# Patient Record
Sex: MALE | Race: WHITE
[De-identification: names, ages, dates, MRNs, and addresses within clinical notes are randomized per-mention and may not be internally consistent; named-entity substitution may affect disease eponyms.]

---

## 2017-03-23 ENCOUNTER — APPOINTMENT (OUTPATIENT)
Dept: OPHTHALMOLOGY | Facility: CLINIC | Age: 82
End: 2017-03-23

## 2017-06-22 ENCOUNTER — APPOINTMENT (OUTPATIENT)
Dept: OPHTHALMOLOGY | Facility: CLINIC | Age: 82
End: 2017-06-22

## 2017-12-07 ENCOUNTER — APPOINTMENT (OUTPATIENT)
Dept: OPHTHALMOLOGY | Facility: CLINIC | Age: 82
End: 2017-12-07
Payer: MEDICARE

## 2017-12-07 PROCEDURE — 92014 COMPRE OPH EXAM EST PT 1/>: CPT

## 2018-06-28 ENCOUNTER — APPOINTMENT (OUTPATIENT)
Dept: OPHTHALMOLOGY | Facility: CLINIC | Age: 83
End: 2018-06-28
Payer: MEDICARE

## 2018-06-28 PROCEDURE — 92014 COMPRE OPH EXAM EST PT 1/>: CPT

## 2018-07-19 ENCOUNTER — APPOINTMENT (OUTPATIENT)
Dept: OPHTHALMOLOGY | Facility: CLINIC | Age: 83
End: 2018-07-19
Payer: MEDICARE

## 2018-07-19 PROCEDURE — 92012 INTRM OPH EXAM EST PATIENT: CPT | Mod: NC

## 2018-12-13 ENCOUNTER — APPOINTMENT (OUTPATIENT)
Dept: OPHTHALMOLOGY | Facility: CLINIC | Age: 83
End: 2018-12-13

## 2019-06-27 ENCOUNTER — APPOINTMENT (OUTPATIENT)
Dept: OPHTHALMOLOGY | Facility: CLINIC | Age: 84
End: 2019-06-27
Payer: MEDICARE

## 2019-06-27 ENCOUNTER — NON-APPOINTMENT (OUTPATIENT)
Age: 84
End: 2019-06-27

## 2019-06-27 PROCEDURE — 92014 COMPRE OPH EXAM EST PT 1/>: CPT

## 2019-10-31 ENCOUNTER — RECORD ABSTRACTING (OUTPATIENT)
Age: 84
End: 2019-10-31

## 2019-10-31 DIAGNOSIS — M48.00 SPINAL STENOSIS, SITE UNSPECIFIED: ICD-10-CM

## 2019-10-31 DIAGNOSIS — F41.9 ANXIETY DISORDER, UNSPECIFIED: ICD-10-CM

## 2019-10-31 RX ORDER — TERAZOSIN 5 MG/1
5 CAPSULE ORAL
Refills: 0 | Status: ACTIVE | COMMUNITY

## 2019-10-31 RX ORDER — FINASTERIDE 5 MG/1
5 TABLET, FILM COATED ORAL
Qty: 90 | Refills: 3 | Status: ACTIVE | COMMUNITY

## 2019-10-31 RX ORDER — NITROGLYCERIN 0.4 MG/1
0.4 TABLET SUBLINGUAL
Refills: 0 | Status: ACTIVE | COMMUNITY

## 2019-10-31 RX ORDER — ASPIRIN 81 MG
81 TABLET, DELAYED RELEASE (ENTERIC COATED) ORAL DAILY
Refills: 0 | Status: ACTIVE | COMMUNITY

## 2019-10-31 RX ORDER — DIAZEPAM 5 MG/1
5 TABLET ORAL
Refills: 0 | Status: ACTIVE | COMMUNITY

## 2019-12-26 ENCOUNTER — APPOINTMENT (OUTPATIENT)
Dept: CARDIOLOGY | Facility: CLINIC | Age: 84
End: 2019-12-26
Payer: MEDICARE

## 2019-12-26 ENCOUNTER — RX RENEWAL (OUTPATIENT)
Age: 84
End: 2019-12-26

## 2019-12-26 ENCOUNTER — NON-APPOINTMENT (OUTPATIENT)
Age: 84
End: 2019-12-26

## 2019-12-26 ENCOUNTER — MEDICATION RENEWAL (OUTPATIENT)
Age: 84
End: 2019-12-26

## 2019-12-26 VITALS
HEIGHT: 67 IN | SYSTOLIC BLOOD PRESSURE: 100 MMHG | BODY MASS INDEX: 25.58 KG/M2 | DIASTOLIC BLOOD PRESSURE: 60 MMHG | RESPIRATION RATE: 15 BRPM | HEART RATE: 56 BPM | WEIGHT: 163 LBS

## 2019-12-26 PROCEDURE — 99205 OFFICE O/P NEW HI 60 MIN: CPT

## 2019-12-26 PROCEDURE — 93306 TTE W/DOPPLER COMPLETE: CPT

## 2019-12-26 PROCEDURE — 93000 ELECTROCARDIOGRAM COMPLETE: CPT

## 2020-01-13 NOTE — ASSESSMENT
[FreeTextEntry1] : ECG- Sinus margarita at 56 bpm, 1'AVB, LBBB pattern (unchanged)\par \par Lab data 11/19/2019\par Chol. 123\par LDL    54\par HDL   59\par Tri.    48\par Creat. 1.31\par HGB 12.0\par \par Echo 12/26/19\par EF 45%\par Basal and mid inferior  wall severe Hypokinesis\par ischemic cardiomyopathy\par Normal LV size and function\par Mod. to severe MR\par Mild to mod. aortic aortic regurgitation \par Large mid to distal abdominal aortic aneurysm measuring 5.90 cm\par \par Impression\par   87 year old male with history of inferior infarction, mild LV dysfunction s/p  a quadruple bypass 1997 with 2   graft occlusions:\par   s/p  stenting of OM1 in  2008,  presenting to continue cardiac management as he recently moved to   [place]   from the [place].\par \par 1. HX of HTN, BP today 100/60 and has been stable. No dizzy spells\par \par 2. Lipids for review reasonably controlled with Simvastatin therapy. \par     Creat. of 1.3, seems to be baseline, no HX of renal  insufficiency.\par \par 3. No anginal discomfort at rest or with exercise. Last stress test from 4/21/10 showed Anterior ischemia.\par \par 4. There has been several years passed since any non invasive testing has been completed to reassess CAD     status.\par \par 5. Known large  aortic abnormal aneurysm which is being managed by [facility]/ DR. Pinto. No records are available for review. Brief scan today showed it was > 5 cm\par He is asymptomatic in regards to this.\par \par \par 6. Left bundle bloc  pattern chronic on ECG.\par \par Plan\par 1. Continue all medications as prescribed\par \par Although Mr. Collazo has a significant cardiac history, as outlined above,  he seems to be stable from a cardiac standpoint without any associated symptoms of HF or angina\par \par He knows to contact my office if there is any chest pain or SOB.\par \par 2. Ask that any testing/exams or consults from Dr. Mahsa Pinto re the AAA be forwarded for review.\par \par 3. Continue to F/U with PCP as scheduled and have all blood work faxed to our office. \par Clinical follow up in 6 months\par

## 2020-01-13 NOTE — PHYSICAL EXAM
[General Appearance - Well Developed] : well developed [Normal Appearance] : normal appearance [Normal Conjunctiva] : the conjunctiva exhibited no abnormalities [Eyelids - No Xanthelasma] : the eyelids demonstrated no xanthelasmas [Normal Oral Mucosa] : normal oral mucosa [No Oral Pallor] : no oral pallor [No Oral Cyanosis] : no oral cyanosis [Normal Jugular Venous V Waves Present] : normal jugular venous V waves present [Normal Jugular Venous A Waves Present] : normal jugular venous A waves present [Heart Sounds] : normal S1 and S2 [Heart Rate And Rhythm] : heart rate and rhythm were normal [Murmurs] : no murmurs present [Edema] : no peripheral edema present [Arterial Pulses Normal] : the arterial pulses were normal [] : no respiratory distress [Exaggerated Use Of Accessory Muscles For Inspiration] : no accessory muscle use [Respiration, Rhythm And Depth] : normal respiratory rhythm and effort [Abdomen Tenderness] : non-tender [Abdomen Soft] : soft [Bowel Sounds] : normal bowel sounds [Nail Clubbing] : no clubbing of the fingernails [Abnormal Walk] : normal gait [No Venous Stasis] : no venous stasis [Skin Color & Pigmentation] : normal skin color and pigmentation [No Skin Ulcers] : no skin ulcer [Oriented To Time, Place, And Person] : oriented to person, place, and time [Affect] : the affect was normal [Memory Recent] : recent memory was not impaired [FreeTextEntry1] : Uses cane

## 2020-01-13 NOTE — ADDENDUM
[FreeTextEntry1] : Telephone conversation today 1/13/20. Vascular evaluation at [facility] revealed that the aneurysm is now 5.2 cm. This represents a 1.5 cm growth and the last 3 months and a 4.5 cm growth in the last 9 months.\par A fenestrated graft is anticipated as a means of repair to allow for renal artery stenting. Cut off this is about 5.5 cm at the center. This will be reassessed in June.\par We have discussed the need for preoperative evaluation to include pharmacologic stress testing which will be facilitated shortly.

## 2020-01-13 NOTE — HISTORY OF PRESENT ILLNESS
[FreeTextEntry1] : Recent moved from the [place] to [place] and [facility].\par \par His Coronary history includes inferior infarction with  a quadruple bypass 10/6/97 \par LIMA to LAD\par Vein graft to PDA\par Vein graft to posterior LV branch and diagonal\par \par Cardiac catheterization 5/13/8 revealed occluded vein graft to diagonal and posterior LV branch\par LIMA to LAD and vein graft to PDA patent\par He had  OM1 stented \par These interventions all  done at [facility]. He doesn’t remember the exact symptoms that prompted intervention.\par \par Generally speaking he feel well, denies any SOB, chest pain, palpitations or edema. He ambulates slowly with a cane.\par \par Previously a patient of Dr. Isael Navarro. Last echo completed in 2014 revealed an EF of 55% with mild- moderate valvular regurgitation and mild left ventricular dilation\par \par Stress test completed in 4/21/10 revealing moderate to large sized region of ischemia involving the anterior wall and anterior septum.\par \par Currently being worked up for an aneurysm of the abdominal aorta through [facility]/ Dr.Karin Pinto with whom he will continue to follow up \par \par An echo completed on 12/26/19 will be discussed

## 2020-01-13 NOTE — REASON FOR VISIT
[FreeTextEntry1] : This is a 87 year old male presenting to continue management of cardiac care with a known history of:\par \par 1. Abdominal Aortic aneurysm\par 2.CAD with stenting of the  OM1  in 2008 and CABG x 4 1997\par 3. Chest pain syndrome\par 4. Claudication\par 5. inferior wall MI 1997\par 6. HTN\par 7. PAD

## 2020-01-13 NOTE — REVIEW OF SYSTEMS
[Feeling Fatigued] : feeling fatigued [Joint Pain] : joint pain [Joint Stiffness] : joint stiffness [Negative] : Heme/Lymph [Recent Weight Gain (___ Lbs)] : no recent weight gain [Recent Weight Loss (___ Lbs)] : no recent weight loss

## 2020-01-28 ENCOUNTER — INPATIENT (INPATIENT)
Facility: HOSPITAL | Age: 85
LOS: 1 days | Discharge: HOME | DRG: 641 | End: 2020-01-30
Attending: HOSPITALIST | Admitting: HOSPITALIST
Payer: MEDICARE

## 2020-01-28 ENCOUNTER — MOBILE ON CALL (OUTPATIENT)
Age: 85
End: 2020-01-28

## 2020-01-28 VITALS
RESPIRATION RATE: 18 BRPM | SYSTOLIC BLOOD PRESSURE: 178 MMHG | OXYGEN SATURATION: 98 % | HEART RATE: 85 BPM | TEMPERATURE: 99 F | DIASTOLIC BLOOD PRESSURE: 80 MMHG

## 2020-01-28 DIAGNOSIS — I10 ESSENTIAL (PRIMARY) HYPERTENSION: ICD-10-CM

## 2020-01-28 DIAGNOSIS — E78.5 HYPERLIPIDEMIA, UNSPECIFIED: ICD-10-CM

## 2020-01-28 DIAGNOSIS — Z98.890 OTHER SPECIFIED POSTPROCEDURAL STATES: Chronic | ICD-10-CM

## 2020-01-28 DIAGNOSIS — E86.0 DEHYDRATION: ICD-10-CM

## 2020-01-28 DIAGNOSIS — Z96.641 PRESENCE OF RIGHT ARTIFICIAL HIP JOINT: Chronic | ICD-10-CM

## 2020-01-28 DIAGNOSIS — Z95.1 PRESENCE OF AORTOCORONARY BYPASS GRAFT: Chronic | ICD-10-CM

## 2020-01-28 DIAGNOSIS — R55 SYNCOPE AND COLLAPSE: ICD-10-CM

## 2020-01-28 DIAGNOSIS — F41.9 ANXIETY DISORDER, UNSPECIFIED: ICD-10-CM

## 2020-01-28 DIAGNOSIS — I71.4 ABDOMINAL AORTIC ANEURYSM, WITHOUT RUPTURE: ICD-10-CM

## 2020-01-28 DIAGNOSIS — N40.0 BENIGN PROSTATIC HYPERPLASIA WITHOUT LOWER URINARY TRACT SYMPTOMS: ICD-10-CM

## 2020-01-28 DIAGNOSIS — I25.10 ATHEROSCLEROTIC HEART DISEASE OF NATIVE CORONARY ARTERY WITHOUT ANGINA PECTORIS: Chronic | ICD-10-CM

## 2020-01-28 LAB
ALBUMIN SERPL ELPH-MCNC: 3.7 G/DL — SIGNIFICANT CHANGE UP (ref 3.3–5.2)
ALP SERPL-CCNC: 85 U/L — SIGNIFICANT CHANGE UP (ref 40–120)
ALT FLD-CCNC: 9 U/L — SIGNIFICANT CHANGE UP
ANION GAP SERPL CALC-SCNC: 10 MMOL/L — SIGNIFICANT CHANGE UP (ref 5–17)
APPEARANCE UR: CLEAR — SIGNIFICANT CHANGE UP
AST SERPL-CCNC: 16 U/L — SIGNIFICANT CHANGE UP
BACTERIA # UR AUTO: ABNORMAL
BASOPHILS # BLD AUTO: 0 K/UL — SIGNIFICANT CHANGE UP (ref 0–0.2)
BASOPHILS NFR BLD AUTO: 0 % — SIGNIFICANT CHANGE UP (ref 0–2)
BILIRUB SERPL-MCNC: 1.1 MG/DL — SIGNIFICANT CHANGE UP (ref 0.4–2)
BILIRUB UR-MCNC: NEGATIVE — SIGNIFICANT CHANGE UP
BUN SERPL-MCNC: 25 MG/DL — HIGH (ref 8–20)
CALCIUM SERPL-MCNC: 9 MG/DL — SIGNIFICANT CHANGE UP (ref 8.6–10.2)
CHLORIDE SERPL-SCNC: 99 MMOL/L — SIGNIFICANT CHANGE UP (ref 98–107)
CO2 SERPL-SCNC: 27 MMOL/L — SIGNIFICANT CHANGE UP (ref 22–29)
COLOR SPEC: YELLOW — SIGNIFICANT CHANGE UP
CREAT SERPL-MCNC: 1.34 MG/DL — HIGH (ref 0.5–1.3)
DIFF PNL FLD: ABNORMAL
EOSINOPHIL # BLD AUTO: 0 K/UL — SIGNIFICANT CHANGE UP (ref 0–0.5)
EOSINOPHIL NFR BLD AUTO: 0 % — SIGNIFICANT CHANGE UP (ref 0–6)
EPI CELLS # UR: SIGNIFICANT CHANGE UP
GIANT PLATELETS BLD QL SMEAR: PRESENT — SIGNIFICANT CHANGE UP
GLUCOSE SERPL-MCNC: 148 MG/DL — HIGH (ref 70–99)
GLUCOSE UR QL: NEGATIVE MG/DL — SIGNIFICANT CHANGE UP
HCT VFR BLD CALC: 37.2 % — LOW (ref 39–50)
HGB BLD-MCNC: 12 G/DL — LOW (ref 13–17)
KETONES UR-MCNC: NEGATIVE — SIGNIFICANT CHANGE UP
LACTATE BLDV-MCNC: 0.9 MMOL/L — SIGNIFICANT CHANGE UP (ref 0.5–2)
LACTATE BLDV-MCNC: 2.7 MMOL/L — HIGH (ref 0.5–2)
LEUKOCYTE ESTERASE UR-ACNC: NEGATIVE — SIGNIFICANT CHANGE UP
LYMPHOCYTES # BLD AUTO: 0.13 K/UL — LOW (ref 1–3.3)
LYMPHOCYTES # BLD AUTO: 1.7 % — LOW (ref 13–44)
MAGNESIUM SERPL-MCNC: 1.8 MG/DL — SIGNIFICANT CHANGE UP (ref 1.8–2.6)
MANUAL SMEAR VERIFICATION: SIGNIFICANT CHANGE UP
MCHC RBC-ENTMCNC: 30.8 PG — SIGNIFICANT CHANGE UP (ref 27–34)
MCHC RBC-ENTMCNC: 32.3 GM/DL — SIGNIFICANT CHANGE UP (ref 32–36)
MCV RBC AUTO: 95.4 FL — SIGNIFICANT CHANGE UP (ref 80–100)
MONOCYTES # BLD AUTO: 0.27 K/UL — SIGNIFICANT CHANGE UP (ref 0–0.9)
MONOCYTES NFR BLD AUTO: 3.5 % — SIGNIFICANT CHANGE UP (ref 2–14)
MYELOCYTES NFR BLD: 0.9 % — HIGH (ref 0–0)
NEUTROPHILS # BLD AUTO: 7.23 K/UL — SIGNIFICANT CHANGE UP (ref 1.8–7.4)
NEUTROPHILS NFR BLD AUTO: 91.3 % — HIGH (ref 43–77)
NEUTS BAND # BLD: 2.6 % — SIGNIFICANT CHANGE UP (ref 0–8)
NITRITE UR-MCNC: NEGATIVE — SIGNIFICANT CHANGE UP
NT-PROBNP SERPL-SCNC: 1804 PG/ML — HIGH (ref 0–300)
PH UR: 7 — SIGNIFICANT CHANGE UP (ref 5–8)
PLAT MORPH BLD: NORMAL — SIGNIFICANT CHANGE UP
PLATELET # BLD AUTO: 143 K/UL — LOW (ref 150–400)
POTASSIUM SERPL-MCNC: 5.2 MMOL/L — SIGNIFICANT CHANGE UP (ref 3.5–5.3)
POTASSIUM SERPL-SCNC: 5.2 MMOL/L — SIGNIFICANT CHANGE UP (ref 3.5–5.3)
PROT SERPL-MCNC: 6.3 G/DL — LOW (ref 6.6–8.7)
PROT UR-MCNC: 30 MG/DL
RAPID RVP RESULT: SIGNIFICANT CHANGE UP
RBC # BLD: 3.9 M/UL — LOW (ref 4.2–5.8)
RBC # FLD: 13.1 % — SIGNIFICANT CHANGE UP (ref 10.3–14.5)
RBC BLD AUTO: NORMAL — SIGNIFICANT CHANGE UP
RBC CASTS # UR COMP ASSIST: >50 /HPF (ref 0–4)
SODIUM SERPL-SCNC: 136 MMOL/L — SIGNIFICANT CHANGE UP (ref 135–145)
SP GR SPEC: 1 — LOW (ref 1.01–1.02)
TROPONIN T SERPL-MCNC: <0.01 NG/ML — SIGNIFICANT CHANGE UP (ref 0–0.06)
UROBILINOGEN FLD QL: NEGATIVE MG/DL — SIGNIFICANT CHANGE UP
WBC # BLD: 7.7 K/UL — SIGNIFICANT CHANGE UP (ref 3.8–10.5)
WBC # FLD AUTO: 7.7 K/UL — SIGNIFICANT CHANGE UP (ref 3.8–10.5)
WBC UR QL: SIGNIFICANT CHANGE UP

## 2020-01-28 PROCEDURE — 93010 ELECTROCARDIOGRAM REPORT: CPT

## 2020-01-28 PROCEDURE — 99285 EMERGENCY DEPT VISIT HI MDM: CPT | Mod: GC

## 2020-01-28 PROCEDURE — 99222 1ST HOSP IP/OBS MODERATE 55: CPT

## 2020-01-28 PROCEDURE — 71046 X-RAY EXAM CHEST 2 VIEWS: CPT | Mod: 26

## 2020-01-28 PROCEDURE — 74176 CT ABD & PELVIS W/O CONTRAST: CPT | Mod: 26

## 2020-01-28 RX ORDER — FINASTERIDE 5 MG/1
5 TABLET, FILM COATED ORAL AT BEDTIME
Refills: 0 | Status: DISCONTINUED | OUTPATIENT
Start: 2020-01-28 | End: 2020-01-30

## 2020-01-28 RX ORDER — DIAZEPAM 5 MG
5 TABLET ORAL THREE TIMES A DAY
Refills: 0 | Status: DISCONTINUED | OUTPATIENT
Start: 2020-01-28 | End: 2020-01-30

## 2020-01-28 RX ORDER — CEFTRIAXONE 500 MG/1
1000 INJECTION, POWDER, FOR SOLUTION INTRAMUSCULAR; INTRAVENOUS ONCE
Refills: 0 | Status: COMPLETED | OUTPATIENT
Start: 2020-01-28 | End: 2020-01-28

## 2020-01-28 RX ORDER — ASPIRIN/CALCIUM CARB/MAGNESIUM 324 MG
162 TABLET ORAL AT BEDTIME
Refills: 0 | Status: DISCONTINUED | OUTPATIENT
Start: 2020-01-28 | End: 2020-01-29

## 2020-01-28 RX ORDER — SODIUM CHLORIDE 9 MG/ML
2000 INJECTION INTRAMUSCULAR; INTRAVENOUS; SUBCUTANEOUS ONCE
Refills: 0 | Status: COMPLETED | OUTPATIENT
Start: 2020-01-28 | End: 2020-01-28

## 2020-01-28 RX ORDER — METOPROLOL TARTRATE 50 MG
25 TABLET ORAL
Refills: 0 | Status: DISCONTINUED | OUTPATIENT
Start: 2020-01-28 | End: 2020-01-30

## 2020-01-28 RX ORDER — ACETAMINOPHEN 500 MG
650 TABLET ORAL EVERY 6 HOURS
Refills: 0 | Status: DISCONTINUED | OUTPATIENT
Start: 2020-01-28 | End: 2020-01-30

## 2020-01-28 RX ORDER — IBUPROFEN 200 MG
400 TABLET ORAL ONCE
Refills: 0 | Status: COMPLETED | OUTPATIENT
Start: 2020-01-28 | End: 2020-01-28

## 2020-01-28 RX ORDER — DOXAZOSIN MESYLATE 4 MG
4 TABLET ORAL AT BEDTIME
Refills: 0 | Status: DISCONTINUED | OUTPATIENT
Start: 2020-01-28 | End: 2020-01-30

## 2020-01-28 RX ORDER — LISINOPRIL 2.5 MG/1
10 TABLET ORAL AT BEDTIME
Refills: 0 | Status: DISCONTINUED | OUTPATIENT
Start: 2020-01-28 | End: 2020-01-30

## 2020-01-28 RX ORDER — SENNA PLUS 8.6 MG/1
2 TABLET ORAL AT BEDTIME
Refills: 0 | Status: DISCONTINUED | OUTPATIENT
Start: 2020-01-28 | End: 2020-01-30

## 2020-01-28 RX ORDER — MAGNESIUM SULFATE 500 MG/ML
2 VIAL (ML) INJECTION ONCE
Refills: 0 | Status: COMPLETED | OUTPATIENT
Start: 2020-01-28 | End: 2020-01-28

## 2020-01-28 RX ORDER — ISOSORBIDE MONONITRATE 60 MG/1
60 TABLET, EXTENDED RELEASE ORAL DAILY
Refills: 0 | Status: DISCONTINUED | OUTPATIENT
Start: 2020-01-28 | End: 2020-01-30

## 2020-01-28 RX ORDER — ACETAMINOPHEN 500 MG
650 TABLET ORAL ONCE
Refills: 0 | Status: COMPLETED | OUTPATIENT
Start: 2020-01-28 | End: 2020-01-28

## 2020-01-28 RX ORDER — SIMVASTATIN 20 MG/1
20 TABLET, FILM COATED ORAL AT BEDTIME
Refills: 0 | Status: DISCONTINUED | OUTPATIENT
Start: 2020-01-28 | End: 2020-01-30

## 2020-01-28 RX ORDER — SODIUM CHLORIDE 9 MG/ML
1000 INJECTION INTRAMUSCULAR; INTRAVENOUS; SUBCUTANEOUS
Refills: 0 | Status: DISCONTINUED | OUTPATIENT
Start: 2020-01-28 | End: 2020-01-30

## 2020-01-28 RX ADMIN — Medication 400 MILLIGRAM(S): at 18:43

## 2020-01-28 RX ADMIN — Medication 4 MILLIGRAM(S): at 23:56

## 2020-01-28 RX ADMIN — CEFTRIAXONE 100 MILLIGRAM(S): 500 INJECTION, POWDER, FOR SOLUTION INTRAMUSCULAR; INTRAVENOUS at 17:01

## 2020-01-28 RX ADMIN — SODIUM CHLORIDE 100 MILLILITER(S): 9 INJECTION INTRAMUSCULAR; INTRAVENOUS; SUBCUTANEOUS at 23:00

## 2020-01-28 RX ADMIN — FINASTERIDE 5 MILLIGRAM(S): 5 TABLET, FILM COATED ORAL at 23:55

## 2020-01-28 RX ADMIN — Medication 650 MILLIGRAM(S): at 17:56

## 2020-01-28 RX ADMIN — Medication 25 MILLIGRAM(S): at 23:55

## 2020-01-28 RX ADMIN — Medication 400 MILLIGRAM(S): at 20:00

## 2020-01-28 RX ADMIN — Medication 650 MILLIGRAM(S): at 14:31

## 2020-01-28 RX ADMIN — SENNA PLUS 2 TABLET(S): 8.6 TABLET ORAL at 23:56

## 2020-01-28 RX ADMIN — LISINOPRIL 10 MILLIGRAM(S): 2.5 TABLET ORAL at 23:55

## 2020-01-28 RX ADMIN — Medication 2 GRAM(S): at 18:43

## 2020-01-28 RX ADMIN — Medication 50 GRAM(S): at 17:55

## 2020-01-28 RX ADMIN — SODIUM CHLORIDE 2000 MILLILITER(S): 9 INJECTION INTRAMUSCULAR; INTRAVENOUS; SUBCUTANEOUS at 14:31

## 2020-01-28 RX ADMIN — Medication 162 MILLIGRAM(S): at 23:55

## 2020-01-28 RX ADMIN — CEFTRIAXONE 1000 MILLIGRAM(S): 500 INJECTION, POWDER, FOR SOLUTION INTRAMUSCULAR; INTRAVENOUS at 18:44

## 2020-01-28 RX ADMIN — SODIUM CHLORIDE 2000 MILLILITER(S): 9 INJECTION INTRAMUSCULAR; INTRAVENOUS; SUBCUTANEOUS at 15:40

## 2020-01-28 RX ADMIN — SIMVASTATIN 20 MILLIGRAM(S): 20 TABLET, FILM COATED ORAL at 23:55

## 2020-01-28 NOTE — H&P ADULT - NSHPPHYSICALEXAM_GEN_ALL_CORE
Vital Signs Last 24 Hrs  T(C): 37.2 (28 Jan 2020 23:28), Max: 39 (28 Jan 2020 13:47)  T(F): 98.9 (28 Jan 2020 23:28), Max: 102.2 (28 Jan 2020 13:47)  HR: 88 (28 Jan 2020 23:28) (78 - 93)  BP: 133/69 (28 Jan 2020 23:28) (124/75 - 178/80)  RR: 17 (28 Jan 2020 23:28) (17 - 20)  SpO2: 96% (28 Jan 2020 23:28) (95% - 99%)    Constitutional/General: Well developed, well nourished, vitals reviewed  EYE: PERRL, conjunctiva clear  ENT: Good dentition, oropharynx clear  NECK: No masses, thyroid normal  RESP: CTAB, no wheezes, no rhonchi, normal effort  CV: RRR, normal S1S2, no murmur, 2+ DP pulses, no JVD, no edema  ABDOMEN: Soft, nontender, no HSM  LYMPH: No cervical or supraclavicular adenopathy  SKIN: Warm, dry, no rashes  NEURO: CN II-XII intact grossly, sensation intact  PSYCHIATRIC: Oriented x3, normal mood and affect

## 2020-01-28 NOTE — H&P ADULT - HISTORY OF PRESENT ILLNESS
ED HPI: Pt is an 87 y.o. M hx of HTN, AAA, Cardiac Bypass 97', cardiac stent x1, CAD on ASA, presenting with a presycnopal event earlier today. The pt was walking in his [facility] assisted living facility, within his unit when he felt lightheaded, fell backward, denies head trauma, denies LOC, but hit his left wrist sustaining an abrasion which was bandaged. Was able to stand and go to chair within one minute. +nausea. Denies chest pain, chest pressure, diaphoresis, vomiting, abdominal pain/pressure, diarrhea or constipation. The pt believes he had a 'stomach bug' three to four days ago given lack of appetite. Of note the pt had a CT scan last week measuring his AAA at 5.2 cm.    Above ED HPi reviewed with patient and noted. patient reports that for the past two days felling "unwell" with some abdominal discomfort, no nausea or vomiting, but loss of appetite. One day prior to arrival his loss of appetite worsened and he developed generalized fatigue and weakness. while at his kitchen sink felt lightheaded, fell back, hitting his left wrist, no loss of consciousness, no head trauma, ED HPI: Pt is an 87 y.o. M hx of HTN, AAA, Cardiac Bypass 97', cardiac stent x1, CAD on ASA, presenting with a presycnopal event earlier today. The pt was walking in his [facility] assisted living facility, within his unit when he felt lightheaded, fell backward, denies head trauma, denies LOC, but hit his left wrist sustaining an abrasion which was bandaged. Was able to stand and go to chair within one minute. +nausea. Denies chest pain, chest pressure, diaphoresis, vomiting, abdominal pain/pressure, diarrhea or constipation. The pt believes he had a 'stomach bug' three to four days ago given lack of appetite. Of note the pt had a CT scan last week measuring his AAA at 5.2 cm.    Above ED HPi reviewed with patient and noted. patient reports that for the past two days felling "unwell" with some abdominal discomfort, no nausea or vomiting, but loss of appetite. One day prior to arrival his loss of appetite worsened and he developed generalized fatigue and weakness. while at his kitchen sink felt lightheaded, fell back, hitting his left wrist, no loss of consciousness, no head trauma. He was then able to crawl to his sofa and call the facility who then advised him to seek medical attention. He has no prior history of such events.

## 2020-01-28 NOTE — H&P ADULT - NSICDXPASTSURGICALHX_GEN_ALL_CORE_FT
PAST SURGICAL HISTORY:  CAD (coronary artery disease) s/p stent placed    History of right hip replacement     S/P CABG (coronary artery bypass graft)     S/P left inguinal hernia repair

## 2020-01-28 NOTE — ED ADULT NURSE NOTE - OBJECTIVE STATEMENT
pt states 'I feel like I did when I had the stomach virus the other day"   pt was walking back from breakfast with cane and fell   sustaining skin tear to LEFT wrist.   'I am ok' 'I just feel like I am getting the stomach virus again"  son at bedside

## 2020-01-28 NOTE — ED PROVIDER NOTE - PSH
CAD (coronary artery disease)  s/p stent placed  History of right hip replacement    S/P CABG (coronary artery bypass graft)    S/P left inguinal hernia repair

## 2020-01-28 NOTE — ED PROVIDER NOTE - ATTENDING CONTRIBUTION TO CARE
HPI: 88yo male PMH HTN, AAA (last measured 5.2cm 3 weeks ago,), Coronary Artery Disease s/p CABG presenting with episode of near-syncope. Patient states he and his wife have been sick with similar symptoms at home- have felt nauseous, no appetitie, +chills. No vomiting or diarrhea. Patient states that he stood up, felt lightheaded and felt like he was about to pass out and fell backward. Denies hitting head but +abrasion to L wrist.     PE:  Gen: NAD  Head: NCAT  HEENT: Oral mucosa dry, normal conjunctiva  CV: RRR no murmurs, normal perfusion  Resp: CTA b/l, no wheezing, rales, rhonchi, no respiratory distress  GI: Abd Soft minimally tender epigastric region no guarding/rigidity no pulsatile mass  Neuro: No focal neuro deficits  MSK: FROM all 4 extremities, no deformity  Skin: No rash, no bruising  Psych: Normal affect    MDM: 88yo male with fever, nausea, near-syncopal episode, clinically dehydrated. Source viral vs UTI (given rocephin to cover). WIll admit to hospital for telemetry monitoring, IV fluids, and antibiotics. Eboni Dunne DO         I performed a history and physical exam of the patient and discussed their management with the resident. I reviewed the resident's note and agree with the documented findings and plan of care. My medical decision making and observations are found above.

## 2020-01-28 NOTE — H&P ADULT - PROBLEM SELECTOR PLAN 1
Admit to medical unit with monitor  Cardiology consult, reviewed, agree with assessment  Fall / Aspiration precautions  Code sepsis initiated on arrival, patient is status post Blood & urine culture drawn, 2L bolus, LActic (elevated at 2.7), IV Ceftriaxone. no Clear Source of infection identified at this time.   CXR: Cardiomegaly with clear lungs.   CT abd: 5.2cm AAA, known to patient with ongoing follow up in [place], next scheduled visit in June.  ECHO pending

## 2020-01-28 NOTE — ED PROVIDER NOTE - OBJECTIVE STATEMENT
Pt is an 87 y.o. M hx of HTN, AAA, Cardiac Bypass 97', cardiac stent x1, CAD on ASA, presenting with a presycnopal event earlier today. The [facility] within his unit when he felt lightheaded, felt Pt is an 87 y.o. M hx of HTN, AAA, Cardiac Bypass 97', cardiac stent x1, CAD on ASA, presenting with a presycnopal event earlier today. The pt was walking in his [facility] assisted living facility, within his unit when he felt lightheaded, fell backward, denies head trauma, denies LOC, but hit his left wrist sustaining an abrasion which was bandaged. Was able to stand and go to chair within one minute. +nausea. Denies chest pain, chest pressure, diaphoresis, vomiting, abdominal pain/pressure, diarrhea or constipation. The pt believes he had a 'stomach bug' three to four days ago given lack of appetite. Of note the pt had a CT scan last week measuring his AAA at 5.2 cm.

## 2020-01-28 NOTE — H&P ADULT - ASSESSMENT
88 y/o with HTN, AAA, Cardiac Bypass 97, CAD stent x1, sent from Assisted living facility due to syncope.

## 2020-01-28 NOTE — CONSULT NOTE ADULT - ASSESSMENT
Assessment:    Mr. Collazo is an 87 year old man with past medical history Coronary artery disease (s/p stent of OM1 in 2008, CABG x 4 in 1997; LIMA-LAD, SVG-PDA, SVG-posterior LV branch and diagonal), Inferior wall myocardial infarction, Abdominal aortic aneurysm, Hypertension, Peripheral arterial disease and reports of chest pain syndrome,           Recommendations:  Large mid to distal abdominal aortic aneurysm 5.9 cm manged by [facility] dr Assessment:  Mr. Collazo is an 87 year old man with past medical history Coronary artery disease (s/p stent of OM1 in 2008, CABG x 4 in 1997; LIMA-LAD, SVG-PDA, SVG-posterior LV branch and diagonal), Inferior wall myocardial infarction, Abdominal aortic aneurysm, CKD (Cr 1.3 baseline), Hypertension, Peripheral arterial disease and reports of chest pain syndrome, who presents with episode of presyncope and fall onto left wrist. The patient denies episode of syncope. He does report having a recent abdominal infection, likely gastroenteritis and has had decreased oral intake and decreased appetite, there is concern for dehydration/hypovolemia.    So far, less concern for ischemia, given lack of typical symptoms, ECG with no acute ischemic changes, he has chronic LBBB and initial troponin negative. Would still rule out acute coronary syndrome. Also the patient does not appear to be in decompensated heart failure on exam. Would recommend the following:     Recommendations:  [] Pre-syncope: Concern for dehydration/hypovolemia given recent gastroenteritis. Recommend supportive care and PO hydration.   [] Coronary artery disease: Symptoms appear stable. Would check repeat troponins x 2. Check echocardiogram. Continue home CV meds: Aspirin 81 mg daily, Metoprolol tartrate 25 mg BID, Ramipril 2.5 mg daily, Simvastatin 20 mg daily  [] Abdominal aortic aneurysm: Outpatient TTE with 5.9 cm aneurysm, patient follows up with [facility] with plans for re-evaluation in June per outpatient notes     Thank you for the consult. We will follow along.    Evi Delgado MD  Cardiology, [facility]

## 2020-01-28 NOTE — CONSULT NOTE ADULT - SUBJECTIVE AND OBJECTIVE BOX
PAT HOFFMANN  [id number]      HPI:  Mr. Hoffmann is an 87 year old man with past medical history Coronary artery disease (s/p stent of OM1 in 2008, CABG x 4 in 1997; LIMA-LAD, SVG-PDA, SVG-posterior LV branch and diagonal), Inferior wall myocardial infarction, Abdominal aortic aneurysm, Hypertension, Peripheral arterial disease and reports of chest pain syndrome,       ALLERGIES:  penicillin (Vomiting; Nausea)      PAST MEDICAL & SURGICAL HISTORY:  Coronary artery disease (s/p stent of OM1 in 2008, CABG x 4 in 1997; LIMA-LAD, SVG-PDA, SVG-posterior LV branch and diagonal)  Inferior wall myocardial infarction  Abdominal aortic aneurysm  Hypertension  Peripheral arterial disease  Chest pain syndrome      Home Cardiac Meds:        SOCIAL HISTORY:  Patient denies alcohol, tobacco, drug use    FAMILY HISTORY:  No pertinent family history in first degree relatives      ROS:  All 10 systems reviewed and positives noted in HPI    Objective:       Vital Signs Last 24 Hrs  T(C): 39 (28 Jan 2020 13:47), Max: 39 (28 Jan 2020 13:47)  T(F): 102.2 (28 Jan 2020 13:47), Max: 102.2 (28 Jan 2020 13:47)  HR: 93 (28 Jan 2020 14:10) (81 - 93)  BP: 168/75 (28 Jan 2020 14:10) (167/85 - 178/80)  BP(mean): --  RR: 20 (28 Jan 2020 14:10) (18 - 20)  SpO2: 95% (28 Jan 2020 14:10) (95% - 99%)    Physical Exam:   General: Patient comfortable in NAD  HEENT: NCAT, mmm, EOMI  Neck: no JVD, no carotid bruits  CVS: nl s1, split s2, no s3, no s4, no murmur or rubs, RRR  Chest: CTA b/l  Abdomen: soft, nt/nd  Extremities: No c/c/e  Neuro: A&O x3  Psych: Normal affect        LABS:                        12.0   7.70  )-----------( 143      ( 28 Jan 2020 13:20 )             37.2     01-28    136  |  99  |  25.0<H>  ----------------------------<  148<H>  5.2   |  27.0  |  1.34<H>    Ca    9.0      28 Jan 2020 13:20  Mg     1.8     01-28    TPro  6.3<L>  /  Alb  3.7  /  TBili  1.1  /  DBili  x   /  AST  16  /  ALT  9   /  AlkPhos  85  01-28    CARDIAC MARKERS ( 28 Jan 2020 13:20 )  x     / <0.01 ng/mL / x     / x     / x              Coronary angiogram (5/2018) at [facility]:  Occluded vein graft to diagonal and posterior LV branch  LIMA to LAD and vein graft to PDA patent  s/p PCI to OM1    Outpatient stress test (4/2010):  Moderate to large sized region of ischemia in the anterior wall and anteroseptum    Outpatient TTE (12/2019):  LVEF 45%  Basal and mid inferior wall severe hypokinesis  Moderate to severe mitral regurgitation  Mild to moderate aortic regurgitation  Large mid to distal abdominal aortic aneurysm 5.9 cm PAT HOFFMANN  [id number]      HPI:  Mr. Hoffmann is an 87 year old man with past medical history Coronary artery disease (s/p stent of OM1 in 2008, CABG x 4 in 1997; LIMA-LAD, SVG-PDA, SVG-posterior LV branch and diagonal), Inferior wall myocardial infarction, Abdominal aortic aneurysm, Hypertension, Peripheral arterial disease and reports of chest pain syndrome who presents with pre-syncope. The patient states that he was at home with his wife and was finished eating breakfast, when he felt dizzy as if he would pass out. He denies having an episode of syncope. He reports his wife was present. Reports that he ambulates around and has not experienced exertional angina or dyspnea. Denies leg edema and orthopnea. Denies palpitations. He reports that he has not been eating and drinking normally due to recent abdominal gastroenteritis.       ALLERGIES:  penicillin (Vomiting; Nausea)      PAST MEDICAL & SURGICAL HISTORY:  Coronary artery disease (s/p stent of OM1 in 2008, CABG x 4 in 1997; LIMA-LAD, SVG-PDA, SVG-posterior LV branch and diagonal)  Inferior wall myocardial infarction  Abdominal aortic aneurysm  Hypertension  Peripheral arterial disease  Chest pain syndrome      Home Cardiac Meds:  Aspirin 81 mg daily  Metoprolol tartrate  25 mg BID  Nitroglycerin SL  Ramipril 2.5 mg daily  Simvastatin 20 mg daily      SOCIAL HISTORY:  Patient denies alcohol, tobacco, drug use    FAMILY HISTORY:  No pertinent family history in first degree relatives      ROS:  All 10 systems reviewed and positives noted in HPI    Objective:       Vital Signs Last 24 Hrs  T(C): 39 (28 Jan 2020 13:47), Max: 39 (28 Jan 2020 13:47)  T(F): 102.2 (28 Jan 2020 13:47), Max: 102.2 (28 Jan 2020 13:47)  HR: 93 (28 Jan 2020 14:10) (81 - 93)  BP: 168/75 (28 Jan 2020 14:10) (167/85 - 178/80)  BP(mean): --  RR: 20 (28 Jan 2020 14:10) (18 - 20)  SpO2: 95% (28 Jan 2020 14:10) (95% - 99%)    Physical Exam:   General: elderly man, sitting upright, no distress  HEENT: dry oral mucosa  Neck: supple, no carotid bruits b/l   CVS: JVP ~ 7 cm H20, RRR, s1, s2, no murmurs  Chest: unlabored respirations, CTAB  Abdomen: non-distended  Extremities: mild venous skin changes and minimal lower extremity edema b/l  Neuro: A&O x3  Psych: Normal affect        LABS:                        12.0   7.70  )-----------( 143      ( 28 Jan 2020 13:20 )             37.2     01-28    136  |  99  |  25.0<H>  ----------------------------<  148<H>  5.2   |  27.0  |  1.34<H>    Ca    9.0      28 Jan 2020 13:20  Mg     1.8     01-28    TPro  6.3<L>  /  Alb  3.7  /  TBili  1.1  /  DBili  x   /  AST  16  /  ALT  9   /  AlkPhos  85  01-28    CARDIAC MARKERS ( 28 Jan 2020 13:20 )  x     / <0.01 ng/mL / x     / x     / x          ECG (1/28/2020): sinus rhythm, first degree AV block, LBBB, old inferior infarction (similar to outpatient ECG)    Coronary angiogram (5/2018) at [facility]:  Occluded vein graft to diagonal and posterior LV branch  LIMA to LAD and vein graft to PDA patent  s/p PCI to OM1    Outpatient stress test (4/2010):  Moderate to large sized region of ischemia in the anterior wall and anteroseptum    Outpatient TTE (12/2019):  LVEF 45%  Basal and mid inferior wall severe hypokinesis  Moderate to severe mitral regurgitation  Mild to moderate aortic regurgitation  Large mid to distal abdominal aortic aneurysm 5.9 cm

## 2020-01-28 NOTE — ED ADULT TRIAGE NOTE - CHIEF COMPLAINT QUOTE
Pt BIBA s/p near syncopal episode. Pt denies LOC or head injury.  VSS on arrival pt c/o Nausea. States he has been feeling generally weak x 1 week  [id number]

## 2020-01-28 NOTE — ED ADULT NURSE NOTE - CHIEF COMPLAINT QUOTE
Pt BIBA s/p near syncopal episode. Pt denies LOC or head injury.  VSS on arrival pt c/o Nausea. States he has been feeling generally weak x 1 week  [id number]

## 2020-01-28 NOTE — ED PROVIDER NOTE - PROGRESS NOTE DETAILS
Michael Ashley, Resident: Pt unchanged, pending CT scan. No complains, still nauseous but does not want an antiemetic. Physical exam unchanged from before. Magneisum level low at 1.8, will replete electrolyte. Michael Ashley, Resident: Rocephin given. Given poor PO intake, poor volume status, age and weakness pt to be admitted. Pt continues to be febrile despite tylenol given, will give motrin. Spoke with hospitalist who agrees with admission.

## 2020-01-28 NOTE — H&P ADULT - PROBLEM SELECTOR PLAN 2
Poor oral intake for past few days, with cont use of antihypertensive and Valium.   found to have DESIRAE, suspected volume depletion.   status post 2L bolus, as per sepsis protocol   cont IV hydration Normal Saline at 100ml.   will cont Rocephin for now, due to high Bandemia  f/u repeat BMP, CBC, mag, phos

## 2020-01-29 LAB
ANION GAP SERPL CALC-SCNC: 10 MMOL/L — SIGNIFICANT CHANGE UP (ref 5–17)
ANISOCYTOSIS BLD QL: SLIGHT — SIGNIFICANT CHANGE UP
BASOPHILS # BLD AUTO: 0 K/UL — SIGNIFICANT CHANGE UP (ref 0–0.2)
BASOPHILS NFR BLD AUTO: 0 % — SIGNIFICANT CHANGE UP (ref 0–2)
BUN SERPL-MCNC: 24 MG/DL — HIGH (ref 8–20)
CALCIUM SERPL-MCNC: 7.8 MG/DL — LOW (ref 8.6–10.2)
CHLORIDE SERPL-SCNC: 105 MMOL/L — SIGNIFICANT CHANGE UP (ref 98–107)
CO2 SERPL-SCNC: 22 MMOL/L — SIGNIFICANT CHANGE UP (ref 22–29)
CREAT SERPL-MCNC: 1.07 MG/DL — SIGNIFICANT CHANGE UP (ref 0.5–1.3)
CULTURE RESULTS: SIGNIFICANT CHANGE UP
ELLIPTOCYTES BLD QL SMEAR: SLIGHT — SIGNIFICANT CHANGE UP
EOSINOPHIL # BLD AUTO: 0.04 K/UL — SIGNIFICANT CHANGE UP (ref 0–0.5)
EOSINOPHIL NFR BLD AUTO: 0.9 % — SIGNIFICANT CHANGE UP (ref 0–6)
GIANT PLATELETS BLD QL SMEAR: PRESENT — SIGNIFICANT CHANGE UP
GLUCOSE SERPL-MCNC: 103 MG/DL — HIGH (ref 70–99)
HCT VFR BLD CALC: 34.6 % — LOW (ref 39–50)
HGB BLD-MCNC: 10.9 G/DL — LOW (ref 13–17)
HYPOCHROMIA BLD QL: SLIGHT — SIGNIFICANT CHANGE UP
LYMPHOCYTES # BLD AUTO: 0.3 K/UL — LOW (ref 1–3.3)
LYMPHOCYTES # BLD AUTO: 7.8 % — LOW (ref 13–44)
MACROCYTES BLD QL: SLIGHT — SIGNIFICANT CHANGE UP
MAGNESIUM SERPL-MCNC: 2.2 MG/DL — SIGNIFICANT CHANGE UP (ref 1.6–2.6)
MANUAL SMEAR VERIFICATION: SIGNIFICANT CHANGE UP
MCHC RBC-ENTMCNC: 30.2 PG — SIGNIFICANT CHANGE UP (ref 27–34)
MCHC RBC-ENTMCNC: 31.5 GM/DL — LOW (ref 32–36)
MCV RBC AUTO: 95.8 FL — SIGNIFICANT CHANGE UP (ref 80–100)
MICROCYTES BLD QL: SLIGHT — SIGNIFICANT CHANGE UP
MONOCYTES # BLD AUTO: 0.07 K/UL — SIGNIFICANT CHANGE UP (ref 0–0.9)
MONOCYTES NFR BLD AUTO: 1.7 % — LOW (ref 2–14)
NEUTROPHILS # BLD AUTO: 3.46 K/UL — SIGNIFICANT CHANGE UP (ref 1.8–7.4)
NEUTROPHILS NFR BLD AUTO: 85.2 % — HIGH (ref 43–77)
NEUTS BAND # BLD: 3.5 % — SIGNIFICANT CHANGE UP (ref 0–8)
OVALOCYTES BLD QL SMEAR: SLIGHT — SIGNIFICANT CHANGE UP
PHOSPHATE SERPL-MCNC: 2.8 MG/DL — SIGNIFICANT CHANGE UP (ref 2.4–4.7)
PLAT MORPH BLD: ABNORMAL
PLATELET # BLD AUTO: 112 K/UL — LOW (ref 150–400)
PLATELET COUNT - ESTIMATE: ABNORMAL
POIKILOCYTOSIS BLD QL AUTO: SLIGHT — SIGNIFICANT CHANGE UP
POTASSIUM SERPL-MCNC: 4.6 MMOL/L — SIGNIFICANT CHANGE UP (ref 3.5–5.3)
POTASSIUM SERPL-SCNC: 4.6 MMOL/L — SIGNIFICANT CHANGE UP (ref 3.5–5.3)
PROCALCITONIN SERPL-MCNC: 0.2 NG/ML — HIGH (ref 0.02–0.1)
RBC # BLD: 3.61 M/UL — LOW (ref 4.2–5.8)
RBC # FLD: 13.2 % — SIGNIFICANT CHANGE UP (ref 10.3–14.5)
RBC BLD AUTO: ABNORMAL
SCHISTOCYTES BLD QL AUTO: SLIGHT — SIGNIFICANT CHANGE UP
SODIUM SERPL-SCNC: 137 MMOL/L — SIGNIFICANT CHANGE UP (ref 135–145)
SPECIMEN SOURCE: SIGNIFICANT CHANGE UP
TROPONIN T SERPL-MCNC: 0.02 NG/ML — SIGNIFICANT CHANGE UP (ref 0–0.06)
TROPONIN T SERPL-MCNC: 0.02 NG/ML — SIGNIFICANT CHANGE UP (ref 0–0.06)
VARIANT LYMPHS # BLD: 0.9 % — SIGNIFICANT CHANGE UP (ref 0–6)
WBC # BLD: 3.9 K/UL — SIGNIFICANT CHANGE UP (ref 3.8–10.5)
WBC # FLD AUTO: 3.9 K/UL — SIGNIFICANT CHANGE UP (ref 3.8–10.5)

## 2020-01-29 PROCEDURE — 99232 SBSQ HOSP IP/OBS MODERATE 35: CPT

## 2020-01-29 PROCEDURE — 99233 SBSQ HOSP IP/OBS HIGH 50: CPT

## 2020-01-29 PROCEDURE — 12345: CPT | Mod: NC

## 2020-01-29 RX ORDER — CEFTRIAXONE 500 MG/1
1000 INJECTION, POWDER, FOR SOLUTION INTRAMUSCULAR; INTRAVENOUS EVERY 24 HOURS
Refills: 0 | Status: DISCONTINUED | OUTPATIENT
Start: 2020-01-29 | End: 2020-01-30

## 2020-01-29 RX ORDER — ONDANSETRON 8 MG/1
4 TABLET, FILM COATED ORAL EVERY 6 HOURS
Refills: 0 | Status: DISCONTINUED | OUTPATIENT
Start: 2020-01-29 | End: 2020-01-30

## 2020-01-29 RX ORDER — ASPIRIN/CALCIUM CARB/MAGNESIUM 324 MG
81 TABLET ORAL DAILY
Refills: 0 | Status: DISCONTINUED | OUTPATIENT
Start: 2020-01-30 | End: 2020-01-30

## 2020-01-29 RX ADMIN — CEFTRIAXONE 100 MILLIGRAM(S): 500 INJECTION, POWDER, FOR SOLUTION INTRAMUSCULAR; INTRAVENOUS at 12:54

## 2020-01-29 RX ADMIN — SIMVASTATIN 20 MILLIGRAM(S): 20 TABLET, FILM COATED ORAL at 21:36

## 2020-01-29 RX ADMIN — LISINOPRIL 10 MILLIGRAM(S): 2.5 TABLET ORAL at 21:36

## 2020-01-29 RX ADMIN — SODIUM CHLORIDE 100 MILLILITER(S): 9 INJECTION INTRAMUSCULAR; INTRAVENOUS; SUBCUTANEOUS at 17:07

## 2020-01-29 RX ADMIN — ISOSORBIDE MONONITRATE 60 MILLIGRAM(S): 60 TABLET, EXTENDED RELEASE ORAL at 08:01

## 2020-01-29 RX ADMIN — Medication 4 MILLIGRAM(S): at 21:36

## 2020-01-29 RX ADMIN — ONDANSETRON 4 MILLIGRAM(S): 8 TABLET, FILM COATED ORAL at 15:35

## 2020-01-29 RX ADMIN — Medication 25 MILLIGRAM(S): at 05:23

## 2020-01-29 RX ADMIN — Medication 5 MILLIGRAM(S): at 08:04

## 2020-01-29 RX ADMIN — Medication 25 MILLIGRAM(S): at 17:06

## 2020-01-29 RX ADMIN — FINASTERIDE 5 MILLIGRAM(S): 5 TABLET, FILM COATED ORAL at 21:36

## 2020-01-29 NOTE — PHYSICAL THERAPY INITIAL EVALUATION ADULT - GENERAL OBSERVATIONS, REHAB EVAL
Pt received in stretcher bed, + IV Loc, +Tele, breathing on RA in NAD, in 0/10 pain, agreeable to PT evaluation

## 2020-01-29 NOTE — PROGRESS NOTE ADULT - SUBJECTIVE AND OBJECTIVE BOX
CHIEF COMPLAINT/INTERVAL HISTORY:    Patient is a 87y old  Male who presents with a chief complaint of Syncope (2020 14:11)      HPI:  ED HPI: Pt is an 87 y.o. M hx of HTN, AAA, Cardiac Bypass 97', cardiac stent x1, CAD on ASA, presenting with a presycnopal event earlier today. The pt was walking in his [facility] assisted living facility, within his unit when he felt lightheaded, fell backward, denies head trauma, denies LOC, but hit his left wrist sustaining an abrasion which was bandaged. Was able to stand and go to chair within one minute. +nausea. Denies chest pain, chest pressure, diaphoresis, vomiting, abdominal pain/pressure, diarrhea or constipation. The pt believes he had a 'stomach bug' three to four days ago given lack of appetite. Of note the pt had a CT scan last week measuring his AAA at 5.2 cm.    Above ED HPi reviewed with patient and noted. patient reports that for the past two days felling "unwell" with some abdominal discomfort, no nausea or vomiting, but loss of appetite. One day prior to arrival his loss of appetite worsened and he developed generalized fatigue and weakness. while at his kitchen sink felt lightheaded, fell back, hitting his left wrist, no loss of consciousness, no head trauma. He was then able to crawl to his sofa and call the facility who then advised him to seek medical attention. He has no prior history of such events. (2020 20:48)      SUBJECTIVE & OBJECTIVE/ ROS: Pt seen and examined at bedside. Tmax of 102 yesterday but none since last night. No fever, chills, URI, diarrhea, constipation, vomiting, open wounds, dysuria, increased frequency, HA, neck stiffness.     ICU Vital Signs Last 24 Hrs  T(C): 36.9 (2020 15:31), Max: 37.3 (2020 20:50)  T(F): 98.5 (2020 15:31), Max: 99.1 (2020 20:50)  HR: 65 (2020 15:31) (59 - 88)  BP: 130/61 (2020 15:31) (119/65 - 151/67)  BP(mean): --  ABP: --  ABP(mean): --  RR: 18 (2020 15:31) (17 - 18)  SpO2: 98% (2020 15:31) (95% - 98%)        MEDICATIONS  (STANDING):  cefTRIAXone   IVPB 1000 milliGRAM(s) IV Intermittent every 24 hours  doxazosin 4 milliGRAM(s) Oral at bedtime  finasteride 5 milliGRAM(s) Oral at bedtime  isosorbide   mononitrate ER Tablet (IMDUR) 60 milliGRAM(s) Oral daily  lisinopril 10 milliGRAM(s) Oral at bedtime  metoprolol tartrate 25 milliGRAM(s) Oral two times a day  senna 2 Tablet(s) Oral at bedtime  simvastatin 20 milliGRAM(s) Oral at bedtime  sodium chloride 0.9%. 1000 milliLiter(s) (100 mL/Hr) IV Continuous <Continuous>    MEDICATIONS  (PRN):  acetaminophen   Tablet .. 650 milliGRAM(s) Oral every 6 hours PRN Temp greater or equal to 38C (100.4F), Mild Pain (1 - 3)  diazepam    Tablet 5 milliGRAM(s) Oral three times a day PRN Anxiety  ondansetron    Tablet 4 milliGRAM(s) Oral every 6 hours PRN Nausea      LABS:                        10.9   3.90  )-----------( 112      ( 2020 11:55 )             34.6         137  |  105  |  24.0<H>  ----------------------------<  103<H>  4.6   |  22.0  |  1.07    Ca    7.8<L>      2020 11:55  Phos  2.8       Mg     2.2         TPro  6.3<L>  /  Alb  3.7  /  TBili  1.1  /  DBili  x   /  AST  16  /  ALT  9   /  AlkPhos  85        Urinalysis Basic - ( 2020 14:19 )    Color: Yellow / Appearance: Clear / S.005 / pH: x  Gluc: x / Ketone: Negative  / Bili: Negative / Urobili: Negative mg/dL   Blood: x / Protein: 30 mg/dL / Nitrite: Negative   Leuk Esterase: Negative / RBC: >50 /HPF / WBC 0-2   Sq Epi: x / Non Sq Epi: Occasional / Bacteria: Few        CAPILLARY BLOOD GLUCOSE          RECENT CULTURES:      RADIOLOGY & ADDITIONAL TESTS:      PHYSICAL EXAM:    GENERAL: NAD, well-groomed, well-developed  HEAD:  Atraumatic, Normocephalic  EYES: EOMI, PERRLA, conjunctiva and sclera clear  ENMT: Moist mucous membranes  NECK: Supple, No JVD  NERVOUS SYSTEM:  Alert & Oriented X3, Motor Strength 5/5 B/L upper and lower extremities; DTRs 2+ intact and symmetric  CHEST/LUNG: Clear to auscultation bilaterally; No rales, rhonchi, wheezing, or rubs  HEART: Regular rate and rhythm; No murmurs, rubs, or gallops  ABDOMEN: Soft, Nontender, Nondistended; Bowel sounds present  EXTREMITIES:  2+ Peripheral Pulses, No clubbing, cyanosis, or edema, skin tear on LUE

## 2020-01-29 NOTE — PROGRESS NOTE ADULT - SUBJECTIVE AND OBJECTIVE BOX
PAT HOFFMANN  [id number]      Chief Complaint: Follow up pre-syncope      Interval History: The patient reports feeling ok, denies chest pain and dyspnea. Denies palpitations.       Tele: sinus bradycardia at 50s BPM    Current Meds:   acetaminophen   Tablet .. 650 milliGRAM(s) Oral every 6 hours PRN  cefTRIAXone   IVPB 1000 milliGRAM(s) IV Intermittent every 24 hours  diazepam    Tablet 5 milliGRAM(s) Oral three times a day PRN  doxazosin 4 milliGRAM(s) Oral at bedtime  finasteride 5 milliGRAM(s) Oral at bedtime  isosorbide   mononitrate ER Tablet (IMDUR) 60 milliGRAM(s) Oral daily  lisinopril 10 milliGRAM(s) Oral at bedtime  metoprolol tartrate 25 milliGRAM(s) Oral two times a day  ondansetron    Tablet 4 milliGRAM(s) Oral every 6 hours PRN  senna 2 Tablet(s) Oral at bedtime  simvastatin 20 milliGRAM(s) Oral at bedtime  sodium chloride 0.9%. 1000 milliLiter(s) IV Continuous <Continuous>        Objective:     Vital Signs:   T(C): 37.2 (01-29-20 @ 12:44), Max: 38.9 (01-28-20 @ 16:53)  HR: 59 (01-29-20 @ 07:54) (59 - 88)  BP: 119/65 (01-29-20 @ 07:54) (119/65 - 151/67)  RR: 17 (01-29-20 @ 04:17) (17 - 18)  SpO2: 96% (01-29-20 @ 07:54) (95% - 96%)  Wt(kg): --        Physical Exam:   General: elderly man, laying in bed, no distress  HEENT: dry oral mucosa  Neck: supple, no carotid bruits b/l   CVS: JVP ~ 7 cm H20, RRR, s1, s2, no murmurs  Chest: unlabored respirations, CTAB  Abdomen: non-distended  Extremities: mild venous skin changes and minimal lower extremity edema b/l  Neuro: A&O x3  Psych: Normal affect        Labs:   29 Jan 2020 11:55    137    |  105    |  24.0   ----------------------------<  103    4.6     |  22.0   |  1.07     Ca    7.8        29 Jan 2020 11:55  Phos  2.8       29 Jan 2020 11:55  Mg     2.2       29 Jan 2020 11:55    TPro  6.3    /  Alb  3.7    /  TBili  1.1    /  DBili  x      /  AST  16     /  ALT  9      /  AlkPhos  85     28 Jan 2020 13:20                          10.9   3.90  )-----------( 112      ( 29 Jan 2020 11:55 )             34.6       CARDIAC MARKERS ( 29 Jan 2020 11:55 )  x     / 0.02 ng/mL / x     / x     / x      CARDIAC MARKERS ( 28 Jan 2020 13:20 )  x     / <0.01 ng/mL / x     / x     / x              ECG (1/28/2020): sinus rhythm, first degree AV block, LBBB, old inferior infarction (similar to outpatient ECG)    Coronary angi[facility] (5/2018) at [facility]:  Occluded vein graft to diagonal and posterior LV branch  LIMA to LAD and vein graft to PDA patent  s/p PCI to OM1    Outpatient stress test (4/2010):  Moderate to large sized region of ischemia in the anterior wall and anteroseptum    Outpatient TTE (12/2019):  LVEF 45%  Basal and mid inferior wall severe hypokinesis  Moderate to severe mitral regurgitation  Mild to moderate aortic regurgitation  Large mid to distal abdominal aortic aneurysm 5.9 cm       CT Abdomen/Pelvis (1/28/2020):    5.2 x 5.1 cm abdominal aortic aneurysm without evidence of retroperitoneal hematoma.    Cholelithiasis without evidence of acute cholecystitis.    CXR (1/28/2020):  Impression:  Cardiomegaly. Clear lungs. No evidence of acute cardiopulmonary disease..

## 2020-01-29 NOTE — PROGRESS NOTE ADULT - ASSESSMENT
Assessment:  Mr. Collazo is an 87 year old man with past medical history Coronary artery disease (s/p stent of OM1 in 2008, CABG x 4 in 1997; LIMA-LAD, SVG-PDA, SVG-posterior LV branch and diagonal), Inferior wall myocardial infarction, Abdominal aortic aneurysm, Hypertension, Peripheral arterial disease and reports of chest pain syndrome, who presents with episode of presyncope and fall onto left wrist. The patient denies episode of syncope. He does report having a recent abdominal infection, likely gastroenteritis and has had decreased oral intake and decreased appetite, there is concern for dehydration/hypovolemia.    So far, less concern for ischemia, given lack of typical symptoms, ECG with no acute ischemic changes, he has chronic LBBB and initial troponin negative. Would still rule out acute coronary syndrome. Also the patient does not appear to be in decompensated heart failure on exam. Would recommend the following:     Recommendations:  [] Pre-syncope: Mild acute kidney injury on admission, all likely due to dehydration/hypovolemia given recent gastroenteritis. Recommend supportive care and PO hydration.   [] Coronary artery disease: Symptoms appear stable. Troponins negative x 2. Follow up echocardiogram.  Continue home CV meds: Aspirin 81 mg daily, Metoprolol tartrate 25 mg BID, Ramipril 2.5 mg daily, Simvastatin 20 mg daily. If HR persistently less than 50 BPM, will have to decrease beta blocker as this could contribute to symptoms.  [] Abdominal aortic aneurysm: Outpatient TTE with 5.9 cm aneurysm, patient follows up with [facility] with plans for re-evaluation in June per outpatient notes     Thank you for the consult. We will follow along.    Evi Delgado MD  Cardiology, [facility]

## 2020-01-29 NOTE — PROGRESS NOTE ADULT - PROBLEM SELECTOR PLAN 3
with h/o CAD and AAA (5.2cm)   cont Metoprolol, Ramapril, Isosorbide, and Aspirin with holding parameters
n/a

## 2020-01-29 NOTE — PROGRESS NOTE ADULT - PROBLEM SELECTOR PLAN 1
all likely due to dehydration/hypovolemia given recent gastroenteritis.   s/p IVF  c/w Metoprolol tartrate 25 mg BID. If HR persistently less than 50 BPM, will have to decrease beta blocker as this could contribute to symptoms.  Cardiology consult, reviewed, agree with assessment  Fall / Aspiration precautions  Code sepsis initiated on arrival, patient is status post Blood & urine culture drawn, 2L bolus, LActic (elevated at 2.7), IV Ceftriaxone. no Clear Source of infection identified at this time.   CXR: Cardiomegaly with clear lungs.   UA -ve  Blood cx pending  NO diarrhea, N/V, constipation  Only mild abd pain with nausea, but pt ate lunch as per RN  Tmax of 102 yesterday but none since last night. No fever, chills, URI, diarrhea, constipation, vomiting, open wounds, dysuria, increased frequency, HA, neck stiffness.  Check procal  Likely viral gastroenteritits  CT abd: 5.2cm AAA, known to patient with ongoing follow up in [place], next scheduled visit in June.  ECHO pending

## 2020-01-29 NOTE — PHYSICAL THERAPY INITIAL EVALUATION ADULT - PERTINENT HX OF CURRENT PROBLEM, REHAB EVAL
Per EMR: Pt is presenting with a presycnopal event. The pt was walking in his [facility] assisted living facility, within his unit when he felt lightheaded, fell backward, denies head trauma, denies LOC, but hit his left wrist sustaining an abrasion which was bandaged.

## 2020-01-29 NOTE — PHYSICAL THERAPY INITIAL EVALUATION ADULT - ADDITIONAL COMMENTS
Pt lives at [facility] living [facility] where there are no steps.  Pt owns medical equipment: SAC, RW, and shower chair. Prefers to use SAC for ambulation  Pt lives with: wife. Reports he primarily takes care of her.

## 2020-01-30 ENCOUNTER — TRANSCRIPTION ENCOUNTER (OUTPATIENT)
Age: 85
End: 2020-01-30

## 2020-01-30 VITALS
OXYGEN SATURATION: 96 % | SYSTOLIC BLOOD PRESSURE: 154 MMHG | DIASTOLIC BLOOD PRESSURE: 75 MMHG | RESPIRATION RATE: 19 BRPM | HEART RATE: 66 BPM | TEMPERATURE: 98 F

## 2020-01-30 LAB
ANION GAP SERPL CALC-SCNC: 9 MMOL/L — SIGNIFICANT CHANGE UP (ref 5–17)
BASOPHILS # BLD AUTO: 0 K/UL — SIGNIFICANT CHANGE UP (ref 0–0.2)
BASOPHILS NFR BLD AUTO: 0 % — SIGNIFICANT CHANGE UP (ref 0–2)
BUN SERPL-MCNC: 23 MG/DL — HIGH (ref 8–20)
CALCIUM SERPL-MCNC: 7.6 MG/DL — LOW (ref 8.6–10.2)
CHLORIDE SERPL-SCNC: 104 MMOL/L — SIGNIFICANT CHANGE UP (ref 98–107)
CO2 SERPL-SCNC: 22 MMOL/L — SIGNIFICANT CHANGE UP (ref 22–29)
CREAT SERPL-MCNC: 1.03 MG/DL — SIGNIFICANT CHANGE UP (ref 0.5–1.3)
EOSINOPHIL # BLD AUTO: 0.32 K/UL — SIGNIFICANT CHANGE UP (ref 0–0.5)
EOSINOPHIL NFR BLD AUTO: 7.2 % — HIGH (ref 0–6)
GIANT PLATELETS BLD QL SMEAR: PRESENT — SIGNIFICANT CHANGE UP
GLUCOSE SERPL-MCNC: 101 MG/DL — HIGH (ref 70–99)
HCT VFR BLD CALC: 34.1 % — LOW (ref 39–50)
HGB BLD-MCNC: 10.9 G/DL — LOW (ref 13–17)
LYMPHOCYTES # BLD AUTO: 0.64 K/UL — LOW (ref 1–3.3)
LYMPHOCYTES # BLD AUTO: 14.4 % — SIGNIFICANT CHANGE UP (ref 13–44)
MAGNESIUM SERPL-MCNC: 2 MG/DL — SIGNIFICANT CHANGE UP (ref 1.6–2.6)
MANUAL SMEAR VERIFICATION: SIGNIFICANT CHANGE UP
MCHC RBC-ENTMCNC: 30.5 PG — SIGNIFICANT CHANGE UP (ref 27–34)
MCHC RBC-ENTMCNC: 32 GM/DL — SIGNIFICANT CHANGE UP (ref 32–36)
MCV RBC AUTO: 95.5 FL — SIGNIFICANT CHANGE UP (ref 80–100)
MONOCYTES # BLD AUTO: 0.12 K/UL — SIGNIFICANT CHANGE UP (ref 0–0.9)
MONOCYTES NFR BLD AUTO: 2.7 % — SIGNIFICANT CHANGE UP (ref 2–14)
NEUTROPHILS # BLD AUTO: 3.34 K/UL — SIGNIFICANT CHANGE UP (ref 1.8–7.4)
NEUTROPHILS NFR BLD AUTO: 73.9 % — SIGNIFICANT CHANGE UP (ref 43–77)
NEUTS BAND # BLD: 0.9 % — SIGNIFICANT CHANGE UP (ref 0–8)
PHOSPHATE SERPL-MCNC: 2.2 MG/DL — LOW (ref 2.4–4.7)
PLAT MORPH BLD: NORMAL — SIGNIFICANT CHANGE UP
PLATELET # BLD AUTO: 109 K/UL — LOW (ref 150–400)
POTASSIUM SERPL-MCNC: 4.6 MMOL/L — SIGNIFICANT CHANGE UP (ref 3.5–5.3)
POTASSIUM SERPL-SCNC: 4.6 MMOL/L — SIGNIFICANT CHANGE UP (ref 3.5–5.3)
RBC # BLD: 3.57 M/UL — LOW (ref 4.2–5.8)
RBC # FLD: 13.3 % — SIGNIFICANT CHANGE UP (ref 10.3–14.5)
RBC BLD AUTO: NORMAL — SIGNIFICANT CHANGE UP
SODIUM SERPL-SCNC: 135 MMOL/L — SIGNIFICANT CHANGE UP (ref 135–145)
VARIANT LYMPHS # BLD: 0.9 % — SIGNIFICANT CHANGE UP (ref 0–6)
WBC # BLD: 4.47 K/UL — SIGNIFICANT CHANGE UP (ref 3.8–10.5)
WBC # FLD AUTO: 4.47 K/UL — SIGNIFICANT CHANGE UP (ref 3.8–10.5)

## 2020-01-30 PROCEDURE — 81001 URINALYSIS AUTO W/SCOPE: CPT

## 2020-01-30 PROCEDURE — 87040 BLOOD CULTURE FOR BACTERIA: CPT

## 2020-01-30 PROCEDURE — 83735 ASSAY OF MAGNESIUM: CPT

## 2020-01-30 PROCEDURE — 87086 URINE CULTURE/COLONY COUNT: CPT

## 2020-01-30 PROCEDURE — 87581 M.PNEUMON DNA AMP PROBE: CPT

## 2020-01-30 PROCEDURE — 80053 COMPREHEN METABOLIC PANEL: CPT

## 2020-01-30 PROCEDURE — 99232 SBSQ HOSP IP/OBS MODERATE 35: CPT

## 2020-01-30 PROCEDURE — 99285 EMERGENCY DEPT VISIT HI MDM: CPT | Mod: 25

## 2020-01-30 PROCEDURE — 84100 ASSAY OF PHOSPHORUS: CPT

## 2020-01-30 PROCEDURE — 84145 PROCALCITONIN (PCT): CPT

## 2020-01-30 PROCEDURE — 36415 COLL VENOUS BLD VENIPUNCTURE: CPT

## 2020-01-30 PROCEDURE — 93005 ELECTROCARDIOGRAM TRACING: CPT

## 2020-01-30 PROCEDURE — 96368 THER/DIAG CONCURRENT INF: CPT

## 2020-01-30 PROCEDURE — 87486 CHLMYD PNEUM DNA AMP PROBE: CPT

## 2020-01-30 PROCEDURE — 96365 THER/PROPH/DIAG IV INF INIT: CPT

## 2020-01-30 PROCEDURE — 83880 ASSAY OF NATRIURETIC PEPTIDE: CPT

## 2020-01-30 PROCEDURE — 96366 THER/PROPH/DIAG IV INF ADDON: CPT

## 2020-01-30 PROCEDURE — 96361 HYDRATE IV INFUSION ADD-ON: CPT

## 2020-01-30 PROCEDURE — 99239 HOSP IP/OBS DSCHRG MGMT >30: CPT

## 2020-01-30 PROCEDURE — 87633 RESP VIRUS 12-25 TARGETS: CPT

## 2020-01-30 PROCEDURE — 76705 ECHO EXAM OF ABDOMEN: CPT

## 2020-01-30 PROCEDURE — 82962 GLUCOSE BLOOD TEST: CPT

## 2020-01-30 PROCEDURE — 83605 ASSAY OF LACTIC ACID: CPT

## 2020-01-30 PROCEDURE — 84484 ASSAY OF TROPONIN QUANT: CPT

## 2020-01-30 PROCEDURE — 87798 DETECT AGENT NOS DNA AMP: CPT

## 2020-01-30 PROCEDURE — 71046 X-RAY EXAM CHEST 2 VIEWS: CPT

## 2020-01-30 PROCEDURE — 80048 BASIC METABOLIC PNL TOTAL CA: CPT

## 2020-01-30 PROCEDURE — 85027 COMPLETE CBC AUTOMATED: CPT

## 2020-01-30 PROCEDURE — 74176 CT ABD & PELVIS W/O CONTRAST: CPT

## 2020-01-30 PROCEDURE — 97163 PT EVAL HIGH COMPLEX 45 MIN: CPT

## 2020-01-30 RX ORDER — SIMVASTATIN 20 MG/1
1 TABLET, FILM COATED ORAL
Qty: 0 | Refills: 0 | DISCHARGE
Start: 2020-01-30

## 2020-01-30 RX ADMIN — Medication 81 MILLIGRAM(S): at 14:28

## 2020-01-30 RX ADMIN — Medication 25 MILLIGRAM(S): at 05:55

## 2020-01-30 RX ADMIN — ISOSORBIDE MONONITRATE 60 MILLIGRAM(S): 60 TABLET, EXTENDED RELEASE ORAL at 14:28

## 2020-01-30 RX ADMIN — Medication 62.5 MILLIMOLE(S): at 14:28

## 2020-01-30 NOTE — PROGRESS NOTE ADULT - SUBJECTIVE AND OBJECTIVE BOX
PAT HOFFMANN  [id number]      Chief Complaint: Follow up pre-syncope      Interval History: The patient reports feeling better. Denies dyspnea and chest pain. Denies presyncope.       Tele: no events      Current Meds:   acetaminophen   Tablet .. 650 milliGRAM(s) Oral every 6 hours PRN  aspirin  chewable 81 milliGRAM(s) Oral daily  cefTRIAXone   IVPB 1000 milliGRAM(s) IV Intermittent every 24 hours  diazepam    Tablet 5 milliGRAM(s) Oral three times a day PRN  doxazosin 4 milliGRAM(s) Oral at bedtime  finasteride 5 milliGRAM(s) Oral at bedtime  isosorbide   mononitrate ER Tablet (IMDUR) 60 milliGRAM(s) Oral daily  lisinopril 10 milliGRAM(s) Oral at bedtime  metoprolol tartrate 25 milliGRAM(s) Oral two times a day  ondansetron    Tablet 4 milliGRAM(s) Oral every 6 hours PRN  senna 2 Tablet(s) Oral at bedtime  simvastatin 20 milliGRAM(s) Oral at bedtime      Objective:     Vital Signs:   T(C): 37.1 (01-30-20 @ 09:05), Max: 37.1 (01-30-20 @ 09:05)  HR: 67 (01-30-20 @ 09:05) (65 - 73)  BP: 167/78 (01-30-20 @ 14:26) (130/61 - 167/78)  RR: 18 (01-30-20 @ 09:05) (18 - 20)  SpO2: 94% (01-30-20 @ 09:05) (94% - 98%)  Wt(kg): --    Physical Exam:   General: elderly man, laying in bed, no distress  HEENT: dry oral mucosa  Neck: supple, no carotid bruits b/l   CVS: JVP ~ 7 cm H20, RRR, s1, s2, no murmurs  Chest: unlabored respirations, CTAB  Abdomen: non-distended  Extremities: mild venous skin changes and minimal lower extremity edema b/l  Neuro: A&O x3  Psych: Normal affect      Labs:   30 Jan 2020 06:02    135    |  104    |  23.0   ----------------------------<  101    4.6     |  22.0   |  1.03     Ca    7.6        30 Jan 2020 06:02  Phos  2.2       30 Jan 2020 06:02  Mg     2.0       30 Jan 2020 06:02                            10.9   4.47  )-----------( 109      ( 30 Jan 2020 06:02 )             34.1       CARDIAC MARKERS ( 29 Jan 2020 19:02 )  x     / 0.02 ng/mL / x     / x     / x      CARDIAC MARKERS ( 29 Jan 2020 11:55 )  x     / 0.02 ng/mL / x     / x     / x              ECG (1/28/2020): sinus rhythm, first degree AV block, LBBB, old inferior infarction (similar to outpatient ECG)    Coronary angiogram (5/2018) at [facility]:  Occluded vein graft to diagonal and posterior LV branch  LIMA to LAD and vein graft to PDA patent  s/p PCI to OM1    Outpatient stress test (4/2010):  Moderate to large sized region of ischemia in the anterior wall and anteroseptum    Outpatient TTE (12/2019):  LVEF 45%  Basal and mid inferior wall severe hypokinesis  Moderate to severe mitral regurgitation  Mild to moderate aortic regurgitation  Large mid to distal abdominal aortic aneurysm 5.9 cm       CT Abdomen/Pelvis (1/28/2020):    5.2 x 5.1 cm abdominal aortic aneurysm without evidence of retroperitoneal hematoma.    Cholelithiasis without evidence of acute cholecystitis.    CXR (1/28/2020):  Impression:  Cardiomegaly. Clear lungs. No evidence of acute cardiopulmonary disease..

## 2020-01-30 NOTE — DISCHARGE NOTE PROVIDER - NSDCCPCAREPLAN_GEN_ALL_CORE_FT
PRINCIPAL DISCHARGE DIAGNOSIS  Diagnosis: Dehydration  Assessment and Plan of Treatment: Maintain oral hydration. Follow up with your primary doctor within 1 week of discharge

## 2020-01-30 NOTE — DISCHARGE NOTE PROVIDER - NSDCMRMEDTOKEN_GEN_ALL_CORE_FT
Aspir 81 oral delayed release tablet: 1 tab(s) orally once a day  Colace 100 mg oral capsule: 2 cap(s) orally once a day (at bedtime)  diazePAM 5 mg oral tablet: 1 tab(s) orally 3 times a day, As Needed  finasteride 5 mg oral tablet: 1 tab(s) orally once a day  isosorbide mononitrate 60 mg oral tablet, extended release: 1 tab(s) orally once a day (in the morning)  metoprolol tartrate 25 mg oral tablet: 1 tab(s) orally 2 times a day  ramipril 2.5 mg oral capsule: 1 cap(s) orally once a day  senna 8.6 mg oral tablet: 2 tab(s) orally once a day (at bedtime)  simvastatin 20 mg oral tablet: 1 tab(s) orally once a day (at bedtime)  terazosin 5 mg oral tablet: 1  orally once a day

## 2020-01-30 NOTE — DISCHARGE NOTE PROVIDER - HOSPITAL COURSE
88 y/o with HTN, AAA, Cardiac Bypass 97, CAD stent x1, sent from Assisted living facility due to syncope.          Problem/Plan - 1:    ·  Problem: Syncope and collapse.  Plan: all likely due to dehydration/hypovolemia given recent gastroenteritis.     s/p IVF    c/w Metoprolol tartrate 25 mg BID. If HR persistently less than 50 BPM, will have to decrease beta blocker as this could contribute to symptoms.    Cardiology consult, reviewed, agree with assessment    Fall / Aspiration precautions    Code sepsis initiated on arrival, patient is status post Blood & urine culture drawn, 2L bolus, s/p IV Ceftriaxone. no Clear Source of infection identified at this time.     CXR: Cardiomegaly with clear lungs.     UA -ve    Blood cx NGTD    NO diarrhea, N/V, constipation    Only mild abd pain with nausea, but pt ate lunch as per RN    Tmax of 102 on arrival but no fever since 24 hrs. No fever, chills, URI, diarrhea, constipation, vomiting, open wounds, dysuria, increased frequency, HA, neck stiffness.    Likely viral gastroenteritits    CT abd: 5.2cm AAA, known to patient with ongoing follow up in [place], next scheduled visit in June.    ECHO not needed as discussed with [facility] cardio. No further bradycardia on tele. They will f/u as outpatient     D/c abx as no clear source of infection. Likely viral gastroenteritits         Problem/Plan - 2:    ·  Problem: Dehydration.  Plan: Poor oral intake for past few days, with cont use of antihypertensive and Valium.     found to have DESIRAE, suspected volume depletion.     status post 2L bolus, as per sepsis protocol     cont IV hydration Normal Saline at 100ml.     f/u repeat BMP, CBC, mag, phos.          Problem/Plan - 3:    ·  Problem: HTN (hypertension).  Plan: with h/o CAD and AAA (5.2cm)     cont Metoprolol, Ramapril, Isosorbide, and Aspirin          Problem/Plan - 4:    ·  Problem: AAA (abdominal aortic aneurysm).  Plan: cont out patient follow up.          Problem/Plan - 5:    ·  Problem: Anxiety.  Plan: cont Valium as needed.          Problem/Plan - 6:    Problem: BPH (benign prostatic hyperplasia). Plan: cont Finasteride 5mg and Terazosin (converted to Doxazosin).         Problem/Plan - 7:    ·  Problem: HLD (hyperlipidemia).  Plan: cont Simvastatin.         PHYSICAL EXAM:        GENERAL: NAD, well-groomed, well-developed    HEAD:  Atraumatic, Normocephalic    EYES: EOMI, PERRLA, conjunctiva and sclera clear    ENMT: Moist mucous membranes    NECK: Supple, No JVD    NERVOUS SYSTEM:  Alert & Oriented X3, Motor Strength 5/5 B/L upper and lower extremities; DTRs 2+ intact and symmetric    CHEST/LUNG: Clear to auscultation bilaterally; No rales, rhonchi, wheezing, or rubs    HEART: Regular rate and rhythm; No murmurs, rubs, or gallops    ABDOMEN: Soft, Nontender, Nondistended; Bowel sounds present    EXTREMITIES:  2+ Peripheral Pulses, No clubbing, cyanosis, or edema, skin tear on LUE

## 2020-01-30 NOTE — DISCHARGE NOTE NURSING/CASE MANAGEMENT/SOCIAL WORK - PATIENT PORTAL LINK FT
You can access the FollowMyHealth Patient Portal offered by [facility] by registering at the following website: [email or web address]. By joining [facility] FollowMyHealth portal, you will also be able to view your health information using other applications (apps) compatible with our system.

## 2020-01-30 NOTE — PROGRESS NOTE ADULT - ASSESSMENT
Assessment:  Mr. Collazo is an 87 year old man with past medical history Coronary artery disease (s/p stent of OM1 in 2008, CABG x 4 in 1997; LIMA-LAD, SVG-PDA, SVG-posterior LV branch and diagonal), Inferior wall myocardial infarction, Abdominal aortic aneurysm, Hypertension, Peripheral arterial disease and reports of chest pain syndrome, who presents with episode of presyncope and fall onto left wrist. The patient denies episode of syncope. He does report having a recent abdominal infection, likely gastroenteritis and has had decreased oral intake and decreased appetite, there is concern for dehydration/hypovolemia.    So far, less concern for ischemia, given lack of typical symptoms, ECG with no acute ischemic changes, he has chronic LBBB and initial troponin negative. Would still rule out acute coronary syndrome. Also the patient does not appear to be in decompensated heart failure on exam. Would recommend the following:     Recommendations:  [] Pre-syncope: Mild acute kidney injury on admission, all likely due to dehydration/hypovolemia given recent gastroenteritis. Recommend supportive care and PO hydration.   [] Coronary artery disease: Symptoms appear stable. Troponins negative x 2. Continue home CV meds: Aspirin 81 mg daily, Metoprolol tartrate 25 mg BID, Ramipril 2.5 mg daily, Simvastatin 20 mg daily. Discussed with patient's cardiologist and patient can have repeat echo as outpatient and patient can be discharged today.  [] Abdominal aortic aneurysm: Outpatient TTE with 5.9 cm aneurysm, patient follows up with [facility] with plans for re-evaluation in June per outpatient notes     Thank you for the consult. We will sign off, please re-consult if needed.    Evi Delgado MD  Cardiology, [facility]

## 2020-01-30 NOTE — DISCHARGE NOTE PROVIDER - CARE PROVIDER_API CALL
Negin Delgado)  Internal Medicine  [address] [place] [address]  Phone: [phone]  Follow Up Time: 1 week

## 2020-01-31 RX ORDER — DIPHENHYDRAMINE HCL 50 MG
1 CAPSULE ORAL
Qty: 0 | Refills: 0 | DISCHARGE

## 2020-01-31 RX ORDER — SIMVASTATIN 20 MG/1
1 TABLET, FILM COATED ORAL
Qty: 0 | Refills: 0 | DISCHARGE

## 2020-02-02 LAB
CULTURE RESULTS: SIGNIFICANT CHANGE UP
CULTURE RESULTS: SIGNIFICANT CHANGE UP
SPECIMEN SOURCE: SIGNIFICANT CHANGE UP
SPECIMEN SOURCE: SIGNIFICANT CHANGE UP

## 2020-03-09 ENCOUNTER — APPOINTMENT (OUTPATIENT)
Dept: CARDIOLOGY | Facility: CLINIC | Age: 85
End: 2020-03-09
Payer: MEDICARE

## 2020-03-09 PROCEDURE — A9500: CPT

## 2020-03-09 PROCEDURE — 78452 HT MUSCLE IMAGE SPECT MULT: CPT

## 2020-03-09 PROCEDURE — 93015 CV STRESS TEST SUPVJ I&R: CPT

## 2020-03-09 RX ORDER — AMINOPHYLLINE 25 MG/ML
25 INJECTION, SOLUTION INTRAVENOUS
Qty: 0 | Refills: 0 | Status: COMPLETED | OUTPATIENT
Start: 2020-03-09

## 2020-03-09 RX ORDER — REGADENOSON 0.08 MG/ML
0.4 INJECTION, SOLUTION INTRAVENOUS
Qty: 4 | Refills: 0 | Status: COMPLETED | OUTPATIENT
Start: 2020-03-09

## 2020-03-09 RX ADMIN — REGADENOSON 0 MG/5ML: 0.08 INJECTION, SOLUTION INTRAVENOUS at 00:00

## 2020-03-09 RX ADMIN — AMINOPHYLLINE 0 MG/ML: 25 INJECTION, SOLUTION INTRAVENOUS at 00:00

## 2020-03-11 RX ORDER — KIT FOR THE PREPARATION OF TECHNETIUM TC99M SESTAMIBI 1 MG/5ML
INJECTION, POWDER, LYOPHILIZED, FOR SOLUTION PARENTERAL
Refills: 0 | Status: COMPLETED | OUTPATIENT
Start: 2020-03-11

## 2020-03-11 RX ADMIN — KIT FOR THE PREPARATION OF TECHNETIUM TC99M SESTAMIBI 0: 1 INJECTION, POWDER, LYOPHILIZED, FOR SOLUTION PARENTERAL at 00:00

## 2020-06-16 ENCOUNTER — APPOINTMENT (OUTPATIENT)
Dept: CARDIOLOGY | Facility: CLINIC | Age: 85
End: 2020-06-16
Payer: MEDICARE

## 2020-06-16 DIAGNOSIS — Z01.818 ENCOUNTER FOR OTHER PREPROCEDURAL EXAMINATION: ICD-10-CM

## 2020-06-16 PROBLEM — I10 ESSENTIAL (PRIMARY) HYPERTENSION: Chronic | Status: ACTIVE | Noted: 2020-01-28

## 2020-06-16 PROBLEM — I71.4 ABDOMINAL AORTIC ANEURYSM, WITHOUT RUPTURE: Chronic | Status: ACTIVE | Noted: 2020-01-28

## 2020-06-16 PROCEDURE — 99443: CPT | Mod: 95

## 2020-06-17 ENCOUNTER — TRANSCRIPTION ENCOUNTER (OUTPATIENT)
Age: 85
End: 2020-06-17

## 2020-06-18 ENCOUNTER — APPOINTMENT (OUTPATIENT)
Dept: OPHTHALMOLOGY | Facility: CLINIC | Age: 85
End: 2020-06-18

## 2020-06-22 ENCOUNTER — APPOINTMENT (OUTPATIENT)
Dept: CARDIOLOGY | Facility: CLINIC | Age: 85
End: 2020-06-22

## 2020-07-15 ENCOUNTER — TRANSCRIPTION ENCOUNTER (OUTPATIENT)
Age: 85
End: 2020-07-15

## 2020-07-16 ENCOUNTER — APPOINTMENT (OUTPATIENT)
Dept: ULTRASOUND IMAGING | Facility: CLINIC | Age: 85
End: 2020-07-16

## 2020-07-27 ENCOUNTER — INPATIENT (INPATIENT)
Facility: HOSPITAL | Age: 85
LOS: 8 days | Discharge: HOME | DRG: 226 | End: 2020-08-05
Attending: INTERNAL MEDICINE | Admitting: INTERNAL MEDICINE
Payer: MEDICARE

## 2020-07-27 VITALS
HEIGHT: 67 IN | TEMPERATURE: 98 F | WEIGHT: 164.91 LBS | DIASTOLIC BLOOD PRESSURE: 59 MMHG | OXYGEN SATURATION: 100 % | RESPIRATION RATE: 19 BRPM | HEART RATE: 71 BPM | SYSTOLIC BLOOD PRESSURE: 100 MMHG

## 2020-07-27 DIAGNOSIS — Z96.641 PRESENCE OF RIGHT ARTIFICIAL HIP JOINT: Chronic | ICD-10-CM

## 2020-07-27 DIAGNOSIS — Z95.1 PRESENCE OF AORTOCORONARY BYPASS GRAFT: Chronic | ICD-10-CM

## 2020-07-27 DIAGNOSIS — Z98.890 OTHER SPECIFIED POSTPROCEDURAL STATES: Chronic | ICD-10-CM

## 2020-07-27 DIAGNOSIS — I50.9 HEART FAILURE, UNSPECIFIED: ICD-10-CM

## 2020-07-27 DIAGNOSIS — I25.10 ATHEROSCLEROTIC HEART DISEASE OF NATIVE CORONARY ARTERY WITHOUT ANGINA PECTORIS: Chronic | ICD-10-CM

## 2020-07-27 LAB
ALBUMIN SERPL ELPH-MCNC: 2.8 G/DL — LOW (ref 3.3–5.2)
ALP SERPL-CCNC: 70 U/L — SIGNIFICANT CHANGE UP (ref 40–120)
ALT FLD-CCNC: 23 U/L — SIGNIFICANT CHANGE UP
ANION GAP SERPL CALC-SCNC: 13 MMOL/L — SIGNIFICANT CHANGE UP (ref 5–17)
APTT BLD: 28.7 SEC — SIGNIFICANT CHANGE UP (ref 27.5–35.5)
AST SERPL-CCNC: 24 U/L — SIGNIFICANT CHANGE UP
BASOPHILS # BLD AUTO: 0.03 K/UL — SIGNIFICANT CHANGE UP (ref 0–0.2)
BASOPHILS NFR BLD AUTO: 0.3 % — SIGNIFICANT CHANGE UP (ref 0–2)
BILIRUB SERPL-MCNC: 0.9 MG/DL — SIGNIFICANT CHANGE UP (ref 0.4–2)
BUN SERPL-MCNC: 37 MG/DL — HIGH (ref 8–20)
CALCIUM SERPL-MCNC: 8.3 MG/DL — LOW (ref 8.6–10.2)
CHLORIDE SERPL-SCNC: 98 MMOL/L — SIGNIFICANT CHANGE UP (ref 98–107)
CO2 SERPL-SCNC: 23 MMOL/L — SIGNIFICANT CHANGE UP (ref 22–29)
CREAT SERPL-MCNC: 1.6 MG/DL — HIGH (ref 0.5–1.3)
EOSINOPHIL # BLD AUTO: 0.26 K/UL — SIGNIFICANT CHANGE UP (ref 0–0.5)
EOSINOPHIL NFR BLD AUTO: 2.8 % — SIGNIFICANT CHANGE UP (ref 0–6)
GLUCOSE SERPL-MCNC: 106 MG/DL — HIGH (ref 70–99)
HCT VFR BLD CALC: 25.7 % — LOW (ref 39–50)
HGB BLD-MCNC: 8.1 G/DL — LOW (ref 13–17)
IMM GRANULOCYTES NFR BLD AUTO: 0.9 % — SIGNIFICANT CHANGE UP (ref 0–1.5)
INR BLD: 1.33 RATIO — HIGH (ref 0.88–1.16)
LYMPHOCYTES # BLD AUTO: 0.95 K/UL — LOW (ref 1–3.3)
LYMPHOCYTES # BLD AUTO: 10.3 % — LOW (ref 13–44)
MCHC RBC-ENTMCNC: 30.8 PG — SIGNIFICANT CHANGE UP (ref 27–34)
MCHC RBC-ENTMCNC: 31.5 GM/DL — LOW (ref 32–36)
MCV RBC AUTO: 97.7 FL — SIGNIFICANT CHANGE UP (ref 80–100)
MONOCYTES # BLD AUTO: 0.74 K/UL — SIGNIFICANT CHANGE UP (ref 0–0.9)
MONOCYTES NFR BLD AUTO: 8 % — SIGNIFICANT CHANGE UP (ref 2–14)
NEUTROPHILS # BLD AUTO: 7.2 K/UL — SIGNIFICANT CHANGE UP (ref 1.8–7.4)
NEUTROPHILS NFR BLD AUTO: 77.7 % — HIGH (ref 43–77)
PLATELET # BLD AUTO: 183 K/UL — SIGNIFICANT CHANGE UP (ref 150–400)
POTASSIUM SERPL-MCNC: 4.5 MMOL/L — SIGNIFICANT CHANGE UP (ref 3.5–5.3)
POTASSIUM SERPL-SCNC: 4.5 MMOL/L — SIGNIFICANT CHANGE UP (ref 3.5–5.3)
PROT SERPL-MCNC: 5.3 G/DL — LOW (ref 6.6–8.7)
PROTHROM AB SERPL-ACNC: 15.2 SEC — HIGH (ref 10.6–13.6)
RBC # BLD: 2.63 M/UL — LOW (ref 4.2–5.8)
RBC # FLD: 14.5 % — SIGNIFICANT CHANGE UP (ref 10.3–14.5)
SARS-COV-2 RNA SPEC QL NAA+PROBE: SIGNIFICANT CHANGE UP
SODIUM SERPL-SCNC: 134 MMOL/L — LOW (ref 135–145)
TROPONIN T SERPL-MCNC: 1.35 NG/ML — HIGH (ref 0–0.06)
TROPONIN T SERPL-MCNC: 1.35 NG/ML — HIGH (ref 0–0.06)
TROPONIN T SERPL-MCNC: 1.48 NG/ML — HIGH (ref 0–0.06)
WBC # BLD: 9.26 K/UL — SIGNIFICANT CHANGE UP (ref 3.8–10.5)
WBC # FLD AUTO: 9.26 K/UL — SIGNIFICANT CHANGE UP (ref 3.8–10.5)

## 2020-07-27 PROCEDURE — 93970 EXTREMITY STUDY: CPT | Mod: 26

## 2020-07-27 PROCEDURE — 93010 ELECTROCARDIOGRAM REPORT: CPT

## 2020-07-27 PROCEDURE — 74174 CTA ABD&PLVS W/CONTRAST: CPT | Mod: 26

## 2020-07-27 PROCEDURE — 99291 CRITICAL CARE FIRST HOUR: CPT | Mod: CS,GC

## 2020-07-27 PROCEDURE — 99222 1ST HOSP IP/OBS MODERATE 55: CPT

## 2020-07-27 PROCEDURE — 71046 X-RAY EXAM CHEST 2 VIEWS: CPT | Mod: 26

## 2020-07-27 PROCEDURE — 99223 1ST HOSP IP/OBS HIGH 75: CPT | Mod: AI

## 2020-07-27 PROCEDURE — 71275 CT ANGIOGRAPHY CHEST: CPT | Mod: 26

## 2020-07-27 RX ORDER — SODIUM CHLORIDE 9 MG/ML
1000 INJECTION INTRAMUSCULAR; INTRAVENOUS; SUBCUTANEOUS ONCE
Refills: 0 | Status: COMPLETED | OUTPATIENT
Start: 2020-07-27 | End: 2020-07-27

## 2020-07-27 RX ORDER — DOXAZOSIN MESYLATE 4 MG
4 TABLET ORAL AT BEDTIME
Refills: 0 | Status: DISCONTINUED | OUTPATIENT
Start: 2020-07-27 | End: 2020-08-05

## 2020-07-27 RX ORDER — SENNA PLUS 8.6 MG/1
2 TABLET ORAL AT BEDTIME
Refills: 0 | Status: DISCONTINUED | OUTPATIENT
Start: 2020-07-27 | End: 2020-08-05

## 2020-07-27 RX ORDER — ISOSORBIDE MONONITRATE 60 MG/1
30 TABLET, EXTENDED RELEASE ORAL DAILY
Refills: 0 | Status: DISCONTINUED | OUTPATIENT
Start: 2020-07-27 | End: 2020-07-29

## 2020-07-27 RX ORDER — METOPROLOL TARTRATE 50 MG
25 TABLET ORAL
Refills: 0 | Status: DISCONTINUED | OUTPATIENT
Start: 2020-07-27 | End: 2020-07-29

## 2020-07-27 RX ORDER — ASPIRIN/CALCIUM CARB/MAGNESIUM 324 MG
81 TABLET ORAL DAILY
Refills: 0 | Status: DISCONTINUED | OUTPATIENT
Start: 2020-07-27 | End: 2020-08-05

## 2020-07-27 RX ORDER — FUROSEMIDE 40 MG
40 TABLET ORAL ONCE
Refills: 0 | Status: COMPLETED | OUTPATIENT
Start: 2020-07-27 | End: 2020-07-27

## 2020-07-27 RX ORDER — FINASTERIDE 5 MG/1
5 TABLET, FILM COATED ORAL DAILY
Refills: 0 | Status: DISCONTINUED | OUTPATIENT
Start: 2020-07-27 | End: 2020-08-05

## 2020-07-27 RX ORDER — SIMVASTATIN 20 MG/1
20 TABLET, FILM COATED ORAL AT BEDTIME
Refills: 0 | Status: DISCONTINUED | OUTPATIENT
Start: 2020-07-27 | End: 2020-08-05

## 2020-07-27 RX ORDER — FUROSEMIDE 40 MG
40 TABLET ORAL
Refills: 0 | Status: DISCONTINUED | OUTPATIENT
Start: 2020-07-27 | End: 2020-08-01

## 2020-07-27 RX ORDER — FERROUS SULFATE 325(65) MG
325 TABLET ORAL DAILY
Refills: 0 | Status: DISCONTINUED | OUTPATIENT
Start: 2020-07-27 | End: 2020-08-05

## 2020-07-27 RX ORDER — ISOSORBIDE MONONITRATE 60 MG/1
60 TABLET, EXTENDED RELEASE ORAL DAILY
Refills: 0 | Status: DISCONTINUED | OUTPATIENT
Start: 2020-07-27 | End: 2020-07-27

## 2020-07-27 RX ADMIN — SODIUM CHLORIDE 1000 MILLILITER(S): 9 INJECTION INTRAMUSCULAR; INTRAVENOUS; SUBCUTANEOUS at 13:46

## 2020-07-27 RX ADMIN — Medication 40 MILLIGRAM(S): at 21:47

## 2020-07-27 RX ADMIN — FINASTERIDE 5 MILLIGRAM(S): 5 TABLET, FILM COATED ORAL at 17:57

## 2020-07-27 RX ADMIN — Medication 325 MILLIGRAM(S): at 17:57

## 2020-07-27 RX ADMIN — Medication 81 MILLIGRAM(S): at 17:57

## 2020-07-27 RX ADMIN — SIMVASTATIN 20 MILLIGRAM(S): 20 TABLET, FILM COATED ORAL at 21:46

## 2020-07-27 RX ADMIN — Medication 25 MILLIGRAM(S): at 17:57

## 2020-07-27 RX ADMIN — SENNA PLUS 2 TABLET(S): 8.6 TABLET ORAL at 21:46

## 2020-07-27 RX ADMIN — SODIUM CHLORIDE 1000 MILLILITER(S): 9 INJECTION INTRAMUSCULAR; INTRAVENOUS; SUBCUTANEOUS at 12:03

## 2020-07-27 RX ADMIN — Medication 40 MILLIGRAM(S): at 14:12

## 2020-07-27 RX ADMIN — Medication 4 MILLIGRAM(S): at 21:46

## 2020-07-27 NOTE — H&P ADULT - ASSESSMENT
88 year old man with past medical history Coronary artery disease (s/p stent of OM1 in 2008, CABG x 4 in 1997; LIMA-LAD, SVG- PDA, SVG- posterior LV branch and diagonal), Inferior wall myocardial infarction, Abdominal aortic aneurysm, Hypertension, Peripheral arterial disease , recent triple AAA repair on 7/20/2020 at [facility] presents with reports of chest pain syndrome, fatigue, leg edema and leg pain.    # Elevated BNP, Elevated Trop, CXR findings= AE CHF systolic with mildly reduced EF of 45%  seen by Cards in ED  Trend CE  Recheck ECHO  IV Lasix bid  Hold ACEi or ARB sec to DESIRAE  supplement O2 PRN  CT- Small bilateral pleural effusions with associated compressive atelectasis with a patchy left lower lobe opacity, either atelectasis or pneumonia  If no improvement with diuresing, will consider infectious etiology  daily wts, I/Os, fluid restriction    # CP, CAD, HTN, PAD  sec to AE CHF  as above  Patient had recent nuclear stress test with mostly fixed inferior wall defect and mild richa-infarct ischemia in LAD territory  Cont rest of Home meds    # DESIRAE  sec to hypoperfusion sec to CHF  Hold ACEi / ARBs  But diuretics medical necessity  Avoid nephrotoxics  renally dose meds    # Normocytic Anemia 2 to blood loss  post operative- recent AAA repair  does not need BT now  add iron to regimen and monitor H/H  CT- Small amount of retroperitoneal hemorrhage with no e/o leak or ongoing bleed. per Radiologist- layering seen    # Leg pain  CT- Severe narrowing involving the left common femoral artery with a focal dissection just distally.  pain likely sec to instrumentation  No e/o ongoing dissection/ bleed  Will get Vasc to opine if any other Rx needed    ICDs

## 2020-07-27 NOTE — ED PROVIDER NOTE - CLINICAL SUMMARY MEDICAL DECISION MAKING FREE TEXT BOX
Mr. Collazo is a Mr. Collazo is an 88 year old male with history of AAA (Repaired 7/20/2020), HTN, CAD (s/p stent 2008, CABG x4 1997), inferior wall MI, PAD who presented to the ED with bilateral thigh pain with exertion. Patient had AAA repaired on 7/20/20 at [facility] and since then had progressively worsening bilateral thigh pain with any exertion. Also reported intermittent chest pain. On exam patient noted to have bilateral pitting edema. Labs remarkable for HGB of 8.1 and elevated troponin. CXR with bilateral pleural effusions. CTA chest/ab/pelvis noted to have small focal dissection at left common femoral. Anemia felt most likely secondary to recent surgery (baseline hgb ~10) and patient stable without active bleeding. Patient will be admitted for further management of fluid overload/CHF, NSTEMI, and AAA repair. Mr. Collazo is an 88 year old male with history of AAA (Repaired 7/20/2020), HTN, CAD (s/p stent 2008, CABG x4 1997), inferior wall MI, PAD who presented to the ED with bilateral thigh pain with exertion. Patient had AAA repaired on 7/20/20 at [facility] and since then had progressively worsening bilateral thigh pain with any exertion. Also reported intermittent chest pain. On exam patient noted to have bilateral crackles and bilateral pitting edema. Labs remarkable for HGB of 8.1 and elevated troponin. CXR with bilateral pleural effusions. CTA chest/ab/pelvis noted to have small focal dissection at left common femoral. Anemia felt most likely secondary to recent surgery (baseline hgb ~10) and patient stable without active bleeding. Cardiology consulted and recommending lasix and repeat CT A/P. Patient will be admitted for further management of fluid overload, NSTEMI, and f/u CT.

## 2020-07-27 NOTE — ED ADULT NURSE NOTE - NSIMPLEMENTINTERV_GEN_ALL_ED
Implemented All Fall with Harm Risk Interventions:  [place] to call system. Call bell, personal items and telephone within reach. Instruct patient to call for assistance. Room bathroom lighting operational. Non-slip footwear when patient is off stretcher. Physically safe environment: no spills, clutter or unnecessary equipment. Stretcher in lowest position, wheels locked, appropriate side rails in place. Provide visual cue, wrist band, yellow gown, etc. Monitor gait and stability. Monitor for mental status changes and reorient to person, place, and time. Review medications for side effects contributing to fall risk. Reinforce activity limits and safety measures with patient and family. Provide visual clues: red socks.

## 2020-07-27 NOTE — H&P ADULT - NSHPPHYSICALEXAM_GEN_ALL_CORE
Vital Signs Last 24 Hrs  T(C): 36.6 (07-27-20 @ 08:14), Max: 36.6 (07-27-20 @ 08:14)  T(F): 97.8 (07-27-20 @ 08:14), Max: 97.8 (07-27-20 @ 08:14)  HR: 75 (07-27-20 @ 12:08) (67 - 75)  BP: 116/65 (07-27-20 @ 12:10) (98/55 - 119/68)  BP(mean): --  RR: 26 (07-27-20 @ 12:08) (19 - 26)  SpO2: 100% (07-27-20 @ 12:08) (97% - 100%)  General: elderly man, chronically ill appearing, mildly labored respirations   HEENT: sclera anicteric   Neck: supple, no carotid bruits b/l   CVS: engorged external jugular vein, RRR, s1, s2, no murmurs  Chest: mildly labored respirations, crackles at the bases b/l   Abdomen: distended  Extremities: 2+ b/l lower extremity edema up to knees  Neuro: A&O x3  Psych: Normal affect

## 2020-07-27 NOTE — CONSULT NOTE ADULT - ATTENDING COMMENTS
88 year old male s/p FEVAR 1 week ago for 5.5cm AAA  Now admitted with CHF exarcebation and b/l leg swelling  Denies claudication.  CTA shows patent visceral stents and endograft. There is a focal high grade left CFA stenosis  TONNY normal in RLE, LLE 0.68    Plan:  No intervention for Left femoral stenosis at this time as patient is minimally symptomatic and does not have critical limb ischemia  He will follow up with his vascular surgeon in a few weeks  Findings discussed with patient, all questions answered

## 2020-07-27 NOTE — CONSULT NOTE ADULT - ASSESSMENT
Patient is an 87yo M with recent hx of TAVR on 7/20/20 presenting with failure to thrive  with Trops 1.35 and BNP 70371 with findings of narrowing of LCFA and focal dissection distally    -No acute vacular intervention indicated   -Will order TONNY/PVR  -Rest of care per primary team Patient is an 87yo M with recent hx of fenestrated EVAR on 7/20/20 presenting with failure to thrive  with Trops 1.35 and BNP 34769 with findings of narrowing of LCFA and focal dissection distally, hemodynamically normal with dopplerable biphasic DP/PT distally    -No acute vacular intervention indicated   -Will order TONNY/PVR  -Rest of care per primary team

## 2020-07-27 NOTE — CONSULT NOTE ADULT - SUBJECTIVE AND OBJECTIVE BOX
PAT HOFFMANN  [id number]      HPI:      ALLERGIES:  penicillin (Vomiting; Nausea)      PAST MEDICAL & SURGICAL HISTORY:  AAA (abdominal aortic aneurysm)  HTN (hypertension)  S/P AAA repair: 7-  S/P left inguinal hernia repair  CAD (coronary artery disease): s/p stent placed  History of right hip replacement  S/P CABG (coronary artery bypass graft)        SOCIAL HISTORY:  Patient denies alcohol, tobacco, drug use    FAMILY HISTORY:  No pertinent family history in first degree relatives      ROS:  All 10 systems reviewed and positives noted in HPI    Home Cardiac Meds:  Aspirin 81 mg daily  Imdur 60 mg daily  Nitro SL  Simvastatin 20 mg daily   Ramipril 2.5 mg daily  Metoprolol tartrate 25 mg BID        PHYSICAL EXAM:  Vital Signs Last 24 Hrs  T(C): 36.6 (27 Jul 2020 08:14), Max: 36.6 (27 Jul 2020 08:14)  T(F): 97.8 (27 Jul 2020 08:14), Max: 97.8 (27 Jul 2020 08:14)  HR: 75 (27 Jul 2020 12:08) (67 - 75)  BP: 116/65 (27 Jul 2020 12:10) (98/55 - 119/68)  BP(mean): --  RR: 26 (27 Jul 2020 12:08) (19 - 26)  SpO2: 100% (27 Jul 2020 12:08) (97% - 100%)  General: Patient comfortable in NAD  HEENT: NCAT, mmm, EOMI  Neck: no JVD, no carotid bruits  CVS: nl s1, split s2, no s3, no s4, no murmur or rubs, RRR  Chest: CTA b/l  Abdomen: soft, nt/nd  Extremities: No c/c/e  Neuro: A&O x3  Psych: Normal affect        LABS:                        8.1    9.26  )-----------( 183      ( 27 Jul 2020 10:22 )             25.7     07-27    134<L>  |  98  |  37.0<H>  ----------------------------<  106<H>  4.5   |  23.0  |  1.60<H>    Ca    8.3<L>      27 Jul 2020 10:22    TPro  5.3<L>  /  Alb  2.8<L>  /  TBili  0.9  /  DBili  x   /  AST  24  /  ALT  23  /  AlkPhos  70  07-27    CARDIAC MARKERS ( 27 Jul 2020 10:22 )  x     / 1.48 ng/mL / 44 U/L / x     / x          PT/INR - ( 27 Jul 2020 11:28 )   PT: 15.2 sec;   INR: 1.33 ratio         PTT - ( 27 Jul 2020 11:28 )  PTT:28.7 sec      ECG (1/28/2020): sinus rhythm, first degree AV block, LBBB, old inferior infarction (similar to outpatient ECG)    Coronary angiogram (5/2018) at [facility]:  Occluded vein graft to diagonal and posterior LV branch  LIMA to LAD and vein graft to PDA patent  s/p PCI to OM1    Outpatient stress test (3/2020):  Small sized, severe intensity fixed defect of the basal inferior, basal-mid inferolateral wall consistent with infarction. Small to moderate sized partially reversible at the margins defect of the basal-mid anteroseptal wall consistent with infarction with mild richa-infarct ischemia, LVEF 47%    Outpatient TTE (12/2019):  LVEF 45%  Basal and mid inferior wall severe hypokinesis  Moderate to severe mitral regurgitation  Mild to moderate aortic regurgitation  Large mid to distal abdominal aortic aneurysm 5.9 cm PAT HOFFMANN  [id number]      HPI:  Mr. Hoffmann is an 88 year old man with past medical history Coronary artery disease (s/p stent of OM1 in 2008, CABG x 4 in 1997; LIMA-LAD, SVG-PDA, SVG-posterior LV branch and diagonal), Inferior wall myocardial infarction, Abdominal aortic aneurysm, Hypertension, Peripheral arterial disease and reports of chest pain syndrome, who presents with recent triple AAA repair on 7/20/2020 at [facility] presents with fatigue, leg edema and leg pain. The patient states that after his AAA repair he had episodes of chest discomfort and was told at [facility] that everything was stable from cardiac standpoint. He reports that a few days after that hospital discharge he noticed extreme fatigue and was unable to get up from seated position because he had no energy. Denies any chest discomfort at this time. Also reports chronic leg edema and dyspnea. Denies palpitations or syncope.         ALLERGIES:  penicillin (Vomiting; Nausea)      PAST MEDICAL & SURGICAL HISTORY:  Coronary artery disease (s/p stent of OM1 in 2008, CABG x 4 in 1997; LIMA-LAD, SVG-PDA, SVG-posterior LV branch and diagonal)  Inferior wall myocardial infarction  Abdominal aortic aneurysm  Hypertension  Peripheral arterial disease  Chest pain syndrome  Recent triple AAA repair on 7/20/2020 at [facility]         SOCIAL HISTORY:  Patient denies alcohol, tobacco, drug use    FAMILY HISTORY:  No pertinent family history in first degree relatives      ROS:  All 10 systems reviewed and positives noted in HPI    Home Cardiac Meds:  Aspirin 81 mg daily  Imdur 60 mg daily  Nitro SL  Simvastatin 20 mg daily   Ramipril 2.5 mg daily  Metoprolol tartrate 25 mg BID      Objective:       Vital Signs Last 24 Hrs  T(C): 36.6 (27 Jul 2020 08:14), Max: 36.6 (27 Jul 2020 08:14)  T(F): 97.8 (27 Jul 2020 08:14), Max: 97.8 (27 Jul 2020 08:14)  HR: 75 (27 Jul 2020 12:08) (67 - 75)  BP: 116/65 (27 Jul 2020 12:10) (98/55 - 119/68)  BP(mean): --  RR: 26 (27 Jul 2020 12:08) (19 - 26)  SpO2: 100% (27 Jul 2020 12:08) (97% - 100%)      Physical Exam:   General: elderly man, chronically ill appearing, mildly labored respirations   HEENT: sclera anicteric   Neck: supple, no carotid bruits b/l   CVS: engorged external jugular vein, RRR, s1, s2, no murmurs  Chest: mildly labored respirations, crackles at the bases b/l   Abdomen: distended  Extremities: 2+ b/l lower extremity edema up to knees  Neuro: A&O x3  Psych: Normal affect        LABS:                        8.1    9.26  )-----------( 183      ( 27 Jul 2020 10:22 )             25.7     07-27    134<L>  |  98  |  37.0<H>  ----------------------------<  106<H>  4.5   |  23.0  |  1.60<H>    Ca    8.3<L>      27 Jul 2020 10:22    TPro  5.3<L>  /  Alb  2.8<L>  /  TBili  0.9  /  DBili  x   /  AST  24  /  ALT  23  /  AlkPhos  70  07-27    CARDIAC MARKERS ( 27 Jul 2020 10:22 )  x     / 1.48 ng/mL / 44 U/L / x     / x          PT/INR - ( 27 Jul 2020 11:28 )   PT: 15.2 sec;   INR: 1.33 ratio         PTT - ( 27 Jul 2020 11:28 )  PTT:28.7 sec    ECG (7/27/2020): sinus rhythm, first degree AV block, PVC, LBBB (similar to prior ECG)      Coronary angiogram (5/2018) at [facility]:  Occluded vein graft to diagonal and posterior LV branch  LIMA to LAD and vein graft to PDA patent  s/p PCI to OM1    Outpatient stress test (3/2020):  Small sized, severe intensity fixed defect of the basal inferior, basal-mid inferolateral wall consistent with infarction. Small to moderate sized partially reversible at the margins defect of the basal-mid anteroseptal wall consistent with infarction with mild richa-infarct ischemia, LVEF 47%    Outpatient TTE (12/2019):  LVEF 45%  Basal and mid inferior wall severe hypokinesis  Moderate to severe mitral regurgitation  Mild to moderate aortic regurgitation  Large mid to distal abdominal aortic aneurysm 5.9 cm

## 2020-07-27 NOTE — H&P ADULT - HISTORY OF PRESENT ILLNESS
88 year old man with past medical history Coronary artery disease (s/p stent of OM1 in 2008, CABG x 4 in 1997; LIMA-LAD, SVG- PDA, SVG- posterior LV branch and diagonal), Inferior wall myocardial infarction, Abdominal aortic aneurysm, Hypertension, Peripheral arterial disease , recent triple AAA repair on 7/20/2020 at [facility] presents with reports of chest pain syndrome, fatigue, leg edema and leg pain. The patient states that after his AAA repair he had episodes of chest discomfort and was told at [facility] that everything was stable from cardiac standpoint. He had some leg pain after the surgery which he states he was given Valium for. He was discharged on 7/22/20. The pain is worst in his upper thighs. He reports that a few days after that hospital discharge he noticed extreme fatigue and was unable to get up from seated position because he had no energy. Denies any chest discomfort at this time. Also reports chronic leg edema and dyspnea. Denies palpitations or syncope. States he had a stress test and ECHO in 3/2020 and there was no evidence of heart failure at that time.    SH- lives at [facility] with spouse; denies habits now; former smoker  FH- heart dz runs in the family

## 2020-07-27 NOTE — ED ADULT NURSE NOTE - OBJECTIVE STATEMENT
Assumed care of patient at 1100 from RN Saida, alert and oriented x4, c/o SOB since last week. Pt reports he had a aortic aneurysm repair 1 week ago and since then hasn't felt good, stated he came to the ER today because he could not get out of bed. Pt on CM, NSR, VSS, Denies chest pain and SOB now. Resting comfortably in bed. CT scan done. IVF stopped per MD, patient received 300 mL.

## 2020-07-27 NOTE — H&P ADULT - NSICDXPASTSURGICALHX_GEN_ALL_CORE_FT
PAST SURGICAL HISTORY:  CAD (coronary artery disease) s/p stent placed    History of right hip replacement     S/P AAA repair 7-    S/P CABG (coronary artery bypass graft)     S/P left inguinal hernia repair

## 2020-07-27 NOTE — ED PROVIDER NOTE - PSH
CAD (coronary artery disease)  s/p stent placed  History of right hip replacement    S/P AAA repair  7-  S/P CABG (coronary artery bypass graft)    S/P left inguinal hernia repair

## 2020-07-27 NOTE — PROVIDER CONTACT NOTE (OTHER) - ASSESSMENT
Bedrest orders in place, pending urgent chest x-ray due to chest pain, will follow up later in day if time permits

## 2020-07-27 NOTE — ED PROVIDER NOTE - PROGRESS NOTE DETAILS
Given the significant and immediate threats to this patient based on initial presentation, the benefits of emergency contrast-enhanced CT imaging despite elevated GFR/creatinine serum level results greatly outweigh the potential risk of harm due to contrast-induced nephropathy. Walker: I rechecked the patient. He was resting comfortably. I updated him on his troponin result. I informed him that we have reached out to cardiology. Rachna: pt HD stable, feeling ok, with elevated trop, cards seeing pt feels fluid over loaded. spoke to Dr. Cy Bullock from radiology, states b/l pleural effusion, no PE, with small fluid in abd could be blood but not actively extravasating and small, small focal dissection likely at site of cath procedure. admitted to Dr. Rogers. repeat trop sent. Rachna: pt HD stable, feeling ok, with elevated trop, cards seeing pt feels fluid over loaded. spoke to Dr. Cy Bullock from radiology, states b/l pleural effusion, no PE, with small fluid in abd could be blood but not actively extravasating and small. hgb was 11, now 8.1 but had recent surg lkely had more blood that is decreasing given surg 5 days ago. small focal dissection likely at site of cath procedure. admitted to Dr. Rogers who is aware of these findings. repeat trop sent.

## 2020-07-27 NOTE — ED ADULT NURSE NOTE - CHPI ED NUR SYMPTOMS NEG
no chills/no diaphoresis/no congestion/no vomiting/no nausea/no dizziness/no fever/no syncope/no chest pain/no back pain

## 2020-07-27 NOTE — ED PROVIDER NOTE - OBJECTIVE STATEMENT
Mr. Collazo is an 88 year old male with history of HTN, CAD, CABG (1997), tobacco abuse (quit 50 years ago), and AAA who presents with bilateral leg pain. Patient underwent AAA repair at [facility] on 7/20/20. He had some leg pain after the surgery which he states he was given Valium for. He was discharged on 7/22/20. Since then however he has been having progressively worsening bilateral leg pain that is exacerbated with even standing. The pain is worst in his upper thighs. Reports accompanying chest pain "sometimes" (none here in the ED), a rare cough, leg swelling, decreased urination, and anorexia. He is not on supplemental o2 at baseline. No fevers, chills, abdominal pain, or back pain. States he had a stress test and ECHO in 3/2020 and there was no evidence of heart failure at that time. Lives [facility] [place].

## 2020-07-27 NOTE — CONSULT NOTE ADULT - ASSESSMENT
Assessment:  Mr. Collazo is an 88 year old man with past medical history Coronary artery disease (s/p stent of OM1 in 2008, CABG x 4 in 1997; LIMA-LAD, SVG-PDA, SVG-posterior LV branch and diagonal), Inferior wall myocardial infarction, Abdominal aortic aneurysm, Hypertension, Peripheral arterial disease and reports of chest pain syndrome, who presents with recent SMA stent on 7/20/2020 at [facility] Assessment:  Mr. Collazo is an 88 year old man with past medical history of Coronary artery disease (s/p stent of OM1 in 2008, CABG x 4 in 1997; LIMA-LAD, SVG-PDA, SVG-posterior LV branch and diagonal), Inferior wall myocardial infarction, Abdominal aortic aneurysm, Hypertension, Peripheral arterial disease and reports of chest pain syndrome, who presents with recent triple AAA repair on 7/20/2020 at [facility] presents with fatigue, leg edema and leg pain, found to have decompensated heart failure.     Physical exam notable for lung crackles and significant bilateral lower extremity edema. Volume overload may be the cause of his overall weakness. ECG reviewed and consistent with known LBBB, no acute changes. Initial troponin is elevated which is likely from volume overload, elevated Cr and underlying CAD. Currently in normal sinus rhythm.    Recommendations:  [] Weakness, likely decompensated heart failure: BP normotensive. Patient does not take diuretics at home. Would dose Lasix 40 mg IVP once today and monitor urine output. Check echo to re-evaluate LVEF. Follow-up CT abdomen given recent AAA repair. Check pro BNP. Hold home ACE-I. Anemia evaluation is recommended (Hgb was ~11 in January and now is ~ 8).   [] Elevated troponin, history of CAD: Patient had recent nuclear stress test with mostly fixed inferior wall defect and mild richa-infarct ischemia in LAD territory. Continue to trend until it peaks. Continue home: Aspirin 81 mg daily, Imdur 60 mg daily, Simvastatin 20 mg daily, Metoprolol tartrate 25 mg BID. Once euvolemic, plan to re-evaluate his CAD management.   [] Abdominal aortic aneurysm repair: follow up CT abdomen.       Thank you for the consult. We will continue to follow along.    The patient will require inpatient admission for CHF treatment.    Evi Delgado MD  Cardiology Assessment:  Mr. Collazo is an 88 year old man with past medical history of Coronary artery disease (s/p stent of OM1 in 2008, CABG x 4 in 1997; LIMA-LAD, SVG-PDA, SVG-posterior LV branch and diagonal), Inferior wall myocardial infarction, Abdominal aortic aneurysm, Hypertension, Peripheral arterial disease and reports of chest pain syndrome, who presents with recent triple AAA repair on 7/20/2020 at [facility] presents with fatigue, leg edema and leg pain, found to have decompensated heart failure.     Physical exam notable for lung crackles and significant bilateral lower extremity edema. Volume overload may be the cause of his overall weakness. ECG reviewed and consistent with known LBBB, no acute changes. Initial troponin is elevated which is likely from volume overload, elevated Cr and underlying CAD. Currently in normal sinus rhythm.    Recommendations:  [] Weakness, likely decompensated heart failure: BP normotensive. Patient does not take diuretics at home. Would dose Lasix 40 mg IVP once today and monitor urine output. Check echo to re-evaluate LVEF. Follow-up CT abdomen given recent AAA repair. Check pro BNP. Hold home ACE-I. Anemia evaluation is recommended (Hgb was ~11 in January and now is ~ 8).   [] Elevated troponin, history of CAD: Patient had recent nuclear stress test with mostly fixed inferior wall defect and mild richa-infarct ischemia in LAD territory. Continue to trend until it peaks. Continue home: Aspirin 81 mg daily, Imdur 60 mg daily, Simvastatin 20 mg daily, Metoprolol tartrate 25 mg BID. Once euvolemic, plan to re-evaluate his CAD management.   [] Abdominal aortic aneurysm repair: follow up CT abdomen.     Thank you for the consult. We will follow along.    The patient will require inpatient admission for CHF treatment.         Thank you for the consult. We will continue to follow along.    The patient will require inpatient admission for CHF treatment.    Evi Delgado MD  Cardiology Assessment:  Mr. Collazo is an 88 year old man with past medical history of Coronary artery disease (s/p stent of OM1 in 2008, CABG x 4 in 1997; LIMA-LAD, SVG-PDA, SVG-posterior LV branch and diagonal), Inferior wall myocardial infarction, Abdominal aortic aneurysm, Hypertension, Peripheral arterial disease and reports of chest pain syndrome, who presents with recent triple AAA repair on 7/20/2020 at [facility] presents with fatigue, leg edema and leg pain, found to have decompensated heart failure.     Physical exam notable for lung crackles and significant bilateral lower extremity edema. Volume overload may be the cause of his overall weakness. ECG reviewed and consistent with known LBBB, no acute changes. Initial troponin is elevated which is likely from volume overload, elevated Cr and underlying CAD. Currently in normal sinus rhythm.    Recommendations:  [] Weakness, likely decompensated heart failure: BP normotensive. Patient does not take diuretics at home. Would dose Lasix 40 mg IVP once today and monitor urine output. Check echo to re-evaluate LVEF. Follow-up CT abdomen given recent AAA repair. Check pro BNP. Hold home ACE-I. Anemia evaluation is recommended (Hgb was ~11 in January and now is ~ 8).   [] Elevated troponin, history of CAD: Patient had recent nuclear stress test with mostly fixed inferior wall defect and mild richa-infarct ischemia in LAD territory. Continue to trend until it peaks. Continue home:  Imdur 60 mg daily, Simvastatin 20 mg daily, Metoprolol tartrate 25 mg BID. Once euvolemic, plan to re-evaluate his CAD management. Hold aspirin given concern for anemia.   [] Abdominal aortic aneurysm repair: follow up CT abdomen.     Thank you for the consult. We will follow along. Discussed with Dr. Briscoe.    The patient will require inpatient admission for CHF treatment.         Thank you for the consult. We will continue to follow along.    The patient will require inpatient admission for CHF treatment.    Evi Delgado MD  Cardiology

## 2020-07-27 NOTE — ED PROVIDER NOTE - PHYSICAL EXAMINATION
General: elderly appearing male sitting upright in bed, NAD  Head:  NC, AT, on 3L per NC   Eyes: EOMI, no scleral icterus  Ears: no erythema/drainage  Nose: midline, no bleeding/drainage  Cardiac: RRR, no m/r/g, 1+ pitting bilateral lower extremity edema  Respiratory: CTABL, no wheezes/rales/rhonchi, equal chest wall expansions  Abdomen: soft, slightly distended, NT, no rebound tenderness, no guarding, nonperitonitic  MSK/Vascular: full ROM, distal pulses intact, soft compartments, warm extremities  Neuro: AAOx3, sensation to light touch intact  Psych: calm, cooperative, normal affect

## 2020-07-27 NOTE — ED PROVIDER NOTE - CARE PLAN
Principal Discharge DX:	CHF (congestive heart failure)  Secondary Diagnosis:	NSTEMI (non-ST elevated myocardial infarction)  Secondary Diagnosis:	Anemia

## 2020-07-27 NOTE — ED ADULT TRIAGE NOTE - CHIEF COMPLAINT QUOTE
c/o feeling sob for about one month and c/o occasional cough pt says he was supposed to have a telemedicine appt with cardiology today but was sent here instead. no resp distress noted

## 2020-07-27 NOTE — CONSULT NOTE ADULT - SUBJECTIVE AND OBJECTIVE BOX
VASCULAR SURGERY CONSULT    HPI: Patient is an 87yo M with PMH of CAD s/p CABG and PCI, HTN, PAD presenting s/p 1-week TAVR in [facility] by Dr. Hess and discharged on POD2. Patient presents to the ED from [facility] due to failure to thrive with difficulty to move and getting out of bed. Patient reports since his surgery he has been feeling weak and constantly tired, with episodes of chest pain. Mentions he has been having difficulty ambulating having SOB and CRESPO, pain in LE specially in his thighs. Prior to surgery patient was able to ambulate but mentions he as been having chronic edema on LE and pain in his thighs since before the procedure. Patient currently denies fevers, chills, n/v/d.       PAST MEDICAL HISTORY:  AAA (abdominal aortic aneurysm)  HTN (hypertension)      PAST SURGICAL HISTORY:  S/P AAA repair  S/P left inguinal hernia repair  CAD (coronary artery disease)  History of right hip replacement  S/P CABG (coronary artery bypass graft)      ALLERGIES:  penicillin (Vomiting; Nausea)    SOCIAL HISTORY: former smoker    MEDICATIONS  (STANDING):  aspirin enteric coated 81 milliGRAM(s) Oral daily  doxazosin 4 milliGRAM(s) Oral at bedtime  ferrous    sulfate 325 milliGRAM(s) Oral daily  finasteride 5 milliGRAM(s) Oral daily  furosemide   Injectable 40 milliGRAM(s) IV Push two times a day  isosorbide   mononitrate ER Tablet (IMDUR) 30 milliGRAM(s) Oral daily  metoprolol tartrate 25 milliGRAM(s) Oral two times a day  senna 2 Tablet(s) Oral at bedtime  simvastatin 20 milliGRAM(s) Oral at bedtime    MEDICATIONS  (PRN):      VITALS & I/Os:  Vital Signs Last 24 Hrs  T(C): 36.1 (27 Jul 2020 16:57), Max: 36.6 (27 Jul 2020 08:14)  T(F): 97 (27 Jul 2020 16:57), Max: 97.8 (27 Jul 2020 08:14)  HR: 71 (27 Jul 2020 16:57) (67 - 75)  BP: 112/54 (27 Jul 2020 16:57) (98/55 - 119/68)  BP(mean): --  RR: 21 (27 Jul 2020 16:57) (19 - 26)  SpO2: 100% (27 Jul 2020 16:57) (97% - 100%)  CAPILLARY BLOOD GLUCOSE          I&O's Summary    27 Jul 2020 07:01  -  27 Jul 2020 17:47  --------------------------------------------------------  IN: 0 mL / OUT: 625 mL / NET: -625 mL        GENERAL: Alert, well developed, in no acute distress.  MENTAL STATUS: AAOx3. Appropriate affect.  HEENT: PERRLA. EOMI. MMM.  Trachea midline. No lymph node swelling or tenderness.  RESPIRATORY: CTAB. No wheezing, rales or rhonchi.  CARDIOVASCULAR: RRR. No audible murmurs, rubs or gallops.   GASTROINTESTINAL: Abdomen soft, NT, ND, -R/-G.    INTGUMENTARY: bruise in pelvis L>R, left groin site with suture in place, right groin with steri-strips in place with dried blood.  MUSCULOSKELETAL: No cyanosis or clubbing. No gross deformities.   VASCULAR: Palpable femoral pulses bilaterally, with biphasic signal in DP/PT bilaterally     LABS:                        8.1    9.26  )-----------( 183      ( 27 Jul 2020 10:22 )             25.7     07-27    134<L>  |  98  |  37.0<H>  ----------------------------<  106<H>  4.5   |  23.0  |  1.60<H>    Ca    8.3<L>      27 Jul 2020 10:22    TPro  5.3<L>  /  Alb  2.8<L>  /  TBili  0.9  /  DBili  x   /  AST  24  /  ALT  23  /  AlkPhos  70  07-27    Lactate: aspirin enteric coated 81 milliGRAM(s) Oral daily  doxazosin 4 milliGRAM(s) Oral at bedtime  ferrous    sulfate 325 milliGRAM(s) Oral daily  finasteride 5 milliGRAM(s) Oral daily  furosemide   Injectable 40 milliGRAM(s) IV Push two times a day  isosorbide   mononitrate ER Tablet (IMDUR) 30 milliGRAM(s) Oral daily  metoprolol tartrate 25 milliGRAM(s) Oral two times a day  senna 2 Tablet(s) Oral at bedtime  simvastatin 20 milliGRAM(s) Oral at bedtime     PT/INR - ( 27 Jul 2020 11:28 )   PT: 15.2 sec;   INR: 1.33 ratio         PTT - ( 27 Jul 2020 11:28 )  PTT:28.7 sec    CARDIAC MARKERS ( 27 Jul 2020 13:41 )  x     / 1.35 ng/mL / x     / x     / x      CARDIAC MARKERS ( 27 Jul 2020 10:22 )  x     / 1.48 ng/mL / 44 U/L / x     / x            IMAGING:       EXAM:  CT ANGIO ABD PELV (W)AW IC                         EXAM:  CT ANGIO CHEST (W)AW IC                          PROCEDURE DATE:  07/27/2020          INTERPRETATION:  CLINICAL INFORMATION: AAA repair 7/20/2020 at [facility]. Shortness of breath. Evaluate for aortic dissection.    COMPARISON: CT abdomen/pelvis 1/28/2020    PROCEDURE:  CT Angiography of the Chest, Abdomen and Pelvis.  Precontrast imaging was performed through the chest followed by arterial phase imaging of the chest, abdomen and pelvis.  Intravenous contrast: 95 ml Visipaque 320.  Oral contrast: None.  Sagittal and coronal reformats were performed as well as 3D (MIP) reconstructions.    FINDINGS:  CHEST:  LUNGS AND LARGE AIRWAYS: Patent central airways. Compressive atelectasis in both lower lobes. Additional patchy opacity in the left lower lobe, either atelectasis or pneumonia. Patchy ground glass opacities in both upper lobes, greater on the right.  PLEURA: Small bilateral pleural effusions.  VESSELS: Thoracic aorta normal in caliber with mild calcified plaque. No aortic dissection. Coronary artery calcifications. No pulmonary embolism.  HEART: Enlarged. No pericardial effusion.  MEDIASTINUM AND VINAY: No lymphadenopathy.  CHEST WALL AND LOWER NECK: No enlarged axillary lymph nodes.    ABDOMEN AND PELVIS:  LIVER: Within normal limits.  BILE DUCTS: Normal caliber.  GALLBLADDER: Cholelithiasis.  SPLEEN: Within normal limits.  PANCREAS: Within normal limits.  ADRENALS: Within normal limits.  KIDNEYS/URETERS: No hydronephrosis. Bilateral renal cysts.    BLADDER: Within normal limits.  REPRODUCTIVE ORGANS: Prostate not enlarged.    BOWEL: No bowel obstruction.  PERITONEUM: Small amount of free fluid in the pelvis.  VESSELS: Aortobiiliac stent graft which extends from the level of the superior mesenteric artery into the common iliac arteries. Abdominal aortic aneurysm measures 6.1 x 5.8 cm (series 6 image 185), previously 5.5 x 5.3 cm on 1/28/2020. Superior mesenteric artery and bilateral renal artery stents. The superior mesenteric artery and renal arteries are patent. Iliac arteries are patent bilaterally. High-grade narrowing or stenosis of the left common femoral artery (series 7 image 963 with a focal dissection just distally (image 95). Superficial femoraland profunda femoral arteries are patent more distally.  RETROPERITONEUM/LYMPH NODES: Small amount of retroperitoneal hemorrhage including along the left pelvic sidewall and in the presacral region (layering high attenuation in the presacral region).  ABDOMINAL WALL: Infiltration of the subcutaneous fat in the left groin, likely related to recent vascular access.  BONES: Sternotomy. Degenerative changes in the spine. Right hip arthroplasty.    IMPRESSION:  No aortic dissection.    Small amount ofretroperitoneal hemorrhage including along the left pelvic sidewall and in the presacral region.    Severe narrowing involving the left common femoral artery with a focal dissection just distally.    Small bilateral pleural effusions with associated compressive atelectasis with a patchy left lower lobe opacity, either atelectasis or pneumonia; correlation is recommended with patient's symptoms.    Nonspecific bilateral upper lobe groundglass opacities; differential includes infectious/inflammatory etiologies, scarring or fibrosis; comparison is recommended with a prior chest CT if available.

## 2020-07-27 NOTE — ED PROVIDER NOTE - NS ED ROS FT
Constitutional: +decreased appetite, no fever, sweats, and no chills.  Eyes: no pain, no redness, and no visual changes.  ENMT: no ear pain and no hearing problems, no nasal congestion/drainage, no dysphagia, and no throat pain, no neck pain, no stiffness  CV: no chest pain, + bilateral edema.  Resp: +cough, no dyspnea  GI: no abdominal pain, no bloating, +constipation, no diarrhea, no nausea and no vomiting.  : +decreased UO, no dysuria, no hematuria  MSK: +bilateral leg pain (worse in thighs), no weakness  Skin: no lesions, and no rashes.  Neuro: no LOC, no headache, no sensory deficits, and no weakness.

## 2020-07-28 LAB
ANION GAP SERPL CALC-SCNC: 15 MMOL/L — SIGNIFICANT CHANGE UP (ref 5–17)
BUN SERPL-MCNC: 37 MG/DL — HIGH (ref 8–20)
CALCIUM SERPL-MCNC: 7.9 MG/DL — LOW (ref 8.6–10.2)
CHLORIDE SERPL-SCNC: 100 MMOL/L — SIGNIFICANT CHANGE UP (ref 98–107)
CO2 SERPL-SCNC: 24 MMOL/L — SIGNIFICANT CHANGE UP (ref 22–29)
CREAT SERPL-MCNC: 1.57 MG/DL — HIGH (ref 0.5–1.3)
FERRITIN SERPL-MCNC: 563 NG/ML — HIGH (ref 30–400)
GLUCOSE SERPL-MCNC: 85 MG/DL — SIGNIFICANT CHANGE UP (ref 70–99)
HCT VFR BLD CALC: 24.2 % — LOW (ref 39–50)
HGB BLD-MCNC: 7.8 G/DL — LOW (ref 13–17)
IRON SATN MFR SERPL: 14 % — LOW (ref 16–55)
IRON SATN MFR SERPL: 27 UG/DL — LOW (ref 59–158)
MCHC RBC-ENTMCNC: 31.2 PG — SIGNIFICANT CHANGE UP (ref 27–34)
MCHC RBC-ENTMCNC: 32.2 GM/DL — SIGNIFICANT CHANGE UP (ref 32–36)
MCV RBC AUTO: 96.8 FL — SIGNIFICANT CHANGE UP (ref 80–100)
PLATELET # BLD AUTO: 210 K/UL — SIGNIFICANT CHANGE UP (ref 150–400)
POTASSIUM SERPL-MCNC: 3.8 MMOL/L — SIGNIFICANT CHANGE UP (ref 3.5–5.3)
POTASSIUM SERPL-SCNC: 3.8 MMOL/L — SIGNIFICANT CHANGE UP (ref 3.5–5.3)
RBC # BLD: 2.5 M/UL — LOW (ref 4.2–5.8)
RBC # FLD: 14.2 % — SIGNIFICANT CHANGE UP (ref 10.3–14.5)
SARS-COV-2 IGG SERPL QL IA: NEGATIVE — SIGNIFICANT CHANGE UP
SARS-COV-2 IGM SERPL IA-ACNC: 0.01 INDEX — SIGNIFICANT CHANGE UP
SODIUM SERPL-SCNC: 139 MMOL/L — SIGNIFICANT CHANGE UP (ref 135–145)
TIBC SERPL-MCNC: 192 UG/DL — LOW (ref 220–430)
TRANSFERRIN SERPL-MCNC: 134 MG/DL — LOW (ref 180–329)
TROPONIN T SERPL-MCNC: 1.35 NG/ML — HIGH (ref 0–0.06)
WBC # BLD: 8.1 K/UL — SIGNIFICANT CHANGE UP (ref 3.8–10.5)
WBC # FLD AUTO: 8.1 K/UL — SIGNIFICANT CHANGE UP (ref 3.8–10.5)

## 2020-07-28 PROCEDURE — 99233 SBSQ HOSP IP/OBS HIGH 50: CPT

## 2020-07-28 PROCEDURE — 99232 SBSQ HOSP IP/OBS MODERATE 35: CPT

## 2020-07-28 PROCEDURE — 93923 UPR/LXTR ART STDY 3+ LVLS: CPT | Mod: 26

## 2020-07-28 PROCEDURE — 93010 ELECTROCARDIOGRAM REPORT: CPT

## 2020-07-28 RX ORDER — CLOPIDOGREL BISULFATE 75 MG/1
75 TABLET, FILM COATED ORAL DAILY
Refills: 0 | Status: DISCONTINUED | OUTPATIENT
Start: 2020-07-28 | End: 2020-08-05

## 2020-07-28 RX ORDER — ENOXAPARIN SODIUM 100 MG/ML
75 INJECTION SUBCUTANEOUS DAILY
Refills: 0 | Status: DISCONTINUED | OUTPATIENT
Start: 2020-07-28 | End: 2020-07-28

## 2020-07-28 RX ADMIN — ISOSORBIDE MONONITRATE 30 MILLIGRAM(S): 60 TABLET, EXTENDED RELEASE ORAL at 12:17

## 2020-07-28 RX ADMIN — FINASTERIDE 5 MILLIGRAM(S): 5 TABLET, FILM COATED ORAL at 12:18

## 2020-07-28 RX ADMIN — Medication 4 MILLIGRAM(S): at 21:15

## 2020-07-28 RX ADMIN — Medication 325 MILLIGRAM(S): at 12:17

## 2020-07-28 RX ADMIN — Medication 25 MILLIGRAM(S): at 17:44

## 2020-07-28 RX ADMIN — SIMVASTATIN 20 MILLIGRAM(S): 20 TABLET, FILM COATED ORAL at 21:15

## 2020-07-28 RX ADMIN — Medication 81 MILLIGRAM(S): at 12:17

## 2020-07-28 RX ADMIN — Medication 40 MILLIGRAM(S): at 05:43

## 2020-07-28 RX ADMIN — Medication 25 MILLIGRAM(S): at 05:43

## 2020-07-28 RX ADMIN — Medication 40 MILLIGRAM(S): at 17:41

## 2020-07-28 RX ADMIN — CLOPIDOGREL BISULFATE 75 MILLIGRAM(S): 75 TABLET, FILM COATED ORAL at 17:41

## 2020-07-28 NOTE — PROGRESS NOTE ADULT - SUBJECTIVE AND OBJECTIVE BOX
PAT SHERMELY  [id number]      Chief Complaint: CHF/AAA repair/New atrial fibrillation/PAD      Interval History: The patient reports some improvement in dyspnea.       Tele: atrial fibrillation       Current meds:   aspirin enteric coated 81 milliGRAM(s) Oral daily  clopidogrel Tablet 75 milliGRAM(s) Oral daily  doxazosin 4 milliGRAM(s) Oral at bedtime  ferrous    sulfate 325 milliGRAM(s) Oral daily  finasteride 5 milliGRAM(s) Oral daily  furosemide   Injectable 40 milliGRAM(s) IV Push two times a day  isosorbide   mononitrate ER Tablet (IMDUR) 30 milliGRAM(s) Oral daily  metoprolol tartrate 25 milliGRAM(s) Oral two times a day  senna 2 Tablet(s) Oral at bedtime  simvastatin 20 milliGRAM(s) Oral at bedtime      Objective:     Vital Signs:   T(C): 36.7 (07-28-20 @ 11:00), Max: 36.7 (07-28-20 @ 11:00)  HR: 77 (07-28-20 @ 11:00) (64 - 77)  BP: 117/67 (07-28-20 @ 11:00) (112/54 - 117/69)  RR: 18 (07-28-20 @ 11:00) (18 - 21)  SpO2: 96% (07-28-20 @ 11:00) (96% - 100%)  Wt(kg): --    Physical Exam:   General: elderly man, chronically ill appearing, no acute distress  HEENT: sclera anicteric   Neck: supple, no carotid bruits b/l   CVS: engorged external jugular vein, RRR, s1, s2, no murmurs  Chest: mildly labored respirations, poor inspiratory effort   Abdomen: distended  Extremities: 2+ b/l lower extremity edema up to knees  Neuro: A&O x3  Psych: Normal affect        Labs:   28 Jul 2020 06:44    139    |  100    |  37.0   ----------------------------<  85     3.8     |  24.0   |  1.57     Ca    7.9        28 Jul 2020 06:44    TPro  5.3    /  Alb  2.8    /  TBili  0.9    /  DBili  x      /  AST  24     /  ALT  23     /  AlkPhos  70     27 Jul 2020 10:22                          7.8    8.10  )-----------( 210      ( 28 Jul 2020 06:44 )             24.2     PT/INR - ( 27 Jul 2020 11:28 )   PT: 15.2 sec;   INR: 1.33 ratio         PTT - ( 27 Jul 2020 11:28 )  PTT:28.7 sec  CARDIAC MARKERS ( 28 Jul 2020 06:44 )  x     / 1.35 ng/mL / x     / x     / x      CARDIAC MARKERS ( 27 Jul 2020 18:37 )  x     / 1.35 ng/mL / x     / x     / x      CARDIAC MARKERS ( 27 Jul 2020 13:41 )  x     / 1.35 ng/mL / x     / x     / x      CARDIAC MARKERS ( 27 Jul 2020 10:22 )  x     / 1.48 ng/mL / 44 U/L / x     / x            ECG (7/27/2020): sinus rhythm, first degree AV block, PVC, LBBB (similar to prior ECG)    Lower extremity venous duplex (7/27/2020):  No evidence of deep venous thrombosis in either lower extremity.    CXR (7/27/2020):  Congestive heart failure with bilateral pulmonary edema. These findings are new since 1/28/2020..    Coronary angiogram (5/2018) at [facility]:  Occluded vein graft to diagonal and posterior LV branch  LIMA to LAD and vein graft to PDA patent  s/p PCI to OM1    Outpatient stress test (3/2020):  Small sized, severe intensity fixed defect of the basal inferior, basal-mid inferolateral wall consistent with infarction. Small to moderate sized partially reversible at the margins defect of the basal-mid anteroseptal wall consistent with infarction with mild richa-infarct ischemia, LVEF 47%    Outpatient TTE (12/2019):  LVEF 45%  Basal and mid inferior wall severe hypokinesis  Moderate to severe mitral regurgitation  Mild to moderate aortic regurgitation  Large mid to distal abdominal aortic aneurysm 5.9 cm APT SHERMELY  [id number]      Chief Complaint: CHF/AAA repair/PAD      Interval History: The patient reports some improvement in dyspnea.       Tele: irregular and regular at times, unclear if p waves present      Current meds:   aspirin enteric coated 81 milliGRAM(s) Oral daily  clopidogrel Tablet 75 milliGRAM(s) Oral daily  doxazosin 4 milliGRAM(s) Oral at bedtime  ferrous    sulfate 325 milliGRAM(s) Oral daily  finasteride 5 milliGRAM(s) Oral daily  furosemide   Injectable 40 milliGRAM(s) IV Push two times a day  isosorbide   mononitrate ER Tablet (IMDUR) 30 milliGRAM(s) Oral daily  metoprolol tartrate 25 milliGRAM(s) Oral two times a day  senna 2 Tablet(s) Oral at bedtime  simvastatin 20 milliGRAM(s) Oral at bedtime      Objective:     Vital Signs:   T(C): 36.7 (07-28-20 @ 11:00), Max: 36.7 (07-28-20 @ 11:00)  HR: 77 (07-28-20 @ 11:00) (64 - 77)  BP: 117/67 (07-28-20 @ 11:00) (112/54 - 117/69)  RR: 18 (07-28-20 @ 11:00) (18 - 21)  SpO2: 96% (07-28-20 @ 11:00) (96% - 100%)  Wt(kg): --    Physical Exam:   General: elderly man, chronically ill appearing, no acute distress  HEENT: sclera anicteric   Neck: supple, no carotid bruits b/l   CVS: engorged external jugular vein, RRR, s1, s2, no murmurs  Chest: mildly labored respirations, poor inspiratory effort   Abdomen: distended  Extremities: 2+ b/l lower extremity edema up to knees  Neuro: A&O x3  Psych: Normal affect        Labs:   28 Jul 2020 06:44    139    |  100    |  37.0   ----------------------------<  85     3.8     |  24.0   |  1.57     Ca    7.9        28 Jul 2020 06:44    TPro  5.3    /  Alb  2.8    /  TBili  0.9    /  DBili  x      /  AST  24     /  ALT  23     /  AlkPhos  70     27 Jul 2020 10:22                          7.8    8.10  )-----------( 210      ( 28 Jul 2020 06:44 )             24.2     PT/INR - ( 27 Jul 2020 11:28 )   PT: 15.2 sec;   INR: 1.33 ratio         PTT - ( 27 Jul 2020 11:28 )  PTT:28.7 sec  CARDIAC MARKERS ( 28 Jul 2020 06:44 )  x     / 1.35 ng/mL / x     / x     / x      CARDIAC MARKERS ( 27 Jul 2020 18:37 )  x     / 1.35 ng/mL / x     / x     / x      CARDIAC MARKERS ( 27 Jul 2020 13:41 )  x     / 1.35 ng/mL / x     / x     / x      CARDIAC MARKERS ( 27 Jul 2020 10:22 )  x     / 1.48 ng/mL / 44 U/L / x     / x            ECG (7/27/2020): sinus rhythm, first degree AV block, PVC, LBBB (similar to prior ECG)    Lower extremity venous duplex (7/27/2020):  No evidence of deep venous thrombosis in either lower extremity.    CXR (7/27/2020):  Congestive heart failure with bilateral pulmonary edema. These findings are new since 1/28/2020..    Coronary angiogram (5/2018) at [facility]:  Occluded vein graft to diagonal and posterior LV branch  LIMA to LAD and vein graft to PDA patent  s/p PCI to OM1    Outpatient stress test (3/2020):  Small sized, severe intensity fixed defect of the basal inferior, basal-mid inferolateral wall consistent with infarction. Small to moderate sized partially reversible at the margins defect of the basal-mid anteroseptal wall consistent with infarction with mild richa-infarct ischemia, LVEF 47%    Outpatient TTE (12/2019):  LVEF 45%  Basal and mid inferior wall severe hypokinesis  Moderate to severe mitral regurgitation  Mild to moderate aortic regurgitation  Large mid to distal abdominal aortic aneurysm 5.9 cm

## 2020-07-28 NOTE — GOALS OF CARE CONVERSATION - ADVANCED CARE PLANNING - CONVERSATION DETAILS
Explained GoC for the possibility of end of life events. explained DNR/ DNI. He asked me why I am discussing this with him when he is feeling better now. states he was feeling like he was dying yesterday and today feels better and so he wants to remain full code and wants everything done.  He also stated he will further discuss this with his family.

## 2020-07-28 NOTE — PROGRESS NOTE ADULT - ASSESSMENT
88 year old man with past medical history Coronary artery disease (s/p stent of OM1 in 2008, CABG x 4 in 1997; LIMA-LAD, SVG- PDA, SVG- posterior LV branch and diagonal), Inferior wall myocardial infarction, Abdominal aortic aneurysm, Hypertension, Peripheral arterial disease , recent triple AAA repair on 7/20/2020 at [facility] presents with reports of chest pain syndrome, fatigue, leg edema and leg pain.    # AE CHF systolic with mildly reduced EF of 45%  seen by Cards in ED  Troponins steady  Recheck ECHO- P  IV Lasix bid for atleast 1- 2 more day  Hold ACEi or ARB sec to DESIRAE  supplement O2 PRN  CT- Small bilateral pleural effusions with associated compressive atelectasis with a patchy left lower lobe opacity, either atelectasis or pneumonia  If no improvement with diuresing, will consider infectious etiology  daily wts, I/Os, fluid restriction  DVT ruled out. ACE wraps for leg edema    # CP, CAD, HTN, PAD  sec to AE CHF  Troponins steady   as above  Patient had recent nuclear stress test with mostly fixed inferior wall defect and mild richa-infarct ischemia in LAD territory  Cont rest of Home meds    # DESIRAE with underlying CKD stg 2  sec to hypoperfusion sec to CHF  Hold ACEi / ARBs  But diuretics medical necessity now  Avoid nephrotoxics  renally dose meds    # Normocytic Anemia 2 to blood loss and underlying AOCD  post operative- recent AAA repair  add iron to regimen and monitor H/H  CT- Small amount of retroperitoneal hemorrhage with no e/o leak or ongoing bleed. per Radiologist- layering seen  sec to preexisting volume overload will hold off on BT until Hgb 7 or less    # Leg pain  CT- Severe narrowing involving the left common femoral artery with a focal dissection just distally.  pain likely sec to instrumentation  No e/o ongoing dissection/ bleed  Vasc input noted  performed well with PT    ICDs 88 year old man with past medical history Coronary artery disease (s/p stent of OM1 in 2008, CABG x 4 in 1997; LIMA-LAD, SVG- PDA, SVG- posterior LV branch and diagonal), Inferior wall myocardial infarction, Abdominal aortic aneurysm, Hypertension, Peripheral arterial disease , [facility] presents with reports of chest pain syndrome, fatigue, leg edema and leg pain.    # AE CHF systolic with mildly reduced EF of 45%  seen by Cards in ED  Troponins steady  Recheck ECHO- P  IV Lasix bid for atleast 1- 2 more day  Hold ACEi or ARB sec to DESIRAE  supplement O2 PRN  CT- Small bilateral pleural effusions with associated compressive atelectasis with a patchy left lower lobe opacity, either atelectasis or pneumonia  If no improvement with diuresing, will consider infectious etiology  daily wts, I/Os, fluid restriction  DVT ruled out. ACE wraps for leg edema    # CP, CAD, HTN, PAD  sec to AE CHF  Troponins steady   as above  Patient had recent nuclear stress test with mostly fixed inferior wall defect and mild richa-infarct ischemia in LAD territory  Cont rest of Home meds    # DESIARE with underlying CKD stg 2  sec to hypoperfusion sec to CHF  Hold ACEi / ARBs  But diuretics medical necessity now  Avoid nephrotoxics  renally dose meds    # Normocytic Anemia 2 to blood loss and underlying AOCD  post operative- recent AAA repair  add iron to regimen and monitor H/H  CT- Small amount of retroperitoneal hemorrhage with no e/o leak or ongoing bleed. per Radiologist- layering seen  sec to preexisting volume overload will hold off on BT until Hgb 7 or less    # Leg pain  CT- Severe narrowing involving the left common femoral artery with a focal dissection just distally.  pain likely sec to instrumentation  No e/o ongoing dissection/ bleed  Vasc input noted  performed well with PT    ICDs     Relevant diagnostics and plan of treatment were discussed with the patient and son to the best of my ability and pertinent questions were answered.    GoC discussed with both patient and his son Xavier. Patient did not want to make this decision now. Xavier stated that he is the HCP and if and when the time comes he would like to assess and make this decision. Currently patient should be full code 88 year old man with past medical history Coronary artery disease (s/p stent of OM1 in 2008, CABG x 4 in 1997; LIMA-LAD, SVG- PDA, SVG- posterior LV branch and diagonal), Inferior wall myocardial infarction, Abdominal aortic aneurysm, Hypertension, Peripheral arterial disease , recent triple AAA repair on 7/20/2020 at [facility] presents with reports of chest pain syndrome, fatigue, leg edema and leg pain.    # AE CHF systolic with mildly reduced EF of 45%  seen by Cards in ED  Troponins steady  Recheck ECHO- P  IV Lasix bid for atleast 1- 2 more day  Hold ACEi or ARB sec to DESIRAE  supplement O2 PRN  CT- Small bilateral pleural effusions with associated compressive atelectasis with a patchy left lower lobe opacity, either atelectasis or pneumonia  If no improvement with diuresing, will consider infectious etiology  daily wts, I/Os, fluid restriction  DVT ruled out. ACE wraps for leg edema    # New Afib on TELE  pt had post op A fib too (per [facility])  likely PAF  could be the cause of AE CHF  Will start Lovenox for A/C given anemia and later assess for oral A/C    # Recent AAA repair  per [facility]- to cont DAPT given recent stenting  resume plavix  Monitor H/H and transfuse PRN    # CP, CAD, HTN, PAD  sec to AE CHF  Troponins steady   as above  Patient had recent nuclear stress test with mostly fixed inferior wall defect and mild richa-infarct ischemia in LAD territory  Cont rest of Home meds    # DESIRAE with underlying CKD stg 2  sec to hypoperfusion sec to CHF  Hold ACEi / ARBs  But diuretics medical necessity now  Avoid nephrotoxics  renally dose meds    # Normocytic Anemia 2 to blood loss and underlying AOCD  post operative- recent AAA repair  add iron to regimen and monitor H/H  CT- Small amount of retroperitoneal hemorrhage with no e/o leak or ongoing bleed. per Radiologist- layering seen  sec to preexisting volume overload will hold off on BT until Hgb 7 or less    # Leg pain  CT- Severe narrowing involving the left common femoral artery with a focal dissection just distally.  pain likely sec to instrumentation  No e/o ongoing dissection/ bleed  Vasc input noted  performed well with PT    ICDs     Relevant diagnostics and plan of treatment were discussed with the patient and son to the best of my ability and pertinent questions were answered.    GoC discussed with both patient and his son Xavier. Patient did not want to make this decision now. Xavier stated that he is the HCP and if and when the time comes he would like to assess and make this decision. Currently patient should be full code

## 2020-07-28 NOTE — PROGRESS NOTE ADULT - ASSESSMENT
Assessment:  Mr. Collazo is an 88 year old man with past medical history of Coronary artery disease (s/p stent of OM1 in 2008, CABG x 4 in 1997; LIMA-LAD, SVG-PDA, SVG-posterior LV branch and diagonal), Inferior wall myocardial infarction, Abdominal aortic aneurysm, Hypertension, Peripheral arterial disease and reports of chest pain syndrome, who presents with recent endovascular triple AAA repair on 7/20/2020 and SMA & renal artery stenting at [facility] presents with fatigue, leg edema and leg pain, found to have decompensated heart failure.     Physical exam notable for lung crackles and significant bilateral lower extremity edema. Volume overload may be the cause of his overall weakness. ECG reviewed and consistent with known LBBB, no acute changes. Initial troponin is elevated which is likely from volume overload, elevated Cr and underlying CAD.    Recommendations:  [] Decompensated heart failure: BP normotensive. Patient does not take diuretics at home. Increase diuresis to Lasix 40 mg IVP TID today, monitor urine output. Follow up echo to re-evaluate LVEF.   [] Elevated troponin, history of CAD: Patient had recent nuclear stress test with mostly fixed inferior wall defect and mild richa-infarct ischemia in LAD territory. Troponin peaked at 1.5. Continue home:  Imdur 60 mg daily, Simvastatin 20 mg daily, Metoprolol tartrate 25 mg BID. Continue Aspirin 81 mg daily.   [] Post-op new atrial fibrillation: Elevated CHADSVasc score warrants anticoagulation, will start heparin drip. This may have precipitated the CHF presentation. Continue metoprolol.   [] Abdominal aortic aneurysm repair: CT abdomen with small amount of retroperitoneal hemorrhage, discussed report with his [facility] surgeon Dr. Amanuel Hess and he recommended to at least continue plavix given recent stenting. Follow up Vascular surgery regarding persistent anemia today (Hgb was ~11 in January and now is ~ 8). Continue aspirin and plavix.     Discussed with his [facility] surgeon, Dr. Amanuel Hess today.     Thank you for the consult. We will follow along.     Evi Delgado MD  Cardiology Assessment:  Mr. Collazo is an 88 year old man with past medical history of Coronary artery disease (s/p stent of OM1 in 2008, CABG x 4 in 1997; LIMA-LAD, SVG-PDA, SVG-posterior LV branch and diagonal), Inferior wall myocardial infarction, Abdominal aortic aneurysm, Hypertension, Peripheral arterial disease and reports of chest pain syndrome, who presents with recent endovascular triple AAA repair on 7/20/2020 and SMA & renal artery stenting at [facility] presents with fatigue, leg edema and leg pain, found to have decompensated heart failure.     Physical exam notable for lung crackles and significant bilateral lower extremity edema. Volume overload may be the cause of his overall weakness. ECG reviewed and consistent with known LBBB, no acute changes. Initial troponin is elevated which is likely from volume overload, elevated Cr and underlying CAD.    Recommendations:  [] Decompensated heart failure: BP normotensive. Patient does not take diuretics at home. Increase diuresis to Lasix 40 mg IVP TID today, monitor urine output. Follow up echo to re-evaluate LVEF.   [] Elevated troponin, history of CAD: Patient had recent nuclear stress test with mostly fixed inferior wall defect and mild richa-infarct ischemia in LAD territory. Troponin peaked at 1.5. Continue home:  Imdur 60 mg daily, Simvastatin 20 mg daily, Metoprolol tartrate 25 mg BID. Continue Aspirin 81 mg daily.   [] Post-op new atrial fibrillation: Elevated CHADSVasc score warrants anticoagulation, can start heparin drip or lovenox. This may have precipitated the CHF presentation. Continue metoprolol.   [] Abdominal aortic aneurysm repair: CT abdomen with small amount of retroperitoneal hemorrhage, discussed report with his [facility] surgeon Dr. Amanuel Hess and he recommended to at least continue plavix given recent stenting. Follow up Vascular surgery regarding persistent anemia today (Hgb was ~11 in January and now is ~ 8). Continue aspirin and plavix.     Discussed with his [facility] surgeon, Dr. Amanuel Hess today. Also discussed with Dr. Rogers.     Thank you for the consult. We will follow along.     Evi Delgado MD  Cardiology Assessment:  Mr. Collazo is an 88 year old man with past medical history of Coronary artery disease (s/p stent of OM1 in 2008, CABG x 4 in 1997; LIMA-LAD, SVG-PDA, SVG-posterior LV branch and diagonal), Inferior wall myocardial infarction, Abdominal aortic aneurysm, Hypertension, Peripheral arterial disease and reports of chest pain syndrome, who presents with recent endovascular triple AAA repair on 7/20/2020 and SMA & renal artery stenting at [facility] presents with fatigue, leg edema and leg pain, found to have decompensated heart failure.     Physical exam notable for lung crackles and significant bilateral lower extremity edema. Volume overload may be the cause of his overall weakness. ECG reviewed and consistent with known LBBB, no acute changes. Initial troponin is elevated which is likely from volume overload, elevated Cr and underlying CAD.    Recommendations:  [] Decompensated heart failure: BP normotensive. Patient does not take diuretics at home. Increase diuresis to Lasix 40 mg IVP TID today, monitor urine output. Follow up echo to re-evaluate LVEF.   [] Elevated troponin, history of CAD: Patient had recent nuclear stress test with mostly fixed inferior wall defect and mild richa-infarct ischemia in LAD territory. Troponin peaked at 1.5. Continue home:  Imdur 60 mg daily, Simvastatin 20 mg daily, Metoprolol tartrate 25 mg BID. Continue Aspirin 81 mg daily.   [] Rhythm: Appears irregular at times and difficult to detect p waves, unclear if atrial fibrillation or junctional rhythm, will consult Cardiac EP  [] Abdominal aortic aneurysm repair: CT abdomen with small amount of retroperitoneal hemorrhage, discussed report with his [facility] surgeon Dr. Amanuel Hess and he recommended to at least continue plavix given recent stenting. Follow up Vascular surgery regarding persistent anemia today (Hgb was ~11 in January and now is ~ 8). Continue aspirin and plavix.     Discussed with his [facility] surgeon, Dr. Amanuel Hess today.     Thank you for the consult. We will follow along.     Evi Delgado MD  Cardiology

## 2020-07-28 NOTE — CHART NOTE - NSCHARTNOTEFT_GEN_A_CORE
Pt to [address] Penaloza RN  06/04/18 1300 TONNY/PVR's reviewed with Dr Mendoza  Pt seen and examined with Dr Mendoza  Pt without symptoms of claudication  Advised pt of TONNY/PVR findings and pt will follow up with Dr Hess(vascular surgeron who performed EVAR) as he already has F/U set up  Vascular surgery will sign off  Reconsult PRN

## 2020-07-29 LAB
ANION GAP SERPL CALC-SCNC: 11 MMOL/L — SIGNIFICANT CHANGE UP (ref 5–17)
BUN SERPL-MCNC: 39 MG/DL — HIGH (ref 8–20)
CALCIUM SERPL-MCNC: 7.6 MG/DL — LOW (ref 8.6–10.2)
CHLORIDE SERPL-SCNC: 98 MMOL/L — SIGNIFICANT CHANGE UP (ref 98–107)
CO2 SERPL-SCNC: 27 MMOL/L — SIGNIFICANT CHANGE UP (ref 22–29)
CREAT SERPL-MCNC: 1.55 MG/DL — HIGH (ref 0.5–1.3)
GLUCOSE SERPL-MCNC: 102 MG/DL — HIGH (ref 70–99)
HCT VFR BLD CALC: 24.8 % — LOW (ref 39–50)
HGB BLD-MCNC: 8.2 G/DL — LOW (ref 13–17)
MAGNESIUM SERPL-MCNC: 2 MG/DL — SIGNIFICANT CHANGE UP (ref 1.6–2.6)
MCHC RBC-ENTMCNC: 31.3 PG — SIGNIFICANT CHANGE UP (ref 27–34)
MCHC RBC-ENTMCNC: 33.1 GM/DL — SIGNIFICANT CHANGE UP (ref 32–36)
MCV RBC AUTO: 94.7 FL — SIGNIFICANT CHANGE UP (ref 80–100)
PHOSPHATE SERPL-MCNC: 3.2 MG/DL — SIGNIFICANT CHANGE UP (ref 2.4–4.7)
PLATELET # BLD AUTO: 244 K/UL — SIGNIFICANT CHANGE UP (ref 150–400)
POTASSIUM SERPL-MCNC: 3.3 MMOL/L — LOW (ref 3.5–5.3)
POTASSIUM SERPL-SCNC: 3.3 MMOL/L — LOW (ref 3.5–5.3)
RBC # BLD: 2.62 M/UL — LOW (ref 4.2–5.8)
RBC # FLD: 14 % — SIGNIFICANT CHANGE UP (ref 10.3–14.5)
SODIUM SERPL-SCNC: 136 MMOL/L — SIGNIFICANT CHANGE UP (ref 135–145)
WBC # BLD: 7.93 K/UL — SIGNIFICANT CHANGE UP (ref 3.8–10.5)
WBC # FLD AUTO: 7.93 K/UL — SIGNIFICANT CHANGE UP (ref 3.8–10.5)

## 2020-07-29 PROCEDURE — 99232 SBSQ HOSP IP/OBS MODERATE 35: CPT

## 2020-07-29 PROCEDURE — 99233 SBSQ HOSP IP/OBS HIGH 50: CPT

## 2020-07-29 PROCEDURE — 99222 1ST HOSP IP/OBS MODERATE 55: CPT | Mod: 25

## 2020-07-29 RX ORDER — CARVEDILOL PHOSPHATE 80 MG/1
3.12 CAPSULE, EXTENDED RELEASE ORAL
Refills: 0 | Status: DISCONTINUED | OUTPATIENT
Start: 2020-07-29 | End: 2020-07-29

## 2020-07-29 RX ORDER — DIAZEPAM 5 MG
1 TABLET ORAL
Qty: 0 | Refills: 0 | DISCHARGE

## 2020-07-29 RX ORDER — POTASSIUM CHLORIDE 20 MEQ
40 PACKET (EA) ORAL EVERY 4 HOURS
Refills: 0 | Status: COMPLETED | OUTPATIENT
Start: 2020-07-29 | End: 2020-07-29

## 2020-07-29 RX ORDER — METOPROLOL TARTRATE 50 MG
25 TABLET ORAL
Refills: 0 | Status: COMPLETED | OUTPATIENT
Start: 2020-07-29 | End: 2020-07-29

## 2020-07-29 RX ORDER — CARVEDILOL PHOSPHATE 80 MG/1
3.12 CAPSULE, EXTENDED RELEASE ORAL
Refills: 0 | Status: DISCONTINUED | OUTPATIENT
Start: 2020-07-30 | End: 2020-07-30

## 2020-07-29 RX ORDER — TERAZOSIN HYDROCHLORIDE 10 MG/1
1 CAPSULE ORAL
Qty: 0 | Refills: 0 | DISCHARGE

## 2020-07-29 RX ORDER — LISINOPRIL 2.5 MG/1
10 TABLET ORAL
Refills: 0 | Status: DISCONTINUED | OUTPATIENT
Start: 2020-07-29 | End: 2020-07-30

## 2020-07-29 RX ORDER — CALCIUM POLYCARBOPHIL 625 MG/1
4 TABLET, FILM COATED ORAL
Qty: 0 | Refills: 0 | DISCHARGE

## 2020-07-29 RX ORDER — DOCUSATE SODIUM 100 MG
2 CAPSULE ORAL
Qty: 0 | Refills: 0 | DISCHARGE

## 2020-07-29 RX ADMIN — Medication 40 MILLIEQUIVALENT(S): at 10:32

## 2020-07-29 RX ADMIN — Medication 25 MILLIGRAM(S): at 05:23

## 2020-07-29 RX ADMIN — SIMVASTATIN 20 MILLIGRAM(S): 20 TABLET, FILM COATED ORAL at 21:01

## 2020-07-29 RX ADMIN — Medication 25 MILLIGRAM(S): at 17:04

## 2020-07-29 RX ADMIN — Medication 4 MILLIGRAM(S): at 21:01

## 2020-07-29 RX ADMIN — Medication 81 MILLIGRAM(S): at 07:52

## 2020-07-29 RX ADMIN — Medication 40 MILLIGRAM(S): at 17:05

## 2020-07-29 RX ADMIN — Medication 325 MILLIGRAM(S): at 07:52

## 2020-07-29 RX ADMIN — Medication 40 MILLIGRAM(S): at 05:23

## 2020-07-29 RX ADMIN — SENNA PLUS 2 TABLET(S): 8.6 TABLET ORAL at 21:01

## 2020-07-29 RX ADMIN — FINASTERIDE 5 MILLIGRAM(S): 5 TABLET, FILM COATED ORAL at 07:52

## 2020-07-29 RX ADMIN — Medication 40 MILLIEQUIVALENT(S): at 14:31

## 2020-07-29 RX ADMIN — CLOPIDOGREL BISULFATE 75 MILLIGRAM(S): 75 TABLET, FILM COATED ORAL at 07:53

## 2020-07-29 NOTE — PROGRESS NOTE ADULT - ASSESSMENT
88 year old man with past medical history Coronary artery disease (s/p stent of OM1 in 2008, CABG x 4 in 1997; LIMA-LAD, SVG- PDA, SVG- posterior LV branch and diagonal), Inferior wall myocardial infarction, Abdominal aortic aneurysm, Hypertension, Peripheral arterial disease , recent triple AAA repair on 7/20/2020 at [facility] presents with reports of chest pain syndrome, fatigue, leg edema and leg pain.    # AE CHF systolic with mildly reduced EF of 45%  Troponins steady  Recheck ECHO- P  IV Lasix bid as long as tolerated  Hold ACEi or ARB sec to DESIRAE. restart at discharge depending on renal fxn  supplement O2 PRN  CT- Small bilateral pleural effusions with associated compressive atelectasis with a patchy left lower lobe opacity, either atelectasis or pneumonia  If no improvement with diuresing, will consider infectious etiology  daily wts, I/Os, fluid restriction  DVT ruled out. ACE wraps for leg edema    # initially suspected New Afib on TELE, upon furhter review- NOT AFIB  EP eval appreciated  o/p [facility] for 2 weeks planned    # Hypokalemia  replete    # Recent AAA repair  per [facility]- to cont DAPT given recent stenting  resume plavix  Monitor H/H and transfuse PRN    # CP, CAD, HTN, PAD  sec to AE CHF  Troponins steady   as above  Patient had recent nuclear stress test with mostly fixed inferior wall defect and mild richa-infarct ischemia in LAD territory  Cont rest of Home meds    # s/p DESIRAE with underlying CKD stg 2  sec to hypoperfusion sec to CHF  Hold ACEi / ARBs  But diuretics medical necessity now  Avoid nephrotoxics  renally dose meds    # Normocytic Anemia 2 to blood loss and underlying AOCD  post operative- recent AAA repair  add iron to regimen and monitor H/H  CT- Small amount of retroperitoneal hemorrhage with no e/o leak or ongoing bleed. per Radiologist- layering seen  sec to preexisting volume overload will hold off on BT until Hgb 7 or less    # Leg pain  CT- Severe narrowing involving the left common femoral artery with a focal dissection just distally.  pain likely sec to instrumentation  No e/o ongoing dissection/ bleed  Vasc input noted  performed well with PT    ICDs     Relevant diagnostics and plan of treatment were discussed with the patient and son to the best of my ability and pertinent questions were answered.    GoC: discussed with both patient and his son Xavier. Patient did not want to make this decision now. Xavier stated that he is the HCP and if and when the time comes he would like to assess and make this decision. Currently patient should be full code  Plan: cont IV lasix as long as tolerated then switch to PO lasix at discharge 88 year old man with past medical history Coronary artery disease (s/p stent of OM1 in 2008, CABG x 4 in 1997; LIMA-LAD, SVG- PDA, SVG- posterior LV branch and diagonal), Inferior wall myocardial infarction, Abdominal aortic aneurysm, Hypertension, Peripheral arterial disease , recent triple AAA repair on 7/20/2020 at [facility] presents with reports of chest pain syndrome, fatigue, leg edema and leg pain.    # AE CHF systolic with mildly reduced EF of 45% now worsened to 25 %  Troponins steady  Recheck ECHO- P  IV Lasix bid as long as tolerated  Not on optimal med regimen for CHF sec to soft BP ?- BB changed to coreg; restart ACEi low dose; consider aldactone as o/p or add as a diuretic at discharge  supplement O2 PRN  CT- Small bilateral pleural effusions with associated compressive atelectasis with a patchy left lower lobe opacity, either atelectasis or pneumonia  If no improvement with diuresing, will consider infectious etiology  daily wts, I/Os, fluid restriction  DVT ruled out. ACE wraps for leg edema    # initially suspected New Afib on TELE, upon furhter review- NOT AFIB  EP eval appreciated  o/p [facility] for 2 weeks planned    # Hypokalemia  replete    # Recent AAA repair  per [facility]- to cont DAPT given recent stenting  resume plavix  Monitor H/H and transfuse PRN    # CP, CAD, HTN, PAD  sec to AE CHF  Troponins steady   as above  Patient had recent nuclear stress test with mostly fixed inferior wall defect and mild richa-infarct ischemia in LAD territory  Cont rest of Home meds    # s/p DESIRAE with underlying CKD stg 2  sec to hypoperfusion sec to CHF  restart ACEi  But diuretics medical necessity now  Avoid nephrotoxics  renally dose meds    # Normocytic Anemia 2 to blood loss and underlying AOCD  post operative- recent AAA repair  add iron to regimen and monitor H/H  CT- Small amount of retroperitoneal hemorrhage with no e/o leak or ongoing bleed. per Radiologist- layering seen  sec to preexisting volume overload will hold off on BT until Hgb 7 or less    # Leg pain  CT- Severe narrowing involving the left common femoral artery with a focal dissection just distally.  pain likely sec to instrumentation  No e/o ongoing dissection/ bleed  Vasc input noted  performed well with PT    ICDs     Relevant diagnostics and plan of treatment were discussed with the patient and son to the best of my ability and pertinent questions were answered.    GoC: discussed with both patient and his son Xavier. Patient did not want to make this decision now. Xavier stated that he is the HCP and if and when the time comes he would like to assess and make this decision. Currently patient should be full code  Plan: cont IV lasix as long as tolerated

## 2020-07-29 NOTE — PROGRESS NOTE ADULT - ASSESSMENT
Assessment:  Mr. Collazo is an 88 year old man with past medical history of Coronary artery disease (s/p stent of OM1 in 2008, CABG x 4 in 1997; LIMA-LAD, SVG-PDA, SVG-posterior LV branch and diagonal), Inferior wall myocardial infarction, Abdominal aortic aneurysm, Hypertension, Peripheral arterial disease and reports of chest pain syndrome, who presents with recent endovascular triple AAA repair on 7/20/2020 and SMA & renal artery stenting at [facility] presents with fatigue, leg edema and leg pain, found to have decompensated heart failure.     Physical exam notable for lung crackles and significant bilateral lower extremity edema. Volume overload likely the cause of his overall weakness. ECG reviewed and consistent with known LBBB, no acute changes. Initial troponin is elevated which is likely from volume overload, elevated Cr and underlying CAD.    Recommendations:  [] Decompensated heart failure: BP normotensive. Patient does not take diuretics at home. Continue Lasix 40 mg IVP BID, likely will require IV diuretics on Thursday. Will repeat pro BNP on Thursday. Will require PO diuretic on hospital discharge. Echo with moderately reduced LVEF, likely some decline from outpatient TTE. Continue guideline directed medical therapy: beta blocker, due to elevated Cr will not start ACE-I (continue imdur and consider addition of hydralazine as outpatient).   [] Elevated troponin, history of CAD: Patient had recent nuclear stress test with mostly fixed inferior wall defect and mild richa-infarct ischemia in LAD territory. Troponin peaked at 1.5. Continue home:  Imdur 60 mg daily, Simvastatin 20 mg daily, Metoprolol tartrate 25 mg BID. Continue Aspirin 81 mg daily.   [] Rhythm: Evaluated by Cardiac EP and findings suggestive of sinus rhythm, PACs, 1st degree AV block and old LBBB, plan for 2 week cardiac event monitor to evaluate for bradycardia, high grade AV block and atrial fibrillation.   [] Abdominal aortic aneurysm repair: CT abdomen with small amount of retroperitoneal hemorrhage, discussed report with his [facility] surgeon Dr. Amanuel Hess and he recommended to at least continue plavix given recent stenting.  Continue aspirin and plavix.     Thank you for the consult. We will follow along.     Evi Delgado MD  Cardiology Assessment:  Mr. Collazo is an 88 year old man with past medical history of Coronary artery disease (s/p stent of OM1 in 2008, CABG x 4 in 1997; LIMA-LAD, SVG-PDA, SVG-posterior LV branch and diagonal), Inferior wall myocardial infarction, Abdominal aortic aneurysm, Hypertension, Peripheral arterial disease and reports of chest pain syndrome, who presents with recent endovascular triple AAA repair on 7/20/2020 and SMA & renal artery stenting at [facility] presents with fatigue, leg edema and leg pain, found to have decompensated heart failure.     Physical exam notable for lung crackles and significant bilateral lower extremity edema. Volume overload likely the cause of his overall weakness. ECG reviewed and consistent with known LBBB, no acute changes. Initial troponin is elevated which is likely from volume overload, elevated Cr and underlying CAD.    Recommendations:  [] Decompensated heart failure: BP normotensive. Patient does not take diuretics at home. Continue Lasix 40 mg IVP BID, likely will require IV diuretics on Thursday. Will repeat pro BNP on Thursday. Will require PO diuretic on hospital discharge. Echo with moderately reduced LVEF, likely some decline from outpatient TTE. Continue guideline directed medical therapy: beta blocker, due to elevated Cr will not start ACE-I (continue imdur and consider addition of hydralazine as outpatient, BP low now).   [] Elevated troponin, history of CAD: Patient had recent nuclear stress test with mostly fixed inferior wall defect and mild richa-infarct ischemia in LAD territory. Troponin peaked at 1.5. Continue home:  Imdur 60 mg daily, Simvastatin 20 mg daily, Metoprolol tartrate 25 mg BID. Continue Aspirin 81 mg daily.   [] Rhythm: Evaluated by Cardiac EP and findings suggestive of sinus rhythm, PACs, 1st degree AV block and old LBBB, plan for 2 week cardiac event monitor to evaluate for bradycardia, high grade AV block and atrial fibrillation.   [] Abdominal aortic aneurysm repair: CT abdomen with small amount of retroperitoneal hemorrhage, discussed report with his [facility] surgeon Dr. Amanuel Hess and he recommended to at least continue plavix given recent stenting.  Continue aspirin and plavix.     Thank you for the consult. We will follow along.     Evi Delgado MD  Cardiology

## 2020-07-29 NOTE — PROGRESS NOTE ADULT - SUBJECTIVE AND OBJECTIVE BOX
HEALTH ISSUES - PROBLEM Dx:  past medical history Coronary artery disease (s/p stent of OM1 in 2008, CABG x 4 in 1997; LIMA-LAD, SVG- PDA, SVG- posterior LV branch and diagonal), Inferior wall myocardial infarction, Abdominal aortic aneurysm, Hypertension, Peripheral arterial disease , recent triple AAA repair on 7/20/2020 at [facility]     AE CHF, DESIRAE    INTERVAL HPI/ OVERNIGHT EVENTS:    comfortable lying in bed  states he feels much better   is asking when he can go home    REVIEW OF SYSTEMS:    as above    Vital Signs Last 24 Hrs  T(C): 36.6 (29 Jul 2020 07:40), Max: 36.7 (28 Jul 2020 21:12)  T(F): 97.8 (29 Jul 2020 07:40), Max: 98 (28 Jul 2020 21:12)  HR: 74 (29 Jul 2020 07:40) (73 - 85)  BP: 101/65 (29 Jul 2020 07:40) (97/61 - 118/69)  BP(mean): 77 (29 Jul 2020 07:40) (77 - 77)  RR: 18 (29 Jul 2020 07:40) (16 - 18)  SpO2: 98% (29 Jul 2020 07:40) (95% - 98%)    PHYSICAL EXAM-  General: elderly man, comfortable  HEENT: sclera anicteric   Neck: supple, no carotid bruits b/l   CVS: RRR, s1, s2, no murmurs  Chest: CTA quinton, basal BS diminished  Abdomen: distended  Extremities: 2+ b/l lower extremity edema up to knees though decreasing  Neuro: A&O x3  Psych: Normal affect      MEDICATIONS  (STANDING):  aspirin enteric coated 81 milliGRAM(s) Oral daily  clopidogrel Tablet 75 milliGRAM(s) Oral daily  doxazosin 4 milliGRAM(s) Oral at bedtime  ferrous    sulfate 325 milliGRAM(s) Oral daily  finasteride 5 milliGRAM(s) Oral daily  furosemide   Injectable 40 milliGRAM(s) IV Push two times a day  isosorbide   mononitrate ER Tablet (IMDUR) 30 milliGRAM(s) Oral daily  metoprolol tartrate 25 milliGRAM(s) Oral two times a day  potassium chloride    Tablet ER 40 milliEquivalent(s) Oral every 4 hours  senna 2 Tablet(s) Oral at bedtime  simvastatin 20 milliGRAM(s) Oral at bedtime    MEDICATIONS  (PRN):      LABS:                        8.2    7.93  )-----------( 244      ( 29 Jul 2020 06:16 )             24.8     07-29    136  |  98  |  39.0<H>  ----------------------------<  102<H>  3.3<L>   |  27.0  |  1.55<H>    Ca    7.6<L>      29 Jul 2020 06:16  Phos  3.2     07-29  Mg     2.0     07-29

## 2020-07-29 NOTE — CONSULT NOTE ADULT - ASSESSMENT
88 year old man with past medical history Coronary artery disease (s/p stent of OM1 in 2008, CABG x 4 in 1997; LIMA-LAD, SVG- PDA, SVG- posterior LV branch and diagonal), Inferior wall myocardial infarction, Abdominal aortic aneurysm, Hypertension, Peripheral arterial disease , recent triple AAA repair on 7/20/2020 at [facility] presents with reports of chest pain syndrome, fatigue, leg edema and leg pain. The patient states that after his AAA repair he had episodes of chest discomfort and was told at [facility] that everything was stable from cardiac standpoint. He had some leg pain after the surgery which he states he was given Valium for. He was discharged on 7/22/20. The pain is worst in his upper thighs. He reports that a few days after that hospital discharge he noticed extreme fatigue and was unable to get up from seated position because he had no energy. Denies any chest discomfort at this time. Also reports chronic leg edema and dyspnea. Denies palpitations or syncope. States he had a stress test and ECHO in 3/2020 and there was no evidence of heart failure at that time.    EP was consulted to assess the rhythm as AFIB was suspected and to assess the AV conduction as patient has LBBB and 1st AVB and presenting with weakness. All available ECGs and Tele reviewed. Patient seen and examined. Patient denies ever having a syncope, pre-syncope, dizziness, or sustained palpitation.     ECG and Tele are consistent with SR with PACs, and old LBBB +1st AVB. No prior syncope, or pre-syncope or current pauses.     Plan:  1. Continue with current therapy.  2. Periodic 2-weeks monitoring with [facility] via Dr. Aguilar's office to screen for any bradycrdia, high grade AVB, and AFIB.    EPS will sign off, pls call if needed.  Above discussed with the patient, primary team and Dr. Aguilar.

## 2020-07-29 NOTE — PROGRESS NOTE ADULT - SUBJECTIVE AND OBJECTIVE BOX
PAT HOFFMANN  [id number]      Chief Complaint: CHF/AAA repair/PAD      Interval History: The patient reports feeling better today, has less dyspnea and less leg edema.       Tele: sinus rhythm      Current meds:   aspirin enteric coated 81 milliGRAM(s) Oral daily  clopidogrel Tablet 75 milliGRAM(s) Oral daily  doxazosin 4 milliGRAM(s) Oral at bedtime  ferrous    sulfate 325 milliGRAM(s) Oral daily  finasteride 5 milliGRAM(s) Oral daily  furosemide   Injectable 40 milliGRAM(s) IV Push two times a day  isosorbide   mononitrate ER Tablet (IMDUR) 30 milliGRAM(s) Oral daily  metoprolol tartrate 25 milliGRAM(s) Oral two times a day  potassium chloride    Tablet ER 40 milliEquivalent(s) Oral every 4 hours  senna 2 Tablet(s) Oral at bedtime  simvastatin 20 milliGRAM(s) Oral at bedtime      Objective:     Vital Signs:  T(C): 36.6 (07-29-20 @ 07:40), Max: 36.7 (07-28-20 @ 21:12)  HR: 74 (07-29-20 @ 07:40) (73 - 85)  BP: 101/65 (07-29-20 @ 07:40) (97/61 - 118/69)  RR: 18 (07-29-20 @ 07:40) (16 - 18)  SpO2: 98% (07-29-20 @ 07:40) (95% - 98%)  Wt(kg): --    General: elderly man, no distress  HEENT: sclera anicteric   Neck: supple  CVS: JVP ~ 9 cm H20, RRR, s1, s2, no murmurs  Chest: unlabored respirations, less crackles   Abdomen: distended  Extremities: 1+ b/l lower extremity edema up to knees (wrapped)  Neuro: A&O x3  Psych: Normal affect    Labs:   29 Jul 2020 06:16    136    |  98     |  39.0   ----------------------------<  102    3.3     |  27.0   |  1.55     Ca    7.6        29 Jul 2020 06:16  Phos  3.2       29 Jul 2020 06:16  Mg     2.0       29 Jul 2020 06:16                            8.2    7.93  )-----------( 244      ( 29 Jul 2020 06:16 )             24.8       CARDIAC MARKERS ( 28 Jul 2020 06:44 )  x     / 1.35 ng/mL / x     / x     / x      CARDIAC MARKERS ( 27 Jul 2020 18:37 )  x     / 1.35 ng/mL / x     / x     / x      CARDIAC MARKERS ( 27 Jul 2020 13:41 )  x     / 1.35 ng/mL / x     / x     / x              ECG (7/27/2020): sinus rhythm, first degree AV block, PVC, LBBB (similar to prior ECG)    Lower extremity venous duplex (7/27/2020):  No evidence of deep venous thrombosis in either lower extremity.    CXR (7/27/2020):  Congestive heart failure with bilateral pulmonary edema. These findings are new since 1/28/2020..    Coronary angiogram (5/2018) at [facility]:  Occluded vein graft to diagonal and posterior LV branch  LIMA to LAD and vein graft to PDA patent  s/p PCI to OM1    Outpatient stress test (3/2020):  Small sized, severe intensity fixed defect of the basal inferior, basal-mid inferolateral wall consistent with infarction. Small to moderate sized partially reversible at the margins defect of the basal-mid anteroseptal wall consistent with infarction with mild richa-infarct ischemia, LVEF 47%    Outpatient TTE (12/2019):  LVEF 45%  Basal and mid inferior wall severe hypokinesis  Moderate to severe mitral regurgitation  Mild to moderate aortic regurgitation  Large mid to distal abdominal aortic aneurysm 5.9 cm     TTE (7/28/2020) images reviewed:   Moderately reduced LVEF ~ 40%, abnormal septal motion likely due to conduction delay  Normal RV size and systolic function  Severely dilated left atrium   Mild AR, Moderate MR, Mild TR   No aortic stenosis   RVSP ~ 22 mmHg PAT HOFFMANN  [id number]      Chief Complaint: CHF/AAA repair/PAD      Interval History: The patient reports feeling better today, has less dyspnea and less leg edema.       Tele: sinus rhythm      Current meds:   aspirin enteric coated 81 milliGRAM(s) Oral daily  clopidogrel Tablet 75 milliGRAM(s) Oral daily  doxazosin 4 milliGRAM(s) Oral at bedtime  ferrous    sulfate 325 milliGRAM(s) Oral daily  finasteride 5 milliGRAM(s) Oral daily  furosemide   Injectable 40 milliGRAM(s) IV Push two times a day  isosorbide   mononitrate ER Tablet (IMDUR) 30 milliGRAM(s) Oral daily  metoprolol tartrate 25 milliGRAM(s) Oral two times a day  potassium chloride    Tablet ER 40 milliEquivalent(s) Oral every 4 hours  senna 2 Tablet(s) Oral at bedtime  simvastatin 20 milliGRAM(s) Oral at bedtime      Objective:     Vital Signs:  T(C): 36.6 (07-29-20 @ 07:40), Max: 36.7 (07-28-20 @ 21:12)  HR: 74 (07-29-20 @ 07:40) (73 - 85)  BP: 101/65 (07-29-20 @ 07:40) (97/61 - 118/69)  RR: 18 (07-29-20 @ 07:40) (16 - 18)  SpO2: 98% (07-29-20 @ 07:40) (95% - 98%)  Wt(kg): --    General: elderly man, no distress  HEENT: sclera anicteric   Neck: supple  CVS: JVP ~ 9 cm H20, RRR, s1, s2, no murmurs  Chest: unlabored respirations, less crackles   Abdomen: distended  Extremities: 1+ b/l lower extremity edema up to knees (wrapped)  Neuro: A&O x3  Psych: Normal affect    Labs:   29 Jul 2020 06:16    136    |  98     |  39.0   ----------------------------<  102    3.3     |  27.0   |  1.55     Ca    7.6        29 Jul 2020 06:16  Phos  3.2       29 Jul 2020 06:16  Mg     2.0       29 Jul 2020 06:16                            8.2    7.93  )-----------( 244      ( 29 Jul 2020 06:16 )             24.8       CARDIAC MARKERS ( 28 Jul 2020 06:44 )  x     / 1.35 ng/mL / x     / x     / x      CARDIAC MARKERS ( 27 Jul 2020 18:37 )  x     / 1.35 ng/mL / x     / x     / x      CARDIAC MARKERS ( 27 Jul 2020 13:41 )  x     / 1.35 ng/mL / x     / x     / x              ECG (7/27/2020): sinus rhythm, first degree AV block, PVC, LBBB (similar to prior ECG)    Lower extremity venous duplex (7/27/2020):  No evidence of deep venous thrombosis in either lower extremity.    CXR (7/27/2020):  Congestive heart failure with bilateral pulmonary edema. These findings are new since 1/28/2020..    Coronary angiogram (5/2018) at [facility]:  Occluded vein graft to diagonal and posterior LV branch  LIMA to LAD and vein graft to PDA patent  s/p PCI to OM1    Outpatient stress test (3/2020):  Small sized, severe intensity fixed defect of the basal inferior, basal-mid inferolateral wall consistent with infarction. Small to moderate sized partially reversible at the margins defect of the basal-mid anteroseptal wall consistent with infarction with mild richa-infarct ischemia, LVEF 47%    Outpatient TTE (12/2019):  LVEF 45%  Basal and mid inferior wall severe hypokinesis  Moderate to severe mitral regurgitation  Mild to moderate aortic regurgitation  Large mid to distal abdominal aortic aneurysm 5.9 cm     TTE (7/28/2020) images reviewed:   Moderately reduced LVEF ~35- 40%, abnormal septal motion likely due to conduction delay  Normal RV size and systolic function  Severely dilated left atrium   Mild AR, Moderate MR, Mild TR   No aortic stenosis   RVSP ~ 22 mmHg

## 2020-07-29 NOTE — CONSULT NOTE ADULT - SUBJECTIVE AND OBJECTIVE BOX
Electrophysiology Attending Consult Note    HPI:  88 year old man with past medical history Coronary artery disease (s/p stent of OM1 in 2008, CABG x 4 in 1997; LIMA-LAD, SVG- PDA, SVG- posterior LV branch and diagonal), Inferior wall myocardial infarction, Abdominal aortic aneurysm, Hypertension, Peripheral arterial disease , recent triple AAA repair on 7/20/2020 at [facility] presents with reports of chest pain syndrome, fatigue, leg edema and leg pain. The patient states that after his AAA repair he had episodes of chest discomfort and was told at [facility] that everything was stable from cardiac standpoint. He had some leg pain after the surgery which he states he was given Valium for. He was discharged on 7/22/20. The pain is worst in his upper thighs. He reports that a few days after that hospital discharge he noticed extreme fatigue and was unable to get up from seated position because he had no energy. Denies any chest discomfort at this time. Also reports chronic leg edema and dyspnea. Denies palpitations or syncope. States he had a stress test and ECHO in 3/2020 and there was no evidence of heart failure at that time.    EP was consulted to assess the rhythm as AFIB was suspected and to assess the AV conduction as patient has LBBB and 1st AVB and presenting with weakness. All available ECGs and Tele reviewed. Patient seen and examined. Patient denies ever having a syncope, pre-syncope, dizziness, or sustained palpitation.     SH- lives at [facility] with spouse; denies habits now; former smoker  FH- heart dz runs in the family (27 Jul 2020 14:15)      PAST MEDICAL & SURGICAL HISTORY:  AAA (abdominal aortic aneurysm), s/p EVAR on 7/20.   HTN (hypertension)  S/P left inguinal hernia repair  CAD (coronary artery disease) with prior inferior MI:          - S/P CABG (coronary artery bypass graft) in 1997	           - s/p PCI to OM in 2008  Ischemic CM with EF around 45%  New anemia  Long-standing LBBB and 1st AVB back to 2017 or earlier.   History of right hip replacement    REVIEW OF SYSTEMS:  Ten point system review is negative unless specified above.       MEDICATIONS  (STANDING):  aspirin enteric coated 81 milliGRAM(s) Oral daily  clopidogrel Tablet 75 milliGRAM(s) Oral daily  doxazosin 4 milliGRAM(s) Oral at bedtime  ferrous    sulfate 325 milliGRAM(s) Oral daily  finasteride 5 milliGRAM(s) Oral daily  furosemide   Injectable 40 milliGRAM(s) IV Push two times a day  isosorbide   mononitrate ER Tablet (IMDUR) 30 milliGRAM(s) Oral daily  metoprolol tartrate 25 milliGRAM(s) Oral two times a day  potassium chloride    Tablet ER 40 milliEquivalent(s) Oral every 4 hours  senna 2 Tablet(s) Oral at bedtime  simvastatin 20 milliGRAM(s) Oral at bedtime    MEDICATIONS  (PRN):      Allergies    penicillin (Vomiting; Nausea)    Intolerances        SOCIAL HISTORY:    FAMILY HISTORY:  No pertinent family history in first degree relatives      Vital Signs Last 24 Hrs  T(C): 36.6 (29 Jul 2020 07:40), Max: 36.7 (28 Jul 2020 11:00)  T(F): 97.8 (29 Jul 2020 07:40), Max: 98 (28 Jul 2020 11:00)  HR: 74 (29 Jul 2020 07:40) (73 - 85)  BP: 101/65 (29 Jul 2020 07:40) (97/61 - 118/69)  BP(mean): 77 (29 Jul 2020 07:40) (77 - 77)  RR: 18 (29 Jul 2020 07:40) (16 - 18)  SpO2: 98% (29 Jul 2020 07:40) (95% - 98%)    Physical Exam:  Constitutional: AAOx3, NAD  Neck: supple, No JVD  Cardiovascular: +S1S2 RRR, no murmurs, rubs, gallops   Pulmonary: CTA b/l, unlabored, no wheezes, rales. rhonci  Abdomen: +BS, soft NTND  Extremities: no edema b/l, +distal pulses b/l  Neuro: non focal, speech clear, CORREA x 4    LABS:                        8.2    7.93  )-----------( 244      ( 29 Jul 2020 06:16 )             24.8   07-29    136  |  98  |  39.0<H>  ----------------------------<  102<H>  3.3<L>   |  27.0  |  1.55<H>    Ca    7.6<L>      29 Jul 2020 06:16  Phos  3.2     07-29  Mg     2.0     07-29      PT/INR - ( 27 Jul 2020 11:28 )   PT: 15.2 sec;   INR: 1.33 ratio         PTT - ( 27 Jul 2020 11:28 )  PTT:28.7 secCARDIAC MARKERS ( 28 Jul 2020 06:44 )  x     / 1.35 ng/mL / x     / x     / x      CARDIAC MARKERS ( 27 Jul 2020 18:37 )  x     / 1.35 ng/mL / x     / x     / x      CARDIAC MARKERS ( 27 Jul 2020 13:41 )  x     / 1.35 ng/mL / x     / x     / x              RADIOLOGY & ADDITIONAL STUDIES:  < from: CT Angio Abdomen and Pelvis w/ IV Cont (07.27.20 @ 11:49) >  IMPRESSION:  No aortic dissection.    Small amount ofretroperitoneal hemorrhage including along the left pelvic sidewall and in the presacral region.    Severe narrowing involving the left common femoral artery with a focal dissection just distally.    Small bilateral pleural effusions with associated compressive atelectasis with a patchy left lower lobe opacity, either atelectasis or pneumonia; correlation is recommended with patient's symptoms.    Nonspecific bilateral upper lobe groundglass opacities; differential includes infectious/inflammatory etiologies, scarring or fibrosis; comparison is recommended with a prior chest CT if available.    < end of copied text >    < from: [facility] Physiol Extremity Lower 3+ Level, BI (07.28.20 @ 09:35) >  Impression:    Moderate to severe left lower extremity arterial disease. There is likely distal femoral/popliteal/proximal tibial arterial disease.    < end of copied text >      < from: US Duplex Venous Lower Ext Complete, Bilateral (07.27.20 @ 22:02) >  FINDINGS:    There is normal compressibility of the bilateral common femoral, femoral and popliteal veins.  Doppler examination shows normal spontaneous and phasic flow.    No calf vein thrombosis is detected.    Left popliteal fossa Baker's cyst measures 4.0 x 0.6 x 1.7 cm.    IMPRESSION:  No evidence of deep venous thrombosis in either lower extremity.    < end of copied text >    < from: Xray Chest 2 Views PA/Lat (07.27.20 @ 11:56) >  Impression:  Congestive heart failure with bilateral pulmonary edema. These findings are new since 1/28/2020..    < end of copied text >      ECG: Sinus with PACs, and first degree AVB and LBBB.   Tele: Sinus with PACs, and LBBB.

## 2020-07-30 LAB
ABO RH CONFIRMATION: SIGNIFICANT CHANGE UP
ANION GAP SERPL CALC-SCNC: 13 MMOL/L — SIGNIFICANT CHANGE UP (ref 5–17)
BLD GP AB SCN SERPL QL: SIGNIFICANT CHANGE UP
BUN SERPL-MCNC: 35 MG/DL — HIGH (ref 8–20)
CALCIUM SERPL-MCNC: 7.8 MG/DL — LOW (ref 8.6–10.2)
CHLORIDE SERPL-SCNC: 98 MMOL/L — SIGNIFICANT CHANGE UP (ref 98–107)
CO2 SERPL-SCNC: 27 MMOL/L — SIGNIFICANT CHANGE UP (ref 22–29)
CREAT SERPL-MCNC: 1.45 MG/DL — HIGH (ref 0.5–1.3)
GLUCOSE SERPL-MCNC: 105 MG/DL — HIGH (ref 70–99)
HCT VFR BLD CALC: 26 % — LOW (ref 39–50)
HGB BLD-MCNC: 8.5 G/DL — LOW (ref 13–17)
MCHC RBC-ENTMCNC: 31.4 PG — SIGNIFICANT CHANGE UP (ref 27–34)
MCHC RBC-ENTMCNC: 32.7 GM/DL — SIGNIFICANT CHANGE UP (ref 32–36)
MCV RBC AUTO: 95.9 FL — SIGNIFICANT CHANGE UP (ref 80–100)
NT-PROBNP SERPL-SCNC: HIGH PG/ML (ref 0–300)
PLATELET # BLD AUTO: 243 K/UL — SIGNIFICANT CHANGE UP (ref 150–400)
POTASSIUM SERPL-MCNC: 3.7 MMOL/L — SIGNIFICANT CHANGE UP (ref 3.5–5.3)
POTASSIUM SERPL-SCNC: 3.7 MMOL/L — SIGNIFICANT CHANGE UP (ref 3.5–5.3)
RBC # BLD: 2.71 M/UL — LOW (ref 4.2–5.8)
RBC # FLD: 14 % — SIGNIFICANT CHANGE UP (ref 10.3–14.5)
SODIUM SERPL-SCNC: 138 MMOL/L — SIGNIFICANT CHANGE UP (ref 135–145)
WBC # BLD: 7.47 K/UL — SIGNIFICANT CHANGE UP (ref 3.8–10.5)
WBC # FLD AUTO: 7.47 K/UL — SIGNIFICANT CHANGE UP (ref 3.8–10.5)

## 2020-07-30 PROCEDURE — 99233 SBSQ HOSP IP/OBS HIGH 50: CPT | Mod: 25,24

## 2020-07-30 PROCEDURE — 99233 SBSQ HOSP IP/OBS HIGH 50: CPT

## 2020-07-30 PROCEDURE — 71045 X-RAY EXAM CHEST 1 VIEW: CPT | Mod: 26

## 2020-07-30 PROCEDURE — 99232 SBSQ HOSP IP/OBS MODERATE 35: CPT

## 2020-07-30 PROCEDURE — 93010 ELECTROCARDIOGRAM REPORT: CPT

## 2020-07-30 RX ORDER — VANCOMYCIN HCL 1 G
1000 VIAL (EA) INTRAVENOUS ONCE
Refills: 0 | Status: COMPLETED | OUTPATIENT
Start: 2020-07-31 | End: 2020-07-31

## 2020-07-30 RX ORDER — CHLORHEXIDINE GLUCONATE 213 G/1000ML
1 SOLUTION TOPICAL
Refills: 0 | Status: DISCONTINUED | OUTPATIENT
Start: 2020-07-30 | End: 2020-07-31

## 2020-07-30 RX ORDER — NOREPINEPHRINE BITARTRATE/D5W 8 MG/250ML
0.05 PLASTIC BAG, INJECTION (ML) INTRAVENOUS
Qty: 8 | Refills: 0 | Status: DISCONTINUED | OUTPATIENT
Start: 2020-07-30 | End: 2020-07-31

## 2020-07-30 RX ADMIN — Medication 325 MILLIGRAM(S): at 11:18

## 2020-07-30 RX ADMIN — LISINOPRIL 10 MILLIGRAM(S): 2.5 TABLET ORAL at 11:19

## 2020-07-30 RX ADMIN — Medication 40 MILLIGRAM(S): at 05:48

## 2020-07-30 RX ADMIN — Medication 81 MILLIGRAM(S): at 11:18

## 2020-07-30 RX ADMIN — FINASTERIDE 5 MILLIGRAM(S): 5 TABLET, FILM COATED ORAL at 11:18

## 2020-07-30 RX ADMIN — CLOPIDOGREL BISULFATE 75 MILLIGRAM(S): 75 TABLET, FILM COATED ORAL at 11:18

## 2020-07-30 NOTE — PROGRESS NOTE ADULT - SUBJECTIVE AND OBJECTIVE BOX
HEALTH ISSUES - PROBLEM Dx:  past medical history Coronary artery disease (s/p stent of OM1 in 2008, CABG x 4 in 1997; LIMA-LAD, SVG- PDA, SVG- posterior LV branch and diagonal), Inferior wall myocardial infarction, Abdominal aortic aneurysm, Hypertension, Peripheral arterial disease , recent triple AAA repair on 7/20/2020 at [facility]     AE CHF, DESIRAE    INTERVAL HPI/ OVERNIGHT EVENTS:    comfortable lying in bed  states he had a hard night and wants to sleep now  denies SOB  REVIEW OF SYSTEMS:    as above    Vital Signs Last 24 Hrs  T(C): 36.7 (30 Jul 2020 11:15), Max: 36.7 (29 Jul 2020 20:47)  T(F): 98 (30 Jul 2020 11:15), Max: 98 (29 Jul 2020 20:47)  HR: 78 (30 Jul 2020 11:15) (72 - 88)  BP: 128/67 (30 Jul 2020 11:15) (111/65 - 128/67)  BP(mean): 90 (29 Jul 2020 17:10) (80 - 90)  RR: 20 (30 Jul 2020 11:15) (16 - 20)  SpO2: 100% (30 Jul 2020 11:15) (98% - 100%)    PHYSICAL EXAM-  General: elderly man, comfortable  HEENT: sclera anicteric   Neck: supple, no carotid bruits b/l   CVS: RRR, s1, s2, no murmurs  Chest: CTA quinton, basal BS diminished  Abdomen: distended  Extremities: 2+ b/l lower extremity edema up to knees though decreasing  Neuro: A&O x3  Psych: Normal affect    MEDICATIONS  (STANDING):  aspirin enteric coated 81 milliGRAM(s) Oral daily  clopidogrel Tablet 75 milliGRAM(s) Oral daily  doxazosin 4 milliGRAM(s) Oral at bedtime  ferrous    sulfate 325 milliGRAM(s) Oral daily  finasteride 5 milliGRAM(s) Oral daily  furosemide   Injectable 40 milliGRAM(s) IV Push two times a day  lisinopril 10 milliGRAM(s) Oral <User Schedule>  senna 2 Tablet(s) Oral at bedtime  simvastatin 20 milliGRAM(s) Oral at bedtime    MEDICATIONS  (PRN):      LABS:                        8.5    7.47  )-----------( 243      ( 30 Jul 2020 03:21 )             26.0     07-30    138  |  98  |  35.0<H>  ----------------------------<  105<H>  3.7   |  27.0  |  1.45<H>    Ca    7.8<L>      30 Jul 2020 03:21  Phos  3.2     07-29  Mg     2.0     07-29

## 2020-07-30 NOTE — PROGRESS NOTE ADULT - SUBJECTIVE AND OBJECTIVE BOX
Electrophysiology Attending Progress Note    HPI:  88 year old man with past medical history Coronary artery disease (s/p stent of OM1 in 2008, CABG x 4 in 1997; LIMA-LAD, SVG- PDA, SVG- posterior LV branch and diagonal), Inferior wall myocardial infarction, Abdominal aortic aneurysm, Hypertension, Peripheral arterial disease , recent triple AAA repair on 7/20/2020 at [facility] presents with reports of chest pain syndrome, fatigue, leg edema and leg pain. The patient states that after his AAA repair he had episodes of chest discomfort and was told at [facility] that everything was stable from cardiac standpoint. He had some leg pain after the surgery which he states he was given Valium for. He was discharged on 7/22/20. The pain is worst in his upper thighs. He reports that a few days after that hospital discharge he noticed extreme fatigue and was unable to get up from seated position because he had no energy. Denies any chest discomfort at this time. Also reports chronic leg edema and dyspnea. Denies palpitations or syncope. States he had a stress test and ECHO in 3/2020 and there was no evidence of heart failure at that time.    EP was consulted to assess the rhythm as AFIB was suspected and to assess the AV conduction as patient has LBBB and 1st AVB and presenting with weakness. All available ECGs and Tele reviewed. Patient seen and examined. Patient denies ever having a syncope, pre-syncope, dizziness, or sustained palpitation.     Seen yesterday and at that time, there was no documented arrhythmia or syncope and a 2 week MCOT monitoring was suggested. Overnight, patient had a nontriggered episode of complete heart block with 9 seconds pause at 11:30 pm. Ther was no signs to suggest vagal nature of this event. Repeated TTE showed LVEF to be 25-30%      SH- lives at [facility] with spouse; denies habits now; former smoker  FH- heart dz runs in the family (27 Jul 2020 14:15)      PAST MEDICAL & SURGICAL HISTORY:  AAA (abdominal aortic aneurysm), s/p EVAR on 7/20.   HTN (hypertension)  S/P left inguinal hernia repair  CAD (coronary artery disease) with prior inferior MI:          - S/P CABG (coronary artery bypass graft) in 1997	           - s/p PCI to OM in 2008  Ischemic CM with EF around 45%  New anemia  Long-standing LBBB and 1st AVB back to 2017 or earlier.   History of right hip replacement    REVIEW OF SYSTEMS:  Ten point system review is negative unless specified above.       MEDICATIONS  (STANDING):  aspirin enteric coated 81 milliGRAM(s) Oral daily  clopidogrel Tablet 75 milliGRAM(s) Oral daily  doxazosin 4 milliGRAM(s) Oral at bedtime  ferrous    sulfate 325 milliGRAM(s) Oral daily  finasteride 5 milliGRAM(s) Oral daily  furosemide   Injectable 40 milliGRAM(s) IV Push two times a day  lisinopril 10 milliGRAM(s) Oral <User Schedule>  senna 2 Tablet(s) Oral at bedtime  simvastatin 20 milliGRAM(s) Oral at bedtime    MEDICATIONS  (PRN):  aspirin enteric coated 81 milliGRAM(s) Oral daily  clopidogrel Tablet 75 milliGRAM(s) Oral daily  doxazosin 4 milliGRAM(s) Oral at bedtime  ferrous    sulfate 325 milliGRAM(s) Oral daily  finasteride 5 milliGRAM(s) Oral daily  furosemide   Injectable 40 milliGRAM(s) IV Push two times a day  isosorbide   mononitrate ER Tablet (IMDUR) 30 milliGRAM(s) Oral daily  metoprolol tartrate 25 milliGRAM(s) Oral two times a day  potassium chloride    Tablet ER 40 milliEquivalent(s) Oral every 4 hours  senna 2 Tablet(s) Oral at bedtime  simvastatin 20 milliGRAM(s) Oral at bedtime      Allergies  penicillin (Vomiting; Nausea)    SOCIAL HISTORY:    FAMILY HISTORY:  No pertinent family history in first degree relatives      Vital Signs Last 24 Hrs  T(C): 36.7 (30 Jul 2020 11:15), Max: 36.7 (29 Jul 2020 20:47)  T(F): 98 (30 Jul 2020 11:15), Max: 98 (29 Jul 2020 20:47)  HR: 78 (30 Jul 2020 11:15) (72 - 88)  BP: 128/67 (30 Jul 2020 11:15) (111/65 - 128/67)  BP(mean): 90 (29 Jul 2020 17:10) (80 - 90)  RR: 20 (30 Jul 2020 11:15) (16 - 20)  SpO2: 100% (30 Jul 2020 11:15) (98% - 100%)T(C): 36.6 (29 Jul 2020 07:40), Max: 36.7 (28 Jul 2020 11:00)      Physical Exam:  Constitutional: AAOx3, NAD  Neck: supple, No JVD  Cardiovascular: +S1S2 RRR, no murmurs, rubs, gallops   Pulmonary: CTA b/l, unlabored, no wheezes, rales. rhonci  Abdomen: +BS, soft NTND  Extremities: no edema b/l, +distal pulses b/l  Neuro: non focal, speech clear, CORREA x 4    LABS:                               8.5    7.47  )-----------( 243      ( 30 Jul 2020 03:21 )             26.0   07-30    138  |  98  |  35.0<H>  ----------------------------<  105<H>  3.7   |  27.0  |  1.45<H>    Ca    7.8<L>      30 Jul 2020 03:21  Phos  3.2     07-29  Mg     2.0     07-29          RADIOLOGY & ADDITIONAL STUDIES:  < from: CT Angio Abdomen and Pelvis w/ IV Cont (07.27.20 @ 11:49) >  IMPRESSION:  No aortic dissection.    Small amount ofretroperitoneal hemorrhage including along the left pelvic sidewall and in the presacral region.    Severe narrowing involving the left common femoral artery with a focal dissection just distally.    Small bilateral pleural effusions with associated compressive atelectasis with a patchy left lower lobe opacity, either atelectasis or pneumonia; correlation is recommended with patient's symptoms.    Nonspecific bilateral upper lobe groundglass opacities; differential includes infectious/inflammatory etiologies, scarring or fibrosis; comparison is recommended with a prior chest CT if available.    < end of copied text >    < from: VA Physiol Extremity Lower 3+ Level, BI (07.28.20 @ 09:35) >  Impression:    Moderate to severe left lower extremity arterial disease. There is likely distal femoral/popliteal/proximal tibial arterial disease.    < end of copied text >      < from: US Duplex Venous Lower Ext Complete, Bilateral (07.27.20 @ 22:02) >  FINDINGS:    There is normal compressibility of the bilateral common femoral, femoral and popliteal veins.  Doppler examination shows normal spontaneous and phasic flow.    No calf vein thrombosis is detected.    Left popliteal fossa Baker's cyst measures 4.0 x 0.6 x 1.7 cm.    IMPRESSION:  No evidence of deep venous thrombosis in either lower extremity.    < end of copied text >        < from: Xray Chest 2 Views PA/Lat (07.27.20 @ 11:56) >  Impression:  Congestive heart failure with bilateral pulmonary edema. These findings are new since 1/28/2020..    < end of copied text >      ECG: Sinus with PACs, and first degree AVB and LBBB.   Tele: One episode of 9 seconds pause due to CHB    Summary:   1. Left ventricular ejection fraction, by visual estimation, is 25 to 30%.   2. Severely decreased global left ventricular systolic function.   3. Moderate to severely increased left ventricular internal cavity size.   4. Spectral Doppler shows impaired relaxation pattern of left ventricular myocardial filling (Grade I diastolic dysfunction).   5. There is no evidence of pericardial effusion.   6. Mild mitral annular calcification.   7. Moderate to severe mitral valve regurgitation.   8. Mild-moderate tricuspid regurgitation.   9. Moderate aortic regurgitation.  10. Sclerotic aortic valve with normal opening.  11. Eccentric AI jet visualized.  12. Endocardial visualization was enhanced with intravenous echo contrast.    MD Edilia Electronically signed on 7/29/2020 at 12:20:20 PM

## 2020-07-30 NOTE — CHART NOTE - NSCHARTNOTEFT_GEN_A_CORE
Bedside nurse called pt had 9 sec. pause (pt has no Hx of heart pauses) and frequent PVCs.  Vitals are stable and pt is asymptomatic     I examined pt at bedside    REVIEW OF SYMPTOMS:   Cardiovascular:  No chest pain,  No dyspnea,  No fatigue, No syncope,  No palpitations, No dizziness, No Orthopnea, No Paroxsymal nocturnal dyspnea.  Respiratory:  No Dyspnea, No cough.  Genitourinary:  No dysuria, no hematuria.  Gastrointestinal:  No nausea, no vomiting. No diarrhea.  No abdominal pain.   Neurological: No headache, no dizziness, no slurred speech.  Psychiatric: No agitation, no anxiety.    ALL OTHER REVIEW OF SYSTEMS ARE NEGATIVE.    Vital Signs Last 24 Hrs  T(C): 36.7 (29 Jul 2020 20:47), Max: 36.7 (29 Jul 2020 20:47)  T(F): 98 (29 Jul 2020 20:47), Max: 98 (29 Jul 2020 20:47)  HR: 74 (29 Jul 2020 23:45) (74 - 88)  BP: 116/66 (29 Jul 2020 23:45) (101/65 - 121/73)  BP(mean): 90 (29 Jul 2020 17:10) (77 - 90)  RR: 16 (29 Jul 2020 23:45) (16 - 18)  SpO2: 100% (29 Jul 2020 20:47) (96% - 100%)    PHYSICAL EXAM:  Constitutional: Comfortable . No acute distress.   HEENT: Atraumatic and normcephalic , neck is supple . no JVD.   CNS: A&O x3. No focal deficits.  Respiratory: CTAB, no rhonchi, no wheezing, no crackles   Cardiovascular: S1S2 RRR. No murmur/rubs   Gastrointestinal: Soft non-tender and non distended . +Bowel sounds.   Extremities: No edema.   Psychiatric: Calm . no agitation.  Skin: No skin rash/ulcers visualized to face, hands or feet.    LABS:                        8.2    7.93  )-----------( 244      ( 29 Jul 2020 06:16 )             24.8     07-29    136  |  98  |  39.0<H>  ----------------------------<  102<H>  3.3<L>   |  27.0  |  1.55<H>    Ca    7.6<L>      29 Jul 2020 06:16  Phos  3.2     07-29  Mg     2.0     07-29      CARDIAC MARKERS ( 28 Jul 2020 06:44 )  x     / 1.35 ng/mL / x     / x     / x        ;p-BNP=Serum Pro-Brain Natriuretic Peptide: [id number] pg/mL (07-27 @ 13:41)    A&P    - Long Ventricular pause (9 sec)  Plan: stat EKG, CBC and BMP  Hold all AV node block for now   We will reconsult EP team in the AM  Keep Atropine at bedside and place pacer pads. Nurse aware Bedside nurse called pt had 9 sec. pause (pt has no Hx of heart pauses) and frequent PVCs.  Vitals are stable and pt is asymptomatic   Pt. has retroperitoneal bleeding on CT and very extensive cardiac Hx  I examined pt at bedside    REVIEW OF SYMPTOMS:   Cardiovascular:  No chest pain,  No dyspnea,  No fatigue, No syncope,  No palpitations, No dizziness, No Orthopnea, No Paroxsymal nocturnal dyspnea.  Respiratory:  No Dyspnea, No cough.  Genitourinary:  No dysuria, no hematuria.  Gastrointestinal:  No nausea, no vomiting. No diarrhea.  No abdominal pain.   Neurological: No headache, no dizziness, no slurred speech.  Psychiatric: No agitation, no anxiety.    ALL OTHER REVIEW OF SYSTEMS ARE NEGATIVE.    Vital Signs Last 24 Hrs  T(C): 36.7 (29 Jul 2020 20:47), Max: 36.7 (29 Jul 2020 20:47)  T(F): 98 (29 Jul 2020 20:47), Max: 98 (29 Jul 2020 20:47)  HR: 74 (29 Jul 2020 23:45) (74 - 88)  BP: 116/66 (29 Jul 2020 23:45) (101/65 - 121/73)  BP(mean): 90 (29 Jul 2020 17:10) (77 - 90)  RR: 16 (29 Jul 2020 23:45) (16 - 18)  SpO2: 100% (29 Jul 2020 20:47) (96% - 100%)    PHYSICAL EXAM:  Constitutional: Comfortable . No acute distress.   HEENT: Atraumatic and normcephalic , neck is supple . no JVD.   CNS: A&O x3. No focal deficits.  Respiratory: CTAB, no rhonchi, no wheezing, no crackles   Cardiovascular: S1S2 RRR. No murmur/rubs   Gastrointestinal: Soft non-tender and non distended . +Bowel sounds.   Extremities: No edema.   Psychiatric: Calm . no agitation.  Skin: No skin rash/ulcers visualized to face, hands or feet.    LABS:                        8.2    7.93  )-----------( 244      ( 29 Jul 2020 06:16 )             24.8     07-29    136  |  98  |  39.0<H>  ----------------------------<  102<H>  3.3<L>   |  27.0  |  1.55<H>    Ca    7.6<L>      29 Jul 2020 06:16  Phos  3.2     07-29  Mg     2.0     07-29      CARDIAC MARKERS ( 28 Jul 2020 06:44 )  x     / 1.35 ng/mL / x     / x     / x        ;p-BNP=Serum Pro-Brain Natriuretic Peptide: 50938 pg/mL (07-27 @ 13:41)    A&P    - Long Ventricular pause (9 sec)  Plan: stat EKG, CBC and BMP.   Hold all AV node block for now   We will reconsult EP team in the AM  Keep Atropine at bedside and place pacer pads. Nurse aware

## 2020-07-30 NOTE — PROGRESS NOTE ADULT - SUBJECTIVE AND OBJECTIVE BOX
ELECTROPHYSIOLOGY BRIEF POST-OP NOTE    PRE-OP DIAGNOSIS: Intermittent Complete Heart Block, HFrEF (LVEF: 25%)    POST-OP DIAGNOSIS: Intermittent Complete Heart Block, HFrEF (LVEF: 25%)    PROCEDURE: Cardiac Resynchronization Therapy-ICD Implant    Physician: Handy Del Castillo MD  Assistant: Lemuel Jiang MD; Nahid Vásquez MD    ESTIMATED BLOOD LOSS:  50 mL    ANESTHESIA TYPE:  [ X  ]General Anesthesia  [   ]Sedation  [   ]Local/Regional    CONDITION:  [  ]Critical  [  ]Serious  [ X ]Stable  [  ]Good    SPECIMENS REMOVED (if applicable): N/A    IMPLANT (if applicable): MDT CRT-D with His Bundle Pacing    ASSESSMENT: S/p unsuccessful LV lead implant. Successful His bundle resynchronization with CRT-D implant.    PLAN OF CARE:  -Vancomycin 1gram IV Q12h x 1 more dose (6 AM 7/31)  -Chest X-ray STAT to r/o PTX  -Chest X-ray PA and LAT in am to eval lead position  -NO LOVENOX OR HEPARIN INCLUDING SQ UNLESS CLEARED BY EP  -No lifting affected arm over shoulder x 6 weeks  -Transfer to MICU

## 2020-07-30 NOTE — PROGRESS NOTE ADULT - ASSESSMENT
88 year old man with past medical history Coronary artery disease (s/p stent of OM1 in 2008, CABG x 4 in 1997; LIMA-LAD, SVG- PDA, SVG- posterior LV branch and diagonal), Inferior wall myocardial infarction, Abdominal aortic aneurysm, Hypertension, Peripheral arterial disease , recent triple AAA repair on 7/20/2020 at [facility] presents with reports of chest pain syndrome, fatigue, leg edema and leg pain. treated for CHF AE and ACEi held for DESIRAE. EF further reduced from prev, so lopressor changed to coreg and ACEi started. But pt then ahd 9 sec pause on Tele and BB stopped. and CRPT offered.     # 9 sec pause on Tele  Pt unaware of same  AVN blockers stopped  EP plans CRPT.  Family yet to decide on the same  Atropine at bedside    # AE CHF systolic with mildly reduced EF of 45% now worsened to 25 %  Troponins steady  Recheck ECHO- P  IV Lasix bid as long as tolerated  Not on optimal med regimen for CHF sec to soft BP ?- BB changed to coreg; restart ACEi low dose; consider aldactone as o/p or add as a diuretic at discharge  supplement O2 PRN  CT- Small bilateral pleural effusions with associated compressive atelectasis with a patchy left lower lobe opacity, either atelectasis or pneumonia  If no improvement with diuresing, will consider infectious etiology  daily wts, I/Os, fluid restriction  DVT ruled out. ACE wraps for leg edema    # initially suspected New Afib on TELE, upon further review- NOT AFIB  EP eval appreciated  o/p MCOT for 2 weeks was planned. if pt gets CRPT, this might change    # s/p Hypokalemia  repleted    # Recent AAA repair  per [facility]- to cont DAPT given recent stenting  resume plavix  Monitor H/H and transfuse PRN    # CP, CAD, HTN, PAD  sec to AE CHF  Troponins steady   as above  Patient had recent nuclear stress test with mostly fixed inferior wall defect and mild richa-infarct ischemia in LAD territory  Cont rest of Home meds    # s/p DESIRAE with underlying CKD stg 2  sec to hypoperfusion sec to CHF  restart ACEi  But diuretics medical necessity now. renal fxn imrpoving  Avoid nephrotoxics  renally dose meds    # Normocytic Anemia 2 to blood loss and underlying AOCD  post operative- recent AAA repair  add iron to regimen and monitor H/H  CT- Small amount of retroperitoneal hemorrhage with no e/o leak or ongoing bleed. per Radiologist- layering seen  sec to preexisting volume overload will hold off on BT until Hgb 7 or less    # Leg pain  CT- Severe narrowing involving the left common femoral artery with a focal dissection just distally.  pain likely sec to instrumentation  No e/o ongoing dissection/ bleed  Vasc input noted  performed well with PT    ICDs     Relevant diagnostics and plan of treatment were discussed with the patient and son to the best of my ability and pertinent questions were answered.    GoC: discussed with both patient and his son Xavier. Patient did not want to make this decision now. Xavier stated that he is the HCP and if and when the time comes he would like to assess and make this decision. Currently patient should be full code  Plan: cont IV lasix as long as tolerated. ? CRPT

## 2020-07-30 NOTE — PROGRESS NOTE ADULT - ASSESSMENT
Assessment:  Mr. Collazo is an 88 year old man with past medical history of Coronary artery disease (s/p stent of OM1 in 2008, CABG x 4 in 1997; LIMA-LAD, SVG-PDA, SVG-posterior LV branch and diagonal), Inferior wall myocardial infarction, Abdominal aortic aneurysm, Hypertension, Peripheral arterial disease and reports of chest pain syndrome, who presents with recent endovascular triple AAA repair on 7/20/2020 and SMA & renal artery stenting at [facility] presents with fatigue, leg edema and leg pain, found to have decompensated heart failure.     Physical exam notable for lung crackles and significant bilateral lower extremity edema. Volume overload likely the cause of his overall weakness. ECG reviewed and consistent with known LBBB, no acute changes. Initial troponin is elevated which is likely from volume overload, elevated Cr and underlying CAD.    Recommendations:  [] Decompensated heart failure: BP normotensive. Patient does not take diuretics at home. Continue Lasix 40 mg IVP BID today and tomorrow, pro BNP still significantly elevated. Likely plan for Lasix 40 mg PO daily starting Saturday.  Echo with moderately reduced LVEF, which is reduction from outpatient echo. Continue guideline directed medical therapy: beta blocker, ACE-I was restarted, would be cautious and monitor Cr. Given LBBB, reduced LVEF and 9 second pause, discussed with Cardiac EP, patient's primary cardiologist Dr. Chin and patient's family and plan is for CRT-D.  [] Elevated troponin, history of CAD: Patient had recent nuclear stress test with mostly fixed inferior wall defect and mild richa-infarct ischemia in LAD territory. Troponin peaked at 1.5. Continue home:  Imdur 60 mg daily, Simvastatin 20 mg daily, Metoprolol tartrate 25 mg BID. Continue Aspirin 81 mg daily.   [] Abdominal aortic aneurysm repair: CT abdomen with small amount of retroperitoneal hemorrhage, discussed report with his [facility] surgeon Dr. Amanuel Hess and he recommended to at least continue plavix given recent stenting.  Continue aspirin and plavix.     Thank you for the consult. We will follow along.     Evi Delgado MD  Cardiology

## 2020-07-30 NOTE — PROGRESS NOTE ADULT - ASSESSMENT
88 year old man with past medical history Coronary artery disease (s/p stent of OM1 in 2008, CABG x 4 in 1997; LIMA-LAD, SVG- PDA, SVG- posterior LV branch and diagonal), Inferior wall myocardial infarction, Abdominal aortic aneurysm, Hypertension, Peripheral arterial disease , recent triple AAA repair on 7/20/2020 at [facility] presents with reports of chest pain syndrome, fatigue, leg edema and leg pain. The patient states that after his AAA repair he had episodes of chest discomfort and was told at [facility] that everything was stable from cardiac standpoint. He had some leg pain after the surgery which he states he was given Valium for. He was discharged on 7/22/20. The pain is worst in his upper thighs. He reports that a few days after that hospital discharge he noticed extreme fatigue and was unable to get up from seated position because he had no energy. Denies any chest discomfort at this time. Also reports chronic leg edema and dyspnea. Denies palpitations or syncope. States he had a stress test and ECHO in 3/2020 and there was no evidence of heart failure at that time.    EP was consulted to assess the rhythm as AFIB was suspected and to assess the AV conduction as patient has LBBB and 1st AVB and presenting with weakness. All available ECGs and Tele reviewed. Patient seen and examined. Patient denies ever having a syncope, pre-syncope, dizziness, or sustained palpitation.   ECG and Tele are consistent with SR with PACs, and old LBBB +1st AVB. No prior syncope, or pre-syncope or current pauses.     Seen yesterday and at that time, there was no documented arrhythmia or syncope and a 2 week MCOT monitoring was suggested. Overnight, patient had a nontriggered episode of complete heart block with 9 seconds pause at 11:30 pm. There was no signs to suggest vagal nature of this event, and its unlikely to be caused by beta blocker. Also beta blockers are essential drugs for this patient given HFrEF. Repeated TTE showed LVEF to be 25-30%    Plan:  1. Patient needs pacing support with CRTP.   2. Given the drop in EF to < 35% and need for a device implantation a CRTD is reasonable. This was discussed with patient, his family and his primary team. A CRTD is planned for later today. Patient is NPO since last night. All benefits, risks, and alternatives discussed.  3. Beta blocker held, and can be resumed after device implantation.   4. No heparin, LMWH or any anticoagulation for now. Can continue DAPT.

## 2020-07-30 NOTE — CHART NOTE - NSCHARTNOTEFT_GEN_A_CORE
Pt arrived to cath Lab in anticipation for CRT-D placement. NPO >8 hours confirmed. Last ate yesterday. Pt on ASA/Plavix. Consent obtained by Attending. Pt arrived to Cath Lab room 13 in anticipation for CRT-D placement. NPO >8 hours confirmed. Last ate yesterday. Pt currently on ASA/Plavix. Consent obtained by Attending. Pt arrived to Cath Lab room 13 in anticipation for CRT-D placement. NPO >8 hours confirmed. Last ate yesterday. Labs reviewed. Pt currently on ASA/Plavix. Consent obtained by Attending.

## 2020-07-30 NOTE — PROGRESS NOTE ADULT - SUBJECTIVE AND OBJECTIVE BOX
PAT HOFFMANN  [id number]      Chief Complaint: CHF/AAA repair/PAD      Interval History: The patient reports feeling ok, reports having strange symptoms last night. Denies any presyncope or syncope.       Tele: sinus rhythm, first degree AV block, had ~ 9 second pause at 23:00 last night      Current meds:   aspirin enteric coated 81 milliGRAM(s) Oral daily  clopidogrel Tablet 75 milliGRAM(s) Oral daily  doxazosin 4 milliGRAM(s) Oral at bedtime  ferrous    sulfate 325 milliGRAM(s) Oral daily  finasteride 5 milliGRAM(s) Oral daily  furosemide   Injectable 40 milliGRAM(s) IV Push two times a day  lisinopril 10 milliGRAM(s) Oral <User Schedule>  senna 2 Tablet(s) Oral at bedtime  simvastatin 20 milliGRAM(s) Oral at bedtime      Objective:     Vital Signs:   T(C): 36.7 (07-30-20 @ 11:15), Max: 36.7 (07-29-20 @ 20:47)  HR: 78 (07-30-20 @ 11:15) (72 - 88)  BP: 128/67 (07-30-20 @ 11:15) (111/65 - 128/67)  RR: 20 (07-30-20 @ 11:15) (16 - 20)  SpO2: 100% (07-30-20 @ 11:15) (98% - 100%)  Wt(kg): --    Physical Exam:   General: elderly man, no distress  HEENT: sclera anicteric   Neck: supple  CVS: JVP ~ 9 cm H20, RRR, s1, s2, no murmurs  Chest: unlabored respirations, less crackles   Abdomen: distended  Extremities: 1+ b/l lower extremity edema up to knees (wrapped)  Neuro: A&O x3  Psych: Normal affect        Labs:   30 Jul 2020 03:21    138    |  98     |  35.0   ----------------------------<  105    3.7     |  27.0   |  1.45     Ca    7.8        30 Jul 2020 03:21  Phos  3.2       29 Jul 2020 06:16  Mg     2.0       29 Jul 2020 06:16                            8.5    7.47  )-----------( 243      ( 30 Jul 2020 03:21 )             26.0           ECG (7/27/2020): sinus rhythm, first degree AV block, PVC, LBBB (similar to prior ECG)    Lower extremity venous duplex (7/27/2020):  No evidence of deep venous thrombosis in either lower extremity.    CXR (7/27/2020):  Congestive heart failure with bilateral pulmonary edema. These findings are new since 1/28/2020..    Coronary angiogram (5/2018) at [facility]:  Occluded vein graft to diagonal and posterior LV branch  LIMA to LAD and vein graft to PDA patent  s/p PCI to OM1    Outpatient stress test (3/2020):  Small sized, severe intensity fixed defect of the basal inferior, basal-mid inferolateral wall consistent with infarction. Small to moderate sized partially reversible at the margins defect of the basal-mid anteroseptal wall consistent with infarction with mild richa-infarct ischemia, LVEF 47%    Outpatient TTE (12/2019):  LVEF 45%  Basal and mid inferior wall severe hypokinesis  Moderate to severe mitral regurgitation  Mild to moderate aortic regurgitation  Large mid to distal abdominal aortic aneurysm 5.9 cm     TTE (7/28/2020) images reviewed:   Moderately reduced LVEF ~35- 40%, abnormal septal motion likely due to conduction delay  Normal RV size and systolic function  Severely dilated left atrium   Mild AR, Moderate MR, Mild TR   No aortic stenosis   RVSP ~ 22 mmHg

## 2020-07-31 ENCOUNTER — TRANSCRIPTION ENCOUNTER (OUTPATIENT)
Age: 85
End: 2020-07-31

## 2020-07-31 DIAGNOSIS — I44.2 ATRIOVENTRICULAR BLOCK, COMPLETE: ICD-10-CM

## 2020-07-31 DIAGNOSIS — I50.23 ACUTE ON CHRONIC SYSTOLIC (CONGESTIVE) HEART FAILURE: ICD-10-CM

## 2020-07-31 DIAGNOSIS — D50.9 IRON DEFICIENCY ANEMIA, UNSPECIFIED: ICD-10-CM

## 2020-07-31 DIAGNOSIS — D72.829 ELEVATED WHITE BLOOD CELL COUNT, UNSPECIFIED: ICD-10-CM

## 2020-07-31 LAB
ANION GAP SERPL CALC-SCNC: 14 MMOL/L — SIGNIFICANT CHANGE UP (ref 5–17)
APPEARANCE UR: CLEAR — SIGNIFICANT CHANGE UP
BACTERIA # UR AUTO: ABNORMAL
BILIRUB UR-MCNC: NEGATIVE — SIGNIFICANT CHANGE UP
BUN SERPL-MCNC: 31 MG/DL — HIGH (ref 8–20)
CALCIUM SERPL-MCNC: 7.6 MG/DL — LOW (ref 8.6–10.2)
CHLORIDE SERPL-SCNC: 99 MMOL/L — SIGNIFICANT CHANGE UP (ref 98–107)
CO2 SERPL-SCNC: 23 MMOL/L — SIGNIFICANT CHANGE UP (ref 22–29)
COLOR SPEC: YELLOW — SIGNIFICANT CHANGE UP
CREAT SERPL-MCNC: 1.44 MG/DL — HIGH (ref 0.5–1.3)
DIFF PNL FLD: NEGATIVE — SIGNIFICANT CHANGE UP
EPI CELLS # UR: NEGATIVE — SIGNIFICANT CHANGE UP
GAS PNL BLDA: SIGNIFICANT CHANGE UP
GLUCOSE SERPL-MCNC: 122 MG/DL — HIGH (ref 70–99)
GLUCOSE UR QL: NEGATIVE MG/DL — SIGNIFICANT CHANGE UP
HCT VFR BLD CALC: 26.2 % — LOW (ref 39–50)
HGB BLD-MCNC: 8.4 G/DL — LOW (ref 13–17)
KETONES UR-MCNC: NEGATIVE — SIGNIFICANT CHANGE UP
LEUKOCYTE ESTERASE UR-ACNC: NEGATIVE — SIGNIFICANT CHANGE UP
MAGNESIUM SERPL-MCNC: 1.9 MG/DL — SIGNIFICANT CHANGE UP (ref 1.6–2.6)
MCHC RBC-ENTMCNC: 31 PG — SIGNIFICANT CHANGE UP (ref 27–34)
MCHC RBC-ENTMCNC: 32.1 GM/DL — SIGNIFICANT CHANGE UP (ref 32–36)
MCV RBC AUTO: 96.7 FL — SIGNIFICANT CHANGE UP (ref 80–100)
NITRITE UR-MCNC: NEGATIVE — SIGNIFICANT CHANGE UP
PH UR: 6 — SIGNIFICANT CHANGE UP (ref 5–8)
PHOSPHATE SERPL-MCNC: 3.8 MG/DL — SIGNIFICANT CHANGE UP (ref 2.4–4.7)
PLATELET # BLD AUTO: 276 K/UL — SIGNIFICANT CHANGE UP (ref 150–400)
POTASSIUM SERPL-MCNC: 3.7 MMOL/L — SIGNIFICANT CHANGE UP (ref 3.5–5.3)
POTASSIUM SERPL-SCNC: 3.7 MMOL/L — SIGNIFICANT CHANGE UP (ref 3.5–5.3)
PROT UR-MCNC: NEGATIVE MG/DL — SIGNIFICANT CHANGE UP
RBC # BLD: 2.71 M/UL — LOW (ref 4.2–5.8)
RBC # FLD: 14.2 % — SIGNIFICANT CHANGE UP (ref 10.3–14.5)
RBC CASTS # UR COMP ASSIST: SIGNIFICANT CHANGE UP /HPF (ref 0–4)
SODIUM SERPL-SCNC: 136 MMOL/L — SIGNIFICANT CHANGE UP (ref 135–145)
SP GR SPEC: 1.01 — SIGNIFICANT CHANGE UP (ref 1.01–1.02)
TSH SERPL-MCNC: 3.39 UIU/ML — SIGNIFICANT CHANGE UP (ref 0.27–4.2)
UROBILINOGEN FLD QL: NEGATIVE MG/DL — SIGNIFICANT CHANGE UP
WBC # BLD: 19.01 K/UL — HIGH (ref 3.8–10.5)
WBC # FLD AUTO: 19.01 K/UL — HIGH (ref 3.8–10.5)
WBC UR QL: SIGNIFICANT CHANGE UP

## 2020-07-31 PROCEDURE — 99233 SBSQ HOSP IP/OBS HIGH 50: CPT

## 2020-07-31 PROCEDURE — 99232 SBSQ HOSP IP/OBS MODERATE 35: CPT | Mod: 24,25

## 2020-07-31 RX ORDER — ACETAMINOPHEN 500 MG
650 TABLET ORAL EVERY 6 HOURS
Refills: 0 | Status: DISCONTINUED | OUTPATIENT
Start: 2020-07-31 | End: 2020-08-05

## 2020-07-31 RX ORDER — ACETAMINOPHEN 500 MG
1000 TABLET ORAL ONCE
Refills: 0 | Status: COMPLETED | OUTPATIENT
Start: 2020-07-31 | End: 2020-07-31

## 2020-07-31 RX ORDER — MAGNESIUM SULFATE 500 MG/ML
2 VIAL (ML) INJECTION ONCE
Refills: 0 | Status: COMPLETED | OUTPATIENT
Start: 2020-07-31 | End: 2020-07-31

## 2020-07-31 RX ORDER — CALCIUM GLUCONATE 100 MG/ML
1 VIAL (ML) INTRAVENOUS ONCE
Refills: 0 | Status: COMPLETED | OUTPATIENT
Start: 2020-07-31 | End: 2020-07-31

## 2020-07-31 RX ADMIN — Medication 400 MILLIGRAM(S): at 01:05

## 2020-07-31 RX ADMIN — Medication 650 MILLIGRAM(S): at 23:04

## 2020-07-31 RX ADMIN — Medication 1000 MILLIGRAM(S): at 01:10

## 2020-07-31 RX ADMIN — Medication 4 MILLIGRAM(S): at 21:40

## 2020-07-31 RX ADMIN — FINASTERIDE 5 MILLIGRAM(S): 5 TABLET, FILM COATED ORAL at 13:46

## 2020-07-31 RX ADMIN — SENNA PLUS 2 TABLET(S): 8.6 TABLET ORAL at 21:40

## 2020-07-31 RX ADMIN — SIMVASTATIN 20 MILLIGRAM(S): 20 TABLET, FILM COATED ORAL at 21:41

## 2020-07-31 RX ADMIN — Medication 250 MILLIGRAM(S): at 05:47

## 2020-07-31 RX ADMIN — Medication 50 GRAM(S): at 07:17

## 2020-07-31 RX ADMIN — Medication 40 MILLIGRAM(S): at 05:46

## 2020-07-31 RX ADMIN — CHLORHEXIDINE GLUCONATE 1 APPLICATION(S): 213 SOLUTION TOPICAL at 05:48

## 2020-07-31 RX ADMIN — Medication 81 MILLIGRAM(S): at 12:51

## 2020-07-31 RX ADMIN — Medication 100 GRAM(S): at 01:05

## 2020-07-31 RX ADMIN — Medication 325 MILLIGRAM(S): at 12:51

## 2020-07-31 RX ADMIN — Medication 40 MILLIGRAM(S): at 17:27

## 2020-07-31 RX ADMIN — CLOPIDOGREL BISULFATE 75 MILLIGRAM(S): 75 TABLET, FILM COATED ORAL at 12:51

## 2020-07-31 NOTE — CONSULT NOTE ADULT - SUBJECTIVE AND OBJECTIVE BOX
Patient is a 88y old  Male who presents with a chief complaint of Fatigue (30 Jul 2020 22:08)      BRIEF HOSPITAL COURSE: ***    Events last 24 hours: ***    PAST MEDICAL & SURGICAL HISTORY:  AAA (abdominal aortic aneurysm)  HTN (hypertension)  S/P AAA repair: 7-  S/P left inguinal hernia repair  CAD (coronary artery disease): s/p stent placed  History of right hip replacement  S/P CABG (coronary artery bypass graft)      Review of Systems:  CONSTITUTIONAL: No fever, chills, or fatigue  EYES: No eye pain, visual disturbances, or discharge  ENMT:  No difficulty hearing, tinnitus, vertigo; No sinus or throat pain  NECK: No pain or stiffness  RESPIRATORY: No cough, wheezing, chills or hemoptysis; No shortness of breath  CARDIOVASCULAR: No chest pain, palpitations, dizziness, or leg swelling  GASTROINTESTINAL: No abdominal or epigastric pain. No nausea, vomiting, or hematemesis; No diarrhea or constipation. No melena or hematochezia.  GENITOURINARY: No dysuria, frequency, hematuria, or incontinence  NEUROLOGICAL: No headaches, memory loss, loss of strength, numbness, or tremors  SKIN: No itching, burning, rashes, or lesions   MUSCULOSKELETAL: No joint pain or swelling; No muscle, back, or extremity pain  PSYCHIATRIC: No depression, anxiety, mood swings, or difficulty sleeping      Medications:    doxazosin 4 milliGRAM(s) Oral at bedtime  furosemide   Injectable 40 milliGRAM(s) IV Push two times a day  norepinephrine Infusion 0.05 MICROgram(s)/kG/Min IV Continuous <Continuous>          aspirin enteric coated 81 milliGRAM(s) Oral daily  clopidogrel Tablet 75 milliGRAM(s) Oral daily    senna 2 Tablet(s) Oral at bedtime      finasteride 5 milliGRAM(s) Oral daily  simvastatin 20 milliGRAM(s) Oral at bedtime    ferrous    sulfate 325 milliGRAM(s) Oral daily      chlorhexidine 2% Cloths 1 Application(s) Topical <User Schedule>            ICU Vital Signs Last 24 Hrs  T(C): 36.4 (30 Jul 2020 16:07), Max: 36.7 (30 Jul 2020 11:15)  T(F): 97.6 (30 Jul 2020 16:07), Max: 98 (30 Jul 2020 11:15)  HR: 76 (30 Jul 2020 23:30) (72 - 90)  BP: 98/50 (30 Jul 2020 23:30) (62/38 - 138/65)  BP(mean): 71 (30 Jul 2020 23:30) (35 - 74)  ABP: --  ABP(mean): --  RR: 19 (30 Jul 2020 23:30) (15 - 28)  SpO2: 99% (30 Jul 2020 23:30) (93% - 100%)                LABS:                        8.5    7.47  )-----------( 243      ( 30 Jul 2020 03:21 )             26.0     07-30    138  |  98  |  35.0<H>  ----------------------------<  105<H>  3.7   |  27.0  |  1.45<H>    Ca    7.8<L>      30 Jul 2020 03:21  Phos  3.2     07-29  Mg     2.0     07-29            CAPILLARY BLOOD GLUCOSE            CULTURES:      Physical Examination:    General: No acute distress.  Alert, oriented, interactive, nonfocal    HEENT: Pupils equal, reactive to light.  Symmetric.    PULM: Clear to auscultation bilaterally, no significant sputum production    CVS: Regular rate and rhythm, no murmurs, rubs, or gallops    ABD: Soft, nondistended, nontender, normoactive bowel sounds, no masses    EXT: No edema, nontender    SKIN: Warm and well perfused, no rashes noted.    RADIOLOGY: ***    CRITICAL CARE TIME SPENT: *** Patient is a 88y old  Male who presents with a chief complaint of Fatigue (2020 22:08)      BRIEF HOSPITAL COURSE:   88M with PMHx of CAD, sp CABG, sp AAA repair, HTN, MI, PAD, CHF (EF 25%) who was admitted for CHF. While on floor developed CHB with 9 sec pause. EPS consulted.     Events last 24 hours: Underwent unsuccessful LV lead implant and successful His bundle resynchronization with CRT-D implant this evening, required intubation for case due to sedation needs for agitation on table, now extubated, requiring pressors post procedure for hypotension. Denies CP, SOB, abd pain, N/V, HA.      PAST MEDICAL & SURGICAL HISTORY:  AAA (abdominal aortic aneurysm)  HTN (hypertension)  S/P AAA repair: 2020  S/P left inguinal hernia repair  CAD (coronary artery disease): s/p stent placed  History of right hip replacement  S/P CABG (coronary artery bypass graft)      Review of Systems:  10 pt ROS negative unless otherwise stated above      Medications:    doxazosin 4 milliGRAM(s) Oral at bedtime  furosemide   Injectable 40 milliGRAM(s) IV Push two times a day  norepinephrine Infusion 0.05 MICROgram(s)/kG/Min IV Continuous <Continuous>          aspirin enteric coated 81 milliGRAM(s) Oral daily  clopidogrel Tablet 75 milliGRAM(s) Oral daily    senna 2 Tablet(s) Oral at bedtime      finasteride 5 milliGRAM(s) Oral daily  simvastatin 20 milliGRAM(s) Oral at bedtime    ferrous    sulfate 325 milliGRAM(s) Oral daily      chlorhexidine 2% Cloths 1 Application(s) Topical <User Schedule>            ICU Vital Signs Last 24 Hrs  T(C): 36.4 (2020 16:07), Max: 36.7 (2020 11:15)  T(F): 97.6 (2020 16:07), Max: 98 (2020 11:15)  HR: 76 (2020 23:30) (72 - 90)  BP: 98/50 (2020 23:30) (62/38 - 138/65)  BP(mean): 71 (2020 23:30) (35 - 74)  ABP: --  ABP(mean): --  RR: 19 (2020 23:30) (15 - 28)  SpO2: 99% (2020 23:30) (93% - 100%)                LABS:                        8.5    7.47  )-----------( 243      ( 2020 03:21 )             26.0     07-30    138  |  98  |  35.0<H>  ----------------------------<  105<H>  3.7   |  27.0  |  1.45<H>    Ca    7.8<L>      2020 03:21  Phos  3.2     07-  Mg     2.0     -            CAPILLARY BLOOD GLUCOSE            CULTURES:      Physical Examination:    General: No acute distress.  Drowsy, oriented x 3, nonfocal    HEENT: Pupils equal, reactive to light.  Symmetric, sclera anicteric    PULM: Diminished BS bilateral bases with scattered insp rales, no wheezing    CVS: Regular rate and rhythm, L chest wall dressing in place, no dressing saturation/active bleeding    ABD: Soft, nondistended, nontender, normoactive bowel sounds, no rebound or guarding    EXT: + edema LE nontender    SKIN: Warm and well perfused, no rashes noted.    RADIOLOGY:   EXAM:  ECHO TTE WO CON COMP W DOPP      PROCEDURE DATE:  2020   .      INTERPRETATION:  REPORT:  TRANSTHORACIC ECHOCARDIOGRAM REPORT        Patient Name:   PAT HOFFMANN Patient Location: Inpatient  Medical Rec #:  [id number]        Accession #:      [id number]  Account #:                      Height:           66.9 in 170.0 cm  YOB: 1932       Weight:           165.3 lb 75.00 kg  Patient Age:    88 years        BSA:              1.86 m²  Patient Gender: M               BP:               116/65 mmHg      Date of Exam:        2020 4:40:42 PM  Sonographer:         Reena Livingston  Referring Physician: Paola Rogers MD    Procedure:     2D Echo/Doppler/Color Doppler Complete.  Indications:   Edema, unspecified - R60.9  Diagnosis:     Nonrheumatic mitral (valve) insufficiency - I34.0  Study Details: Technically adequate study.        2D AND M-MODE MEASUREMENTS (normal ranges within parentheses):  Left                 Normal   Aorta/Left            Normal  Ventricle:                    Atrium:  IVSd (2D):    0.96  (0.7-1.1) Aortic Root  3.19 cm (2.4-3.7)                 cm             (2D):  LVPWd (2D):   0.77  (0.7-1.1) Left Atrium  4.40 cm (1.9-4.0)                 cm             (2D):  LVIDd (2D):   6.43  (3.4-5.7) LA Volume     54.1                 cm             Index         ml/m²  LVIDs (2D):   6.13                 cm  LV FS (2D):   4.7 %  (>25%)  Relative Wall 0.24   (<0.42)  Thickness    LV SYSTOLIC FUNCTION BY 2D PLANIMETRY (MOD):  EF-A4C View: 29.9 % EF-A2C View: 18.5 % EF-Biplane: 25 %    LV DIASTOLIC FUNCTION:  MV Peak E: 0.62 m/s e', MV Daisy: 0.05 m/s  MV Peak A: 0.77 m/s E/e' Ratio: 12.82  E/A Ratio: 0.80    SPECTRAL DOPPLER ANALYSIS (where applicable):  Mitral Insufficiency by PISA:  MR Volume: 59.66 ml MR Flow Rate: 184.90 ml/s MR EROA: 37.05 mm²    Aortic Valve: AoV Max Jimmy: 1.34 m/s AoV Peak P.2 mmHg AoV Mean P.0 mmHg    LVOT Vmax: 1.20 m/s LVOT VTI: 0.203 m LVOT Diameter: 1.93 cm    AoV Area, Vmax: 2.62 cm² AoV Area, VTI: 2.34 cm² AoV Area, Vmn: 2.18 cm²  Ao VTI: 0.254  Aortic Insufficiency:  AI Half-time: 375 msec    Tricuspid Valve and PA/RV Systolic Pressure: TR Max Velocity: 2.37 m/s RA Pressure: 8 mmHg RVSP/PASP: 30.5 mmHg      PHYSICIAN INTERPRETATION:  Left Ventricle: Endocardial visualization was enhanced with intravenous echo contrast. The left ventricular internal cavity size is moderate to severely increased.  Global LV systolic function was severely decreased. Left ventricular ejection fraction, by visual estimation, is 25 to 30%. Spectral Doppler shows impaired relaxation pattern of left ventricular myocardial filling (Grade I diastolic dysfunction).  Right Ventricle: The right ventricular size is mildly enlarged. RV systolic function is normal.  Left Atrium: Severely enlarged left atrium.  Right Atrium: Mildly enlarged right atrium.  Pericardium: There is no evidence of pericardial effusion.  Mitral Valve: Mitral leaflet mobility is normal. There is mild mitral annular calcification. Moderate to severe mitral valve regurgitation is seen.  Tricuspid Valve: Mild-moderate tricuspid regurgitation is visualized.  Aortic Valve: The aortic valve is trileaflet. Sclerotic aortic valve with normal opening. Moderate aortic valve regurgitation is seen. Eccentric AI jet visualized.  Pulmonic Valve: Trace pulmonic valve regurgitation.  Aorta: The aortic root is normal in size and structure.  Pulmonary Artery: The main pulmonary artery is normal in size.  Venous: The inferior vena cava is not well visualized.      Summary:   1. Left ventricular ejection fraction, by visual estimation, is 25 to 30%.   2. Severely decreased global left ventricular systolic function.   3. Moderate to severely increased left ventricular internal cavity size.   4. Spectral Doppler shows impaired relaxation pattern of left ventricular myocardial filling (Grade I diastolic dysfunction).   5. There is no evidence of pericardial effusion.   6. Mild mitral annular calcification.   7. Moderate to severe mitral valve regurgitation.   8. Mild-moderate tricuspid regurgitation.   9. Moderate aortic regurgitation.  10. Sclerotic aortic valve with normal opening.  11. Eccentric AI jet visualized.  12. Endocardial visualization was enhanced with intravenous echo contrast.    MD Edilia Electronically signed on 2020 at 12:20:20 PM            *** Final ***                DEMETRIUS TUBBS   This document has been electronically signed. 2020  4:40PM    CXR post procedure: no ptx/effusion, wires appear in appropriate position    CRITICAL CARE TIME SPENT: 35 mins assessing presenting problems of acute illness that poses high probability of life threatening deterioration or end organ damage/dysfunction.  Medical decision making including Initiating plan of care, reviewing data, reviewing radiology, direct patient bedside evaluation and interpretation of vital signs, any necessary ventilator management , discussion with multidisciplinary team, all non inclusive of procedures

## 2020-07-31 NOTE — PROGRESS NOTE ADULT - SUBJECTIVE AND OBJECTIVE BOX
Patient is a 88y old  Male who presents with a chief complaint of CHB (31 Jul 2020 00:02)      BRIEF HOSPITAL COURSE:   Pt is a 89 y/o male with PMHx of CAD s/p CABG, recent AAA repair, HTN, prior MI, PAD, HFrEF (~25%) here initially admitted for CHF exacerbation. Course c/b CHB w/ 9 sec pause. EP was consulted, CRT-D implant yesterday evening (His bundle lead/resynchonrization). Pt became acutely agitated richa-procedure requiring sedation, ultimately requiring intubation but was extubated shortly after. Pt was on transient course of Levophed as well.     Events last 24 hours: Off pressors for several hours, MAPs >70, mentating well, no acute complaints. VSS.     PAST MEDICAL & SURGICAL HISTORY:  AAA (abdominal aortic aneurysm)  HTN (hypertension)  S/P AAA repair: 7-  S/P left inguinal hernia repair  CAD (coronary artery disease): s/p stent placed  History of right hip replacement  S/P CABG (coronary artery bypass graft)    Review of Systems:  CONSTITUTIONAL: No fever, chills, or fatigue  EYES: No eye pain, visual disturbances, or discharge  ENMT:  No difficulty hearing, tinnitus, vertigo; No sinus or throat pain  NECK: No pain or stiffness  RESPIRATORY: No cough, wheezing, chills or hemoptysis; No shortness of breath  CARDIOVASCULAR: No chest pain, palpitations, dizziness, or leg swelling  GASTROINTESTINAL: No abdominal or epigastric pain. No nausea, vomiting, or hematemesis; No diarrhea or constipation. No melena or hematochezia.  GENITOURINARY: No dysuria, frequency, hematuria, or incontinence  NEUROLOGICAL: No headaches, memory loss, loss of strength, numbness, or tremors  SKIN: No itching, burning, rashes, or lesions   MUSCULOSKELETAL: No joint pain or swelling; No muscle, back, or extremity pain  PSYCHIATRIC: No depression, anxiety, mood swings, or difficulty sleeping    Medications:  doxazosin 4 milliGRAM(s) Oral at bedtime  furosemide   Injectable 40 milliGRAM(s) IV Push two times a day  aspirin enteric coated 81 milliGRAM(s) Oral daily  clopidogrel Tablet 75 milliGRAM(s) Oral daily  senna 2 Tablet(s) Oral at bedtime  finasteride 5 milliGRAM(s) Oral daily  simvastatin 20 milliGRAM(s) Oral at bedtime  ferrous    sulfate 325 milliGRAM(s) Oral daily    ICU Vital Signs Last 24 Hrs  T(C): 36.3 (31 Jul 2020 12:00), Max: 36.5 (31 Jul 2020 07:00)  T(F): 97.3 (31 Jul 2020 12:00), Max: 97.7 (31 Jul 2020 07:00)  HR: 83 (31 Jul 2020 12:00) (66 - 100)  BP: 96/51 (31 Jul 2020 12:00) (62/38 - 138/65)  BP(mean): 71 (31 Jul 2020 12:00) (35 - 84)  ABP: --  ABP(mean): --  RR: 27 (31 Jul 2020 12:00) (8 - 28)  SpO2: 99% (31 Jul 2020 12:00) (93% - 100%)    ABG - ( 31 Jul 2020 00:30 )  pH, Arterial: 7.45  pH, Blood: x     /  pCO2: 36    /  pO2: 122   / HCO3: 25    / Base Excess: 0.6   /  SaO2: 100       I&O's Detail    30 Jul 2020 07:01  -  31 Jul 2020 07:00  --------------------------------------------------------  IN:    norepinephrine Infusion: 72 mL    Solution: 1200 mL    Solution: 100 mL    Solution: 50 mL    Solution: 250 mL  Total IN: 1672 mL    OUT:    Voided: 750 mL  Total OUT: 750 mL    Total NET: 922 mL      31 Jul 2020 07:01  -  31 Jul 2020 12:20  --------------------------------------------------------  IN:  Total IN: 0 mL    OUT:    Voided: 350 mL  Total OUT: 350 mL    Total NET: -350 mL      LABS:                        8.4    19.01 )-----------( 276      ( 31 Jul 2020 02:36 )             26.2     07-31    136  |  99  |  31.0<H>  ----------------------------<  122<H>  3.7   |  23.0  |  1.44<H>    Ca    7.6<L>      31 Jul 2020 02:36  Phos  3.8     07-31  Mg     1.9     07-31            CAPILLARY BLOOD GLUCOSE            CULTURES:N      Physical Examination:    General: Well appearing, lying in bed in NAD      HEENT: Pupils equal, reactive to light.  Symmetric. No scleral icterus or injection    PULM: Clear to auscultation bilaterally, no wheezes, rales or rhonchi, no significant sputum production or increased respiratory effort    NECK: Supple, no lymphadenopathy, trachea midline    CVS: Paced (2 pacing spikes), no murmurs, +s1/s2    ABD: Soft, nondistended, nontender, normoactive bowel sounds    EXT: No edema, nontender    SKIN: Warm and well perfused    NEURO: Alert, oriented, interactive, nonfocal    POCUS:N    DEVICES: PPM  LINES:N  WELSH:N    RADIOLOGY: No recent radiology

## 2020-07-31 NOTE — DIETITIAN INITIAL EVALUATION ADULT. - OTHER INFO
Pt is a 88 year old man with past medical history Coronary artery disease (s/p stent of OM1 in 2008, CABG x 4 in 1997; LIMA-LAD, SVG- PDA, SVG- posterior LV branch and diagonal), Inferior wall myocardial infarction, Abdominal aortic aneurysm, Hypertension, Peripheral arterial disease , recent triple AAA repair on 7/20/2020 at [facility] presents with reports of chest pain syndrome, fatigue, leg edema and leg pain. The patient states that after his AAA repair he had episodes of chest discomfort and was told at [facility] that everything was stable from cardiac standpoint. He had some leg pain after the surgery which he states he was given Valium for. He was discharged on 7/22/20. The pain is worst in his upper thighs. He reports that a few days after that hospital discharge he noticed extreme fatigue and was unable to get up from seated position because he had no energy. Denies any chest discomfort at this time. Also reports chronic leg edema and dyspnea. Denies palpitations or syncope. States he had a stress test and ECHO in 3/2020 and there was no evidence of heart failure at that time. Pt was admitted for CHF. While on floor developed CHB with 9 sec pause. EPS consulted. S/p unsuccessful LV lead implant. Successful His bundle resynchronization with CRT-D implant.  Pt reports eating poorly x 2 weeks; reports having no appetite, denies difficulties chewing or swallowing. Endorses wt loss, unsure of exact amount (pt "guessing 5# wt loss). NFPE conducted

## 2020-07-31 NOTE — PROGRESS NOTE ADULT - SUBJECTIVE AND OBJECTIVE BOX
CHIEF COMPLAINT/INTERVAL HISTORY:  73 y/o male with h/o CAD s/p CABG, chronic systolic CHF, AAA repair about 1 week ago, came to the ER because of worsening of LE edema and SOB. patient was admitted with CHF exacerbation. on cardiac monitor patient was found in CHB and a 9 sec pause. PPM was placed. patient added that he had multiple unexplained sudden falls in the past. today, patient feels fine with no chest pain or SOB. labs showed that WBC increased to 19 000. no fever but o/e: ?? full urinary bladder. S Creat: 1.44. Hgb: 8.4 with Iron: 27    REVIEW OF SYSTEMS:    Vital Signs Last 24 Hrs  T(C): 36.3 (31 Jul 2020 12:00), Max: 36.5 (31 Jul 2020 07:00)  T(F): 97.3 (31 Jul 2020 12:00), Max: 97.7 (31 Jul 2020 07:00)  HR: 83 (31 Jul 2020 12:00) (66 - 100)  BP: 96/51 (31 Jul 2020 12:00) (62/38 - 138/65)  BP(mean): 71 (31 Jul 2020 12:00) (35 - 84)  RR: 27 (31 Jul 2020 12:00) (8 - 28)  SpO2: 99% (31 Jul 2020 12:00) (93% - 100%)    PHYSICAL EXAM:  GENERAL: NAD  HEENT: EOMI. PERRL  Chest: CTA bilaterally  CV: S1S2, RRR, no edema  GI: soft, +BS, NT/ND  Ext: no C/c. right leg edema. no calf tenderness  Neuro: AAO x3  Skin: warm and dry    LABS:                        8.4    19.01 )-----------( 276      ( 31 Jul 2020 02:36 )             26.2     07-31    136  |  99  |  31.0<H>  ----------------------------<  122<H>  3.7   |  23.0  |  1.44<H>    Ca    7.6<L>      31 Jul 2020 02:36  Phos  3.8     07-31  Mg     1.9     07-31            Assessment and Plan:

## 2020-07-31 NOTE — DIETITIAN INITIAL EVALUATION ADULT. - MALNUTRITION
Severe (Acute on chronic) NFPE; Severe muscle mass loss in temple, clavicle, shoulder and severe fat loss in orbita, buccal, triceps, thoracic regions (+fluid accumulation in B/L lower extremities) Severe (acute on chronic)

## 2020-07-31 NOTE — CONSULT NOTE ADULT - ASSESSMENT
Impression:  1. CHB sp  His bundle resynchronization with CRT-D implant  2  CKD  3. decompensated systolic heart failure  4. CAD  5. anemia  6. shock, not otherwise specified      Plan:  Neuro - avoiding all neurosuppressants and sleep agents    CV -  active titration of pressors to maintain MAP> 60           Avoiding fluid challenges given his significant LV dysfunction and high probability of worsening heart failure                  cont DAPT given fairly recent stents           high intensity statin          holding all anti-HTN for now while on pressors          consideration for addition of midodrine to facilitate pressor weaning if unable to wean off overnight          check TSH in am          check ABG with lytes to evaluate for metabolic acidosis and/or lactemia, if lactate significantly elevated may need inotropic support to wean pressors            Pulm -  stable on NC              check ABG to evaluate CO2             place on end tidal monitoring    GI -  NPO except meds for now    Renal - Cr at baseline elevation, avoid MAP<60, strict I/Os    Heme -  no pharmacologic DVT PPx  until cleared by EPS, SCD's only, transfuse for Hbg<8 in presence of CAD    ID - Vanco periop x 1, wound care as per EPS    Endo - stable Impression:  1. CHB sp  His bundle resynchronization with CRT-D implant  2  CKD  3. acute on chronic decompensated systolic heart failure  4. CAD  5. anemia  6. shock, not otherwise specified      Plan:  Neuro - avoiding all neurosuppressants and sleep agents    CV -  active titration of pressors to maintain MAP> 60           Avoiding fluid challenges given his significant LV dysfunction and high probability of worsening heart failure                  cont DAPT given fairly recent stents           high intensity statin          holding all anti-HTN for now while on pressors          consideration for addition of midodrine to facilitate pressor weaning if unable to wean off overnight          check TSH in am          check ABG with lytes to evaluate for metabolic acidosis and/or lactemia, if lactate significantly elevated may need inotropic support to wean pressors            Pulm -  stable on NC              check ABG to evaluate CO2             place on end tidal monitoring    GI -  NPO except meds for now    Renal - Cr at baseline elevation, avoid MAP<60, strict I/Os    Heme -  no pharmacologic DVT PPx  until cleared by EPS, SCD's only, transfuse for Hbg<8 in presence of CAD    ID - Vanco periop x 1, wound care as per EPS    Endo - stable

## 2020-07-31 NOTE — DIETITIAN INITIAL EVALUATION ADULT. - PERTINENT LABORATORY DATA
07-31 Na136 mmol/L Glu 122 mg/dL<H> K+ 3.7 mmol/L Cr  1.44 mg/dL<H> BUN 31.0 mg/dL<H> Phos 3.8 mg/dL Alb n/a   PAB n/a

## 2020-07-31 NOTE — PROGRESS NOTE ADULT - SUBJECTIVE AND OBJECTIVE BOX
PAT TAHIRA  [id number]        Chief Complaint: follow up CHFrEF, recent AAA repair/CHB now s/p CRT-D with his pacing      Subjective: no complaints, denies cp/sob/palps      24 hour Tele: SR, paced        aspirin enteric coated 81 milliGRAM(s) Oral daily  clopidogrel Tablet 75 milliGRAM(s) Oral daily  doxazosin 4 milliGRAM(s) Oral at bedtime  ferrous    sulfate 325 milliGRAM(s) Oral daily  finasteride 5 milliGRAM(s) Oral daily  furosemide   Injectable 40 milliGRAM(s) IV Push two times a day  senna 2 Tablet(s) Oral at bedtime  simvastatin 20 milliGRAM(s) Oral at bedtime          Physical Exam:  T(C): 36.5 (07-31-20 @ 07:00), Max: 36.5 (07-31-20 @ 07:00)  HR: 81 (07-31-20 @ 11:15) (66 - 100)  BP: 94/50 (07-31-20 @ 11:15) (62/38 - 138/65)  RR: 19 (07-31-20 @ 11:15) (8 - 28)  SpO2: 97% (07-31-20 @ 11:15) (93% - 100%)  Wt(kg): --  General: Comfortable in NAD  HEENT: dry MM, sclera anicteric  Resp: CTA bilaterally, reduced at bases  CVS: nl s1s2, rrr, II/VI systolic murmur  Abd: soft, NT, ND, BS+  Ext: No c/c/e  Neuro A&O x3  Psych: Normal affect    I&O's Summary    30 Jul 2020 07:01  -  31 Jul 2020 07:00  --------------------------------------------------------  IN: 1672 mL / OUT: 750 mL / NET: 922 mL    31 Jul 2020 07:01  -  31 Jul 2020 11:48  --------------------------------------------------------  IN: 0 mL / OUT: 350 mL / NET: -350 mL          Labs:   31 Jul 2020 02:36    136    |  99     |  31.0   ----------------------------<  122    3.7     |  23.0   |  1.44     Ca    7.6        31 Jul 2020 02:36  Phos  3.8       31 Jul 2020 02:36  Mg     1.9       31 Jul 2020 02:36                            8.4    19.01 )-----------( 276      ( 31 Jul 2020 02:36 )             26.2     Lower extremity venous duplex (7/27/2020):  No evidence of deep venous thrombosis in either lower extremity.    CXR (7/27/2020):  Congestive heart failure with bilateral pulmonary edema. These findings are new since 1/28/2020..    Coronary angiogram (5/2018) at [facility]:  Occluded vein graft to diagonal and posterior LV branch  LIMA to LAD and vein graft to PDA patent  s/p PCI to OM1    Outpatient stress test (3/2020):  Small sized, severe intensity fixed defect of the basal inferior, basal-mid inferolateral wall consistent with infarction. Small to moderate sized partially reversible at the margins defect of the basal-mid anteroseptal wall consistent with infarction with mild richa-infarct ischemia, LVEF 47%    Outpatient TTE (12/2019):  LVEF 45%  Basal and mid inferior wall severe hypokinesis  Moderate to severe mitral regurgitation  Mild to moderate aortic regurgitation  Large mid to distal abdominal aortic aneurysm 5.9 cm     TTE (7/28/2020) images reviewed:   Moderately reduced LVEF ~35- 40%, abnormal septal motion likely due to conduction delay  Normal RV size and systolic function  Severely dilated left atrium   Mild AR, Moderate MR, Mild TR   No aortic stenosis   RVSP ~ 22 mmHg        Assessment and Plan:   · Assessment		  Assessment:  Mr. Collazo is an 88 year old man with past medical history of Coronary artery disease (s/p stent of OM1 in 2008, CABG x 4 in 1997; LIMA-LAD, SVG-PDA, SVG-posterior LV branch and diagonal), Inferior wall myocardial infarction, Abdominal aortic aneurysm, Hypertension, Peripheral arterial disease and reports of chest pain syndrome, who presents with recent endovascular triple AAA repair on 7/20/2020 and SMA & renal artery stenting at [facility] presents with fatigue, leg edema and leg pain, found to have decompensated heart failure.     Physical exam notable for lung crackles and significant bilateral lower extremity edema. Volume overload likely the cause of his overall weakness. ECG reviewed and consistent with known LBBB, no acute changes. Initial troponin is elevated which is likely from volume overload, elevated Cr and underlying CAD.  -Developed 9 second pause in hospital  -Now s/p CRT-D with his bundle pacing as failed implant of LV lead  -Post procedural hypotension and monitored in ICU overnight, levo weaned off. ? sedation related and from diuresis. Now stable    Plan:  1. PAtient to be transferred out of ICU  2. Continue CV meds  3. Restart low dose beta blocker and ACEI tomorrow if BP stable  4. Supportive care  5. EP follow up     Baljinder Gustafson MD

## 2020-07-31 NOTE — PROGRESS NOTE ADULT - SUBJECTIVE AND OBJECTIVE BOX
Pt doing well POD #1 s/p MDT CRT-D implant (his bundle pacing 2/2 failure to place LV lead). Procedure complicated by intra-op agitation, ultimately requiring intubation for general anesthesia. Successfully extubated post op; transient pressor requirement - now resolved. Pt now doing well. Currently resting in bed comfortably, denies complaint.       Exam:   VSS, NAD, A&O x 3  Incision: Steri strips C/D/I; no bleeding, hematoma, erythema, exudate or edema; distal pulses intact  Card: S1/S2, RRR, no m/g/r  Resp: lungs CTA b/l  Abd: S/NT/ND  Ext: no edema, distal pulses intact    Device interrogation: measurements appropriate & stable. Parameters confirmed.     Tele: predominantly non selective his pacing; occ PVC; no sustained arrhythmias or acute changes.     CXR: PA/Lat pending    Labs:                     8.4    19.01 )-----------( 276      ( 31 Jul 2020 02:36 )             26.2   07-31    136  |  99  |  31.0<H>  ----------------------------<  122<H>  3.7   |  23.0  |  1.44<H>    Ca    7.6<L>      31 Jul 2020 02:36  Phos  3.8     07-31  Mg     1.9     07-31    Assessment:   88M with PMHx of CAD, sp CABG, sp AAA repair, HTN, MI, PAD, ICM, HFrEF (EF 25%) admitted for acute exacerbation of chronic HF, noted to have transient CHB w/ 9 second pause on telemetry. Now POD # 1 s/p CRT-D implant w/ unsuccessful LV lead implant resulting in his bundle pacing with procedure complicated by intra-op agitation, ultimately requiring intubation for general anesthesia. Successfully extubated post op; also with transient pressor requirement post op - now resolved.         Plan:   Elevated WBC likely 2/2 prolonged complex procedure - pt remains afebrile, off pressors and without complaint. Continue monitoring WBC and surveillance for s/s of infection.   Pt instructed as to ROM of affected arm - no lifting/pushing/pulling >10 lbs & shoulder ROM limited to 90deg & below x 4 weeks.   Pt instructed as to incision care and f/up - written instructions included in d/c documents.  Outpt f/up in 2-4 weeks - our office will call to schedule an appointment.   Please obtain PA/Lat CXR prior to d/c.   Above d/w Dr. Del Castillo. Pt doing well POD #1 s/p MDT CRT-D implant (HIS bundle pacing 2/2 unfeasible CS lead implantation). Procedure complicated by intra-op agitation, ultimately requiring intubation for general anesthesia. Successfully extubated post op; transient pressor requirement - now resolved. Pt now doing well. Currently resting in bed comfortably, denies complaint.       Exam:   VSS, NAD, A&O x 3  Incision: Steri strips C/D/I; no bleeding, hematoma, erythema, exudate or edema; distal pulses intact  Card: S1/S2, RRR, no m/g/r  Resp: lungs CTA b/l  Abd: S/NT/ND  Ext: no edema, distal pulses intact    Device interrogation: measurements appropriate & stable. Parameters confirmed.     Tele: predominantly non selective his pacing; occ PVC; no sustained arrhythmias or acute changes.     CXR: PA/Lat pending    Labs:                     8.4    19.01 )-----------( 276      ( 31 Jul 2020 02:36 )             26.2   07-31    136  |  99  |  31.0<H>  ----------------------------<  122<H>  3.7   |  23.0  |  1.44<H>    Ca    7.6<L>      31 Jul 2020 02:36  Phos  3.8     07-31  Mg     1.9     07-31    Assessment:   88M with PMHx of CAD, sp CABG, sp AAA repair, HTN, MI, PAD, ICM, HFrEF (EF 25%) admitted for acute exacerbation of chronic HF, noted to have transient CHB w/ 9 second pause on telemetry. Now POD # 1 s/p CRT-D implant with HIS bundle pacing with procedure complicated by intra-op agitation, ultimately requiring intubation for general anesthesia. Successfully extubated post op; also with transient pressor requirement post op - now resolved.     ECG, and device check are ok  Pressure dressing removed, and site is free of any redness, or swelling.     Plan:   Elevated WBC likely 2/2 procedure - pt remains afebrile, off pressors and without complaint. Continue monitoring WBC and surveillance for s/s of infection. Monitor CXR for any signs of aspiration.   Pt instructed as to ROM of affected arm - no lifting/pushing/pulling >10 lbs & shoulder ROM limited to 90deg & below x 4 weeks. Keep site dry for 3 days.   Pt instructed as to incision care and f/up - written instructions included in d/c documents.  Outpt f/up in 2-4 weeks - our office will call to schedule an appointment.   Please obtain PA/Lat CXR prior to d/c.   Remote monitoring with our office.     Patient seen with Dr. Del Castillo. Pt doing well POD #1 s/p MDT CRT-D implant (HIS bundle pacing 2/2 unfeasible CS lead implantation). Procedure complicated by intra-op agitation, ultimately requiring intubation for general anesthesia. Successfully extubated post op; transient pressor requirement - now resolved. Pt now doing well. Currently resting in bed comfortably, denies complaint.       Exam:   VSS, NAD, A&O x 3  Incision: Steri strips C/D/I; no bleeding, hematoma, erythema, exudate or edema; distal pulses intact  Card: S1/S2, RRR, no m/g/r  Resp: lungs CTA b/l  Abd: S/NT/ND  Ext: no edema, distal pulses intact    Device interrogation: measurements appropriate & stable. Parameters confirmed.     Tele: predominantly non selective his pacing; occ PVC; no sustained arrhythmias or acute changes.     CXR: PA/Lat pending    Labs:                     8.4    19.01 )-----------( 276      ( 31 Jul 2020 02:36 )             26.2   07-31    136  |  99  |  31.0<H>  ----------------------------<  122<H>  3.7   |  23.0  |  1.44<H>    Ca    7.6<L>      31 Jul 2020 02:36  Phos  3.8     07-31  Mg     1.9     07-31    Assessment:   88M with PMHx of CAD, sp CABG, sp AAA repair, HTN, MI, PAD, ICM, HFrEF (EF 25%) admitted for acute exacerbation of chronic HF, noted to have transient CHB w/ 9 second pause on telemetry. Now POD # 1 s/p CRT-D implant with HIS bundle pacing with procedure complicated by intra-op agitation, ultimately requiring intubation for general anesthesia. Successfully extubated post op; also with transient pressor requirement post op - now resolved.     ECG, and device check are ok  Pressure dressing removed, and site is free of any redness, or swelling.     Plan:   Elevated WBC likely 2/2 procedure - pt remains afebrile, off pressors and without complaint. Continue monitoring WBC and surveillance for s/s of infection. Monitor CXR for any signs of aspiration.   Pt instructed as to ROM of affected arm - no lifting/pushing/pulling >10 lbs & shoulder ROM limited to 90deg & below x 4 weeks. Keep site dry for 3 days.   Pt instructed as to incision care and f/up - written instructions included in d/c documents.  Outpt f/up in 2-4 weeks - our office will call to schedule an appointment.   Please obtain PA/Lat CXR prior to d/c.   Remote monitoring with our office.   Can restart beta blocker as tolerated, now patient has pacing support.     Patient seen with Dr. Del Castillo.

## 2020-07-31 NOTE — DISCHARGE NOTE NURSING/CASE MANAGEMENT/SOCIAL WORK - PATIENT PORTAL LINK FT
You can access the FollowMyHealth Patient Portal offered by [facility] by registering at the following website: [email or web address]. By joining [employer]’s FollowMyHealth portal, you will also be able to view your health information using other applications (apps) compatible with our system.

## 2020-08-01 LAB
ALBUMIN SERPL ELPH-MCNC: 2.5 G/DL — LOW (ref 3.3–5.2)
ALP SERPL-CCNC: 68 U/L — SIGNIFICANT CHANGE UP (ref 40–120)
ALT FLD-CCNC: 19 U/L — SIGNIFICANT CHANGE UP
ANION GAP SERPL CALC-SCNC: 10 MMOL/L — SIGNIFICANT CHANGE UP (ref 5–17)
AST SERPL-CCNC: 16 U/L — SIGNIFICANT CHANGE UP
BASOPHILS # BLD AUTO: 0.02 K/UL — SIGNIFICANT CHANGE UP (ref 0–0.2)
BASOPHILS NFR BLD AUTO: 0.2 % — SIGNIFICANT CHANGE UP (ref 0–2)
BILIRUB SERPL-MCNC: 0.7 MG/DL — SIGNIFICANT CHANGE UP (ref 0.4–2)
BUN SERPL-MCNC: 37 MG/DL — HIGH (ref 8–20)
CALCIUM SERPL-MCNC: 7.5 MG/DL — LOW (ref 8.6–10.2)
CHLORIDE SERPL-SCNC: 100 MMOL/L — SIGNIFICANT CHANGE UP (ref 98–107)
CO2 SERPL-SCNC: 27 MMOL/L — SIGNIFICANT CHANGE UP (ref 22–29)
CREAT SERPL-MCNC: 1.5 MG/DL — HIGH (ref 0.5–1.3)
EOSINOPHIL # BLD AUTO: 0.81 K/UL — HIGH (ref 0–0.5)
EOSINOPHIL NFR BLD AUTO: 8 % — HIGH (ref 0–6)
GLUCOSE SERPL-MCNC: 100 MG/DL — HIGH (ref 70–99)
HCT VFR BLD CALC: 24.6 % — LOW (ref 39–50)
HGB BLD-MCNC: 7.9 G/DL — LOW (ref 13–17)
IMM GRANULOCYTES NFR BLD AUTO: 0.5 % — SIGNIFICANT CHANGE UP (ref 0–1.5)
LYMPHOCYTES # BLD AUTO: 1.38 K/UL — SIGNIFICANT CHANGE UP (ref 1–3.3)
LYMPHOCYTES # BLD AUTO: 13.6 % — SIGNIFICANT CHANGE UP (ref 13–44)
MAGNESIUM SERPL-MCNC: 2.4 MG/DL — SIGNIFICANT CHANGE UP (ref 1.6–2.6)
MCHC RBC-ENTMCNC: 30.9 PG — SIGNIFICANT CHANGE UP (ref 27–34)
MCHC RBC-ENTMCNC: 32.1 GM/DL — SIGNIFICANT CHANGE UP (ref 32–36)
MCV RBC AUTO: 96.1 FL — SIGNIFICANT CHANGE UP (ref 80–100)
MONOCYTES # BLD AUTO: 0.62 K/UL — SIGNIFICANT CHANGE UP (ref 0–0.9)
MONOCYTES NFR BLD AUTO: 6.1 % — SIGNIFICANT CHANGE UP (ref 2–14)
NEUTROPHILS # BLD AUTO: 7.26 K/UL — SIGNIFICANT CHANGE UP (ref 1.8–7.4)
NEUTROPHILS NFR BLD AUTO: 71.6 % — SIGNIFICANT CHANGE UP (ref 43–77)
PHOSPHATE SERPL-MCNC: 2.7 MG/DL — SIGNIFICANT CHANGE UP (ref 2.4–4.7)
PLATELET # BLD AUTO: 247 K/UL — SIGNIFICANT CHANGE UP (ref 150–400)
POTASSIUM SERPL-MCNC: 3.4 MMOL/L — LOW (ref 3.5–5.3)
POTASSIUM SERPL-SCNC: 3.4 MMOL/L — LOW (ref 3.5–5.3)
PROT SERPL-MCNC: 4.9 G/DL — LOW (ref 6.6–8.7)
RBC # BLD: 2.56 M/UL — LOW (ref 4.2–5.8)
RBC # FLD: 14.1 % — SIGNIFICANT CHANGE UP (ref 10.3–14.5)
SODIUM SERPL-SCNC: 136 MMOL/L — SIGNIFICANT CHANGE UP (ref 135–145)
WBC # BLD: 10.14 K/UL — SIGNIFICANT CHANGE UP (ref 3.8–10.5)
WBC # FLD AUTO: 10.14 K/UL — SIGNIFICANT CHANGE UP (ref 3.8–10.5)

## 2020-08-01 PROCEDURE — 99232 SBSQ HOSP IP/OBS MODERATE 35: CPT

## 2020-08-01 PROCEDURE — 99233 SBSQ HOSP IP/OBS HIGH 50: CPT

## 2020-08-01 RX ORDER — POTASSIUM CHLORIDE 20 MEQ
40 PACKET (EA) ORAL ONCE
Refills: 0 | Status: DISCONTINUED | OUTPATIENT
Start: 2020-08-01 | End: 2020-08-01

## 2020-08-01 RX ORDER — METOPROLOL TARTRATE 50 MG
25 TABLET ORAL EVERY 12 HOURS
Refills: 0 | Status: DISCONTINUED | OUTPATIENT
Start: 2020-08-01 | End: 2020-08-01

## 2020-08-01 RX ORDER — FUROSEMIDE 40 MG
40 TABLET ORAL EVERY 12 HOURS
Refills: 0 | Status: DISCONTINUED | OUTPATIENT
Start: 2020-08-01 | End: 2020-08-05

## 2020-08-01 RX ORDER — DIAZEPAM 5 MG
5 TABLET ORAL ONCE
Refills: 0 | Status: DISCONTINUED | OUTPATIENT
Start: 2020-08-01 | End: 2020-08-01

## 2020-08-01 RX ORDER — METOPROLOL TARTRATE 50 MG
50 TABLET ORAL DAILY
Refills: 0 | Status: DISCONTINUED | OUTPATIENT
Start: 2020-08-01 | End: 2020-08-05

## 2020-08-01 RX ORDER — POTASSIUM CHLORIDE 20 MEQ
40 PACKET (EA) ORAL ONCE
Refills: 0 | Status: COMPLETED | OUTPATIENT
Start: 2020-08-01 | End: 2020-08-01

## 2020-08-01 RX ORDER — ASCORBIC ACID 60 MG
500 TABLET,CHEWABLE ORAL DAILY
Refills: 0 | Status: DISCONTINUED | OUTPATIENT
Start: 2020-08-01 | End: 2020-08-05

## 2020-08-01 RX ORDER — PANTOPRAZOLE SODIUM 20 MG/1
40 TABLET, DELAYED RELEASE ORAL
Refills: 0 | Status: DISCONTINUED | OUTPATIENT
Start: 2020-08-01 | End: 2020-08-05

## 2020-08-01 RX ORDER — CALCIUM GLUCONATE 100 MG/ML
1 VIAL (ML) INTRAVENOUS DAILY
Refills: 0 | Status: DISCONTINUED | OUTPATIENT
Start: 2020-08-01 | End: 2020-08-02

## 2020-08-01 RX ORDER — SENNA PLUS 8.6 MG/1
2 TABLET ORAL AT BEDTIME
Refills: 0 | Status: DISCONTINUED | OUTPATIENT
Start: 2020-08-01 | End: 2020-08-01

## 2020-08-01 RX ORDER — FUROSEMIDE 40 MG
40 TABLET ORAL
Refills: 0 | Status: DISCONTINUED | OUTPATIENT
Start: 2020-08-01 | End: 2020-08-01

## 2020-08-01 RX ADMIN — FINASTERIDE 5 MILLIGRAM(S): 5 TABLET, FILM COATED ORAL at 11:50

## 2020-08-01 RX ADMIN — CLOPIDOGREL BISULFATE 75 MILLIGRAM(S): 75 TABLET, FILM COATED ORAL at 11:50

## 2020-08-01 RX ADMIN — Medication 40 MILLIEQUIVALENT(S): at 11:50

## 2020-08-01 RX ADMIN — Medication 325 MILLIGRAM(S): at 11:50

## 2020-08-01 RX ADMIN — PANTOPRAZOLE SODIUM 40 MILLIGRAM(S): 20 TABLET, DELAYED RELEASE ORAL at 11:52

## 2020-08-01 RX ADMIN — SENNA PLUS 2 TABLET(S): 8.6 TABLET ORAL at 21:24

## 2020-08-01 RX ADMIN — Medication 5 MILLIGRAM(S): at 21:24

## 2020-08-01 RX ADMIN — Medication 40 MILLIGRAM(S): at 08:31

## 2020-08-01 RX ADMIN — Medication 100 GRAM(S): at 11:51

## 2020-08-01 RX ADMIN — Medication 40 MILLIGRAM(S): at 17:02

## 2020-08-01 RX ADMIN — Medication 5 MILLIGRAM(S): at 23:11

## 2020-08-01 RX ADMIN — Medication 500 MILLIGRAM(S): at 11:50

## 2020-08-01 RX ADMIN — Medication 81 MILLIGRAM(S): at 11:50

## 2020-08-01 RX ADMIN — SIMVASTATIN 20 MILLIGRAM(S): 20 TABLET, FILM COATED ORAL at 21:24

## 2020-08-01 NOTE — PHYSICAL THERAPY INITIAL EVALUATION ADULT - MANUAL MUSCLE TESTING RESULTS, REHAB EVAL
bilateral UE's + bilateral LE's grossly assessed via functional assessment of sit to stand transfer, 3/5
no strength deficits were identified/except [place] LE: grossly 3+/5 throughout

## 2020-08-01 NOTE — PHYSICAL THERAPY INITIAL EVALUATION ADULT - PHYSICAL ASSIST/NONPHYSICAL ASSIST: STAND/SIT, REHAB EVAL
1 person assist/pt required increased physical assistance to help perform transfer/to safely lower to a sitting position; verbal cues re proper sequence + proper hand placement in prep to perform transfer/verbal cues

## 2020-08-01 NOTE — PHYSICAL THERAPY INITIAL EVALUATION ADULT - PHYSICAL ASSIST/NONPHYSICAL ASSIST: SUPINE/SIT, REHAB EVAL
verbal cues/pt required increased physical assistance to get bilateral LE's out of bed and to help assume proper upright sitting at EOB posture; verbal cues for proper sequence + proper use of bed rails; pt required intermittent verbal cues re cardiac/PPM precautions/1 person assist

## 2020-08-01 NOTE — PHYSICAL THERAPY INITIAL EVALUATION ADULT - IMPAIRMENTS FOUND, PT EVAL
muscle strength/gait, locomotion, and balance/aerobic capacity/endurance
aerobic capacity/endurance/gross motor/muscle strength/posture/gait, locomotion, and balance/ROM

## 2020-08-01 NOTE — PROGRESS NOTE ADULT - ASSESSMENT
Patient is an 89 y/o male with a pmhx of Coronary artery disease (s/p stent of OM1 in 2008, CABG x 4 in 1997; LIMA-LAD, SVG-PDA, SVG-posterior LV branch and diagonal), Inferior wall myocardial infarction, Abdominal aortic aneurysm, Hypertension, Peripheral arterial disease and reports of chest pain syndrome, who presents with recent endovascular triple AAA repair on 7/20/2020 and SMA & renal artery stenting at [facility] who presents with fatigue, leg edema/leg pain and found ot have decompensated heart failure. While here, patient was found ot have CHB with a 9 sec pause and a PPM was placed. He was downgraded on 7/31 from the ICU. He was also noted to have leucocytosis to 19k with no fever but distended bladder and Cr of 1.44. Post PPM placement, he was noted to have hypotension and was monitored in the ICU overnight with levo weaned off and diuresis continued     CHB s/p PPM in light of decompensated systolic heart failure (HFrEF) - EF 25%  h/o chronic HFrEF  -PPM site care, seen by cardio and EP  -c/w tele   -resume low dose beta blocker and monitor BP   -c/w lasix 40mg ivp bid (concern for fatigue also being due to decomp HR)   -monitor i/o and daily weights     Leucocytosis on admission   with distended bladder   -trended down on its own, monitor i/o, condom cath in place, monitor   -encourage ambulation   -if spikes, panculture     Recent AAA repair - ecchymosis noted with left stitch to LLQ   -monitor hgb     Normocytic anemia with overall iron panel concerning for aocd   -c/w ferrous sulfate daily, add vitamin c for absorption   -monitor Hgb     Elevated Cr - prior Cr 1.34 noted in the system with a GFR of 47 ?CKD stage 3b now   with hypokalemia and borderline hypocalcemia (corrected to 8.7)   -monitor Cr given fluid overload on admission   -c/w lasix and monitor cr   -IV calcium and then PO K     h/o cad - c/w aspirin, plavix and statin     ? BPH - cardura and proscar on board     constipation - resumed senna, started dulcolax and monitor     DVT PPx - c/w venodynes   GI PPX - not on any GI ppx - given recent hospital stays, being on asa and plavix and surgery,may benefit for ppx x 30 days     Dispo - d/c planning in the next 1-2 days. Lives in an adult living facility with his wife. Walks with a cane at times. Asked me to call his wife with an update, no number. Called son Huan and SWAPNA Patient is an 87 y/o male with a pmhx of Coronary artery disease (s/p stent of OM1 in 2008, CABG x 4 in 1997; LIMA-LAD, SVG-PDA, SVG-posterior LV branch and diagonal), Inferior wall myocardial infarction, Abdominal aortic aneurysm, Hypertension, Peripheral arterial disease and reports of chest pain syndrome, who presents with recent endovascular triple AAA repair on 7/20/2020 and SMA & renal artery stenting at [facility] who presents with fatigue, leg edema/leg pain and found ot have decompensated heart failure. While here, patient was found ot have CHB with a 9 sec pause and a PPM was placed. He was downgraded on 7/31 from the ICU. He was also noted to have leucocytosis to 19k with no fever but distended bladder and Cr of 1.44. Post PPM placement, he was noted to have hypotension and was monitored in the ICU overnight with levo weaned off and diuresis continued     CHB s/p PPM in light of decompensated systolic heart failure (HFrEF) - EF 25%  h/o chronic HFrEF  -PPM site care, seen by cardio and EP  -c/w tele   -resume low dose beta blocker and monitor BP   -c/w lasix 40mg ivp bid (concern for fatigue also being due to decomp HR)   -monitor i/o and daily weights     Leucocytosis on admission   with distended bladder   -trended down on its own, monitor i/o, condom cath in place, monitor   -encourage ambulation   -if spikes, panculture     Recent AAA repair - ecchymosis noted with left stitch to LLQ   -monitor hgb     Normocytic anemia with overall iron panel concerning for aocd   -c/w ferrous sulfate daily, add vitamin c for absorption   -monitor Hgb     Elevated Cr - prior Cr 1.34 noted in the system with a GFR of 47 ?CKD stage 3b now   with hypokalemia and borderline hypocalcemia (corrected to 8.7)   -monitor Cr given fluid overload on admission   -c/w lasix and monitor cr   -IV calcium and then PO K     h/o cad - c/w aspirin, plavix and statin     ? BPH - cardura and proscar on board     constipation - resumed senna, started dulcolax and monitor     DVT PPx - c/w venodynes   GI PPX - not on any GI ppx - given recent hospital stays, being on asa and plavix and surgery,may benefit for ppx x 30 days     Dispo - d/c planning in the next 1-2 days. Lives in an adult living facility with his wife. Walks with a cane at times - PT samantha done on 7/28 - home with PT lyric frederick.  Asked me to call his wife with an update, no number. Called son Huan and LM     Full code at this time. Will address advanced directives

## 2020-08-01 NOTE — PHYSICAL THERAPY INITIAL EVALUATION ADULT - PERTINENT HX OF CURRENT PROBLEM, REHAB EVAL
presents with recent triple AAA repair on 7/20/2020 at [facility] presents with fatigue, leg edema and leg pain, found to have decompensated heart failure.
c/o SOB x 1 month and bilateral LE's pain; recent AAA repair at [facility] on 7/20/2020

## 2020-08-01 NOTE — PHYSICAL THERAPY INITIAL EVALUATION ADULT - CRITERIA FOR SKILLED THERAPEUTIC INTERVENTIONS
impairments found/therapy frequency/rehab potential/anticipated discharge recommendation/functional limitations in following categories
impairments found/risk reduction/prevention/rehab potential/predicted duration of therapy intervention/functional limitations in following categories/anticipated discharge recommendation/therapy frequency/anticipated equipment needs at discharge

## 2020-08-01 NOTE — PHYSICAL THERAPY INITIAL EVALUATION ADULT - PLANNED THERAPY INTERVENTIONS, PT EVAL
strengthening/bed mobility training/balance training/transfer training/gait training
gait training/strengthening/balance training/ROM/bed mobility training/transfer training

## 2020-08-01 NOTE — PHYSICAL THERAPY INITIAL EVALUATION ADULT - TRANSFER SAFETY CONCERNS NOTED: SIT/STAND, REHAB EVAL
decreased balance during turns/decreased step length/decreased safety awareness/stepping too close to front of assistive device/decreased weight-shifting ability

## 2020-08-01 NOTE — PHYSICAL THERAPY INITIAL EVALUATION ADULT - PHYSICAL ASSIST/NONPHYSICAL ASSIST: GAIT, REHAB EVAL
1 person assist/verbal cues/pt required increased physical assistance to help maintain proper upright walking posture during ambulation, assistance re proper use of AD and assistance for safety & falls prevention; verbal cues re proper gait sequence + proper use of RW

## 2020-08-01 NOTE — PROGRESS NOTE ADULT - SUBJECTIVE AND OBJECTIVE BOX
Patient is a 88y old  Male who presents with a chief complaint of cardiogenic shock (31 Jul 2020 12:48)    Patient seen and examined at bedside. No acute distress and no acute overnight events. States that he is feeling well. Has some pain to the left arm site where the PPM was placed. States that he thinks his falls in the past has something to do with his heart rate and is happy that he was able to get it done. In addition, states that he doesn't have much of an appetite. + constipated, last BM 2 days ago and was small     Tele:      Vital Signs Last 24 Hrs  T(C): 36.6 (01 Aug 2020 08:29), Max: 37.2 (31 Jul 2020 19:48)  T(F): 97.8 (01 Aug 2020 08:29), Max: 98.9 (31 Jul 2020 19:48)  HR: 86 (01 Aug 2020 08:29) (65 - 100)  BP: 107/65 (01 Aug 2020 08:29) (83/45 - 130/60)  BP(mean): 78 (01 Aug 2020 06:00) (59 - 87)  RR: 18 (01 Aug 2020 08:29) (13 - 34)  SpO2: 100% (01 Aug 2020 08:29) (94% - 100%)    PHYSICAL EXAM:  GENERAL: NAD, elderly male aaox3  HEENT: EOMI. PERRLA. Dry MM   Chest: CTA bilaterally, no r/r/w noted  CV: S1S2, RRR, no edema  GI: soft, +BS, NT/ND. Suprapubic region with ecchymosis noted, non tender   : condom cath in place   Ext: no c/c. RLL chronic venous stasis change. LLE mild edema 1+ noted pitting   Neuro: AAO x3  Skin: warm and dry    LABS:             7.9    10.14 )-----------( 247      ( 01 Aug 2020 05:12 )             24.6   08-01    136  |  100  |  37.0<H>  ----------------------------<  100<H>  3.4<L>   |  27.0  |  1.50<H>    Ca    7.5<L>      01 Aug 2020 05:12  Phos  2.7     08-01  Mg     2.4     08-01    TPro  4.9<L>  /  Alb  2.5<L>  /  TBili  0.7  /  DBili  x   /  AST  16  /  ALT  19  /  AlkPhos  68  08-01

## 2020-08-01 NOTE — PHYSICAL THERAPY INITIAL EVALUATION ADULT - DISCHARGE DISPOSITION, PT EVAL
home PT > subacute rehab
home w/ home PT/home w/ assist/home with assist, RW and home PT pending progress

## 2020-08-01 NOTE — PHYSICAL THERAPY INITIAL EVALUATION ADULT - ADDITIONAL COMMENTS
Pt lives with spouse at [facility] with no stairs to negotiate. Pt's PLOF was independent all ADL's + ambulation with RW. Pt has RW, rollator, SAC, shower chair and raised toilet seat. At this time, no additional Assistive Device/DME required/recommended upon d/c
owns and uses a RW, lives at [facility] assistive living, no stairs to negotiate

## 2020-08-01 NOTE — PHYSICAL THERAPY INITIAL EVALUATION ADULT - NAME OF CLINICIAN
Subjective:       Patient ID: Diamond Das is a 61 y.o. female.    Chief Complaint: Follow-up (3 month f/u)    Has constant abd pain - uncontrolled DM in the past.      Diabetes   She presents for her follow-up diabetic visit. She has type 2 diabetes mellitus. MedicAlert identification noted. Her disease course has been stable. Pertinent negatives for hypoglycemia include no confusion, dizziness, headaches or hunger. Pertinent negatives for diabetes include no blurred vision, no chest pain, no fatigue and no foot paresthesias. Pertinent negatives for hypoglycemia complications include no blackouts and no hospitalization. Symptoms are stable. Risk factors for coronary artery disease include diabetes mellitus.     Review of Systems   Constitutional: Negative for fatigue.   Eyes: Negative for blurred vision.   Cardiovascular: Negative for chest pain.   Neurological: Negative for dizziness and headaches.   Psychiatric/Behavioral: Negative for confusion.       Objective:      Physical Exam   Constitutional: She appears well-developed and well-nourished. She appears distressed.   HENT:   Head: Normocephalic and atraumatic.   Pulmonary/Chest: Effort normal and breath sounds normal. No respiratory distress. She has no wheezes.   Abdominal: Soft. She exhibits no distension. There is no tenderness. There is no guarding.   Skin: Skin is warm and dry. No rash noted. She is not diaphoretic. No erythema.   Nursing note and vitals reviewed.      Assessment:       1. Type 2 diabetes mellitus with other circulatory complication, without long-term current use of insulin    2. Diabetic gastroparesis associated with type 2 diabetes mellitus    3. Screen for colon cancer    4. Screening mammogram, encounter for        Plan:     Problem List Items Addressed This Visit        Renal/    Screening mammogram, encounter for    Relevant Orders    Mammo Digital Screening Bilat       Endocrine    Type 2 diabetes mellitus with circulatory  disorder - Primary    Relevant Medications    doxazosin (CARDURA) 2 MG tablet    Other Relevant Orders    Microalbumin/creatinine urine ratio    Lipid panel    Hemoglobin A1c    Comprehensive metabolic panel    Ambulatory referral to Optometry    Diabetic gastroparesis associated with type 2 diabetes mellitus    Relevant Orders    Ambulatory Referral to Gastroenterology       GI    Screen for colon cancer    Relevant Orders    Case request GI: COLONOSCOPY (Completed)         Charley ROMAN RN

## 2020-08-01 NOTE — PHYSICAL THERAPY INITIAL EVALUATION ADULT - GAIT DEVIATIONS NOTED, PT EVAL
decreased stride length/decreased weight-shifting ability/decreased artur/decreased step length/stepping too close to front of assistive device

## 2020-08-01 NOTE — PHYSICAL THERAPY INITIAL EVALUATION ADULT - DIAGNOSIS, PT EVAL
Decreased functional status and mobility due to decrease strength, transfers, balance, endurance and ambulation/stair tolerance
decreased strength

## 2020-08-01 NOTE — PROGRESS NOTE ADULT - SUBJECTIVE AND OBJECTIVE BOX
PAT SHERMELY  [id number]      Chief Complaint:  CHFrEF, recent AAA repair/CHB now s/p CRT-D with His pacing      Interval History:  Patient feeling better.  Ambulated to chair and back without significant SOB.  Denies CP/dizziness/palps.    Tele:  SR, V-paced      acetaminophen   Tablet .. 650 milliGRAM(s) Oral every 6 hours PRN  ascorbic acid 500 milliGRAM(s) Oral daily  aspirin enteric coated 81 milliGRAM(s) Oral daily  bisacodyl 5 milliGRAM(s) Oral at bedtime  calcium gluconate IVPB 1 Gram(s) IV Intermittent daily  clopidogrel Tablet 75 milliGRAM(s) Oral daily  doxazosin 4 milliGRAM(s) Oral at bedtime  ferrous    sulfate 325 milliGRAM(s) Oral daily  finasteride 5 milliGRAM(s) Oral daily  furosemide   Injectable 40 milliGRAM(s) IV Push every 12 hours  metoprolol succinate ER 50 milliGRAM(s) Oral daily  pantoprazole    Tablet 40 milliGRAM(s) Oral before breakfast  senna 2 Tablet(s) Oral at bedtime  simvastatin 20 milliGRAM(s) Oral at bedtime          Physical Exam:  T(C): 36.6 (08-01-20 @ 08:29), Max: 37.2 (07-31-20 @ 19:48)  HR: 86 (08-01-20 @ 08:29) (65 - 93)  BP: 107/65 (08-01-20 @ 08:29) (84/47 - 130/60)  RR: 18 (08-01-20 @ 08:29) (13 - 34)  SpO2: 100% (08-01-20 @ 08:29) (94% - 100%)  Wt(kg): --  General: Comfortable in NAD  Neck: No JVD  CVS: nl s1s2, no s3  Pulm: Severely diminished at bases b/l with faint crackles  Abd: soft, non-tender  Ext: No c/c/e  Neuro A&O x3  Psych: Normal affect      Labs:   01 Aug 2020 05:12    136    |  100    |  37.0   ----------------------------<  100    3.4     |  27.0   |  1.50     Ca    7.5        01 Aug 2020 05:12  Phos  2.7       01 Aug 2020 05:12  Mg     2.4       01 Aug 2020 05:12    TPro  4.9    /  Alb  2.5    /  TBili  0.7    /  DBili  x      /  AST  16     /  ALT  19     /  AlkPhos  68     01 Aug 2020 05:12                          7.9    10.14 )-----------( 247      ( 01 Aug 2020 05:12 )             24.6       Lower extremity venous duplex (7/27/2020):  No evidence of deep venous thrombosis in either lower extremity.    Coronary angi[facility] (5/2018) at [facility]:  Occluded vein graft to diagonal and posterior LV branch  LIMA to LAD and vein graft to PDA patent  s/p PCI to OM1    Outpatient stress test (3/2020):  Small sized, severe intensity fixed defect of the basal inferior, basal-mid inferolateral wall consistent with infarction. Small to moderate sized partially reversible at the margins defect of the basal-mid anteroseptal wall consistent with infarction with mild richa-infarct ischemia, LVEF 47%    Outpatient TTE (12/2019):  LVEF 45%  Basal and mid inferior wall severe hypokinesis  Moderate to severe mitral regurgitation  Mild to moderate aortic regurgitation  Large mid to distal abdominal aortic aneurysm 5.9 cm     TTE (7/28/2020) images reviewed:   Moderately reduced LVEF ~35- 40%, abnormal septal motion likely due to conduction delay  Normal RV size and systolic function  Severely dilated left atrium   Mild AR, Moderate MR, Mild TR   No aortic stenosis   RVSP ~ 22 mmHg      Assessment:  88 year old man with past medical history of Coronary artery disease (s/p stent of OM1 in 2008, CABG x 4 in 1997; LIMA-LAD, SVG-PDA, SVG-posterior LV branch and diagonal), Inferior wall myocardial infarction, Abdominal aortic aneurysm, Hypertension, Peripheral arterial disease and reports of chest pain syndrome, who presents with recent endovascular triple AAA repair on 7/20/2020 and SMA & renal artery stenting at [facility] presents with fatigue, leg edema and leg pain, found to have decompensated heart failure.   -Improved with diuresis.  -Developed 9 second pause in hospital.  Now s/p CRT-D with his bundle pacing as failed implant of LV lead  -Post procedural hypotension and monitored in ICU overnight, levo weaned off. ?sedation related and from diuresis. Now stable and transferred to floor.    -BP still low at times but need to restart CHF meds slowly.      Plan:  1. Tele  2. Start Toprol XL 50mg daily today.  If BP stable restart ACEi tomorrow.  3. Continue other current CV meds at current doses.  4. No further CV testing now.  5. OOB/PT.  6. EP f/u.    D/w Dr. Metz and RN.

## 2020-08-02 ENCOUNTER — NON-APPOINTMENT (OUTPATIENT)
Age: 85
End: 2020-08-02

## 2020-08-02 LAB
ANION GAP SERPL CALC-SCNC: 12 MMOL/L — SIGNIFICANT CHANGE UP (ref 5–17)
BLD GP AB SCN SERPL QL: SIGNIFICANT CHANGE UP
BUN SERPL-MCNC: 36 MG/DL — HIGH (ref 8–20)
CALCIUM SERPL-MCNC: 7.9 MG/DL — LOW (ref 8.6–10.2)
CHLORIDE SERPL-SCNC: 98 MMOL/L — SIGNIFICANT CHANGE UP (ref 98–107)
CO2 SERPL-SCNC: 27 MMOL/L — SIGNIFICANT CHANGE UP (ref 22–29)
CREAT SERPL-MCNC: 1.55 MG/DL — HIGH (ref 0.5–1.3)
GLUCOSE SERPL-MCNC: 109 MG/DL — HIGH (ref 70–99)
HCT VFR BLD CALC: 24.1 % — LOW (ref 39–50)
HCT VFR BLD CALC: 27.4 % — LOW (ref 39–50)
HGB BLD-MCNC: 7.7 G/DL — LOW (ref 13–17)
HGB BLD-MCNC: 9 G/DL — LOW (ref 13–17)
MAGNESIUM SERPL-MCNC: 2.3 MG/DL — SIGNIFICANT CHANGE UP (ref 1.6–2.6)
MCHC RBC-ENTMCNC: 30.4 PG — SIGNIFICANT CHANGE UP (ref 27–34)
MCHC RBC-ENTMCNC: 32 GM/DL — SIGNIFICANT CHANGE UP (ref 32–36)
MCV RBC AUTO: 95.3 FL — SIGNIFICANT CHANGE UP (ref 80–100)
PLATELET # BLD AUTO: 257 K/UL — SIGNIFICANT CHANGE UP (ref 150–400)
POTASSIUM SERPL-MCNC: 4.1 MMOL/L — SIGNIFICANT CHANGE UP (ref 3.5–5.3)
POTASSIUM SERPL-SCNC: 4.1 MMOL/L — SIGNIFICANT CHANGE UP (ref 3.5–5.3)
RBC # BLD: 2.53 M/UL — LOW (ref 4.2–5.8)
RBC # FLD: 14.1 % — SIGNIFICANT CHANGE UP (ref 10.3–14.5)
SODIUM SERPL-SCNC: 137 MMOL/L — SIGNIFICANT CHANGE UP (ref 135–145)
WBC # BLD: 8.97 K/UL — SIGNIFICANT CHANGE UP (ref 3.8–10.5)
WBC # FLD AUTO: 8.97 K/UL — SIGNIFICANT CHANGE UP (ref 3.8–10.5)

## 2020-08-02 PROCEDURE — 99233 SBSQ HOSP IP/OBS HIGH 50: CPT

## 2020-08-02 PROCEDURE — 99232 SBSQ HOSP IP/OBS MODERATE 35: CPT

## 2020-08-02 RX ORDER — LISINOPRIL 2.5 MG/1
5 TABLET ORAL DAILY
Refills: 0 | Status: DISCONTINUED | OUTPATIENT
Start: 2020-08-02 | End: 2020-08-05

## 2020-08-02 RX ORDER — DIAZEPAM 5 MG
5 TABLET ORAL ONCE
Refills: 0 | Status: DISCONTINUED | OUTPATIENT
Start: 2020-08-02 | End: 2020-08-02

## 2020-08-02 RX ADMIN — Medication 5 MILLIGRAM(S): at 23:08

## 2020-08-02 RX ADMIN — SENNA PLUS 2 TABLET(S): 8.6 TABLET ORAL at 21:08

## 2020-08-02 RX ADMIN — Medication 81 MILLIGRAM(S): at 12:59

## 2020-08-02 RX ADMIN — CLOPIDOGREL BISULFATE 75 MILLIGRAM(S): 75 TABLET, FILM COATED ORAL at 13:00

## 2020-08-02 RX ADMIN — Medication 40 MILLIGRAM(S): at 05:16

## 2020-08-02 RX ADMIN — Medication 40 MILLIGRAM(S): at 15:41

## 2020-08-02 RX ADMIN — Medication 4 MILLIGRAM(S): at 21:09

## 2020-08-02 RX ADMIN — Medication 500 MILLIGRAM(S): at 13:00

## 2020-08-02 RX ADMIN — Medication 325 MILLIGRAM(S): at 13:00

## 2020-08-02 RX ADMIN — FINASTERIDE 5 MILLIGRAM(S): 5 TABLET, FILM COATED ORAL at 12:59

## 2020-08-02 RX ADMIN — Medication 5 MILLIGRAM(S): at 21:09

## 2020-08-02 RX ADMIN — Medication 50 MILLIGRAM(S): at 05:16

## 2020-08-02 RX ADMIN — LISINOPRIL 5 MILLIGRAM(S): 2.5 TABLET ORAL at 17:02

## 2020-08-02 RX ADMIN — PANTOPRAZOLE SODIUM 40 MILLIGRAM(S): 20 TABLET, DELAYED RELEASE ORAL at 05:16

## 2020-08-02 RX ADMIN — SIMVASTATIN 20 MILLIGRAM(S): 20 TABLET, FILM COATED ORAL at 21:08

## 2020-08-02 NOTE — PROGRESS NOTE ADULT - SUBJECTIVE AND OBJECTIVE BOX
Patient is a 88y old  Male who presents with a chief complaint of cardiogenic shock (31 Jul 2020 12:48)    Patient seen and examined at bedside. No acute distress and no acute overnight events. States that he is feeling well. He did not have a BM yet. He was able to eat 50% of each of his meals and feels that he is ready to go home.     Tele:  - no further issues    Vital Signs Last 24 Hrs  T(C): 36.8 (02 Aug 2020 10:28), Max: 36.8 (01 Aug 2020 21:09)  T(F): 98.3 (02 Aug 2020 10:28), Max: 98.3 (01 Aug 2020 21:09)  HR: 77 (02 Aug 2020 10:28) (77 - 91)  BP: 116/64 (02 Aug 2020 10:28) (107/65 - 123/73)  RR: 18 (02 Aug 2020 10:28) (18 - 19)  SpO2: 96% (02 Aug 2020 10:28) (96% - 100%)  PHYSICAL EXAM:  GENERAL: NAD, elderly male aaox3. pallor noted  HEENT: EOMI. PERRLA. Dry MM   Chest: CTA bilaterally, no r/r/w noted  CV: S1S2, RRR, no edema  GI: soft, +BS, NT/ND. Suprapubic region with ecchymosis noted, non tender and has not worsened   : condom cath in place   Ext: no c/c. RLL chronic venous stasis change. LLE mild edema 1+ noted pitting   Neuro: AAO x3  Skin: warm and dry    LABS:                        7.7    8.97  )-----------( 257      ( 02 Aug 2020 06:18 )             24.1   08-02    137  |  98  |  36.0<H>  ----------------------------<  109<H>  4.1   |  27.0  |  1.55<H>    Ca    7.9<L>      02 Aug 2020 06:18  Phos  2.7     08-01  Mg     2.3     08-02    TPro  4.9<L>  /  Alb  2.5<L>  /  TBili  0.7  /  DBili  x   /  AST  16  /  ALT  19  /  AlkPhos  68  08-01    MEDICATIONS  (STANDING):  ascorbic acid 500 milliGRAM(s) Oral daily  aspirin enteric coated 81 milliGRAM(s) Oral daily  bisacodyl 5 milliGRAM(s) Oral at bedtime  clopidogrel Tablet 75 milliGRAM(s) Oral daily  doxazosin 4 milliGRAM(s) Oral at bedtime  ferrous    sulfate 325 milliGRAM(s) Oral daily  finasteride 5 milliGRAM(s) Oral daily  furosemide   Injectable 40 milliGRAM(s) IV Push every 12 hours  lisinopril 5 milliGRAM(s) Oral daily  metoprolol succinate ER 50 milliGRAM(s) Oral daily  pantoprazole    Tablet 40 milliGRAM(s) Oral before breakfast  senna 2 Tablet(s) Oral at bedtime  simvastatin 20 milliGRAM(s) Oral at bedtime    MEDICATIONS  (PRN):  acetaminophen   Tablet .. 650 milliGRAM(s) Oral every 6 hours PRN Temp greater or equal to 38C (100.4F), Mild Pain (1 - 3)

## 2020-08-02 NOTE — PROGRESS NOTE ADULT - SUBJECTIVE AND OBJECTIVE BOX
PAT HOFFMANN  [id number]      HTN (hypertension)  CHF (congestive heart failure)  Complete heart block  Acute on chronic systolic congestive heart failure  S/P AAA repair  CAD (coronary artery disease)  S/P CABG (coronary artery bypass graft)  SOB  90+  Anemia  NSTEMI (non-ST elevated myocardial infarction)  k        Chief Complaint:  CHFrEF, recent AAA repair/CHB now s/p CRT-D with His pacing      Interval History:  Patient feeling better.  Ambulated to chair and back without significant SOB.  Denies CP/dizziness/palps.    Tele:  SR, V-paced            acetaminophen   Tablet .. 650 milliGRAM(s) Oral every 6 hours PRN  ascorbic acid 500 milliGRAM(s) Oral daily  aspirin enteric coated 81 milliGRAM(s) Oral daily  bisacodyl 5 milliGRAM(s) Oral at bedtime  clopidogrel Tablet 75 milliGRAM(s) Oral daily  doxazosin 4 milliGRAM(s) Oral at bedtime  ferrous    sulfate 325 milliGRAM(s) Oral daily  finasteride 5 milliGRAM(s) Oral daily  furosemide   Injectable 40 milliGRAM(s) IV Push every 12 hours  metoprolol succinate ER 50 milliGRAM(s) Oral daily  pantoprazole    Tablet 40 milliGRAM(s) Oral before breakfast  senna 2 Tablet(s) Oral at bedtime  simvastatin 20 milliGRAM(s) Oral at bedtime          Physical Exam:  T(C): 36.8 (08-02-20 @ 10:28), Max: 36.8 (08-01-20 @ 21:09)  HR: 77 (08-02-20 @ 10:28) (77 - 91)  BP: 116/64 (08-02-20 @ 10:28) (107/65 - 123/73)  RR: 18 (08-02-20 @ 10:28) (18 - 19)  SpO2: 96% (08-02-20 @ 10:28) (96% - 100%)  Wt(kg): --  General: Comfortable in NAD  Neck: No JVD  CVS: nl s1s2, no s3  Pulm: Severely diminished at bases b/l with faint crackles  Abd: soft, non-tender  Ext: No c/c/e  Neuro A&O x3  Psych: Normal affect      I&O's Summary    01 Aug 2020 07:01  -  02 Aug 2020 07:00  --------------------------------------------------------  IN: 240 mL / OUT: 1600 mL / NET: -1360 mL        Coronary angiogram (5/2018) at [facility]:  Occluded vein graft to diagonal and posterior LV branch  LIMA to LAD and vein graft to PDA patent  s/p PCI to OM1    Outpatient stress test (3/2020):  Small sized, severe intensity fixed defect of the basal inferior, basal-mid inferolateral wall consistent with infarction. Small to moderate sized partially reversible at the margins defect of the basal-mid anteroseptal wall consistent with infarction with mild richa-infarct ischemia, LVEF 47%    Outpatient TTE (12/2019):  LVEF 45%  Basal and mid inferior wall severe hypokinesis  Moderate to severe mitral regurgitation  Mild to moderate aortic regurgitation  Large mid to distal abdominal aortic aneurysm 5.9 cm     TTE (7/28/2020) images reviewed:   Moderately reduced LVEF ~35- 40%, abnormal septal motion likely due to conduction delay  Normal RV size and systolic function  Severely dilated left atrium   Mild AR, Moderate MR, Mild TR   No aortic stenosis   RVSP ~ 22 mmHg      Labs:   02 Aug 2020 06:18    137    |  98     |  36.0   ----------------------------<  109    4.1     |  27.0   |  1.55     Ca    7.9        02 Aug 2020 06:18  Phos  2.7       01 Aug 2020 05:12  Mg     2.3       02 Aug 2020 06:18    TPro  4.9    /  Alb  2.5    /  TBili  0.7    /  DBili  x      /  AST  16     /  ALT  19     /  AlkPhos  68     01 Aug 2020 05:12                          7.7    8.97  )-----------( 257      ( 02 Aug 2020 06:18 )             24.1             Radiology:  New radiology reviewed      Assessment:  88 year old man with past medical history of Coronary artery disease (s/p stent of OM1 in 2008, CABG x 4 in 1997; LIMA-LAD, SVG-PDA, SVG-posterior LV branch and diagonal), Inferior wall myocardial infarction, Abdominal aortic aneurysm, Hypertension, Peripheral arterial disease and reports of chest pain syndrome, who presents with recent endovascular triple AAA repair on 7/20/2020 and SMA & renal artery stenting at [facility] presents with fatigue, leg edema and leg pain, found to have decompensated heart failure.   -Improved with diuresis.  -Developed 9 second pause in hospital.  Now s/p CRT-D with his bundle pacing as failed implant of LV lead  -Post procedural hypotension; was in ICU overnight, levo weaned off. ?sedation related and from diuresis. Now stable and transferred to floor.    -BP still low at times but need to restart CHF meds slowly.      Plan:  1. Tele  2.Maintain  Toprol XL 50mg daily today.  If BP stable restart ACEi  3. Continue other current CV meds at current doses.  4. No further CV testing now.  5. OOB/PT.  6. EP f/u. PAT HOFFMANN  [id number]      HTN (hypertension)  CHF (congestive heart failure)  Complete heart block  Acute on chronic systolic congestive heart failure  S/P AAA repair  CAD (coronary artery disease)  S/P CABG (coronary artery bypass graft)  SOB  90+  Anemia  NSTEMI (non-ST elevated myocardial infarction)  k        Chief Complaint:  CHFrEF, recent AAA repair/CHB now s/p CRT-D with His pacing      Interval History:  Patient feeling better.  Ambulated to chair and back without significant SOB.  Denies CP/dizziness/palps.    Tele:  SR, V-paced            acetaminophen   Tablet .. 650 milliGRAM(s) Oral every 6 hours PRN  ascorbic acid 500 milliGRAM(s) Oral daily  aspirin enteric coated 81 milliGRAM(s) Oral daily  bisacodyl 5 milliGRAM(s) Oral at bedtime  clopidogrel Tablet 75 milliGRAM(s) Oral daily  doxazosin 4 milliGRAM(s) Oral at bedtime  ferrous    sulfate 325 milliGRAM(s) Oral daily  finasteride 5 milliGRAM(s) Oral daily  furosemide   Injectable 40 milliGRAM(s) IV Push every 12 hours  metoprolol succinate ER 50 milliGRAM(s) Oral daily  pantoprazole    Tablet 40 milliGRAM(s) Oral before breakfast  senna 2 Tablet(s) Oral at bedtime  simvastatin 20 milliGRAM(s) Oral at bedtime          Physical Exam:  T(C): 36.8 (08-02-20 @ 10:28), Max: 36.8 (08-01-20 @ 21:09)  HR: 77 (08-02-20 @ 10:28) (77 - 91)  BP: 116/64 (08-02-20 @ 10:28) (107/65 - 123/73)  RR: 18 (08-02-20 @ 10:28) (18 - 19)  SpO2: 96% (08-02-20 @ 10:28) (96% - 100%)  Wt(kg): --  General: Comfortable in NAD  Neck: No JVD  CVS: nl s1s2, no s3  Pulm: Severely diminished at bases b/l with faint crackles  Abd: soft, non-tender  Ext: No c/c/ 1+ brawney pretibial edema  Neuro A&O x3  Psych: Normal affect      I&O's Summary    01 Aug 2020 07:01  -  02 Aug 2020 07:00  --------------------------------------------------------  IN: 240 mL / OUT: 1600 mL / NET: -1360 mL        Coronary angiogram (5/2018) at [facility]:  Occluded vein graft to diagonal and posterior LV branch  LIMA to LAD and vein graft to PDA patent  s/p PCI to OM1    Outpatient stress test (3/2020):  Small sized, severe intensity fixed defect of the basal inferior, basal-mid inferolateral wall consistent with infarction. Small to moderate sized partially reversible at the margins defect of the basal-mid anteroseptal wall consistent with infarction with mild richa-infarct ischemia, LVEF 47%    Outpatient TTE (12/2019):  LVEF 45%  Basal and mid inferior wall severe hypokinesis  Moderate to severe mitral regurgitation  Mild to moderate aortic regurgitation  Large mid to distal abdominal aortic aneurysm 5.9 cm     TTE (7/28/2020) images reviewed:   Moderately reduced LVEF ~35- 40%, abnormal septal motion likely due to conduction delay  Normal RV size and systolic function  Severely dilated left atrium   Mild AR, Moderate MR, Mild TR   No aortic stenosis   RVSP ~ 22 mmHg      Labs:   02 Aug 2020 06:18    137    |  98     |  36.0   ----------------------------<  109    4.1     |  27.0   |  1.55     Ca    7.9        02 Aug 2020 06:18  Phos  2.7       01 Aug 2020 05:12  Mg     2.3       02 Aug 2020 06:18    TPro  4.9    /  Alb  2.5    /  TBili  0.7    /  DBili  x      /  AST  16     /  ALT  19     /  AlkPhos  68     01 Aug 2020 05:12                          7.7    8.97  )-----------( 257      ( 02 Aug 2020 06:18 )             24.1         Assessment:  88 year old man with past medical history of Coronary artery disease (s/p stent of OM1 in 2008, CABG x 4 in 1997; LIMA-LAD, SVG-PDA, SVG-posterior LV branch and diagonal), Inferior wall myocardial infarction, Abdominal aortic aneurysm, Hypertension, Peripheral arterial disease and reports of chest pain syndrome, who presents with recent endovascular triple AAA repair on 7/20/2020 and SMA & renal artery stenting at [facility] presents with fatigue, leg edema and leg pain, found to have decompensated heart failure.   -Improved with diuresis.  - Small retroperitoneal hemorrhage with sig but stable anemia  -Developed 9 second pause in hospital.  Now s/p CRT-D with his bundle pacing as failed implant of LV lead  -Post procedural hypotension; was in ICU overnight, levo weaned off. ?sedation related and from diuresis. Now stable and transferred to floor.    -BP still low at times but tolerating gradual resumption of meds      Plan:  1. Tele  2.Maintain  Toprol XL 50mg daily today.    3. Restart  low dose ACEi, monitor Creat, BP and Continue other current CV meds at current doses.      May need Midodrine  4. No further CV testing now.  5. OOB/PT.

## 2020-08-02 NOTE — PROGRESS NOTE ADULT - ASSESSMENT
Patient is an 87 y/o male with a pmhx of Coronary artery disease (s/p stent of OM1 in 2008, CABG x 4 in 1997; LIMA-LAD, SVG-PDA, SVG-posterior LV branch and diagonal), Inferior wall myocardial infarction, Abdominal aortic aneurysm, Hypertension, Peripheral arterial disease and reports of chest pain syndrome, who presents with recent endovascular triple AAA repair on 7/20/2020 and SMA & renal artery stenting at [facility] who presents with fatigue, leg edema/leg pain and found ot have decompensated heart failure. While here, patient was found ot have CHB with a 9 sec pause and a PPM was placed. He was downgraded on 7/31 from the ICU. He was also noted to have leucocytosis to 19k with no fever but distended bladder and Cr of 1.44. Post PPM placement, he was noted to have hypotension and was monitored in the ICU overnight with levo weaned off and diuresis continued. On 8/2, noted to have a downtrending Hgb with no enlarged bruising noted and no complaints of abdominal pain from patient     Downtrending Hgb with baseline Normocytic anemia with overall iron panel concerning for aocd   -c/w ferrous sulfate daily, vitamin c for absorption   -type and screen, 1 U prbc as patient is a cardiac patient, repeat hgb 10pm and then again in am   -monitor Hgb     CHB s/p PPM in light of decompensated systolic heart failure (HFrEF) - EF 25%  h/o chronic HFrEF  -PPM site care, seen by cardio and EP, c/w tele   -c/w metoprolol, added low dose acei   -c/w lasix 40mg ivp bid (concern for fatigue also being due to decomp HR) x 1 more day  -monitor i/o and daily weights     Leucocytosis on admission   with distended bladder   -trended down on its own, monitor i/o, condom cath in place, monitor   -encourage ambulation   -if spikes, panculture     Recent AAA repair - ecchymosis noted with left stitch to LLQ   -monitor hgb post PRBC transfusion     Elevated Cr - prior Cr 1.34 noted in the system with a GFR of 47 ?CKD stage 3b now   with hypokalemia and borderline hypocalcemia (corrected to 8.7)   -monitor Cr given fluid overload on admission   -c/w lasix and monitor cr   -IV calcium given 8/1     h/o cad - c/w aspirin, plavix and statin     ? BPH - cardura and proscar on board     constipation - c/w senna, dulcolax and monitor. 1 dose dulcolax suppository today     DVT PPx - c/w venodynes     GI PPX - not on any GI ppx - given recent hospital stays, being on asa and plavix and surgery, added PPI x 30 days    Dispo - d/c planning in the next 1-2 days. Lives in an adult living facility with his wife. Walks with a cane at times - PT eval done on 7/28 - home with PT vs otoniel.  Asked me to call his wife with an update (Yaritza [phone])- # not picked up and no room to leave a message today. Called son Huan again today to give an update and same as above, not able to leave a message today     Full code at this time per facility papers. Tried to call Xavier Collazo from papers in alpha [phone] - left message

## 2020-08-03 LAB
ALBUMIN SERPL ELPH-MCNC: 3.2 G/DL — LOW (ref 3.3–5.2)
ANION GAP SERPL CALC-SCNC: 12 MMOL/L — SIGNIFICANT CHANGE UP (ref 5–17)
BUN SERPL-MCNC: 41 MG/DL — HIGH (ref 8–20)
CALCIUM SERPL-MCNC: 8.3 MG/DL — LOW (ref 8.6–10.2)
CHLORIDE SERPL-SCNC: 97 MMOL/L — LOW (ref 98–107)
CO2 SERPL-SCNC: 28 MMOL/L — SIGNIFICANT CHANGE UP (ref 22–29)
CREAT SERPL-MCNC: 1.66 MG/DL — HIGH (ref 0.5–1.3)
GLUCOSE SERPL-MCNC: 104 MG/DL — HIGH (ref 70–99)
HCT VFR BLD CALC: 28.4 % — LOW (ref 39–50)
HGB BLD-MCNC: 9.1 G/DL — LOW (ref 13–17)
MAGNESIUM SERPL-MCNC: 2.4 MG/DL — SIGNIFICANT CHANGE UP (ref 1.6–2.6)
MCHC RBC-ENTMCNC: 30.8 PG — SIGNIFICANT CHANGE UP (ref 27–34)
MCHC RBC-ENTMCNC: 32 GM/DL — SIGNIFICANT CHANGE UP (ref 32–36)
MCV RBC AUTO: 96.3 FL — SIGNIFICANT CHANGE UP (ref 80–100)
PHOSPHATE SERPL-MCNC: 2.8 MG/DL — SIGNIFICANT CHANGE UP (ref 2.4–4.7)
PLATELET # BLD AUTO: 282 K/UL — SIGNIFICANT CHANGE UP (ref 150–400)
POTASSIUM SERPL-MCNC: 4 MMOL/L — SIGNIFICANT CHANGE UP (ref 3.5–5.3)
POTASSIUM SERPL-SCNC: 4 MMOL/L — SIGNIFICANT CHANGE UP (ref 3.5–5.3)
RBC # BLD: 2.95 M/UL — LOW (ref 4.2–5.8)
RBC # FLD: 14.2 % — SIGNIFICANT CHANGE UP (ref 10.3–14.5)
SODIUM SERPL-SCNC: 137 MMOL/L — SIGNIFICANT CHANGE UP (ref 135–145)
WBC # BLD: 8.92 K/UL — SIGNIFICANT CHANGE UP (ref 3.8–10.5)
WBC # FLD AUTO: 8.92 K/UL — SIGNIFICANT CHANGE UP (ref 3.8–10.5)

## 2020-08-03 PROCEDURE — 71046 X-RAY EXAM CHEST 2 VIEWS: CPT | Mod: 26

## 2020-08-03 PROCEDURE — 99232 SBSQ HOSP IP/OBS MODERATE 35: CPT

## 2020-08-03 PROCEDURE — 99233 SBSQ HOSP IP/OBS HIGH 50: CPT

## 2020-08-03 RX ORDER — DIAZEPAM 5 MG
5 TABLET ORAL ONCE
Refills: 0 | Status: DISCONTINUED | OUTPATIENT
Start: 2020-08-03 | End: 2020-08-04

## 2020-08-03 RX ADMIN — Medication 500 MILLIGRAM(S): at 11:52

## 2020-08-03 RX ADMIN — FINASTERIDE 5 MILLIGRAM(S): 5 TABLET, FILM COATED ORAL at 11:53

## 2020-08-03 RX ADMIN — Medication 40 MILLIGRAM(S): at 05:22

## 2020-08-03 RX ADMIN — SIMVASTATIN 20 MILLIGRAM(S): 20 TABLET, FILM COATED ORAL at 20:57

## 2020-08-03 RX ADMIN — LISINOPRIL 5 MILLIGRAM(S): 2.5 TABLET ORAL at 05:23

## 2020-08-03 RX ADMIN — Medication 40 MILLIGRAM(S): at 18:26

## 2020-08-03 RX ADMIN — PANTOPRAZOLE SODIUM 40 MILLIGRAM(S): 20 TABLET, DELAYED RELEASE ORAL at 05:23

## 2020-08-03 RX ADMIN — Medication 325 MILLIGRAM(S): at 11:53

## 2020-08-03 RX ADMIN — CLOPIDOGREL BISULFATE 75 MILLIGRAM(S): 75 TABLET, FILM COATED ORAL at 11:52

## 2020-08-03 RX ADMIN — Medication 81 MILLIGRAM(S): at 11:52

## 2020-08-03 RX ADMIN — Medication 50 MILLIGRAM(S): at 05:23

## 2020-08-03 RX ADMIN — Medication 4 MILLIGRAM(S): at 20:57

## 2020-08-03 NOTE — PROGRESS NOTE ADULT - ASSESSMENT
Assessment:  Mr. Collazo is an 88 year old man with past medical history of Coronary artery disease (s/p stent of OM1 in 2008, CABG x 4 in 1997; LIMA-LAD, SVG-PDA, SVG-posterior LV branch and diagonal), Inferior wall myocardial infarction, Abdominal aortic aneurysm, Hypertension, Peripheral arterial disease and reports of chest pain syndrome, who presents with recent endovascular triple AAA repair on 7/20/2020 and [facility]MA & [facility] artery stenting [facility] presents with fatigue, leg edema and leg pain, found to have decompensated heart failure. Hospital course also with complete heart block, now s/p CRT-D with HIS bundle pacing.     Recommendations:  [] Decompensated heart failure: BP normotensive. Continue Lasix 40 mg IVP BID today, will check Pro BNP tomorrow, likely transition to Lasix 40 mg PO daily on Tuesday. Echo with LVEF ~ 35% which is reduction from outpatient TTE. Continue guideline directed medical therapy: metoprolol succinate 50 mg daily, lisinopril 5 mg daily.   [] Elevated troponin, history of CAD: Patient had recent nuclear stress test with mostly fixed inferior wall defect and mild ircha-infarct ischemia in LAD territory. Troponin peaked at 1.5. Continue home: Simvastatin 20 mg daily, Metoprolol succinate 50 mg daily. Can resume home Imdur. Continue Aspirin 81 mg daily.   [] Abdominal aortic aneurysm repair: CT abdomen with small amount of retroperitoneal hemorrhage, discussed report with his Health system surgeon Dr. Amanuel Hess and he recommended to at least continue plavix given recent stenting.  Continue aspirin and plavix.   [] Recommend physical therapy evaluation, likely transition to PO diuretics tomorrow.   [] Recommend EP follow up     Thank you for the consult. We will follow along.     Evi Delgado MD  Cardiology

## 2020-08-03 NOTE — PROGRESS NOTE ADULT - ASSESSMENT
Patient is an 89 y/o male with a pmhx of Coronary artery disease (s/p stent of OM1 in 2008, CABG x 4 in 1997; LIMA-LAD, SVG-PDA, SVG-posterior LV branch and diagonal), Inferior wall myocardial infarction, Abdominal aortic aneurysm, Hypertension, Peripheral arterial disease and reports of chest pain syndrome, who presents with recent endovascular triple AAA repair on 7/20/2020 and SMA & renal artery stenting at [facility] who presents with fatigue, leg edema/leg pain and found ot have decompensated heart failure. While here, patient was found ot have CHB with a 9 sec pause and a PPM was placed. He was downgraded on 7/31 from the ICU. He was also noted to have leucocytosis to 19k with no fever but distended bladder and Cr of 1.44. Post PPM placement, he was noted to have hypotension and was monitored in the ICU overnight with levo weaned off and diuresis continued. On 8/2, noted to have a downtrending Hgb with no enlarged bruising noted and no complaints of abdominal pain from patient     > low h/h / likely anemia of chronic disease /  Normocytic anemia   -c/w ferrous sulfate daily, vitamin c for absorption   -type and screen, 1 U prbc as patient is a cardiac patient, repeat hgb 10pm and then again in am   -monitor Hgb     >CHB s/p PPM in light of acute on chronic decompensated systolic heart failure (HFrEF) - EF 25%  -h/o chronic HFrEF  -PPM site care, seen by cardio and EP, c/w tele   -c/w metoprolol, added low dose acei   -c/w lasix 40mg ivp bid (concern for fatigue also being due to decomp HR) x 1 more day  -monitor i/o and daily weights     >Leucocytosis on admission / now resolved/ likely reactive   -with distended bladder   -trended down on its own, monitor i/o, condom cath in place, monitor   -encourage ambulation   -if spikes, panculture     >Recent AAA repair - ecchymosis noted with left stitch to LLQ   -monitor hgb post PRBC transfusion     >Elevated Cr - prior Cr 1.34 noted in the system with a GFR of 47 ?CKD stage 3b now   with hypokalemia and borderline hypocalcemia (corrected to 8.7)   -monitor Cr given fluid overload on admission   -c/w lasix and monitor cr   -IV calcium given 8/1     >h/o cad   - c/w aspirin, plavix and statin     > BPH   - cardura and proscar on board     >constipation - c/w senna, dulcolax and monitor. 1 dose dulcolax suppository today     >DVT PPx - c/w venodynes     >GI PPX   -c/w ppi

## 2020-08-03 NOTE — PROGRESS NOTE ADULT - SUBJECTIVE AND OBJECTIVE BOX
cc: bradycardia , chf       interval hx: patient seen and eval. patient comfortable , in no acute distress. no fever or chills. denies any dizziness or palpitations. denies any sob     Vital Signs Last 24 Hrs  T(C): 36.6 (02 Aug 2020 20:45), Max: 36.6 (02 Aug 2020 20:45)  T(F): 97.8 (02 Aug 2020 20:45), Max: 97.8 (02 Aug 2020 20:45)  HR: 67 (03 Aug 2020 05:20) (60 - 70)  BP: 112/67 (03 Aug 2020 05:20) (112/67 - 134/67)  BP(mean): --  RR: 18 (02 Aug 2020 20:45) (18 - 18)  SpO2: 99% (02 Aug 2020 23:12) (99% - 99%)    PHYSICAL EXAM:  GENERAL: NAD, elderly male aaox3. pallor noted  HEENT: EOMI. PERRLA. Dry MM   Chest: CTA bilaterally, no r/r/w noted  CV: S1S2, RRR, no edema  GI: soft, +BS, NT/ND. Suprapubic region with ecchymosis noted, non tender and has not worsened    Ext: no c/c. RLL chronic venous stasis change. LLE mild edema 1+ noted pitting   Neuro: AAO x3  Skin: warm and dry                            9.1    8.92  )-----------( 282      ( 03 Aug 2020 07:26 )             28.4   08-03    137  |  97<L>  |  41.0<H>  ----------------------------<  104<H>  4.0   |  28.0  |  1.66<H>    Ca    8.3<L>      03 Aug 2020 07:17  Phos  2.8     08-03  Mg     2.4     08-03    TPro  x   /  Alb  3.2<L>  /  TBili  x   /  DBili  x   /  AST  x   /  ALT  x   /  AlkPhos  x   08-03

## 2020-08-03 NOTE — CHART NOTE - NSCHARTNOTEFT_GEN_A_CORE
Source: Patient [ ]  Family [ ]   other [x ] pt is currently off the floor    Current Diet: Diet, DASH/TLC:   Sodium & Cholesterol Restricted (07-31-20 @ 09:18)    PO intake:  Pt with fair po intake at meals per RN flowsheets.    Current Weight:   (8/3) 159 lbs  (7/30) 165 lbs  (7/28) 177 lbs    % Weight Change - wt trending down if accurate, will continue to monitor.  Pt with 2+ mild edema left arm/right leg and 3+ moderate edema left leg.    Pertinent Medications: MEDICATIONS  (STANDING):  ascorbic acid 500 milliGRAM(s) Oral daily  aspirin enteric coated 81 milliGRAM(s) Oral daily  bisacodyl 5 milliGRAM(s) Oral at bedtime  clopidogrel Tablet 75 milliGRAM(s) Oral daily  doxazosin 4 milliGRAM(s) Oral at bedtime  ferrous    sulfate 325 milliGRAM(s) Oral daily  finasteride 5 milliGRAM(s) Oral daily  furosemide   Injectable 40 milliGRAM(s) IV Push every 12 hours  lisinopril 5 milliGRAM(s) Oral daily  metoprolol succinate ER 50 milliGRAM(s) Oral daily  pantoprazole    Tablet 40 milliGRAM(s) Oral before breakfast  senna 2 Tablet(s) Oral at bedtime  simvastatin 20 milliGRAM(s) Oral at bedtime    MEDICATIONS  (PRN):  acetaminophen   Tablet .. 650 milliGRAM(s) Oral every 6 hours PRN Temp greater or equal to 38C (100.4F), Mild Pain (1 - 3)    Pertinent Labs: CBC Full  -  ( 03 Aug 2020 07:26 )  WBC Count : 8.92 K/uL  RBC Count : 2.95 M/uL  Hemoglobin : 9.1 g/dL  Hematocrit : 28.4 %  Platelet Count - Automated : 282 K/uL  Mean Cell Volume : 96.3 fl  Mean Cell Hemoglobin : 30.8 pg  Mean Cell Hemoglobin Concentration : 32.0 gm/dL  08-03 Nan/a   Glu n/a   K+ n/a   Cr  n/a   BUN n/a   Phos n/a   Alb 3.2 g/dL<L> PAB n/a       Skin: stage 2 coccyx    Nutrition focused physical exam previously conducted - found signs of malnutrition [ ]absent [x ]present    Subcutaneous fat loss: [x ] Orbital fat pads region, [x ]Buccal fat region, [ ]Triceps region,  [ ]Ribs region    Muscle wasting: [x ]Temples region, [x ]Clavicle region, [x ]Shoulder region, [ ]Scapula region, [ ]Interosseous region,  [ ]thigh region, [ ]Calf region    Current Nutrition Diagnosis: Pt remains at nutrition risk secondary to severe protein calorie malnutrition (acute on chronic) related to inadequate protein energy intake with decreased appetite in setting of recent AAA repair, CHF, CHB and skin breakdown as evidenced by pt meeting <50% estimated energy intake >5 days and severe muscle wasting/fat loss.  Pt continues with fair po intake at meals per RN flowsheets.    Recommendations:   1. RX: Ensure TID   2. RX: MVI daily   3. Continue with Vit C and feso4 daily  4. Monitor daily wts     Monitoring and Evaluation:   [x ] PO intake [x ] Tolerance to diet prescription [X] Weights  [X] Follow up per protocol [X] Labs:

## 2020-08-03 NOTE — PROGRESS NOTE ADULT - SUBJECTIVE AND OBJECTIVE BOX
PAT HOFFMANN  [id number]      Chief Complaint: CHF/History of AAA repair/PAD/ Complete heart block s/p CRT-D with HIS bundle pacing      Interval History: The patient reports feeling ok, has not been ambulating much. Denies any worsening in breathing.       Tele: atrial and ventricular paced rhythm       Current meds:   acetaminophen   Tablet .. 650 milliGRAM(s) Oral every 6 hours PRN  ascorbic acid 500 milliGRAM(s) Oral daily  aspirin enteric coated 81 milliGRAM(s) Oral daily  bisacodyl 5 milliGRAM(s) Oral at bedtime  clopidogrel Tablet 75 milliGRAM(s) Oral daily  doxazosin 4 milliGRAM(s) Oral at bedtime  ferrous    sulfate 325 milliGRAM(s) Oral daily  finasteride 5 milliGRAM(s) Oral daily  furosemide   Injectable 40 milliGRAM(s) IV Push every 12 hours  lisinopril 5 milliGRAM(s) Oral daily  metoprolol succinate ER 50 milliGRAM(s) Oral daily  pantoprazole    Tablet 40 milliGRAM(s) Oral before breakfast  senna 2 Tablet(s) Oral at bedtime  simvastatin 20 milliGRAM(s) Oral at bedtime      Objective:     Vital Signs:   T(C): 36.6 (08-02-20 @ 20:45), Max: 36.6 (08-02-20 @ 20:45)  HR: 67 (08-03-20 @ 05:20) (60 - 70)  BP: 112/67 (08-03-20 @ 05:20) (108/67 - 134/67)  RR: 18 (08-02-20 @ 20:45) (18 - 18)  SpO2: 99% (08-02-20 @ 23:12) (99% - 99%)  Wt(kg): --    Physical Exam:   General: elderly man, no distress  HEENT: sclera anicteric   Neck: supple  CVS: JVP ~ 9 cm H20, RRR, s1, s2, no murmurs  Chest: unlabored respirations, anterior sounds mostly clear  Abdomen: non-distended  Extremities: minimal b/l lower extremity edema (improved)  Neuro: A&O x3  Psych: Normal affect        Labs:   03 Aug 2020 07:17    137    |  97     |  41.0   ----------------------------<  104    4.0     |  28.0   |  1.66     Ca    8.3        03 Aug 2020 07:17  Phos  2.8       03 Aug 2020 07:17  Mg     2.4       03 Aug 2020 07:17    TPro  x      /  Alb  3.2    /  TBili  x      /  DBili  x      /  AST  x      /  ALT  x      /  AlkPhos  x      03 Aug 2020 07:18                          9.1    8.92  )-----------( 282      ( 03 Aug 2020 07:26 )             28.4           ECG (7/27/2020): sinus rhythm, first degree AV block, PVC, LBBB (similar to prior ECG)    Lower extremity venous duplex (7/27/2020):  No evidence of deep venous thrombosis in either lower extremity.    CXR (7/27/2020):  Congestive heart failure with bilateral pulmonary edema. These findings are new since 1/28/2020..    Coronary angiogram (5/2018) at [facility]:  Occluded vein graft to diagonal and posterior LV branch  LIMA to LAD and vein graft to PDA patent  s/p PCI to OM1    Outpatient stress test (3/2020):  Small sized, severe intensity fixed defect of the basal inferior, basal-mid inferolateral wall consistent with infarction. Small to moderate sized partially reversible at the margins defect of the basal-mid anteroseptal wall consistent with infarction with mild richa-infarct ischemia, LVEF 47%    Outpatient TTE (12/2019):  LVEF 45%  Basal and mid inferior wall severe hypokinesis  Moderate to severe mitral regurgitation  Mild to moderate aortic regurgitation  Large mid to distal abdominal aortic aneurysm 5.9 cm     TTE (7/28/2020) images reviewed:   Moderately reduced LVEF ~35- 40%, abnormal septal motion likely due to conduction delay  Normal RV size and systolic function  Severely dilated left atrium   Mild AR, Moderate MR, Mild TR   No aortic stenosis   RVSP ~ 22 mmHg

## 2020-08-04 LAB
ANION GAP SERPL CALC-SCNC: 14 MMOL/L — SIGNIFICANT CHANGE UP (ref 5–17)
BUN SERPL-MCNC: 45 MG/DL — HIGH (ref 8–20)
CALCIUM SERPL-MCNC: 8.1 MG/DL — LOW (ref 8.6–10.2)
CHLORIDE SERPL-SCNC: 98 MMOL/L — SIGNIFICANT CHANGE UP (ref 98–107)
CO2 SERPL-SCNC: 26 MMOL/L — SIGNIFICANT CHANGE UP (ref 22–29)
CREAT SERPL-MCNC: 1.68 MG/DL — HIGH (ref 0.5–1.3)
GLUCOSE SERPL-MCNC: 100 MG/DL — HIGH (ref 70–99)
HCT VFR BLD CALC: 27.1 % — LOW (ref 39–50)
HGB BLD-MCNC: 8.7 G/DL — LOW (ref 13–17)
MAGNESIUM SERPL-MCNC: 2.2 MG/DL — SIGNIFICANT CHANGE UP (ref 1.8–2.6)
MCHC RBC-ENTMCNC: 30.7 PG — SIGNIFICANT CHANGE UP (ref 27–34)
MCHC RBC-ENTMCNC: 32.1 GM/DL — SIGNIFICANT CHANGE UP (ref 32–36)
MCV RBC AUTO: 95.8 FL — SIGNIFICANT CHANGE UP (ref 80–100)
NT-PROBNP SERPL-SCNC: HIGH PG/ML (ref 0–300)
PHOSPHATE SERPL-MCNC: 3.2 MG/DL — SIGNIFICANT CHANGE UP (ref 2.4–4.7)
PLATELET # BLD AUTO: 282 K/UL — SIGNIFICANT CHANGE UP (ref 150–400)
POTASSIUM SERPL-MCNC: 3.4 MMOL/L — LOW (ref 3.5–5.3)
POTASSIUM SERPL-SCNC: 3.4 MMOL/L — LOW (ref 3.5–5.3)
RBC # BLD: 2.83 M/UL — LOW (ref 4.2–5.8)
RBC # FLD: 13.9 % — SIGNIFICANT CHANGE UP (ref 10.3–14.5)
SODIUM SERPL-SCNC: 138 MMOL/L — SIGNIFICANT CHANGE UP (ref 135–145)
WBC # BLD: 9.38 K/UL — SIGNIFICANT CHANGE UP (ref 3.8–10.5)
WBC # FLD AUTO: 9.38 K/UL — SIGNIFICANT CHANGE UP (ref 3.8–10.5)

## 2020-08-04 PROCEDURE — 99232 SBSQ HOSP IP/OBS MODERATE 35: CPT

## 2020-08-04 PROCEDURE — 99223 1ST HOSP IP/OBS HIGH 75: CPT

## 2020-08-04 PROCEDURE — 99497 ADVNCD CARE PLAN 30 MIN: CPT | Mod: 25

## 2020-08-04 RX ORDER — POLYETHYLENE GLYCOL 3350 17 G/17G
17 POWDER, FOR SOLUTION ORAL DAILY
Refills: 0 | Status: DISCONTINUED | OUTPATIENT
Start: 2020-08-04 | End: 2020-08-05

## 2020-08-04 RX ORDER — LANOLIN ALCOHOL/MO/W.PET/CERES
3 CREAM (GRAM) TOPICAL AT BEDTIME
Refills: 0 | Status: DISCONTINUED | OUTPATIENT
Start: 2020-08-04 | End: 2020-08-05

## 2020-08-04 RX ORDER — DIAZEPAM 5 MG
5 TABLET ORAL AT BEDTIME
Refills: 0 | Status: DISCONTINUED | OUTPATIENT
Start: 2020-08-04 | End: 2020-08-05

## 2020-08-04 RX ORDER — POTASSIUM CHLORIDE 20 MEQ
20 PACKET (EA) ORAL
Refills: 0 | Status: COMPLETED | OUTPATIENT
Start: 2020-08-04 | End: 2020-08-04

## 2020-08-04 RX ORDER — MINERAL OIL
133 OIL (ML) MISCELLANEOUS ONCE
Refills: 0 | Status: COMPLETED | OUTPATIENT
Start: 2020-08-04 | End: 2020-08-04

## 2020-08-04 RX ADMIN — Medication 81 MILLIGRAM(S): at 12:26

## 2020-08-04 RX ADMIN — Medication 4 MILLIGRAM(S): at 20:58

## 2020-08-04 RX ADMIN — Medication 133 MILLILITER(S): at 12:25

## 2020-08-04 RX ADMIN — Medication 5 MILLIGRAM(S): at 21:52

## 2020-08-04 RX ADMIN — LISINOPRIL 5 MILLIGRAM(S): 2.5 TABLET ORAL at 05:27

## 2020-08-04 RX ADMIN — CLOPIDOGREL BISULFATE 75 MILLIGRAM(S): 75 TABLET, FILM COATED ORAL at 12:26

## 2020-08-04 RX ADMIN — SIMVASTATIN 20 MILLIGRAM(S): 20 TABLET, FILM COATED ORAL at 20:58

## 2020-08-04 RX ADMIN — Medication 20 MILLIEQUIVALENT(S): at 20:57

## 2020-08-04 RX ADMIN — Medication 50 MILLIGRAM(S): at 05:27

## 2020-08-04 RX ADMIN — PANTOPRAZOLE SODIUM 40 MILLIGRAM(S): 20 TABLET, DELAYED RELEASE ORAL at 05:27

## 2020-08-04 RX ADMIN — Medication 40 MILLIGRAM(S): at 17:44

## 2020-08-04 RX ADMIN — Medication 325 MILLIGRAM(S): at 12:26

## 2020-08-04 RX ADMIN — Medication 40 MILLIGRAM(S): at 05:26

## 2020-08-04 RX ADMIN — Medication 20 MILLIEQUIVALENT(S): at 17:44

## 2020-08-04 RX ADMIN — SENNA PLUS 2 TABLET(S): 8.6 TABLET ORAL at 20:58

## 2020-08-04 RX ADMIN — FINASTERIDE 5 MILLIGRAM(S): 5 TABLET, FILM COATED ORAL at 12:26

## 2020-08-04 RX ADMIN — Medication 3 MILLIGRAM(S): at 01:01

## 2020-08-04 RX ADMIN — Medication 500 MILLIGRAM(S): at 12:26

## 2020-08-04 NOTE — CONSULT NOTE ADULT - ASSESSMENT
87 yo M with Advanced CHF Exacerbation and Pleural Effusions S/P PPM placement    Complete heart Block  Decompensated Systolic Heart Failure     BNP 16,119   EF 25- 30% dropped  Medical Management  Telemetry Monitored  PPM 7/31 tolerated well AV Paced Rhythm  LLE  arterial disease    Fluid Overload  Cardiomegaly  B/L Pleural Effusions  B/L lower extremity edema to calves  Neg for DVT  Diuretics IV Lasix switch to oral tomorrow     Causing CKD III b   BUN/Cr 45/1.68 GFR 36  Still needing diuretics  Monitoring closely  Avoid Nephrotoxic Meds    Constipation  Bowel regime was effective today  patient uses Linzess at home for constipation regularity  Asking for RX when being discharged to be available when he goes home-Non formulary at [facility]    Anxiety  Very talkative  Wanting to go home Why are they keeping me here? etc.  Patient was using Valium in Assisted Living a 5mg PO 3x/d as needed  Would recommend having that available for him especially at night    Anemia  H/H8.7/27.1  Transfused this admission  Watching CBC Trends    GOC  FULL Code at this time  See entry in Advance Directive section  Left phone message with son Nicolas to return my call  Need to be educated on MOLDST Directive- need to Fax Living will and HCP documents to copy for our chart

## 2020-08-04 NOTE — PROGRESS NOTE ADULT - ASSESSMENT
Patient is an 89 y/o male with a pmhx of Coronary artery disease (s/p stent of OM1 in 2008, CABG x 4 in 1997; LIMA-LAD, SVG-PDA, SVG-posterior LV branch and diagonal), Inferior wall myocardial infarction, Abdominal aortic aneurysm, Hypertension, Peripheral arterial disease and reports of chest pain syndrome, who presents with recent endovascular triple AAA repair on 7/20/2020 and SMA & renal artery stenting at [facility] who presents with fatigue, leg edema/leg pain and found ot have decompensated heart failure. While here, patient was found ot have CHB with a 9 sec pause and a PPM was placed. He was downgraded on 7/31 from the ICU. He was also noted to have leucocytosis to 19k with no fever but distended bladder and Cr of 1.44. Post PPM placement, he was noted to have hypotension and was monitored in the ICU overnight with levo weaned off and diuresis continued. On 8/2, noted to have a downtrending Hgb with no enlarged bruising noted and no complaints of abdominal pain from patient     > low h/h / likely anemia of chronic disease /  Normocytic anemia   -c/w ferrous sulfate daily, vitamin c for absorption   -type and screen, 1 U prbc as patient is a cardiac patient, repeat hgb 10pm and then again in am   -monitor Hgb     >CHB s/p PPM in light of acute on chronic decompensated systolic heart failure (HFrEF) - EF 25%  -h/o chronic HFrEF  -PPM site care, seen by cardio and EP, c/w tele   -c/w metoprolol, added low dose acei   -c/w lasix 40mg iv as per cardio   -monitor i/o and daily weights     >Leucocytosis on admission / now resolved/ likely reactive   -with distended bladder   -trended down on its own, monitor i/o, condom cath in place, monitor   -encourage ambulation   -if spikes, panculture     >Recent AAA repair - ecchymosis noted with left stitch to LLQ   -monitor hgb post PRBC transfusion     >Elevated Cr - prior Cr 1.34 noted in the system with a GFR of 47 ?CKD stage 3b now   with hypokalemia and borderline hypocalcemia (corrected to 8.7)   -monitor Cr given fluid overload on admission   -c/w lasix and monitor cr   -IV calcium given 8/1     >h/o cad   - c/w aspirin, plavix and statin     > BPH   - cardura and proscar on board     >constipation - c/w senna, dulcolax and monitor. patient states that he takes linzess at home , not available in house . will add miralax     >DVT PPx - c/w venodynes     >GI PPX   -c/w ppi

## 2020-08-04 NOTE — PROGRESS NOTE ADULT - ASSESSMENT
Assessment:  Mr. Collazo is an 88 year old man with past medical history of Coronary artery disease (s/p stent of OM1 in 2008, CABG x 4 in 1997; LIMA-LAD, SVG-PDA, SVG-posterior LV branch and diagonal), Inferior wall myocardial infarction, Abdominal aortic aneurysm, Hypertension, Peripheral arterial disease and reports of chest pain syndrome, who presents with recent endovascular triple AAA repair on 7/20/2020 and SMA & renal artery stenting at [facility] presents with fatigue, leg edema and leg pain, found to have decompensated heart failure. Hospital course also with complete heart block, now s/p CRT-D with HIS bundle pacing.     Recommendations:  [] Decompensated heart failure: BP normotensive. Continue Lasix 40 mg IVP BID today, Pro BNP improved to 16,000s from 27,000s on admission and patient with cough today and with crackles on exam, so continue IV diuresis today. Plan to transition to Lasix 40 mg PO daily on Wednesday, pending clinical course. Echo with LVEF ~ 35% which is reduction from outpatient TTE. Continue guideline directed medical therapy: metoprolol succinate 50 mg daily, lisinopril 5 mg daily.   [] Elevated troponin, history of CAD: Patient had recent nuclear stress test with mostly fixed inferior wall defect and mild richa-infarct ischemia in LAD territory. Troponin peaked at 1.5. Continue home: Simvastatin 20 mg daily, Metoprolol succinate 50 mg daily. Continue Aspirin 81 mg daily.   [] Abdominal aortic aneurysm repair: CT abdomen with small amount of retroperitoneal hemorrhage, discussed report with his [facility] surgeon Dr. Amanuel Hess and he recommended to at least continue plavix given recent stenting.  Continue aspirin and plavix.   [] Follow up physical therapy regarding disposition   [] Recommend EP follow up     Thank you for the consult. We will follow along.     Evi Delgado MD  Cardiology Assessment:  Mr. Collazo is an 88 year old man with past medical history of Coronary artery disease (s/p stent of OM1 in 2008, CABG x 4 in 1997; LIMA-LAD, SVG-PDA, SVG-posterior LV branch and diagonal), Inferior wall myocardial infarction, Abdominal aortic aneurysm, Hypertension, Peripheral arterial disease and reports of chest pain syndrome, who presents with recent endovascular triple AAA repair on 7/20/2020 and SMA & renal artery stenting at [facility] presents with fatigue, leg edema and leg pain, found to have decompensated heart failure. Hospital course also with complete heart block, now s/p CRT-D with HIS bundle pacing.     Recommendations:  [] Decompensated heart failure: BP normotensive. Continue Lasix 40 mg IVP BID today, Pro BNP improved to 16,000s from 27,000s on admission but patient with cough today and with crackles on exam, so continue IV diuresis today. Plan to transition to Lasix 40 mg PO daily on Wednesday, pending clinical course. Echo with LVEF ~ 35% which is reduction from outpatient TTE. Continue guideline directed medical therapy: metoprolol succinate 50 mg daily, lisinopril 5 mg daily.   [] Elevated troponin, history of CAD: Patient had recent nuclear stress test with mostly fixed inferior wall defect and mild richa-infarct ischemia in LAD territory. Troponin peaked at 1.5. Continue home: Simvastatin 20 mg daily, Metoprolol succinate 50 mg daily. Continue Aspirin 81 mg daily.   [] Abdominal aortic aneurysm repair: CT abdomen with small amount of retroperitoneal hemorrhage, discussed report with his [facility] surgeon Dr. Amanuel Hess and he recommended to at least continue plavix given recent stenting.  Continue aspirin and plavix.   [] Follow up physical therapy regarding disposition   [] Recommend EP follow up     Thank you for the consult. We will follow along.     Evi Delgado MD  Cardiology

## 2020-08-04 NOTE — CONSULT NOTE ADULT - ATTENDING COMMENTS
COUNSELING:    Face to face meeting to discuss Advanced Care Planning - Time Spent ___45___ Minutes.  See goals of care note.    More than 50% time spent in counseling and coordinating care. ______ Minutes.     Thank you for the opportunity to assist with the care of this patient.   [place] [facility] Consult Service [phone]. COUNSELING:    Face to face meeting to discuss Advanced Care Planning - Time Spent ___10__ Minutes.  See goals of care note.    More than 50% time spent in counseling and coordinating care. ___45___ Minutes.     Thank you for the opportunity to assist with the care of this patient.   [place] [facility] Consult Service [phone].

## 2020-08-04 NOTE — CONSULT NOTE ADULT - SUBJECTIVE AND OBJECTIVE BOX
HPI: This is an 87 yo M alert oriented x 3 and very talkative.He has a significant history of Cardiovascular Disease and interventions. Recent AAA repair 7/20/2020 and PPM placed this admission.  PMH of CAD, STENTs, MI, HTN PAD. He was readmitted one week later to [facility] in [facility] with worsening dyspnea small B/L Pleural effusions, profound fatigue-not able to get up from sitting position. He was also C/O severe B/L thigh pain chronic leg edema. Today he is S/P PPM placement,POD 4 feeling much better today. B/L LE edema much less but still taut edema in calves. O2 Sat on room air 97%. He had been constipated but able to have BM today. Poor appetite. He deneis CP, Palpatations, N/V/D No leg pain+incontinence [place] Catheter in place. Anxious to go home    88 year old man with past medical history Coronary artery disease (s/p stent of OM1 in 2008, CABG x 4 in 1997; LIMA-LAD, SVG- PDA, SVG- posterior LV branch and diagonal), Inferior wall myocardial infarction, Abdominal aortic aneurysm, Hypertension, Peripheral arterial disease , recent triple AAA repair on 7/20/2020 at [facility] presents with reports of chest pain syndrome, fatigue, leg edema and leg pain. The patient states that after his AAA repair he had episodes of chest discomfort and was told at [facility] that everything was stable from cardiac standpoint. He had some leg pain after the surgery which he states he was given Valium for. He was discharged on 7/22/20. The pain is worst in his upper thighs. He reports that a few days after that hospital discharge he noticed extreme fatigue and was unable to get up from seated position because he had no energy. Denies any chest discomfort at this time. Also reports chronic leg edema and dyspnea. Denies palpitations or syncope. States he had a stress test and ECHO in 3/2020 and there was no evidence of heart failure at that time.    SH- lives at [facility] with spouse; denies habits now; former smoker  FH- heart disease runs in the family (27 Jul 2020 14:15)    PERTINENT PMH REVIEWED: Yes     PAST MEDICAL & SURGICAL HISTORY:  AAA (abdominal aortic aneurysm)  HTN (hypertension)  S/P AAA repair: 7-  S/P left inguinal hernia repair  CAD (coronary artery disease): s/p stent placed  History of right hip replacement  S/P CABG (coronary artery bypass graft)    SOCIAL HISTORY:  EtOH  No                                    Drugs    No                                   Former  smoker                                   Admitted from: Assisted Living_HCP? Son Marla Mohan [phone]     FAMILY HISTORY:  No pertinent family history in first degree relatives    Allergies  Broccoli (Unknown)  penicillin (Vomiting; Nausea)  Intolerances    Baseline ADLs (prior to admission):  Independent     Present Symptoms:     Dyspnea: 0    Nausea/Vomiting:  No poor appetite  Anxiety:  Yes   Depression:  No  Fatigue: Yes but feels good today  Loss of appetite: Yes     Pain: denies            Character-            Duration-            Effect-            Factors-            Frequency-            Location-            Severity-    Review of Systems: Reviewed                 All others negative    MEDICATIONS  (STANDING):  ascorbic acid 500 milliGRAM(s) Oral daily  aspirin enteric coated 81 milliGRAM(s) Oral daily  bisacodyl 5 milliGRAM(s) Oral at bedtime  clopidogrel Tablet 75 milliGRAM(s) Oral daily  doxazosin 4 milliGRAM(s) Oral at bedtime  ferrous    sulfate 325 milliGRAM(s) Oral daily  finasteride 5 milliGRAM(s) Oral daily  furosemide   Injectable 40 milliGRAM(s) IV Push every 12 hours  lisinopril 5 milliGRAM(s) Oral daily  metoprolol succinate ER 50 milliGRAM(s) Oral daily  pantoprazole    Tablet 40 milliGRAM(s) Oral before breakfast  senna 2 Tablet(s) Oral at bedtime  simvastatin 20 milliGRAM(s) Oral at bedtime    MEDICATIONS  (PRN):  acetaminophen   Tablet .. 650 milliGRAM(s) Oral every 6 hours PRN Temp greater or equal to 38C (100.4F), Mild Pain (1 - 3)  melatonin 3 milliGRAM(s) Oral at bedtime PRN Insomnia    PHYSICAL EXAM:    Vital Signs Last 24 Hrs  T(C): 36.3 (04 Aug 2020 15:16), Max: 36.7 (03 Aug 2020 20:51)  T(F): 97.4 (04 Aug 2020 15:16), Max: 98 (03 Aug 2020 20:51)  HR: 62 (04 Aug 2020 15:16) (62 - 89)  BP: 109/66 (04 Aug 2020 15:16) (96/51 - 143/76)  BP(mean): --  RR: 18 (04 Aug 2020 15:16) (18 - 18)  SpO2: 100% (04 Aug 2020 15:16) (92% - 100%)    General: alert  oriented x _3___ anxious               thin    HEENT:   dry mouth      Lungs: comfortable     CV: normal     GI: softly  distended                   constipation  last BM: TODAY    : normal  incontinent  [place] Cath for frequency and urgency    MSK:  weakness  edema             ambulatory with PT yesterday   Not OOB today    Skin: normal  ___  no rash    LABS:                        8.7    9.38  )-----------( 282      ( 04 Aug 2020 06:59 )             27.1     08-04    138  |  98  |  45.0<H>  ----------------------------<  100<H>  3.4<L>   |  26.0  |  1.68<H>    Ca    8.1<L>      04 Aug 2020 06:59  Phos  3.2     08-04  Mg     2.2     08-04    TPro  x   /  Alb  3.2<L>  /  TBili  x   /  DBili  x   /  AST  x   /  ALT  x   /  AlkPhos  x   08-03    I&O's Summary    03 Aug 2020 07:01  -  04 Aug 2020 07:00  --------------------------------------------------------  IN: 1445 mL / OUT: 1850 mL / NET: -405 mL    04 Aug 2020 07:01  -  04 Aug 2020 16:34  --------------------------------------------------------  IN: 0 mL / OUT: 450 mL / NET: -450 mL    RADIOLOGY & ADDITIONAL STUDIES:    ADVANCE DIRECTIVES:   DNR  NO  Completed on:                     MOLST   NO   Completed on:  Living Will  YES     Completed on:? "My Son Panfilo is a  and he has the papers I have about that." Not willing to speak about End of Life Directives  "I'm doing so well, why do we have to talk about this now?" I told him I would call his son Nicolas leave a message about getting copy of those papers to copy for  his chart, I will educate your Son's on purpose of having a MOLST Directive-and will leave them a copy to compare with Living Will

## 2020-08-04 NOTE — PROGRESS NOTE ADULT - SUBJECTIVE AND OBJECTIVE BOX
PAT SHERMELY  [id number]    Chief Complaint: CHF/History of AAA repair/PAD/ Complete heart block s/p CRT-D with HIS bundle pacing      Interval History: The patient reports feeling ok, tried to ambulate with physical therapy, still has cough. Less shortness of breath and leg edema.       Tele: atrial and ventricular paced rhythm       Current meds:   acetaminophen   Tablet .. 650 milliGRAM(s) Oral every 6 hours PRN  ascorbic acid 500 milliGRAM(s) Oral daily  aspirin enteric coated 81 milliGRAM(s) Oral daily  bisacodyl 5 milliGRAM(s) Oral at bedtime  clopidogrel Tablet 75 milliGRAM(s) Oral daily  doxazosin 4 milliGRAM(s) Oral at bedtime  ferrous    sulfate 325 milliGRAM(s) Oral daily  finasteride 5 milliGRAM(s) Oral daily  furosemide   Injectable 40 milliGRAM(s) IV Push every 12 hours  lisinopril 5 milliGRAM(s) Oral daily  melatonin 3 milliGRAM(s) Oral at bedtime PRN  metoprolol succinate ER 50 milliGRAM(s) Oral daily  mineral oil enema 133 milliLiter(s) Rectal once  pantoprazole    Tablet 40 milliGRAM(s) Oral before breakfast  senna 2 Tablet(s) Oral at bedtime  simvastatin 20 milliGRAM(s) Oral at bedtime      Objective:     Vital Signs:   T(C): 36.3 (08-04-20 @ 10:51), Max: 36.7 (08-03-20 @ 20:51)  HR: 70 (08-04-20 @ 10:51) (63 - 89)  BP: 108/69 (08-04-20 @ 10:51) (96/51 - 143/76)  RR: 18 (08-04-20 @ 10:51) (18 - 18)  SpO2: 92% (08-04-20 @ 10:51) (92% - 100%)  Wt(kg): --    Physical Exam:   General: elderly man, no distress  HEENT: sclera anicteric   Neck: supple  CVS: JVP ~ 9 cm H20, RRR, s1, s2, no murmurs  Chest: unlabored respirations, faint crackles   Abdomen: non-distended  Extremities: minimal b/l lower extremity edema (improved)  Neuro: A&O x3  Psych: Normal affect      Labs:   04 Aug 2020 06:59    138    |  98     |  45.0   ----------------------------<  100    3.4     |  26.0   |  1.68     Ca    8.1        04 Aug 2020 06:59  Phos  3.2       04 Aug 2020 06:59  Mg     2.2       04 Aug 2020 06:59    TPro  x      /  Alb  3.2    /  TBili  x      /  DBili  x      /  AST  x      /  ALT  x      /  AlkPhos  x      03 Aug 2020 07:18                          8.7    9.38  )-----------( 282      ( 04 Aug 2020 06:59 )             27.1           ECG (7/27/2020): sinus rhythm, first degree AV block, PVC, LBBB (similar to prior ECG)    Lower extremity venous duplex (7/27/2020):  No evidence of deep venous thrombosis in either lower extremity.    CXR (7/27/2020):  Congestive heart failure with bilateral pulmonary edema. These findings are new since 1/28/2020..    Coronary angiogram (5/2018) at [facility]:  Occluded vein graft to diagonal and posterior LV branch  LIMA to LAD and vein graft to PDA patent  s/p PCI to OM1    Outpatient stress test (3/2020):  Small sized, severe intensity fixed defect of the basal inferior, basal-mid inferolateral wall consistent with infarction. Small to moderate sized partially reversible at the margins defect of the basal-mid anteroseptal wall consistent with infarction with mild richa-infarct ischemia, LVEF 47%    Outpatient TTE (12/2019):  LVEF 45%  Basal and mid inferior wall severe hypokinesis  Moderate to severe mitral regurgitation  Mild to moderate aortic regurgitation  Large mid to distal abdominal aortic aneurysm 5.9 cm     TTE (7/28/2020) images reviewed:   Moderately reduced LVEF ~35- 40%, abnormal septal motion likely due to conduction delay  Normal RV size and systolic function  Severely dilated left atrium   Mild AR, Moderate MR, Mild TR   No aortic stenosis   RVSP ~ 22 mmHg

## 2020-08-05 ENCOUNTER — TRANSCRIPTION ENCOUNTER (OUTPATIENT)
Age: 85
End: 2020-08-05

## 2020-08-05 VITALS
RESPIRATION RATE: 18 BRPM | TEMPERATURE: 99 F | SYSTOLIC BLOOD PRESSURE: 117 MMHG | DIASTOLIC BLOOD PRESSURE: 62 MMHG | OXYGEN SATURATION: 97 % | HEART RATE: 63 BPM

## 2020-08-05 LAB
ANION GAP SERPL CALC-SCNC: 12 MMOL/L — SIGNIFICANT CHANGE UP (ref 5–17)
BUN SERPL-MCNC: 45 MG/DL — HIGH (ref 8–20)
CALCIUM SERPL-MCNC: 8.4 MG/DL — LOW (ref 8.6–10.2)
CHLORIDE SERPL-SCNC: 97 MMOL/L — LOW (ref 98–107)
CO2 SERPL-SCNC: 28 MMOL/L — SIGNIFICANT CHANGE UP (ref 22–29)
CREAT SERPL-MCNC: 1.67 MG/DL — HIGH (ref 0.5–1.3)
GLUCOSE SERPL-MCNC: 101 MG/DL — HIGH (ref 70–99)
HCT VFR BLD CALC: 28.7 % — LOW (ref 39–50)
HGB BLD-MCNC: 9.1 G/DL — LOW (ref 13–17)
MAGNESIUM SERPL-MCNC: 2.3 MG/DL — SIGNIFICANT CHANGE UP (ref 1.8–2.6)
MCHC RBC-ENTMCNC: 30.4 PG — SIGNIFICANT CHANGE UP (ref 27–34)
MCHC RBC-ENTMCNC: 31.7 GM/DL — LOW (ref 32–36)
MCV RBC AUTO: 96 FL — SIGNIFICANT CHANGE UP (ref 80–100)
PLATELET # BLD AUTO: 290 K/UL — SIGNIFICANT CHANGE UP (ref 150–400)
POTASSIUM SERPL-MCNC: 4.5 MMOL/L — SIGNIFICANT CHANGE UP (ref 3.5–5.3)
POTASSIUM SERPL-SCNC: 4.5 MMOL/L — SIGNIFICANT CHANGE UP (ref 3.5–5.3)
RBC # BLD: 2.99 M/UL — LOW (ref 4.2–5.8)
RBC # FLD: 14.2 % — SIGNIFICANT CHANGE UP (ref 10.3–14.5)
SODIUM SERPL-SCNC: 137 MMOL/L — SIGNIFICANT CHANGE UP (ref 135–145)
WBC # BLD: 8.15 K/UL — SIGNIFICANT CHANGE UP (ref 3.8–10.5)
WBC # FLD AUTO: 8.15 K/UL — SIGNIFICANT CHANGE UP (ref 3.8–10.5)

## 2020-08-05 PROCEDURE — 83550 IRON BINDING TEST: CPT

## 2020-08-05 PROCEDURE — 96361 HYDRATE IV INFUSION ADD-ON: CPT

## 2020-08-05 PROCEDURE — 93306 TTE W/DOPPLER COMPLETE: CPT

## 2020-08-05 PROCEDURE — 86769 SARS-COV-2 COVID-19 ANTIBODY: CPT

## 2020-08-05 PROCEDURE — 84443 ASSAY THYROID STIM HORMONE: CPT

## 2020-08-05 PROCEDURE — 97163 PT EVAL HIGH COMPLEX 45 MIN: CPT

## 2020-08-05 PROCEDURE — 36430 TRANSFUSION BLD/BLD COMPNT: CPT

## 2020-08-05 PROCEDURE — U0003: CPT

## 2020-08-05 PROCEDURE — 82040 ASSAY OF SERUM ALBUMIN: CPT

## 2020-08-05 PROCEDURE — 96360 HYDRATION IV INFUSION INIT: CPT | Mod: XU

## 2020-08-05 PROCEDURE — C1892: CPT

## 2020-08-05 PROCEDURE — 80048 BASIC METABOLIC PNL TOTAL CA: CPT

## 2020-08-05 PROCEDURE — 85610 PROTHROMBIN TIME: CPT

## 2020-08-05 PROCEDURE — 86900 BLOOD TYPING SEROLOGIC ABO: CPT

## 2020-08-05 PROCEDURE — 81001 URINALYSIS AUTO W/SCOPE: CPT

## 2020-08-05 PROCEDURE — C1777: CPT

## 2020-08-05 PROCEDURE — C1889: CPT

## 2020-08-05 PROCEDURE — 71275 CT ANGIOGRAPHY CHEST: CPT

## 2020-08-05 PROCEDURE — 83735 ASSAY OF MAGNESIUM: CPT

## 2020-08-05 PROCEDURE — 82803 BLOOD GASES ANY COMBINATION: CPT

## 2020-08-05 PROCEDURE — 80053 COMPREHEN METABOLIC PANEL: CPT

## 2020-08-05 PROCEDURE — 85730 THROMBOPLASTIN TIME PARTIAL: CPT

## 2020-08-05 PROCEDURE — C1883: CPT

## 2020-08-05 PROCEDURE — 82330 ASSAY OF CALCIUM: CPT

## 2020-08-05 PROCEDURE — 82435 ASSAY OF BLOOD CHLORIDE: CPT

## 2020-08-05 PROCEDURE — 93005 ELECTROCARDIOGRAM TRACING: CPT

## 2020-08-05 PROCEDURE — 85018 HEMOGLOBIN: CPT

## 2020-08-05 PROCEDURE — C1894: CPT

## 2020-08-05 PROCEDURE — 86923 COMPATIBILITY TEST ELECTRIC: CPT

## 2020-08-05 PROCEDURE — 97116 GAIT TRAINING THERAPY: CPT

## 2020-08-05 PROCEDURE — 83880 ASSAY OF NATRIURETIC PEPTIDE: CPT

## 2020-08-05 PROCEDURE — 83540 ASSAY OF IRON: CPT

## 2020-08-05 PROCEDURE — 99239 HOSP IP/OBS DSCHRG MGMT >30: CPT

## 2020-08-05 PROCEDURE — 84484 ASSAY OF TROPONIN QUANT: CPT

## 2020-08-05 PROCEDURE — 97110 THERAPEUTIC EXERCISES: CPT

## 2020-08-05 PROCEDURE — C1887: CPT

## 2020-08-05 PROCEDURE — 36415 COLL VENOUS BLD VENIPUNCTURE: CPT

## 2020-08-05 PROCEDURE — 86850 RBC ANTIBODY SCREEN: CPT

## 2020-08-05 PROCEDURE — C1898: CPT

## 2020-08-05 PROCEDURE — 82728 ASSAY OF FERRITIN: CPT

## 2020-08-05 PROCEDURE — 85027 COMPLETE CBC AUTOMATED: CPT

## 2020-08-05 PROCEDURE — 86901 BLOOD TYPING SEROLOGIC RH(D): CPT

## 2020-08-05 PROCEDURE — 93923 UPR/LXTR ART STDY 3+ LVLS: CPT

## 2020-08-05 PROCEDURE — 71045 X-RAY EXAM CHEST 1 VIEW: CPT

## 2020-08-05 PROCEDURE — C1730: CPT

## 2020-08-05 PROCEDURE — 82947 ASSAY GLUCOSE BLOOD QUANT: CPT

## 2020-08-05 PROCEDURE — 74174 CTA ABD&PLVS W/CONTRAST: CPT

## 2020-08-05 PROCEDURE — P9016: CPT

## 2020-08-05 PROCEDURE — 82550 ASSAY OF CK (CPK): CPT

## 2020-08-05 PROCEDURE — 84132 ASSAY OF SERUM POTASSIUM: CPT

## 2020-08-05 PROCEDURE — 99285 EMERGENCY DEPT VISIT HI MDM: CPT | Mod: 25

## 2020-08-05 PROCEDURE — 99232 SBSQ HOSP IP/OBS MODERATE 35: CPT

## 2020-08-05 PROCEDURE — C1769: CPT

## 2020-08-05 PROCEDURE — C1882: CPT

## 2020-08-05 PROCEDURE — 33249 INSJ/RPLCMT DEFIB W/LEAD(S): CPT

## 2020-08-05 PROCEDURE — 93970 EXTREMITY STUDY: CPT

## 2020-08-05 PROCEDURE — 84100 ASSAY OF PHOSPHORUS: CPT

## 2020-08-05 PROCEDURE — 84295 ASSAY OF SERUM SODIUM: CPT

## 2020-08-05 PROCEDURE — 71046 X-RAY EXAM CHEST 2 VIEWS: CPT

## 2020-08-05 PROCEDURE — 84466 ASSAY OF TRANSFERRIN: CPT

## 2020-08-05 PROCEDURE — 83605 ASSAY OF LACTIC ACID: CPT

## 2020-08-05 PROCEDURE — 85014 HEMATOCRIT: CPT

## 2020-08-05 RX ORDER — ISOSORBIDE MONONITRATE 60 MG/1
1 TABLET, EXTENDED RELEASE ORAL
Qty: 0 | Refills: 0 | DISCHARGE

## 2020-08-05 RX ORDER — ASCORBIC ACID 60 MG
1 TABLET,CHEWABLE ORAL
Qty: 30 | Refills: 0
Start: 2020-08-05

## 2020-08-05 RX ORDER — FERROUS SULFATE 325(65) MG
1 TABLET ORAL
Qty: 30 | Refills: 0
Start: 2020-08-05

## 2020-08-05 RX ORDER — RAMIPRIL 5 MG
1 CAPSULE ORAL
Qty: 30 | Refills: 0
Start: 2020-08-05

## 2020-08-05 RX ORDER — TERAZOSIN HYDROCHLORIDE 10 MG/1
1 CAPSULE ORAL
Qty: 0 | Refills: 0 | DISCHARGE

## 2020-08-05 RX ORDER — DOCUSATE SODIUM 100 MG
2 CAPSULE ORAL
Qty: 0 | Refills: 0 | DISCHARGE

## 2020-08-05 RX ORDER — PANTOPRAZOLE SODIUM 20 MG/1
1 TABLET, DELAYED RELEASE ORAL
Qty: 30 | Refills: 0
Start: 2020-08-05

## 2020-08-05 RX ORDER — CLOPIDOGREL BISULFATE 75 MG/1
1 TABLET, FILM COATED ORAL
Qty: 0 | Refills: 0 | DISCHARGE

## 2020-08-05 RX ORDER — ASPIRIN/CALCIUM CARB/MAGNESIUM 324 MG
1 TABLET ORAL
Qty: 30 | Refills: 0
Start: 2020-08-05

## 2020-08-05 RX ORDER — FUROSEMIDE 40 MG
1 TABLET ORAL
Qty: 60 | Refills: 0
Start: 2020-08-05 | End: 2020-09-03

## 2020-08-05 RX ORDER — DIAZEPAM 5 MG
1 TABLET ORAL
Qty: 0 | Refills: 0 | DISCHARGE

## 2020-08-05 RX ORDER — RAMIPRIL 5 MG
1 CAPSULE ORAL
Qty: 0 | Refills: 0 | DISCHARGE

## 2020-08-05 RX ORDER — POLYETHYLENE GLYCOL 3350 17 G/17G
17 POWDER, FOR SOLUTION ORAL
Qty: 510 | Refills: 0
Start: 2020-08-05

## 2020-08-05 RX ORDER — FINASTERIDE 5 MG/1
1 TABLET, FILM COATED ORAL
Qty: 30 | Refills: 0
Start: 2020-08-05

## 2020-08-05 RX ORDER — LANOLIN ALCOHOL/MO/W.PET/CERES
1 CREAM (GRAM) TOPICAL
Qty: 3 | Refills: 0
Start: 2020-08-05

## 2020-08-05 RX ORDER — PANTOPRAZOLE SODIUM 20 MG/1
1 TABLET, DELAYED RELEASE ORAL
Qty: 0 | Refills: 0 | DISCHARGE
Start: 2020-08-05

## 2020-08-05 RX ORDER — CALCIUM POLYCARBOPHIL 625 MG/1
4 TABLET, FILM COATED ORAL
Qty: 0 | Refills: 0 | DISCHARGE

## 2020-08-05 RX ORDER — SENNA PLUS 8.6 MG/1
2 TABLET ORAL
Qty: 0 | Refills: 0 | DISCHARGE

## 2020-08-05 RX ORDER — SIMVASTATIN 20 MG/1
1 TABLET, FILM COATED ORAL
Qty: 30 | Refills: 0
Start: 2020-08-05 | End: 2020-09-03

## 2020-08-05 RX ORDER — METOPROLOL TARTRATE 50 MG
1 TABLET ORAL
Qty: 0 | Refills: 0 | DISCHARGE

## 2020-08-05 RX ORDER — METOPROLOL TARTRATE 50 MG
1 TABLET ORAL
Qty: 30 | Refills: 0
Start: 2020-08-05

## 2020-08-05 RX ORDER — TERAZOSIN HYDROCHLORIDE 10 MG/1
1 CAPSULE ORAL
Qty: 30 | Refills: 0
Start: 2020-08-05

## 2020-08-05 RX ORDER — CLOPIDOGREL BISULFATE 75 MG/1
1 TABLET, FILM COATED ORAL
Qty: 30 | Refills: 0
Start: 2020-08-05 | End: 2020-09-03

## 2020-08-05 RX ORDER — DIAZEPAM 5 MG
1 TABLET ORAL
Qty: 3 | Refills: 0
Start: 2020-08-05

## 2020-08-05 RX ORDER — FINASTERIDE 5 MG/1
1 TABLET, FILM COATED ORAL
Qty: 0 | Refills: 0 | DISCHARGE

## 2020-08-05 RX ORDER — FUROSEMIDE 40 MG
1 TABLET ORAL
Qty: 0 | Refills: 0 | DISCHARGE
Start: 2020-08-05 | End: 2020-09-03

## 2020-08-05 RX ADMIN — Medication 325 MILLIGRAM(S): at 12:49

## 2020-08-05 RX ADMIN — Medication 40 MILLIGRAM(S): at 05:21

## 2020-08-05 RX ADMIN — LISINOPRIL 5 MILLIGRAM(S): 2.5 TABLET ORAL at 05:21

## 2020-08-05 RX ADMIN — FINASTERIDE 5 MILLIGRAM(S): 5 TABLET, FILM COATED ORAL at 12:49

## 2020-08-05 RX ADMIN — CLOPIDOGREL BISULFATE 75 MILLIGRAM(S): 75 TABLET, FILM COATED ORAL at 12:49

## 2020-08-05 RX ADMIN — Medication 500 MILLIGRAM(S): at 12:49

## 2020-08-05 RX ADMIN — PANTOPRAZOLE SODIUM 40 MILLIGRAM(S): 20 TABLET, DELAYED RELEASE ORAL at 05:21

## 2020-08-05 RX ADMIN — Medication 50 MILLIGRAM(S): at 05:21

## 2020-08-05 RX ADMIN — Medication 81 MILLIGRAM(S): at 12:49

## 2020-08-05 RX ADMIN — Medication 40 MILLIGRAM(S): at 16:43

## 2020-08-05 NOTE — DISCHARGE NOTE PROVIDER - NSDCCPCAREPLAN_GEN_ALL_CORE_FT
PRINCIPAL DISCHARGE DIAGNOSIS  Diagnosis: CHF (congestive heart failure)  Assessment and Plan of Treatment: continue with lasix as advised.   fluid restriction of 1500cc/ day   follow up with cardiology in 1 week      SECONDARY DISCHARGE DIAGNOSES  Diagnosis: S/P cardiac pacemaker procedure  Assessment and Plan of Treatment: follow up with EP cardiology in 1 week.   wound care as advised.  continue with medications as advised.

## 2020-08-05 NOTE — DISCHARGE NOTE PROVIDER - NSDCMRMEDTOKEN_GEN_ALL_CORE_FT
ascorbic acid 500 mg oral tablet: 1 tab(s) orally once a day  aspirin 81 mg oral delayed release tablet: 1 tab(s) orally once a day  bisacodyl 5 mg oral delayed release tablet: 1 tab(s) orally every 12 hours  clopidogrel 75 mg oral tablet: 1 tab(s) orally once a day  diazePAM 5 mg oral tablet: 1 tab(s) orally once a day (at bedtime), As needed, home med MDD:1  ferrous sulfate 325 mg (65 mg elemental iron) oral tablet: 1 tab(s) orally once a day  finasteride 5 mg oral tablet: 1 tab(s) orally once a day  Lasix 40 mg oral tablet: 1 tab(s) orally 2 times a day   melatonin 3 mg oral tablet: 1 tab(s) orally once a day (at bedtime), As needed, Insomnia  metoprolol succinate 50 mg oral tablet, extended release: 1 tab(s) orally once a day  pantoprazole 40 mg oral delayed release tablet: 1 tab(s) orally once a day (before a meal)  polyethylene glycol 3350 oral powder for reconstitution: 17 gram(s) orally once a day  ramipril 2.5 mg oral capsule: 1 cap(s) orally once a day  simvastatin 20 mg oral tablet: 1 tab(s) orally once a day (at bedtime)  terazosin 5 mg oral capsule: 1 cap(s) orally once a day (at bedtime) ascorbic acid 500 mg oral tablet: 1 tab(s) orally once a day  aspirin 81 mg oral delayed release tablet: 1 tab(s) orally once a day  bisacodyl 5 mg oral delayed release tablet: 1 tab(s) orally every 12 hours  clopidogrel 75 mg oral tablet: 1 tab(s) orally once a day  diazePAM 5 mg oral tablet: 1 tab(s) orally once a day (at bedtime), As needed, home med MDD:1  ferrous sulfate 325 mg (65 mg elemental iron) oral tablet: 1 tab(s) orally once a day  finasteride 5 mg oral tablet: 1 tab(s) orally once a day  Lasix 40 mg oral tablet: 1 tab(s) orally 2 times a day   melatonin 3 mg oral tablet: 1 tab(s) orally once a day (at bedtime), As needed, Insomnia  metoprolol succinate 50 mg oral tablet, extended release: 1 tab(s) orally once a day  pantoprazole 40 mg oral delayed release tablet: 1 tab(s) orally once a day (before a meal)  polyethylene glycol 3350 oral powder for reconstitution: 17 gram(s) orally once a day  ramipril 2.5 mg oral capsule: 1 cap(s) orally once a day  rolling walker :   simvastatin 20 mg oral tablet: 1 tab(s) orally once a day (at bedtime)  terazosin 5 mg oral capsule: 1 cap(s) orally once a day (at bedtime)

## 2020-08-05 NOTE — DISCHARGE NOTE PROVIDER - HOSPITAL COURSE
Patient is an 89 y/o male with a pmhx of Coronary artery disease (s/p stent of OM1 in 2008, CABG x 4 in 1997; LIMA-LAD, SVG-PDA, SVG-posterior LV branch and diagonal), Inferior wall myocardial infarction, Abdominal aortic aneurysm, Hypertension, Peripheral arterial disease and reports of chest pain syndrome, who presents with recent endovascular triple AAA repair on 7/20/2020 and SMA & renal artery stenting at [facility] who presents with fatigue, leg edema/leg pain and found ot have decompensated heart failure. While here, patient was found ot have CHB with a 9 sec pause and a PPM was placed. He was downgraded on 7/31 from the ICU. He was also noted to have leucocytosis to 19k with no fever but distended bladder and Cr of 1.44. Post PPM placement, he was noted to have hypotension and was monitored in the ICU overnight with levo weaned off and diuresis continued. On 8/2, noted to have a downtrending Hgb with no enlarged bruising noted and no complaints of abdominal pain from patient         > low h/h / likely anemia of chronic disease /  Normocytic anemia     -c/w ferrous sulfate daily, vitamin c for absorption     -type and screen, 1 U prbc as patient is a cardiac patient, repeat hgb 10pm and then again in am     -monitor Hgb         >CHB s/p PPM in light of acute on chronic decompensated systolic heart failure (HFrEF) - EF 25%    -h/o chronic HFrEF    -PPM site care, seen by cardio and EP, c/w tele     -c/w metoprolol, added low dose acei     -c/w lasix 40mg iv as per cardio     -monitor i/o and daily weights         >Leucocytosis on admission / now resolved/ likely reactive     -with distended bladder     -trended down on its own, monitor i/o, condom cath in place, monitor     -encourage ambulation     -if spikes, panculture         >Recent AAA repair - ecchymosis noted with left stitch to LLQ     -monitor hgb post PRBC transfusion         >Elevated Cr - prior Cr 1.34 noted in the system with a GFR of 47 ?CKD stage 3b now     with hypokalemia and borderline hypocalcemia (corrected to 8.7)     -monitor Cr given fluid overload on admission     -c/w lasix and monitor cr     -IV calcium given 8/1         >h/o cad     - c/w aspirin, plavix and statin         > BPH     - cardura and proscar on board         >constipation - c/w senna, dulcolax and monitor. patient states that he takes linzess at home , not available in house . will add miralax         >DVT PPx - c/w venodynes         >GI PPX     -c/w ppi Patient is an 89 y/o male with a pmhx of Coronary artery disease (s/p stent of OM1 in 2008, CABG x 4 in 1997; LIMA-LAD, SVG-PDA, SVG-posterior LV branch and diagonal), Inferior wall myocardial infarction, Abdominal aortic aneurysm, Hypertension, Peripheral arterial disease, with recent endovascular triple AAA repair on 7/20/2020 and SMA & renal artery stenting at [facility] who presents with fatigue, leg edema/leg pain and found ot have acute systolic congestive decompensated heart failure. While here, patient was found to  have CHB with a 9 sec pause and a PPM was placed 7/31. Post PPM placement, he was noted to have hypotension and was monitored in the ICU overnight with levo weaned off and diuresis continued. He was downgraded on 7/31 from the ICU. He was also noted to have leucocytosis to 19k with no fever / wbcs now nl. On 8/2, noted to have a downtrending Hgb with no enlarged bruising noted and no complaints of abdominal pain from patient. h/h now stable   9.1 /28.7 . patient to c/w ferrous sulfate daily, vitamin c for absorption     for CHB s/p PPM in light of acute on chronic decompensated systolic heart failure (HFrEF) - EF 25%h/o chronic HFrEF. PPM site care, seen by cardio and EP, c/w metoprolol, added low dose acei . switched lasix 40mg iv bid to lasix po bid. patient to follow up op with cardio in 1 wk.     monitor i/o and daily weights     Elevated Cr / likely DESIRAE due to lasix /  prior Cr 1.34 noted in the system with a GFR of 47 ?CKD stage 3b now     patient to follow up with pmd and cardio in 1 week.     for h/o cad patient to c/w aspirin, plavix and statin.     patient has been seen by palliative care team during this hospitalization. patient wishes to be full code. patient would benefit from continued palliative care follow up and goc discussions at assisted living.         Vital Signs Last 24 Hrs    T(C): 36.4 (05 Aug 2020 08:53), Max: 36.7 (04 Aug 2020 21:00)    T(F): 97.6 (05 Aug 2020 08:53), Max: 98 (04 Aug 2020 21:00)    HR: 57 (05 Aug 2020 08:53) (57 - 70)    BP: 115/60 (05 Aug 2020 08:53) (104/49 - 126/69)    BP(mean): --    RR: 18 (05 Aug 2020 08:53) (17 - 18)    SpO2: 98% (05 Aug 2020 08:53) (98% - 100%)        PHYSICAL EXAM:    GENERAL: NAD, elderly male aaox3. pallor noted    HEENT: EOMI. PERRLA. Dry MM     Chest: CTA bilaterally, no r/r/w noted    CV: S1S2, RRR, no edema    GI: soft, +BS, NT/ND. Suprapubic region with ecchymosis noted, non tender and has not worsened      Ext: no c/c. RLL chronic venous stasis change. LLE mild edema 1+ noted pitting     Neuro: AAO x3    Skin: warm and dry        time spent for this dc 50 mins

## 2020-08-05 NOTE — DISCHARGE NOTE PROVIDER - NSDCFUADDAPPT_GEN_ALL_CORE_FT
CARDIOLOGIST FOLLOW UP VISIT  DR GASTON PEÑA  [address] [facility]  [phone]  AUGUST 6TH AT 3:30 PM

## 2020-08-05 NOTE — DISCHARGE NOTE PROVIDER - PROVIDER TOKENS
PROVIDER:[TOKEN:[[id number]],FOLLOWUP:[1 week]],FREE:[LAST:[primary care],PHONE:[(   )    -],FAX:[(   )    -],FOLLOWUP:[1 week]]

## 2020-08-05 NOTE — DISCHARGE NOTE PROVIDER - NSDCFUADDINST_GEN_ALL_CORE_FT
patient has been seen by palliative care team during this hospitalization. patient wishes to be full code. patient would benefit from continued palliative care follow up and goals of care discussions at assisted living.

## 2020-08-05 NOTE — PROGRESS NOTE ADULT - ASSESSMENT
Assessment:  Mr. Collazo is an 88 year old man with past medical history of Coronary artery disease (s/p stent of OM1 in 2008, CABG x 4 in 1997; LIMA-LAD, SVG-PDA, SVG-posterior LV branch and diagonal), Inferior wall myocardial infarction, Abdominal aortic aneurysm, Hypertension, Peripheral arterial disease and reports of chest pain syndrome, who presents with recent endovascular triple AAA repair on 7/20/2020 and SMA & renal artery stenting at [facility] presents with fatigue, leg edema and leg pain, found to have decompensated heart failure. Hospital course also with complete heart block, now s/p CRT-D with HIS bundle pacing.     Recommendations:  [] Decompensated heart failure: BP normotensive. Diuresed well with Lasix 40 mg IVP BID, agree with transition to Lasix 40 mg PO BID. Pro BNP improved from admission, and patient is no longer on supplemental oxygen so less volume overload present at this time. Echo with LVEF ~ 35% which is reduction from outpatient TTE. Continue guideline directed medical therapy: metoprolol succinate 50 mg daily, lisinopril 5 mg daily. Also s/p CRT-D with HIS bundle pacing.  [] Elevated troponin, history of CAD: Patient had recent nuclear stress test with mostly fixed inferior wall defect and mild richa-infarct ischemia in LAD territory. Troponin peaked at 1.5. Continue home: Simvastatin 20 mg daily, Metoprolol succinate 50 mg daily. Continue Aspirin 81 mg daily.   [] Abdominal aortic aneurysm repair: CT abdomen with small amount of retroperitoneal hemorrhage, discussed report with his [facility] surgeon Dr. Amanuel Hess and he recommended to at least continue plavix given recent stenting.  Continue aspirin and plavix.   [] Recommend EP follow up on discharge, discussed with Cardiac EP regarding outpatient device monitoring   [] Discussed with cardiology office for tele health visit for this Friday (8/7/2020) with Dr. Chin, repeat BMP, Mg and Pro BNP within 1 week and in-office follow-up within 2 weeks. Also discussed plan with rita Valenzuela today.       Thank you for the consult.     Discussed with Dr. Post.     Evi Delgado MD  Cardiology

## 2020-08-05 NOTE — DISCHARGE NOTE PROVIDER - NSDCFUSCHEDAPPT_GEN_ALL_CORE_FT
PAT HOFFMANN ; 08/06/2020 ; [facility] [address] [facility] PAT HOFFMANN ; 08/06/2020 ; [facility] [address] [facility]

## 2020-08-05 NOTE — DISCHARGE NOTE PROVIDER - NSDCHHASSISTDEVIC_GEN_ALL_CORE_FT
patient has been seen by palliative care team during this hospitalization. patient wishes to be full code. patient would benefit from continued palliative care follow up and goal of care discussions at assisted living.

## 2020-08-05 NOTE — PROGRESS NOTE ADULT - SUBJECTIVE AND OBJECTIVE BOX
PAT HOFFMANN  [id number]      Chief Complaint: CHF/History of AAA repair/PAD/ Complete heart block s/p CRT-D with HIS bundle pacing      Interval History: The patient reports feeling well and is no longer on oxygen. Denies dyspnea or chest pain. Has less leg edema.     Tele: sinus rhythm, ventricular paced rhythm       Current meds:   acetaminophen   Tablet .. 650 milliGRAM(s) Oral every 6 hours PRN  ascorbic acid 500 milliGRAM(s) Oral daily  aspirin enteric coated 81 milliGRAM(s) Oral daily  bisacodyl 5 milliGRAM(s) Oral every 12 hours  clopidogrel Tablet 75 milliGRAM(s) Oral daily  diazepam    Tablet 5 milliGRAM(s) Oral at bedtime PRN  doxazosin 4 milliGRAM(s) Oral at bedtime  ferrous    sulfate 325 milliGRAM(s) Oral daily  finasteride 5 milliGRAM(s) Oral daily  furosemide   Injectable 40 milliGRAM(s) IV Push every 12 hours  lisinopril 5 milliGRAM(s) Oral daily  melatonin 3 milliGRAM(s) Oral at bedtime PRN  metoprolol succinate ER 50 milliGRAM(s) Oral daily  pantoprazole    Tablet 40 milliGRAM(s) Oral before breakfast  polyethylene glycol 3350 17 Gram(s) Oral daily  senna 2 Tablet(s) Oral at bedtime  simvastatin 20 milliGRAM(s) Oral at bedtime      Objective:     Vital Signs:   T(C): 36.4 (08-05-20 @ 08:53), Max: 36.7 (08-04-20 @ 21:00)  HR: 57 (08-05-20 @ 08:53) (57 - 70)  BP: 115/60 (08-05-20 @ 08:53) (104/49 - 126/69)  RR: 18 (08-05-20 @ 08:53) (17 - 18)  SpO2: 98% (08-05-20 @ 08:53) (98% - 100%)  Wt(kg): --    Physical Exam:   General: elderly man, no distress  HEENT: sclera anicteric   Neck: supple  CVS: JVP ~ 9 cm H20, RRR, s1, s2, no murmurs  Chest: unlabored respirations, clear   Abdomen: non-distended  Extremities: minimal b/l lower extremity edema (improved)  Neuro: A&O x3  Psych: Normal affect      Labs:   05 Aug 2020 07:14    137    |  97     |  45.0   ----------------------------<  101    4.5     |  28.0   |  1.67     Ca    8.4        05 Aug 2020 07:14  Phos  3.2       04 Aug 2020 06:59  Mg     2.3       05 Aug 2020 07:14                            9.1    8.15  )-----------( 290      ( 05 Aug 2020 07:14 )             28.7           ECG (7/27/2020): sinus rhythm, first degree AV block, PVC, LBBB (similar to prior ECG)    Lower extremity venous duplex (7/27/2020):  No evidence of deep venous thrombosis in either lower extremity.    CXR (7/27/2020):  Congestive heart failure with bilateral pulmonary edema. These findings are new since 1/28/2020..    Coronary angiogram (5/2018) at [facility]:  Occluded vein graft to diagonal and posterior LV branch  LIMA to LAD and vein graft to PDA patent  s/p PCI to OM1    Outpatient stress test (3/2020):  Small sized, severe intensity fixed defect of the basal inferior, basal-mid inferolateral wall consistent with infarction. Small to moderate sized partially reversible at the margins defect of the basal-mid anteroseptal wall consistent with infarction with mild richa-infarct ischemia, LVEF 47%    Outpatient TTE (12/2019):  LVEF 45%  Basal and mid inferior wall severe hypokinesis  Moderate to severe mitral regurgitation  Mild to moderate aortic regurgitation  Large mid to distal abdominal aortic aneurysm 5.9 cm     TTE (7/28/2020) images reviewed:   Moderately reduced LVEF ~35- 40%, abnormal septal motion likely due to conduction delay  Normal RV size and systolic function  Severely dilated left atrium   Mild AR, Moderate MR, Mild TR   No aortic stenosis   RVSP ~ 22 mmHg

## 2020-08-05 NOTE — DISCHARGE NOTE PROVIDER - CARE PROVIDER_API CALL
Handy Del Castillo)  Cardiac Electrophysiology; Cardiovascular Disease; Internal Medicine  [address] [place] [id number]  Phone: [phone]  Fax: [phone]  Follow Up Time: 1 week    primary care,   Phone: (   )    -  Fax: (   )    -  Follow Up Time: 1 week

## 2020-08-05 NOTE — PROGRESS NOTE ADULT - PROVIDER SPECIALTY LIST ADULT
Cardiology
Electrophysiology
Hospitalist
Internal Medicine
Cardiology
Critical Care
Cardiology
Hospitalist

## 2020-08-06 ENCOUNTER — NON-APPOINTMENT (OUTPATIENT)
Age: 85
End: 2020-08-06

## 2020-08-06 ENCOUNTER — APPOINTMENT (OUTPATIENT)
Dept: CARDIOLOGY | Facility: CLINIC | Age: 85
End: 2020-08-06

## 2020-08-07 NOTE — CDI QUERY NOTE - NSCDIOTHERTXTBX_GEN_ALL_CORE_HH
The patient received a nutrition assessment by a Registered Dietician on 7/31.  The documentation of this assessment states: Severe Protein Calorie Malnutrition       If you agree with diagnosis please document in d/c note with clinical findings and treatment    -	Severe Protein Calorie Malnutrition  -	Other (please specify)  -	Clinically unable to be determined    Chart Documentation:      Nutrition diagnosis yes... Nutrition Diagnostic #1.     Nutrition Diagnostic Terminology #1 Malnutrition...     Malnutrition Severe (acute on chronic).     Etiology Related to inadequate protein energy intake with decreased appetite in setting of recent AAA repair, CHF, CHB.     Signs/Symptoms as evidenced by meeting less then 50% est needs > 7 days and severe muscle/fat loss.     Goal/Expected Outcome Improve Nutrition status.        Upon Nutritional Assessment by the Registered Dietitian your patient was determined to meet criteria / has evidence of the following diagnosis/diagnoses:          [ ]  Mild Protein Calorie Malnutrition        [ ]  Moderate Protein Calorie Malnutrition        [x ] Severe Protein Calorie Malnutrition        [ ] Unspecified Protein Calorie Malnutrition        [ ] Underweight / BMI <19        [ ] Morbid Obesity / BMI > 40      Findings as based on:  •  Comprehensive nutrition assessment and consultation  •  Calorie counts (nutrient intake analysis)  •  Food acceptance and intake status from observations by staff  •  Follow up  •  Patient education  •  Intervention secondary to interdisciplinary rounds  •   concerns      Treatment:    The following diet has been recommended:    Ensure TID.

## 2020-08-11 NOTE — ED PROVIDER NOTE - NS ED ROS FT
Please note that the following visit was completed using two-way, real-time interactive audio and/or video communication.   This has been done in good jaclyn to provide continuity of care in the best interest of the provider-patient relationship, due to the patient was made aware of where to find [facility] notice of privacy practices, telehealth consent form and other related consent forms and documents. which are located on the [facility] website.  The patient verbally agreed to telehealth consent form, related consent appropriately. Patient did not appear short of breath. ASSESSMENT/PLAN:   Redness of skin  (primary encounter diagnosis)    1.  Redness of skin  -After discussion with patient's mother, advised the following:  -Told to watch rash  -Can apply moisterizer Constitutional: no fever, sweats, and no chills.  Eyes: no pain, no redness, and no visual changes.  ENMT: no ear pain and no hearing problems, no nasal congestion/drainage, no dysphagia, and no throat pain, no neck pain, no stiffness  CV: no chest pain, no edema.  Resp: no cough, no dyspnea  GI: no abdominal pain, no bloating, no constipation, no diarrhea, +NAUSEA and no vomiting.  : no dysuria, no hematuria  MSK: no msk pain, no weakness  Skin: no lesions, and no rashes.  Neuro: no LOC, no headache, no sensory deficits

## 2020-08-17 ENCOUNTER — APPOINTMENT (OUTPATIENT)
Dept: ELECTROPHYSIOLOGY | Facility: CLINIC | Age: 85
End: 2020-08-17
Payer: MEDICARE

## 2020-08-17 VITALS — HEART RATE: 73 BPM | OXYGEN SATURATION: 99 % | SYSTOLIC BLOOD PRESSURE: 130 MMHG | DIASTOLIC BLOOD PRESSURE: 63 MMHG

## 2020-08-17 PROCEDURE — 93284 PRGRMG EVAL IMPLANTABLE DFB: CPT

## 2020-08-17 NOTE — CDI QUERY NOTE - NSCDIOTHERTXTBX_GEN_ALL_CORE_HH
This patient is documented with DESIRAE/acute renal failure.  This patient does not appear to meet [facility]'s policy using KDIGO criteria.    The [employer]’s (KDIGO) definition of DESIRAE include an increase in SCr of = 0.3mg/dl with 48 hours or   = 1.5 times baseline or urine volume < 0.5ml/kg/h for 6 hours, which the [facility] policy for reporting DESIRAE is based on.    Please refer to [facility] Policy # C1 “The Reporting of DESIRAE in Adult/Pediatric Patients” for full detail.    creatinine 1/30/20- 1.03      Please document an addendum to the D/C summary your criteria for the diagnosis of DESIRAE, or document if DESIRAE was ruled out.  Thank you      Creatinine Trend:  Comprehensive Metabolic Panel (07.27.20 @ 10:22)    Creatinine, Serum: 1.60 mg/dL  Basic Metabolic Panel in AM (07.28.20 @ 06:44)    Creatinine, Serum: 1.57 mg/dL  Basic Metabolic Panel in AM (07.29.20 @ 06:16)    Creatinine, Serum: 1.55 mg/dL  Creatinine, Serum: 1.44 mg/dL (07.31.20 @ 02:36)  Creatinine, Serum: 1.67 mg/dL (08.05.20 @ 07:14)      Chart Documentation:  H&P- # DESIRAE  sec to hypoperfusion sec to CHF  Hold ACEi / ARBs  But diuretics medical necessity  Avoid nephrotoxics  renally dose meds    7/31 Crit Care-  DESIRAE on CKD

## 2020-08-18 NOTE — HISTORY OF PRESENT ILLNESS
[de-identified] : Mr. Collazo is an 88 y/m with history of CAD s/p prior CABG, HFrEF ( EF 25-30%, and NYHA 2-3 despite GDMT), AAA, s/p recent EVAR, long standing LBBB and first degree AVB and now with intermittent CHB. He is now s/p implantation of Medtronic His bundle Defib implant on 7/30/20. HIS lead placed after attempts to Cannulate CS were unsuccessful. \par \par Interrogation of Medtronic device reveals normal function. RV threshold elevated since POD #1, threshold 3.25 @ 1.0 . His lead in LV port with selective HIS capture at 5.5V@ 1.0, with non-selective his capture through 1.0V @ 1.0, with septal capture at 0.75V @ 1.0 ms. Patient reports he has been feeling well, slowly getting stronger post hospitalization.

## 2020-08-18 NOTE — PROCEDURE
[No] : not [NSR] : normal sinus rhythm [CRT-D] : Cardiac resynchronization therapy defibrillator [DDD] : DDD [Longevity: ___ months] : The estimated remaining battery life is [unfilled] months [Threshold Testing Performed] : Threshold testing was performed [Sensing Amplitude ___mv] : sensing amplitude was [unfilled] mv [___V @] : [unfilled] V [___ ms] : [unfilled] ms [Lead Imp:  ___ohms] : lead impedance was [unfilled] ohms [de-identified] : [id number] [de-identified] : Medtronic  [de-identified] : Ramiro  [de-identified] : 07/31/20 [de-identified] : 50 [de-identified] : AP- 15.1%, Effective RV/HIS pacing 99.1%

## 2020-08-18 NOTE — DISCUSSION/SUMMARY
[FreeTextEntry1] : Mr. Collazo is an 88 y/m with history of CAD s/p prior CABG, HFrEF ( EF 25-30%, and NYHA 2-3 despite GDMT), AAA, s/p recent EVAR, long standing LBBB and first degree AVB and now with intermittent CHB. He is now s/p implantation of Medtronic His bundle Defib implant on 7/30/20. HIS lead placed after attempts to Cannulate CS were unsuccessful. \par \par Interrogation of Medtronic device reveals normal function. RV threshold elevated since POD #1, threshold 3.25 @ 1.0 . His lead in LV port with selective HIS capture at 5.5V@ 1.0, with non-selective his capture through 1.0V @ 1.0, with septal capture at 0.75V @ 1.0 ms. Patient reports he has been feeling well, slowly getting stronger post hospitalization. \par \par Recommendation: \par \par Site care, post op arm restrictions and remote f/u reviewed. As discussed with Dr. Jiang will arrange repeat DVC interrogation to reassess pacing thresholds. IF RV pacing thresholds remains elevated to consider testing in unipolar, with possible adaptive CRT pacing to utilize HIS Lead ( in LV port)  only to maximize battery longevity with non-selective his capture. \par \par Eda LIU

## 2020-08-31 ENCOUNTER — NON-APPOINTMENT (OUTPATIENT)
Age: 85
End: 2020-08-31

## 2020-08-31 ENCOUNTER — APPOINTMENT (OUTPATIENT)
Dept: CARDIOLOGY | Facility: CLINIC | Age: 85
End: 2020-08-31
Payer: MEDICARE

## 2020-08-31 VITALS
HEART RATE: 65 BPM | RESPIRATION RATE: 16 BRPM | SYSTOLIC BLOOD PRESSURE: 120 MMHG | HEIGHT: 67 IN | BODY MASS INDEX: 23.54 KG/M2 | OXYGEN SATURATION: 97 % | WEIGHT: 150 LBS | TEMPERATURE: 98.9 F | DIASTOLIC BLOOD PRESSURE: 58 MMHG

## 2020-08-31 DIAGNOSIS — I34.0 NONRHEUMATIC MITRAL (VALVE) INSUFFICIENCY: ICD-10-CM

## 2020-08-31 DIAGNOSIS — I73.9 PERIPHERAL VASCULAR DISEASE, UNSPECIFIED: ICD-10-CM

## 2020-08-31 DIAGNOSIS — Z95.0 PRESENCE OF CARDIAC PACEMAKER: ICD-10-CM

## 2020-08-31 DIAGNOSIS — I42.0 ISCHEMIC CARDIOMYOPATHY: ICD-10-CM

## 2020-08-31 DIAGNOSIS — G47.00 INSOMNIA, UNSPECIFIED: ICD-10-CM

## 2020-08-31 DIAGNOSIS — N40.0 BENIGN PROSTATIC HYPERPLASIA WITHOUT LOWER URINARY TRACT SYMPMS: ICD-10-CM

## 2020-08-31 DIAGNOSIS — I25.5 ISCHEMIC CARDIOMYOPATHY: ICD-10-CM

## 2020-08-31 DIAGNOSIS — Z86.79 PERSONAL HISTORY OF OTHER DISEASES OF THE CIRCULATORY SYSTEM: ICD-10-CM

## 2020-08-31 DIAGNOSIS — K59.00 CONSTIPATION, UNSPECIFIED: ICD-10-CM

## 2020-08-31 PROCEDURE — 99214 OFFICE O/P EST MOD 30 MIN: CPT

## 2020-08-31 PROCEDURE — 93000 ELECTROCARDIOGRAM COMPLETE: CPT

## 2020-08-31 RX ORDER — CHLORHEXIDINE GLUCONATE 4 %
325 (65 FE) LIQUID (ML) TOPICAL DAILY
Refills: 0 | Status: ACTIVE | COMMUNITY

## 2020-08-31 RX ORDER — MULTIVIT-MIN/FOLIC/VIT K/LYCOP 400-300MCG
500 TABLET ORAL DAILY
Refills: 0 | Status: ACTIVE | COMMUNITY

## 2020-08-31 RX ORDER — METOPROLOL TARTRATE 25 MG/1
25 TABLET, FILM COATED ORAL TWICE DAILY
Qty: 180 | Refills: 3 | Status: DISCONTINUED | COMMUNITY
End: 2020-08-31

## 2020-08-31 RX ORDER — GLUCOSAMINE HCL/CHONDROITIN SU 500-400 MG
3 CAPSULE ORAL
Refills: 0 | Status: ACTIVE | COMMUNITY

## 2020-08-31 RX ORDER — FUROSEMIDE 20 MG/1
20 TABLET ORAL
Qty: 90 | Refills: 3 | Status: DISCONTINUED | COMMUNITY
Start: 2020-08-31 | End: 2020-08-31

## 2020-08-31 RX ORDER — ISOSORBIDE MONONITRATE 60 MG/1
60 TABLET, EXTENDED RELEASE ORAL DAILY
Qty: 90 | Refills: 3 | Status: DISCONTINUED | COMMUNITY
End: 2020-08-31

## 2020-08-31 RX ORDER — SENNOSIDES 8.6 MG/1
8.6 CAPSULE, GELATIN COATED ORAL
Refills: 0 | Status: DISCONTINUED | COMMUNITY
End: 2020-08-31

## 2020-08-31 RX ORDER — TERAZOSIN 5 MG/1
5 CAPSULE ORAL
Refills: 0 | Status: DISCONTINUED | COMMUNITY
End: 2020-08-31

## 2020-08-31 RX ORDER — DOCUSATE SODIUM 100 MG
100 TABLET ORAL DAILY
Refills: 0 | Status: DISCONTINUED | COMMUNITY
End: 2020-08-31

## 2020-08-31 RX ORDER — BISACODYL 5 MG/1
5 TABLET ORAL
Refills: 0 | Status: ACTIVE | COMMUNITY

## 2020-08-31 RX ORDER — CLOPIDOGREL BISULFATE 75 MG/1
75 TABLET, FILM COATED ORAL
Qty: 90 | Refills: 2 | Status: ACTIVE | COMMUNITY

## 2020-08-31 NOTE — REASON FOR VISIT
[FreeTextEntry1] : This is a 88 year old male with a known history of:\par \par 1. Abdominal Aortic aneurysm s/p recent stent graft at [facility]\par 2. CAD with stenting of the  OM1  in 2008 and CABG x 4 1997\par 3. Chest pain syndrome\par 4. Claudication\par 5. inferior wall MI 1997\par 6. HTN\par 7. PAD\par \par S/p  SMA stent,  followed by an infrarenal abdominal aortic graft  7/20/20 with renal artery involvement.\par Amanuel Hess MD\par Phone: [phone]\par He was at moderately increased risk with regard to the revascularization procedures, however, the risk-benefit ratio was clearly in favor of proceeding with the vascular procedure.\par  A few days postprocedure, the patient developed increasing shortness of breath and edema presenting with acute congestive heart failure.\par \par In the course of being diuresis he developed complete heart block and a 9 second pause. Transient hypotension and developed as well.\par The patient dropped his hemoglobin with a small retroperitoneal hematoma.\par \par Subsequently had implant of a Medtronic MRI compatible biventricular generator and because the coronary sinus could not be cannulated he had his bundle resynchronization performed.\par \par Patient was eventually discharged on 8/5/20 with a slightly elevated creatinine but generally euvolemic. He has subsequently improved and his general health with increased strengths mobility and has remained free of any shortness of breath or edema.

## 2020-08-31 NOTE — PHYSICAL EXAM
[Normal Conjunctiva] : the conjunctiva exhibited no abnormalities [Eyelids - No Xanthelasma] : the eyelids demonstrated no xanthelasmas [Normal Jugular Venous A Waves Present] : normal jugular venous A waves present [Normal Jugular Venous V Waves Present] : normal jugular venous V waves present [No Jugular Venous Barlow A Waves] : no jugular venous barlow A waves [Respiration, Rhythm And Depth] : normal respiratory rhythm and effort [Exaggerated Use Of Accessory Muscles For Inspiration] : no accessory muscle use [Auscultation Breath Sounds / Voice Sounds] : lungs were clear to auscultation bilaterally [Abdomen Soft] : soft [Abdomen Tenderness] : non-tender [Abdomen Mass (___ Cm)] : no abdominal mass palpated [FreeTextEntry1] : Ambulates slowly with assistance [] : no rash [No Venous Stasis] : no venous stasis [No Skin Ulcers] : no skin ulcer [Oriented To Time, Place, And Person] : oriented to person, place, and time [Impaired Insight] : insight and judgment were intact [Affect] : the affect was normal

## 2020-08-31 NOTE — ASSESSMENT
[FreeTextEntry1] : % AV sequential pacing\par \par Laboratory data 8/18/20:\par BUN 44\par Creatinine 1.79\par BNP 8633\par \par Lab data 11/19/2019\par Chol. 123\par LDL    54\par HDL   59\par Tri.    48\par Creat. 1.31\par HGB 12.0\par \par Echocardiogram [facility] 7/28/20:\par Left ventricular ejection fraction 25-30%\par Moderate to severe mitral regurgitation\par Moderate aortic valve insufficiency\par \par \par Echo 12/26/19\par EF 45%\par Basal and mid inferior  wall severe Hypokinesis\par ischemic cardiomyopathy\par Normal LV size and function\par Mod. to severe MR\par Mild to mod. aortic aortic regurgitation \par Large mid to distal abdominal aortic aneurysm measuring 5.90 cm\par \par Cardiac catheterization 5/13/8 \par occluded vein graft to diagonal and posterior LV branch\par LIMA to LAD and vein graft to PDA patent\par He had OM1 stented \par \par  Nuclear stress 3/9/20\par Fixed Basal Inferior and posterior defect\par Partially reversible moderate defect of the basal and mid anteroseptum\par \par Impression\par   88  year old male with history of inferior infarction, LV dysfunction s/p  a quadruple bypass 1997 with 2   graft occlusions\par s/p  stenting of OM1 in  2008\par Now status post AAA repair with a fenestrated graft and SMA artery stenting.\par Post hospitalization for acute systolic heart failure\par Status post ICD with resynchronization therapy\par Worsening chronic kidney disease\par Worsening LV systolic function\par \par   \par \par 1. HX of HTN, has been stable. No dizzy spells\par \par 2. Lipids  reasonably controlled with Simvastatin therapy. \par     Creat. of 1.3,was baseline\par     Now increased to 1.7\par \par 3. No anginal discomfort at rest or with exercise. Last  Nuclear stress 3/9/20 showed small scar inferiorly and mild ischemia Anterior/ septum\par \par 4.   Large  expanding aortic abnormal aneurysm s/p Stent graft 7/20/20\par \par 6. Left bundle bloc  pattern chronic on ECG. Now with ICD and CRT\par \par 7. \par Plan\par 1. Will try to wean his Lasix from 88 day to 60 a day and repeat a BMP in a couple weeks. Patient and family understand to contact me with regard to any increasing shortness of breath or edema.\par \par 2. Will establish appropriate care and followup with regard to the ICD to her office.\par \par 3. Progressive ambulation as tolerated. Blood pressures to be followed up through the nursing facility \par

## 2020-08-31 NOTE — HISTORY OF PRESENT ILLNESS
[FreeTextEntry1] : His Coronary history includes inferior infarction with  a quadruple bypass 10/6/97 \par LIMA to LAD\par Vein graft to PDA\par Vein graft to posterior LV branch and diagonal\par \par Cardiac catheterization 5/13/8 revealed:\par occluded vein graft to diagonal and posterior LV branch\par LIMA to LAD and vein graft to PDA patent\par He had  OM1 stented \par These interventions all  done at Health system. He doesn’t remember the exact symptoms that prompted intervention.\par \par Previously a patient of Dr. Isael Navarro. \par Echo completed in 2014 revealed an EF of 55% with mild- moderate valvular regurgitation and mild left ventricular dilation\par \par Stress test completed in 4/21/10 revealing moderate to large sized region of ischemia involving the anterior wall and anterior septum.\par \par

## 2020-08-31 NOTE — REVIEW OF SYSTEMS
[Feeling Fatigued] : feeling fatigued [Joint Stiffness] : joint stiffness [Joint Pain] : joint pain [Negative] : Heme/Lymph [Recent Weight Gain (___ Lbs)] : no recent weight gain [Recent Weight Loss (___ Lbs)] : no recent weight loss

## 2020-09-03 RX ORDER — PANTOPRAZOLE 40 MG/1
40 TABLET, DELAYED RELEASE ORAL DAILY
Qty: 90 | Refills: 2 | Status: ACTIVE | COMMUNITY
Start: 1900-01-01 | End: 1900-01-01

## 2020-09-14 ENCOUNTER — TRANSCRIPTION ENCOUNTER (OUTPATIENT)
Age: 85
End: 2020-09-14

## 2020-09-16 RX ORDER — FUROSEMIDE 40 MG/1
40 TABLET ORAL TWICE DAILY
Refills: 0 | Status: DISCONTINUED | COMMUNITY
End: 2020-09-16

## 2020-09-22 ENCOUNTER — APPOINTMENT (OUTPATIENT)
Dept: ELECTROPHYSIOLOGY | Facility: CLINIC | Age: 85
End: 2020-09-22

## 2020-11-17 ENCOUNTER — APPOINTMENT (OUTPATIENT)
Dept: ELECTROPHYSIOLOGY | Facility: CLINIC | Age: 85
End: 2020-11-17

## 2020-12-29 ENCOUNTER — APPOINTMENT (OUTPATIENT)
Dept: CARDIOLOGY | Facility: CLINIC | Age: 85
End: 2020-12-29
Payer: MEDICARE

## 2020-12-29 VITALS
TEMPERATURE: 97.3 F | DIASTOLIC BLOOD PRESSURE: 64 MMHG | HEART RATE: 60 BPM | SYSTOLIC BLOOD PRESSURE: 140 MMHG | BODY MASS INDEX: 23.54 KG/M2 | HEIGHT: 67 IN | WEIGHT: 150 LBS | RESPIRATION RATE: 16 BRPM

## 2020-12-29 DIAGNOSIS — K21.9 GASTRO-ESOPHAGEAL REFLUX DISEASE W/OUT ESOPHAGITIS: ICD-10-CM

## 2020-12-29 DIAGNOSIS — Z00.00 ENCOUNTER FOR GENERAL ADULT MEDICAL EXAMINATION W/OUT ABNORMAL FINDINGS: ICD-10-CM

## 2020-12-29 DIAGNOSIS — R07.9 CHEST PAIN, UNSPECIFIED: ICD-10-CM

## 2020-12-29 PROCEDURE — 93000 ELECTROCARDIOGRAM COMPLETE: CPT | Mod: 59

## 2020-12-29 PROCEDURE — 99214 OFFICE O/P EST MOD 30 MIN: CPT

## 2020-12-29 PROCEDURE — 93289 INTERROG DEVICE EVAL HEART: CPT

## 2020-12-29 RX ORDER — SIMVASTATIN 20 MG/1
20 TABLET, FILM COATED ORAL
Qty: 90 | Refills: 3 | Status: DISCONTINUED | COMMUNITY
Start: 1900-01-01 | End: 2020-12-29

## 2020-12-29 RX ORDER — ROSUVASTATIN CALCIUM 20 MG/1
20 TABLET, FILM COATED ORAL DAILY
Qty: 90 | Refills: 3 | Status: ACTIVE | COMMUNITY
Start: 2020-12-29 | End: 1900-01-01

## 2020-12-29 NOTE — HISTORY OF PRESENT ILLNESS
[FreeTextEntry1] : His Coronary history includes inferior infarction with  a quadruple bypass 10/6/97 \par LIMA to LAD\par Vein graft to PDA\par Vein graft to posterior LV branch and diagonal\par \par Cardiac catheterization 5/13/8 revealed:\par occluded vein graft to diagonal and posterior LV branch\par LIMA to LAD and vein graft to PDA patent\par He had  OM1 stented \par These interventions all  done at [facility]. He doesn’t remember the exact symptoms that prompted intervention.\par \par Previously a patient of Dr. Isael Navarro. \par Echo completed in 2014 revealed an EF of 55% with mild- moderate valvular regurgitation and mild left ventricular dilation\par \par Stress test completed in 4/21/10 revealing moderate to large sized region of ischemia involving the anterior wall and anterior septum.\par \par

## 2020-12-29 NOTE — REASON FOR VISIT
[FreeTextEntry1] : This is a 88 year old male with a known history of:\par \par 1. Abdominal Aortic aneurysm s/p recent stent graft at [facility]\par 2. CAD with stenting of the  OM1  in 2008 and CABG x 4 1997\par 3. Chest pain syndrome\par 4. Claudication\par 5. inferior wall MI 1997\par 6. HTN\par 7. PAD\par 8. CKD\par \par S/p  SMA stent,  followed by an infrarenal abdominal aortic graft  7/20/20 with renal artery involvement.\par Amanuel Hess MD\par Phone: [phone]\par He was at moderately increased risk with regard to the revascularization procedures, however, the risk-benefit ratio was clearly in favor of proceeding with the vascular procedure.\par  A few days postprocedure, the patient developed increasing shortness of breath and edema presenting with acute congestive heart failure.\par \par In the course of being diuresis he developed complete heart block and a 9 second pause. Transient hypotension and developed as well.\par The patient dropped his hemoglobin with a small retroperitoneal hematoma.\par \par Subsequently had implant of a Medtronic MRI compatible biventricular generator and because the coronary sinus could not be cannulated he had his bundle resynchronization performed.\par \par Discharged on 8/5/20 with a slightly elevated creatinine but generally euvolemic. He has subsequently improved and his general health with increased strengths mobility and has remained free of any shortness of breath but has had edema.

## 2020-12-29 NOTE — PHYSICAL EXAM
[Normal Conjunctiva] : the conjunctiva exhibited no abnormalities [Eyelids - No Xanthelasma] : the eyelids demonstrated no xanthelasmas [Normal Jugular Venous A Waves Present] : normal jugular venous A waves present [Normal Jugular Venous V Waves Present] : normal jugular venous V waves present [No Jugular Venous Barlow A Waves] : no jugular venous barlow A waves [Respiration, Rhythm And Depth] : normal respiratory rhythm and effort [Exaggerated Use Of Accessory Muscles For Inspiration] : no accessory muscle use [Auscultation Breath Sounds / Voice Sounds] : lungs were clear to auscultation bilaterally [Abdomen Soft] : soft [Abdomen Tenderness] : non-tender [Abdomen Mass (___ Cm)] : no abdominal mass palpated [] : no rash [No Venous Stasis] : no venous stasis [No Skin Ulcers] : no skin ulcer [Oriented To Time, Place, And Person] : oriented to person, place, and time [Impaired Insight] : insight and judgment were intact [Affect] : the affect was normal [FreeTextEntry1] : Ambulates slowly with assistance

## 2020-12-29 NOTE — ASSESSMENT
[FreeTextEntry1] : % AV sequential pacing\par \par Laboratory data\par \par              8/18/20:    12/24/2020\par BUN             44           40\par Creatinine 1.79          1.76\par BNP          8633\par A1c                             5.8\par Chol                            165\par HDL                              48\par LDL                            101\par       11/19/2019\par Chol. 123\par LDL    54\par HDL   59\par Tri.    48\par Creat. 1.31\par HGB 12.0\par \par ICD interrogation: 100% ventricular biventricular pacing.\par Sensitivities and thresholds are normal\par No therapy delivered.\par 2 episodes of mode switching for paroxysmal atrial fibrillation longest episode 4 and half hours\par \par Echocardiogram [facility] 7/28/20:\par Left ventricular ejection fraction 25-30%\par Moderate to severe mitral regurgitation\par Moderate aortic valve insufficiency\par \par \par Echo 12/26/19\par EF 45%\par Basal and mid inferior  wall severe Hypokinesis\par ischemic cardiomyopathy\par Normal LV size and function\par Mod. to severe MR\par Mild to mod. aortic aortic regurgitation \par Large mid to distal abdominal aortic aneurysm measuring 5.90 cm\par \par Cardiac catheterization 5/13/8 \par occluded vein graft to diagonal and posterior LV branch\par LIMA to LAD and vein graft to PDA patent\par He had OM1 stented \par \par  Nuclear stress 3/9/20\par Fixed Basal Inferior and posterior defect\par Partially reversible moderate defect of the basal and mid anteroseptum\par \par Impression\par   88  year old male with history of inferior infarction, moderately severe LV dysfunction s/p  a quadruple bypass 1997 with 2   graft occlusions -\par -  s/p  stenting of OM1 in  2008-\par \par - status post AAA repair with a fenestrated graft s/p Stent graft 7/20/20  and SMA artery stenting.\par \par - Post hospitalization for acute systolic heart failure\par \par - Status post ICD with resynchronization therapy- \par \par -   chronic kidney disease stage 3\par \par - Worsening LV systolic function \par \par - Drop Hgb from 12 to 11\par \par - 1-2 + edema\par \par -Significant increase in lipid profile from 1 year ago\par \par -  No anginal discomfort at rest or with exercise. Last  Nuclear stress 3/9/20 showed small scar inferiorly and mild ischemia Anterior/ septum\par \par 4.   Large  expanding aortic abnormal aneurysm \par \par 6. Left bundle bloc  pattern chronic on ECG. Now with ICD and CRT\par \par 7.  ICD interrogation suggest 2 mode switches for atrial fibrillation longest of which was 4-1/2 hours.\par Plan\par 1.  Tolerating Lasix  60 a day with no appreciable change in creatinine though mild persistent ankle edema.Repeat a BMP in a couple months . Patient and family understand to contact me with regard to any increasing shortness of breath or edema.\par Suggested monitoring of daily or several times a week weights.  Contact me if increased 3 to 4 pound sustained\par \par 2. Will establish appropriate care and followup with regard to the ICD 3 to 4 months\par \par 3. Progressive ambulation as tolerated. Blood pressures to be followed up through the nursing facility \par \par 4.  In view of gait instability, the use of 2 antiplatelet agents, the drop in hemoglobin, would not advocate introducing an oral anticoagulant for his paroxysmal atrial fibrillation\par

## 2021-01-12 NOTE — PROGRESS NOTE ADULT - SUBJECTIVE AND OBJECTIVE BOX
GYNECOLOGY H&P    Chief Complaint     Chief Complaint   Patient presents with   • Abdominal Pain     History of Present Illness   Savita Dean is a 30 year old  who presents for left lower abdominal pain. Pt was seen on 2021 for similar complaint and was found to have a left complex cystic mass on TVUS. She was discharged with instructions to take ibuprofen, Tylenol and a bowel regimen with f/u at the end of this month. Pt states her pain initially was gone but around 8pm, she became very uncomfortable again, \"crying for 2 hrs\", and Tylenol did not alleviate her pain. Denies dysuria, hematuria, diarrhea. Reports constipation. Denies vomiting, F/C, SOB, CP. Has felt intermittent nausea.   Pt reports significant anxiety regarding new imaging findings of left adnexal cyst and is concerned about her fertility, as she and her  are attempting pregnancy.     Reproductive History:  LMP 2021 , regular monthly cycles, denies dysmenorrhea or menorrhagia. Denies abnormal uterine bleeding.   Hx of fibroids: reports one time dose of depo Provera prior to myomectomy.   Hx of D&C after SAB. Conceived spontaneously within 1-2 months.     OB History    Para Term  AB Living   1       1     SAB TAB Ectopic Molar Multiple Live Births   1                # Outcome Date GA Lbr [place]/2nd Weight Sex Delivery Anes PTL Lv   1 2020              Complications: Status post dilation and curettage     Past Medical History:   Diagnosis Date   • Hyperprolactinemia      Past Surgical History:   Procedure Laterality Date   • Appendectomy     • Dilation and curettage of uterus     • Myomectomy        Medications: Bromocriptine, PNV    ALLERGIES:  No Known Allergies    Family History   Problem Relation Age of Onset   • Cancer Neg Hx         Cervical   • Cancer, Breast Neg Hx    • Cancer, Endometrial Neg Hx    • Cancer, Ovarian Neg Hx    • Cancer, Skin Neg Hx    • Cancer, Colon Neg Hx       Social History      Tobacco Use   • Smoking status: Never Smoker   • Smokeless tobacco: Never Used   Substance Use Topics   • Alcohol use: Never     Frequency: Never   • Drug use: Never     Physical Exam     Vital Last Value 24 Hour Range   Temperature 95.4 °F (35.2 °C) Temp  Min: 95.4 °F (35.2 °C)  Max: 98.1 °F (36.7 °C)   Pulse 75 Pulse  Min: 75  Max: 96   Respiratory 16 Resp  Min: 16  Max: 17   Blood Pressure 101/45 BP  Min: 101/45  Max: 130/90   Pulse Oximetry 98 % SpO2  Min: 98 %  Max: 99 %   CVP   No data recorded     Vital Today Admit   Weight       Height N/A     BMI N/A       Physical Exam  Constitutional:       Appearance: She is not ill-appearing or toxic-appearing.   HENT:      Head: Normocephalic and atraumatic.   Eyes:      Extraocular Movements: Extraocular movements intact.   Neck:      Musculoskeletal: Normal range of motion.   Cardiovascular:      Rate and Rhythm: Normal rate.   Pulmonary:      Effort: Pulmonary effort is normal. No respiratory distress.   Abdominal:      General: A surgical scar is present. There is no distension.      Palpations: Abdomen is soft. There is no mass.      Tenderness: There is no abdominal tenderness. There is no guarding or rebound. Negative signs include Rovsing's sign and McBurney's sign.      Comments: 4 laparoscopic incision sites well-healed.    Musculoskeletal: Normal range of motion.   Neurological:      General: No focal deficit present.      Mental Status: She is alert and oriented to person, place, and time.   Skin:     General: Skin is warm and dry.   Psychiatric:         Mood and Affect: Mood is anxious.         Speech: Speech is tangential.   Vitals signs and nursing note reviewed.         Recent Labs   Lab 01/11/21  2200 01/11/21  0759   WBC 4.0* 3.8*   HCT 40.4 38.6   HGB 13.2 12.6    134*   CREATININE  --  0.56   POTASSIUM  --  3.8   GLUCOSE  --  101*   BUN  --  9   SODIUM  --  139   CHLORIDE  --  107   AST  --  61*   BILIRUBIN  --  0.6   CO2  --  26  cc: bradycardia , chf       interval hx: patient seen and eval. patient comfortable , in no acute distress. no fever or chills. denies any dizziness or palpitations. denies any sob     Vital Signs Last 24 Hrs  T(C): 36.3 (04 Aug 2020 15:16), Max: 36.7 (03 Aug 2020 20:51)  T(F): 97.4 (04 Aug 2020 15:16), Max: 98 (03 Aug 2020 20:51)  HR: 62 (04 Aug 2020 15:16) (62 - 89)  BP: 109/66 (04 Aug 2020 15:16) (96/51 - 143/76)  BP(mean): --  RR: 18 (04 Aug 2020 15:16) (18 - 18)  SpO2: 100% (04 Aug 2020 15:16) (92% - 100%)    PHYSICAL EXAM:  GENERAL: NAD, elderly male aaox3. pallor noted  HEENT: EOMI. PERRLA. Dry MM   Chest: CTA bilaterally, no r/r/w noted  CV: S1S2, RRR, no edema  GI: soft, +BS, NT/ND. Suprapubic region with ecchymosis noted, non tender and has not worsened    Ext: no c/c. RLL chronic venous stasis change. LLE mild edema 1+ noted pitting   Neuro: AAO x3  Skin: warm and dry                          8.7    9.38  )-----------( 282      ( 04 Aug 2020 06:59 )             27.1   08-04    138  |  98  |  45.0<H>  ----------------------------<  100<H>  3.4<L>   |  26.0  |  1.68<H>    Ca    8.1<L>      04 Aug 2020 06:59  Phos  3.2     08-04  Mg     2.2     08-04    TPro  x   /  Alb  3.2<L>  /  TBili  x   /  DBili  x   /  AST  x   /  ALT  x   /  AlkPhos  x   08-03   ALBUMIN  --  3.7     TVUS 1/11/2021:   FINDINGS:  Uterus measures approximately 7.5 cm x 5.4 cm x 5.4 cm.  Endometrial stripe  is heterogeneous and thickened measuring up to nearly 21 mm.  Endometrial  stripe thickness previously measured up to approximately 12 mm.  No  normal-appearing intrauterine pregnancy is evident.  There is a mass lesion along the left aspect of the uterus measuring 6.5 cm  x 4.1 cm x 5.4 cm.  There was a similar lesion along the left aspect of the  uterus on the previous examination measuring 8.2 cm x 4.0 cm x 4.1 cm.   Differences in size may be related to differences in measuring technique.  There is an additional complex left adnexal mass lesion measuring 3.9 cm x  2.9 cm x 3.2 cm.  This measures slightly smaller than the previous  examination where it measured 5.3 cm x 3.2 cm x 3.1 cm.  Differences in  measurement size may be related to differences in measuring technique.  The left ovary is difficult to distinctly visualize but is suspected to be  seen on series 1.  This measures approximately 5.6 cm x 3.0 cm x 4.3 cm  (image 100, series 1).  Cystic areas with septations or associated with the  left ovary, similar to the prior.  Doppler color and spectral flow is seen  within left ovarian tissue.  Right ovary measures 3.0 cm x 1.4 cm x 1.8 cm and contains several  follicles.  There is normal-appearing Doppler color and spectral flow in  the right ovary.  There is a small amount of free fluid surrounding the  right ovary.     IMPRESSION:  Thickened heterogeneous endometrium.  Measured thickness of the endometrial  stripe is increased from the previous examination.  Large mass lesion along the left aspect of the uterus, similar to slightly  decreased in size from the exam performed earlier today.  Neoplasm,  infected fluid collection, exophytic fibroid and complex ovarian cyst is  included in the differential diagnosis.  If the patient has a positive  pregnancy test, ectopic pregnancy  would be included in the differential  diagnosis.  Additional complex left adnexal mass, similar to slightly  decreased in size.  Stable complex cystic left ovarian lesions with septations.  Small volume of nonspecific pelvic free fluid.    Images reviewed with Dr. Beyer. No evidence of ovarian torsion.     Assessment & Plan   Savita Dean is a 30 year old  who presents for recurrent abdominal pain, found to have left complex cystic mass on TVUS without e/o ovarian torsion.   - Pt does not have a surgical abdomen. Hgb stable. Vitals stable as well.   - Reassurance provided: no indication currently to perform diagnostic laparoscopy. Reviewed normal menstrual cycle.   - Recommended ibuprofen 600mg q6hr, tylenol 1g q8hr for pain.   - Advised bowel regimen to allow for regular soft bowel movements   - Pt to follow up with us in the office for repeat imaging of left cystic mass, and preconception counseling.   - Recommended against repeat prolactin testing this morning as pt just restarted bromocriptine. Can f/u at appointment    Dispo: Appointment on 2021 with US.    Attending: Seen and examined with Dr Pepito Bowling MD, PGY-3  2021

## 2021-03-02 ENCOUNTER — APPOINTMENT (OUTPATIENT)
Dept: CARDIOLOGY | Facility: CLINIC | Age: 86
End: 2021-03-02
Payer: MEDICARE

## 2021-03-02 PROCEDURE — 93295 DEV INTERROG REMOTE 1/2/MLT: CPT

## 2021-03-02 PROCEDURE — 93296 REM INTERROG EVL PM/IDS: CPT

## 2021-04-08 ENCOUNTER — APPOINTMENT (OUTPATIENT)
Dept: CARDIOLOGY | Facility: CLINIC | Age: 86
End: 2021-04-08
Payer: MEDICARE

## 2021-04-08 VITALS
HEART RATE: 58 BPM | BODY MASS INDEX: 23.78 KG/M2 | RESPIRATION RATE: 16 BRPM | DIASTOLIC BLOOD PRESSURE: 62 MMHG | SYSTOLIC BLOOD PRESSURE: 138 MMHG | OXYGEN SATURATION: 97 % | HEIGHT: 66 IN | WEIGHT: 148 LBS | TEMPERATURE: 97.9 F

## 2021-04-08 DIAGNOSIS — I25.10 ATHEROSCLEROTIC HEART DISEASE OF NATIVE CORONARY ARTERY W/OUT ANGINA PECTORIS: ICD-10-CM

## 2021-04-08 DIAGNOSIS — I71.4 ABDOMINAL AORTIC ANEURYSM, W/OUT RUPTURE: ICD-10-CM

## 2021-04-08 DIAGNOSIS — E78.00 PURE HYPERCHOLESTEROLEMIA, UNSPECIFIED: ICD-10-CM

## 2021-04-08 DIAGNOSIS — I35.1 NONRHEUMATIC AORTIC (VALVE) INSUFFICIENCY: ICD-10-CM

## 2021-04-08 DIAGNOSIS — I73.9 PERIPHERAL VASCULAR DISEASE, UNSPECIFIED: ICD-10-CM

## 2021-04-08 DIAGNOSIS — I10 ESSENTIAL (PRIMARY) HYPERTENSION: ICD-10-CM

## 2021-04-08 DIAGNOSIS — R60.0 LOCALIZED EDEMA: ICD-10-CM

## 2021-04-08 DIAGNOSIS — I50.42 CHRONIC COMBINED SYSTOLIC (CONGESTIVE) AND DIASTOLIC (CONGESTIVE) HEART FAILURE: ICD-10-CM

## 2021-04-08 DIAGNOSIS — R06.02 SHORTNESS OF BREATH: ICD-10-CM

## 2021-04-08 DIAGNOSIS — I25.2 OLD MYOCARDIAL INFARCTION: ICD-10-CM

## 2021-04-08 PROCEDURE — 99214 OFFICE O/P EST MOD 30 MIN: CPT

## 2021-04-08 PROCEDURE — 93289 INTERROG DEVICE EVAL HEART: CPT

## 2021-04-08 PROCEDURE — 93000 ELECTROCARDIOGRAM COMPLETE: CPT

## 2021-04-08 NOTE — PHYSICAL EXAM
[Normal Conjunctiva] : the conjunctiva exhibited no abnormalities [Eyelids - No Xanthelasma] : the eyelids demonstrated no xanthelasmas [Normal Jugular Venous A Waves Present] : normal jugular venous A waves present [Normal Jugular Venous V Waves Present] : normal jugular venous V waves present [No Jugular Venous Barlow A Waves] : no jugular venous barlow A waves [Respiration, Rhythm And Depth] : normal respiratory rhythm and effort [Exaggerated Use Of Accessory Muscles For Inspiration] : no accessory muscle use [Auscultation Breath Sounds / Voice Sounds] : lungs were clear to auscultation bilaterally [Abdomen Soft] : soft [Abdomen Tenderness] : non-tender [Abdomen Mass (___ Cm)] : no abdominal mass palpated [] : no rash [No Venous Stasis] : no venous stasis [No Skin Ulcers] : no skin ulcer [Oriented To Time, Place, And Person] : oriented to person, place, and time [Impaired Insight] : insight and judgment were intact [Affect] : the affect was normal [FreeTextEntry1] : Multiple subcutaneous hemorrhages

## 2021-04-08 NOTE — HISTORY OF PRESENT ILLNESS
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 He doesn’t remember the exact symptoms that prompted intervention.\par \par Previously a patient of Dr. Isael Navarro. \par Echo completed in 2014 revealed an EF of 55% with mild- moderate valvular regurgitation and mild left ventricular dilation\par \par Stress test completed in 4/21/10 revealing moderate to large sized region of ischemia involving the anterior wall and anterior septum.\par \par Patient weighs himself regularly and there has not been any significant interval change.\par No falls.\par Has had some mild subcutaneous hemorrhages but no major hematomas or bleeds noted.\par \par Anticipates going for cataract surgery in the near future.\par \par

## 2021-04-08 NOTE — ASSESSMENT
[FreeTextEntry1] : % AV sequential pacing\par \par Laboratory data\par \par              8/18/20:    12/24/2020 4/1/2021\par BUN             44           40                40\par Creatinine 1.79          1.76              1.8\par BNP          8633\par A1c                             5.8\par Chol                            165            137\par HDL                              48             56\par LDL                            101             67\par \par       11/19/2019\par Chol. 123\par LDL    54\par HDL   59\par Tri.    48\par Creat. 1.31\par HGB 12.0\par \par ICD interrogation: 100% ventricular biventricular pacing.\par Sensitivities and thresholds are normal\par No therapy delivered.\par 1 episodes of mode switching for paroxysmal atrial fibrillation longest episode  2'22"\par \par Echocardiogram [facility] 7/28/20:\par Left ventricular ejection fraction 25-30%\par Moderate to severe mitral regurgitation\par Moderate aortic valve insufficiency\par \par \par Echo 12/26/19\par EF 45%\par Basal and mid inferior  wall severe Hypokinesis\par ischemic cardiomyopathy\par Normal LV size and function\par Mod. to severe MR\par Mild to mod. aortic aortic regurgitation \par Large mid to distal abdominal aortic aneurysm measuring 5.90 cm\par \par Cardiac catheterization 5/13/8 \par occluded vein graft to diagonal and posterior LV branch\par LIMA to LAD and vein graft to PDA patent\par He had OM1 stented \par \par  Nuclear stress 3/9/20\par Fixed Basal Inferior and posterior defect\par Partially reversible moderate defect of the basal and mid anteroseptum\par \par Impression\par   88  year old male with history of inferior infarction, moderately severe LV dysfunction s/p  a quadruple bypass 1997 with 2   graft occlusions, CKD stage III  -now preop for cataract surgery.\par -  s/p  stenting of OM1 in  2008-\par \par - status post AAA repair with a fenestrated graft s/p Stent graft 7/20/20  and SMA artery stenting.\par \par - Post hospitalization for acute systolic heart failure\par \par - Status post ICD with resynchronization therapy- \par \par -   chronic kidney disease stage 3\par \par -  Worsening LV systolic function by the last echo july 2020\par \par - Drop Hgb from 12 to 11, which alex remained stable \par \par - 1 + edema which has been stable and unchanged on his current diuretic regimen\par \par -Lipids seem very adequately controlled.\par \par -  No anginal discomfort at rest or with exercise. Last  Nuclear stress 3/9/20 showed small scar inferiorly and mild ischemia Anterior/ septum\par \par 4.   Large aortic abnormal aneurysm successfully stented 1 year ago\par \par 6. Left bundle block  pattern chronic on ECG. Now with ICD and CRT\par \par 7.  ICD interrogation suggest 1 mode switches for atrial fibrillation\par \par Plan\par 1.  Tolerating Lasix  60 a day with no appreciable change in creatinine though mild persistent ankle edema\par     Patient and family understand to contact me with regard to any increasing shortness of breath or edema.\par    Suggested continued monitoring of daily or several times a week weights.  Contact me if increased 3 to 4 pound     sustained\par \par 2. Will continue regular followup with regard to the ICD 3 to 4 months\par \par 3. Progressive ambulation as tolerated. Blood pressures to be followed up through the nursing facility \par \par 4.  In view of gait instability, the use of 2 antiplatelet agents, the drop in hemoglobin, would not advocate introducing an oral anticoagulant for his rare episodes of paroxysmal atrial fibrillation\par \par 5.  There is no cardiac contraindication to cataract surgery.\par      Assuming that this will be done with topical anesthetic and perhaps some sedation, there is no reason to stop any of his antiplatelet therapy.\par In the event that anesthesia will be using a injected block then aspirin and Plavix may be stopped for 3 to 4 days before the procedure.\par

## 2021-05-27 RX ORDER — FUROSEMIDE 40 MG/1
40 TABLET ORAL
Qty: 90 | Refills: 1 | Status: ACTIVE | COMMUNITY
Start: 1900-01-01 | End: 1900-01-01

## 2021-05-27 RX ORDER — FUROSEMIDE 20 MG/1
20 TABLET ORAL
Qty: 90 | Refills: 1 | Status: ACTIVE | COMMUNITY
Start: 1900-01-01 | End: 1900-01-01

## 2021-06-22 RX ORDER — RAMIPRIL 2.5 MG/1
2.5 CAPSULE ORAL
Qty: 90 | Refills: 2 | Status: ACTIVE | COMMUNITY
Start: 1900-01-01 | End: 1900-01-01

## 2021-06-22 RX ORDER — METOPROLOL SUCCINATE 50 MG/1
50 TABLET, EXTENDED RELEASE ORAL DAILY
Qty: 90 | Refills: 2 | Status: ACTIVE | COMMUNITY
Start: 1900-01-01 | End: 1900-01-01

## 2021-08-10 ENCOUNTER — APPOINTMENT (OUTPATIENT)
Dept: CARDIOLOGY | Facility: CLINIC | Age: 86
End: 2021-08-10

## 2021-08-18 ENCOUNTER — TRANSCRIPTION ENCOUNTER (OUTPATIENT)
Age: 86
End: 2021-08-18

## 2021-09-17 ENCOUNTER — TRANSCRIPTION ENCOUNTER (OUTPATIENT)
Age: 86
End: 2021-09-17

## 2021-11-03 ENCOUNTER — NON-APPOINTMENT (OUTPATIENT)
Age: 86
End: 2021-11-03

## 2021-11-26 NOTE — ED ADULT NURSE NOTE - IS THE PATIENT ABLE TO BE SCREENED?
Medication: Adderall 30mg  Last Date Filled 10/25/2021  Last appointment addressing medication use: 9/23/2021    CSA in last year: YES    Random Utox in last year: NO  (if any of the above answer NO - schedule with PCP)     Opioids + benzodiazepines? NO  (if the above answer YES - schedule with PCP every 6 months)       All responses suggest:        Yes

## 2021-12-17 ENCOUNTER — INPATIENT (INPATIENT)
Facility: HOSPITAL | Age: 86
LOS: 5 days | Discharge: HOME | DRG: 280 | End: 2021-12-23
Attending: INTERNAL MEDICINE | Admitting: HOSPITALIST
Payer: MEDICARE

## 2021-12-17 VITALS
OXYGEN SATURATION: 95 % | HEIGHT: 67 IN | WEIGHT: 139.99 LBS | SYSTOLIC BLOOD PRESSURE: 172 MMHG | DIASTOLIC BLOOD PRESSURE: 84 MMHG | RESPIRATION RATE: 16 BRPM | TEMPERATURE: 98 F | HEART RATE: 81 BPM

## 2021-12-17 DIAGNOSIS — Z98.890 OTHER SPECIFIED POSTPROCEDURAL STATES: Chronic | ICD-10-CM

## 2021-12-17 DIAGNOSIS — Z95.1 PRESENCE OF AORTOCORONARY BYPASS GRAFT: Chronic | ICD-10-CM

## 2021-12-17 DIAGNOSIS — I25.10 ATHEROSCLEROTIC HEART DISEASE OF NATIVE CORONARY ARTERY WITHOUT ANGINA PECTORIS: Chronic | ICD-10-CM

## 2021-12-17 DIAGNOSIS — Z96.641 PRESENCE OF RIGHT ARTIFICIAL HIP JOINT: Chronic | ICD-10-CM

## 2021-12-17 LAB
ALBUMIN SERPL ELPH-MCNC: 3.3 G/DL — SIGNIFICANT CHANGE UP (ref 3.3–5)
ALP SERPL-CCNC: 107 U/L — SIGNIFICANT CHANGE UP (ref 40–120)
ALT FLD-CCNC: 25 U/L — SIGNIFICANT CHANGE UP (ref 12–78)
ANION GAP SERPL CALC-SCNC: 6 MMOL/L — SIGNIFICANT CHANGE UP (ref 5–17)
APPEARANCE UR: CLEAR — SIGNIFICANT CHANGE UP
APTT BLD: 35.1 SEC — SIGNIFICANT CHANGE UP (ref 27.5–35.5)
AST SERPL-CCNC: 20 U/L — SIGNIFICANT CHANGE UP (ref 15–37)
BASOPHILS # BLD AUTO: 0.03 K/UL — SIGNIFICANT CHANGE UP (ref 0–0.2)
BASOPHILS NFR BLD AUTO: 0.4 % — SIGNIFICANT CHANGE UP (ref 0–2)
BILIRUB SERPL-MCNC: 0.7 MG/DL — SIGNIFICANT CHANGE UP (ref 0.2–1.2)
BILIRUB UR-MCNC: NEGATIVE — SIGNIFICANT CHANGE UP
BUN SERPL-MCNC: 50 MG/DL — HIGH (ref 7–23)
CALCIUM SERPL-MCNC: 8.7 MG/DL — SIGNIFICANT CHANGE UP (ref 8.5–10.1)
CHLORIDE SERPL-SCNC: 108 MMOL/L — SIGNIFICANT CHANGE UP (ref 96–108)
CK SERPL-CCNC: 74 U/L — SIGNIFICANT CHANGE UP (ref 26–308)
CO2 SERPL-SCNC: 25 MMOL/L — SIGNIFICANT CHANGE UP (ref 22–31)
COLOR SPEC: YELLOW — SIGNIFICANT CHANGE UP
CREAT SERPL-MCNC: 2.22 MG/DL — HIGH (ref 0.5–1.3)
DIFF PNL FLD: ABNORMAL
EOSINOPHIL # BLD AUTO: 0.21 K/UL — SIGNIFICANT CHANGE UP (ref 0–0.5)
EOSINOPHIL NFR BLD AUTO: 2.8 % — SIGNIFICANT CHANGE UP (ref 0–6)
GLUCOSE SERPL-MCNC: 117 MG/DL — HIGH (ref 70–99)
GLUCOSE UR QL: NEGATIVE MG/DL — SIGNIFICANT CHANGE UP
HCT VFR BLD CALC: 33.3 % — LOW (ref 39–50)
HGB BLD-MCNC: 10.6 G/DL — LOW (ref 13–17)
IMM GRANULOCYTES NFR BLD AUTO: 0.4 % — SIGNIFICANT CHANGE UP (ref 0–1.5)
INR BLD: 1.18 RATIO — HIGH (ref 0.88–1.16)
KETONES UR-MCNC: NEGATIVE — SIGNIFICANT CHANGE UP
LEUKOCYTE ESTERASE UR-ACNC: ABNORMAL
LYMPHOCYTES # BLD AUTO: 1.05 K/UL — SIGNIFICANT CHANGE UP (ref 1–3.3)
LYMPHOCYTES # BLD AUTO: 14.2 % — SIGNIFICANT CHANGE UP (ref 13–44)
MAGNESIUM SERPL-MCNC: 2.4 MG/DL — SIGNIFICANT CHANGE UP (ref 1.6–2.6)
MCHC RBC-ENTMCNC: 30.9 PG — SIGNIFICANT CHANGE UP (ref 27–34)
MCHC RBC-ENTMCNC: 31.8 GM/DL — LOW (ref 32–36)
MCV RBC AUTO: 97.1 FL — SIGNIFICANT CHANGE UP (ref 80–100)
MONOCYTES # BLD AUTO: 0.65 K/UL — SIGNIFICANT CHANGE UP (ref 0–0.9)
MONOCYTES NFR BLD AUTO: 8.8 % — SIGNIFICANT CHANGE UP (ref 2–14)
NEUTROPHILS # BLD AUTO: 5.41 K/UL — SIGNIFICANT CHANGE UP (ref 1.8–7.4)
NEUTROPHILS NFR BLD AUTO: 73.4 % — SIGNIFICANT CHANGE UP (ref 43–77)
NITRITE UR-MCNC: NEGATIVE — SIGNIFICANT CHANGE UP
NT-PROBNP SERPL-SCNC: HIGH PG/ML (ref 0–450)
PH UR: 5 — SIGNIFICANT CHANGE UP (ref 5–8)
PLATELET # BLD AUTO: 141 K/UL — LOW (ref 150–400)
POTASSIUM SERPL-MCNC: 4.5 MMOL/L — SIGNIFICANT CHANGE UP (ref 3.5–5.3)
POTASSIUM SERPL-SCNC: 4.5 MMOL/L — SIGNIFICANT CHANGE UP (ref 3.5–5.3)
PROT SERPL-MCNC: 6.8 GM/DL — SIGNIFICANT CHANGE UP (ref 6–8.3)
PROT UR-MCNC: 15 MG/DL
PROTHROM AB SERPL-ACNC: 13.7 SEC — HIGH (ref 10.6–13.6)
RBC # BLD: 3.43 M/UL — LOW (ref 4.2–5.8)
RBC # FLD: 15.2 % — HIGH (ref 10.3–14.5)
SARS-COV-2 RNA SPEC QL NAA+PROBE: SIGNIFICANT CHANGE UP
SODIUM SERPL-SCNC: 139 MMOL/L — SIGNIFICANT CHANGE UP (ref 135–145)
SP GR SPEC: 1.01 — SIGNIFICANT CHANGE UP (ref 1.01–1.02)
TROPONIN I, HIGH SENSITIVITY RESULT: 121.83 NG/L — HIGH
TROPONIN I, HIGH SENSITIVITY RESULT: 36.38 NG/L — SIGNIFICANT CHANGE UP
TROPONIN I, HIGH SENSITIVITY RESULT: 918.65 NG/L — HIGH
UROBILINOGEN FLD QL: NEGATIVE MG/DL — SIGNIFICANT CHANGE UP
WBC # BLD: 7.38 K/UL — SIGNIFICANT CHANGE UP (ref 3.8–10.5)
WBC # FLD AUTO: 7.38 K/UL — SIGNIFICANT CHANGE UP (ref 3.8–10.5)

## 2021-12-17 PROCEDURE — 99285 EMERGENCY DEPT VISIT HI MDM: CPT | Mod: CS

## 2021-12-17 PROCEDURE — 93306 TTE W/DOPPLER COMPLETE: CPT | Mod: 26

## 2021-12-17 PROCEDURE — 71045 X-RAY EXAM CHEST 1 VIEW: CPT | Mod: 26

## 2021-12-17 RX ORDER — DOXAZOSIN MESYLATE 4 MG
4 TABLET ORAL AT BEDTIME
Refills: 0 | Status: DISCONTINUED | OUTPATIENT
Start: 2021-12-17 | End: 2021-12-23

## 2021-12-17 RX ORDER — LISINOPRIL 2.5 MG/1
10 TABLET ORAL DAILY
Refills: 0 | Status: DISCONTINUED | OUTPATIENT
Start: 2021-12-17 | End: 2021-12-23

## 2021-12-17 RX ORDER — ASPIRIN/CALCIUM CARB/MAGNESIUM 324 MG
325 TABLET ORAL ONCE
Refills: 0 | Status: COMPLETED | OUTPATIENT
Start: 2021-12-17 | End: 2021-12-17

## 2021-12-17 RX ORDER — LANOLIN ALCOHOL/MO/W.PET/CERES
3 CREAM (GRAM) TOPICAL AT BEDTIME
Refills: 0 | Status: DISCONTINUED | OUTPATIENT
Start: 2021-12-17 | End: 2021-12-23

## 2021-12-17 RX ORDER — FERROUS SULFATE 325(65) MG
325 TABLET ORAL DAILY
Refills: 0 | Status: DISCONTINUED | OUTPATIENT
Start: 2021-12-17 | End: 2021-12-23

## 2021-12-17 RX ORDER — FINASTERIDE 5 MG/1
5 TABLET, FILM COATED ORAL DAILY
Refills: 0 | Status: DISCONTINUED | OUTPATIENT
Start: 2021-12-17 | End: 2021-12-23

## 2021-12-17 RX ORDER — CLOPIDOGREL BISULFATE 75 MG/1
75 TABLET, FILM COATED ORAL DAILY
Refills: 0 | Status: DISCONTINUED | OUTPATIENT
Start: 2021-12-17 | End: 2021-12-23

## 2021-12-17 RX ORDER — SODIUM CHLORIDE 9 MG/ML
1000 INJECTION INTRAMUSCULAR; INTRAVENOUS; SUBCUTANEOUS
Refills: 0 | Status: DISCONTINUED | OUTPATIENT
Start: 2021-12-17 | End: 2021-12-18

## 2021-12-17 RX ORDER — PANTOPRAZOLE SODIUM 20 MG/1
40 TABLET, DELAYED RELEASE ORAL DAILY
Refills: 0 | Status: DISCONTINUED | OUTPATIENT
Start: 2021-12-17 | End: 2021-12-23

## 2021-12-17 RX ORDER — ASPIRIN/CALCIUM CARB/MAGNESIUM 324 MG
81 TABLET ORAL DAILY
Refills: 0 | Status: DISCONTINUED | OUTPATIENT
Start: 2021-12-17 | End: 2021-12-23

## 2021-12-17 RX ORDER — FUROSEMIDE 40 MG
40 TABLET ORAL DAILY
Refills: 0 | Status: DISCONTINUED | OUTPATIENT
Start: 2021-12-17 | End: 2021-12-22

## 2021-12-17 RX ORDER — ACETAMINOPHEN 500 MG
650 TABLET ORAL EVERY 6 HOURS
Refills: 0 | Status: DISCONTINUED | OUTPATIENT
Start: 2021-12-17 | End: 2021-12-23

## 2021-12-17 RX ORDER — DIAZEPAM 5 MG
5 TABLET ORAL AT BEDTIME
Refills: 0 | Status: DISCONTINUED | OUTPATIENT
Start: 2021-12-17 | End: 2021-12-20

## 2021-12-17 RX ORDER — POLYETHYLENE GLYCOL 3350 17 G/17G
17 POWDER, FOR SOLUTION ORAL DAILY
Refills: 0 | Status: DISCONTINUED | OUTPATIENT
Start: 2021-12-17 | End: 2021-12-23

## 2021-12-17 RX ORDER — METOPROLOL TARTRATE 50 MG
50 TABLET ORAL DAILY
Refills: 0 | Status: DISCONTINUED | OUTPATIENT
Start: 2021-12-17 | End: 2021-12-23

## 2021-12-17 RX ORDER — ONDANSETRON 8 MG/1
4 TABLET, FILM COATED ORAL EVERY 8 HOURS
Refills: 0 | Status: DISCONTINUED | OUTPATIENT
Start: 2021-12-17 | End: 2021-12-23

## 2021-12-17 RX ORDER — ATORVASTATIN CALCIUM 80 MG/1
80 TABLET, FILM COATED ORAL AT BEDTIME
Refills: 0 | Status: DISCONTINUED | OUTPATIENT
Start: 2021-12-17 | End: 2021-12-23

## 2021-12-17 RX ORDER — MORPHINE SULFATE 50 MG/1
2 CAPSULE, EXTENDED RELEASE ORAL EVERY 4 HOURS
Refills: 0 | Status: DISCONTINUED | OUTPATIENT
Start: 2021-12-17 | End: 2021-12-18

## 2021-12-17 RX ORDER — ASCORBIC ACID 60 MG
500 TABLET,CHEWABLE ORAL DAILY
Refills: 0 | Status: DISCONTINUED | OUTPATIENT
Start: 2021-12-17 | End: 2021-12-23

## 2021-12-17 RX ADMIN — SODIUM CHLORIDE 75 MILLILITER(S): 9 INJECTION INTRAMUSCULAR; INTRAVENOUS; SUBCUTANEOUS at 12:07

## 2021-12-17 RX ADMIN — Medication 50 MILLIGRAM(S): at 17:33

## 2021-12-17 RX ADMIN — LISINOPRIL 10 MILLIGRAM(S): 2.5 TABLET ORAL at 17:32

## 2021-12-17 RX ADMIN — Medication 4 MILLIGRAM(S): at 22:01

## 2021-12-17 RX ADMIN — Medication 325 MILLIGRAM(S): at 12:06

## 2021-12-17 RX ADMIN — ATORVASTATIN CALCIUM 80 MILLIGRAM(S): 80 TABLET, FILM COATED ORAL at 22:01

## 2021-12-17 RX ADMIN — Medication 325 MILLIGRAM(S): at 17:33

## 2021-12-17 RX ADMIN — FINASTERIDE 5 MILLIGRAM(S): 5 TABLET, FILM COATED ORAL at 17:32

## 2021-12-17 NOTE — H&P ADULT - HISTORY OF PRESENT ILLNESS
89 year old male patient with pertinent PMH of CAD presented to the ED complaining of a sudden onset of chest pressure that has since resolved( resolved  in the ED). Patient was in bed when pain presented. Denies associated nausea, vomiting, diaphoresis, pre-syncope or shortness of breath. No clear aggravating symptoms. States pain got better after taking valium.        In the ED patient with No EKG changes. 2nd troponin with mild elevation to 121

## 2021-12-17 NOTE — ED ADULT NURSE NOTE - OBJECTIVE STATEMENT
Pt BIBEMS from [facility], c/o "generalized weakness and discomfort in chest." pt poor historian at this time. pt [place]. pt reports "I wouldn't say I am in pain I just have some discomfort".

## 2021-12-17 NOTE — ED ADULT NURSE REASSESSMENT NOTE - NS ED NURSE REASSESS COMMENT FT1
Pt ambulated with steady gait to bathroom. pt safety maintained, pt assisted into stretcher, x2 side rails up, wheels locked.

## 2021-12-17 NOTE — H&P ADULT - NSHPSOCIALHISTORY_GEN_ALL_CORE
Resides at [facility]. No current smoking, alcohol or drug use

## 2021-12-17 NOTE — H&P ADULT - NSHPPHYSICALEXAM_GEN_ALL_CORE
Vital Signs Last 24 Hrs  T(C): 36.6 (17 Dec 2021 07:43), Max: 36.6 (17 Dec 2021 07:43)  T(F): 97.8 (17 Dec 2021 07:43), Max: 97.8 (17 Dec 2021 07:43)  HR: 81 (17 Dec 2021 07:43) (81 - 81)  BP: 172/84 (17 Dec 2021 07:43) (172/84 - 172/84)  BP(mean): --  RR: 16 (17 Dec 2021 07:43) (16 - 16)  SpO2: 95% (17 Dec 2021 07:43) (95% - 95%)

## 2021-12-17 NOTE — H&P ADULT - ASSESSMENT
89 year old male patient with chest pain that has since resolved        - Place in Observation        # Chest pain  -pain has since resolved  -no EKG changes  -trend troponin  -serial troponin  -get morning echo  -consider Cardiology evaluation if precipitous rises in Troponin noted    # DESIRAE on CKD  -likely pre-renal  -will give IV lasix while in-patient  -trend kidney function    #HTN  -give lisinopril    # CAD  -on asa, statin, Ace- inhibitor    # Hx of CHF  -last EF 25-30 percent in 07/2021  -give IV lasix while inpatient    #DVT ppx  -scds

## 2021-12-17 NOTE — ED PROVIDER NOTE - CPE EDP [facility] NORM
Fine to write note. Anticipate she would be able to participate today in gym unless cough has worsened then may return to gym on 2/13. normal...

## 2021-12-17 NOTE — PATIENT PROFILE ADULT - FALL HARM RISK - HARM RISK INTERVENTIONS

## 2021-12-17 NOTE — ED PROVIDER NOTE - OBJECTIVE STATEMENT
Patient is a 88 yo male with hx of CAD sent by [facility] for evaluation of chest discomfort and "not feeling well." Patient reports that he awoke this AM with chest discomfort- describes as dull; no radiation of pain; no sob; no abdominal pain; no fever or chills; no cough; no urinary complaints; also endorses "not feeling well" but unable to give further information at this time.

## 2021-12-18 LAB
ADD ON TEST-SPECIMEN IN LAB: SIGNIFICANT CHANGE UP
ALBUMIN SERPL ELPH-MCNC: 2.5 G/DL — LOW (ref 3.3–5)
ALP SERPL-CCNC: 85 U/L — SIGNIFICANT CHANGE UP (ref 40–120)
ALT FLD-CCNC: 25 U/L — SIGNIFICANT CHANGE UP (ref 12–78)
ANION GAP SERPL CALC-SCNC: 6 MMOL/L — SIGNIFICANT CHANGE UP (ref 5–17)
APTT BLD: 46.7 SEC — HIGH (ref 27.5–35.5)
APTT BLD: 57.8 SEC — HIGH (ref 27.5–35.5)
AST SERPL-CCNC: 35 U/L — SIGNIFICANT CHANGE UP (ref 15–37)
BASOPHILS # BLD AUTO: 0.03 K/UL — SIGNIFICANT CHANGE UP (ref 0–0.2)
BASOPHILS NFR BLD AUTO: 0.5 % — SIGNIFICANT CHANGE UP (ref 0–2)
BILIRUB SERPL-MCNC: 1 MG/DL — SIGNIFICANT CHANGE UP (ref 0.2–1.2)
BUN SERPL-MCNC: 43 MG/DL — HIGH (ref 7–23)
CALCIUM SERPL-MCNC: 8.4 MG/DL — LOW (ref 8.5–10.1)
CHLORIDE SERPL-SCNC: 112 MMOL/L — HIGH (ref 96–108)
CO2 SERPL-SCNC: 23 MMOL/L — SIGNIFICANT CHANGE UP (ref 22–31)
CREAT SERPL-MCNC: 1.84 MG/DL — HIGH (ref 0.5–1.3)
CULTURE RESULTS: NO GROWTH — SIGNIFICANT CHANGE UP
EOSINOPHIL # BLD AUTO: 0.15 K/UL — SIGNIFICANT CHANGE UP (ref 0–0.5)
EOSINOPHIL NFR BLD AUTO: 2.3 % — SIGNIFICANT CHANGE UP (ref 0–6)
GLUCOSE SERPL-MCNC: 96 MG/DL — SIGNIFICANT CHANGE UP (ref 70–99)
HCT VFR BLD CALC: 30 % — LOW (ref 39–50)
HCT VFR BLD CALC: 30.6 % — LOW (ref 39–50)
HGB BLD-MCNC: 9.6 G/DL — LOW (ref 13–17)
HGB BLD-MCNC: 9.6 G/DL — LOW (ref 13–17)
IMM GRANULOCYTES NFR BLD AUTO: 0.5 % — SIGNIFICANT CHANGE UP (ref 0–1.5)
INR BLD: 1.22 RATIO — HIGH (ref 0.88–1.16)
LYMPHOCYTES # BLD AUTO: 0.75 K/UL — LOW (ref 1–3.3)
LYMPHOCYTES # BLD AUTO: 11.7 % — LOW (ref 13–44)
MCHC RBC-ENTMCNC: 30.7 PG — SIGNIFICANT CHANGE UP (ref 27–34)
MCHC RBC-ENTMCNC: 31.1 PG — SIGNIFICANT CHANGE UP (ref 27–34)
MCHC RBC-ENTMCNC: 31.4 GM/DL — LOW (ref 32–36)
MCHC RBC-ENTMCNC: 32 GM/DL — SIGNIFICANT CHANGE UP (ref 32–36)
MCV RBC AUTO: 97.1 FL — SIGNIFICANT CHANGE UP (ref 80–100)
MCV RBC AUTO: 97.8 FL — SIGNIFICANT CHANGE UP (ref 80–100)
MONOCYTES # BLD AUTO: 0.56 K/UL — SIGNIFICANT CHANGE UP (ref 0–0.9)
MONOCYTES NFR BLD AUTO: 8.8 % — SIGNIFICANT CHANGE UP (ref 2–14)
NEUTROPHILS # BLD AUTO: 4.88 K/UL — SIGNIFICANT CHANGE UP (ref 1.8–7.4)
NEUTROPHILS NFR BLD AUTO: 76.2 % — SIGNIFICANT CHANGE UP (ref 43–77)
PLATELET # BLD AUTO: 127 K/UL — LOW (ref 150–400)
PLATELET # BLD AUTO: 137 K/UL — LOW (ref 150–400)
POTASSIUM SERPL-MCNC: 4.8 MMOL/L — SIGNIFICANT CHANGE UP (ref 3.5–5.3)
POTASSIUM SERPL-SCNC: 4.8 MMOL/L — SIGNIFICANT CHANGE UP (ref 3.5–5.3)
PROT SERPL-MCNC: 5.8 GM/DL — LOW (ref 6–8.3)
PROTHROM AB SERPL-ACNC: 14 SEC — HIGH (ref 10.6–13.6)
RBC # BLD: 3.09 M/UL — LOW (ref 4.2–5.8)
RBC # BLD: 3.13 M/UL — LOW (ref 4.2–5.8)
RBC # FLD: 15.2 % — HIGH (ref 10.3–14.5)
RBC # FLD: 15.3 % — HIGH (ref 10.3–14.5)
SODIUM SERPL-SCNC: 141 MMOL/L — SIGNIFICANT CHANGE UP (ref 135–145)
SPECIMEN SOURCE: SIGNIFICANT CHANGE UP
TROPONIN I, HIGH SENSITIVITY RESULT: 1589.41 NG/L — HIGH
WBC # BLD: 6.4 K/UL — SIGNIFICANT CHANGE UP (ref 3.8–10.5)
WBC # BLD: 8.17 K/UL — SIGNIFICANT CHANGE UP (ref 3.8–10.5)
WBC # FLD AUTO: 6.4 K/UL — SIGNIFICANT CHANGE UP (ref 3.8–10.5)
WBC # FLD AUTO: 8.17 K/UL — SIGNIFICANT CHANGE UP (ref 3.8–10.5)

## 2021-12-18 RX ORDER — HEPARIN SODIUM 5000 [USP'U]/ML
3800 INJECTION INTRAVENOUS; SUBCUTANEOUS EVERY 6 HOURS
Refills: 0 | Status: DISCONTINUED | OUTPATIENT
Start: 2021-12-18 | End: 2021-12-19

## 2021-12-18 RX ORDER — HEPARIN SODIUM 5000 [USP'U]/ML
INJECTION INTRAVENOUS; SUBCUTANEOUS
Qty: 25000 | Refills: 0 | Status: DISCONTINUED | OUTPATIENT
Start: 2021-12-18 | End: 2021-12-19

## 2021-12-18 RX ORDER — MULTIVIT-MIN/FERROUS GLUCONATE 9 MG/15 ML
1 LIQUID (ML) ORAL DAILY
Refills: 0 | Status: DISCONTINUED | OUTPATIENT
Start: 2021-12-18 | End: 2021-12-23

## 2021-12-18 RX ORDER — HEPARIN SODIUM 5000 [USP'U]/ML
3800 INJECTION INTRAVENOUS; SUBCUTANEOUS ONCE
Refills: 0 | Status: COMPLETED | OUTPATIENT
Start: 2021-12-18 | End: 2021-12-18

## 2021-12-18 RX ORDER — ZINC SULFATE TAB 220 MG (50 MG ZINC EQUIVALENT) 220 (50 ZN) MG
220 TAB ORAL DAILY
Refills: 0 | Status: DISCONTINUED | OUTPATIENT
Start: 2021-12-18 | End: 2021-12-23

## 2021-12-18 RX ADMIN — HEPARIN SODIUM 850 UNIT(S)/HR: 5000 INJECTION INTRAVENOUS; SUBCUTANEOUS at 16:18

## 2021-12-18 RX ADMIN — Medication 81 MILLIGRAM(S): at 09:50

## 2021-12-18 RX ADMIN — FINASTERIDE 5 MILLIGRAM(S): 5 TABLET, FILM COATED ORAL at 09:50

## 2021-12-18 RX ADMIN — Medication 50 MILLIGRAM(S): at 09:50

## 2021-12-18 RX ADMIN — CLOPIDOGREL BISULFATE 75 MILLIGRAM(S): 75 TABLET, FILM COATED ORAL at 09:50

## 2021-12-18 RX ADMIN — ATORVASTATIN CALCIUM 80 MILLIGRAM(S): 80 TABLET, FILM COATED ORAL at 21:45

## 2021-12-18 RX ADMIN — Medication 500 MILLIGRAM(S): at 09:50

## 2021-12-18 RX ADMIN — Medication 5 MILLIGRAM(S): at 21:45

## 2021-12-18 RX ADMIN — Medication 4 MILLIGRAM(S): at 21:45

## 2021-12-18 RX ADMIN — Medication 325 MILLIGRAM(S): at 09:50

## 2021-12-18 RX ADMIN — PANTOPRAZOLE SODIUM 40 MILLIGRAM(S): 20 TABLET, DELAYED RELEASE ORAL at 09:50

## 2021-12-18 RX ADMIN — Medication 40 MILLIGRAM(S): at 09:50

## 2021-12-18 RX ADMIN — Medication 3 MILLIGRAM(S): at 21:47

## 2021-12-18 RX ADMIN — HEPARIN SODIUM 3800 UNIT(S): 5000 INJECTION INTRAVENOUS; SUBCUTANEOUS at 09:51

## 2021-12-18 RX ADMIN — Medication 3 MILLIGRAM(S): at 01:43

## 2021-12-18 RX ADMIN — HEPARIN SODIUM 750 UNIT(S)/HR: 5000 INJECTION INTRAVENOUS; SUBCUTANEOUS at 09:51

## 2021-12-18 RX ADMIN — LISINOPRIL 10 MILLIGRAM(S): 2.5 TABLET ORAL at 09:50

## 2021-12-18 NOTE — DIETITIAN INITIAL EVALUATION ADULT. - PERTINENT LABORATORY DATA
12-18    141  |  112<H>  |  43<H>  ----------------------------<  96  4.8   |  23  |  1.84<H>    Ca    8.4<L>      18 Dec 2021 05:29  Mg     2.4     12-17    TPro  5.8<L>  /  Alb  2.5<L>  /  TBili  1.0  /  DBili  x   /  AST  35  /  ALT  25  /  AlkPhos  85  12-18    Iron Total, Serum: 27 ug/dL (07-28-20 @ 06:44)

## 2021-12-18 NOTE — CONSULT NOTE ADULT - SUBJECTIVE AND OBJECTIVE BOX
89 year old male patient with pertinent PMH of CAD presented to the ED complaining of a sudden onset of chest pressure that has since resolved( resolved  in the ED). Patient was in bed when pain presented. Denies associated nausea, vomiting, diaphoresis, pre-syncope or shortness of breath. No clear aggravating symptoms. States pain got better after taking valium.  In the ED patient with No EKG changes. 2nd troponin with mild elevation to 121 (17 Dec 2021 12:12)      he is comfortable in his bed. says he feels better as compared to yesterday.  troponin has peaked and on the way down.  chest xray showed CHF.  Echo in  showed low EF but today, it appears to be normal.       PAST MEDICAL & SURGICAL HISTORY:  HTN (hypertension)    AAA (abdominal aortic aneurysm)    S/P CABG (coronary artery bypass graft)    History of right hip replacement    CAD (coronary artery disease)  s/p stent placed    S/P left inguinal hernia repair    S/P AAA repair  2020      MEDICATIONS  (STANDING):  ascorbic acid 500 milliGRAM(s) Oral daily  aspirin enteric coated 81 milliGRAM(s) Oral daily  atorvastatin 80 milliGRAM(s) Oral at bedtime  bisacodyl Oral Tab/Cap - Peds 5 milliGRAM(s) Oral every 12 hours  clopidogrel Tablet 75 milliGRAM(s) Oral daily  doxazosin 4 milliGRAM(s) Oral at bedtime  ferrous    sulfate 325 milliGRAM(s) Oral daily  finasteride 5 milliGRAM(s) Oral daily  furosemide   Injectable 40 milliGRAM(s) IV Push daily  heparin  Infusion.  Unit(s)/Hr (7.5 mL/Hr) IV Continuous <Continuous>  lisinopril 10 milliGRAM(s) Oral daily  metoprolol succinate ER 50 milliGRAM(s) Oral daily  pantoprazole    Tablet 40 milliGRAM(s) Oral daily  polyethylene glycol 3350 17 Gram(s) Oral daily    MEDICATIONS  (PRN):  acetaminophen     Tablet .. 650 milliGRAM(s) Oral every 6 hours PRN Temp greater or equal to 38C (100.4F), Mild Pain (1 - 3)  diazepam    Tablet 5 milliGRAM(s) Oral at bedtime PRN home med  heparin   Injectable 3800 Unit(s) IV Push every 6 hours PRN For aPTT less than 40  melatonin 3 milliGRAM(s) Oral at bedtime PRN Insomnia  melatonin Oral Tab/Cap - Peds 3 milliGRAM(s) Oral at bedtime PRN Insomnia  ondansetron Injectable 4 milliGRAM(s) IV Push every 8 hours PRN Nausea and/or Vomiting    Allergies    Broccoli (Unknown)  penicillin (Vomiting; Nausea)    Intolerances      FAMILY HISTORY:  No pertinent family history in first degree relatives          REVIEW OF SYSTEMS:    CONSTITUTIONAL: No weakness, fevers or chills  EYES/ENT: No visual changes;  No vertigo or throat pain   NECK: No pain or stiffness  RESPIRATORY: No cough, wheezing, hemoptysis; No shortness of breath  CARDIOVASCULAR: No chest pain or palpitations  GASTROINTESTINAL: No abdominal or epigastric pain. No nausea, vomiting, or hematemesis; No diarrhea or constipation. No melena or hematochezia.  GENITOURINARY: No dysuria, frequency or hematuria  NEUROLOGICAL: No numbness or weakness  SKIN: No itching, burning, rashes, or lesions   All other review of systems is negative unless indicated above      PHYSICAL EXAM:  Daily     Daily Weight in k.7 (18 Dec 2021 06:41)  Vital Signs Last 24 Hrs  T(C): 36.4 (18 Dec 2021 09:49), Max: 36.6 (17 Dec 2021 13:28)  T(F): 97.5 (18 Dec 2021 09:49), Max: 97.9 (17 Dec 2021 13:28)  HR: 64 (18 Dec 2021 09:49) (64 - 82)  BP: 143/60 (18 Dec 2021 09:49) (143/60 - 159/72)  BP(mean): --  RR: 20 (18 Dec 2021 09:49) (16 - 20)  SpO2: 93% (18 Dec 2021 09:49) (87% - 96%)    Constitutional: NAD, awake and alert, well-developed  HEENT: PERR, EOMI, Normal Hearing, MMM  Neck: Soft and supple, No LAD, No JVD  Respiratory: Breath sounds are clear bilaterally, No wheezing, rales or rhonchi  Cardiovascular: S1 and S2, regular rate and rhythm, no Murmurs, gallops or rubs  Gastrointestinal: Bowel Sounds present, soft, nontender, nondistended, no guarding, no rebound  Extremities: No peripheral edema  Vascular: 2+ peripheral pulses  Neurological: A/O x 3, no focal deficits  Musculoskeletal: 5/5 strength b/l upper and lower extremities  Skin: No rashes    LABS: All Labs Reviewed:                        9.6    6.40  )-----------( 127      ( 18 Dec 2021 05:29 )             30.6     12-18    141  |  112<H>  |  43<H>  ----------------------------<  96  4.8   |  23  |  1.84<H>    Ca    8.4<L>      18 Dec 2021 05:29  Mg     2.4         TPro  5.8<L>  /  Alb  2.5<L>  /  TBili  1.0  /  DBili  x   /  AST  35  /  ALT  25  /  AlkPhos  85  1218    PT/INR - ( 18 Dec 2021 05:29 )   PT: 14.0 sec;   INR: 1.22 ratio         PTT - ( 17 Dec 2021 08:09 )  PTT:35.1 sec  CARDIAC MARKERS ( 17 Dec 2021 08:09 )  x     / x     / 74 U/L / x     / x          Blood Culture: Organism --  Gram Stain Blood -- Gram Stain --  Specimen Source Clean Catch None  Culture-Blood --        RADIOLOGY:   < from: Xray Chest 1 View- PORTABLE-Urgent (Xray Chest 1 View- PORTABLE-Urgent .) (21 @ 08:40) >  ACC: [id number] EXAM:  XR CHEST PORTABLE URGENT 1V                          PROCEDURE DATE:  2021          INTERPRETATION:  AP chest on 2021 at 8:30 AM. Patient has   chest pain.    Heart enlargement, sternotomy, left-sided defibrillator again noted.    Above findings are similar to August 3, 2020.    The present film shows significant perihilar infiltrates left greater   than right which would be consistent with CHF.    IMPRESSION: Significant CHF. Stable cardiac findings.    < end of copied text >      CARDIOLOGY TESTING:  Echo results pending but visually, EF 50% with 2-3+ MR, 1-2+ TR, 2+ AI

## 2021-12-18 NOTE — DIETITIAN INITIAL EVALUATION ADULT. - ADD RECOMMEND
1) add gelatein BID to optimize PO intake and meet increased nutr needs 2/2 PU, 2) Recommend to add MVI w/minerals, Vit C 500 mg BID, add Zinc Sulfate 220 mg x 10 days to promote wound healing, 3) Monitor bowel movements, if no BM for >3 days, consider implementing bowel regimen, 4) recommend to obtain SLP speech and swallow evaluation 2/2 reported difficulty swallowing. RD will continue to monitor PO intake, labs, hydration, and wt prn. 1) Liberalize diet to regular to maximize caloric and nutrient intake. Add gelatein BID to optimize PO intake and meet increased nutr needs 2/2 PU, 2) Recommend to add MVI w/minerals, Vit C 500 mg BID, add Zinc Sulfate 220 mg x 10 days to promote wound healing, 3) Monitor bowel movements, if no BM for >3 days, consider implementing bowel regimen, 4) recommend to obtain SLP speech and swallow evaluation 2/2 reported difficulty swallowing. RD will continue to monitor PO intake, labs, hydration, and wt prn.

## 2021-12-18 NOTE — PROGRESS NOTE ADULT - SUBJECTIVE AND OBJECTIVE BOX
HOSPITALIST ATTENDING PROGRESS NOTE    Chart and meds reviewed.  Patient seen and examined.    CC: CP    Subjective: Had CP which prompted presentation, was at rest when happened, x a few minutes. No SOB, palpitations, nausea, diaphoresis. CP free today, trop peaked 1700.    All other systems reviewed and found to be negative with the exception of what has been described above.    MEDICATIONS  (STANDING):  ascorbic acid 500 milliGRAM(s) Oral daily  aspirin enteric coated 81 milliGRAM(s) Oral daily  atorvastatin 80 milliGRAM(s) Oral at bedtime  bisacodyl Oral Tab/Cap - Peds 5 milliGRAM(s) Oral every 12 hours  clopidogrel Tablet 75 milliGRAM(s) Oral daily  doxazosin 4 milliGRAM(s) Oral at bedtime  ferrous    sulfate 325 milliGRAM(s) Oral daily  finasteride 5 milliGRAM(s) Oral daily  furosemide   Injectable 40 milliGRAM(s) IV Push daily  heparin  Infusion.  Unit(s)/Hr (7.5 mL/Hr) IV Continuous <Continuous>  lisinopril 10 milliGRAM(s) Oral daily  metoprolol succinate ER 50 milliGRAM(s) Oral daily  pantoprazole    Tablet 40 milliGRAM(s) Oral daily  polyethylene glycol 3350 17 Gram(s) Oral daily    MEDICATIONS  (PRN):  acetaminophen     Tablet .. 650 milliGRAM(s) Oral every 6 hours PRN Temp greater or equal to 38C (100.4F), Mild Pain (1 - 3)  diazepam    Tablet 5 milliGRAM(s) Oral at bedtime PRN home med  heparin   Injectable 3800 Unit(s) IV Push every 6 hours PRN For aPTT less than 40  melatonin 3 milliGRAM(s) Oral at bedtime PRN Insomnia  melatonin Oral Tab/Cap - Peds 3 milliGRAM(s) Oral at bedtime PRN Insomnia  ondansetron Injectable 4 milliGRAM(s) IV Push every 8 hours PRN Nausea and/or Vomiting      VITALS:  T(F): 97.5 (21 @ 09:49), Max: 97.6 (21 @ 15:15)  HR: 64 (21 @ 09:49) (64 - 82)  BP: 143/60 (21 @ 09:49) (143/60 - 159/72)  RR: 20 (21 @ 09:49) (18 - 20)  SpO2: 93% (21 @ 09:49) (87% - 96%)  Wt(kg): --    I&O's Summary      CAPILLARY BLOOD GLUCOSE          PHYSICAL EXAM:  Gen: NAD  HEENT:  pupils equal and reactive, EOMI, no oropharyngeal lesions, erythema, exudates, oral thrush  NECK:   supple, no carotid bruits, no palpable lymph nodes, no thyromegaly  CV:  +S1, +S2, regular, no murmurs or rubs  RESP:   lungs clear to auscultation bilaterally, no wheezing, rales, rhonchi, good air entry bilaterally  BREAST:  not examined  GI:  abdomen soft, non-tender, non-distended, normal BS, no bruits, no abdominal masses, no palpable masses  RECTAL:  not examined  :  not examined  MSK:   normal muscle tone, no atrophy, no rigidity, no contractions  EXT:  no clubbing, no cyanosis, no edema, no calf pain, swelling or erythema  VASCULAR:  pulses equal and symmetric in the upper and lower extremities  NEURO:  AAOX3, no focal neurological deficits, follows all commands, able to move extremities spontaneously  SKIN:  no ulcers, lesions or rashes    LABS:                            9.6    6.40  )-----------( 127      ( 18 Dec 2021 05:29 )             30.6     12-18    141  |  112<H>  |  43<H>  ----------------------------<  96  4.8   |  23  |  1.84<H>    Ca    8.4<L>      18 Dec 2021 05:29  Mg     2.4     12-17    TPro  5.8<L>  /  Alb  2.5<L>  /  TBili  1.0  /  DBili  x   /  AST  35  /  ALT  25  /  AlkPhos  85  12-18    CARDIAC MARKERS ( 17 Dec 2021 08:09 )  x     / x     / 74 U/L / x     / x          LIVER FUNCTIONS - ( 18 Dec 2021 05:29 )  Alb: 2.5 g/dL / Pro: 5.8 gm/dL / ALK PHOS: 85 U/L / ALT: 25 U/L / AST: 35 U/L / GGT: x           PT/INR - ( 18 Dec 2021 05:29 )   PT: 14.0 sec;   INR: 1.22 ratio         PTT - ( 17 Dec 2021 08:09 )  PTT:35.1 sec  Urinalysis Basic - ( 17 Dec 2021 08:09 )    Color: Yellow / Appearance: Clear / S.010 / pH: x  Gluc: x / Ketone: Negative  / Bili: Negative / Urobili: Negative mg/dL   Blood: x / Protein: 15 mg/dL / Nitrite: Negative   Leuk Esterase: Trace / RBC: 6-10 /HPF / WBC 3-5   Sq Epi: x / Non Sq Epi: Negative / Bacteria: Negative    CULTURES:  UCx NG    Additional results/Imaging, I have personally reviewed:  CXR 21: Significant CHF. Stable cardiac findings.    Echo read pending    Device interrogation ordered    Telemetry, personally reviewed:  21: AV, V paced

## 2021-12-18 NOTE — DIETITIAN INITIAL EVALUATION ADULT. - PERTINENT MEDS FT
MEDICATIONS  (STANDING):  ascorbic acid 500 milliGRAM(s) Oral daily  aspirin enteric coated 81 milliGRAM(s) Oral daily  atorvastatin 80 milliGRAM(s) Oral at bedtime  bisacodyl Oral Tab/Cap - Peds 5 milliGRAM(s) Oral every 12 hours  clopidogrel Tablet 75 milliGRAM(s) Oral daily  doxazosin 4 milliGRAM(s) Oral at bedtime  ferrous    sulfate 325 milliGRAM(s) Oral daily  finasteride 5 milliGRAM(s) Oral daily  furosemide   Injectable 40 milliGRAM(s) IV Push daily  heparin  Infusion.  Unit(s)/Hr (7.5 mL/Hr) IV Continuous <Continuous>  lisinopril 10 milliGRAM(s) Oral daily  metoprolol succinate ER 50 milliGRAM(s) Oral daily  pantoprazole    Tablet 40 milliGRAM(s) Oral daily  polyethylene glycol 3350 17 Gram(s) Oral daily    MEDICATIONS  (PRN):  acetaminophen     Tablet .. 650 milliGRAM(s) Oral every 6 hours PRN Temp greater or equal to 38C (100.4F), Mild Pain (1 - 3)  diazepam    Tablet 5 milliGRAM(s) Oral at bedtime PRN home med  heparin   Injectable 3800 Unit(s) IV Push every 6 hours PRN For aPTT less than 40  melatonin 3 milliGRAM(s) Oral at bedtime PRN Insomnia  melatonin Oral Tab/Cap - Peds 3 milliGRAM(s) Oral at bedtime PRN Insomnia  ondansetron Injectable 4 milliGRAM(s) IV Push every 8 hours PRN Nausea and/or Vomiting

## 2021-12-18 NOTE — DIETITIAN INITIAL EVALUATION ADULT. - MALNUTRITION
Pt meets criteria for severe protein-calorie malnutrition in context of chronic disease Pt meets criteria for severe protein-calorie malnutrition in context of chronic disease r/t decreased ability to meet nutrient needs 2/2 lack of appetite/ difficulty swallowing AEB moderate to severe muscle/ fat wasting

## 2021-12-18 NOTE — DIETITIAN NUTRITION RISK NOTIFICATION - ADDITIONAL COMMENTS/DIETITIAN RECOMMENDATIONS
1) Liberalize diet to regular to maximize caloric and nutrient intake. Add gelatein BID to optimize PO intake and meet increased nutr needs 2/2 PU, 2) Recommend to add MVI w/minerals, Vit C 500 mg BID, add Zinc Sulfate 220 mg x 10 days to promote wound healing, 3) Monitor bowel movements, if no BM for >3 days, consider implementing bowel regimen, 4) recommend to obtain SLP speech and swallow evaluation 2/2 reported difficulty swallowing. RD will continue to monitor PO intake, labs, hydration, and wt prn.

## 2021-12-18 NOTE — PROGRESS NOTE ADULT - ASSESSMENT
89M hx CAD s/p CABG and stent, AAA s/p repair 7/20, now on ASA alone, prior HFrEF, HTN, p/w CP.    #c/f NSTEMI and acute on chronic HFpEF, hx CAD s/p CABG- CP on day prior to presentation and now with elevated trops  -tele  -on RA  -trops peaked  -BNP 1700  -check A1c, lipids  -EKG done  -echo done, read pending (but per Dr. Campuzano, EF 50%)  -ppm interrogation ordered  -ASA, add Plavix  -high dose statin  -heparin drip  -iv lasix for now  -lisinopril, metoprolol  -f/u Justen recs (known to Dr. Hyatt)- ?plan for stress vs cath    #DESIRAE on CKD, likely cardiorenal  -b/l Cr around 1.5  -in setting of above, improving with diuresis, trend    DVT ppx- full a/c w heparin drip 89M hx CAD s/p CABG and stent, AAA s/p repair 7/20, now on ASA alone, prior HFrEF, HTN, p/w CP.    #c/f NSTEMI and acute on chronic HFpEF, hx CAD s/p CABG- CP on day prior to presentation and now with elevated trops  -tele  -on RA  -trops peaked  -BNP 12K  -check A1c, lipids  -EKG done  -echo done, read pending (but per Dr. Campuzano, EF 50%)  -ppm interrogation ordered  -ASA, add Plavix  -high dose statin  -heparin drip  -iv lasix for now  -lisinopril, metoprolol  -f/u Justen recs (known to Dr. Hyatt)- ?plan for stress vs cath    #DESIRAE on CKD, likely cardiorenal  -b/l Cr around 1.5  -in setting of above, improving with diuresis, trend    DVT ppx- full a/c w heparin drip

## 2021-12-18 NOTE — DIETITIAN INITIAL EVALUATION ADULT. - OTHER INFO
89 year old male patient with pertinent PMH of CAD and HTN presented to the ED complaining of a sudden onset of chest pressure that has since resolved.     Very pleasant man. As per RN pt ate ~75% of breakfast today - although noted to be a small meal of oatmeal, coffee, and juice. Pt endorses fair to poor appetite (states "it comes and goes") but has difficulty swallowing sometimes. He is missing his dental bridge so is having some difficulty chewing but reports son is bringing it to  today. Would recommend SLP swallow evaluation to determine safest diet consistency. Reports chronic constipation, takes senna at home to alleviate. Had normal BM yesterday. Denies ensure 2/2 dislikes taste but willing to trial gelatein BID. Reports # - admit wt 140# ? accuracy. Unable to obtain bed scale wt to confirm. RD observed moderate to severe muscle/ fat wasting meeting criteria for PCM. See recommendations below.

## 2021-12-18 NOTE — CONSULT NOTE ADULT - ASSESSMENT
89 year old male patient with pertinent PMH of CAD presented to the ED complaining of a sudden onset of chest pressure that has since resolved( resolved  in the ED). Patient was in bed when pain presented. Denies associated nausea, vomiting, diaphoresis, pre-syncope or shortness of breath. No clear aggravating symptoms. States pain got better after taking valium.  In the ED patient with No EKG changes. 2nd troponin with mild elevation to 121 (17 Dec 2021 12:12)    12/18  EF appears normal on echo.   Troponins have peaked.   CHF on chest xray as well as elevated BNP  continue IV Lasix over the weekend.   stress test on monday.  ambulation.

## 2021-12-18 NOTE — DIETITIAN INITIAL EVALUATION ADULT. - NAME AND PHONE
Post-Anesthesia Evaluation and Assessment    Patient: Sunday Anne Marie MRN: [id number]  SSN: [id number]    YOB: 1945  Age: 67 y.o. Sex: female       Cardiovascular Function/Vital Signs  Visit Vitals    /77    Pulse 69    Temp 37 °C (98.6 °F)    Resp 15    Wt 57.2 kg (126 lb)    SpO2 97%       Patient is status post MAC anesthesia for Procedure(s):  RIGHT EYE MEDICATION INJECTION Vitreous Tap Injection-Antibiotic. Nausea/Vomiting: None    Postoperative hydration reviewed and adequate. Pain:  Pain Scale 1: Numeric (0 - 10) (02/15/18 1234)  Pain Intensity 1: 0 (02/15/18 1234)   Managed    Neurological Status:   Neuro (WDL): Within Defined Limits (02/15/18 1234)   At baseline    Mental Status and Level of Consciousness: Alert and oriented     Pulmonary Status:   O2 Device: Room air (02/15/18 1234)   Adequate oxygenation and airway patent    Complications related to anesthesia: None    Post-anesthesia assessment completed.  No concerns    Signed By: Albaro Katz MD     February 15, 2018
Anusha Gamboa, MS, RDN, [phone]

## 2021-12-18 NOTE — DIETITIAN INITIAL EVALUATION ADULT. - PHYSCIAL ASSESSMENT
Tacho score 19  PU sacrum stage 2  No BM documented - pt states had one yesterday thin, frail, severo/other (specify)

## 2021-12-19 LAB
ANION GAP SERPL CALC-SCNC: 6 MMOL/L — SIGNIFICANT CHANGE UP (ref 5–17)
APTT BLD: 103.1 SEC — HIGH (ref 27.5–35.5)
APTT BLD: 49.2 SEC — HIGH (ref 27.5–35.5)
APTT BLD: 56.3 SEC — HIGH (ref 27.5–35.5)
BUN SERPL-MCNC: 48 MG/DL — HIGH (ref 7–23)
CALCIUM SERPL-MCNC: 8.4 MG/DL — LOW (ref 8.5–10.1)
CHLORIDE SERPL-SCNC: 111 MMOL/L — HIGH (ref 96–108)
CO2 SERPL-SCNC: 25 MMOL/L — SIGNIFICANT CHANGE UP (ref 22–31)
CREAT SERPL-MCNC: 1.79 MG/DL — HIGH (ref 0.5–1.3)
GLUCOSE SERPL-MCNC: 111 MG/DL — HIGH (ref 70–99)
HCT VFR BLD CALC: 28.6 % — LOW (ref 39–50)
HCT VFR BLD CALC: 30.6 % — LOW (ref 39–50)
HGB BLD-MCNC: 9.3 G/DL — LOW (ref 13–17)
HGB BLD-MCNC: 9.9 G/DL — LOW (ref 13–17)
MCHC RBC-ENTMCNC: 31.3 PG — SIGNIFICANT CHANGE UP (ref 27–34)
MCHC RBC-ENTMCNC: 31.7 PG — SIGNIFICANT CHANGE UP (ref 27–34)
MCHC RBC-ENTMCNC: 32.4 GM/DL — SIGNIFICANT CHANGE UP (ref 32–36)
MCHC RBC-ENTMCNC: 32.5 GM/DL — SIGNIFICANT CHANGE UP (ref 32–36)
MCV RBC AUTO: 96.3 FL — SIGNIFICANT CHANGE UP (ref 80–100)
MCV RBC AUTO: 98.1 FL — SIGNIFICANT CHANGE UP (ref 80–100)
PLATELET # BLD AUTO: 133 K/UL — LOW (ref 150–400)
PLATELET # BLD AUTO: 138 K/UL — LOW (ref 150–400)
POTASSIUM SERPL-MCNC: 3.9 MMOL/L — SIGNIFICANT CHANGE UP (ref 3.5–5.3)
POTASSIUM SERPL-SCNC: 3.9 MMOL/L — SIGNIFICANT CHANGE UP (ref 3.5–5.3)
RBC # BLD: 2.97 M/UL — LOW (ref 4.2–5.8)
RBC # BLD: 3.12 M/UL — LOW (ref 4.2–5.8)
RBC # FLD: 15 % — HIGH (ref 10.3–14.5)
RBC # FLD: 15.2 % — HIGH (ref 10.3–14.5)
SODIUM SERPL-SCNC: 142 MMOL/L — SIGNIFICANT CHANGE UP (ref 135–145)
WBC # BLD: 7.56 K/UL — SIGNIFICANT CHANGE UP (ref 3.8–10.5)
WBC # BLD: 7.95 K/UL — SIGNIFICANT CHANGE UP (ref 3.8–10.5)
WBC # FLD AUTO: 7.56 K/UL — SIGNIFICANT CHANGE UP (ref 3.8–10.5)
WBC # FLD AUTO: 7.95 K/UL — SIGNIFICANT CHANGE UP (ref 3.8–10.5)

## 2021-12-19 PROCEDURE — 97530 THERAPEUTIC ACTIVITIES: CPT | Mod: GP

## 2021-12-19 PROCEDURE — 99153 MOD SED SAME PHYS/QHP EA: CPT

## 2021-12-19 PROCEDURE — 36415 COLL VENOUS BLD VENIPUNCTURE: CPT

## 2021-12-19 PROCEDURE — C1894: CPT

## 2021-12-19 PROCEDURE — U0003: CPT

## 2021-12-19 PROCEDURE — C1769: CPT

## 2021-12-19 PROCEDURE — 85027 COMPLETE CBC AUTOMATED: CPT

## 2021-12-19 PROCEDURE — 83880 ASSAY OF NATRIURETIC PEPTIDE: CPT

## 2021-12-19 PROCEDURE — 93459 L HRT ART/GRFT ANGIO: CPT

## 2021-12-19 PROCEDURE — 78452 HT MUSCLE IMAGE SPECT MULT: CPT | Mod: ME

## 2021-12-19 PROCEDURE — A9500: CPT

## 2021-12-19 PROCEDURE — U0005: CPT

## 2021-12-19 PROCEDURE — C1760: CPT

## 2021-12-19 PROCEDURE — 85730 THROMBOPLASTIN TIME PARTIAL: CPT

## 2021-12-19 PROCEDURE — C1887: CPT

## 2021-12-19 PROCEDURE — 97116 GAIT TRAINING THERAPY: CPT | Mod: GP

## 2021-12-19 PROCEDURE — G0269: CPT

## 2021-12-19 PROCEDURE — 99233 SBSQ HOSP IP/OBS HIGH 50: CPT

## 2021-12-19 PROCEDURE — G1004: CPT

## 2021-12-19 PROCEDURE — 80048 BASIC METABOLIC PNL TOTAL CA: CPT

## 2021-12-19 PROCEDURE — 99152 MOD SED SAME PHYS/QHP 5/>YRS: CPT

## 2021-12-19 RX ORDER — HEPARIN SODIUM 5000 [USP'U]/ML
650 INJECTION INTRAVENOUS; SUBCUTANEOUS
Qty: 25000 | Refills: 0 | Status: DISCONTINUED | OUTPATIENT
Start: 2021-12-19 | End: 2021-12-20

## 2021-12-19 RX ORDER — HEPARIN SODIUM 5000 [USP'U]/ML
3800 INJECTION INTRAVENOUS; SUBCUTANEOUS EVERY 6 HOURS
Refills: 0 | Status: DISCONTINUED | OUTPATIENT
Start: 2021-12-19 | End: 2021-12-20

## 2021-12-19 RX ADMIN — Medication 81 MILLIGRAM(S): at 09:28

## 2021-12-19 RX ADMIN — HEPARIN SODIUM 850 UNIT(S)/HR: 5000 INJECTION INTRAVENOUS; SUBCUTANEOUS at 00:05

## 2021-12-19 RX ADMIN — PANTOPRAZOLE SODIUM 40 MILLIGRAM(S): 20 TABLET, DELAYED RELEASE ORAL at 09:29

## 2021-12-19 RX ADMIN — Medication 40 MILLIGRAM(S): at 09:28

## 2021-12-19 RX ADMIN — Medication 5 MILLIGRAM(S): at 01:05

## 2021-12-19 RX ADMIN — HEPARIN SODIUM 650 UNIT(S)/HR: 5000 INJECTION INTRAVENOUS; SUBCUTANEOUS at 09:21

## 2021-12-19 RX ADMIN — Medication 500 MILLIGRAM(S): at 09:29

## 2021-12-19 RX ADMIN — ATORVASTATIN CALCIUM 80 MILLIGRAM(S): 80 TABLET, FILM COATED ORAL at 21:05

## 2021-12-19 RX ADMIN — ZINC SULFATE TAB 220 MG (50 MG ZINC EQUIVALENT) 220 MILLIGRAM(S): 220 (50 ZN) TAB at 09:29

## 2021-12-19 RX ADMIN — Medication 50 MILLIGRAM(S): at 09:29

## 2021-12-19 RX ADMIN — LISINOPRIL 10 MILLIGRAM(S): 2.5 TABLET ORAL at 09:29

## 2021-12-19 RX ADMIN — HEPARIN SODIUM 0 UNIT(S)/HR: 5000 INJECTION INTRAVENOUS; SUBCUTANEOUS at 07:17

## 2021-12-19 RX ADMIN — Medication 325 MILLIGRAM(S): at 09:29

## 2021-12-19 RX ADMIN — Medication 1 TABLET(S): at 09:28

## 2021-12-19 RX ADMIN — HEPARIN SODIUM 650 UNIT(S)/HR: 5000 INJECTION INTRAVENOUS; SUBCUTANEOUS at 16:21

## 2021-12-19 RX ADMIN — HEPARIN SODIUM 0 UNIT(S)/HR: 5000 INJECTION INTRAVENOUS; SUBCUTANEOUS at 07:03

## 2021-12-19 RX ADMIN — Medication 5 MILLIGRAM(S): at 21:05

## 2021-12-19 RX ADMIN — Medication 4 MILLIGRAM(S): at 21:05

## 2021-12-19 RX ADMIN — CLOPIDOGREL BISULFATE 75 MILLIGRAM(S): 75 TABLET, FILM COATED ORAL at 09:29

## 2021-12-19 RX ADMIN — HEPARIN SODIUM 650 UNIT(S)/HR: 5000 INJECTION INTRAVENOUS; SUBCUTANEOUS at 19:24

## 2021-12-19 RX ADMIN — FINASTERIDE 5 MILLIGRAM(S): 5 TABLET, FILM COATED ORAL at 09:29

## 2021-12-19 RX ADMIN — Medication 5 MILLIGRAM(S): at 21:07

## 2021-12-19 NOTE — PHYSICAL THERAPY INITIAL EVALUATION ADULT - GENERAL OBSERVATIONS, REHAB EVAL
Pt was found lying in bed with IV Heparin, on 2 lits of o2, tele, pt is pleasant and willing to participate in PT

## 2021-12-19 NOTE — PROGRESS NOTE ADULT - ASSESSMENT
89 year old male patient with pertinent PMH of CAD presented to the ED complaining of a sudden onset of chest pressure that has since resolved( resolved  in the ED). Patient was in bed when pain presented. Denies associated nausea, vomiting, diaphoresis, pre-syncope or shortness of breath. No clear aggravating symptoms. States pain got better after taking valium.  In the ED patient with No EKG changes. 2nd troponin with mild elevation to 121 (17 Dec 2021 12:12)    12/18  EF appears normal on echo.   Troponins have peaked.   CHF on chest xray as well as elevated BNP  continue IV Lasix over the weekend.   stress test on monday.  ambulation.    12/19  continue IV lasix.   scheduled for Lexiscan stress test tomorrow.   discussed with his son and daughter in law.

## 2021-12-19 NOTE — PROGRESS NOTE ADULT - ASSESSMENT
89M hx CAD s/p CABG and stent, AAA s/p repair 7/20, now on ASA alone, prior HFrEF, HTN, p/w CP.    #c/f NSTEMI and acute on chronic HFpEF, hx CAD s/p CABG- CP on day prior to presentation and now with elevated trops  -tele  -on RA  -trops peaked  -BNP 12K on admission, will repeat in am  -A1c 5.7. lipids done  -EKG done  -echo done, read pending (but per Dr. Campuzano, EF 50%)  -ppm interrogation ordered  -ASA, add Plavix  -high dose statin  -heparin drip  -iv lasix for now, 40iv qd  -lisinopril, metoprolol  -f/u Justen recs (known to Dr. Hyatt)- plan for stress Monday    #DSEIRAE on CKD, likely cardiorenal  -b/l Cr around 1.5  -in setting of above, improving with diuresis, trend    DVT ppx- full a/c w heparin drip

## 2021-12-19 NOTE — PROGRESS NOTE ADULT - SUBJECTIVE AND OBJECTIVE BOX
HOSPITALIST ATTENDING PROGRESS NOTE    Chart and meds reviewed.  Patient seen and examined.    CC: SUSANA    Subjective: Planning for stress tmrw. Iv diuretic continued. Pt requesting valium for sleep, already ordered.    All other systems reviewed and found to be negative with the exception of what has been described above.    MEDICATIONS  (STANDING):  ascorbic acid 500 milliGRAM(s) Oral daily  aspirin enteric coated 81 milliGRAM(s) Oral daily  atorvastatin 80 milliGRAM(s) Oral at bedtime  bisacodyl Oral Tab/Cap - Peds 5 milliGRAM(s) Oral every 12 hours  clopidogrel Tablet 75 milliGRAM(s) Oral daily  doxazosin 4 milliGRAM(s) Oral at bedtime  ferrous    sulfate 325 milliGRAM(s) Oral daily  finasteride 5 milliGRAM(s) Oral daily  furosemide   Injectable 40 milliGRAM(s) IV Push daily  heparin  Infusion. 650 Unit(s)/Hr (6.5 mL/Hr) IV Continuous <Continuous>  lisinopril 10 milliGRAM(s) Oral daily  metoprolol succinate ER 50 milliGRAM(s) Oral daily  multivitamin/minerals 1 Tablet(s) Oral daily  pantoprazole    Tablet 40 milliGRAM(s) Oral daily  polyethylene glycol 3350 17 Gram(s) Oral daily  zinc sulfate 220 milliGRAM(s) Oral daily    MEDICATIONS  (PRN):  acetaminophen     Tablet .. 650 milliGRAM(s) Oral every 6 hours PRN Temp greater or equal to 38C (100.4F), Mild Pain (1 - 3)  diazepam    Tablet 5 milliGRAM(s) Oral at bedtime PRN home med  heparin   Injectable 3800 Unit(s) IV Push every 6 hours PRN For aPTT less than 40  melatonin 3 milliGRAM(s) Oral at bedtime PRN Insomnia  melatonin Oral Tab/Cap - Peds 3 milliGRAM(s) Oral at bedtime PRN Insomnia  ondansetron Injectable 4 milliGRAM(s) IV Push every 8 hours PRN Nausea and/or Vomiting      VITALS:  T(F): 98 (12-19-21 @ 08:55), Max: 98 (12-19-21 @ 08:55)  HR: 67 (12-19-21 @ 08:55) (67 - 70)  BP: 152/68 (12-19-21 @ 08:55) (145/56 - 152/68)  RR: 20 (12-19-21 @ 08:55) (20 - 20)  SpO2: 94% (12-19-21 @ 08:55) (94% - 97%)  Wt(kg): --    I&O's Summary      CAPILLARY BLOOD GLUCOSE          PHYSICAL EXAM:  Gen: NAD  HEENT:  pupils equal and reactive, EOMI, no oropharyngeal lesions, erythema, exudates, oral thrush  NECK:   supple, no carotid bruits, no palpable lymph nodes, no thyromegaly  CV:  +S1, +S2, regular, no murmurs or rubs  RESP:   lungs clear to auscultation bilaterally, no wheezing, rales, rhonchi, good air entry bilaterally  BREAST:  not examined  GI:  abdomen soft, non-tender, non-distended, normal BS, no bruits, no abdominal masses, no palpable masses  RECTAL:  not examined  :  not examined  MSK:   normal muscle tone, no atrophy, no rigidity, no contractions  EXT:  no clubbing, no cyanosis, no edema, no calf pain, swelling or erythema  VASCULAR:  pulses equal and symmetric in the upper and lower extremities  NEURO:  AAOX3, no focal neurological deficits, follows all commands, able to move extremities spontaneously  SKIN:  no ulcers, lesions or rashes    LABS:                            9.3    7.56  )-----------( 138      ( 19 Dec 2021 05:48 )             28.6     12-19    142  |  111<H>  |  48<H>  ----------------------------<  111<H>  3.9   |  25  |  1.79<H>    Ca    8.4<L>      19 Dec 2021 05:48    TPro  5.8<L>  /  Alb  2.5<L>  /  TBili  1.0  /  DBili  x   /  AST  35  /  ALT  25  /  AlkPhos  85  12-18        LIVER FUNCTIONS - ( 18 Dec 2021 05:29 )  Alb: 2.5 g/dL / Pro: 5.8 gm/dL / ALK PHOS: 85 U/L / ALT: 25 U/L / AST: 35 U/L / GGT: x           PT/INR - ( 18 Dec 2021 05:29 )   PT: 14.0 sec;   INR: 1.22 ratio         PTT - ( 19 Dec 2021 05:48 )  PTT:103.1 sec    CULTURES:  UCx NG    Additional results/Imaging, I have personally reviewed:  CXR 12/17/21: Significant CHF. Stable cardiac findings.    Echo read pending    Device interrogation ordered    Telemetry, personally reviewed:  12/18/21: AV, V paced

## 2021-12-19 NOTE — PHYSICAL THERAPY INITIAL EVALUATION ADULT - PERTINENT HX OF CURRENT PROBLEM, REHAB EVAL
89 year old male patient with pertinent PMH of CAD presented to the ED complaining of a sudden onset of chest pressure that has since resolved( resolved  in the ED), troponins have peaked and on their way down., scheduled for stress test tomorrow.

## 2021-12-19 NOTE — PROGRESS NOTE ADULT - SUBJECTIVE AND OBJECTIVE BOX
Patient is a 89y old  Male who presents with a chief complaint of Chest pain  Elevated troponin (18 Dec 2021 14:10)    89 year old male patient with pertinent PMH of CAD presented to the ED complaining of a sudden onset of chest pressure that has since resolved( resolved  in the ED). Patient was in bed when pain presented. Denies associated nausea, vomiting, diaphoresis, pre-syncope or shortness of breath. No clear aggravating symptoms. States pain got better after taking valium.  In the ED patient with No EKG changes. 2nd troponin with mild elevation to 121 (17 Dec 2021 12:12)      he is comfortable in his bed. says he feels better as compared to yesterday.  troponin has peaked and on the way down.  chest xray showed CHF.  Echo in  showed low EF but today, it appears to be normal.       he feels short of breath today.   troponins have peaked and on their way down.  serum creatinine is improving.   scheduled for stress test tomorrow.     Allergies    Broccoli (Unknown)  penicillin (Vomiting; Nausea)    Intolerances        MEDICATIONS  (STANDING):  ascorbic acid 500 milliGRAM(s) Oral daily  aspirin enteric coated 81 milliGRAM(s) Oral daily  atorvastatin 80 milliGRAM(s) Oral at bedtime  bisacodyl Oral Tab/Cap - Peds 5 milliGRAM(s) Oral every 12 hours  clopidogrel Tablet 75 milliGRAM(s) Oral daily  doxazosin 4 milliGRAM(s) Oral at bedtime  ferrous    sulfate 325 milliGRAM(s) Oral daily  finasteride 5 milliGRAM(s) Oral daily  furosemide   Injectable 40 milliGRAM(s) IV Push daily  heparin  Infusion. 650 Unit(s)/Hr (6.5 mL/Hr) IV Continuous <Continuous>  lisinopril 10 milliGRAM(s) Oral daily  metoprolol succinate ER 50 milliGRAM(s) Oral daily  multivitamin/minerals 1 Tablet(s) Oral daily  pantoprazole    Tablet 40 milliGRAM(s) Oral daily  polyethylene glycol 3350 17 Gram(s) Oral daily  zinc sulfate 220 milliGRAM(s) Oral daily    MEDICATIONS  (PRN):  acetaminophen     Tablet .. 650 milliGRAM(s) Oral every 6 hours PRN Temp greater or equal to 38C (100.4F), Mild Pain (1 - 3)  diazepam    Tablet 5 milliGRAM(s) Oral at bedtime PRN home med  heparin   Injectable 3800 Unit(s) IV Push every 6 hours PRN For aPTT less than 40  melatonin 3 milliGRAM(s) Oral at bedtime PRN Insomnia  melatonin Oral Tab/Cap - Peds 3 milliGRAM(s) Oral at bedtime PRN Insomnia  ondansetron Injectable 4 milliGRAM(s) IV Push every 8 hours PRN Nausea and/or Vomiting    REVIEW OF SYSTEMS:    RESPIRATORY: No cough, wheezing, hemoptysis; No shortness of breath  CARDIOVASCULAR: No chest pain or palpitations  All other review of systems is negative unless indicated above      PHYSICAL EXAM:  Daily     Daily Weight in k.8 (19 Dec 2021 04:34)  Vital Signs Last 24 Hrs  T(C): 36.7 (19 Dec 2021 08:55), Max: 36.7 (19 Dec 2021 08:55)  T(F): 98 (19 Dec 2021 08:55), Max: 98 (19 Dec 2021 08:55)  HR: 67 (19 Dec 2021 08:55) (67 - 70)  BP: 152/68 (19 Dec 2021 08:55) (145/56 - 152/68)  BP(mean): --  RR: 20 (19 Dec 2021 08:55) (20 - 20)  SpO2: 94% (19 Dec 2021 08:55) (94% - 97%)    Constitutional: NAD, awake and alert, well-developed  HEENT: PERR, EOMI, Normal Hearing, MMM  Neck: Soft and supple, No LAD, No JVD  Respiratory: Breath sounds are clear bilaterally, No wheezing, rales or rhonchi  Cardiovascular: S1 and S2, regular rate and rhythm, no Murmurs, gallops or rubs  Gastrointestinal: Bowel Sounds present, soft, nontender, nondistended, no guarding, no rebound  Extremities: No peripheral edema  Vascular: 2+ peripheral pulses  Neurological: A/O x 3, no focal deficits  Musculoskeletal: 5/5 strength b/l upper and lower extremities  Skin: No rashes    LABS: All Labs Reviewed:                        9.3    7.56  )-----------( 138      ( 19 Dec 2021 05:48 )             28.6     12-19    142  |  111<H>  |  48<H>  ----------------------------<  111<H>  3.9   |  25  |  1.79<H>    Ca    8.4<L>      19 Dec 2021 05:48    TPro  5.8<L>  /  Alb  2.5<L>  /  TBili  1.0  /  DBili  x   /  AST  35  /  ALT  25  /  AlkPhos  85  12-18    PT/INR - ( 18 Dec 2021 05:29 )   PT: 14.0 sec;   INR: 1.22 ratio         PTT - ( 19 Dec 2021 05:48 )  PTT:103.1 sec          TELEMETRY/EKG: NSR

## 2021-12-20 DIAGNOSIS — R07.9 CHEST PAIN, UNSPECIFIED: ICD-10-CM

## 2021-12-20 LAB
ANION GAP SERPL CALC-SCNC: 5 MMOL/L — SIGNIFICANT CHANGE UP (ref 5–17)
APTT BLD: 61.8 SEC — HIGH (ref 27.5–35.5)
BUN SERPL-MCNC: 46 MG/DL — HIGH (ref 7–23)
CALCIUM SERPL-MCNC: 8.5 MG/DL — SIGNIFICANT CHANGE UP (ref 8.5–10.1)
CHLORIDE SERPL-SCNC: 108 MMOL/L — SIGNIFICANT CHANGE UP (ref 96–108)
CO2 SERPL-SCNC: 28 MMOL/L — SIGNIFICANT CHANGE UP (ref 22–31)
CREAT SERPL-MCNC: 1.89 MG/DL — HIGH (ref 0.5–1.3)
GLUCOSE SERPL-MCNC: 106 MG/DL — HIGH (ref 70–99)
HCT VFR BLD CALC: 32 % — LOW (ref 39–50)
HGB BLD-MCNC: 10.2 G/DL — LOW (ref 13–17)
MCHC RBC-ENTMCNC: 31.2 PG — SIGNIFICANT CHANGE UP (ref 27–34)
MCHC RBC-ENTMCNC: 31.9 GM/DL — LOW (ref 32–36)
MCV RBC AUTO: 97.9 FL — SIGNIFICANT CHANGE UP (ref 80–100)
NT-PROBNP SERPL-SCNC: HIGH PG/ML (ref 0–450)
PLATELET # BLD AUTO: 143 K/UL — LOW (ref 150–400)
POTASSIUM SERPL-MCNC: 3.5 MMOL/L — SIGNIFICANT CHANGE UP (ref 3.5–5.3)
POTASSIUM SERPL-SCNC: 3.5 MMOL/L — SIGNIFICANT CHANGE UP (ref 3.5–5.3)
RBC # BLD: 3.27 M/UL — LOW (ref 4.2–5.8)
RBC # FLD: 14.7 % — HIGH (ref 10.3–14.5)
SODIUM SERPL-SCNC: 141 MMOL/L — SIGNIFICANT CHANGE UP (ref 135–145)
WBC # BLD: 8.27 K/UL — SIGNIFICANT CHANGE UP (ref 3.8–10.5)
WBC # FLD AUTO: 8.27 K/UL — SIGNIFICANT CHANGE UP (ref 3.8–10.5)

## 2021-12-20 PROCEDURE — 99233 SBSQ HOSP IP/OBS HIGH 50: CPT

## 2021-12-20 PROCEDURE — 93284 PRGRMG EVAL IMPLANTABLE DFB: CPT | Mod: 26

## 2021-12-20 RX ORDER — DIAZEPAM 5 MG
5 TABLET ORAL
Refills: 0 | Status: DISCONTINUED | OUTPATIENT
Start: 2021-12-20 | End: 2021-12-23

## 2021-12-20 RX ORDER — POTASSIUM CHLORIDE 20 MEQ
40 PACKET (EA) ORAL ONCE
Refills: 0 | Status: COMPLETED | OUTPATIENT
Start: 2021-12-20 | End: 2021-12-20

## 2021-12-20 RX ORDER — DIAZEPAM 5 MG
5 TABLET ORAL ONCE
Refills: 0 | Status: COMPLETED | OUTPATIENT
Start: 2021-12-20 | End: 2021-12-20

## 2021-12-20 RX ORDER — HEPARIN SODIUM 5000 [USP'U]/ML
5000 INJECTION INTRAVENOUS; SUBCUTANEOUS EVERY 12 HOURS
Refills: 0 | Status: DISCONTINUED | OUTPATIENT
Start: 2021-12-21 | End: 2021-12-23

## 2021-12-20 RX ADMIN — Medication 1 TABLET(S): at 11:13

## 2021-12-20 RX ADMIN — POLYETHYLENE GLYCOL 3350 17 GRAM(S): 17 POWDER, FOR SOLUTION ORAL at 11:14

## 2021-12-20 RX ADMIN — Medication 5 MILLIGRAM(S): at 11:13

## 2021-12-20 RX ADMIN — PANTOPRAZOLE SODIUM 40 MILLIGRAM(S): 20 TABLET, DELAYED RELEASE ORAL at 11:13

## 2021-12-20 RX ADMIN — FINASTERIDE 5 MILLIGRAM(S): 5 TABLET, FILM COATED ORAL at 11:14

## 2021-12-20 RX ADMIN — Medication 325 MILLIGRAM(S): at 11:13

## 2021-12-20 RX ADMIN — HEPARIN SODIUM 750 UNIT(S)/HR: 5000 INJECTION INTRAVENOUS; SUBCUTANEOUS at 07:31

## 2021-12-20 RX ADMIN — LISINOPRIL 10 MILLIGRAM(S): 2.5 TABLET ORAL at 11:14

## 2021-12-20 RX ADMIN — ATORVASTATIN CALCIUM 80 MILLIGRAM(S): 80 TABLET, FILM COATED ORAL at 21:11

## 2021-12-20 RX ADMIN — Medication 81 MILLIGRAM(S): at 11:13

## 2021-12-20 RX ADMIN — Medication 5 MILLIGRAM(S): at 21:11

## 2021-12-20 RX ADMIN — CLOPIDOGREL BISULFATE 75 MILLIGRAM(S): 75 TABLET, FILM COATED ORAL at 11:13

## 2021-12-20 RX ADMIN — Medication 500 MILLIGRAM(S): at 11:14

## 2021-12-20 RX ADMIN — Medication 4 MILLIGRAM(S): at 21:11

## 2021-12-20 RX ADMIN — Medication 40 MILLIEQUIVALENT(S): at 11:13

## 2021-12-20 RX ADMIN — ZINC SULFATE TAB 220 MG (50 MG ZINC EQUIVALENT) 220 MILLIGRAM(S): 220 (50 ZN) TAB at 11:13

## 2021-12-20 RX ADMIN — Medication 40 MILLIGRAM(S): at 11:14

## 2021-12-20 RX ADMIN — HEPARIN SODIUM 750 UNIT(S)/HR: 5000 INJECTION INTRAVENOUS; SUBCUTANEOUS at 00:05

## 2021-12-20 RX ADMIN — Medication 50 MILLIGRAM(S): at 11:14

## 2021-12-20 NOTE — PROGRESS NOTE ADULT - SUBJECTIVE AND OBJECTIVE BOX
HOSPITALIST ATTENDING PROGRESS NOTE    Chart and meds reviewed.  Patient seen and examined.    CC: CP    Subjective: Reports some SOB but amenable to trial on RA to check O2 sat. Stress lab not open today, planned for tmrw. Stopped heparin drip. Requesting valium BID prn.    All other systems reviewed and found to be negative with the exception of what has been described above.    MEDICATIONS  (STANDING):  ascorbic acid 500 milliGRAM(s) Oral daily  aspirin enteric coated 81 milliGRAM(s) Oral daily  atorvastatin 80 milliGRAM(s) Oral at bedtime  bisacodyl Oral Tab/Cap - Peds 5 milliGRAM(s) Oral every 12 hours  clopidogrel Tablet 75 milliGRAM(s) Oral daily  doxazosin 4 milliGRAM(s) Oral at bedtime  ferrous    sulfate 325 milliGRAM(s) Oral daily  finasteride 5 milliGRAM(s) Oral daily  furosemide   Injectable 40 milliGRAM(s) IV Push daily  lisinopril 10 milliGRAM(s) Oral daily  metoprolol succinate ER 50 milliGRAM(s) Oral daily  multivitamin/minerals 1 Tablet(s) Oral daily  pantoprazole    Tablet 40 milliGRAM(s) Oral daily  polyethylene glycol 3350 17 Gram(s) Oral daily  zinc sulfate 220 milliGRAM(s) Oral daily    MEDICATIONS  (PRN):  acetaminophen     Tablet .. 650 milliGRAM(s) Oral every 6 hours PRN Temp greater or equal to 38C (100.4F), Mild Pain (1 - 3)  diazepam    Tablet 5 milliGRAM(s) Oral two times a day PRN anxiety  melatonin 3 milliGRAM(s) Oral at bedtime PRN Insomnia  melatonin Oral Tab/Cap - Peds 3 milliGRAM(s) Oral at bedtime PRN Insomnia  ondansetron Injectable 4 milliGRAM(s) IV Push every 8 hours PRN Nausea and/or Vomiting      VITALS:  T(F): 98.3 (12-20-21 @ 07:58), Max: 98.3 (12-20-21 @ 07:58)  HR: 60 (12-20-21 @ 07:58) (60 - 70)  BP: 144/55 (12-20-21 @ 07:58) (144/55 - 148/59)  RR: 18 (12-20-21 @ 07:58) (18 - 80)  SpO2: 97% (12-20-21 @ 07:58) (95% - 98%)  Wt(kg): --    I&O's Summary      CAPILLARY BLOOD GLUCOSE          PHYSICAL EXAM:  Gen: NAD  HEENT:  pupils equal and reactive, EOMI, no oropharyngeal lesions, erythema, exudates, oral thrush  NECK:   supple, no carotid bruits, no palpable lymph nodes, no thyromegaly  CV:  +S1, +S2, regular, no murmurs or rubs  RESP:   lungs clear to auscultation bilaterally, no wheezing, rales, rhonchi, good air entry bilaterally  BREAST:  not examined  GI:  abdomen soft, non-tender, non-distended, normal BS, no bruits, no abdominal masses, no palpable masses  RECTAL:  not examined  :  not examined  MSK:   normal muscle tone, no atrophy, no rigidity, no contractions  EXT:  no clubbing, no cyanosis, no edema, no calf pain, swelling or erythema  VASCULAR:  pulses equal and symmetric in the upper and lower extremities  NEURO:  AAOX3, no focal neurological deficits, follows all commands, able to move extremities spontaneously  SKIN:  no ulcers, lesions or rashes    LABS:                            10.2   8.27  )-----------( 143      ( 20 Dec 2021 06:38 )             32.0     12-20    141  |  108  |  46<H>  ----------------------------<  106<H>  3.5   |  28  |  1.89<H>    Ca    8.5      20 Dec 2021 06:38            PTT - ( 20 Dec 2021 06:38 )  PTT:61.8 sec    CULTURES:  UCx NG    Additional results/Imaging, I have personally reviewed:  CXR 12/17/21: Significant CHF. Stable cardiac findings.    Echo 12/17/21:  Fibrocalcific changes noted to the mitral valve leaflets with preserved leaflet excursion. At least Moderate (2+) mitral regurgitation is present. Fibrocalcific changes noted to the Aortic valve leaflets with preserved leaflet excursion. Mild (1+) aortic regurgitation is present. Mild (1+) tricuspid valve regurgitation is present. Trace pulmonic valvular regurgitation is present. The left atrium is moderately dilated. Minimally reduced left ventricular systolic function. Estimated left ventricular ejection fraction is 50 %. A device wire is seen in the RV and RA. Normal appearing right atrium. Trace pericardial effusion cannot be ruled out.    Device interrogation 12/20/21:   · VT/VF episode detected or delivered?	No  · Shock Impedance	60 ohms  · Battery	Adequate  · Underlying Rhythm	sinus rhythm, not dependent  · Comments	Optivol is elevated since end of November 2021  · Additional Procedure Details	No recent PAF, last episode May 2021.  No recent recorded events or therapies delivered.  Normal CRT-D function, RV output raised to ensure safety margin.    Telemetry, personally reviewed:  12/18/21: AV, V paced  12/20/21: AV, V paced

## 2021-12-20 NOTE — PROGRESS NOTE ADULT - ASSESSMENT
89M hx CAD s/p CABG and stent, AAA s/p repair 7/20, now on ASA alone, prior HFrEF, HTN, p/w CP.    #c/f NSTEMI and acute on chronic HFpEF, hx CAD s/p CABG- CP on day prior to presentation and now with elevated trops  -tele  -on 2L NC, trial on RA  -trops peaked  -BNP 12K on admission, increased to 17K  -A1c 5.7. lipids done  -EKG done  -echo done, EF 50%  -device interrogation as above  -ASA, add Plavix  -high dose statin  -heparin drip completed 48 hrs, discussed w Dr. Campuzano, will d/c  -iv lasix for now, 40iv qd  -lisinopril, metoprolol  -f/u Justen recs (known to Dr. Hyatt)- plan for stress tmrw    #DESIRAE on CKD, likely cardiorenal  -b/l Cr around 1.5  -in setting of above, trend w diuresis    #DVT ppx- SQH    #from Jacobo HERNADEZ

## 2021-12-20 NOTE — PROGRESS NOTE ADULT - SUBJECTIVE AND OBJECTIVE BOX
Patient is a 89y old  Male who presents with a chief complaint of Chest pain  Elevated troponin (19 Dec 2021 14:38)    89 year old male patient with pertinent PMH of CAD presented to the ED complaining of a sudden onset of chest pressure that has since resolved( resolved  in the ED). Patient was in bed when pain presented. Denies associated nausea, vomiting, diaphoresis, pre-syncope or shortness of breath. No clear aggravating symptoms. States pain got better after taking valium.  In the ED patient with No EKG changes. 2nd troponin with mild elevation to 121 (17 Dec 2021 12:12)      he is comfortable in his bed. says he feels better as compared to yesterday.  troponin has peaked and on the way down.  chest xray showed CHF.  Echo in  showed low EF but today, it appears to be normal.       he feels short of breath today.   troponins have peaked and on their way down.  serum creatinine is improving.   scheduled for stress test tomorrow.       was downstairs for his stress test. After the resting images, the test was aborted because the stress machine was down. He will have to go down tomorrow for the stress images.   he denies chest pain and shortness of breath.        Allergies    Broccoli (Unknown)  penicillin (Vomiting; Nausea)    Intolerances        MEDICATIONS  (STANDING):  ascorbic acid 500 milliGRAM(s) Oral daily  aspirin enteric coated 81 milliGRAM(s) Oral daily  atorvastatin 80 milliGRAM(s) Oral at bedtime  bisacodyl Oral Tab/Cap - Peds 5 milliGRAM(s) Oral every 12 hours  clopidogrel Tablet 75 milliGRAM(s) Oral daily  doxazosin 4 milliGRAM(s) Oral at bedtime  ferrous    sulfate 325 milliGRAM(s) Oral daily  finasteride 5 milliGRAM(s) Oral daily  furosemide   Injectable 40 milliGRAM(s) IV Push daily  heparin  Infusion. 650 Unit(s)/Hr (6.5 mL/Hr) IV Continuous <Continuous>  lisinopril 10 milliGRAM(s) Oral daily  metoprolol succinate ER 50 milliGRAM(s) Oral daily  multivitamin/minerals 1 Tablet(s) Oral daily  pantoprazole    Tablet 40 milliGRAM(s) Oral daily  polyethylene glycol 3350 17 Gram(s) Oral daily  potassium chloride    Tablet ER 40 milliEquivalent(s) Oral once  zinc sulfate 220 milliGRAM(s) Oral daily    MEDICATIONS  (PRN):  acetaminophen     Tablet .. 650 milliGRAM(s) Oral every 6 hours PRN Temp greater or equal to 38C (100.4F), Mild Pain (1 - 3)  diazepam    Tablet 5 milliGRAM(s) Oral at bedtime PRN home med  heparin   Injectable 3800 Unit(s) IV Push every 6 hours PRN For aPTT less than 40  melatonin 3 milliGRAM(s) Oral at bedtime PRN Insomnia  melatonin Oral Tab/Cap - Peds 3 milliGRAM(s) Oral at bedtime PRN Insomnia  ondansetron Injectable 4 milliGRAM(s) IV Push every 8 hours PRN Nausea and/or Vomiting    REVIEW OF SYSTEMS:    RESPIRATORY: No cough, wheezing, hemoptysis; No shortness of breath  CARDIOVASCULAR: No chest pain or palpitations  All other review of systems is negative unless indicated above      PHYSICAL EXAM:  Daily     Daily Weight in k.5 (20 Dec 2021 05:57)  Vital Signs Last 24 Hrs  T(C): 36.8 (20 Dec 2021 07:58), Max: 36.8 (20 Dec 2021 07:58)  T(F): 98.3 (20 Dec 2021 07:58), Max: 98.3 (20 Dec 2021 07:58)  HR: 60 (20 Dec 2021 07:58) (60 - 70)  BP: 144/55 (20 Dec 2021 07:58) (144/55 - 148/59)  BP(mean): --  RR: 18 (20 Dec 2021 07:58) (18 - 80)  SpO2: 97% (20 Dec 2021 07:58) (95% - 98%)    Constitutional: NAD, awake and alert, well-developed  HEENT: PERR, EOMI, Normal Hearing, MMM  Neck: Soft and supple, No LAD, No JVD  Respiratory: Breath sounds are clear bilaterally, No wheezing, rales or rhonchi  Cardiovascular: S1 and S2, regular rate and rhythm, no Murmurs, gallops or rubs  Gastrointestinal: Bowel Sounds present, soft, nontender, nondistended, no guarding, no rebound  Extremities: No peripheral edema  Vascular: 2+ peripheral pulses  Neurological: A/O x 3, no focal deficits  Musculoskeletal: 5/5 strength b/l upper and lower extremities  Skin: No rashes    LABS: All Labs Reviewed:                        10.2   8.27  )-----------( 143      ( 20 Dec 2021 06:38 )             32.0     12-20    141  |  108  |  46<H>  ----------------------------<  106<H>  3.5   |  28  |  1.89<H>    Ca    8.5      20 Dec 2021 06:38      PTT - ( 20 Dec 2021 06:38 )  PTT:61.8 sec      ECHO:  < from: TTE Echo Complete w/o Contrast w/ Doppler (21 @ 13:58) >   EXAM:  ECHO TTE WO CON COMP W DOPP         PROCEDURE DATE:  2021        INTERPRETATION:  Transthoracic Echocardiography Report (TTE)     Demographics     Patient name       TAHIRA STEWART      Age           89 year(s)     Med Rec #[id number]            Gender        Male     Account #          [id number]         Date of Birth 1932     Interpreting       Tracy Domingo, Room Number   0342   Physician          MD     Referring          Dominic Salomon         Sonographer   Ac Toney,   Physician                                             [id number]     Date of study      2021 01:31 PM     Height             66.93 in             Weight        139.99 pounds    Type of Study:     TTE procedure: ECHO TTE WO CON COMP W DOP     Study Location: EDTechnical Quality: Fair    Indications   1) R07.89 - Other chest pain    M-Mode Measurements (cm)     LVEDd: 4.7 cm             LVESd: 3.81 cm   IVSEd: 1 cm   LVPWd: 0.93 cm            AO Root Dimension: 3.1 cm                        ACS: 1.8 cm                             LA: 5.4 cm    Doppler Measurements:     AV Velocity:124 cm/s               MV Peak E-Wave: 96.7 cm/s   AV Peak Gradient: 6.15 mmHg        MV Peak A-Wave: 54.8 cm/s              MV E/A Ratio: 1.76 %   TR Velocity:296 cm/s               MV Peak Gradient: 3.74 mmHg   TR Gradient:35.0464 mmHg   Estimated RAP:5 mmHg   RVSP:40 mmHg     Findings     Mitral Valve   Fibrocalcific changes noted to the mitral valve leaflets with preserved   leaflet excursion.   At least Moderate (2+) mitral regurgitation is present.     Aortic Valve   Fibrocalcific changes noted to the Aortic valve leaflets with preserved   leaflet excursion.   Mild (1+) aortic regurgitation is present.     Tricuspid Valve   Mild (1+) tricuspid valve regurgitation is present.     Pulmonic Valve   Trace pulmonic valvular regurgitation is present.     Left Atrium   The left atrium is moderately dilated.     Left Ventricle   Minimally reduced left ventricular systolic function.   Estimated left ventricular ejection fraction is 50 %.     Right Atrium   Normal appearing right atrium.     Right Ventricle   A device wire is seen in the RV and RA.     Pericardial Effusion   Trace pericardial effusion cannot be ruled out.     Pleural Effusion   Pleural effusion - is present.     Miscellaneous   The IVC appears normal.     Impression     Summary     Fibrocalcific changes noted to the mitral valve leaflets with preserved   leaflet excursion.   At least Moderate (2+) mitral regurgitation is present.   Fibrocalcific changes noted to the Aortic valve leaflets with preserved   leaflet excursion.   Mild (1+) aortic regurgitation is present.   Mild (1+) tricuspid valve regurgitation is present.   Trace pulmonic valvular regurgitation is present.   The left atrium is moderately dilated.   Minimally reduced left ventricular systolic function.   Estimated left ventricular ejection fraction is 50 %.   A device wire is seen in the RV and RA.   Normal appearing right atrium.   Trace pericardial effusion cannot be ruled out.    < end of copied text >

## 2021-12-20 NOTE — PROCEDURE NOTE - ADDITIONAL PROCEDURE DETAILS
No recent PAF, last episode May 2021.  No recent recorded events or therapies delivered.  Normal CRT-D function, RV output raised to ensure safety margin.

## 2021-12-20 NOTE — PROGRESS NOTE ADULT - ASSESSMENT
89 year old male patient with pertinent PMH of CAD presented to the ED complaining of a sudden onset of chest pressure that has since resolved( resolved  in the ED). Patient was in bed when pain presented. Denies associated nausea, vomiting, diaphoresis, pre-syncope or shortness of breath. No clear aggravating symptoms. States pain got better after taking valium.  In the ED patient with No EKG changes. 2nd troponin with mild elevation to 121 (17 Dec 2021 12:12)    12/18  EF appears normal on echo.   Troponins have peaked.   CHF on chest xray as well as elevated BNP  continue IV Lasix over the weekend.   stress test on monday.  ambulation.    12/19  continue IV lasix.   scheduled for Lexiscan stress test tomorrow.   discussed with his son and daughter in law.    12/20  stress images for the stress test tomorrow.   if we have a problem with the stress machinery, will then have to arrange a cardiac cath.

## 2021-12-21 LAB
ANION GAP SERPL CALC-SCNC: 7 MMOL/L — SIGNIFICANT CHANGE UP (ref 5–17)
BUN SERPL-MCNC: 41 MG/DL — HIGH (ref 7–23)
CALCIUM SERPL-MCNC: 8.6 MG/DL — SIGNIFICANT CHANGE UP (ref 8.5–10.1)
CHLORIDE SERPL-SCNC: 112 MMOL/L — HIGH (ref 96–108)
CO2 SERPL-SCNC: 25 MMOL/L — SIGNIFICANT CHANGE UP (ref 22–31)
CREAT SERPL-MCNC: 1.62 MG/DL — HIGH (ref 0.5–1.3)
GLUCOSE SERPL-MCNC: 94 MG/DL — SIGNIFICANT CHANGE UP (ref 70–99)
HCT VFR BLD CALC: 31.9 % — LOW (ref 39–50)
HGB BLD-MCNC: 10.2 G/DL — LOW (ref 13–17)
MCHC RBC-ENTMCNC: 30.9 PG — SIGNIFICANT CHANGE UP (ref 27–34)
MCHC RBC-ENTMCNC: 32 GM/DL — SIGNIFICANT CHANGE UP (ref 32–36)
MCV RBC AUTO: 96.7 FL — SIGNIFICANT CHANGE UP (ref 80–100)
PLATELET # BLD AUTO: 154 K/UL — SIGNIFICANT CHANGE UP (ref 150–400)
POTASSIUM SERPL-MCNC: 3.8 MMOL/L — SIGNIFICANT CHANGE UP (ref 3.5–5.3)
POTASSIUM SERPL-SCNC: 3.8 MMOL/L — SIGNIFICANT CHANGE UP (ref 3.5–5.3)
RBC # BLD: 3.3 M/UL — LOW (ref 4.2–5.8)
RBC # FLD: 14.7 % — HIGH (ref 10.3–14.5)
SARS-COV-2 RNA SPEC QL NAA+PROBE: SIGNIFICANT CHANGE UP
SODIUM SERPL-SCNC: 144 MMOL/L — SIGNIFICANT CHANGE UP (ref 135–145)
WBC # BLD: 7.74 K/UL — SIGNIFICANT CHANGE UP (ref 3.8–10.5)
WBC # FLD AUTO: 7.74 K/UL — SIGNIFICANT CHANGE UP (ref 3.8–10.5)

## 2021-12-21 PROCEDURE — 99233 SBSQ HOSP IP/OBS HIGH 50: CPT

## 2021-12-21 PROCEDURE — 78452 HT MUSCLE IMAGE SPECT MULT: CPT | Mod: 26

## 2021-12-21 RX ORDER — REGADENOSON 0.08 MG/ML
0.4 INJECTION, SOLUTION INTRAVENOUS ONCE
Refills: 0 | Status: COMPLETED | OUTPATIENT
Start: 2021-12-21 | End: 2021-12-21

## 2021-12-21 RX ADMIN — Medication 81 MILLIGRAM(S): at 08:08

## 2021-12-21 RX ADMIN — HEPARIN SODIUM 5000 UNIT(S): 5000 INJECTION INTRAVENOUS; SUBCUTANEOUS at 21:13

## 2021-12-21 RX ADMIN — ATORVASTATIN CALCIUM 80 MILLIGRAM(S): 80 TABLET, FILM COATED ORAL at 21:13

## 2021-12-21 RX ADMIN — Medication 1 TABLET(S): at 08:09

## 2021-12-21 RX ADMIN — ZINC SULFATE TAB 220 MG (50 MG ZINC EQUIVALENT) 220 MILLIGRAM(S): 220 (50 ZN) TAB at 08:08

## 2021-12-21 RX ADMIN — Medication 325 MILLIGRAM(S): at 08:09

## 2021-12-21 RX ADMIN — PANTOPRAZOLE SODIUM 40 MILLIGRAM(S): 20 TABLET, DELAYED RELEASE ORAL at 08:09

## 2021-12-21 RX ADMIN — REGADENOSON 0.4 MILLIGRAM(S): 0.08 INJECTION, SOLUTION INTRAVENOUS at 08:40

## 2021-12-21 RX ADMIN — FINASTERIDE 5 MILLIGRAM(S): 5 TABLET, FILM COATED ORAL at 08:09

## 2021-12-21 RX ADMIN — Medication 500 MILLIGRAM(S): at 08:08

## 2021-12-21 RX ADMIN — Medication 50 MILLIGRAM(S): at 08:09

## 2021-12-21 RX ADMIN — CLOPIDOGREL BISULFATE 75 MILLIGRAM(S): 75 TABLET, FILM COATED ORAL at 08:09

## 2021-12-21 RX ADMIN — Medication 4 MILLIGRAM(S): at 21:13

## 2021-12-21 RX ADMIN — Medication 40 MILLIGRAM(S): at 08:09

## 2021-12-21 RX ADMIN — Medication 5 MILLIGRAM(S): at 12:21

## 2021-12-21 RX ADMIN — LISINOPRIL 10 MILLIGRAM(S): 2.5 TABLET ORAL at 08:09

## 2021-12-21 NOTE — PROGRESS NOTE ADULT - SUBJECTIVE AND OBJECTIVE BOX
HOSPITALIST ATTENDING PROGRESS NOTE    Chart and meds reviewed.  Patient seen and examined.    CC: CP    Subjective: Stress +, pt very distressed about this. Justen hickey d/w Geoff re: possible cath. Updated son, Nicolas.    All other systems reviewed and found to be negative with the exception of what has been described above.    MEDICATIONS  (STANDING):  ascorbic acid 500 milliGRAM(s) Oral daily  aspirin enteric coated 81 milliGRAM(s) Oral daily  atorvastatin 80 milliGRAM(s) Oral at bedtime  bisacodyl Oral Tab/Cap - Peds 5 milliGRAM(s) Oral every 12 hours  clopidogrel Tablet 75 milliGRAM(s) Oral daily  doxazosin 4 milliGRAM(s) Oral at bedtime  ferrous    sulfate 325 milliGRAM(s) Oral daily  finasteride 5 milliGRAM(s) Oral daily  furosemide   Injectable 40 milliGRAM(s) IV Push daily  heparin   Injectable 5000 Unit(s) SubCutaneous every 12 hours  lisinopril 10 milliGRAM(s) Oral daily  metoprolol succinate ER 50 milliGRAM(s) Oral daily  multivitamin/minerals 1 Tablet(s) Oral daily  pantoprazole    Tablet 40 milliGRAM(s) Oral daily  polyethylene glycol 3350 17 Gram(s) Oral daily  zinc sulfate 220 milliGRAM(s) Oral daily    MEDICATIONS  (PRN):  acetaminophen     Tablet .. 650 milliGRAM(s) Oral every 6 hours PRN Temp greater or equal to 38C (100.4F), Mild Pain (1 - 3)  diazepam    Tablet 5 milliGRAM(s) Oral two times a day PRN anxiety  melatonin 3 milliGRAM(s) Oral at bedtime PRN Insomnia  melatonin Oral Tab/Cap - Peds 3 milliGRAM(s) Oral at bedtime PRN Insomnia  ondansetron Injectable 4 milliGRAM(s) IV Push every 8 hours PRN Nausea and/or Vomiting      VITALS:  T(F): 98.1 (12-21-21 @ 10:53), Max: 98.4 (12-21-21 @ 07:17)  HR: 73 (12-21-21 @ 10:53) (62 - 73)  BP: 149/58 (12-21-21 @ 10:53) (123/64 - 149/58)  RR: 18 (12-21-21 @ 10:53) (18 - 18)  SpO2: 96% (12-21-21 @ 10:53) (95% - 97%)  Wt(kg): --    I&O's Summary      CAPILLARY BLOOD GLUCOSE          PHYSICAL EXAM:  Gen: NAD  HEENT:  pupils equal and reactive, EOMI, no oropharyngeal lesions, erythema, exudates, oral thrush  NECK:   supple, no carotid bruits, no palpable lymph nodes, no thyromegaly  CV:  +S1, +S2, regular, no murmurs or rubs  RESP:   lungs clear to auscultation bilaterally, no wheezing, rales, rhonchi, good air entry bilaterally  BREAST:  not examined  GI:  abdomen soft, non-tender, non-distended, normal BS, no bruits, no abdominal masses, no palpable masses  RECTAL:  not examined  :  not examined  MSK:   normal muscle tone, no atrophy, no rigidity, no contractions  EXT:  no clubbing, no cyanosis, no edema, no calf pain, swelling or erythema  VASCULAR:  pulses equal and symmetric in the upper and lower extremities  NEURO:  AAOX3, no focal neurological deficits, follows all commands, able to move extremities spontaneously  SKIN:  no ulcers, lesions or rashes    LABS:                            10.2   7.74  )-----------( 154      ( 21 Dec 2021 07:01 )             31.9     12-21    144  |  112<H>  |  41<H>  ----------------------------<  94  3.8   |  25  |  1.62<H>    Ca    8.6      21 Dec 2021 07:01            PTT - ( 20 Dec 2021 06:38 )  PTT:61.8 sec    CULTURES:  UCx NG    Additional results/Imaging, I have personally reviewed:  CXR 12/17/21: Significant CHF. Stable cardiac findings.    Echo 12/17/21:  Fibrocalcific changes noted to the mitral valve leaflets with preserved leaflet excursion. At least Moderate (2+) mitral regurgitation is present. Fibrocalcific changes noted to the Aortic valve leaflets with preserved leaflet excursion. Mild (1+) aortic regurgitation is present. Mild (1+) tricuspid valve regurgitation is present. Trace pulmonic valvular regurgitation is present. The left atrium is moderately dilated. Minimally reduced left ventricular systolic function. Estimated left ventricular ejection fraction is 50 %. A device wire is seen in the RV and RA. Normal appearing right atrium. Trace pericardial effusion cannot be ruled out.    Device interrogation 12/20/21:   · VT/VF episode detected or delivered?	No  · Shock Impedance	60 ohms  · Battery	Adequate  · Underlying Rhythm	sinus rhythm, not dependent  · Comments	Optivol is elevated since end of November 2021  · Additional Procedure Details	No recent PAF, last episode May 2021.  No recent recorded events or therapies delivered.  Normal CRT-D function, RV output raised to ensure safety margin.    [employer] stress 12/21/21:Abnormal non-gated SPECT Myocardial Perfusion Imaging. Dilated left ventricle with partially reversible perfusion defect in the apical and mid segments of the anteroseptal wall of the left ventricle. Please refer to cardiac stress test report for dosage of Regadenoson administered, EKG findings and symptoms during the procedure.    Telemetry, personally reviewed:  12/18/21: AV, V paced  12/20/21: AV, V paced

## 2021-12-21 NOTE — PROGRESS NOTE ADULT - SUBJECTIVE AND OBJECTIVE BOX
Patient is a 89y old  Male who presents with a chief complaint of Chest pain  Elevated troponin (20 Dec 2021 14:36)    89 year old male patient with pertinent PMH of CAD presented to the ED complaining of a sudden onset of chest pressure that has since resolved( resolved  in the ED). Patient was in bed when pain presented. Denies associated nausea, vomiting, diaphoresis, pre-syncope or shortness of breath. No clear aggravating symptoms. States pain got better after taking valium.  In the ED patient with No EKG changes. 2nd troponin with mild elevation to 121 (17 Dec 2021 12:12)      he is comfortable in his bed. says he feels better as compared to yesterday.  troponin has peaked and on the way down.  chest xray showed CHF.  Echo in  showed low EF but today, it appears to be normal.       he feels short of breath today.   troponins have peaked and on their way down.  serum creatinine is improving.   scheduled for stress test tomorrow.       was downstairs for his stress test. After the resting images, the test was aborted because the stress machine was down. He will have to go down tomorrow for the stress images.   he denies chest pain and shortness of breath.      yesterday, had the rest portion of the stress test. today, will have the pharmacologic exercise portion.  otherwise feeling well.      Allergies    Broccoli (Unknown)  penicillin (Vomiting; Nausea)    Intolerances        MEDICATIONS  (STANDING):  ascorbic acid 500 milliGRAM(s) Oral daily  aspirin enteric coated 81 milliGRAM(s) Oral daily  atorvastatin 80 milliGRAM(s) Oral at bedtime  bisacodyl Oral Tab/Cap - Peds 5 milliGRAM(s) Oral every 12 hours  clopidogrel Tablet 75 milliGRAM(s) Oral daily  doxazosin 4 milliGRAM(s) Oral at bedtime  ferrous    sulfate 325 milliGRAM(s) Oral daily  finasteride 5 milliGRAM(s) Oral daily  furosemide   Injectable 40 milliGRAM(s) IV Push daily  heparin   Injectable 5000 Unit(s) SubCutaneous every 12 hours  lisinopril 10 milliGRAM(s) Oral daily  metoprolol succinate ER 50 milliGRAM(s) Oral daily  multivitamin/minerals 1 Tablet(s) Oral daily  pantoprazole    Tablet 40 milliGRAM(s) Oral daily  polyethylene glycol 3350 17 Gram(s) Oral daily  zinc sulfate 220 milliGRAM(s) Oral daily    MEDICATIONS  (PRN):  acetaminophen     Tablet .. 650 milliGRAM(s) Oral every 6 hours PRN Temp greater or equal to 38C (100.4F), Mild Pain (1 - 3)  diazepam    Tablet 5 milliGRAM(s) Oral two times a day PRN anxiety  melatonin 3 milliGRAM(s) Oral at bedtime PRN Insomnia  melatonin Oral Tab/Cap - Peds 3 milliGRAM(s) Oral at bedtime PRN Insomnia  ondansetron Injectable 4 milliGRAM(s) IV Push every 8 hours PRN Nausea and/or Vomiting    REVIEW OF SYSTEMS:    RESPIRATORY: No cough, wheezing, hemoptysis; No shortness of breath  CARDIOVASCULAR: No chest pain or palpitations  All other review of systems is negative unless indicated above      PHYSICAL EXAM:  Daily     Daily Weight in k.7 (21 Dec 2021 06:49)  Vital Signs Last 24 Hrs  T(C): 36.9 (21 Dec 2021 07:17), Max: 36.9 (21 Dec 2021 07:17)  T(F): 98.4 (21 Dec 2021 07:17), Max: 98.4 (21 Dec 2021 07:17)  HR: 62 (21 Dec 2021 07:17) (62 - 67)  BP: 148/53 (21 Dec 2021 07:17) (123/64 - 148/53)  BP(mean): --  RR: 18 (21 Dec 2021 07:17) (18 - 18)  SpO2: 95% (21 Dec 2021 07:17) (95% - 97%)    Constitutional: NAD, awake and alert, well-developed  HEENT: PERR, EOMI, Normal Hearing, MMM  Neck: Soft and supple, No LAD, No JVD  Respiratory: Breath sounds are clear bilaterally, No wheezing, rales or rhonchi  Cardiovascular: S1 and S2, regular rate and rhythm, no Murmurs, gallops or rubs  Gastrointestinal: Bowel Sounds present, soft, nontender, nondistended, no guarding, no rebound  Extremities: No peripheral edema  Vascular: 2+ peripheral pulses  Neurological: A/O x 3, no focal deficits  Musculoskeletal: 5/5 strength b/l upper and lower extremities  Skin: No rashes    LABS: All Labs Reviewed:                        10.2   774  )-----------( 154      ( 21 Dec 2021 07:01 )             31.9     12-    144  |  112<H>  |  41<H>  ----------------------------<  94  3.8   |  25  |  1.62<H>    Ca    8.6      21 Dec 2021 07:01      PTT - ( 20 Dec 2021 06:38 )  PTT:61.8 sec          TELEMETRY/EKG:

## 2021-12-21 NOTE — PROGRESS NOTE ADULT - SUBJECTIVE AND OBJECTIVE BOX
Pharmacological nuclear stress test.    Pharmacological portion of the test report.    Baseline EKG- V paced rythm.  Functional capacity- NA  BP response to regadenosine- blunted.  HR response- blunted.  No symptoms.  No arrythmias.  NO ekg changes suggestive of ischemia.  Please correlate with nuclear imaging.

## 2021-12-21 NOTE — PROGRESS NOTE ADULT - ASSESSMENT
89 year old male patient with pertinent PMH of CAD presented to the ED complaining of a sudden onset of chest pressure that has since resolved( resolved  in the ED). Patient was in bed when pain presented. Denies associated nausea, vomiting, diaphoresis, pre-syncope or shortness of breath. No clear aggravating symptoms. States pain got better after taking valium.  In the ED patient with No EKG changes. 2nd troponin with mild elevation to 121 (17 Dec 2021 12:12)    12/18  EF appears normal on echo.   Troponins have peaked.   CHF on chest xray as well as elevated BNP  continue IV Lasix over the weekend.   stress test on monday.  ambulation.    12/19  continue IV lasix.   scheduled for Lexiscan stress test tomorrow.   discussed with his son and daughter in law.    12/20  stress images for the stress test tomorrow.   if we have a problem with the stress machinery, will then have to arrange a cardiac cath.    12/21  having pharmacologic stress test today. if negative, consider discharge home on medical therapy.

## 2021-12-21 NOTE — PROGRESS NOTE ADULT - ASSESSMENT
89M hx CAD s/p CABG and stent, AAA s/p repair 7/20, now on ASA alone, prior HFrEF, HTN, p/w CP.    #c/f NSTEMI and acute on chronic HFpEF, hx CAD s/p CABG- CP on day prior to presentation and now with elevated trops  -tele  -prvsly on 2L, now on RA  -trops peaked  -BNP 12K on admission, increased to 17K  -A1c 5.7. lipids done  -EKG done  -echo done, EF 50%  -device interrogation as above  -stress w reversible perfusion defect, Dr. Campuzano to d/w Geoff re: cath  -ASA, add Plavix  -high dose statin  -heparin drip completed 48 hrs, discussed w Dr. Campuzano, d/c'd  -iv lasix for now, 40iv qd  -lisinopril, metoprolol  -f/u Justen recs (known to Dr. Hyatt)    #DESIRAE on CKD, likely cardiorenal  -b/l Cr around 1.5  -in setting of above, trend w diuresis    #DVT ppx- SQH    #from Jacobo HERNADEZ

## 2021-12-22 LAB
ANION GAP SERPL CALC-SCNC: 7 MMOL/L — SIGNIFICANT CHANGE UP (ref 5–17)
BUN SERPL-MCNC: 41 MG/DL — HIGH (ref 7–23)
CALCIUM SERPL-MCNC: 8.3 MG/DL — LOW (ref 8.5–10.1)
CHLORIDE SERPL-SCNC: 111 MMOL/L — HIGH (ref 96–108)
CO2 SERPL-SCNC: 26 MMOL/L — SIGNIFICANT CHANGE UP (ref 22–31)
CREAT SERPL-MCNC: 1.7 MG/DL — HIGH (ref 0.5–1.3)
GLUCOSE SERPL-MCNC: 112 MG/DL — HIGH (ref 70–99)
HCT VFR BLD CALC: 31.9 % — LOW (ref 39–50)
HGB BLD-MCNC: 10.1 G/DL — LOW (ref 13–17)
MCHC RBC-ENTMCNC: 30.8 PG — SIGNIFICANT CHANGE UP (ref 27–34)
MCHC RBC-ENTMCNC: 31.7 GM/DL — LOW (ref 32–36)
MCV RBC AUTO: 97.3 FL — SIGNIFICANT CHANGE UP (ref 80–100)
PLATELET # BLD AUTO: 151 K/UL — SIGNIFICANT CHANGE UP (ref 150–400)
POTASSIUM SERPL-MCNC: 3.8 MMOL/L — SIGNIFICANT CHANGE UP (ref 3.5–5.3)
POTASSIUM SERPL-SCNC: 3.8 MMOL/L — SIGNIFICANT CHANGE UP (ref 3.5–5.3)
RBC # BLD: 3.28 M/UL — LOW (ref 4.2–5.8)
RBC # FLD: 14.6 % — HIGH (ref 10.3–14.5)
SODIUM SERPL-SCNC: 144 MMOL/L — SIGNIFICANT CHANGE UP (ref 135–145)
WBC # BLD: 9.02 K/UL — SIGNIFICANT CHANGE UP (ref 3.8–10.5)
WBC # FLD AUTO: 9.02 K/UL — SIGNIFICANT CHANGE UP (ref 3.8–10.5)

## 2021-12-22 PROCEDURE — 99232 SBSQ HOSP IP/OBS MODERATE 35: CPT

## 2021-12-22 RX ORDER — FUROSEMIDE 40 MG
1 TABLET ORAL
Qty: 0 | Refills: 0 | DISCHARGE

## 2021-12-22 RX ORDER — FUROSEMIDE 40 MG
1 TABLET ORAL
Qty: 30 | Refills: 0
Start: 2021-12-22 | End: 2022-01-20

## 2021-12-22 RX ORDER — CLOPIDOGREL BISULFATE 75 MG/1
1 TABLET, FILM COATED ORAL
Qty: 30 | Refills: 0
Start: 2021-12-22 | End: 2022-01-20

## 2021-12-22 RX ORDER — FUROSEMIDE 40 MG
40 TABLET ORAL DAILY
Refills: 0 | Status: DISCONTINUED | OUTPATIENT
Start: 2021-12-22 | End: 2021-12-23

## 2021-12-22 RX ADMIN — Medication 4 MILLIGRAM(S): at 21:27

## 2021-12-22 RX ADMIN — Medication 500 MILLIGRAM(S): at 10:02

## 2021-12-22 RX ADMIN — Medication 81 MILLIGRAM(S): at 10:01

## 2021-12-22 RX ADMIN — Medication 325 MILLIGRAM(S): at 10:02

## 2021-12-22 RX ADMIN — Medication 40 MILLIGRAM(S): at 10:02

## 2021-12-22 RX ADMIN — FINASTERIDE 5 MILLIGRAM(S): 5 TABLET, FILM COATED ORAL at 10:03

## 2021-12-22 RX ADMIN — CLOPIDOGREL BISULFATE 75 MILLIGRAM(S): 75 TABLET, FILM COATED ORAL at 10:02

## 2021-12-22 RX ADMIN — HEPARIN SODIUM 5000 UNIT(S): 5000 INJECTION INTRAVENOUS; SUBCUTANEOUS at 21:27

## 2021-12-22 RX ADMIN — ZINC SULFATE TAB 220 MG (50 MG ZINC EQUIVALENT) 220 MILLIGRAM(S): 220 (50 ZN) TAB at 10:02

## 2021-12-22 RX ADMIN — Medication 1 TABLET(S): at 10:03

## 2021-12-22 RX ADMIN — ATORVASTATIN CALCIUM 80 MILLIGRAM(S): 80 TABLET, FILM COATED ORAL at 21:27

## 2021-12-22 RX ADMIN — PANTOPRAZOLE SODIUM 40 MILLIGRAM(S): 20 TABLET, DELAYED RELEASE ORAL at 10:02

## 2021-12-22 RX ADMIN — LISINOPRIL 10 MILLIGRAM(S): 2.5 TABLET ORAL at 10:03

## 2021-12-22 RX ADMIN — Medication 50 MILLIGRAM(S): at 10:02

## 2021-12-22 NOTE — CHART NOTE - NSCHARTNOTEFT_GEN_A_CORE
s/p LHC    Denies chest pain, shortness of breath, dizziness or palpitations at this time    A+ox4  CV: S1S2 reg  Respiratory: CTAB  Right groin procedure site CDI.  no bleeding, no hematoma, site soft, non tender, positive pedal pulses bilaterally    T(C): 36.9 (22 Dec 2021 11:14), Max: 36.9 (22 Dec 2021 11:14)  T(F): 98.4 (22 Dec 2021 11:14), Max: 98.4 (22 Dec 2021 11:14)  HR: 66 (22 Dec 2021 16:35) (60 - 71)  BP: 155/58 (22 Dec 2021 16:35) (147/67 - 170/58)  RR: 18 (22 Dec 2021 16:35) (16 - 18)  SpO2: 97% (22 Dec 2021 16:35) (94% - 98%)    MEDICATIONS  (STANDING):  ascorbic acid 500 milliGRAM(s) Oral daily  aspirin enteric coated 81 milliGRAM(s) Oral daily  atorvastatin 80 milliGRAM(s) Oral at bedtime  bisacodyl Oral Tab/Cap - Peds 5 milliGRAM(s) Oral every 12 hours  clopidogrel Tablet 75 milliGRAM(s) Oral daily  doxazosin 4 milliGRAM(s) Oral at bedtime  ferrous    sulfate 325 milliGRAM(s) Oral daily  finasteride 5 milliGRAM(s) Oral daily  furosemide    Tablet 40 milliGRAM(s) Oral daily  heparin   Injectable 5000 Unit(s) SubCutaneous every 12 hours  lisinopril 10 milliGRAM(s) Oral daily  metoprolol succinate ER 50 milliGRAM(s) Oral daily  multivitamin/minerals 1 Tablet(s) Oral daily  pantoprazole    Tablet 40 milliGRAM(s) Oral daily  polyethylene glycol 3350 17 Gram(s) Oral daily  zinc sulfate 220 milliGRAM(s) Oral daily    MEDICATIONS  (PRN):  acetaminophen     Tablet .. 650 milliGRAM(s) Oral every 6 hours PRN Temp greater or equal to 38C (100.4F), Mild Pain (1 - 3)  diazepam    Tablet 5 milliGRAM(s) Oral two times a day PRN anxiety  melatonin 3 milliGRAM(s) Oral at bedtime PRN Insomnia  melatonin Oral Tab/Cap - Peds 3 milliGRAM(s) Oral at bedtime PRN Insomnia  ondansetron Injectable 4 milliGRAM(s) IV Push every 8 hours PRN Nausea and/or Vomiting  HPI:  89 year old male patient with pertinent PMH of CAD presented to the ED complaining of a sudden onset of chest pressure that has since resolved( resolved  in the ED). Patient was in bed when pain presented. Denies associated nausea, vomiting, diaphoresis, pre-syncope or shortness of breath. No clear aggravating symptoms. States pain got better after taking valium.  In the ED patient with No EKG changes. 2nd troponin with mild elevation to 121 (17 Dec 2021 12:12)  s/p LHC revealing 2 patent grafts    -continue tele  -continue hospitalist service  -encourage PO fluids  -plan of care discussed with patient and MD   -post procedure instructions reviewed  -follow up with MD in 1-2 weeks after discharge  -Discussed therapeutic lifestyle changes to reduce risk factors such as following a cardiac diet, weight loss, maintaining a healthy weight, exercise, smoking cessation, medication compliance, and regular follow-up  with MD

## 2021-12-22 NOTE — PROGRESS NOTE ADULT - ASSESSMENT
89M hx CAD s/p CABG and stent, AAA s/p repair 7/20, now on ASA alone, prior HFrEF, HTN, p/w CP.    #c/f NSTEMI and acute on chronic HFpEF, hx CAD s/p CABG- CP on day prior to presentation and now with elevated trops  -tele  -prvsly on 2L, now on RA  -trops peaked  -BNP 12K on admission, increased to 17K  -A1c 5.7. lipids done  -EKG done  -echo done, EF 50%  -device interrogation as above  -stress w reversible perfusion defect, for cath with Dr. Tellez today  -ASA, add Plavix  -high dose statin  -heparin drip completed 48 hrs, discussed w Dr. Campuzano, d/c'd  -s/p iv lasix, 40iv qd, now changed to lasix 40mg po qd  -lisinopril, metoprolol  -f/u Justen recs (known to Dr. Hyatt)    #DESIRAE on CKD, likely cardiorenal  -b/l Cr around 1.5  -in setting of above, trend w diuresis    #DVT ppx- SQH    #from Jacobo HERNADEZ, dispo pending cath

## 2021-12-22 NOTE — PROGRESS NOTE ADULT - NUTRITIONAL ASSESSMENT
This patient has been assessed with a concern for Malnutrition and has been determined to have a diagnosis/diagnoses of Severe protein-calorie malnutrition.    This patient is being managed with:   Diet DASH/TLC-  Sodium & Cholesterol Restricted     Special Instructions for Nursing:  Sodium & Cholesterol Restricted  Entered: Dec 22 2021  3:50PM    Diet NPO after Midnight-     NPO Start Date: 21-Dec-2021   NPO Start Time: 23:59  Entered: Dec 21 2021  3:57PM    The following pending diet order is being considered for treatment of Severe protein-calorie malnutrition:  Diet Regular-  Entered: Dec 18 2021  2:19PM  
This patient has been assessed with a concern for Malnutrition and has been determined to have a diagnosis/diagnoses of Severe protein-calorie malnutrition.    This patient is being managed with:   Diet NPO after Midnight-     NPO Start Date: 20-Dec-2021   NPO Start Time: 23:59  Entered: Dec 20 2021  1:46PM    Diet Regular-  Prosource Gelatein Plus     Qty per Day:  2  Entered: Dec 18 2021  2:23PM    Diet Regular-  Entered: Dec 18 2021  2:19PM    The following pending diet order is being considered for treatment of Severe protein-calorie malnutrition:null
This patient has been assessed with a concern for Malnutrition and has been determined to have a diagnosis/diagnoses of Severe protein-calorie malnutrition.    This patient is being managed with:   Diet NPO after Midnight-     NPO Start Date: 19-Dec-2021   NPO Start Time: 23:59  Entered: Dec 19 2021  8:18AM    Diet Regular-  Prosource Gelatein Plus     Qty per Day:  2  Entered: Dec 18 2021  2:23PM    Diet Regular-  Entered: Dec 18 2021  2:19PM    The following pending diet order is being considered for treatment of Severe protein-calorie malnutrition:null
This patient has been assessed with a concern for Malnutrition and has been determined to have a diagnosis/diagnoses of Severe protein-calorie malnutrition.    This patient is being managed with:   Diet Regular-  Prosource Gelatein Plus     Qty per Day:  2  Entered: Dec 18 2021  2:23PM    Diet Regular-  Entered: Dec 18 2021  2:19PM    The following pending diet order is being considered for treatment of Severe protein-calorie malnutrition:null

## 2021-12-22 NOTE — PROGRESS NOTE ADULT - SUBJECTIVE AND OBJECTIVE BOX
HOSPITALIST ATTENDING PROGRESS NOTE    Chart and meds reviewed.  Patient seen and examined.    CC: SUSANA    Subjective: For cath today. No new complaints.    All other systems reviewed and found to be negative with the exception of what has been described above.    MEDICATIONS  (STANDING):  ascorbic acid 500 milliGRAM(s) Oral daily  aspirin enteric coated 81 milliGRAM(s) Oral daily  atorvastatin 80 milliGRAM(s) Oral at bedtime  bisacodyl Oral Tab/Cap - Peds 5 milliGRAM(s) Oral every 12 hours  clopidogrel Tablet 75 milliGRAM(s) Oral daily  doxazosin 4 milliGRAM(s) Oral at bedtime  ferrous    sulfate 325 milliGRAM(s) Oral daily  finasteride 5 milliGRAM(s) Oral daily  furosemide    Tablet 40 milliGRAM(s) Oral daily  heparin   Injectable 5000 Unit(s) SubCutaneous every 12 hours  lisinopril 10 milliGRAM(s) Oral daily  metoprolol succinate ER 50 milliGRAM(s) Oral daily  multivitamin/minerals 1 Tablet(s) Oral daily  pantoprazole    Tablet 40 milliGRAM(s) Oral daily  polyethylene glycol 3350 17 Gram(s) Oral daily  zinc sulfate 220 milliGRAM(s) Oral daily    MEDICATIONS  (PRN):  acetaminophen     Tablet .. 650 milliGRAM(s) Oral every 6 hours PRN Temp greater or equal to 38C (100.4F), Mild Pain (1 - 3)  diazepam    Tablet 5 milliGRAM(s) Oral two times a day PRN anxiety  melatonin 3 milliGRAM(s) Oral at bedtime PRN Insomnia  melatonin Oral Tab/Cap - Peds 3 milliGRAM(s) Oral at bedtime PRN Insomnia  ondansetron Injectable 4 milliGRAM(s) IV Push every 8 hours PRN Nausea and/or Vomiting      VITALS:  T(F): 98.4 (12-22-21 @ 11:14), Max: 98.4 (12-22-21 @ 11:14)  HR: 65 (12-22-21 @ 16:05) (60 - 71)  BP: 156/48 (12-22-21 @ 16:05) (148/52 - 170/58)  RR: 18 (12-22-21 @ 16:05) (16 - 18)  SpO2: 96% (12-22-21 @ 16:05) (94% - 98%)  Wt(kg): --    I&O's Summary      CAPILLARY BLOOD GLUCOSE          PHYSICAL EXAM:  Gen: NAD  HEENT:  pupils equal and reactive, EOMI, no oropharyngeal lesions, erythema, exudates, oral thrush  NECK:   supple, no carotid bruits, no palpable lymph nodes, no thyromegaly  CV:  +S1, +S2, regular, no murmurs or rubs  RESP:   lungs clear to auscultation bilaterally, no wheezing, rales, rhonchi, good air entry bilaterally  BREAST:  not examined  GI:  abdomen soft, non-tender, non-distended, normal BS, no bruits, no abdominal masses, no palpable masses  RECTAL:  not examined  :  not examined  MSK:   normal muscle tone, no atrophy, no rigidity, no contractions  EXT:  no clubbing, no cyanosis, no edema, no calf pain, swelling or erythema  VASCULAR:  pulses equal and symmetric in the upper and lower extremities  NEURO:  AAOX3, no focal neurological deficits, follows all commands, able to move extremities spontaneously  SKIN:  no ulcers, lesions or rashes    LABS:                            10.1   9.02  )-----------( 151      ( 22 Dec 2021 06:35 )             31.9     12-22    144  |  111<H>  |  41<H>  ----------------------------<  112<H>  3.8   |  26  |  1.70<H>    Ca    8.3<L>      22 Dec 2021 06:35    CULTURES:  UCx NG    Additional results/Imaging, I have personally reviewed:  CXR 12/17/21: Significant CHF. Stable cardiac findings.    Echo 12/17/21:  Fibrocalcific changes noted to the mitral valve leaflets with preserved leaflet excursion. At least Moderate (2+) mitral regurgitation is present. Fibrocalcific changes noted to the Aortic valve leaflets with preserved leaflet excursion. Mild (1+) aortic regurgitation is present. Mild (1+) tricuspid valve regurgitation is present. Trace pulmonic valvular regurgitation is present. The left atrium is moderately dilated. Minimally reduced left ventricular systolic function. Estimated left ventricular ejection fraction is 50 %. A device wire is seen in the RV and RA. Normal appearing right atrium. Trace pericardial effusion cannot be ruled out.    Device interrogation 12/20/21:   · VT/VF episode detected or delivered?	No  · Shock Impedance	60 ohms  · Battery	Adequate  · Underlying Rhythm	sinus rhythm, not dependent  · Comments	Optivol is elevated since end of November 2021  · Additional Procedure Details	No recent PAF, last episode May 2021.  No recent recorded events or therapies delivered.  Normal CRT-D function, RV output raised to ensure safety margin.    N[employer] pharma stress 12/21/21:Abnormal non-gated SPECT Myocardial Perfusion Imaging. Dilated left ventricle with partially reversible perfusion defect in the apical and mid segments of the anteroseptal wall of the left ventricle. Please refer to cardiac stress test report for dosage of Regadenoson administered, EKG findings and symptoms during the procedure.    Telemetry, personally reviewed:  12/18/21: AV, V paced  12/20/21: AV, V paced   HOSPITALIST ATTENDING PROGRESS NOTE    Chart and meds reviewed.  Patient seen and examined.    CC: SUSANA    Subjective: For cath today. No new complaints.    All other systems reviewed and found to be negative with the exception of what has been described above.    MEDICATIONS  (STANDING):  ascorbic acid 500 milliGRAM(s) Oral daily  aspirin enteric coated 81 milliGRAM(s) Oral daily  atorvastatin 80 milliGRAM(s) Oral at bedtime  bisacodyl Oral Tab/Cap - Peds 5 milliGRAM(s) Oral every 12 hours  clopidogrel Tablet 75 milliGRAM(s) Oral daily  doxazosin 4 milliGRAM(s) Oral at bedtime  ferrous    sulfate 325 milliGRAM(s) Oral daily  finasteride 5 milliGRAM(s) Oral daily  furosemide    Tablet 40 milliGRAM(s) Oral daily  heparin   Injectable 5000 Unit(s) SubCutaneous every 12 hours  lisinopril 10 milliGRAM(s) Oral daily  metoprolol succinate ER 50 milliGRAM(s) Oral daily  multivitamin/minerals 1 Tablet(s) Oral daily  pantoprazole    Tablet 40 milliGRAM(s) Oral daily  polyethylene glycol 3350 17 Gram(s) Oral daily  zinc sulfate 220 milliGRAM(s) Oral daily    MEDICATIONS  (PRN):  acetaminophen     Tablet .. 650 milliGRAM(s) Oral every 6 hours PRN Temp greater or equal to 38C (100.4F), Mild Pain (1 - 3)  diazepam    Tablet 5 milliGRAM(s) Oral two times a day PRN anxiety  melatonin 3 milliGRAM(s) Oral at bedtime PRN Insomnia  melatonin Oral Tab/Cap - Peds 3 milliGRAM(s) Oral at bedtime PRN Insomnia  ondansetron Injectable 4 milliGRAM(s) IV Push every 8 hours PRN Nausea and/or Vomiting      VITALS:  T(F): 98.4 (12-22-21 @ 11:14), Max: 98.4 (12-22-21 @ 11:14)  HR: 65 (12-22-21 @ 16:05) (60 - 71)  BP: 156/48 (12-22-21 @ 16:05) (148/52 - 170/58)  RR: 18 (12-22-21 @ 16:05) (16 - 18)  SpO2: 96% (12-22-21 @ 16:05) (94% - 98%)  Wt(kg): --    I&O's Summary      CAPILLARY BLOOD GLUCOSE          PHYSICAL EXAM:  Gen: NAD  HEENT:  pupils equal and reactive, EOMI, no oropharyngeal lesions, erythema, exudates, oral thrush  NECK:   supple, no carotid bruits, no palpable lymph nodes, no thyromegaly  CV:  +S1, +S2, regular, no murmurs or rubs  RESP:   lungs clear to auscultation bilaterally, no wheezing, rales, rhonchi, good air entry bilaterally  BREAST:  not examined  GI:  abdomen soft, non-tender, non-distended, normal BS, no bruits, no abdominal masses, no palpable masses  RECTAL:  not examined  :  not examined  MSK:   normal muscle tone, no atrophy, no rigidity, no contractions  EXT:  no clubbing, no cyanosis, no edema, no calf pain, swelling or erythema  VASCULAR:  pulses equal and symmetric in the upper and lower extremities  NEURO:  AAOX3, no focal neurological deficits, follows all commands, able to move extremities spontaneously  SKIN:  no ulcers, lesions or rashes    LABS:                            10.1   9.02  )-----------( 151      ( 22 Dec 2021 06:35 )             31.9     12-22    144  |  111<H>  |  41<H>  ----------------------------<  112<H>  3.8   |  26  |  1.70<H>    Ca    8.3<L>      22 Dec 2021 06:35    CULTURES:  UCx NG    Additional results/Imaging, I have personally reviewed:  CXR 12/17/21: Significant CHF. Stable cardiac findings.    Echo 12/17/21:  Fibrocalcific changes noted to the mitral valve leaflets with preserved leaflet excursion. At least Moderate (2+) mitral regurgitation is present. Fibrocalcific changes noted to the Aortic valve leaflets with preserved leaflet excursion. Mild (1+) aortic regurgitation is present. Mild (1+) tricuspid valve regurgitation is present. Trace pulmonic valvular regurgitation is present. The left atrium is moderately dilated. Minimally reduced left ventricular systolic function. Estimated left ventricular ejection fraction is 50 %. A device wire is seen in the RV and RA. Normal appearing right atrium. Trace pericardial effusion cannot be ruled out.    Device interrogation 12/20/21:   · VT/VF episode detected or delivered?	No  · Shock Impedance	60 ohms  · Battery	Adequate  · Underlying Rhythm	sinus rhythm, not dependent  · Comments	Optivol is elevated since end of November 2021  · Additional Procedure Details	No recent PAF, last episode May 2021.  No recent recorded events or therapies delivered.  Normal CRT-D function, RV output raised to ensure safety margin.    N[employer] ph[employer] stress 12/21/21:Abnormal non-gated SPECT Myocardial Perfusion Imaging. Dilated left ventricle with partially reversible perfusion defect in the apical and mid segments of the anteroseptal wall of the left ventricle. Please refer to cardiac stress test report for dosage of Regadenoson administered, EKG findings and symptoms during the procedure.    Telemetry, personally reviewed:  12/18/21: AV, V paced  12/20/21: AV, V paced  12/22: AV, V paced

## 2021-12-22 NOTE — CHART NOTE - NSCHARTNOTEFT_GEN_A_CORE
Pt brought to cath lab for cardiac cath with possible PCI who presented with c/o chest discomfort and noted to be NSTE-ACS with trop peak at 1741   Seen and examined Pt in unit.  VSS, A+O x4, denies all pain and SOB at this time.   -Consent obtained for cardiac catheterization w/ coronary angiogram and possible stent placement. Pt is competent, has capacity, and understands risks and benefits of procedure. Risks and benefits discussed. Risk discussed included, but not limited to MI, stroke, mortality, major bleeding, arrythmia, or infection. All questions answered       ASA class:  II  Cr:  1.70  GFR:  37  Bleeding risk:  6.1%

## 2021-12-22 NOTE — PACU DISCHARGE NOTE - COMMENTS
report given to LUCIANA Mcgill. s/p [facility]. Right groin drsg CDI. soft, no evidence of hematoma. no bleeding noted. vital signs stable. family updated.

## 2021-12-23 ENCOUNTER — TRANSCRIPTION ENCOUNTER (OUTPATIENT)
Age: 86
End: 2021-12-23

## 2021-12-23 VITALS
SYSTOLIC BLOOD PRESSURE: 143 MMHG | DIASTOLIC BLOOD PRESSURE: 67 MMHG | OXYGEN SATURATION: 93 % | TEMPERATURE: 98 F | RESPIRATION RATE: 18 BRPM | HEART RATE: 68 BPM

## 2021-12-23 LAB
ANION GAP SERPL CALC-SCNC: 7 MMOL/L — SIGNIFICANT CHANGE UP (ref 5–17)
BUN SERPL-MCNC: 39 MG/DL — HIGH (ref 7–23)
CALCIUM SERPL-MCNC: 8.5 MG/DL — SIGNIFICANT CHANGE UP (ref 8.5–10.1)
CHLORIDE SERPL-SCNC: 109 MMOL/L — HIGH (ref 96–108)
CO2 SERPL-SCNC: 29 MMOL/L — SIGNIFICANT CHANGE UP (ref 22–31)
CREAT SERPL-MCNC: 1.68 MG/DL — HIGH (ref 0.5–1.3)
GLUCOSE SERPL-MCNC: 105 MG/DL — HIGH (ref 70–99)
POTASSIUM SERPL-MCNC: 3.9 MMOL/L — SIGNIFICANT CHANGE UP (ref 3.5–5.3)
POTASSIUM SERPL-SCNC: 3.9 MMOL/L — SIGNIFICANT CHANGE UP (ref 3.5–5.3)
SODIUM SERPL-SCNC: 145 MMOL/L — SIGNIFICANT CHANGE UP (ref 135–145)

## 2021-12-23 PROCEDURE — 99239 HOSP IP/OBS DSCHRG MGMT >30: CPT

## 2021-12-23 PROCEDURE — 93016 CV STRESS TEST SUPVJ ONLY: CPT

## 2021-12-23 PROCEDURE — 93018 CV STRESS TEST I&R ONLY: CPT

## 2021-12-23 RX ORDER — CLOPIDOGREL BISULFATE 75 MG/1
1 TABLET, FILM COATED ORAL
Qty: 0 | Refills: 0 | DISCHARGE
Start: 2021-12-23

## 2021-12-23 RX ORDER — FUROSEMIDE 40 MG
1 TABLET ORAL
Qty: 0 | Refills: 0 | DISCHARGE
Start: 2021-12-23

## 2021-12-23 RX ADMIN — ZINC SULFATE TAB 220 MG (50 MG ZINC EQUIVALENT) 220 MILLIGRAM(S): 220 (50 ZN) TAB at 08:58

## 2021-12-23 RX ADMIN — Medication 81 MILLIGRAM(S): at 08:57

## 2021-12-23 RX ADMIN — CLOPIDOGREL BISULFATE 75 MILLIGRAM(S): 75 TABLET, FILM COATED ORAL at 08:57

## 2021-12-23 RX ADMIN — Medication 40 MILLIGRAM(S): at 08:58

## 2021-12-23 RX ADMIN — LISINOPRIL 10 MILLIGRAM(S): 2.5 TABLET ORAL at 08:57

## 2021-12-23 RX ADMIN — HEPARIN SODIUM 5000 UNIT(S): 5000 INJECTION INTRAVENOUS; SUBCUTANEOUS at 08:59

## 2021-12-23 RX ADMIN — Medication 500 MILLIGRAM(S): at 08:58

## 2021-12-23 RX ADMIN — Medication 1 TABLET(S): at 08:58

## 2021-12-23 RX ADMIN — FINASTERIDE 5 MILLIGRAM(S): 5 TABLET, FILM COATED ORAL at 08:58

## 2021-12-23 RX ADMIN — PANTOPRAZOLE SODIUM 40 MILLIGRAM(S): 20 TABLET, DELAYED RELEASE ORAL at 08:58

## 2021-12-23 RX ADMIN — Medication 50 MILLIGRAM(S): at 08:57

## 2021-12-23 NOTE — DISCHARGE NOTE PROVIDER - NSDCMRMEDTOKEN_GEN_ALL_CORE_FT
ascorbic acid 500 mg oral tablet: 1 tab(s) orally once a day  aspirin 81 mg oral delayed release tablet: 1 tab(s) orally once a day  bisacodyl 5 mg oral delayed release tablet: 1 tab(s) orally every 12 hours  clopidogrel 75 mg oral tablet: 1 tab(s) orally once a day  diazePAM 5 mg oral tablet: 1 tab(s) orally once a day (at bedtime), As needed, home med MDD:1  ferrous sulfate 325 mg (65 mg elemental iron) oral tablet: 1 tab(s) orally once a day  finasteride 5 mg oral tablet: 1 tab(s) orally once a day  furosemide 40 mg oral tablet: 1 tab(s) orally once a day  melatonin 3 mg oral tablet: 1 tab(s) orally once a day (at bedtime), As needed, Insomnia  metoprolol succinate 50 mg oral tablet, extended release: 1 tab(s) orally once a day  pantoprazole 40 mg oral delayed release tablet: 1 tab(s) orally once a day (before a meal)  polyethylene glycol 3350 oral powder for reconstitution: 17 gram(s) orally once a day  ramipril 2.5 mg oral capsule: 1 cap(s) orally once a day  rosuvastatin 20 mg oral tablet: 1 tab(s) orally once a day  terazosin 5 mg oral capsule: 1 cap(s) orally once a day (at bedtime)   ascorbic acid 500 mg oral tablet: 1 tab(s) orally once a day  aspirin 81 mg oral delayed release tablet: 1 tab(s) orally once a day  bisacodyl 5 mg oral delayed release tablet: 1 tab(s) orally every 12 hours  diazePAM 5 mg oral tablet: 1 tab(s) orally once a day (at bedtime), As needed, home med MDD:1  ferrous sulfate 325 mg (65 mg elemental iron) oral tablet: 1 tab(s) orally once a day  finasteride 5 mg oral tablet: 1 tab(s) orally once a day  furosemide 40 mg oral tablet: 1 tab(s) orally once a day  melatonin 3 mg oral tablet: 1 tab(s) orally once a day (at bedtime), As needed, Insomnia  metoprolol succinate 50 mg oral tablet, extended release: 1 tab(s) orally once a day  pantoprazole 40 mg oral delayed release tablet: 1 tab(s) orally once a day (before a meal)  polyethylene glycol 3350 oral powder for reconstitution: 17 gram(s) orally once a day  ramipril 2.5 mg oral capsule: 1 cap(s) orally once a day  rosuvastatin 20 mg oral tablet: 1 tab(s) orally once a day  terazosin 5 mg oral capsule: 1 cap(s) orally once a day (at bedtime)

## 2021-12-23 NOTE — PROGRESS NOTE ADULT - ASSESSMENT
89 year old male patient with pertinent PMH of CAD presented to the ED complaining of a sudden onset of chest pressure that has since resolved( resolved  in the ED). Patient was in bed when pain presented. Denies associated nausea, vomiting, diaphoresis, pre-syncope or shortness of breath. No clear aggravating symptoms. States pain got better after taking valium.  In the ED patient with No EKG changes. 2nd troponin with mild elevation to 121 (17 Dec 2021 12:12)    12/18  EF appears normal on echo.   Troponins have peaked.   CHF on chest xray as well as elevated BNP  continue IV Lasix over the weekend.   stress test on monday.  ambulation.    12/19  continue IV lasix.   scheduled for Lexiscan stress test tomorrow.   discussed with his son and daughter in law.    12/20  stress images for the stress test tomorrow.   if we have a problem with the stress machinery, will then have to arrange a cardiac cath.    12/21  having pharmacologic stress test today. if negative, consider discharge home on medical therapy.     12/23  cardiac cath yesterday. no intervention.   awaiting the final report.   may be discharged home today and will follow up with Dr Hyatt as an outpatient.   no change in medications.

## 2021-12-23 NOTE — DISCHARGE NOTE PROVIDER - NSDCQMPCI_CARD_ALL_CORE
Pt completed amoxicillin 4 days ago for illness, states he felt better for a few days and then got worse again. He is here stating he needs a stronger antibiotic. I advised he go to urgent care for further eval/ chest xray for more specific treatment. He was agreeable with poc and discharged in no acute distress to urgent care.  
No

## 2021-12-23 NOTE — DISCHARGE NOTE PROVIDER - CARE PROVIDER_API CALL
Keith Hyatt (MD)  Cardiovascular Disease  [address] [place] [address]  Phone: [phone]  Fax: [phone]  Follow Up Time:

## 2021-12-23 NOTE — DISCHARGE NOTE NURSING/CASE MANAGEMENT/SOCIAL WORK - PATIENT PORTAL LINK FT
You can access the FollowMyHealth Patient Portal offered by [facility] by registering at the following website: [email or web address]. By joining [employer]’s FollowMyHealth portal, you will also be able to view your health information using other applications (apps) compatible with our system.

## 2021-12-23 NOTE — DISCHARGE NOTE NURSING/CASE MANAGEMENT/SOCIAL WORK - NSDCPEFALRISK_GEN_ALL_CORE
For information on Fall & Injury Prevention, visit: [email or web address] OR  [email or web address] OR  https://www.cdc.gov/steadi/patient.html

## 2021-12-23 NOTE — DISCHARGE NOTE PROVIDER - HOSPITAL COURSE
89M hx CAD s/p CABG and stent, AAA s/p repair 7/20, now on ASA alone, prior HFrEF, HTN, p/w CP.    #c/f NSTEMI and acute on chronic HFpEF, hx CAD s/p CABG- CP on day prior to presentation and now with elevated trops  cath neg        #from Jacobo HERNADEZ               89M hx CAD s/p CABG and stent, AAA s/p repair 7/20, now on ASA alone, prior HFrEF, HTN, p/w CP.    #c/f NSTEMI and acute on chronic HFpEF, hx CAD s/p CABG- CP on day prior to presentation and now with elevated trops  cath neg  case d/w Dr Horan; previous Rx Lasix and Plavix faxed by Dr Garcia are not necessary and should not be given to patient even if faxed               89M hx CAD s/p CABG and stent, AAA s/p repair 7/20, now on ASA alone, prior HFrEF, HTN, p/w CP.    #c/f NSTEMI and acute on chronic HFpEF, hx CAD s/p CABG- CP on day prior to presentation and now with elevated trops  cath neg  case d/w Dr Horan; according to [facility] med list he was on Plavix and Lasix all home meds to be resumed no change

## 2021-12-23 NOTE — PROGRESS NOTE ADULT - REASON FOR ADMISSION
Chest pain  Elevated troponin

## 2021-12-23 NOTE — PROGRESS NOTE ADULT - SUBJECTIVE AND OBJECTIVE BOX
Patient is a 89y old  Male who presents with a chief complaint of Chest pain  Elevated troponin (22 Dec 2021 16:21)    89 year old male patient with pertinent PMH of CAD presented to the ED complaining of a sudden onset of chest pressure that has since resolved( resolved  in the ED). Patient was in bed when pain presented. Denies associated nausea, vomiting, diaphoresis, pre-syncope or shortness of breath. No clear aggravating symptoms. States pain got better after taking valium.  In the ED patient with No EKG changes. 2nd troponin with mild elevation to 121 (17 Dec 2021 12:12)      he is comfortable in his bed. says he feels better as compared to yesterday.  troponin has peaked and on the way down.  chest xray showed CHF.  Echo in  showed low EF but today, it appears to be normal.       he feels short of breath today.   troponins have peaked and on their way down.  serum creatinine is improving.   scheduled for stress test tomorrow.       was downstairs for his stress test. After the resting images, the test was aborted because the stress machine was down. He will have to go down tomorrow for the stress images.   he denies chest pain and shortness of breath.      yesterday, had the rest portion of the stress test. today, will have the pharmacologic exercise portion.  otherwise feeling well.      Cardiac cath yesterday showed patent grafts.   the final report is pending.   no intervention was necessary    Allergies    Broccoli (Unknown)  penicillin (Vomiting; Nausea)    Intolerances        MEDICATIONS  (STANDING):  ascorbic acid 500 milliGRAM(s) Oral daily  aspirin enteric coated 81 milliGRAM(s) Oral daily  atorvastatin 80 milliGRAM(s) Oral at bedtime  bisacodyl Oral Tab/Cap - Peds 5 milliGRAM(s) Oral every 12 hours  clopidogrel Tablet 75 milliGRAM(s) Oral daily  doxazosin 4 milliGRAM(s) Oral at bedtime  ferrous    sulfate 325 milliGRAM(s) Oral daily  finasteride 5 milliGRAM(s) Oral daily  furosemide    Tablet 40 milliGRAM(s) Oral daily  heparin   Injectable 5000 Unit(s) SubCutaneous every 12 hours  lisinopril 10 milliGRAM(s) Oral daily  metoprolol succinate ER 50 milliGRAM(s) Oral daily  multivitamin/minerals 1 Tablet(s) Oral daily  pantoprazole    Tablet 40 milliGRAM(s) Oral daily  polyethylene glycol 3350 17 Gram(s) Oral daily  zinc sulfate 220 milliGRAM(s) Oral daily    MEDICATIONS  (PRN):  acetaminophen     Tablet .. 650 milliGRAM(s) Oral every 6 hours PRN Temp greater or equal to 38C (100.4F), Mild Pain (1 - 3)  diazepam    Tablet 5 milliGRAM(s) Oral two times a day PRN anxiety  melatonin 3 milliGRAM(s) Oral at bedtime PRN Insomnia  melatonin Oral Tab/Cap - Peds 3 milliGRAM(s) Oral at bedtime PRN Insomnia  ondansetron Injectable 4 milliGRAM(s) IV Push every 8 hours PRN Nausea and/or Vomiting    REVIEW OF SYSTEMS:    RESPIRATORY: No cough, wheezing, hemoptysis; No shortness of breath  CARDIOVASCULAR: No chest pain or palpitations    All other review of systems is negative unless indicated above      PHYSICAL EXAM:  Daily     Daily Weight in k.2 (23 Dec 2021 05:09)  Vital Signs Last 24 Hrs  T(C): 36.7 (22 Dec 2021 19:18), Max: 36.9 (22 Dec 2021 11:14)  T(F): 98.1 (22 Dec 2021 19:18), Max: 98.4 (22 Dec 2021 11:14)  HR: 68 (22 Dec 2021 19:18) (60 - 71)  BP: 158/53 (22 Dec 2021 19:18) (147/67 - 170/58)  BP(mean): --  RR: 18 (22 Dec 2021 19:18) (16 - 18)  SpO2: 92% (22 Dec 2021 19:18) (92% - 98%)    Constitutional: NAD, awake and alert, well-developed  HEENT: PERR, EOMI, Normal Hearing, MMM  Neck: Soft and supple, No LAD, No JVD  Respiratory: Breath sounds are clear bilaterally, No wheezing, rales or rhonchi  Cardiovascular: S1 and S2, regular rate and rhythm, no Murmurs, gallops or rubs  Gastrointestinal: Bowel Sounds present, soft, nontender, nondistended, no guarding, no rebound  Extremities: No peripheral edema  Vascular: 2+ peripheral pulses  Neurological: A/O x 3, no focal deficits  Musculoskeletal: 5/5 strength b/l upper and lower extremities  Skin: No rashes    LABS: All Labs Reviewed:                        10.1   902  )-----------( 151      ( 22 Dec 2021 06:35 )             31.9     12-    145  |  109<H>  |  39<H>  ----------------------------<  105<H>  3.9   |  29  |  1.68<H>    Ca    8.5      23 Dec 2021 06:34

## 2022-01-02 DIAGNOSIS — N17.9 ACUTE KIDNEY FAILURE, UNSPECIFIED: ICD-10-CM

## 2022-01-02 DIAGNOSIS — I25.119 ATHEROSCLEROTIC HEART DISEASE OF NATIVE CORONARY ARTERY WITH UNSPECIFIED ANGINA PECTORIS: ICD-10-CM

## 2022-01-02 DIAGNOSIS — E43 UNSPECIFIED SEVERE PROTEIN-CALORIE MALNUTRITION: ICD-10-CM

## 2022-01-02 DIAGNOSIS — Z96.641 PRESENCE OF RIGHT ARTIFICIAL HIP JOINT: ICD-10-CM

## 2022-01-02 DIAGNOSIS — I25.719 ATHEROSCLEROSIS OF AUTOLOGOUS VEIN CORONARY ARTERY BYPASS GRAFT(S) WITH UNSPECIFIED ANGINA PECTORIS: ICD-10-CM

## 2022-01-02 DIAGNOSIS — I21.4 NON-ST ELEVATION (NSTEMI) MYOCARDIAL INFARCTION: ICD-10-CM

## 2022-01-02 DIAGNOSIS — Z95.810 PRESENCE OF AUTOMATIC (IMPLANTABLE) CARDIAC DEFIBRILLATOR: ICD-10-CM

## 2022-01-02 DIAGNOSIS — Z79.82 LONG TERM (CURRENT) USE OF ASPIRIN: ICD-10-CM

## 2022-01-02 DIAGNOSIS — I50.33 ACUTE ON CHRONIC DIASTOLIC (CONGESTIVE) HEART FAILURE: ICD-10-CM

## 2022-01-02 DIAGNOSIS — Z95.5 PRESENCE OF CORONARY ANGIOPLASTY IMPLANT AND GRAFT: ICD-10-CM

## 2022-01-02 DIAGNOSIS — I13.0 HYPERTENSIVE HEART AND CHRONIC KIDNEY DISEASE WITH HEART FAILURE AND STAGE 1 THROUGH STAGE 4 CHRONIC KIDNEY DISEASE, OR UNSPECIFIED CHRONIC KIDNEY DISEASE: ICD-10-CM

## 2022-01-02 DIAGNOSIS — N18.9 CHRONIC KIDNEY DISEASE, UNSPECIFIED: ICD-10-CM

## 2022-02-22 ENCOUNTER — EMERGENCY (EMERGENCY)
Facility: HOSPITAL | Age: 87
LOS: 0 days | Discharge: HOME | End: 2022-02-23
Attending: EMERGENCY MEDICINE
Payer: MEDICARE

## 2022-02-22 VITALS
WEIGHT: 154.98 LBS | TEMPERATURE: 98 F | DIASTOLIC BLOOD PRESSURE: 62 MMHG | RESPIRATION RATE: 18 BRPM | HEIGHT: 67 IN | OXYGEN SATURATION: 100 % | HEART RATE: 72 BPM | SYSTOLIC BLOOD PRESSURE: 152 MMHG
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
## 2022-02-22 DIAGNOSIS — Z88.0 ALLERGY STATUS TO PENICILLIN: ICD-10-CM

## 2022-02-22 DIAGNOSIS — Z96.641 PRESENCE OF RIGHT ARTIFICIAL HIP JOINT: Chronic | ICD-10-CM

## 2022-02-22 DIAGNOSIS — Z95.1 PRESENCE OF AORTOCORONARY BYPASS GRAFT: Chronic | ICD-10-CM

## 2022-02-22 DIAGNOSIS — S09.90XA UNSPECIFIED INJURY OF HEAD, INITIAL ENCOUNTER: ICD-10-CM

## 2022-02-22 DIAGNOSIS — I25.10 ATHEROSCLEROTIC HEART DISEASE OF NATIVE CORONARY ARTERY WITHOUT ANGINA PECTORIS: Chronic | ICD-10-CM

## 2022-02-22 DIAGNOSIS — I10 ESSENTIAL (PRIMARY) HYPERTENSION: ICD-10-CM

## 2022-02-22 DIAGNOSIS — Z98.890 OTHER SPECIFIED POSTPROCEDURAL STATES: Chronic | ICD-10-CM

## 2022-02-22 DIAGNOSIS — Z79.82 LONG TERM (CURRENT) USE OF ASPIRIN: ICD-10-CM

## 2022-02-22 DIAGNOSIS — Z91.018 ALLERGY TO OTHER FOODS: ICD-10-CM

## 2022-02-22 DIAGNOSIS — I25.10 ATHEROSCLEROTIC HEART DISEASE OF NATIVE CORONARY ARTERY WITHOUT ANGINA PECTORIS: ICD-10-CM

## 2022-02-22 DIAGNOSIS — K03.1 ABRASION OF TEETH: ICD-10-CM
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
## 2022-02-22 DIAGNOSIS — G93.0 CEREBRAL CYSTS: ICD-10-CM

## 2022-02-22 PROCEDURE — 99284 EMERGENCY DEPT VISIT MOD MDM: CPT

## 2022-02-22 PROCEDURE — 70450 CT HEAD/BRAIN W/O DYE: CPT | Mod: MA

## 2022-02-22 PROCEDURE — 99284 EMERGENCY DEPT VISIT MOD MDM: CPT | Mod: 25

## 2022-02-22 PROCEDURE — 70450 CT HEAD/BRAIN W/O DYE: CPT | Mod: 26,MA

## 2022-02-22 NOTE — ED ADULT TRIAGE NOTE - CHIEF COMPLAINT QUOTE
patient states he lost his balance in the bathroom and fell backwards.  patient A&Ox3 denies LOC. pt has abrasion to top of head, bleeding controlled.  patient is on Plavix.  patient evaluated by Dr. Stacy at triage, neuro alert called.

## 2022-02-22 NOTE — ED PROVIDER NOTE - CLINICAL SUMMARY MEDICAL DECISION MAKING FREE TEXT BOX
Neuro alert called at triage: CT head to r/o ICH negative for ICH but shows incidental arachnoid cyst.  [facility] ACP contacted and awaiting call back from their attending.

## 2022-02-22 NOTE — ED ADULT NURSE NOTE - OBJECTIVE STATEMENT
pt states "I have a balance problem". Pts states that he was up brushing his teeth when he   fell backwards hitting the back of his head. denies LOC, nausea, or dizziness.

## 2022-02-22 NOTE — ED PROVIDER NOTE - PROGRESS NOTE DETAILS
Discussed case and CT head with [facility] ACP who states likely nothing to do acutely for arachnoid cyst but will d/w attending.  Leon Solis, DO Contacted [facility] PA for disposition and they stated they are still waiting on Dr. Flood to call back.  Will attempt to contact [facility] attending from ED.  Leon Solis, DO [facility] PA states she talked to Dr. Carroll and patient is safe for d/c and outpatient follow up.  Leon Solis, DO

## 2022-02-22 NOTE — ED ADULT NURSE NOTE - NSIMPLEMENTINTERV_GEN_ALL_ED
Implemented All Fall with Harm Risk Interventions:  [place] to call system. Call bell, personal items and telephone within reach. Instruct patient to call for assistance. Room bathroom lighting operational. Non-slip footwear when patient is off stretcher. Physically safe environment: no spills, clutter or unnecessary equipment. Stretcher in lowest position, wheels locked, appropriate side rails in place. Provide visual cue, wrist band, yellow gown, etc. Monitor gait and stability. Monitor for mental status changes and reorient to person, place, and time. Review medications for side effects contributing to fall risk. Reinforce activity limits and safety measures with patient and family. Provide visual clues: red socks.

## 2022-02-22 NOTE — ED PROVIDER NOTE - NSFOLLOWUPINSTRUCTIONS_ED_ALL_ED_FT
Arachnoid Cyst       An arachnoid cyst is a fluid-filled sac that forms in the brain or on the spinal cord. The cyst develops between the brain or spinal cord and a membrane that covers the brain and spinal cord (arachnoid membrane). The cyst is filled with some of the fluid that surrounds and protects the brain and spinal cord (cerebrospinal fluid or CSF). Arachnoid cysts are not cancerous.    Brain cysts are more common than spinal cord cysts. Arachnoid cysts usually develop in a middle area of the brain (middle cranial fossa). In most cases, an arachnoid cyst develops before birth (primary arachnoid cyst). An arachnoid cyst can also develop after birth (secondary arachnoid cyst), which is less common.    What are the causes?  The cause of a primary arachnoid cyst is usually not known.    Secondary arachnoid cysts may be caused by:    Brain or spinal cord injury.  Bleeding in the brain or spinal cord.  Brain or spinal cord infection (meningitis).  A brain or spinal cord tumor.  Brain or spinal cord surgery.    What increases the risk?  You may be more likely to have a primary arachnoid cyst if you have a family history of arachnoid cysts.    What are the signs or symptoms?  Signs and symptoms depend on the size and location of the cyst. You may have no symptoms if the cyst is small.    Symptoms of a brain cyst may include:    Nausea and vomiting.  Headache.  Dizziness.  Seizures.  Problems with hearing or vision.  Clumsiness or loss of balance.  Developmental delay in children.    Symptoms of a spinal cord cyst may include:    Pain in the back, arms, or legs.  Tingling or numbness in the back, arms, or legs.  Loss of the ability to control urine or bowel movements.    How is this diagnosed?  This condition may be diagnosed based on:    Your symptoms and medical history.  A physical exam.  Imaging tests, such as an MRI. This is the best way to diagnose an arachnoid cyst.    How is this treated?  Treatment depends on the size and location of the cyst and what symptoms it causes. If your cyst is small and does not cause symptoms, you may only need to have regular visits with your health care provider to monitor your cyst over time.    If your cyst is large, in a dangerous area, or causes symptoms, you may need surgery to do one of the following:    Open and drain the cyst.  Place a tube (shunt) into the cyst to drain it.  Remove the cyst completely.    Follow these instructions at home:  If your cyst is being monitored, watch for any new symptoms or changes in your condition. Let your health care provider know about any new symptoms or changes.  Take over-the-counter and prescription medicines only as told by your health care provider.  Return to your normal activities as told by your health care provider. Ask your health care provider what activities are safe for you.  Keep all follow-up visits as told by your health care provider. This is important.    Contact a health care provider if:  Your symptoms get worse, or you develop any of the following symptoms:    Pain or headache.  Nausea or vomiting.  Dizziness or clumsiness.  Tingling or numbness.  Changes in hearing or vision.    Get help right away if you:  Have a seizure.  Suddenly lose hearing or vision.  Lose control of your bowel or bladder function.  Develop a severe headache.    Summary  An arachnoid cyst is a sac filled with cerebrospinal fluid (CSF) that develops in your brain or on your spinal cord.  A primary cyst is present at birth. A secondary cyst develops after birth and may be caused by injury, infection, tumor, or surgery.  Treatment depends on the size and location of the cyst. Small cysts that do not cause symptoms may only need to be monitored. Large cysts that cause symptoms may require surgery.  If your cyst is being monitored, watch for any new symptoms or changes in your condition. Let your health care provider know about any new symptoms or changes.    ADDITIONAL NOTES AND INSTRUCTIONS    Please follow up with your Primary MD in 24-48 hr.  Seek immediate medical care for any new/worsening signs or symptoms.     Head Injury    WHAT YOU NEED TO KNOW:    A head injury is most often caused by a blow to the head. This may occur from a fall, bicycle injury, sports injury, being struck in the head, or a motor vehicle accident.     DISCHARGE INSTRUCTIONS:    Call 911 or have someone else call for any of the following:     You cannot be woken.      You have a seizure.      You stop responding to others or you faint.      You have blurry or double vision.      Your speech becomes slurred or confused.      You have arm or leg weakness, loss of feeling, or new problems with coordination.      Your pupils are larger than usual or one pupil is a different size than the other.       You have blood or clear fluid coming out of your ears or nose.    Return to the emergency department if:     You have repeated or forceful vomiting.      You feel confused.      Your headache gets worse or becomes severe.      You or someone caring for you notices that you are harder to wake than usual.    Contact your healthcare provider if:     Your symptoms last longer than 6 weeks after the injury.      You have questions or concerns about your condition or care.    Medicines:     Acetaminophen decreases pain. Acetaminophen is available without a doctor's order. Ask how much to take and how often to take it. Follow directions. Acetaminophen can cause liver damage if not taken correctly.      Take your medicine as directed. Contact your healthcare provider if you think your medicine is not helping or if you have side effects. Tell him or her if you are allergic to any medicine. Keep a list of the medicines, vitamins, and herbs you take. Include the amounts, and when and why you take them. Bring the list or the pill bottles to follow-up visits. Carry your medicine list with you in case of an emergency.    Self-care:     Rest or do quiet activities for 24 to 48 hours. Limit your time watching TV, using the computer, or doing tasks that require a lot of thinking. Slowly return to your normal activities as directed. Do not play sports or do activities that may cause you to get hit in the head. Ask your healthcare provider when you can return to sports.       Apply ice on your head for 15 to 20 minutes every hour or as directed. Use an ice pack, or put crushed ice in a plastic bag. Cover it with a towel before you apply it to your skin. Ice helps prevent tissue damage and decreases swelling and pain.       Have someone stay with you for 24 hours or as directed. This person can monitor you for complications and call 911. When you are awake the person should ask you a few questions to see if you are thinking clearly. An example would be to ask your name or your address.     Prevent another head injury:     Wear a helmet that fits properly. Do this when you play sports, or ride a bike, scooter, or skateboard. Helmets help decrease your risk of a serious head injury. Talk to your healthcare provider about other ways you can protect yourself if you play sports.      Wear your seat belt every time you are in a car. This helps to decrease your risk for a head injury if you are in a car accident.     Follow up with your healthcare provider as directed: Write down your questions so you remember to ask them during your visits.

## 2022-02-22 NOTE — ED PROVIDER NOTE - PATIENT PORTAL LINK FT
You can access the FollowMyHealth Patient Portal offered by [facility] by registering at the following website: [email or web address]. By joining [employer]’s FollowMyHealth portal, you will also be able to view your health information using other applications (apps) compatible with our system.

## 2022-02-22 NOTE — ED ADULT NURSE NOTE - NS ED NURSE RECORD ANOTHER VITAL SIGN
Patient with no concern for infection at this time due to low WBC of joint fluid and negative procalcitonin.  Joint aspirate positive for urate crystals.  Recommend treatment with NSAIDs or colchicine if patient's kidney function allows.   Yes

## 2022-02-22 NOTE — ED PROVIDER NOTE - OBJECTIVE STATEMENT
89 y/o M with h/o CAD, AAA repair in 2020, CABG BIBEMS for unwitnessed fall with head injury on Plavix without LOC.  Pt is AAO x 3 and states he was standing at the sink brushing his teeth and lost his balance falling backwards and striking his head on the floor.  Pt noted to have abrasion with dressing applied by EMS.  Pt has no complaints currently.  Pt denies having any dizziness, CP or SOB around the time of the fall.

## 2022-02-23 VITALS
OXYGEN SATURATION: 98 % | HEART RATE: 68 BPM | RESPIRATION RATE: 18 BRPM | DIASTOLIC BLOOD PRESSURE: 57 MMHG | SYSTOLIC BLOOD PRESSURE: 138 MMHG | TEMPERATURE: 98 F

## 2022-02-23 NOTE — CONSULT NOTE ADULT - SUBJECTIVE AND OBJECTIVE BOX
Patient is a 89y old  Male who presents with a chief complaint of     HPI: 89 yo male transferred from NH after sustaining a fall with head trauma. Pt states he looses his balance often and while he was brushing his teeth he lost his balance and fell backwards hitting the back of his head. He denies any LOC, headache, dizziness, numbness, tingling or weakness. On head CT pt was found to have a large arachnoid cyst, which he states he has known about and followed with a neurosurgeon along time ago with serial MRIs but hasn't seen in a long time. Pt denies any seizure history or seizure like activity. Pt was seen and examined in the Er and d/w Dr. Carroll. Pt ok to be transferred back to NH and follow up with Dr. Carroll as an outpt.       PAST MEDICAL & SURGICAL HISTORY:  HTN (hypertension)    AAA (abdominal aortic aneurysm)    S/P CABG (coronary artery bypass graft)    History of right hip replacement    CAD (coronary artery disease)  s/p stent placed    S/P left inguinal hernia repair    S/P AAA repair  7-        FAMILY HISTORY:  No pertinent family history in first degree relatives        Social Hx:  Nonsmoker, no drug or alcohol use    MEDICATIONS  (STANDING):       Allergies    Broccoli (Unknown)  penicillin (Vomiting; Nausea)    Intolerances        ROS: Pertinent positives in HPI, all other ROS were reviewed and are negative.      Vital Signs Last 24 Hrs  T(C): 36.6 (22 Feb 2022 22:20), Max: 36.6 (22 Feb 2022 22:20)  T(F): 97.9 (22 Feb 2022 22:20), Max: 97.9 (22 Feb 2022 22:20)  HR: 66 (23 Feb 2022 02:27) (66 - 72)  BP: 138/78 (23 Feb 2022 06:02) (138/78 - 152/62)  BP(mean): --  RR: 22 (23 Feb 2022 00:37) (18 - 22)  SpO2: 99% (23 Feb 2022 06:02) (95% - 100%)        Constitutional: awake and alert.  HEENT: PERRLA, EOMI,   Neck: Supple. no pain to palp  Respiratory: Breath sounds are clear bilaterally  Cardiovascular: S1 and S2, regular rhythm  Gastrointestinal: soft, nontender  Extremities: + quinton ankle edema  Musculoskeletal: no joint swelling/tenderness, no abnormal movements  Skin: + skin tear/abrasion noted to parietal scalp     Neurological exam:  HF: A x O x 3. Appropriately interactive, normal affect. Speech fluent, No Aphasia or paraphasic errors. Naming /repetition intact   CN: right AYO, left irreg pupil, EOMI, facial sensation normal, no NLFD, tongue midline SCM equal bilaterally  Motor: No pronator drift, Strength 5/5 in all 4 ext, normal bulk and tone, no tremor, rigidity or bradykinesia.    Sens: Intact to light touch   Gait/Balance: Cannot test        Radiology:  - CT Head:      ACC: [id number] EXAM:  CT BRAIN                          PROCEDURE DATE:  02/22/2022          INTERPRETATION:  CLINICAL INFORMATION:  fall with head injury on Plavix    TECHNIQUE:  Axial CT images were acquired through the head.  Intravenous contrast: None  Two-dimensional reformats were generated.    COMPARISON STUDY: None    FINDINGS:  The study is slightly limited by streak artifact.    There is a large left middle cranial fossa arachnoid cyst, with mass   effect on the adjacent left inferior frontal and anterior temporal lobes.   There is also slight effacement of the left lateral ventricle, with trace   left-to-right midline shift measuring 2-3 mm.    There is a partially calcified extra-axial lesion measuring 1.1 cm along   the high right parietal convexity, likely a calcified meningioma.    There is no CT evidence of acute intracranial hemorrhage or acute, large   territorial infarct.  There is no hydrocephalus. The basal cisterns are   patent.    Mild patchy periventricular and subcortical white matter heterogeneity is   nonspecific, although likely on the basis of chronic small vessel   ischemic disease.    There are atherosclerotic calcifications at the skull base.    The mastoid air cells and middle ear cavities are grosslyclear. There is   a small mucous retention cyst versus polyp in the right maxillary sinus,   the remaining visualized paranasal sinuses are well aerated. There are   bilateral ocular lens replacements.    The calvarium and skull base are grossly intact. There is a laceration in   the scalp at the vertex without underlying hematoma.    IMPRESSION:  No acute intracranial hemorrhage, cerebral edema, or hydrocephalus.    Large left middle cranial fossa arachnoid cyst, with trace rightward   midline shift.    --- End of Report ---            NIKKI HOWE MD; Attending Radiologist  This document has been electronically signed. Feb 23 2022 12:04AM

## 2022-02-23 NOTE — ED ADULT NURSE REASSESSMENT NOTE - NS ED NURSE REASSESS COMMENT FT1
Report received from night RN Abad Richter. Pt has been discharged, EMS at bedside ready to bring pt back to assisted living, pt insist that son is to pick him up. Tracy CHOWDHURY spoke to son who says that his dad should go with EMS. Pt in no acute distress, laying comfortable in bed.

## 2022-02-23 NOTE — CONSULT NOTE ADULT - ASSESSMENT
88 yo male with a HTN, HTN, AAA, CAD w/ stents on Plavix was transferred from NH after sustaining a fall with head trauma. Pt states he looses his balance often and while he was brushing his teeth he lost his balance and fell backwards hitting the back of his head. He denies any LOC, headache, dizziness, numbness, tingling or weakness. On head CT pt was found to have a large arachnoid cyst, which he states he has known about and followed with a neurosurgeon along time ago with serial MRIs but hasn't seen in a long time. Pt denies any seizure history or seizure like activity. Pt was seen and examined in the Er and d/w Dr. Carroll. Pt ok to be transferred back to NH and follow up with Dr. Carroll as an outpt.

## 2022-03-18 ENCOUNTER — INPATIENT (INPATIENT)
Facility: HOSPITAL | Age: 87
LOS: 2 days | Discharge: HOME | DRG: 302 | End: 2022-03-21
Attending: HOSPITALIST | Admitting: FAMILY MEDICINE
Payer: MEDICARE

## 2022-03-18 VITALS
DIASTOLIC BLOOD PRESSURE: 68 MMHG | SYSTOLIC BLOOD PRESSURE: 139 MMHG | HEART RATE: 85 BPM | RESPIRATION RATE: 18 BRPM | TEMPERATURE: 98 F | WEIGHT: 156.09 LBS | OXYGEN SATURATION: 94 % | HEIGHT: 67 IN

## 2022-03-18 DIAGNOSIS — I25.10 ATHEROSCLEROTIC HEART DISEASE OF NATIVE CORONARY ARTERY WITHOUT ANGINA PECTORIS: Chronic | ICD-10-CM

## 2022-03-18 DIAGNOSIS — Z98.890 OTHER SPECIFIED POSTPROCEDURAL STATES: Chronic | ICD-10-CM

## 2022-03-18 DIAGNOSIS — Z96.641 PRESENCE OF RIGHT ARTIFICIAL HIP JOINT: Chronic | ICD-10-CM

## 2022-03-18 DIAGNOSIS — R07.9 CHEST PAIN, UNSPECIFIED: ICD-10-CM

## 2022-03-18 DIAGNOSIS — Z95.1 PRESENCE OF AORTOCORONARY BYPASS GRAFT: Chronic | ICD-10-CM

## 2022-03-18 LAB
ALBUMIN SERPL ELPH-MCNC: 2.9 G/DL — LOW (ref 3.3–5)
ALP SERPL-CCNC: 124 U/L — HIGH (ref 40–120)
ALT FLD-CCNC: 17 U/L — SIGNIFICANT CHANGE UP (ref 12–78)
ANION GAP SERPL CALC-SCNC: 5 MMOL/L — SIGNIFICANT CHANGE UP (ref 5–17)
APPEARANCE UR: CLEAR — SIGNIFICANT CHANGE UP
APTT BLD: 28.9 SEC — SIGNIFICANT CHANGE UP (ref 27.5–35.5)
AST SERPL-CCNC: 19 U/L — SIGNIFICANT CHANGE UP (ref 15–37)
BASOPHILS # BLD AUTO: 0.02 K/UL — SIGNIFICANT CHANGE UP (ref 0–0.2)
BASOPHILS NFR BLD AUTO: 0.2 % — SIGNIFICANT CHANGE UP (ref 0–2)
BILIRUB SERPL-MCNC: 0.7 MG/DL — SIGNIFICANT CHANGE UP (ref 0.2–1.2)
BILIRUB UR-MCNC: NEGATIVE — SIGNIFICANT CHANGE UP
BUN SERPL-MCNC: 41 MG/DL — HIGH (ref 7–23)
CALCIUM SERPL-MCNC: 8.6 MG/DL — SIGNIFICANT CHANGE UP (ref 8.5–10.1)
CHLORIDE SERPL-SCNC: 107 MMOL/L — SIGNIFICANT CHANGE UP (ref 96–108)
CO2 SERPL-SCNC: 25 MMOL/L — SIGNIFICANT CHANGE UP (ref 22–31)
COLOR SPEC: YELLOW — SIGNIFICANT CHANGE UP
CREAT SERPL-MCNC: 1.84 MG/DL — HIGH (ref 0.5–1.3)
DIFF PNL FLD: ABNORMAL
EGFR: 35 ML/MIN/1.73M2 — LOW
EOSINOPHIL # BLD AUTO: 0.24 K/UL — SIGNIFICANT CHANGE UP (ref 0–0.5)
EOSINOPHIL NFR BLD AUTO: 3 % — SIGNIFICANT CHANGE UP (ref 0–6)
GLUCOSE SERPL-MCNC: 112 MG/DL — HIGH (ref 70–99)
GLUCOSE UR QL: NEGATIVE — SIGNIFICANT CHANGE UP
HCT VFR BLD CALC: 26.7 % — LOW (ref 39–50)
HGB BLD-MCNC: 8.3 G/DL — LOW (ref 13–17)
IMM GRANULOCYTES NFR BLD AUTO: 0.4 % — SIGNIFICANT CHANGE UP (ref 0–1.5)
INR BLD: 1.2 RATIO — HIGH (ref 0.88–1.16)
KETONES UR-MCNC: NEGATIVE — SIGNIFICANT CHANGE UP
LEUKOCYTE ESTERASE UR-ACNC: NEGATIVE — SIGNIFICANT CHANGE UP
LYMPHOCYTES # BLD AUTO: 0.85 K/UL — LOW (ref 1–3.3)
LYMPHOCYTES # BLD AUTO: 10.6 % — LOW (ref 13–44)
MCHC RBC-ENTMCNC: 30.4 PG — SIGNIFICANT CHANGE UP (ref 27–34)
MCHC RBC-ENTMCNC: 31.1 GM/DL — LOW (ref 32–36)
MCV RBC AUTO: 97.8 FL — SIGNIFICANT CHANGE UP (ref 80–100)
MONOCYTES # BLD AUTO: 0.66 K/UL — SIGNIFICANT CHANGE UP (ref 0–0.9)
MONOCYTES NFR BLD AUTO: 8.2 % — SIGNIFICANT CHANGE UP (ref 2–14)
NEUTROPHILS # BLD AUTO: 6.21 K/UL — SIGNIFICANT CHANGE UP (ref 1.8–7.4)
NEUTROPHILS NFR BLD AUTO: 77.6 % — HIGH (ref 43–77)
NITRITE UR-MCNC: NEGATIVE — SIGNIFICANT CHANGE UP
NT-PROBNP SERPL-SCNC: 8663 PG/ML — HIGH (ref 0–450)
PH UR: 6.5 — SIGNIFICANT CHANGE UP (ref 5–8)
PLATELET # BLD AUTO: 197 K/UL — SIGNIFICANT CHANGE UP (ref 150–400)
POTASSIUM SERPL-MCNC: 5.3 MMOL/L — SIGNIFICANT CHANGE UP (ref 3.5–5.3)
POTASSIUM SERPL-SCNC: 5.3 MMOL/L — SIGNIFICANT CHANGE UP (ref 3.5–5.3)
PROT SERPL-MCNC: 6.5 GM/DL — SIGNIFICANT CHANGE UP (ref 6–8.3)
PROT UR-MCNC: NEGATIVE — SIGNIFICANT CHANGE UP
PROTHROM AB SERPL-ACNC: 14 SEC — HIGH (ref 10.5–13.4)
RBC # BLD: 2.73 M/UL — LOW (ref 4.2–5.8)
RBC # FLD: 14.5 % — SIGNIFICANT CHANGE UP (ref 10.3–14.5)
SODIUM SERPL-SCNC: 137 MMOL/L — SIGNIFICANT CHANGE UP (ref 135–145)
SP GR SPEC: 1.01 — SIGNIFICANT CHANGE UP (ref 1.01–1.02)
TROPONIN I, HIGH SENSITIVITY RESULT: 19.49 NG/L — SIGNIFICANT CHANGE UP
TROPONIN I, HIGH SENSITIVITY RESULT: 253.39 NG/L — HIGH
TROPONIN I, HIGH SENSITIVITY RESULT: 585.87 NG/L — HIGH
UROBILINOGEN FLD QL: NEGATIVE — SIGNIFICANT CHANGE UP
WBC # BLD: 8.01 K/UL — SIGNIFICANT CHANGE UP (ref 3.8–10.5)
WBC # FLD AUTO: 8.01 K/UL — SIGNIFICANT CHANGE UP (ref 3.8–10.5)

## 2022-03-18 PROCEDURE — 84466 ASSAY OF TRANSFERRIN: CPT

## 2022-03-18 PROCEDURE — 83615 LACTATE (LD) (LDH) ENZYME: CPT

## 2022-03-18 PROCEDURE — 83735 ASSAY OF MAGNESIUM: CPT

## 2022-03-18 PROCEDURE — 87077 CULTURE AEROBIC IDENTIFY: CPT

## 2022-03-18 PROCEDURE — 85045 AUTOMATED RETICULOCYTE COUNT: CPT

## 2022-03-18 PROCEDURE — 93005 ELECTROCARDIOGRAM TRACING: CPT

## 2022-03-18 PROCEDURE — 83550 IRON BINDING TEST: CPT

## 2022-03-18 PROCEDURE — 93010 ELECTROCARDIOGRAM REPORT: CPT

## 2022-03-18 PROCEDURE — 82607 VITAMIN B-12: CPT

## 2022-03-18 PROCEDURE — 80048 BASIC METABOLIC PNL TOTAL CA: CPT

## 2022-03-18 PROCEDURE — 87070 CULTURE OTHR SPECIMN AEROBIC: CPT

## 2022-03-18 PROCEDURE — 84100 ASSAY OF PHOSPHORUS: CPT

## 2022-03-18 PROCEDURE — 71045 X-RAY EXAM CHEST 1 VIEW: CPT | Mod: 26

## 2022-03-18 PROCEDURE — 97116 GAIT TRAINING THERAPY: CPT | Mod: GP

## 2022-03-18 PROCEDURE — 83540 ASSAY OF IRON: CPT

## 2022-03-18 PROCEDURE — 87186 SC STD MICRODIL/AGAR DIL: CPT

## 2022-03-18 PROCEDURE — 71250 CT THORAX DX C-: CPT | Mod: 26

## 2022-03-18 PROCEDURE — 84484 ASSAY OF TROPONIN QUANT: CPT

## 2022-03-18 PROCEDURE — 83010 ASSAY OF HAPTOGLOBIN QUANT: CPT

## 2022-03-18 PROCEDURE — 85027 COMPLETE CBC AUTOMATED: CPT

## 2022-03-18 PROCEDURE — 97162 PT EVAL MOD COMPLEX 30 MIN: CPT | Mod: GP

## 2022-03-18 PROCEDURE — 99291 CRITICAL CARE FIRST HOUR: CPT | Mod: CS

## 2022-03-18 PROCEDURE — 76770 US EXAM ABDO BACK WALL COMP: CPT

## 2022-03-18 PROCEDURE — 99223 1ST HOSP IP/OBS HIGH 75: CPT

## 2022-03-18 PROCEDURE — 82728 ASSAY OF FERRITIN: CPT

## 2022-03-18 PROCEDURE — 71250 CT THORAX DX C-: CPT

## 2022-03-18 PROCEDURE — 36415 COLL VENOUS BLD VENIPUNCTURE: CPT

## 2022-03-18 RX ORDER — ASPIRIN/CALCIUM CARB/MAGNESIUM 324 MG
81 TABLET ORAL DAILY
Refills: 0 | Status: DISCONTINUED | OUTPATIENT
Start: 2022-03-18 | End: 2022-03-21

## 2022-03-18 RX ORDER — POLYETHYLENE GLYCOL 3350 17 G/17G
17 POWDER, FOR SOLUTION ORAL DAILY
Refills: 0 | Status: DISCONTINUED | OUTPATIENT
Start: 2022-03-18 | End: 2022-03-21

## 2022-03-18 RX ORDER — DIAZEPAM 5 MG
5 TABLET ORAL AT BEDTIME
Refills: 0 | Status: DISCONTINUED | OUTPATIENT
Start: 2022-03-18 | End: 2022-03-21

## 2022-03-18 RX ORDER — HEPARIN SODIUM 5000 [USP'U]/ML
5000 INJECTION INTRAVENOUS; SUBCUTANEOUS EVERY 12 HOURS
Refills: 0 | Status: DISCONTINUED | OUTPATIENT
Start: 2022-03-18 | End: 2022-03-21

## 2022-03-18 RX ORDER — PANTOPRAZOLE SODIUM 20 MG/1
40 TABLET, DELAYED RELEASE ORAL
Refills: 0 | Status: DISCONTINUED | OUTPATIENT
Start: 2022-03-18 | End: 2022-03-21

## 2022-03-18 RX ORDER — ATORVASTATIN CALCIUM 80 MG/1
80 TABLET, FILM COATED ORAL AT BEDTIME
Refills: 0 | Status: DISCONTINUED | OUTPATIENT
Start: 2022-03-18 | End: 2022-03-21

## 2022-03-18 RX ORDER — ASPIRIN/CALCIUM CARB/MAGNESIUM 324 MG
324 TABLET ORAL ONCE
Refills: 0 | Status: COMPLETED | OUTPATIENT
Start: 2022-03-18 | End: 2022-03-18

## 2022-03-18 RX ORDER — ONDANSETRON 8 MG/1
4 TABLET, FILM COATED ORAL EVERY 6 HOURS
Refills: 0 | Status: DISCONTINUED | OUTPATIENT
Start: 2022-03-18 | End: 2022-03-21

## 2022-03-18 RX ORDER — CLOPIDOGREL BISULFATE 75 MG/1
75 TABLET, FILM COATED ORAL DAILY
Refills: 0 | Status: DISCONTINUED | OUTPATIENT
Start: 2022-03-18 | End: 2022-03-21

## 2022-03-18 RX ORDER — ACETAMINOPHEN 500 MG
650 TABLET ORAL EVERY 6 HOURS
Refills: 0 | Status: DISCONTINUED | OUTPATIENT
Start: 2022-03-18 | End: 2022-03-21

## 2022-03-18 RX ORDER — DOXAZOSIN MESYLATE 4 MG
4 TABLET ORAL AT BEDTIME
Refills: 0 | Status: DISCONTINUED | OUTPATIENT
Start: 2022-03-18 | End: 2022-03-21

## 2022-03-18 RX ORDER — ASCORBIC ACID 60 MG
500 TABLET,CHEWABLE ORAL DAILY
Refills: 0 | Status: DISCONTINUED | OUTPATIENT
Start: 2022-03-18 | End: 2022-03-21

## 2022-03-18 RX ORDER — FUROSEMIDE 40 MG
20 TABLET ORAL DAILY
Refills: 0 | Status: DISCONTINUED | OUTPATIENT
Start: 2022-03-18 | End: 2022-03-21

## 2022-03-18 RX ORDER — METOPROLOL TARTRATE 50 MG
50 TABLET ORAL DAILY
Refills: 0 | Status: DISCONTINUED | OUTPATIENT
Start: 2022-03-18 | End: 2022-03-21

## 2022-03-18 RX ORDER — FERROUS SULFATE 325(65) MG
325 TABLET ORAL DAILY
Refills: 0 | Status: DISCONTINUED | OUTPATIENT
Start: 2022-03-18 | End: 2022-03-21

## 2022-03-18 RX ORDER — LANOLIN ALCOHOL/MO/W.PET/CERES
3 CREAM (GRAM) TOPICAL AT BEDTIME
Refills: 0 | Status: DISCONTINUED | OUTPATIENT
Start: 2022-03-18 | End: 2022-03-21

## 2022-03-18 RX ORDER — FUROSEMIDE 40 MG
40 TABLET ORAL DAILY
Refills: 0 | Status: DISCONTINUED | OUTPATIENT
Start: 2022-03-18 | End: 2022-03-21

## 2022-03-18 RX ORDER — FINASTERIDE 5 MG/1
5 TABLET, FILM COATED ORAL DAILY
Refills: 0 | Status: DISCONTINUED | OUTPATIENT
Start: 2022-03-18 | End: 2022-03-21

## 2022-03-18 RX ADMIN — Medication 4 MILLIGRAM(S): at 21:01

## 2022-03-18 RX ADMIN — Medication 20 MILLIGRAM(S): at 17:37

## 2022-03-18 RX ADMIN — Medication 5 MILLIGRAM(S): at 23:20

## 2022-03-18 RX ADMIN — Medication 50 MILLIGRAM(S): at 17:38

## 2022-03-18 RX ADMIN — ATORVASTATIN CALCIUM 80 MILLIGRAM(S): 80 TABLET, FILM COATED ORAL at 21:01

## 2022-03-18 RX ADMIN — CLOPIDOGREL BISULFATE 75 MILLIGRAM(S): 75 TABLET, FILM COATED ORAL at 17:37

## 2022-03-18 RX ADMIN — FINASTERIDE 5 MILLIGRAM(S): 5 TABLET, FILM COATED ORAL at 17:37

## 2022-03-18 RX ADMIN — Medication 324 MILLIGRAM(S): at 13:15

## 2022-03-18 RX ADMIN — Medication 325 MILLIGRAM(S): at 17:37

## 2022-03-18 RX ADMIN — Medication 81 MILLIGRAM(S): at 17:37

## 2022-03-18 RX ADMIN — POLYETHYLENE GLYCOL 3350 17 GRAM(S): 17 POWDER, FOR SOLUTION ORAL at 17:43

## 2022-03-18 RX ADMIN — HEPARIN SODIUM 5000 UNIT(S): 5000 INJECTION INTRAVENOUS; SUBCUTANEOUS at 17:38

## 2022-03-18 NOTE — H&P ADULT - NSHPPHYSICALEXAM_GEN_ALL_CORE
PHYSICAL EXAM:  Constitutional: NAD, awake and alert  Neurological: AAO x 3, no focal deficits  HEENT: PERRLA, EOMI, MMM  Neck: Soft and supple, No LAD, No JVD  Respiratory: Breath sounds are clear bilaterally, No wheezing, rales or rhonchi  Cardiovascular: S1 and S2, regular rate and rhythm; no Murmurs, gallops or rubs  Gastrointestinal: Bowel Sounds present, soft, nontender, nondistended, no guarding, no rebound tenderness  Back: No CVA tenderness   Extremities: No peripheral edema  Vascular: 2+ peripheral pulses  Musculoskeletal: 5/5 strength b/l upper and lower extremities  Skin: No rashes  Breast: Deferred  Rectal: Deferred

## 2022-03-18 NOTE — H&P ADULT - NSHPLABSRESULTS_GEN_ALL_CORE
Lab Results:  CBC  CBC Full  -  ( 18 Mar 2022 08:21 )  WBC Count : 8.01 K/uL  RBC Count : 2.73 M/uL  Hemoglobin : 8.3 g/dL  Hematocrit : 26.7 %  Platelet Count - Automated : 197 K/uL  Mean Cell Volume : 97.8 fl  Mean Cell Hemoglobin : 30.4 pg  Mean Cell Hemoglobin Concentration : 31.1 gm/dL  Auto Neutrophil # : 6.21 K/uL  Auto Lymphocyte # : 0.85 K/uL  Auto Monocyte # : 0.66 K/uL  Auto Eosinophil # : 0.24 K/uL  Auto Basophil # : 0.02 K/uL  Auto Neutrophil % : 77.6 %  Auto Lymphocyte % : 10.6 %  Auto Monocyte % : 8.2 %  Auto Eosinophil % : 3.0 %  Auto Basophil % : 0.2 %    .		Differential:	[] Automated		[] Manual  Chemistry                        8.3    8.01  )-----------( 197      ( 18 Mar 2022 08:21 )             26.7     03-18    137  |  107  |  41<H>  ----------------------------<  112<H>  5.3   |  25  |  1.84<H>    Ca    8.6      18 Mar 2022 08:21    TPro  6.5  /  Alb  2.9<L>  /  TBili  0.7  /  DBili  x   /  AST  19  /  ALT  17  /  AlkPhos  124<H>  03-18    LIVER FUNCTIONS - ( 18 Mar 2022 08:21 )  Alb: 2.9 g/dL / Pro: 6.5 gm/dL / ALK PHOS: 124 U/L / ALT: 17 U/L / AST: 19 U/L / GGT: x           PT/INR - ( 18 Mar 2022 08:21 )   PT: 14.0 sec;   INR: 1.20 ratio         PTT - ( 18 Mar 2022 08:21 )  PTT:28.9 sec  Urinalysis Basic - ( 18 Mar 2022 11:18 )    Color: Yellow / Appearance: Clear / S.010 / pH: x  Gluc: x / Ketone: Negative  / Bili: Negative / Urobili: Negative   Blood: x / Protein: Negative / Nitrite: Negative   Leuk Esterase: Negative / RBC: 6-10 /HPF / WBC 0-2   Sq Epi: x / Non Sq Epi: Occasional / Bacteria: Occasional        RADIOLOGY RESULTS:    EKG pACed @ 79 bpm    < from: Xray Chest 1 View- PORTABLE-Urgent (22 @ 09:29) >      Heart likely enlarged. Sternotomy and left-sided defibrillator again   noted.    Mid lower lung field infiltrates likely congestive are noted somewhat   increased from 2021.    IMPRESSION: Significant congestion somewhat increased. Stable cardiac   findings.    < end of copied text >

## 2022-03-18 NOTE — H&P ADULT - NSHPREVIEWOFSYSTEMS_GEN_ALL_CORE
REVIEW OF SYSTEMS:    CONSTITUTIONAL: No weakness, fevers or chills  EYES/ENT: No visual changes; vertigo or throat pain   NECK: No pain or stiffness  RESPIRATORY: + cough No  wheezing, hemoptysis or shortness of breath  CARDIOVASCULAR: No chest pain or palpitations  GASTROINTESTINAL: No abdominal or epigastric pain. No nausea, vomiting, or hematemesis; No diarrhea or constipation. No melena or hematochezia.  GENITOURINARY: No dysuria, urinary frequency or hematuria  NEUROLOGICAL: No numbness or weakness  EXTREMITIES: No swelling or tenderness  SKIN: No itching, burning, rashes, or lesions   All other review of systems is negative unless indicated above.

## 2022-03-18 NOTE — H&P ADULT - HISTORY OF PRESENT ILLNESS
89 yo pt with PMH of CAD, AAA repair in 2020, CABG BIBEMS for chest pain.  Patient had midsternal, dull chest pain this morning with no radiation. Patient denies any diaphoresis, sob, n/v/d. Denies any Dyspnea, syncope, cough or fever.  chest pain now gone.  No recent fall, travel Hx or sick contacts .    Social History: Lives at assisted living; No tobacco, alcohol or illicit drug use   Family History: Unknown age and cause of death of both parents

## 2022-03-18 NOTE — ED ADULT NURSE NOTE - OBJECTIVE STATEMENT
Pt is A&O x 3, states he had chest discomfort this morning for a couple hours. Pt states currently he does not have chest discomfort. EKG completed. Pt on tele monitor. IV inserted 20 g right AC. Blood labs obtained and sent to lab. Covid swab sent to lab completed by triage.

## 2022-03-18 NOTE — PATIENT PROFILE ADULT - FALL HARM RISK - HARM RISK INTERVENTIONS

## 2022-03-18 NOTE — ED PROVIDER NOTE - OBJECTIVE STATEMENT
89 yo pt with PMH of  CAD, AAA repair in 2020, CABG BIBEMS for chest pain.  pt states he started having chest pressure this am.  Pain mid chest with no radiation.  Pain does not go to back.  Nothing made better or worse.  No sweats, no sob, no n/v/d.  Discomfort is now gone.  No recent fall, no travel, no sick cont.

## 2022-03-18 NOTE — PHARMACOTHERAPY INTERVENTION NOTE - COMMENTS
Med history complete, reviewed medications and allergies with patient and confirmed medication list with doctor first med profile, all medication related questions answered Med history complete, reviewed medications and allergies with patients med list [facility], confirmed medication list with doctor first med profile

## 2022-03-18 NOTE — ED PROVIDER NOTE - PROGRESS NOTE DETAILS
Attending Ashwin, lot 211 brown stool, heme neg, qc reactive. Attending Christopher, pt updated on results of tests.  pt with no sob and not chest pain.  pt would like to go home if possible.    Dr. Garza called and feels if pt second trop neg and no chest pain, pt can be d/c home.  pt had cath recently. Attending Christopher, pt updated on results of tests.  pt with no sob and not chest pain.  pt would like to go home if possible.  CXR with mild increase congestion, but poor inspiratory effort.    Dr. Garza called and feels if pt second trop neg and no chest pain, pt can be d/c home.  pt had cath recently. Attending Ashwin d/w Dr. Boxer about lab values.  can follow up with pt out pt for h/h and increasing creat. Attending Ashwin, Pt updates that second trop in positive.  Plan admit.  pt states his cardiologist is Dr. Hyatt.  Will page Dr. Hyatt. Attending Christopher, Dr. Garza updated on pt elevated trop. Admit and no heparin at this time.

## 2022-03-18 NOTE — H&P ADULT - ASSESSMENT
89 year old male patient with chest pain that has since resolved    # Chest pain and Mildly Elevated Troponin R/O ACS    -Admit to tele   -pain has since resolved  -Last ECHO from 12/21 EF 50%  -no EKG changes  -serial troponin  -cardio consult     #Elevated BNP and increased Congestion on CXR  -Hx of Chronic Systolic CHF   -doubt patient is in heart failure, BNP on last admission was 17K and now its 8K  -Hold off on IV lasix, continue PO lasix   -FU CT chest to rule out PNA     #Hx of Possible CKD  -Hold ACEI   -monitor Kidney function while on PO Lasix  -Bladder scan and kidney sono     #HTN  -continue Metoprolol  -Hold ACEI     # CAD  -on asa, statin    #DVT ppx  -Heparin

## 2022-03-18 NOTE — ED PROVIDER NOTE - CARE PLAN
1 Principal Discharge DX:	Chest pain  Secondary Diagnosis:	DESIRAE (acute kidney injury)  Secondary Diagnosis:	Elevated troponin  Secondary Diagnosis:	Anemia

## 2022-03-18 NOTE — ED ADULT TRIAGE NOTE - CHIEF COMPLAINT QUOTE
Pt BIBEMS from [facility], c/o "chest pressure". pt a/ox3, ambulatory, reports "I have woke up with chest pressure and I just did not feel well it did subside a little since coming to [facility]". pt given 324 ASA by [facility]. EKG initiated.

## 2022-03-18 NOTE — ED PROVIDER NOTE - CLINICAL SUMMARY MEDICAL DECISION MAKING FREE TEXT BOX
89 yo pt presents to ED for chest pain.  Plan check labs and cxr. 88 yo pt presents to ED for chest pain.  Pt currently with no pain.  Plan check labs and cxr.

## 2022-03-19 LAB
ANION GAP SERPL CALC-SCNC: 6 MMOL/L — SIGNIFICANT CHANGE UP (ref 5–17)
BUN SERPL-MCNC: 38 MG/DL — HIGH (ref 7–23)
CALCIUM SERPL-MCNC: 8.6 MG/DL — SIGNIFICANT CHANGE UP (ref 8.5–10.1)
CHLORIDE SERPL-SCNC: 109 MMOL/L — HIGH (ref 96–108)
CO2 SERPL-SCNC: 24 MMOL/L — SIGNIFICANT CHANGE UP (ref 22–31)
CREAT SERPL-MCNC: 1.55 MG/DL — HIGH (ref 0.5–1.3)
CULTURE RESULTS: NO GROWTH — SIGNIFICANT CHANGE UP
EGFR: 43 ML/MIN/1.73M2 — LOW
FERRITIN SERPL-MCNC: 249 NG/ML — SIGNIFICANT CHANGE UP (ref 30–400)
GLUCOSE SERPL-MCNC: 97 MG/DL — SIGNIFICANT CHANGE UP (ref 70–99)
HAPTOGLOB SERPL-MCNC: 251 MG/DL — HIGH (ref 34–200)
HCT VFR BLD CALC: 25.2 % — LOW (ref 39–50)
HCT VFR BLD CALC: 26.3 % — LOW (ref 39–50)
HGB BLD-MCNC: 7.8 G/DL — LOW (ref 13–17)
HGB BLD-MCNC: 8.4 G/DL — LOW (ref 13–17)
IRON SATN MFR SERPL: 22 % — SIGNIFICANT CHANGE UP (ref 16–55)
IRON SATN MFR SERPL: 56 UG/DL — SIGNIFICANT CHANGE UP (ref 45–165)
LDH SERPL L TO P-CCNC: 168 U/L — SIGNIFICANT CHANGE UP (ref 84–241)
MAGNESIUM SERPL-MCNC: 2.6 MG/DL — SIGNIFICANT CHANGE UP (ref 1.6–2.6)
MCHC RBC-ENTMCNC: 29.7 PG — SIGNIFICANT CHANGE UP (ref 27–34)
MCHC RBC-ENTMCNC: 30.4 PG — SIGNIFICANT CHANGE UP (ref 27–34)
MCHC RBC-ENTMCNC: 31 GM/DL — LOW (ref 32–36)
MCHC RBC-ENTMCNC: 31.9 GM/DL — LOW (ref 32–36)
MCV RBC AUTO: 95.3 FL — SIGNIFICANT CHANGE UP (ref 80–100)
MCV RBC AUTO: 95.8 FL — SIGNIFICANT CHANGE UP (ref 80–100)
PHOSPHATE SERPL-MCNC: 2.8 MG/DL — SIGNIFICANT CHANGE UP (ref 2.5–4.5)
PLATELET # BLD AUTO: 171 K/UL — SIGNIFICANT CHANGE UP (ref 150–400)
PLATELET # BLD AUTO: 186 K/UL — SIGNIFICANT CHANGE UP (ref 150–400)
POTASSIUM SERPL-MCNC: 4.8 MMOL/L — SIGNIFICANT CHANGE UP (ref 3.5–5.3)
POTASSIUM SERPL-SCNC: 4.8 MMOL/L — SIGNIFICANT CHANGE UP (ref 3.5–5.3)
RBC # BLD: 2.63 M/UL — LOW (ref 4.2–5.8)
RBC # BLD: 2.76 M/UL — LOW (ref 4.2–5.8)
RBC # BLD: 2.76 M/UL — LOW (ref 4.2–5.8)
RBC # FLD: 14.5 % — SIGNIFICANT CHANGE UP (ref 10.3–14.5)
RBC # FLD: 14.5 % — SIGNIFICANT CHANGE UP (ref 10.3–14.5)
RETICS #: 90.3 K/UL — SIGNIFICANT CHANGE UP (ref 25–125)
RETICS/RBC NFR: 3.3 % — HIGH (ref 0.5–2.5)
SODIUM SERPL-SCNC: 139 MMOL/L — SIGNIFICANT CHANGE UP (ref 135–145)
SPECIMEN SOURCE: SIGNIFICANT CHANGE UP
TIBC SERPL-MCNC: 251 UG/DL — SIGNIFICANT CHANGE UP (ref 220–430)
TRANSFERRIN SERPL-MCNC: 191 MG/DL — LOW (ref 200–360)
UIBC SERPL-MCNC: 195 UG/DL — SIGNIFICANT CHANGE UP (ref 110–370)
VIT B12 SERPL-MCNC: 377 PG/ML — SIGNIFICANT CHANGE UP (ref 232–1245)
WBC # BLD: 7.49 K/UL — SIGNIFICANT CHANGE UP (ref 3.8–10.5)
WBC # BLD: 8.11 K/UL — SIGNIFICANT CHANGE UP (ref 3.8–10.5)
WBC # FLD AUTO: 7.49 K/UL — SIGNIFICANT CHANGE UP (ref 3.8–10.5)
WBC # FLD AUTO: 8.11 K/UL — SIGNIFICANT CHANGE UP (ref 3.8–10.5)

## 2022-03-19 PROCEDURE — 76770 US EXAM ABDO BACK WALL COMP: CPT | Mod: 26

## 2022-03-19 PROCEDURE — 99233 SBSQ HOSP IP/OBS HIGH 50: CPT

## 2022-03-19 RX ORDER — SENNA PLUS 8.6 MG/1
2 TABLET ORAL AT BEDTIME
Refills: 0 | Status: DISCONTINUED | OUTPATIENT
Start: 2022-03-19 | End: 2022-03-21

## 2022-03-19 RX ORDER — RANOLAZINE 500 MG/1
500 TABLET, FILM COATED, EXTENDED RELEASE ORAL
Refills: 0 | Status: DISCONTINUED | OUTPATIENT
Start: 2022-03-19 | End: 2022-03-21

## 2022-03-19 RX ADMIN — Medication 325 MILLIGRAM(S): at 09:49

## 2022-03-19 RX ADMIN — Medication 100 MILLIGRAM(S): at 21:22

## 2022-03-19 RX ADMIN — Medication 40 MILLIGRAM(S): at 09:50

## 2022-03-19 RX ADMIN — HEPARIN SODIUM 5000 UNIT(S): 5000 INJECTION INTRAVENOUS; SUBCUTANEOUS at 05:18

## 2022-03-19 RX ADMIN — Medication 500 MILLIGRAM(S): at 09:50

## 2022-03-19 RX ADMIN — HEPARIN SODIUM 5000 UNIT(S): 5000 INJECTION INTRAVENOUS; SUBCUTANEOUS at 17:15

## 2022-03-19 RX ADMIN — Medication 50 MILLIGRAM(S): at 09:50

## 2022-03-19 RX ADMIN — Medication 20 MILLIGRAM(S): at 17:15

## 2022-03-19 RX ADMIN — Medication 81 MILLIGRAM(S): at 09:50

## 2022-03-19 RX ADMIN — FINASTERIDE 5 MILLIGRAM(S): 5 TABLET, FILM COATED ORAL at 09:49

## 2022-03-19 RX ADMIN — ATORVASTATIN CALCIUM 80 MILLIGRAM(S): 80 TABLET, FILM COATED ORAL at 21:21

## 2022-03-19 RX ADMIN — PANTOPRAZOLE SODIUM 40 MILLIGRAM(S): 20 TABLET, DELAYED RELEASE ORAL at 09:50

## 2022-03-19 RX ADMIN — Medication 4 MILLIGRAM(S): at 21:21

## 2022-03-19 RX ADMIN — CLOPIDOGREL BISULFATE 75 MILLIGRAM(S): 75 TABLET, FILM COATED ORAL at 09:49

## 2022-03-19 RX ADMIN — RANOLAZINE 500 MILLIGRAM(S): 500 TABLET, FILM COATED, EXTENDED RELEASE ORAL at 21:22

## 2022-03-19 RX ADMIN — POLYETHYLENE GLYCOL 3350 17 GRAM(S): 17 POWDER, FOR SOLUTION ORAL at 09:50

## 2022-03-19 RX ADMIN — Medication 5 MILLIGRAM(S): at 21:28

## 2022-03-19 NOTE — DIETITIAN INITIAL EVALUATION ADULT. - OTHER INFO
Return OB Visit       Subjective:   Brooklyn Soni 28 y.o. G5   MÓNICA: 10/3/2019, by Last Menstrual Period  GA:  33w1d. LOF: none  Vaginal bleeding: none  Fetal movement: good  Contractions: occasionally with abdominal pain    Abdominal Pain  - started about 2 week ago  - worsening with bending and any movement; associated with nausea  - has most often in the morning  - associated with nausea  - no change with eating or drinking  - after pain then gets a contraction  - describes as deep muscular pain   - when baby moves a certain way and hits one spot has a deep pain  - sometimes relived with a bowel movement   - has had 3-4 bowel movements today and throughout the pregnancy; has some straining with bowel movements - taking colace   - started after taking medication for reflux - stopped taking after two     Allergies- reviewed:   No Known Allergies  Medications- reviewed:   Current Outpatient Medications   Medication Sig    docusate sodium (COLACE) 100 mg capsule Take 100 mg by mouth two (2) times a day.  pedi multivit no.7/folic acid (FLINTSTONES MULTI-VIT GUMMIES PO) Take  by mouth. No current facility-administered medications for this visit. Past Medical History- reviewed:  History reviewed. No pertinent past medical history.   Past Surgical History- reviewed:   Past Surgical History:   Procedure Laterality Date     DELIVERY ONLY      due to decreased FHR    HX  SECTION  12/27/15     Social History- reviewed:  Social History     Socioeconomic History    Marital status: SINGLE     Spouse name: Not on file    Number of children: Not on file    Years of education: Not on file    Highest education level: Not on file   Occupational History    Not on file   Social Needs    Financial resource strain: Not on file    Food insecurity:     Worry: Not on file     Inability: Not on file    Transportation needs:     Medical: Not on file     Non-medical: Not on file   Tobacco Use    Smoking status: Former Smoker     Last attempt to quit: 8/20/2015     Years since quitting: 3.9    Smokeless tobacco: Never Used   Substance and Sexual Activity    Alcohol use: No    Drug use: No    Sexual activity: Yes     Partners: Male     Birth control/protection: None   Lifestyle    Physical activity:     Days per week: Not on file     Minutes per session: Not on file    Stress: Not on file   Relationships    Social connections:     Talks on phone: Not on file     Gets together: Not on file     Attends Islam service: Not on file     Active member of club or organization: Not on file     Attends meetings of clubs or organizations: Not on file     Relationship status: Not on file    Intimate partner violence:     Fear of current or ex partner: Not on file     Emotionally abused: Not on file     Physically abused: Not on file     Forced sexual activity: Not on file   Other Topics Concern    Not on file   Social History Narrative    Not on file     Immunizations- reviewed:   Immunization History   Administered Date(s) Administered    Influenza Vaccine PF 12/29/2015    MMR 12/30/2015    Tdap 12/29/2015, 07/18/2019         Objective:     Visit Vitals  /77 (BP 1 Location: Right arm, BP Patient Position: Sitting)   Pulse 91   Temp 97.3 °F (36.3 °C) (Oral)   Resp 16   Ht 5' 8.31\" (1.735 m)   Wt 173 lb (78.5 kg)   LMP 12/27/2018   SpO2 98%   BMI 26.07 kg/m²       Physical Exam:  GENERAL APPEARANCE: alert, well appearing, in no apparent distress  ABDOMEN: gravid, fundal height 33 cm, FHT present at 140 bpm. +BS throughout Mild RUQ pain with deep palpation, negative Parker's sign. Not TTP in left side of abdomen. Mild TTP just under gravid uterus in RLQ. No rebound tenderness, no guarding.   PSYCH: normal mood and affect    Labs  Recent Results (from the past 12 hour(s))   AMB POC URINALYSIS DIP STICK AUTO W/O MICRO    Collection Time: 08/16/19  3:38 PM   Result Value Ref Range    Color (UA POC) Yellow     Clarity (UA POC) Clear     Glucose (UA POC) Negative Negative    Bilirubin (UA POC) Negative Negative    Ketones (UA POC) Negative Negative    Specific gravity (UA POC) 1.005 1.001 - 1.035    Blood (UA POC) Negative Negative    pH (UA POC) 6.5 4.6 - 8.0    Protein (UA POC) Negative Negative    Urobilinogen (UA POC) 0.2 mg/dL 0.2 - 1    Nitrites (UA POC) Negative Negative    Leukocyte esterase (UA POC) Negative Negative         Assessment         ICD-10-CM ICD-9-CM    1. Encounter for supervision of other normal pregnancy in third trimester Z34.83 V22.1 AMB POC URINALYSIS DIP STICK AUTO W/O MICRO   2. Right upper quadrant abdominal pain W99.60 008.86 METABOLIC PANEL, COMPREHENSIVE      CBC W/O DIFF      US ABD LTD         Plan   28 y.o. G5  33w1d MÓNICA 10/3/2019, by Last Menstrual Period here for return OB visit     PNL: O+,  Ab screen neg, HIV NR, Hep B neg, CBC nml, VZV immune, Tpall neg, Rubella non-immune, Hbg fract neg, GC/CT neg. S/p TDap. GTT WNL     · First trimester   ? IOB labs: O+,  Ab screen neg, HIV NR, Hep B neg, CBC nml, VZV immune, Tpall neg, Rubella non-immune, Hbg fract neg, GC/CT neg, Pap not on file - per pt UTD and normal done at outside OBGyn  ? 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 Dating ultrasound performed on 04/11/2019     · Second trimester  ? [facility]atomy scan with [facility] (05/21/19): anterior placenta, no abnormalities. 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     · SIUP in Third trimester  ? BP WNL UA unremarkable   ? HgB 11.3 (7/18/19)  ? Continue colace for constipation  ? Follow up in 2 weeks with Dr Ninfa Moscoso    * Abdominal Pain: concern for gallstones, no acute abdomen, negative Parker's sign but TTP in RUQ on deep palpation.     - CBC, CMP today   - RUQ Ultrasound 8/22/19 at [facility]   - pregnancy support belt for work      · Other  ? Continuity Provider:  Slade Saab Sukkari  ? Pain mgmt.  in labor: Epidural for CS scheduled for 9/26/19 @ 7:30am  ? Feeding: bottlefeeding  ? Expecting baby girl \"Melba Hogan\"      Orders Placed This Encounter    US ABD LTD     Standing Status:   Future     Standing Expiration Date:   10/16/2019     Order Specific Question:   Is Patient Pregnant? Answer:   Yes     Order Specific Question:   Reason for Exam     Answer:   RUQ pain concern for gallstones     Order Specific Question:   Specific Body Part     Answer:   RUQ    METABOLIC PANEL, COMPREHENSIVE    CBC W/O DIFF    AMB POC URINALYSIS DIP STICK AUTO W/O MICRO    docusate sodium (COLACE) 100 mg capsule     Sig: Take 100 mg by mouth two (2) times a day. Labor precautions discussed, including: Regular painful contractions, lasting for greater than one hour, taking your breath away; any vaginal bleeding; any leakage of fluid; or absent or decreased fetal movement. Call M.D. on call if any of these symptoms or signs occur. I have discussed the diagnosis with the patient and the intended plan as seen in the above orders. The patient has received an after-visit summary and questions were answered concerning future plans. I have discussed medication side effects and warnings with the patient as well. Informed pt to return to the office or go to the ER if she experiences vaginal bleeding, vaginal discharge, leaking of fluid, pelvic cramping.     Patient discussed with Dr. Annia Huizar MD (attending physician)    Sahara Ba MD  Family Medicine Resident 88yo male with PMH significant for CAD, CABG, HTN p/w midsternal, dull chest pain.  admitted with chest pain and mildly elevated troponin r/o ACS.  elevated BNP and increased congestion (h/o CHF?), h/o possible CKD.    *labs reviewed; potassium trending down.  if trends back up, rec'd add low potassium to diet restrictions.

## 2022-03-19 NOTE — DIETITIAN INITIAL EVALUATION ADULT. - ADD RECOMMEND
1) if potassium trends back up, add potassium restriction to diet Rx 2) add gelatein TID to optimize protein intake 3) add MVI with minerals daily and zinc sulfate 220mg BID x 20 days to optimize wound healing 4) daily wt checks to track/trend changes 5) monitor BM; if > 3 days without BM, add bowel regimen

## 2022-03-19 NOTE — DIETITIAN INITIAL EVALUATION ADULT. - PERTINENT LABORATORY DATA
03-19    139  |  109<H>  |  38<H>  ----------------------------<  97  4.8   |  24  |  1.55<H>    Ca    8.6      19 Mar 2022 07:09  Phos  2.8     03-19  Mg     2.6     03-19    TPro  6.5  /  Alb  2.9<L>  /  TBili  0.7  /  DBili  x   /  AST  19  /  ALT  17  /  AlkPhos  124<H>  03-18  BMI: BMI (kg/m2): 25.3 (03-18-22 @ 12:25)  HbA1c: A1C with Estimated Average Glucose Result: 5.7 % (12-18-21 @ 05:29)    Glucose:   BP: 120/43 (03-19-22 @ 08:21) (120/43 - 152/71)  Lipid Panel: Date/Time: 12-18-21 @ 05:29  Cholesterol, Serum: 101  Direct LDL: --  HDL Cholesterol, Serum: 40  Total Cholesterol/HDL Ration Measurement: --  Triglycerides, Serum: 77  Iron Total, Serum: 27 ug/dL (07-28-20 @ 06:44)

## 2022-03-19 NOTE — DIETITIAN INITIAL EVALUATION ADULT. - PERTINENT MEDS FT
MEDICATIONS  (STANDING):  ascorbic acid 500 milliGRAM(s) Oral daily  aspirin enteric coated 81 milliGRAM(s) Oral daily  atorvastatin 80 milliGRAM(s) Oral at bedtime  clopidogrel Tablet 75 milliGRAM(s) Oral daily  doxazosin 4 milliGRAM(s) Oral at bedtime  ferrous    sulfate 325 milliGRAM(s) Oral daily  finasteride 5 milliGRAM(s) Oral daily  furosemide    Tablet 20 milliGRAM(s) Oral daily  furosemide    Tablet 40 milliGRAM(s) Oral daily  heparin   Injectable 5000 Unit(s) SubCutaneous every 12 hours  metoprolol succinate ER 50 milliGRAM(s) Oral daily  pantoprazole    Tablet 40 milliGRAM(s) Oral before breakfast  polyethylene glycol 3350 17 Gram(s) Oral daily    MEDICATIONS  (PRN):  acetaminophen     Tablet .. 650 milliGRAM(s) Oral every 6 hours PRN Mild Pain (1 - 3)  bisacodyl 5 milliGRAM(s) Oral every 12 hours PRN Constipation  diazepam    Tablet 5 milliGRAM(s) Oral at bedtime PRN anxiety/sleep  melatonin 3 milliGRAM(s) Oral at bedtime PRN Insomnia  ondansetron Injectable 4 milliGRAM(s) IV Push every 6 hours PRN Nausea and/or Vomiting

## 2022-03-19 NOTE — CONSULT NOTE ADULT - ASSESSMENT
CAD, unstable angina  Non-STEMI  Hypertension  Status post CABG  Chronic diastolic CHF    Suggest:  Cardiac monitor  O2 supplement PRN  Continue treatment with aspirin, Plavix  Beta blockers  Statins  Nitrates PRN  Add Ranexa  If angina persists will discuss PCI of distal LM, ostial LCx which is being medically managed

## 2022-03-19 NOTE — DIETITIAN INITIAL EVALUATION ADULT. - MALNUTRITION
severe malnutrition in chronic illness severe malnutrition in chronic illness r/t decreased ability to meet increased needs 2/2 PU AEB severe muscle/fat wasting; likely meeting <75% of protein needs

## 2022-03-19 NOTE — DIETITIAN INITIAL EVALUATION ADULT. - ORAL INTAKE PTA/DIET HISTORY
large breakfast, medium lunch/dinner. lives at [facility], doesn't like food, has been eating less.  encouraged increased protein intake, pt verbalized understanding.

## 2022-03-19 NOTE — CONSULT NOTE ADULT - SUBJECTIVE AND OBJECTIVE BOX
COVERING FOR Dr AVERY / LLOYD      89 yo male with c/o chest pain. h/o CAD, s/p CABG, recent cath showed patent LIMA to LAD and SVG to RPDA. Occluded SVG to D1, OM and pt is being medically managed.  Midsternal, dull chest pain with no radiation. No associated symptoms.       PAST MEDICAL & SURGICAL HISTORY:  HTN (hypertension)  AAA (abdominal aortic aneurysm)  S/P CABG (coronary artery bypass graft)  History of right hip replacement  CAD (coronary artery disease)  s/p stent placed  S/P left inguinal hernia repair  S/P AAA repair 2020        PREVIOUS CARDIAC WORKUP:      < from: TTE Echo Complete w/o Contrast w/ Doppler (21 @ 13:58) >   Fibrocalcific changes noted to the mitral valve leaflets with preserved leaflet excursion.   At least Moderate (2+) mitral regurgitation is present.   Fibrocalcific changes noted to the Aortic valve leaflets with preserved leaflet excursion.   Mild (1+) aortic regurgitation is present.   Mild (1+) tricuspid valve regurgitation is present.   Trace pulmonic valvular regurgitation is present.   The left atrium is moderately dilated.   Minimally reduced left ventricular systolic function.   Estimated left ventricular ejection fraction is 50 %.   A device wire is seen in the RV and RA.   Normal appearing right atrium.   Trace pericardial effusion cannot be ruled out.      < from: Cardiac Catheterization (21 @ 14:53) >   Diagnostic Conclusions     Three Vessel coronary artery disease.   Patent LIMA to LAD.   Patent SVG to RPDA.   Occluded SVG to D1 and OM. NO additional grafts seen on aortogram   Elevated left ventricular end-diastolic pressure 20 mm Hg.   No aortic valve stenosis.     Recommendations     Manage with medical therapy.   Aggressive medical management of coronary artery disease and its underlying risk factors.        ALLERGIES:    Broccoli (Unknown)  penicillin (Vomiting; Nausea)       MEDICATIONS  (STANDING):  ascorbic acid 500 milliGRAM(s) Oral daily  aspirin enteric coated 81 milliGRAM(s) Oral daily  atorvastatin 80 milliGRAM(s) Oral at bedtime  clopidogrel Tablet 75 milliGRAM(s) Oral daily  doxazosin 4 milliGRAM(s) Oral at bedtime  ferrous    sulfate 325 milliGRAM(s) Oral daily  finasteride 5 milliGRAM(s) Oral daily  furosemide    Tablet 20 milliGRAM(s) Oral daily  furosemide    Tablet 40 milliGRAM(s) Oral daily  heparin   Injectable 5000 Unit(s) SubCutaneous every 12 hours  metoprolol succinate ER 50 milliGRAM(s) Oral daily  pantoprazole    Tablet 40 milliGRAM(s) Oral before breakfast  polyethylene glycol 3350 17 Gram(s) Oral daily    MEDICATIONS  (PRN):  acetaminophen     Tablet .. 650 milliGRAM(s) Oral every 6 hours PRN Mild Pain (1 - 3)  aluminum hydroxide/magnesium hydroxide/simethicone Suspension 30 milliLiter(s) Oral every 6 hours PRN Dyspepsia  bisacodyl 5 milliGRAM(s) Oral every 12 hours PRN Constipation  diazepam    Tablet 5 milliGRAM(s) Oral at bedtime PRN anxiety/sleep  melatonin 3 milliGRAM(s) Oral at bedtime PRN Insomnia  ondansetron Injectable 4 milliGRAM(s) IV Push every 6 hours PRN Nausea and/or Vomiting      FAMILY HISTORY:  NC        SOCIAL HISTORY:  Nonsmoker. No ETOH abuse. No illicit drugs.  Lives at [facility]     ROS:     Detailed ten system ROS was performed and was negative except for history as eluded to above.    no fever  no chills  no nausea  no vomiting  no diarrhea  no constipation  no melena  no hematochezia  no chest pain   - resolved   no palpitations  no sob at rest  + dyspnea on exertion  no cough  no wheezing  no anorexia  no headache  no dizziness  no syncope  no weakness  no myalgia  no dysuria  no polyuria  no hematuria       Vital Signs Last 24 Hrs  T(C): 36.7 (19 Mar 2022 08:21), Max: 36.7 (19 Mar 2022 08:21)  T(F): 98 (19 Mar 2022 08:21), Max: 98 (19 Mar 2022 08:21)  HR: 57 (19 Mar 2022 08:21) (57 - 69)  BP: 120/43 (19 Mar 2022 08:21) (120/43 - 135/52)  RR: 18 (19 Mar 2022 08:21) (18 - 18)  SpO2: 93% (19 Mar 2022 08:21) (93% - 95%)        PHYSICAL EXAM:    Constitutional: NAD, awake and alert, well-developed  HEENT: PERR, EOMI, Normal Hearing, MMM  Neck: Soft and supple, No LAD, No JVD  Respiratory: Breath sounds are clear bilaterally, No wheezing, rales or rhonchi  Cardiovascular: S1 and S2, regular rate and rhythm, no Murmurs, gallops or rubs  Gastrointestinal: Bowel Sounds present, soft, nontender, nondistended, no guarding, no rebound  Extremities: No peripheral edema  Vascular: 2+ peripheral pulses  Neurological: A/O x 3, no focal deficits  Musculoskeletal: 5/5 strength b/l upper and lower extremities  Skin: No rashes      TELEMETRY:  Normal sinus rhythm with no tachy or margarita events       ECG:    A sensed V paced      LABS:                          8.4    8.11  )-----------( 186      ( 19 Mar 2022 13:34 )             26.3     03-    139  |  109<H>  |  38<H>  ----------------------------<  97  4.8   |  24  |  1.55<H>    Ca    8.6      19 Mar 2022 07:09  Phos  2.8       Mg     2.6         TPro  6.5  /  Alb  2.9<L>  /  TBili  0.7  /  DBili  x   /  AST  19  /  ALT  17  /  AlkPhos  124<H>        HS Trop-I  19.49  -->  253.39  -->  585.87  -->  372.13      Pro BNP  8663 -18 @ 08:21      PT/INR - ( 18 Mar 2022 08:21 )   PT: 14.0 sec;   INR: 1.20 ratio    PTT - ( 18 Mar 2022 08:21 )  PTT:28.9 sec      Urinalysis Basic - ( 18 Mar 2022 11:18 )    Color: Yellow / Appearance: Clear / S.010 / pH: x  Gluc: x / Ketone: Negative  / Bili: Negative / Urobili: Negative   Blood: x / Protein: Negative / Nitrite: Negative   Leuk Esterase: Negative / RBC: 6-10 /HPF / WBC 0-2   Sq Epi: x / Non Sq Epi: Occasional / Bacteria: Occasional          RADIOLOGY & ADDITIONAL STUDIES:    < from: CT Chest No Cont (22 @ 17:17) >  IMPRESSION:    Patchy groundglass and septal thickening in the upper lobes favored to represent lung edema. Underlying pneumonia would have to be excluded clinically. Stable small pleural effusions.    < from: Xray Chest 1 View- PORTABLE-Urgent (22 @ 09:29) >  IMPRESSION: Significant congestion somewhat increased. Stable cardiac findings.    < from: US Kidney and Bladder (22 @ 09:04) >  IMPRESSION:  No hydronephrosis. Findings consistent with medical renal disease.

## 2022-03-19 NOTE — PROGRESS NOTE ADULT - SUBJECTIVE AND OBJECTIVE BOX
Chief Complaint:    Interval History:    ROS:    Physical Exam:  Vitals  Gen:  HEENT:  Neck:  Chest:  CVS:  Abd:  Ext:  Skin:  Neuro:  Mood:    Labs:    Micro:    Imaging:    Cardiac Testing:    Meds:        Diet:  DVT px:  Code status:  Emergency Contact:  Dispo:     Chief Complaint: Chest discomfort    Interval History: Patient seen and examined. Patient reports feeling markedly improved compared to presentation. Now without chest pain or tightness. No dyspnea, orthopnea, PND or LE swelling. No cough or congestion. No fever or chills. No abdominal complaints or genitourinary complaints. He has a small wound on his scalp that he states is a few weeks old, has been managed at [facility], did appear to have some scant purulent drainage this AM for which culture was requested and he was started on doxycycline.     ROS: Multi system review is comprehensively negative x 10 systems except as above    Physical Exam:  T(F): 98 (19 Mar 2022 08:21), Max: 98 (19 Mar 2022 08:21)  HR: 57 (19 Mar 2022 08:21) (57 - 69)  BP: 120/43 (19 Mar 2022 08:21) (120/43 - 135/52)  RR: 18 (19 Mar 2022 08:21) (18 - 18)  SpO2: 93% (19 Mar 2022 08:21) (93% - 95%) on room air    Gen: Comfortable-appearing at rest  HEENT: Small superficial wound on scalp at head apex, scant purulence noted, no induration or fluctuance, PERRL, EOMI, moist oral mucosa  Neck: Supple no JVD  Chest: CTA B/L  CVS: S1 S2 normal, rate 50s, regular  Abd: +BS, soft NT ND   Ext: No calf tenderness, no edema  Skin: Warm, dry, scalp wound as described above  Neuro: Awake and alert, answers questions appropriately, follows commands  Mood: calm, pleasant    Labs:    CBC 3/19                        8.4    8.11 )----------( 186              26.3     MCV WNL, MCH WNL, RDW WNL    Retic 3.3%, LDH, haptoglobin, iron studies and B12 in-process    BMP 3/19    139  |  109  |  38  --------------------<  97  4.8   |   24   |  1.55    Ca 8.6   Phos 2.8   Mg 2.6    LFTs 3/18:  TPro  6.5  /  Alb  2.9  /  TBili  0.7  /  DBili  x   /  AST  19  /  ALT  17  /  AlkPhos  124    Coags 3/18:  PT: 14.0 sec;   INR: 1.20 ratio;    PTT:28.9 sec    Troponin 19.5 --> 585.9 --> 372.1   proBNP 8663    Urinalysis 3/18:  Clear, Ket-, N-, LE-, 6-10 RBC, 0-2 WBC, occ bacteria    Micro:  Urine culture 3/18: Negative  RSV PCR 3/18: Negative  Flu PCR 3/18: Negative  COVID19 PCR 3/18: Negative    Imaging:  CT chest 3/18: Mild patchy ground glass and septal thickening the upper lobes, much improved compared to 7/2020. Stable small pleural effusions. No thoracic adenopathy. Multi chamber cardiac enlargement. S/P sternotomy, CABG. No pericardial effusion. Dual-chamber ICD leads terminating in the RA and RV. Partially imaged abdominal aortic endograft. Bilateral proximal / ostial renal artery stents. Diffuse qualitative osteopenia.     Cardiac Testing:      Past cardiac testing:   Cardiac cath 12/22: Three-vessel coronary artery disease. Patent LIMA to LAD. Patent SVG to RPDA. Occluded SVG to D1 and OM. NO additional grafts seen on aortogram. Elevated left ventricular end-diastolic pressure 20 mm Hg. No aortic valve stenosis. Recommended manage with medical therapy.      Meds:        Diet:  DVT px:  Code status:  Emergency Contact:  Dispo:     Chief Complaint: Chest discomfort    Interval History: Patient seen and examined. Patient reports feeling markedly improved compared to presentation. Now without chest pain or tightness. No dyspnea, orthopnea, PND or LE swelling. No cough or congestion. No fever or chills. No abdominal complaints or genitourinary complaints. He has a small wound on his scalp that he states is a few weeks old, has been managed at [facility], did appear to have some scant purulent drainage this AM for which culture was requested and he was started on doxycycline.     ROS: Multi system review is comprehensively negative x 10 systems except as above    Physical Exam:  T(F): 98 (19 Mar 2022 08:21), Max: 98 (19 Mar 2022 08:21)  HR: 57 (19 Mar 2022 08:21) (57 - 69)  BP: 120/43 (19 Mar 2022 08:21) (120/43 - 135/52)  RR: 18 (19 Mar 2022 08:21) (18 - 18)  SpO2: 93% (19 Mar 2022 08:21) (93% - 95%) on room air    Gen: Comfortable-appearing at rest  HEENT: Small superficial wound on scalp at head apex, scant purulence noted, no induration or fluctuance, PERRL, EOMI, moist oral mucosa  Neck: Supple no JVD  Chest: CTA B/L  CVS: S1 S2 normal, rate 50s, regular  Abd: +BS, soft NT ND   Ext: No calf tenderness, no edema  Skin: Warm, dry, scalp wound as described above  Neuro: Awake and alert, answers questions appropriately, follows commands  Mood: calm, pleasant    Labs:    CBC 3/19                        8.4    8.11 )----------( 186              26.3     MCV WNL, MCH WNL, RDW WNL    Retic 3.3%, LDH, haptoglobin, iron studies and B12 in-process    BMP 3/19    139  |  109  |  38  --------------------<  97  4.8   |   24   |  1.55    Ca 8.6   Phos 2.8   Mg 2.6    LFTs 3/18:  TPro  6.5  /  Alb  2.9  /  TBili  0.7  /  DBili  x   /  AST  19  /  ALT  17  /  AlkPhos  124    Coags 3/18:  PT: 14.0 sec;   INR: 1.20 ratio;    PTT:28.9 sec    Troponin 19.5 --> 585.9 --> 372.1   proBNP 8663    Urinalysis 3/18:  Clear, Ket-, N-, LE-, 6-10 RBC, 0-2 WBC, occ bacteria    Micro:  Urine culture 3/18: Negative  RSV PCR 3/18: Negative  Flu PCR 3/18: Negative  COVID19 PCR 3/18: Negative    Imaging:  CT chest 3/18: Mild patchy ground glass and septal thickening the upper lobes, much improved compared to 7/2020. Stable small pleural effusions. No thoracic adenopathy. Multi chamber cardiac enlargement. S/P sternotomy, CABG. No pericardial effusion. Dual-chamber ICD leads terminating in the RA and RV. Partially imaged abdominal aortic endograft. Bilateral proximal / ostial renal artery stents. Diffuse qualitative osteopenia.     Cardiac Testing:  Tele 3/19: Reviewed, stable    EKG 3/18: A-sensed, V-paced, PVCs    Past cardiac testing:   Cardiac cath 12/21: Three-vessel coronary artery disease. Patent LIMA to LAD. Patent SVG to RPDA. Occluded SVG to D1 and OM. No additional grafts seen on aortogram. Elevated left ventricular end-diastolic pressure 20 mm Hg. No aortic valve stenosis. Recommended manage with medical therapy.    Meds:  MEDICATIONS  (STANDING):  ascorbic acid 500 milliGRAM(s) Oral daily  aspirin enteric coated 81 milliGRAM(s) Oral daily  atorvastatin 80 milliGRAM(s) Oral at bedtime  clopidogrel Tablet 75 milliGRAM(s) Oral daily  doxazosin 4 milliGRAM(s) Oral at bedtime  doxycycline hyclate Capsule 100 milliGRAM(s) Oral every 12 hours  ferrous    sulfate 325 milliGRAM(s) Oral daily  finasteride 5 milliGRAM(s) Oral daily  furosemide    Tablet 20 milliGRAM(s) Oral daily  furosemide    Tablet 40 milliGRAM(s) Oral daily  heparin   Injectable 5000 Unit(s) SubCutaneous every 12 hours  metoprolol succinate ER 50 milliGRAM(s) Oral daily  pantoprazole    Tablet 40 milliGRAM(s) Oral before breakfast  polyethylene glycol 3350 17 Gram(s) Oral daily  senna 2 Tablet(s) Oral at bedtime    MEDICATIONS  (PRN):  acetaminophen     Tablet .. 650 milliGRAM(s) Oral every 6 hours PRN Mild Pain (1 - 3)  aluminum hydroxide/magnesium hydroxide/simethicone Suspension 30 milliLiter(s) Oral every 6 hours PRN Dyspepsia  bisacodyl 5 milliGRAM(s) Oral every 12 hours PRN Constipation  diazepam    Tablet 5 milliGRAM(s) Oral at bedtime PRN anxiety/sleep  melatonin 3 milliGRAM(s) Oral at bedtime PRN Insomnia  ondansetron Injectable 4 milliGRAM(s) IV Push every 6 hours PRN Nausea and/or Vomiting           Chief Complaint: Chest discomfort    Interval History: Patient seen and examined. Patient reports feeling markedly improved compared to presentation. Now without chest pain or tightness. No dyspnea, orthopnea, PND or LE swelling. No cough or congestion. No fever or chills. No abdominal complaints or genitourinary complaints. He has a small wound on his scalp that he states is a few weeks old, has been managed at [facility], did appear to have some scant purulent drainage this AM for which culture was requested and he was started on doxycycline.     ROS: Multi system review is comprehensively negative x 10 systems except as above    Physical Exam:  T(F): 98 (19 Mar 2022 08:21), Max: 98 (19 Mar 2022 08:21)  HR: 57 (19 Mar 2022 08:21) (57 - 69)  BP: 120/43 (19 Mar 2022 08:21) (120/43 - 135/52)  RR: 18 (19 Mar 2022 08:21) (18 - 18)  SpO2: 93% (19 Mar 2022 08:21) (93% - 95%) on room air    Gen: Comfortable-appearing at rest  HEENT: Small superficial wound on scalp at head apex, scant purulence noted, no induration or fluctuance, PERRL, EOMI, moist oral mucosa  Neck: Supple no JVD  Chest: CTA B/L  CVS: S1 S2 normal, rate 50s, regular  Abd: +BS, soft NT ND   Ext: No calf tenderness, no edema  Skin: Warm, dry, scalp wound as described above  Neuro: Awake and alert, answers questions appropriately, follows commands  Mood: calm, pleasant    Labs:    CBC 3/19                        8.4    8.11 )----------( 186              26.3     MCV WNL, MCH WNL, RDW WNL    Retic 3.3%, LDH, haptoglobin, iron studies and B12 in-process    BMP 3/19    139  |  109  |  38  --------------------<  97  4.8   |   24   |  1.55    Ca 8.6   Phos 2.8   Mg 2.6    LFTs 3/18:  TPro  6.5  /  Alb  2.9  /  TBili  0.7  /  DBili  x   /  AST  19  /  ALT  17  /  AlkPhos  124    Coags 3/18:  PT: 14.0 sec;   INR: 1.20 ratio;    PTT:28.9 sec    Troponin 19.5 --> 585.9 --> 372.1   proBNP 8663    Urinalysis 3/18:  Clear, Ket-, N-, LE-, 6-10 RBC, 0-2 WBC, occ bacteria    Micro:  Urine culture 3/18: Negative  RSV PCR 3/18: Negative  Flu PCR 3/18: Negative  COVID19 PCR 3/18: Negative    Imaging:  CT chest 3/18: Mild patchy ground glass and septal thickening the upper lobes, much improved compared to 7/2020. Stable small pleural effusions. No thoracic adenopathy. Multi chamber cardiac enlargement. S/P sternotomy, CABG. No pericardial effusion. Dual-chamber ICD leads terminating in the RA and RV. Partially imaged abdominal aortic endograft. Bilateral proximal / ostial renal artery stents. Diffuse qualitative osteopenia.     CXR 3/18: Heart likely enlarged. Sternotomy and left-sided defibrillator again noted. Mid lower lung field infiltrates likely congestive are noted somewhat increased from December 17, 2021.    Cardiac Testing:  Tele 3/19: Reviewed, stable    EKG 3/18: A-sensed, V-paced, PVCs    Past cardiac testing:     Cardiac cath 12/21: Three-vessel coronary artery disease. Patent LIMA to LAD. Patent SVG to RPDA. Occluded SVG to D1 and OM. No additional grafts seen on aortogram. Elevated left ventricular end-diastolic pressure 20 mm Hg. No aortic valve stenosis. Recommended manage with medical therapy.    TTE 12/2021:  Mod MR. Mild AR, Mild TR, Mod dilated LA. LVEF 50%. Normal RA.     Meds:  MEDICATIONS  (STANDING):  ascorbic acid 500 milliGRAM(s) Oral daily  aspirin enteric coated 81 milliGRAM(s) Oral daily  atorvastatin 80 milliGRAM(s) Oral at bedtime  clopidogrel Tablet 75 milliGRAM(s) Oral daily  doxazosin 4 milliGRAM(s) Oral at bedtime  doxycycline hyclate Capsule 100 milliGRAM(s) Oral every 12 hours  ferrous    sulfate 325 milliGRAM(s) Oral daily  finasteride 5 milliGRAM(s) Oral daily  furosemide    Tablet 20 milliGRAM(s) Oral daily  furosemide    Tablet 40 milliGRAM(s) Oral daily  heparin   Injectable 5000 Unit(s) SubCutaneous every 12 hours  metoprolol succinate ER 50 milliGRAM(s) Oral daily  pantoprazole    Tablet 40 milliGRAM(s) Oral before breakfast  polyethylene glycol 3350 17 Gram(s) Oral daily  ranolazine 500 milliGRAM(s) Oral two times a day  senna 2 Tablet(s) Oral at bedtime    MEDICATIONS  (PRN):  acetaminophen     Tablet .. 650 milliGRAM(s) Oral every 6 hours PRN Mild Pain (1 - 3)  aluminum hydroxide/magnesium hydroxide/simethicone Suspension 30 milliLiter(s) Oral every 6 hours PRN Dyspepsia  bisacodyl 5 milliGRAM(s) Oral every 12 hours PRN Constipation  diazepam    Tablet 5 milliGRAM(s) Oral at bedtime PRN anxiety/sleep  melatonin 3 milliGRAM(s) Oral at bedtime PRN Insomnia  ondansetron Injectable 4 milliGRAM(s) IV Push every 6 hours PRN Nausea and/or Vomiting

## 2022-03-19 NOTE — PROGRESS NOTE ADULT - ASSESSMENT
Chest pain   Now resolved. Abnormal stress testing in 12/2021.   - F/u with Cardiology - med mgt vs cath?    Scalp wound with cellulitis  Ordered for wound culture. Started patient on doxycycline 100 BID, planned for 7 days  - Follow up wound Cx  - Doxycycline 100mg BID, day 1 today, plan for 7 days    HTN  BP controlled.  - Continue Toprol, furosemide    CKD III  Cr ~1.5.   - Trend Cr, avoid nephrotoxic agents    Anemia  Hgb ~8.5. No overt bleeding. Doubt hemolysis with normal bili. Patient ordered for retic, LDH, haptoglobin, iron studies, B12  - F/u in process anemia workup  - Trend Hgb     Constipation  Stable  - Continue Miralax, added senna, monitor for BM    BPH  Stable  - Continue finasteride    Large L middle cranial fossa arachnoid cyst  As noted on CT head 2/22/22, with mass effect on the adjacent L inferior frontal and anterior temporal lobes. Also, slight effacement of the L lateral ventricle, with trace L-to-R midline shift measuring 2-3 mm. Seen by Neurosurgery at that time. Patient has known about the finding and previously followed with a neurosurgeon along time ago with serial MRIs but hasn't seen in a long time. No seizure history or seizure like activity.   - Outpatient follow up with Dr Carroll.    Likely meningioma  As noted on CT head 2/22/22, partially-calcified extra-axial lesion measuring 1.1 cm along the high R parietal convexity.  - Continue to monitor    Physical deconditioning and debility  PT eval requested  - Anticipate restorative PT sessions    Severe protein calorie malnutrition  Appreciate input from Nutrition  - Encourage PO intake, trend Wt      Diet: DASH  DVT px: Heparin subcut  Code status: Full  Dispo: Anticipate patient will return to [facility] once clinically improved and inpatient workup is complete     89 year old man with HTN, CAD, hx of CABG, AAA s/p repair, CKD III, presented 3/18 for further evaluation of chest discomfort, mid sternal, dull, non radiating. Vitals and exam in the ED unremarkable. Labs notable for troponin elevation,     Chest pain in setting of known multi vessel CAD  Now resolved. Abnormal stress testing in 12/2021. Had subsequent cardiac cath 12/2021. Three-vessel coronary artery disease. Patent LIMA to LAD. Patent SVG to RPDA. Occluded SVG to D1 and OM. No aortic valve stenosis. Recommended manage with medical therapy at that time. On aspirin, Plavix, statin, beta blocker. This presentation, troponin 19.5 --> 585.9 --> 372.1. ProBNP 8663. EKG a-sensed, v-paced, PVCs.   - Follow up with Cardiology, input pending  - Continue aspirin, Plavix, statin, metoprolol    Scalp wound with cellulitis  Ordered for wound culture. Started patient on doxycycline 100 BID, planned for 7 days  - Follow up wound Cx  - Doxycycline 100mg BID, day 1 today, plan for 7 days    HTN  BP controlled.  - Continue Toprol, furosemide    CKD III  Cr ~1.5.   - Trend Cr, avoid nephrotoxic agents    Anemia  Hgb ~8.5. No overt bleeding. Doubt hemolysis with normal bili. Patient ordered for retic, LDH, haptoglobin, iron studies, B12  - F/u in process anemia workup  - Trend Hgb     Constipation  Stable  - Continue Miralax, added senna, monitor for BM    BPH  Stable  - Continue finasteride    Large L middle cranial fossa arachnoid cyst  As noted on CT head 2/22/22, with mass effect on the adjacent L inferior frontal and anterior temporal lobes. Also, slight effacement of the L lateral ventricle, with trace L-to-R midline shift measuring 2-3 mm. Seen by Neurosurgery at that time. Patient has known about the finding and previously followed with a neurosurgeon along time ago with serial MRIs but hasn't seen in a long time. No seizure history or seizure like activity.   - Outpatient follow up with Dr Carroll.    Likely meningioma  As noted on CT head 2/22/22, partially-calcified extra-axial lesion measuring 1.1 cm along the high R parietal convexity.  - Continue to monitor    Physical deconditioning and debility  PT eval requested  - Anticipate restorative PT sessions    Severe protein calorie malnutrition  Appreciate input from Nutrition  - Encourage PO intake, trend Wt      Diet: DASH  DVT px: Heparin subcut  Code status: Full  Dispo: Anticipate patient will return to [facility] once clinically improved and inpatient workup is complete     89 year-old man with HTN, CAD, hx of CABG, AAA s/p repair, CKD III, presented 3/18 for further evaluation of chest discomfort, mid sternal, dull, non radiating. Vitals and exam in the ED unremarkable. Labs notable for troponin elevation, proBNP 8663 EKG a-sensed, v-paced, PVCs. CXR with some pulmonary congestive changes. Continued on home regimen on DAPT, statin, metoprolol and admitted to Medicine.      Stable angina in setting of known multi vessel CAD  Now resolved. Abnormal stress testing in 12/2021. Had subsequent cardiac cath 12/2021. Three-vessel coronary artery disease. Patent LIMA to LAD. Patent SVG to RPDA. Occluded SVG to D1 and OM. No aortic valve stenosis. Recommended manage with medical therapy at that time. On aspirin, Plavix, statin, beta blocker. This presentation, troponin 19.5 --> 585.9 --> 372.1, proBNP 8663. EKG a-sensed, v-paced, PVCs.   - Follow-up with Cardiology re possible coronary intervention  - Continue aspirin, Plavix, statin, metoprolol, added Ranexa    Scalp wound with cellulitis  Ordered for wound culture. Started patient on doxycycline 100 BID, planned for 7 days  - Follow up wound Cx  - Doxycycline 100mg BID, day 1 today, plan for 7 days    HTN  BP controlled.  - Continue Toprol, furosemide    CKD III  Cr ~1.5.   - Trend Cr, avoid nephrotoxic agents    Anemia  Hgb ~8.5. No overt bleeding. Doubt hemolysis with normal bili. Patient ordered for retic, LDH, haptoglobin, iron studies, B12  - F/u in process anemia workup  - Trend Hgb     Constipation  Stable  - Continue Miralax, added senna, monitor for BM    BPH  Stable  - Continue finasteride    Large L middle cranial fossa arachnoid cyst  As noted on CT head 2/22/22, with mass effect on the adjacent L inferior frontal and anterior temporal lobes. Also, slight effacement of the L lateral ventricle, with trace L-to-R midline shift measuring 2-3 mm. Seen by Neurosurgery at that time. Patient has known about the finding and previously followed with a neurosurgeon along time ago with serial MRIs but hasn't seen in a long time. No seizure history or seizure like activity.   - Outpatient follow up with Dr Carroll.    Likely meningioma  As noted on CT head 2/22/22, partially-calcified extra-axial lesion measuring 1.1 cm along the high R parietal convexity.  - Continue to monitor    Physical deconditioning and debility  PT eval requested  - Anticipate restorative PT sessions    Severe protein calorie malnutrition  Appreciate input from Nutrition  - Encourage PO intake, trend Wt      Diet: DASH  DVT px: Heparin subcut  Code status: Full  Dispo: Anticipate patient will return to [facility] once clinically improved and inpatient workup is complete     89 year-old man with HTN, CAD, hx of CABG, AAA s/p repair, CKD III, presented 3/18 for further evaluation of chest discomfort, mid sternal, dull, non radiating. Vitals and exam in the ED unremarkable. Labs notable for troponin elevation, proBNP 8663 EKG a-sensed, v-paced, PVCs. CXR with some pulmonary congestive changes. Continued on home regimen on DAPT, statin, metoprolol and admitted to Medicine.      Stable angina in setting of known multi vessel CAD  Now resolved. Abnormal stress testing in 12/2021. Had subsequent cardiac cath 12/2021. Three-vessel coronary artery disease. Patent LIMA to LAD. Patent SVG to RPDA. Occluded SVG to D1 and OM. No aortic valve stenosis. Recommended manage with medical therapy at that time. On aspirin, Plavix, statin, beta blocker. This presentation, troponin 19.5 --> 585.9 --> 372.1, proBNP 8663. EKG a-sensed, v-paced, PVCs.   - Follow-up with Cardiology re possible coronary intervention  - Continue aspirin, Plavix, statin, metoprolol, added Ranexa    Scalp wound with cellulitis  Ordered for wound culture. Started patient on doxycycline 100 BID, planned for 7 days  - Follow up wound Cx  - Doxycycline 100mg BID, day 1 today, plan for 7 days    HTN  BP controlled.  - Continue Toprol, furosemide    CKD III  Cr ~1.5. Renal US done. No hydronephrosis. Findings consistent with medical renal disease.  - Trend Cr, avoid nephrotoxic agents    Anemia  Hgb ~8.5. No overt bleeding. Doubt hemolysis with normal bili. Patient ordered for retic, LDH, haptoglobin, iron studies, B12  - F/u in process anemia workup  - Trend Hgb     Constipation  Stable  - Continue Miralax, added senna, monitor for BM    BPH  Stable  - Continue finasteride    Large L middle cranial fossa arachnoid cyst  As noted on CT head 2/22/22, with mass effect on the adjacent L inferior frontal and anterior temporal lobes. Also, slight effacement of the L lateral ventricle, with trace L-to-R midline shift measuring 2-3 mm. Seen by Neurosurgery at that time. Patient has known about the finding and previously followed with a neurosurgeon along time ago with serial MRIs but hasn't seen in a long time. No seizure history or seizure like activity.   - Outpatient follow up with Dr Carroll.    Likely meningioma  As noted on CT head 2/22/22, partially-calcified extra-axial lesion measuring 1.1 cm along the high R parietal convexity.  - Continue to monitor    Physical deconditioning and debility  PT eval requested  - Anticipate restorative PT sessions    Severe protein calorie malnutrition  Appreciate input from Nutrition  - Encourage PO intake, trend Wt      Diet: DASH  DVT px: Heparin subcut  Code status: Full  Dispo: Anticipate patient will return to [facility] once clinically improved and inpatient workup is complete

## 2022-03-20 ENCOUNTER — TRANSCRIPTION ENCOUNTER (OUTPATIENT)
Age: 87
End: 2022-03-20

## 2022-03-20 LAB
ANION GAP SERPL CALC-SCNC: 7 MMOL/L — SIGNIFICANT CHANGE UP (ref 5–17)
BUN SERPL-MCNC: 36 MG/DL — HIGH (ref 7–23)
CALCIUM SERPL-MCNC: 8.6 MG/DL — SIGNIFICANT CHANGE UP (ref 8.5–10.1)
CHLORIDE SERPL-SCNC: 107 MMOL/L — SIGNIFICANT CHANGE UP (ref 96–108)
CO2 SERPL-SCNC: 24 MMOL/L — SIGNIFICANT CHANGE UP (ref 22–31)
CREAT SERPL-MCNC: 1.66 MG/DL — HIGH (ref 0.5–1.3)
EGFR: 39 ML/MIN/1.73M2 — LOW
GLUCOSE SERPL-MCNC: 95 MG/DL — SIGNIFICANT CHANGE UP (ref 70–99)
HCT VFR BLD CALC: 26.8 % — LOW (ref 39–50)
HGB BLD-MCNC: 8.5 G/DL — LOW (ref 13–17)
MCHC RBC-ENTMCNC: 30.6 PG — SIGNIFICANT CHANGE UP (ref 27–34)
MCHC RBC-ENTMCNC: 31.7 GM/DL — LOW (ref 32–36)
MCV RBC AUTO: 96.4 FL — SIGNIFICANT CHANGE UP (ref 80–100)
PLATELET # BLD AUTO: 190 K/UL — SIGNIFICANT CHANGE UP (ref 150–400)
POTASSIUM SERPL-MCNC: 4.4 MMOL/L — SIGNIFICANT CHANGE UP (ref 3.5–5.3)
POTASSIUM SERPL-SCNC: 4.4 MMOL/L — SIGNIFICANT CHANGE UP (ref 3.5–5.3)
RBC # BLD: 2.78 M/UL — LOW (ref 4.2–5.8)
RBC # FLD: 14.2 % — SIGNIFICANT CHANGE UP (ref 10.3–14.5)
SODIUM SERPL-SCNC: 138 MMOL/L — SIGNIFICANT CHANGE UP (ref 135–145)
WBC # BLD: 7.74 K/UL — SIGNIFICANT CHANGE UP (ref 3.8–10.5)
WBC # FLD AUTO: 7.74 K/UL — SIGNIFICANT CHANGE UP (ref 3.8–10.5)

## 2022-03-20 PROCEDURE — 99232 SBSQ HOSP IP/OBS MODERATE 35: CPT

## 2022-03-20 RX ADMIN — Medication 4 MILLIGRAM(S): at 21:32

## 2022-03-20 RX ADMIN — RANOLAZINE 500 MILLIGRAM(S): 500 TABLET, FILM COATED, EXTENDED RELEASE ORAL at 09:56

## 2022-03-20 RX ADMIN — HEPARIN SODIUM 5000 UNIT(S): 5000 INJECTION INTRAVENOUS; SUBCUTANEOUS at 05:29

## 2022-03-20 RX ADMIN — POLYETHYLENE GLYCOL 3350 17 GRAM(S): 17 POWDER, FOR SOLUTION ORAL at 09:59

## 2022-03-20 RX ADMIN — Medication 5 MILLIGRAM(S): at 23:18

## 2022-03-20 RX ADMIN — FINASTERIDE 5 MILLIGRAM(S): 5 TABLET, FILM COATED ORAL at 09:56

## 2022-03-20 RX ADMIN — ATORVASTATIN CALCIUM 80 MILLIGRAM(S): 80 TABLET, FILM COATED ORAL at 21:32

## 2022-03-20 RX ADMIN — PANTOPRAZOLE SODIUM 40 MILLIGRAM(S): 20 TABLET, DELAYED RELEASE ORAL at 09:57

## 2022-03-20 RX ADMIN — Medication 50 MILLIGRAM(S): at 09:56

## 2022-03-20 RX ADMIN — HEPARIN SODIUM 5000 UNIT(S): 5000 INJECTION INTRAVENOUS; SUBCUTANEOUS at 17:07

## 2022-03-20 RX ADMIN — Medication 5 MILLIGRAM(S): at 09:58

## 2022-03-20 RX ADMIN — Medication 100 MILLIGRAM(S): at 09:56

## 2022-03-20 RX ADMIN — Medication 100 MILLIGRAM(S): at 21:31

## 2022-03-20 RX ADMIN — RANOLAZINE 500 MILLIGRAM(S): 500 TABLET, FILM COATED, EXTENDED RELEASE ORAL at 21:31

## 2022-03-20 RX ADMIN — Medication 81 MILLIGRAM(S): at 09:56

## 2022-03-20 RX ADMIN — Medication 3 MILLIGRAM(S): at 21:32

## 2022-03-20 RX ADMIN — Medication 325 MILLIGRAM(S): at 09:57

## 2022-03-20 RX ADMIN — Medication 40 MILLIGRAM(S): at 09:59

## 2022-03-20 RX ADMIN — CLOPIDOGREL BISULFATE 75 MILLIGRAM(S): 75 TABLET, FILM COATED ORAL at 09:57

## 2022-03-20 RX ADMIN — Medication 500 MILLIGRAM(S): at 09:57

## 2022-03-20 NOTE — PROGRESS NOTE ADULT - ASSESSMENT
89 year-old man with HTN, CAD, hx of CABG, AAA s/p repair, CKD III, presented 3/18 for further evaluation of chest discomfort, mid sternal, dull, non radiating. Vitals and exam in the ED unremarkable. Labs notable for troponin elevation, proBNP 8663 EKG a-sensed, v-paced, PVCs. CXR with some pulmonary congestive changes. Continued on home regimen on DAPT, statin, metoprolol and admitted to Medicine.      Stable angina in setting of known multi vessel CAD  Now resolved. Abnormal stress testing in 12/2021. Had subsequent cardiac cath 12/2021. Three-vessel coronary artery disease. Patent LIMA to LAD. Patent SVG to RPDA. Occluded SVG to D1 and OM. No aortic valve stenosis. Recommended manage with medical therapy at that time. On aspirin, Plavix, statin, beta blocker. This presentation, troponin 19.5 --> 585.9 --> 372.1, proBNP 8663. EKG a-sensed, v-paced, PVCs.   - Follow-up with Cardiology re possible coronary intervention  - Continue aspirin, Plavix, statin, metoprolol, added Ranexa    Scalp wound with cellulitis  Noted to have some mild purulence at the wound site on exam 3/19. Obtained superficial wound culture and started patient on doxycycline 100 BID  - Continue doxycycline 100mg BID, day 2 today, plan for 7 days  - Follow up wound Cx    HTN  BP controlled.  - Continue Toprol, furosemide    CKD III  Cr ~1.5-1.6. Renal US done. No hydronephrosis. Findings consistent with medical renal disease.  - Trend Cr, avoid nephrotoxic agents    Anemia  Hgb ~8.5. No overt bleeding. Doubt hemolysis with normal bili. LDH WNL. Haptoglobin not low. Iron studies WNL. B12 WNL.   - Continue to trend Hgb     Constipation  Stable  - Continue Miralax, added senna, monitor for BM    BPH  Stable  - Continue finasteride    Large L middle cranial fossa arachnoid cyst  As noted on CT head 2/22/22, with mass effect on the adjacent L inferior frontal and anterior temporal lobes. Also, slight effacement of the L lateral ventricle, with trace L-to-R midline shift measuring 2-3 mm. Seen by Neurosurgery at that time. Patient has known about the finding and previously followed with a neurosurgeon along time ago with serial MRIs but hasn't seen in a long time. No seizure history or seizure like activity.   - Outpatient follow up with Dr Carroll.    Likely meningioma  As noted on CT head 2/22/22, partially-calcified extra-axial lesion measuring 1.1 cm along the high R parietal convexity.  - Continue to monitor    Physical deconditioning and debility  PT eval requested  - Anticipate restorative PT sessions    Severe protein calorie malnutrition  Appreciate input from Nutrition  - Encourage PO intake, trend Wt      Diet: DASH  DVT px: Heparin subcut  Code status: Full  Dispo: Anticipate patient will return to [facility] once clinically improved and inpatient workup is complete

## 2022-03-20 NOTE — PROGRESS NOTE ADULT - NUTRITIONAL ASSESSMENT
This patient has been assessed with a concern for Malnutrition and has been determined to have a diagnosis/diagnoses of Severe protein-calorie malnutrition.    This patient is being managed with:   Diet DASH/TLC-  Sodium & Cholesterol Restricted  Entered: Mar 18 2022 12:39PM    
This patient has been assessed with a concern for Malnutrition and has been determined to have a diagnosis/diagnoses of Severe protein-calorie malnutrition.    This patient is being managed with:   Diet DASH/TLC-  Sodium & Cholesterol Restricted  Entered: Mar 18 2022 12:39PM

## 2022-03-20 NOTE — DISCHARGE NOTE NURSING/CASE MANAGEMENT/SOCIAL WORK - NSDCPETBCESMAN_GEN_ALL_CORE
[>50% of the face to face encounter time was spent on counseling and/or coordination of care for ___] : Greater than 50% of the face to face encounter time was spent on counseling and/or coordination of care for [unfilled] If you are a smoker, it is important for your health to stop smoking. Please be aware that second hand smoke is also harmful.

## 2022-03-20 NOTE — PROGRESS NOTE ADULT - SUBJECTIVE AND OBJECTIVE BOX
CURRENT CARDIAC WORKUP:       Echo:  Stress Test:  Cardiac Cath:    Allergies:   Broccoli (Unknown)  penicillin (Vomiting; Nausea)      MEDICATIONS  (STANDING):  ascorbic acid 500 milliGRAM(s) Oral daily  aspirin enteric coated 81 milliGRAM(s) Oral daily  atorvastatin 80 milliGRAM(s) Oral at bedtime  clopidogrel Tablet 75 milliGRAM(s) Oral daily  doxazosin 4 milliGRAM(s) Oral at bedtime  doxycycline hyclate Capsule 100 milliGRAM(s) Oral every 12 hours  ferrous    sulfate 325 milliGRAM(s) Oral daily  finasteride 5 milliGRAM(s) Oral daily  furosemide    Tablet 40 milliGRAM(s) Oral daily  furosemide    Tablet 20 milliGRAM(s) Oral daily  heparin   Injectable 5000 Unit(s) SubCutaneous every 12 hours  metoprolol succinate ER 50 milliGRAM(s) Oral daily  pantoprazole    Tablet 40 milliGRAM(s) Oral before breakfast  polyethylene glycol 3350 17 Gram(s) Oral daily  ranolazine 500 milliGRAM(s) Oral two times a day  senna 2 Tablet(s) Oral at bedtime    MEDICATIONS  (PRN):  acetaminophen     Tablet .. 650 milliGRAM(s) Oral every 6 hours PRN Mild Pain (1 - 3)  aluminum hydroxide/magnesium hydroxide/simethicone Suspension 30 milliLiter(s) Oral every 6 hours PRN Dyspepsia  bisacodyl 5 milliGRAM(s) Oral every 12 hours PRN Constipation  diazepam    Tablet 5 milliGRAM(s) Oral at bedtime PRN anxiety/sleep  melatonin 3 milliGRAM(s) Oral at bedtime PRN Insomnia  ondansetron Injectable 4 milliGRAM(s) IV Push every 6 hours PRN Nausea and/or Vomiting      ROS:     .ros      Vital Signs Last 24 Hrs  T(C): 36.5 (20 Mar 2022 09:00), Max: 36.7 (19 Mar 2022 20:20)  T(F): 97.7 (20 Mar 2022 09:00), Max: 98.1 (19 Mar 2022 20:20)  HR: 69 (20 Mar 2022 09:00) (61 - 69)  BP: 139/49 (20 Mar 2022 09:00) (135/56 - 149/57)  BP(mean): --  RR: 19 (20 Mar 2022 09:00) (18 - 19)  SpO2: 92% (20 Mar 2022 09:00) (92% - 95%)    I&O's Summary    19 Mar 2022 07:01  -  20 Mar 2022 07:00  --------------------------------------------------------  IN: 0 mL / OUT: 400 mL / NET: -400 mL        PHYSICAL EXAM:    .phy      TELEMETRY:    ECG:    LABS:                        8.5    7.74  )-----------( 190      ( 20 Mar 2022 07:27 )             26.8     03-20    138  |  107  |  36<H>  ----------------------------<  95  4.4   |  24  |  1.66<H>    Ca    8.6      20 Mar 2022 07:27  Phos  2.8     03-19  Mg     2.6     03-19      Pro BNP  8663 03-18 @ 08:21      RADIOLOGY & ADDITIONAL STUDIES:     COVERING FOR Dr AVERY / LLOYD      89 yo male with c/o chest pain. h/o CAD, s/p CABG, recent cath showed patent LIMA to LAD and SVG to RPDA. Occluded SVG to D1, OM and pt is being medically managed.  Midsternal, dull chest pain with no radiation. No associated symptoms. Cardiac enzymes are mildly elevated. Medical management started. Added Ranexa.     Today, no new events. Chest pain free. Hemodynamically stable.     PAST MEDICAL & SURGICAL HISTORY:  HTN (hypertension)  AAA (abdominal aortic aneurysm)  S/P CABG (coronary artery bypass graft)  History of right hip replacement  CAD (coronary artery disease)  s/p stent placed  S/P left inguinal hernia repair  S/P AAA repair 7-        PREVIOUS CARDIAC WORKUP:      < from: TTE Echo Complete w/o Contrast w/ Doppler (12.17.21 @ 13:58) >   Fibrocalcific changes noted to the mitral valve leaflets with preserved leaflet excursion.   At least Moderate (2+) mitral regurgitation is present.   Fibrocalcific changes noted to the Aortic valve leaflets with preserved leaflet excursion.   Mild (1+) aortic regurgitation is present.   Mild (1+) tricuspid valve regurgitation is present.   Trace pulmonic valvular regurgitation is present.   The left atrium is moderately dilated.   Minimally reduced left ventricular systolic function.   Estimated left ventricular ejection fraction is 50 %.   A device wire is seen in the RV and RA.   Normal appearing right atrium.   Trace pericardial effusion cannot be ruled out.      < from: Cardiac Catheterization (12.22.21 @ 14:53) >   Diagnostic Conclusions     Three Vessel coronary artery disease.   Patent LIMA to LAD.   Patent SVG to RPDA.   Occluded SVG to D1 and OM. NO additional grafts seen on aortogram   Elevated left ventricular end-diastolic pressure 20 mm Hg.   No aortic valve stenosis.     Recommendations     Manage with medical therapy.   Aggressive medical management of coronary artery disease and its underlying risk factors.        ALLERGIES:    Broccoli (Unknown)  penicillin (Vomiting; Nausea)      MEDICATIONS  (STANDING):  ascorbic acid 500 milliGRAM(s) Oral daily  aspirin enteric coated 81 milliGRAM(s) Oral daily  atorvastatin 80 milliGRAM(s) Oral at bedtime  clopidogrel Tablet 75 milliGRAM(s) Oral daily  doxazosin 4 milliGRAM(s) Oral at bedtime  doxycycline hyclate Capsule 100 milliGRAM(s) Oral every 12 hours  ferrous    sulfate 325 milliGRAM(s) Oral daily  finasteride 5 milliGRAM(s) Oral daily  furosemide    Tablet 40 milliGRAM(s) Oral daily  furosemide    Tablet 20 milliGRAM(s) Oral daily  heparin   Injectable 5000 Unit(s) SubCutaneous every 12 hours  metoprolol succinate ER 50 milliGRAM(s) Oral daily  pantoprazole    Tablet 40 milliGRAM(s) Oral before breakfast  polyethylene glycol 3350 17 Gram(s) Oral daily  ranolazine 500 milliGRAM(s) Oral two times a day  senna 2 Tablet(s) Oral at bedtime    MEDICATIONS  (PRN):  acetaminophen     Tablet .. 650 milliGRAM(s) Oral every 6 hours PRN Mild Pain (1 - 3)  aluminum hydroxide/magnesium hydroxide/simethicone Suspension 30 milliLiter(s) Oral every 6 hours PRN Dyspepsia  bisacodyl 5 milliGRAM(s) Oral every 12 hours PRN Constipation  diazepam    Tablet 5 milliGRAM(s) Oral at bedtime PRN anxiety/sleep  melatonin 3 milliGRAM(s) Oral at bedtime PRN Insomnia  ondansetron Injectable 4 milliGRAM(s) IV Push every 6 hours PRN Nausea and/or Vomiting        Vital Signs Last 24 Hrs  T(C): 36.5 (20 Mar 2022 09:00), Max: 36.7 (19 Mar 2022 20:20)  T(F): 97.7 (20 Mar 2022 09:00), Max: 98.1 (19 Mar 2022 20:20)  HR: 69 (20 Mar 2022 09:00) (61 - 69)  BP: 139/49 (20 Mar 2022 09:00) (135/56 - 149/57)  BP(mean): --  RR: 19 (20 Mar 2022 09:00) (18 - 19)  SpO2: 92% (20 Mar 2022 09:00) (92% - 95%)      PHYSICAL EXAM:    Constitutional: NAD, awake and alert, well-developed  HEENT: PERR, EOMI, Normal Hearing, MMM  Neck: Soft and supple, No LAD, No JVD  Respiratory: Breath sounds are clear bilaterally, No wheezing, rales or rhonchi  Cardiovascular: S1 and S2, regular rate and rhythm, no Murmurs, gallops or rubs  Gastrointestinal: Bowel Sounds present, soft, nontender, nondistended, no guarding, no rebound  Extremities: No peripheral edema  Vascular: 2+ peripheral pulses  Neurological: A/O x 3, no focal deficits  Musculoskeletal: 5/5 strength b/l upper and lower extremities  Skin: No rashes      TELEMETRY:  Normal sinus rhythm with no tachy or margarita events     ECG:    A sensed V paced      LABS:                        8.5    7.74  )-----------( 190      ( 20 Mar 2022 07:27 )             26.8     03-20    138  |  107  |  36<H>  ----------------------------<  95  4.4   |  24  |  1.66<H>    Ca    8.6      20 Mar 2022 07:27  Phos  2.8     03-19  Mg     2.6     03-19      Pro BNP  8663 03-18 @ 08:21      RADIOLOGY & ADDITIONAL STUDIES:    < from: CT Chest No Cont (03.18.22 @ 17:17) >  IMPRESSION:    Patchy groundglass and septal thickening in the upper lobes favored to represent lung edema. Underlying pneumonia would have to be excluded clinically. Stable small pleural effusions.    < from: Xray Chest 1 View- PORTABLE-Urgent (03.18.22 @ 09:29) >  IMPRESSION: Significant congestion somewhat increased. Stable cardiac findings.    < from: US Kidney and Bladder (03.19.22 @ 09:04) >  IMPRESSION:  No hydronephrosis. Findings consistent with medical renal disease.

## 2022-03-20 NOTE — PROGRESS NOTE ADULT - ASSESSMENT
CAD, unstable angina  Non-STEMI  Hypertension  Status post CABG  Acute on Chronic diastolic CHF  CKD     Suggest:  Cardiac monitor  O2 supplement PRN  Continue treatment with aspirin, Plavix  Beta blockers  Statins  Nitrates PRN  Added Ranexa. He is CP free  If angina persists will discuss PCI of distal LM, ostial LCx which is being medically managed    CKD is stable. Follow renal function and lytes.  Continue diuresis for CHF  ACEi or ARBs if renal function is stable.     Dr AVERY / LLOYD to follow up in AM

## 2022-03-20 NOTE — DISCHARGE NOTE NURSING/CASE MANAGEMENT/SOCIAL WORK - PATIENT PORTAL LINK FT
You can access the FollowMyHealth Patient Portal offered by [facility] by registering at the following website: [email or web address]. By joining [facility]’s FollowMyHealth portal, you will also be able to view your health information using other applications (apps) compatible with our system.

## 2022-03-20 NOTE — PROGRESS NOTE ADULT - SUBJECTIVE AND OBJECTIVE BOX
Chief Complaint: Chest discomfort    Interval History: Patient seen and examined. Patient reports feeling markedly improved compared to presentation. Now without chest pain or tightness. No dyspnea, orthopnea, PND or LE swelling. No cough or congestion. No fever or chills. No abdominal complaints or genitourinary complaints. He has a small wound on his scalp that he states is a few weeks old, has been managed at [facility], did appear to have some scant purulent drainage yesterday AM for which culture was requested and he was started on doxycycline, now day 2.     ROS: Multi system review is comprehensively negative x 10 systems except as above    Physical Exam:  T(F): 97.7 (20 Mar 2022 09:00), Max: 98.1 (19 Mar 2022 20:20)  HR: 69 (20 Mar 2022 09:00) (61 - 69)  BP: 139/49 (20 Mar 2022 09:00) (135/56 - 149/57)  RR: 19 (20 Mar 2022 09:00) (18 - 19)  SpO2: 92% (20 Mar 2022 09:00) (92% - 95%) on room air    Gen: Comfortable-appearing at rest  HEENT: Small superficial wound on scalp at head apex, no induration or fluctuance, PERRL, EOMI, moist oral mucosa  Neck: Supple no JVD  Chest: CTA B/L  CVS: S1 S2 normal, rate 50s, regular  Abd: +BS, soft NT ND   Ext: No calf tenderness, no edema  Skin: Warm, dry, scalp wound as described above  Neuro: Awake and alert, answers questions appropriately, follows commands  Mood: calm, pleasant    Labs:    CBC 3/20                        8.5    7.74 )---------( 190              26.8     MCV WNL, MCH WNL, RDW WNL    Retic 3.3%, LDH WNL, haptoglobin increased, iron studies normal and B12 normal    BMP 3/20    138  |  107  |  36  --------------------< 95  4.4   |   24   |  1.66    Ca 8.6   Phos 2.8   Mg 2.6    LFTs 3/18:  TPro  6.5  /  Alb  2.9  /  TBili  0.7  /  DBili  x   /  AST  19  /  ALT  17  /  AlkPhos  124    Coags 3/18:  PT: 14.0 sec;   INR: 1.20 ratio;    PTT:28.9 sec    Troponin 19.5 --> 585.9 --> 372.1   proBNP 8663    Urinalysis 3/18:  Clear, Ket-, N-, LE-, 6-10 RBC, 0-2 WBC, occ bacteria    Micro:  Wound culture (scalp) 3/19: In process  Urine culture 3/18: Negative  RSV PCR 3/18: Negative  Flu PCR 3/18: Negative  COVID19 PCR 3/18: Negative    Imaging:  US kidneys and bladder 3/19: No hydronephrosis. Findings consistent with medical renal disease.    CT chest 3/18: Mild patchy ground glass and septal thickening the upper lobes, much improved compared to 7/2020. Stable small pleural effusions. No thoracic adenopathy. Multi chamber cardiac enlargement. S/P sternotomy, CABG. No pericardial effusion. Dual-chamber ICD leads terminating in the RA and RV. Partially imaged abdominal aortic endograft. Bilateral proximal / ostial renal artery stents. Diffuse qualitative osteopenia.     CXR 3/18: Heart likely enlarged. Sternotomy and left-sided defibrillator again noted. Mid lower lung field infiltrates likely congestive are noted somewhat increased from December 17, 2021.    Cardiac Testing:  Tele 3/19: Reviewed, stable    EKG 3/18: A-sensed, V-paced, PVCs    Past cardiac testing:     Cardiac cath 12/21: Three-vessel coronary artery disease. Patent LIMA to LAD. Patent SVG to RPDA. Occluded SVG to D1 and OM. No additional grafts seen on aortogram. Elevated left ventricular end-diastolic pressure 20 mm Hg. No aortic valve stenosis. Recommended manage with medical therapy.    TTE 12/2021:  Mod MR. Mild AR, Mild TR, Mod dilated LA. LVEF 50%. Normal RA.     Meds:  MEDICATIONS  (STANDING):  ascorbic acid 500 milliGRAM(s) Oral daily  aspirin enteric coated 81 milliGRAM(s) Oral daily  atorvastatin 80 milliGRAM(s) Oral at bedtime  clopidogrel Tablet 75 milliGRAM(s) Oral daily  doxazosin 4 milliGRAM(s) Oral at bedtime  doxycycline hyclate Capsule 100 milliGRAM(s) Oral every 12 hours  ferrous    sulfate 325 milliGRAM(s) Oral daily  finasteride 5 milliGRAM(s) Oral daily  furosemide    Tablet 20 milliGRAM(s) Oral daily  furosemide    Tablet 40 milliGRAM(s) Oral daily  heparin   Injectable 5000 Unit(s) SubCutaneous every 12 hours  metoprolol succinate ER 50 milliGRAM(s) Oral daily  pantoprazole    Tablet 40 milliGRAM(s) Oral before breakfast  polyethylene glycol 3350 17 Gram(s) Oral daily  ranolazine 500 milliGRAM(s) Oral two times a day  senna 2 Tablet(s) Oral at bedtime    MEDICATIONS  (PRN):  acetaminophen     Tablet .. 650 milliGRAM(s) Oral every 6 hours PRN Mild Pain (1 - 3)  aluminum hydroxide/magnesium hydroxide/simethicone Suspension 30 milliLiter(s) Oral every 6 hours PRN Dyspepsia  bisacodyl 5 milliGRAM(s) Oral every 12 hours PRN Constipation  diazepam    Tablet 5 milliGRAM(s) Oral at bedtime PRN anxiety/sleep  melatonin 3 milliGRAM(s) Oral at bedtime PRN Insomnia  ondansetron Injectable 4 milliGRAM(s) IV Push every 6 hours PRN Nausea and/or Vomiting

## 2022-03-20 NOTE — DISCHARGE NOTE NURSING/CASE MANAGEMENT/SOCIAL WORK - NSDCPEFALRISK_GEN_ALL_CORE
For information on Fall & Injury Prevention, visit: [email or web address] OR  [email or web address] OR  https://www.cdc.gov/steadi/patient.html

## 2022-03-21 ENCOUNTER — TRANSCRIPTION ENCOUNTER (OUTPATIENT)
Age: 87
End: 2022-03-21

## 2022-03-21 VITALS — TEMPERATURE: 97 F

## 2022-03-21 LAB
-  AMIKACIN: SIGNIFICANT CHANGE UP
-  AMOXICILLIN/CLAVULANIC ACID: SIGNIFICANT CHANGE UP
-  AMPICILLIN/SULBACTAM: SIGNIFICANT CHANGE UP
-  AMPICILLIN/SULBACTAM: SIGNIFICANT CHANGE UP
-  AMPICILLIN: SIGNIFICANT CHANGE UP
-  AZTREONAM: SIGNIFICANT CHANGE UP
-  CEFAZOLIN: SIGNIFICANT CHANGE UP
-  CEFAZOLIN: SIGNIFICANT CHANGE UP
-  CEFEPIME: SIGNIFICANT CHANGE UP
-  CEFOXITIN: SIGNIFICANT CHANGE UP
-  CEFTRIAXONE: SIGNIFICANT CHANGE UP
-  CIPROFLOXACIN: SIGNIFICANT CHANGE UP
-  CLINDAMYCIN: SIGNIFICANT CHANGE UP
-  DAPTOMYCIN: SIGNIFICANT CHANGE UP
-  ERTAPENEM: SIGNIFICANT CHANGE UP
-  ERYTHROMYCIN: SIGNIFICANT CHANGE UP
-  GENTAMICIN: SIGNIFICANT CHANGE UP
-  GENTAMICIN: SIGNIFICANT CHANGE UP
-  LEVOFLOXACIN: SIGNIFICANT CHANGE UP
-  LINEZOLID: SIGNIFICANT CHANGE UP
-  MEROPENEM: SIGNIFICANT CHANGE UP
-  OXACILLIN: SIGNIFICANT CHANGE UP
-  PENICILLIN: SIGNIFICANT CHANGE UP
-  PIPERACILLIN/TAZOBACTAM: SIGNIFICANT CHANGE UP
-  RIFAMPIN: SIGNIFICANT CHANGE UP
-  TETRACYCLINE: SIGNIFICANT CHANGE UP
-  TOBRAMYCIN: SIGNIFICANT CHANGE UP
-  TRIMETHOPRIM/SULFAMETHOXAZOLE: SIGNIFICANT CHANGE UP
-  TRIMETHOPRIM/SULFAMETHOXAZOLE: SIGNIFICANT CHANGE UP
-  VANCOMYCIN: SIGNIFICANT CHANGE UP
CULTURE RESULTS: SIGNIFICANT CHANGE UP
METHOD TYPE: SIGNIFICANT CHANGE UP
METHOD TYPE: SIGNIFICANT CHANGE UP
ORGANISM # SPEC MICROSCOPIC CNT: SIGNIFICANT CHANGE UP
SPECIMEN SOURCE: SIGNIFICANT CHANGE UP

## 2022-03-21 PROCEDURE — 99239 HOSP IP/OBS DSCHRG MGMT >30: CPT

## 2022-03-21 RX ORDER — RANOLAZINE 500 MG/1
1000 TABLET, FILM COATED, EXTENDED RELEASE ORAL
Refills: 0 | Status: DISCONTINUED | OUTPATIENT
Start: 2022-03-21 | End: 2022-03-21

## 2022-03-21 RX ORDER — RANOLAZINE 500 MG/1
1 TABLET, FILM COATED, EXTENDED RELEASE ORAL
Qty: 60 | Refills: 0
Start: 2022-03-21

## 2022-03-21 RX ORDER — RANOLAZINE 500 MG/1
500 TABLET, FILM COATED, EXTENDED RELEASE ORAL
Refills: 0 | Status: DISCONTINUED | OUTPATIENT
Start: 2022-03-21 | End: 2022-03-21

## 2022-03-21 RX ADMIN — Medication 325 MILLIGRAM(S): at 10:34

## 2022-03-21 RX ADMIN — HEPARIN SODIUM 5000 UNIT(S): 5000 INJECTION INTRAVENOUS; SUBCUTANEOUS at 06:05

## 2022-03-21 RX ADMIN — Medication 100 MILLIGRAM(S): at 10:34

## 2022-03-21 RX ADMIN — Medication 1 TABLET(S): at 13:06

## 2022-03-21 RX ADMIN — Medication 500 MILLIGRAM(S): at 10:33

## 2022-03-21 RX ADMIN — RANOLAZINE 500 MILLIGRAM(S): 500 TABLET, FILM COATED, EXTENDED RELEASE ORAL at 10:35

## 2022-03-21 RX ADMIN — FINASTERIDE 5 MILLIGRAM(S): 5 TABLET, FILM COATED ORAL at 10:34

## 2022-03-21 RX ADMIN — Medication 40 MILLIGRAM(S): at 10:34

## 2022-03-21 RX ADMIN — Medication 81 MILLIGRAM(S): at 10:33

## 2022-03-21 RX ADMIN — Medication 50 MILLIGRAM(S): at 10:35

## 2022-03-21 RX ADMIN — CLOPIDOGREL BISULFATE 75 MILLIGRAM(S): 75 TABLET, FILM COATED ORAL at 10:33

## 2022-03-21 NOTE — PHYSICAL THERAPY INITIAL EVALUATION ADULT - PERTINENT HX OF CURRENT PROBLEM, REHAB EVAL
89 year-old man with HTN, CAD, hx of CABG, AAA s/p repair, CKD III, presented 3/18 for further evaluation of chest discomfort, mid sternal, dull, non radiating. Vitals and exam in the ED unremarkable. Labs notable for troponin elevation, proBNP 8663 EKG a-sensed, v-paced, PVCs. CXR with some pulmonary congestive changes.

## 2022-03-21 NOTE — DISCHARGE NOTE PROVIDER - NSDCCAREPROVSEEN_GEN_ALL_CORE_FT
Sima Tellez (Cardiology)  Nallely Campuzano (Cardiology)  Jass Christopher ([facility])  Keith Hyatt (Cardiology)

## 2022-03-21 NOTE — DISCHARGE NOTE PROVIDER - DETAILS OF MALNUTRITION DIAGNOSIS/DIAGNOSES
This patient has been assessed with a concern for Malnutrition and was treated during this hospitalization for the following Nutrition diagnosis/diagnoses:     -  03/19/2022: Severe protein-calorie malnutrition

## 2022-03-21 NOTE — DISCHARGE NOTE PROVIDER - HOSPITAL COURSE
89 year-old man with HTN, CAD, hx of CABG, AAA s/p repair, CKD III, presented 3/18 for further evaluation of chest discomfort, mid sternal, dull, non radiating. Vitals and exam in the ED unremarkable. Labs notable for troponin elevation, proBNP 8663 EKG a-sensed, v-paced, PVCs. CXR with some pulmonary congestive changes. Continued on home regimen on DAPT, statin, metoprolol and admitted to Medicine.      Stable angina in setting of known multi vessel CAD  Now resolved. Abnormal stress testing in 12/2021. Had subsequent cardiac cath 12/2021. Three-vessel coronary artery disease. Patent LIMA to LAD. Patent SVG to RPDA. Occluded SVG to D1 and OM. No aortic valve stenosis. Recommended manage with medical therapy at that time. On aspirin, Plavix, statin, beta blocker. This presentation, troponin 19.5 --> 585.9 --> 372.1, proBNP 8663. EKG a-sensed, v-paced, PVCs. Patient seen by Cardiology. Ranexa added with good effect. Patient has been chest pain free for few days now. Continue aspirin, Plavix, statin, metoprolol, and new addition of Ranexa. Outpatient follow up with your cardiologist.      Scalp wound with cellulitis, present upon admission  Patient stated that he has had this scalp wound for a couple weeks now. This admission, some mild purulence was noted at the wound site on exam 3/19. Obtained superficial wound culture and started patient on doxycycline 100 BID. Wound culture grew S.aureus and Proteus sp. Switched doxycycline to Bactrim. Continue Bactrim SS 1 tab twice a day for 7 days. Outpatient follow up advised to ensure wound healing.     HTN  BP controlled. Continue Toprol, furosemide.    CKD III  Cr ~1.5-1.6. Renal US done. No hydronephrosis. Findings consistent with medical renal disease. Kidneys stable. Volume status euvolemic.     Normocytic Anemia  Hgb ~8.5 this admission while Hgb was ~10 in December 2021. MCV normal. RDW normal. Absolute retic normal. WBC and platelets normal. No overt bleeding. Doubt hemolysis with normal bili. LDH WNL. Haptoglobin not low. Iron studies WNL. B12 WNL. Requested stool for occult blood testing, pending. Advise outpatient follow up for further investigation as needed. Patient referred to Hematology Dr. Vishal Rivero.     Constipation  Stable. Continue Miralax, added senna, monitor for regular BM.    BPH  Stable. Continue finasteride.    Large L middle cranial fossa arachnoid cyst  As noted on CT head 2/22/22, with mass effect on the adjacent L inferior frontal and anterior temporal lobes. Also, slight effacement of the L lateral ventricle, with trace L-to-R midline shift measuring 2-3 mm. Seen by Neurosurgery at that time. Patient has known about the finding and previously followed with a neurosurgeon along time ago with serial MRIs but hasn't seen in a long time. No seizure history or seizure like activity. Outpatient follow up with Dr Carroll if desired.     Likely meningioma  As noted on CT head 2/22/22, partially-calcified extra-axial lesion measuring 1.1 cm along the high R parietal convexity. Again, referred to Neurosurgery Dr. Carroll for monitoring if desired.    Physical deconditioning and debility  PT eval requested. Patient for return to [facility]. Physical therapy sessions as tolerated.     Severe protein calorie malnutrition  Appreciate input from Nutrition. Encourage PO intake, trend weight     89 year-old man with HTN, CAD, hx of CABG, AAA s/p repair, CKD III, presented 3/18 for further evaluation of chest discomfort, mid sternal, dull, non radiating. Vitals and exam in the ED unremarkable. Labs notable for troponin elevation, proBNP 8663 EKG a-sensed, v-paced, PVCs. CXR with some pulmonary congestive changes. Continued on home regimen on DAPT, statin, metoprolol and admitted to Medicine.      Stable angina in setting of known multi vessel CAD  Now resolved. Abnormal stress testing in 12/2021. Had subsequent cardiac cath 12/2021. Three-vessel coronary artery disease. Patent LIMA to LAD. Patent SVG to RPDA. Occluded SVG to D1 and OM. No aortic valve stenosis. Recommended manage with medical therapy at that time. On aspirin, Plavix, statin, beta blocker. This presentation, troponin 19.5 --> 585.9 --> 372.1, proBNP 8663. EKG a-sensed, v-paced, PVCs. Patient seen by Cardiology. Ranexa added with good effect. Patient has been chest pain free for few days now. Continue aspirin, Plavix, statin, metoprolol, and new addition of Ranexa. Outpatient follow up with your cardiologist.      Scalp wound with cellulitis, present upon admission  Patient stated that he has had this scalp wound for a couple weeks now. This admission, some mild purulence was noted at the wound site on exam 3/19. Obtained superficial wound culture and started patient on doxycycline 100 BID. Wound culture grew S.aureus and Proteus sp. Switched doxycycline to Bactrim. Continue Bactrim SS 1 tab twice a day for 7 days. Outpatient follow up advised to ensure wound healing.     HTN  BP controlled. Continue Toprol, ramipril, furosemide.    CKD III  Cr ~1.5-1.6. Renal US done. No hydronephrosis. Findings consistent with medical renal disease. Kidneys stable. Volume status euvolemic.     Normocytic Anemia  Hgb ~8.5 this admission while Hgb was ~10 in December 2021. MCV normal. RDW normal. Absolute retic normal. WBC and platelets normal. No overt bleeding. Doubt hemolysis with normal bili. LDH WNL. Haptoglobin not low. Iron studies WNL. B12 WNL. Requested stool for occult blood testing, pending. Advise outpatient follow up for further investigation as needed. Patient referred to Hematology Dr. Vishal Rivero.     Constipation  Stable. Continue Miralax, added senna, monitor for regular BM.    BPH  Stable. Continue finasteride, terazosin.    Large L middle cranial fossa arachnoid cyst  As noted on CT head 2/22/22, with mass effect on the adjacent L inferior frontal and anterior temporal lobes. Also, slight effacement of the L lateral ventricle, with trace L-to-R midline shift measuring 2-3 mm. Seen by Neurosurgery at that time. Patient has known about the finding and previously followed with a neurosurgeon along time ago with serial MRIs but hasn't seen in a long time. No seizure history or seizure like activity. Outpatient follow up with Dr Carroll if desired.     Likely meningioma  As noted on CT head 2/22/22, partially-calcified extra-axial lesion measuring 1.1 cm along the high R parietal convexity. Again, referred to Neurosurgery Dr. Carroll for monitoring if desired.    Physical deconditioning and debility  PT eval requested. Patient for return to [facility]. Physical therapy sessions as tolerated.     Severe protein calorie malnutrition  Appreciate input from Nutrition. Encourage PO intake, trend weight

## 2022-03-21 NOTE — DISCHARGE NOTE PROVIDER - NSDCMRMEDTOKEN_GEN_ALL_CORE_FT
ascorbic acid 500 mg oral tablet: 1 tab(s) orally once a day  aspirin 81 mg oral delayed release tablet: 1 tab(s) orally once a day  bisacodyl 5 mg oral delayed release tablet: 1 tab(s) orally every 12 hours  clopidogrel 75 mg oral tablet: 1 tab(s) orally once a day  diazePAM 5 mg oral tablet: 1 tab(s) orally once a day (at bedtime), As needed, home med MDD:1  ferrous sulfate 325 mg (65 mg elemental iron) oral tablet: 1 tab(s) orally once a day  finasteride 5 mg oral tablet: 1 tab(s) orally once a day  furosemide 20 mg oral tablet: 1 tab(s) orally once a day (in the evening)  furosemide 40 mg oral tablet: 1 tab(s) orally once a day (in the morning)  melatonin 3 mg oral tablet: 1 tab(s) orally once a day (at bedtime), As needed, Insomnia  metoprolol succinate 50 mg oral tablet, extended release: 1 tab(s) orally once a day  pantoprazole 40 mg oral delayed release tablet: 1 tab(s) orally once a day (before a meal)  polyethylene glycol 3350 oral powder for reconstitution: 17 gram(s) orally once a day  ramipril 2.5 mg oral capsule: 1 cap(s) orally once a day  rosuvastatin 20 mg oral tablet: 1 tab(s) orally once a day  terazosin 5 mg oral capsule: 1 cap(s) orally once a day (at bedtime)   ascorbic acid 500 mg oral tablet: 1 tab(s) orally once a day  aspirin 81 mg oral delayed release tablet: 1 tab(s) orally once a day  bisacodyl 5 mg oral delayed release tablet: 1 tab(s) orally every 12 hours  clopidogrel 75 mg oral tablet: 1 tab(s) orally once a day  diazePAM 5 mg oral tablet: 1 tab(s) orally once a day (at bedtime), As needed, home med MDD:1  ferrous sulfate 325 mg (65 mg elemental iron) oral tablet: 1 tab(s) orally once a day  finasteride 5 mg oral tablet: 1 tab(s) orally once a day  furosemide 20 mg oral tablet: 1 tab(s) orally once a day (in the evening)  furosemide 40 mg oral tablet: 1 tab(s) orally once a day (in the morning)  melatonin 3 mg oral tablet: 1 tab(s) orally once a day (at bedtime), As needed, Insomnia  metoprolol succinate 50 mg oral tablet, extended release: 1 tab(s) orally once a day  pantoprazole 40 mg oral delayed release tablet: 1 tab(s) orally once a day (before a meal)  polyethylene glycol 3350 oral powder for reconstitution: 17 gram(s) orally once a day  ramipril 2.5 mg oral capsule: 1 cap(s) orally once a day  ranolazine 500 mg oral tablet, extended release: 1 tab(s) orally 2 times a day  rosuvastatin 20 mg oral tablet: 1 tab(s) orally once a day  sulfamethoxazole-trimethoprim 400 mg-80 mg oral tablet: 1 tab(s) orally 2 times a day  terazosin 5 mg oral capsule: 1 cap(s) orally once a day (at bedtime)

## 2022-03-21 NOTE — DISCHARGE NOTE PROVIDER - CARE PROVIDERS DIRECT ADDRESSES
[email or web address]

## 2022-03-21 NOTE — DISCHARGE NOTE PROVIDER - CARE PROVIDER_API CALL
Boxer, Jonathan A  INTERNAL MEDICINE  [address] [place] [address]  Phone: [phone]  Fax: [phone]  Follow Up Time: 1 week    Keith Hyatt)  Cardiovascular Disease  [address] [place] [address]  Phone: [phone]  Fax: [phone]  Follow Up Time: 1 week    Doug Rivero)  Internal Medicine  [address] [place] [address]  Phone: [phone]  Fax: [phone]  Follow Up Time: 2 weeks    Ceferino Carroll; PhD)  Neurosurgery  [address] [place] [address], [address] [place] [address]  Phone: [phone]  Fax: [phone]  Follow Up Time: 1 month

## 2022-03-21 NOTE — DISCHARGE NOTE PROVIDER - NSDCCPCAREPLAN_GEN_ALL_CORE_FT
PRINCIPAL DISCHARGE DIAGNOSIS  Diagnosis: Stable angina  Assessment and Plan of Treatment: Now resolved. Abnormal stress testing in 12/2021. Had subsequent cardiac cath 12/2021. Three-vessel coronary artery disease. Patent LIMA to LAD. Patent SVG to RPDA. Occluded SVG to D1 and OM. No aortic valve stenosis. Recommended manage with medical therapy at that time. On aspirin, Plavix, statin, beta blocker. This presentation, troponin 19.5 --> 585.9 --> 372.1, proBNP 8663. EKG a-sensed, v-paced, PVCs. Patient seen by Cardiology. Ranexa added with good effect. Patient has been chest pain free for few days now. Continue aspirin, Plavix, statin, metoprolol, and new addition of Ranexa. Outpatient follow up with your cardiologist.      SECONDARY DISCHARGE DIAGNOSES  Diagnosis: Wound cellulitis  Assessment and Plan of Treatment: Scalp wound with cellulitis, present upon admission. Patient stated that he has had this scalp wound for a couple weeks now. This admission, some mild purulence was noted at the wound site on exam 3/19. Obtained superficial wound culture and started patient on doxycycline 100 BID. Wound culture grew S.aureus and Proteus sp. Switched doxycycline to Bactrim. Continue Bactrim SS 1 tab twice a day for 7 days. Outpatient follow up advised to ensure wound healing.    Diagnosis: HTN (hypertension)  Assessment and Plan of Treatment: BP controlled. Continue Toprol, furosemide.    Diagnosis: CKD (chronic kidney disease), stage III  Assessment and Plan of Treatment: Cr ~1.5-1.6. Renal US done. No hydronephrosis. Findings consistent with medical renal disease. Kidneys stable. Volume status euvolemic.    Diagnosis: Normocytic anemia  Assessment and Plan of Treatment: Hgb ~8.5 this admission while Hgb was ~10 in December 2021. MCV normal. RDW normal. Absolute retic normal. WBC and platelets normal. No overt bleeding. Doubt hemolysis with normal bili. LDH WNL. Haptoglobin not low. Iron studies WNL. B12 WNL. Requested stool for occult blood testing, pending. Advise outpatient follow up for further investigation as needed. Patient referred to Hematology Dr. Vishal Rivero.    Diagnosis: Constipation  Assessment and Plan of Treatment: Stable. Continue finasteride.    Diagnosis: BPH (benign prostatic hyperplasia)  Assessment and Plan of Treatment:     Diagnosis: Arachnoid cyst  Assessment and Plan of Treatment: Large L middle cranial fossa arachnoid cyst. As noted on CT head 2/22/22, with mass effect on the adjacent L inferior frontal and anterior temporal lobes. Also, slight effacement of the L lateral ventricle, with trace L-to-R midline shift measuring 2-3 mm. Seen by Neurosurgery at that time. Patient has known about the finding and previously followed with a neurosurgeon along time ago with serial MRIs but hasn't seen in a long time. No seizure history or seizure like activity. Outpatient follow up with Dr Carroll if desired.    Diagnosis: Meningioma  Assessment and Plan of Treatment: As noted on CT head 2/22/22, partially-calcified extra-axial lesion measuring 1.1 cm along the high R parietal convexity. Again, referred to Neurosurgery Dr. Carroll for monitoring if desired.    Diagnosis: Physical deconditioning  Assessment and Plan of Treatment: Patient seen by Physical Therapy. Patient for return to [facility]. Physical therapy sessions as tolerated.    Diagnosis: Severe protein-calorie malnutrition  Assessment and Plan of Treatment: Appreciate input from Nutrition. Encourage PO intake, trend weight     PRINCIPAL DISCHARGE DIAGNOSIS  Diagnosis: Stable angina  Assessment and Plan of Treatment: Now resolved. Abnormal stress testing in 12/2021. Had subsequent cardiac cath 12/2021. Three-vessel coronary artery disease. Patent LIMA to LAD. Patent SVG to RPDA. Occluded SVG to D1 and OM. No aortic valve stenosis. Recommended manage with medical therapy at that time. On aspirin, Plavix, statin, beta blocker. This presentation, troponin 19.5 --> 585.9 --> 372.1, proBNP 8663. EKG a-sensed, v-paced, PVCs. Patient seen by Cardiology. Ranexa added with good effect. Patient has been chest pain free for few days now. Continue aspirin, Plavix, statin, metoprolol, and new addition of Ranexa. Outpatient follow up with your cardiologist.      SECONDARY DISCHARGE DIAGNOSES  Diagnosis: Wound cellulitis  Assessment and Plan of Treatment: Scalp wound with cellulitis, present upon admission. Patient stated that he has had this scalp wound for a couple weeks now. This admission, some mild purulence was noted at the wound site on exam 3/19. Obtained superficial wound culture and started patient on doxycycline 100 BID. Wound culture grew S.aureus and Proteus sp. Switched doxycycline to Bactrim. Continue Bactrim SS 1 tab twice a day for 7 days. Outpatient follow up advised to ensure wound healing.    Diagnosis: HTN (hypertension)  Assessment and Plan of Treatment: BP controlled. Continue Toprol, ramipril, furosemide.    Diagnosis: CKD (chronic kidney disease), stage III  Assessment and Plan of Treatment: Cr ~1.5-1.6. Renal US done. No hydronephrosis. Findings consistent with medical renal disease. Kidneys stable. Volume status euvolemic.    Diagnosis: Normocytic anemia  Assessment and Plan of Treatment: Hgb ~8.5 this admission while Hgb was ~10 in December 2021. MCV normal. RDW normal. Absolute retic normal. WBC and platelets normal. No overt bleeding. Doubt hemolysis with normal bili. LDH WNL. Haptoglobin not low. Iron studies WNL. B12 WNL. Requested stool for occult blood testing, pending. Advise outpatient follow up for further investigation as needed. Patient referred to Hematology Dr. Vishal Rivero.    Diagnosis: Constipation  Assessment and Plan of Treatment: Stable. Continue bowel regimen.    Diagnosis: BPH (benign prostatic hyperplasia)  Assessment and Plan of Treatment: Stable. Continue finateride, terazosin.    Diagnosis: Arachnoid cyst  Assessment and Plan of Treatment: Large L middle cranial fossa arachnoid cyst. As noted on CT head 2/22/22, with mass effect on the adjacent L inferior frontal and anterior temporal lobes. Also, slight effacement of the L lateral ventricle, with trace L-to-R midline shift measuring 2-3 mm. Seen by Neurosurgery at that time. Patient has known about the finding and previously followed with a neurosurgeon along time ago with serial MRIs but hasn't seen in a long time. No seizure history or seizure like activity. Outpatient follow up with Dr Carroll if desired.    Diagnosis: Meningioma  Assessment and Plan of Treatment: As noted on CT head 2/22/22, partially-calcified extra-axial lesion measuring 1.1 cm along the high R parietal convexity. Again, referred to Neurosurgery Dr. Carroll for monitoring if desired.    Diagnosis: Physical deconditioning  Assessment and Plan of Treatment: Patient seen by Physical Therapy. Patient for return to [facility]. Physical therapy sessions as tolerated.    Diagnosis: Severe protein-calorie malnutrition  Assessment and Plan of Treatment: Appreciate input from Nutrition. Encourage PO intake, trend weight

## 2022-03-21 NOTE — PROGRESS NOTE ADULT - SUBJECTIVE AND OBJECTIVE BOX
COVERING FOR Dr AVERY / LLOYD      89 yo male with c/o chest pain. h/o CAD, s/p CABG, recent cath showed patent LIMA to LAD and SVG to RPDA. Occluded SVG to D1, OM and pt is being medically managed.  Midsternal, dull chest pain with no radiation. No associated symptoms. Cardiac enzymes are mildly elevated. Medical management started. Added Ranexa.     Today, no new events. Chest pain free. Hemodynamically stable.     3/21/22: feels better after starting ranexa.  Ambulating without chest pain.      PAST MEDICAL & SURGICAL HISTORY:  HTN (hypertension)  AAA (abdominal aortic aneurysm)  S/P CABG (coronary artery bypass graft)  History of right hip replacement  CAD (coronary artery disease)  s/p stent placed  S/P left inguinal hernia repair  S/P AAA repair 7-        PREVIOUS CARDIAC WORKUP:      < from: TTE Echo Complete w/o Contrast w/ Doppler (12.17.21 @ 13:58) >   Fibrocalcific changes noted to the mitral valve leaflets with preserved leaflet excursion.   At least Moderate (2+) mitral regurgitation is present.   Fibrocalcific changes noted to the Aortic valve leaflets with preserved leaflet excursion.   Mild (1+) aortic regurgitation is present.   Mild (1+) tricuspid valve regurgitation is present.   Trace pulmonic valvular regurgitation is present.   The left atrium is moderately dilated.   Minimally reduced left ventricular systolic function.   Estimated left ventricular ejection fraction is 50 %.   A device wire is seen in the RV and RA.   Normal appearing right atrium.   Trace pericardial effusion cannot be ruled out.      < from: Cardiac Catheterization (12.22.21 @ 14:53) >   Diagnostic Conclusions     Three Vessel coronary artery disease.   Patent LIMA to LAD.   Patent SVG to RPDA.   Occluded SVG to D1 and OM. NO additional grafts seen on aortogram   Elevated left ventricular end-diastolic pressure 20 mm Hg.   No aortic valve stenosis.     Recommendations     Manage with medical therapy.   Aggressive medical management of coronary artery disease and its underlying risk factors.        ALLERGIES:    Broccoli (Unknown)  penicillin (Vomiting; Nausea)      MEDICATIONS  (STANDING):  ascorbic acid 500 milliGRAM(s) Oral daily  aspirin enteric coated 81 milliGRAM(s) Oral daily  atorvastatin 80 milliGRAM(s) Oral at bedtime  clopidogrel Tablet 75 milliGRAM(s) Oral daily  doxazosin 4 milliGRAM(s) Oral at bedtime  doxycycline hyclate Capsule 100 milliGRAM(s) Oral every 12 hours  ferrous    sulfate 325 milliGRAM(s) Oral daily  finasteride 5 milliGRAM(s) Oral daily  furosemide    Tablet 40 milliGRAM(s) Oral daily  furosemide    Tablet 20 milliGRAM(s) Oral daily  heparin   Injectable 5000 Unit(s) SubCutaneous every 12 hours  metoprolol succinate ER 50 milliGRAM(s) Oral daily  pantoprazole    Tablet 40 milliGRAM(s) Oral before breakfast  polyethylene glycol 3350 17 Gram(s) Oral daily  senna 2 Tablet(s) Oral at bedtime    MEDICATIONS  (PRN):  acetaminophen     Tablet .. 650 milliGRAM(s) Oral every 6 hours PRN Mild Pain (1 - 3)  aluminum hydroxide/magnesium hydroxide/simethicone Suspension 30 milliLiter(s) Oral every 6 hours PRN Dyspepsia  bisacodyl 5 milliGRAM(s) Oral every 12 hours PRN Constipation  diazepam    Tablet 5 milliGRAM(s) Oral at bedtime PRN anxiety/sleep  melatonin 3 milliGRAM(s) Oral at bedtime PRN Insomnia  ondansetron Injectable 4 milliGRAM(s) IV Push every 6 hours PRN Nausea and/or Vomiting        Vital Signs Last 24 Hrs  T(C): 36.5 (20 Mar 2022 09:00), Max: 36.7 (19 Mar 2022 20:20)  T(F): 97.7 (20 Mar 2022 09:00), Max: 98.1 (19 Mar 2022 20:20)  HR: 69 (20 Mar 2022 09:00) (61 - 69)  BP: 139/49 (20 Mar 2022 09:00) (135/56 - 149/57)  BP(mean): --  RR: 19 (20 Mar 2022 09:00) (18 - 19)  SpO2: 92% (20 Mar 2022 09:00) (92% - 95%)      PHYSICAL EXAM:    Constitutional: NAD, awake and alert, well-developed  HEENT: PERR, EOMI, Normal Hearing, MMM  Neck: Soft and supple, No LAD, No JVD  Respiratory: Breath sounds are clear bilaterally, No wheezing, rales or rhonchi  Cardiovascular: S1 and S2, regular rate and rhythm, no Murmurs, gallops or rubs  Gastrointestinal: Bowel Sounds present, soft, nontender, nondistended, no guarding, no rebound  Extremities: No peripheral edema  Vascular: 2+ peripheral pulses  Neurological: A/O x 3, no focal deficits  Musculoskeletal: 5/5 strength b/l upper and lower extremities  Skin: No rashes      TELEMETRY:  Normal sinus rhythm with no tachy or margarita events     ECG:    A sensed V paced      LABS:                        8.5    7.74  )-----------( 190      ( 20 Mar 2022 07:27 )             26.8     03-20    138  |  107  |  36<H>  ----------------------------<  95  4.4   |  24  |  1.66<H>    Ca    8.6      20 Mar 2022 07:27    Troponin: <-372.13, <-585.87, <-253.39, <-19.49    RADIOLOGY & ADDITIONAL STUDIES:    < from: CT Chest No Cont (03.18.22 @ 17:17) >  IMPRESSION:    Patchy groundglass and septal thickening in the upper lobes favored to represent lung edema. Underlying pneumonia would have to be excluded clinically. Stable small pleural effusions.    < from: Xray Chest 1 View- PORTABLE-Urgent (03.18.22 @ 09:29) >  IMPRESSION: Significant congestion somewhat increased. Stable cardiac findings.    < from: US Kidney and Bladder (03.19.22 @ 09:04) >  IMPRESSION:  No hydronephrosis. Findings consistent with medical renal disease.

## 2022-03-21 NOTE — PROGRESS NOTE ADULT - ASSESSMENT
CAD, Crescendo angina  Non-STEMI  Hypertension  Status post CABG  Acute on Chronic diastolic CHF  CKD     a/p: responded well to ranexa.  no further chest pain. ambulating without issues.  OK for DC and outpt fu cardiology wise.     D/w patient and staff CAD, Crescendo angina  Non-STEMI  Hypertension  Status post CABG  Acute on Chronic diastolic CHF  CKD     a/p: responded well to ranexa.  no further chest pain. ambulating without issues. Slightly more anemic.  Check iron studies.  OK for DC and outpt fu cardiology wise.     D/w patient and staff

## 2022-03-21 NOTE — DISCHARGE NOTE PROVIDER - PROVIDER TOKENS
PROVIDER:[TOKEN:[[id number]],FOLLOWUP:[1 week]],PROVIDER:[TOKEN:[[id number]],FOLLOWUP:[1 week]],PROVIDER:[TOKEN:[[id number]],FOLLOWUP:[2 weeks]],PROVIDER:[TOKEN:[[id number]],FOLLOWUP:[1 month]]

## 2022-03-21 NOTE — DISCHARGE NOTE PROVIDER - NSDCPNSUBOBJ_GEN_ALL_CORE
Discharge Physical Exam:  Afebrile  /60  HR 62  RR 16  O2 96% on room air  Gen: Comfortable-appearing at rest  HEENT: Small superficial wound on scalp at head apex, no induration or fluctuance, PERRL, EOMI, moist oral mucosa  Neck: Supple no JVD  Chest: CTA B/L  CVS: S1 S2 normal, rate 50s, regular  Abd: +BS, soft NT ND   Ext: No calf tenderness, no edema  Skin: Warm, dry, scalp wound as described above  Neuro: Awake and alert, answers questions appropriately, follows commands  Mood: calm, pleasant    Labs:    CBC 3/20                        8.5    7.74 )---------( 190              26.8     MCV WNL, MCH WNL, RDW WNL    Retic 3.3%, LDH WNL, haptoglobin increased, iron studies normal and B12 normal    BMP 3/20    138  |  107  |  36  --------------------< 95  4.4   |   24   |  1.66    Ca 8.6   Phos 2.8   Mg 2.6    LFTs 3/18:  TPro  6.5  /  Alb  2.9  /  TBili  0.7  /  DBili  x   /  AST  19  /  ALT  17  /  AlkPhos  124    Coags 3/18:  PT: 14.0 sec;   INR: 1.20 ratio;    PTT:28.9 sec    Troponin 19.5 --> 585.9 --> 372.1   proBNP 8663    Urinalysis 3/18:  Clear, Ket-, N-, LE-, 6-10 RBC, 0-2 WBC, occ bacteria    Micro:  Wound culture (scalp) 3/19: S.aureus and Proteus sp.  Urine culture 3/18: Negative  RSV PCR 3/18: Negative  Flu PCR 3/18: Negative  COVID19 PCR 3/18: Negative    Imaging:  US kidneys and bladder 3/19: No hydronephrosis. Findings consistent with medical renal disease.    CT chest 3/18: Mild patchy ground glass and septal thickening the upper lobes, much improved compared to 7/2020. Stable small pleural effusions. No thoracic adenopathy. Multi chamber cardiac enlargement. S/P sternotomy, CABG. No pericardial effusion. Dual-chamber ICD leads terminating in the RA and RV. Partially imaged abdominal aortic endograft. Bilateral proximal / ostial renal artery stents. Diffuse qualitative osteopenia.     CXR 3/18: Heart likely enlarged. Sternotomy and left-sided defibrillator again noted. Mid lower lung field infiltrates likely congestive are noted somewhat increased from December 17, 2021.    Cardiac Testing:  Tele 3/19-21: Reviewed, stable    EKG 3/18: A-sensed, V-paced, PVCs    Past cardiac testing:     Cardiac cath 12/21: Three-vessel coronary artery disease. Patent LIMA to LAD. Patent SVG to RPDA. Occluded SVG to D1 and OM. No additional grafts seen on aortogram. Elevated left ventricular end-diastolic pressure 20 mm Hg. No aortic valve stenosis. Recommended manage with medical therapy.    TTE 12/2021:  Mod MR. Mild AR, Mild TR, Mod dilated LA. LVEF 50%. Normal RA.

## 2022-03-21 NOTE — PHYSICAL THERAPY INITIAL EVALUATION ADULT - SITTING BALANCE: STATIC
Progress Notes by Alysa Webber MD at 06/11/18 02:02 PM     Author:  Alysa Webber MD Service:  (none) Author Type:  Physician     Filed:  06/11/18 02:02 PM Encounter Date:  6/11/2018 Status:  Signed     :  Alysa Webber MD (Physician)            See lab result note.[CS1.1M]    Electronically Signed by:    Alysa Webber MD , 6/11/2018[CS1.1T]        Revision History        User Key Date/Time User Provider Type Action    > CS1.1 06/11/18 02:02 PM Alysa Webber MD Physician Sign    M - Manual, T - Template            
good minus

## 2022-03-21 NOTE — PHYSICAL THERAPY INITIAL EVALUATION ADULT - ASSISTIVE DEVICE:SUPINE/SIT, REHAB EVAL
"Chief Complaint   Patient presents with     Well Child       Initial BP 95/64 (BP Location: Right arm, Patient Position: Sitting, Cuff Size: Child)  Pulse 75  Temp 97.8  F (36.6  C) (Oral)  Ht 3' 10.25\" (1.175 m)  Wt 51 lb 2 oz (23.2 kg)  SpO2 98%  BMI 16.8 kg/m2 Estimated body mass index is 16.8 kg/(m^2) as calculated from the following:    Height as of this encounter: 3' 10.25\" (1.175 m).    Weight as of this encounter: 51 lb 2 oz (23.2 kg).  Medication Reconciliation: complete     Rogers Farrell MA      " bed rails

## 2022-03-23 DIAGNOSIS — R07.89 OTHER CHEST PAIN: ICD-10-CM

## 2022-03-23 DIAGNOSIS — L03.811 CELLULITIS OF HEAD [ANY PART, EXCEPT FACE]: ICD-10-CM

## 2022-03-23 DIAGNOSIS — I5A NON-ISCHEMIC MYOCARDIAL INJURY (NON-TRAUMATIC): ICD-10-CM

## 2022-03-23 DIAGNOSIS — N17.9 ACUTE KIDNEY FAILURE, UNSPECIFIED: ICD-10-CM

## 2022-03-23 DIAGNOSIS — D32.9 BENIGN NEOPLASM OF MENINGES, UNSPECIFIED: ICD-10-CM

## 2022-03-23 DIAGNOSIS — I50.33 ACUTE ON CHRONIC DIASTOLIC (CONGESTIVE) HEART FAILURE: ICD-10-CM

## 2022-03-23 DIAGNOSIS — I25.708 ATHEROSCLEROSIS OF CORONARY ARTERY BYPASS GRAFT(S), UNSPECIFIED, WITH OTHER FORMS OF ANGINA PECTORIS: ICD-10-CM

## 2022-03-23 DIAGNOSIS — E43 UNSPECIFIED SEVERE PROTEIN-CALORIE MALNUTRITION: ICD-10-CM

## 2022-03-23 DIAGNOSIS — Z95.828 PRESENCE OF OTHER VASCULAR IMPLANTS AND GRAFTS: ICD-10-CM

## 2022-03-23 DIAGNOSIS — Z95.1 PRESENCE OF AORTOCORONARY BYPASS GRAFT: ICD-10-CM

## 2022-03-23 DIAGNOSIS — Z98.890 OTHER SPECIFIED POSTPROCEDURAL STATES: ICD-10-CM

## 2022-03-23 DIAGNOSIS — N18.30 CHRONIC KIDNEY DISEASE, STAGE 3 UNSPECIFIED: ICD-10-CM

## 2022-03-23 DIAGNOSIS — N40.0 BENIGN PROSTATIC HYPERPLASIA WITHOUT LOWER URINARY TRACT SYMPTOMS: ICD-10-CM

## 2022-03-23 DIAGNOSIS — D64.9 ANEMIA, UNSPECIFIED: ICD-10-CM

## 2022-03-23 DIAGNOSIS — K59.00 CONSTIPATION, UNSPECIFIED: ICD-10-CM

## 2022-03-23 DIAGNOSIS — I10 ESSENTIAL (PRIMARY) HYPERTENSION: ICD-10-CM

## 2022-03-23 DIAGNOSIS — R77.8 OTHER SPECIFIED ABNORMALITIES OF PLASMA PROTEINS: ICD-10-CM

## 2022-03-23 DIAGNOSIS — R53.81 OTHER MALAISE: ICD-10-CM

## 2022-03-23 DIAGNOSIS — G93.0 CEREBRAL CYSTS: ICD-10-CM

## 2022-03-24 ENCOUNTER — INPATIENT (INPATIENT)
Facility: HOSPITAL | Age: 87
LOS: 1 days | Discharge: NURSING, REHAB OR LONG-TERM CARE | DRG: 682 | End: 2022-03-26
Attending: FAMILY MEDICINE | Admitting: FAMILY MEDICINE
Payer: MEDICARE

## 2022-03-24 VITALS
WEIGHT: 154.98 LBS | HEIGHT: 67 IN | HEART RATE: 65 BPM | SYSTOLIC BLOOD PRESSURE: 130 MMHG | OXYGEN SATURATION: 95 % | RESPIRATION RATE: 18 BRPM | TEMPERATURE: 98 F | DIASTOLIC BLOOD PRESSURE: 56 MMHG

## 2022-03-24 DIAGNOSIS — I25.10 ATHEROSCLEROTIC HEART DISEASE OF NATIVE CORONARY ARTERY WITHOUT ANGINA PECTORIS: Chronic | ICD-10-CM

## 2022-03-24 DIAGNOSIS — Z96.641 PRESENCE OF RIGHT ARTIFICIAL HIP JOINT: Chronic | ICD-10-CM

## 2022-03-24 DIAGNOSIS — Z95.1 PRESENCE OF AORTOCORONARY BYPASS GRAFT: Chronic | ICD-10-CM

## 2022-03-24 DIAGNOSIS — Z98.890 OTHER SPECIFIED POSTPROCEDURAL STATES: Chronic | ICD-10-CM

## 2022-03-24 LAB
ALBUMIN SERPL ELPH-MCNC: 2.9 G/DL — LOW (ref 3.3–5)
ALP SERPL-CCNC: 106 U/L — SIGNIFICANT CHANGE UP (ref 40–120)
ALT FLD-CCNC: 22 U/L — SIGNIFICANT CHANGE UP (ref 12–78)
ANION GAP SERPL CALC-SCNC: 9 MMOL/L — SIGNIFICANT CHANGE UP (ref 5–17)
APPEARANCE UR: CLEAR — SIGNIFICANT CHANGE UP
APTT BLD: 30.3 SEC — SIGNIFICANT CHANGE UP (ref 27.5–35.5)
AST SERPL-CCNC: 22 U/L — SIGNIFICANT CHANGE UP (ref 15–37)
BASOPHILS # BLD AUTO: 0.04 K/UL — SIGNIFICANT CHANGE UP (ref 0–0.2)
BASOPHILS NFR BLD AUTO: 0.6 % — SIGNIFICANT CHANGE UP (ref 0–2)
BILIRUB SERPL-MCNC: 0.5 MG/DL — SIGNIFICANT CHANGE UP (ref 0.2–1.2)
BILIRUB UR-MCNC: NEGATIVE — SIGNIFICANT CHANGE UP
BUN SERPL-MCNC: 56 MG/DL — HIGH (ref 7–23)
CALCIUM SERPL-MCNC: 8.3 MG/DL — LOW (ref 8.5–10.1)
CHLORIDE SERPL-SCNC: 102 MMOL/L — SIGNIFICANT CHANGE UP (ref 96–108)
CO2 SERPL-SCNC: 21 MMOL/L — LOW (ref 22–31)
COLOR SPEC: YELLOW — SIGNIFICANT CHANGE UP
CREAT SERPL-MCNC: 2.86 MG/DL — HIGH (ref 0.5–1.3)
DIFF PNL FLD: NEGATIVE — SIGNIFICANT CHANGE UP
EGFR: 20 ML/MIN/1.73M2 — LOW
EOSINOPHIL # BLD AUTO: 0.29 K/UL — SIGNIFICANT CHANGE UP (ref 0–0.5)
EOSINOPHIL NFR BLD AUTO: 4 % — SIGNIFICANT CHANGE UP (ref 0–6)
GLUCOSE SERPL-MCNC: 96 MG/DL — SIGNIFICANT CHANGE UP (ref 70–99)
GLUCOSE UR QL: NEGATIVE — SIGNIFICANT CHANGE UP
HCT VFR BLD CALC: 25.3 % — LOW (ref 39–50)
HGB BLD-MCNC: 8.2 G/DL — LOW (ref 13–17)
IMM GRANULOCYTES NFR BLD AUTO: 0.6 % — SIGNIFICANT CHANGE UP (ref 0–1.5)
INR BLD: 1.26 RATIO — HIGH (ref 0.88–1.16)
KETONES UR-MCNC: NEGATIVE — SIGNIFICANT CHANGE UP
LEUKOCYTE ESTERASE UR-ACNC: NEGATIVE — SIGNIFICANT CHANGE UP
LYMPHOCYTES # BLD AUTO: 0.87 K/UL — LOW (ref 1–3.3)
LYMPHOCYTES # BLD AUTO: 12 % — LOW (ref 13–44)
MAGNESIUM SERPL-MCNC: 2.7 MG/DL — HIGH (ref 1.6–2.6)
MCHC RBC-ENTMCNC: 30.6 PG — SIGNIFICANT CHANGE UP (ref 27–34)
MCHC RBC-ENTMCNC: 32.4 GM/DL — SIGNIFICANT CHANGE UP (ref 32–36)
MCV RBC AUTO: 94.4 FL — SIGNIFICANT CHANGE UP (ref 80–100)
MONOCYTES # BLD AUTO: 0.5 K/UL — SIGNIFICANT CHANGE UP (ref 0–0.9)
MONOCYTES NFR BLD AUTO: 6.9 % — SIGNIFICANT CHANGE UP (ref 2–14)
NEUTROPHILS # BLD AUTO: 5.49 K/UL — SIGNIFICANT CHANGE UP (ref 1.8–7.4)
NEUTROPHILS NFR BLD AUTO: 75.9 % — SIGNIFICANT CHANGE UP (ref 43–77)
NITRITE UR-MCNC: NEGATIVE — SIGNIFICANT CHANGE UP
PH UR: 5 — SIGNIFICANT CHANGE UP (ref 5–8)
PHOSPHATE SERPL-MCNC: 4 MG/DL — SIGNIFICANT CHANGE UP (ref 2.5–4.5)
PLATELET # BLD AUTO: 202 K/UL — SIGNIFICANT CHANGE UP (ref 150–400)
POTASSIUM SERPL-MCNC: 5 MMOL/L — SIGNIFICANT CHANGE UP (ref 3.5–5.3)
POTASSIUM SERPL-SCNC: 5 MMOL/L — SIGNIFICANT CHANGE UP (ref 3.5–5.3)
PROT SERPL-MCNC: 6.4 GM/DL — SIGNIFICANT CHANGE UP (ref 6–8.3)
PROT UR-MCNC: NEGATIVE — SIGNIFICANT CHANGE UP
PROTHROM AB SERPL-ACNC: 14.6 SEC — HIGH (ref 10.5–13.4)
RBC # BLD: 2.68 M/UL — LOW (ref 4.2–5.8)
RBC # FLD: 14.1 % — SIGNIFICANT CHANGE UP (ref 10.3–14.5)
SARS-COV-2 RNA SPEC QL NAA+PROBE: SIGNIFICANT CHANGE UP
SODIUM SERPL-SCNC: 132 MMOL/L — LOW (ref 135–145)
SP GR SPEC: 1.01 — SIGNIFICANT CHANGE UP (ref 1.01–1.02)
UROBILINOGEN FLD QL: NEGATIVE — SIGNIFICANT CHANGE UP
WBC # BLD: 7.23 K/UL — SIGNIFICANT CHANGE UP (ref 3.8–10.5)
WBC # FLD AUTO: 7.23 K/UL — SIGNIFICANT CHANGE UP (ref 3.8–10.5)

## 2022-03-24 PROCEDURE — 99285 EMERGENCY DEPT VISIT HI MDM: CPT

## 2022-03-24 PROCEDURE — 71045 X-RAY EXAM CHEST 1 VIEW: CPT | Mod: 26

## 2022-03-24 PROCEDURE — 76770 US EXAM ABDO BACK WALL COMP: CPT | Mod: 26

## 2022-03-24 RX ORDER — TAMSULOSIN HYDROCHLORIDE 0.4 MG/1
0.8 CAPSULE ORAL AT BEDTIME
Refills: 0 | Status: DISCONTINUED | OUTPATIENT
Start: 2022-03-24 | End: 2022-03-26

## 2022-03-24 RX ORDER — CLOPIDOGREL BISULFATE 75 MG/1
75 TABLET, FILM COATED ORAL DAILY
Refills: 0 | Status: DISCONTINUED | OUTPATIENT
Start: 2022-03-24 | End: 2022-03-26

## 2022-03-24 RX ORDER — METOPROLOL TARTRATE 50 MG
50 TABLET ORAL DAILY
Refills: 0 | Status: DISCONTINUED | OUTPATIENT
Start: 2022-03-24 | End: 2022-03-26

## 2022-03-24 RX ORDER — FINASTERIDE 5 MG/1
5 TABLET, FILM COATED ORAL DAILY
Refills: 0 | Status: DISCONTINUED | OUTPATIENT
Start: 2022-03-24 | End: 2022-03-26

## 2022-03-24 RX ORDER — ROSUVASTATIN CALCIUM 5 MG/1
1 TABLET ORAL
Qty: 0 | Refills: 0 | DISCHARGE

## 2022-03-24 RX ORDER — PANTOPRAZOLE SODIUM 20 MG/1
40 TABLET, DELAYED RELEASE ORAL
Refills: 0 | Status: DISCONTINUED | OUTPATIENT
Start: 2022-03-24 | End: 2022-03-26

## 2022-03-24 RX ORDER — ACETAMINOPHEN 500 MG
650 TABLET ORAL EVERY 6 HOURS
Refills: 0 | Status: DISCONTINUED | OUTPATIENT
Start: 2022-03-24 | End: 2022-03-26

## 2022-03-24 RX ORDER — SODIUM CHLORIDE 9 MG/ML
3 INJECTION INTRAMUSCULAR; INTRAVENOUS; SUBCUTANEOUS ONCE
Refills: 0 | Status: COMPLETED | OUTPATIENT
Start: 2022-03-24 | End: 2022-03-24

## 2022-03-24 RX ORDER — ATORVASTATIN CALCIUM 80 MG/1
80 TABLET, FILM COATED ORAL AT BEDTIME
Refills: 0 | Status: DISCONTINUED | OUTPATIENT
Start: 2022-03-24 | End: 2022-03-26

## 2022-03-24 RX ORDER — DIAZEPAM 5 MG
5 TABLET ORAL AT BEDTIME
Refills: 0 | Status: DISCONTINUED | OUTPATIENT
Start: 2022-03-24 | End: 2022-03-26

## 2022-03-24 RX ORDER — RANOLAZINE 500 MG/1
500 TABLET, FILM COATED, EXTENDED RELEASE ORAL
Refills: 0 | Status: DISCONTINUED | OUTPATIENT
Start: 2022-03-24 | End: 2022-03-26

## 2022-03-24 RX ORDER — CEFUROXIME AXETIL 250 MG
250 TABLET ORAL EVERY 12 HOURS
Refills: 0 | Status: DISCONTINUED | OUTPATIENT
Start: 2022-03-24 | End: 2022-03-26

## 2022-03-24 RX ORDER — POLYETHYLENE GLYCOL 3350 17 G/17G
17 POWDER, FOR SOLUTION ORAL DAILY
Refills: 0 | Status: DISCONTINUED | OUTPATIENT
Start: 2022-03-24 | End: 2022-03-26

## 2022-03-24 RX ORDER — FERROUS SULFATE 325(65) MG
325 TABLET ORAL DAILY
Refills: 0 | Status: DISCONTINUED | OUTPATIENT
Start: 2022-03-24 | End: 2022-03-26

## 2022-03-24 RX ORDER — ASPIRIN/CALCIUM CARB/MAGNESIUM 324 MG
81 TABLET ORAL DAILY
Refills: 0 | Status: DISCONTINUED | OUTPATIENT
Start: 2022-03-24 | End: 2022-03-26

## 2022-03-24 RX ORDER — HEPARIN SODIUM 5000 [USP'U]/ML
5000 INJECTION INTRAVENOUS; SUBCUTANEOUS EVERY 12 HOURS
Refills: 0 | Status: DISCONTINUED | OUTPATIENT
Start: 2022-03-24 | End: 2022-03-26

## 2022-03-24 RX ORDER — ONDANSETRON 8 MG/1
4 TABLET, FILM COATED ORAL EVERY 6 HOURS
Refills: 0 | Status: DISCONTINUED | OUTPATIENT
Start: 2022-03-24 | End: 2022-03-26

## 2022-03-24 RX ADMIN — Medication 250 MILLIGRAM(S): at 21:43

## 2022-03-24 RX ADMIN — CLOPIDOGREL BISULFATE 75 MILLIGRAM(S): 75 TABLET, FILM COATED ORAL at 13:59

## 2022-03-24 RX ADMIN — RANOLAZINE 500 MILLIGRAM(S): 500 TABLET, FILM COATED, EXTENDED RELEASE ORAL at 21:43

## 2022-03-24 RX ADMIN — HEPARIN SODIUM 5000 UNIT(S): 5000 INJECTION INTRAVENOUS; SUBCUTANEOUS at 21:22

## 2022-03-24 RX ADMIN — Medication 325 MILLIGRAM(S): at 13:59

## 2022-03-24 RX ADMIN — ATORVASTATIN CALCIUM 80 MILLIGRAM(S): 80 TABLET, FILM COATED ORAL at 21:22

## 2022-03-24 RX ADMIN — TAMSULOSIN HYDROCHLORIDE 0.8 MILLIGRAM(S): 0.4 CAPSULE ORAL at 21:22

## 2022-03-24 RX ADMIN — FINASTERIDE 5 MILLIGRAM(S): 5 TABLET, FILM COATED ORAL at 13:59

## 2022-03-24 RX ADMIN — Medication 81 MILLIGRAM(S): at 13:58

## 2022-03-24 RX ADMIN — Medication 100 MILLIGRAM(S): at 21:22

## 2022-03-24 RX ADMIN — Medication 50 MILLIGRAM(S): at 13:59

## 2022-03-24 NOTE — ED PROVIDER NOTE - OBJECTIVE STATEMENT
89 year old male with PMHx of HTN, CAD, CABG, AAA s/p repair, CKD III presents to the ED c/o increasing weakness. Pt with recent hospital admission on 3/18, d/c on 3/21. Pt was admitted for non STEMI and acute kidney injury. Pt states that he has been feeling weak over past couple of days, with decreased urination. Pt states that he was standing in front of a mirror and saw that he was curved and unable to stand up straight. Pt normally ambulates with a walker. Nursing note from [facility] states that pt has not been feeding well and has had difficulty ambulating. Non smoker. No illicit drug use. COVID vaccinated. No other injuries or complaints.

## 2022-03-24 NOTE — ED ADULT TRIAGE NOTE - CHIEF COMPLAINT QUOTE
Pt BIBA from [facility] with report of generalized weakness and some difficulty urinating. Per report, he fell 2 days ago and was not evaluated at hospital. Dressing to elbow and head noted by EMS upon arrival today.  Pt denies LOC or dizziness. Alert and able to answer questions appropriately.  Per pt, he was recently d/c from [facility]. Chart review shows initial c/o chest pain on 3/18. Denies CP at this time.

## 2022-03-24 NOTE — H&P ADULT - HISTORY OF PRESENT ILLNESS
89 year old male with PMHx of HTN, CAD, CABG, AAA s/p repair, CKD III presents to the ED c/o increasing weakness. Pt with recent hospital admission on 3/18, d/c on 3/21. Pt was admitted for non STEMI and acute kidney injury. Pt states that he has been feeling weak over past couple of days, with decreased urination. Pt states that he was standing in front of a mirror and saw that he was curved and unable to stand up straight. Pt normally ambulates with a walker. Nursing note from [facility] states that pt has not been feeding well and has had difficulty ambulating. Bladder scan revealed urinary retention, 700 ml urine after straight cath. I found him without Trevino. Was seen by nephrology who recommended admission.

## 2022-03-24 NOTE — PHARMACOTHERAPY INTERVENTION NOTE - COMMENTS
Medication history complete, patient is from [facility] and self-administers medication, went of list from [facility] and [facility]- list from [facility] has not been updated to reflect dose changes on [facility].

## 2022-03-24 NOTE — H&P ADULT - ASSESSMENT
# DESIRAE on CKD III  suspect retention as a cause  he doesn't have a FQ at this time if repeated scan shows persistent retention she should have straight cath q 8H or a Trevino    #HTN  -continue Metoprolol  -Hold ACEI     # CAD  -on asa, statin           # DESIRAE on CKD III  suspect retention as a cause  he doesn't have a FQ at this time if repeated scan shows persistent retention she should have straight cath q 8H or a Trevino  he was on Proscar and flomax at [facility]  i dc Lasix, Bactrim and ACEI for now    # CXR with signs of CHF but BNP and overall aspect is improved when compared with last CXR  off Lasix for now  moderate MR    #HTN  -continue Metoprolol  -Hold ACEI     # CAD  -on asa, statin, Ranexa    # Scalp wound responded to treatment  dc Bactrim  start Doxy for MRSA and replace Bactrim with Ceftin for completion of treatment    I need to contact [facility] for advanced dir           # DESIRAE on CKD III  suspect retention as a cause  he doesn't have a FQ at this time if repeated scan shows persistent retention she should have straight cath q 8H or a Trevino  he was on Proscar and flomax at [facility]  i dc Lasix, Bactrim and ACEI for now    # CXR with signs of CHF but BNP and overall aspect is improved when compared with last CXR  off Lasix for now  moderate MR    # Chronic normocytic anemia stable    #HTN  -continue Metoprolol  -Hold ACEI     # CAD  -on asa, statin, Ranexa    # Scalp wound responded to treatment  dc Bactrim  start Doxy for MRSA and replace Bactrim with Ceftin for completion of treatment    I need to contact [facility] for advanced dir

## 2022-03-24 NOTE — ED PROVIDER NOTE - ENMT, MLM
Airway patent, Nasal mucosa clear. Mouth with dry mucus membranes. Throat has no vesicles, no oropharyngeal exudates and uvula is midline. Airway patent, Nasal mucosa clear. Mouth with dry mucus membranes. Throat has no vesicles, no oropharyngeal exudates and uvula is midline.  Pt hard of hearing

## 2022-03-24 NOTE — H&P ADULT - NSHPLABSRESULTS_GEN_ALL_CORE
8.2    7.23  )-----------( 202      ( 24 Mar 2022 09:52 )             25.3   03-24    132<L>  |  102  |  56<H>  ----------------------------<  96  5.0   |  21<L>  |  2.86<H>    Ca    8.3<L>      24 Mar 2022 09:52  Phos  4.0     03-24  Mg     2.7     03-24    TPro  6.4  /  Alb  2.9<L>  /  TBili  0.5  /  DBili  x   /  AST  22  /  ALT  22  /  AlkPhos  106  03-24 8.2    7.23  )-----------( 202      ( 24 Mar 2022 09:52 )             25.3   03-24    132<L>  |  102  |  56<H>  ----------------------------<  96  5.0   |  21<L>  |  2.86<H>    Ca    8.3<L>      24 Mar 2022 09:52  Phos  4.0     03-24  Mg     2.7     03-24    TPro  6.4  /  Alb  2.9<L>  /  TBili  0.5  /  DBili  x   /  AST  22  /  ALT  22  /  AlkPhos  106  03-24    TTE Echo Complete w/o Contrast w/ Doppler (12.17.21 @ 13:58) >     Fibrocalcific changes noted to the mitral valve leaflets with preserved   leaflet excursion.   At least Moderate (2+) mitral regurgitation is present.   Fibrocalcific changes noted to the Aortic valve leaflets with preserved   leaflet excursion.   Mild (1+) aortic regurgitation is present.   Mild (1+) tricuspid valve regurgitation is present.   Trace pulmonic valvular regurgitation is present.   The left atrium is moderately dilated.   Minimally reduced left ventricular systolic function.   Estimated left ventricular ejection fraction is 50 %.   A device wire is seen in the RV and RA.   Normal appearing right atrium.   Trace pericardial effusion cannot be ruled out.

## 2022-03-24 NOTE — ED PROVIDER NOTE - CLINICAL SUMMARY MEDICAL DECISION MAKING FREE TEXT BOX
89 year old male with PMHx of HTN, CAD, CABG, AAA s/p repair, CKD III presents with increased weakness. Plan: check labs, x rays, social work and physical therapy evaluation.

## 2022-03-24 NOTE — PHYSICAL THERAPY INITIAL EVALUATION ADULT - LEVEL OF INDEPENDENCE: SIT/SUPINE, REHAB EVAL
Propranolol Counseling:  I discussed with the patient the risks of propranolol including but not limited to low heart rate, low blood pressure, low blood sugar, restlessness and increased cold sensitivity. They should call the office if they experience any of these side effects. Griseofulvin Counseling:  I discussed with the patient the risks of griseofulvin including but not limited to photosensitivity, cytopenia, liver damage, nausea/vomiting and severe allergy. The patient understands that this medication is best absorbed when taken with a fatty meal (e.g., ice cream or french fries). Doxepin Counseling:  Patient advised that the medication is sedating and not to drive a car after taking this medication. Patient informed of potential adverse effects including but not limited to dry mouth, urinary retention, and blurry vision. The patient verbalized understanding of the proper use and possible adverse effects of doxepin. All of the patient's questions and concerns were addressed. Eucrisa Counseling: Patient may experience a mild burning sensation during topical application. Isidore Mcbrides is not approved in children less than 3years of age. Solaraze Counseling:  I discussed with the patient the risks of Solaraze including but not limited to erythema, scaling, itching, weeping, crusting, and pain. Opioid Pregnancy And Lactation Text: These medications can lead to premature delivery and should be avoided during pregnancy. These medications are also present in breast milk in small amounts. Skyrizi Counseling: I discussed with the patient the risks of risankizumab-rzaa including but not limited to immunosuppression, and serious infections. The patient understands that monitoring is required including a PPD at baseline and must alert us or the primary physician if symptoms of infection or other concerning signs are noted. Rhofade Pregnancy And Lactation Text: This medication has not been assigned a Pregnancy Risk Category. It is unknown if the medication is excreted in breast milk. Elidel Pregnancy And Lactation Text: This medication is Pregnancy Category C. It is unknown if this medication is excreted in breast milk. Azathioprine Counseling:  I discussed with the patient the risks of azathioprine including but not limited to myelosuppression, immunosuppression, hepatotoxicity, lymphoma, and infections. The patient understands that monitoring is required including baseline LFTs, Creatinine, possible TPMP genotyping and weekly CBCs for the first month and then every 2 weeks thereafter. The patient verbalized understanding of the proper use and possible adverse effects of azathioprine. All of the patient's questions and concerns were addressed. Bexarotene Pregnancy And Lactation Text: This medication is Pregnancy Category X and should not be given to women who are pregnant or may become pregnant. This medication should not be used if you are breast feeding. Cimetidine Pregnancy And Lactation Text: This medication is Pregnancy Category B and is considered safe during pregnancy. It is also excreted in breast milk and breast feeding isn't recommended. Dupixent Counseling: I discussed with the patient the risks of dupilumab including but not limited to eye infection and irritation, cold sores, injection site reactions, worsening of asthma, allergic reactions and increased risk of parasitic infection. Live vaccines should be avoided while taking dupilumab. Dupilumab will also interact with certain medications such as warfarin and cyclosporine. The patient understands that monitoring is required and they must alert us or the primary physician if symptoms of infection or other concerning signs are noted. Dupixent Pregnancy And Lactation Text: This medication likely crosses the placenta but the risk for the fetus is uncertain. This medication is excreted in breast milk. Skyrizi Pregnancy And Lactation Text: The risk during pregnancy and breastfeeding is uncertain with this medication. Solaraze Pregnancy And Lactation Text: This medication is Pregnancy Category B and is considered safe. There is some data to suggest avoiding during the third trimester. It is unknown if this medication is excreted in breast milk. Eucrisa Pregnancy And Lactation Text: This medication has not been assigned a Pregnancy Risk Category but animal studies failed to show danger with the topical medication. It is unknown if the medication is excreted in breast milk. Propranolol Pregnancy And Lactation Text: This medication is Pregnancy Category C and it isn't known if it is safe during pregnancy. It is excreted in breast milk. Gabapentin Pregnancy And Lactation Text: This medication is Pregnancy Category C and isn't considered safe during pregnancy. It is excreted in breast milk. Metronidazole Counseling:  I discussed with the patient the risks of metronidazole including but not limited to seizures, nausea/vomiting, a metallic taste in the mouth, nausea/vomiting and severe allergy. Azathioprine Pregnancy And Lactation Text: This medication is Pregnancy Category D and isn't considered safe during pregnancy. It is unknown if this medication is excreted in breast milk. contact guard Isotretinoin Counseling: Patient should get monthly blood tests, not donate blood, not drive at night if vision affected, not share medication, and not undergo elective surgery for 6 months after tx completed. Side effects reviewed, pt to contact office should one occur. Detail Level: Zone Enbrel Counseling:  I discussed with the patient the risks of etanercept including but not limited to myelosuppression, immunosuppression, autoimmune hepatitis, demyelinating diseases, lymphoma, and infections. The patient understands that monitoring is required including a PPD at baseline and must alert us or the primary physician if symptoms of infection or other concerning signs are noted. Stelara Counseling:  I discussed with the patient the risks of ustekinumab including but not limited to immunosuppression, malignancy, posterior leukoencephalopathy syndrome, and serious infections. The patient understands that monitoring is required including a PPD at baseline and must alert us or the primary physician if symptoms of infection or other concerning signs are noted. Glycopyrrolate Counseling:  I discussed with the patient the risks of glycopyrrolate including but not limited to skin rash, drowsiness, dry mouth, difficulty urinating, and blurred vision. Birth Control Pills Counseling: Birth Control Pill Counseling: I discussed with the patient the potential side effects of OCPs including but not limited to increased risk of stroke, heart attack, thrombophlebitis, deep venous thrombosis, hepatic adenomas, breast changes, GI upset, headaches, and depression. The patient verbalized understanding of the proper use and possible adverse effects of OCPs. All of the patient's questions and concerns were addressed. Hydroxyzine Counseling: Patient advised that the medication is sedating and not to drive a car after taking this medication. Patient informed of potential adverse effects including but not limited to dry mouth, urinary retention, and blurry vision. The patient verbalized understanding of the proper use and possible adverse effects of hydroxyzine. All of the patient's questions and concerns were addressed. Topical Retinoid counseling:  Patient advised to apply a pea-sized amount only at bedtime and wait 30 minutes after washing their face before applying. If too drying, patient may add a non-comedogenic moisturizer. The patient verbalized understanding of the proper use and possible adverse effects of retinoids. All of the patient's questions and concerns were addressed. Hydroquinone Counseling:  Patient advised that medication may result in skin irritation, lightening (hypopigmentation), dryness, and burning. In the event of skin irritation, the patient was advised to reduce the amount of the drug applied or use it less frequently. Rarely, spots that are treated with hydroquinone can become darker (pseudoochronosis). Should this occur, patient instructed to stop medication and call the office. The patient verbalized understanding of the proper use and possible adverse effects of hydroquinone. All of the patient's questions and concerns were addressed. Arava Counseling:  Patient counseled regarding adverse effects of Arava including but not limited to nausea, vomiting, abnormalities in liver function tests. Patients may develop mouth sores, rash, diarrhea, and abnormalities in blood counts. The patient understands that monitoring is required including LFTs and blood counts. There is a rare possibility of scarring of the liver and lung problems that can occur when taking methotrexate. Persistent nausea, loss of appetite, pale stools, dark urine, cough, and shortness of breath should be reported immediately. Patient advised to discontinue Arava treatment and consult with a physician prior to attempting conception. The patient will have to undergo a treatment to eliminate Arava from the body prior to conception. Griseofulvin Pregnancy And Lactation Text: This medication is Pregnancy Category X and is known to cause serious birth defects. It is unknown if this medication is excreted in breast milk but breast feeding should be avoided. Isotretinoin Pregnancy And Lactation Text: This medication is Pregnancy Category X and is considered extremely dangerous during pregnancy. It is unknown if it is excreted in breast milk. Doxepin Pregnancy And Lactation Text: This medication is Pregnancy Category C and it isn't known if it is safe during pregnancy. It is also excreted in breast milk and breast feeding isn't recommended. Cellcept Counseling:  I discussed with the patient the risks of mycophenolate mofetil including but not limited to infection/immunosuppression, GI upset, hypokalemia, hypercholesterolemia, bone marrow suppression, lymphoproliferative disorders, malignancy, GI ulceration/bleed/perforation, colitis, interstitial lung disease, kidney failure, progressive multifocal leukoencephalopathy, and birth defects. The patient understands that monitoring is required including a baseline creatinine and regular CBC testing. In addition, patient must alert us immediately if symptoms of infection or other concerning signs are noted. Metronidazole Pregnancy And Lactation Text: This medication is Pregnancy Category B and considered safe during pregnancy. It is also excreted in breast milk. Stelara Pregnancy And Lactation Text: This medication is Pregnancy Category B and is considered safe during pregnancy. It is unknown if this medication is excreted in breast milk. Arava Pregnancy And Lactation Text: This medication is Pregnancy Category X and is absolutely contraindicated during pregnancy. It is unknown if it is excreted in breast milk. Glycopyrrolate Pregnancy And Lactation Text: This medication is Pregnancy Category B and is considered safe during pregnancy. It is unknown if it is excreted breast milk. Birth Control Pills Pregnancy And Lactation Text: This medication should be avoided if pregnant and for the first 30 days post-partum. Include Pregnancy/Lactation Warning?: No Minocycline Counseling: Patient advised regarding possible photosensitivity and discoloration of the teeth, skin, lips, tongue and gums. Patient instructed to avoid sunlight, if possible. When exposed to sunlight, patients should wear protective clothing, sunglasses, and sunscreen. The patient was instructed to call the office immediately if the following severe adverse effects occur:  hearing changes, easy bruising/bleeding, severe headache, or vision changes. The patient verbalized understanding of the proper use and possible adverse effects of minocycline. All of the patient's questions and concerns were addressed. Itraconazole Counseling:  I discussed with the patient the risks of itraconazole including but not limited to liver damage, nausea/vomiting, neuropathy, and severe allergy. The patient understands that this medication is best absorbed when taken with acidic beverages such as non-diet cola or ginger ale. The patient understands that monitoring is required including baseline LFTs and repeat LFTs at intervals. The patient understands that they are to contact us or the primary physician if concerning signs are noted. High Dose Vitamin A Counseling: Side effects reviewed, pt to contact office should one occur. Humira Counseling:  I discussed with the patient the risks of adalimumab including but not limited to myelosuppression, immunosuppression, autoimmune hepatitis, demyelinating diseases, lymphoma, and serious infections. The patient understands that monitoring is required including a PPD at baseline and must alert us or the primary physician if symptoms of infection or other concerning signs are noted. Taltz Counseling: I discussed with the patient the risks of ixekizumab including but not limited to immunosuppression, serious infections, worsening of inflammatory bowel disease and drug reactions. The patient understands that monitoring is required including a PPD at baseline and must alert us or the primary physician if symptoms of infection or other concerning signs are noted. Spironolactone Counseling: Patient advised regarding risks of diarrhea, abdominal pain, hyperkalemia, birth defects (for female patients), liver toxicity and renal toxicity. The patient may need blood work to monitor liver and kidney function and potassium levels while on therapy. The patient verbalized understanding of the proper use and possible adverse effects of spironolactone. All of the patient's questions and concerns were addressed. Hydroxychloroquine Counseling:  I discussed with the patient that a baseline ophthalmologic exam is needed at the start of therapy and every year thereafter while on therapy. A CBC may also be warranted for monitoring. The side effects of this medication were discussed with the patient, including but not limited to agranulocytosis, aplastic anemia, seizures, rashes, retinopathy, and liver toxicity. Patient instructed to call the office should any adverse effect occur. The patient verbalized understanding of the proper use and possible adverse effects of Plaquenil. All the patient's questions and concerns were addressed. Clofazimine Counseling:  I discussed with the patient the risks of clofazimine including but not limited to skin and eye pigmentation, liver damage, nausea/vomiting, gastrointestinal bleeding and allergy. Imiquimod Counseling:  I discussed with the patient the risks of imiquimod including but not limited to erythema, scaling, itching, weeping, crusting, and pain. Patient understands that the inflammatory response to imiquimod is variable from person to person and was educated regarded proper titration schedule. If flu-like symptoms develop, patient knows to discontinue the medication and contact us. Tazorac Counseling:  Patient advised that medication is irritating and drying. Patient may need to apply sparingly and wash off after an hour before eventually leaving it on overnight. The patient verbalized understanding of the proper use and possible adverse effects of tazorac. All of the patient's questions and concerns were addressed. Hydroxyzine Pregnancy And Lactation Text: This medication is not safe during pregnancy and should not be taken. It is also excreted in breast milk and breast feeding isn't recommended. Azithromycin Counseling:  I discussed with the patient the risks of azithromycin including but not limited to GI upset, allergic reaction, drug rash, diarrhea, and yeast infections. High Dose Vitamin A Pregnancy And Lactation Text: High dose vitamin A therapy is contraindicated during pregnancy and breast feeding. Cyclophosphamide Counseling:  I discussed with the patient the risks of cyclophosphamide including but not limited to hair loss, hormonal abnormalities, decreased fertility, abdominal pain, diarrhea, nausea and vomiting, bone marrow suppression and infection. The patient understands that monitoring is required while taking this medication. Minocycline Pregnancy And Lactation Text: This medication is Pregnancy Category D and not consider safe during pregnancy. It is also excreted in breast milk. Itraconazole Pregnancy And Lactation Text: This medication is Pregnancy Category C and it isn't know if it is safe during pregnancy. It is also excreted in breast milk. Hydroxychloroquine Pregnancy And Lactation Text: This medication has been shown to cause fetal harm but it isn't assigned a Pregnancy Risk Category. There are small amounts excreted in breast milk. Tazorac Pregnancy And Lactation Text: This medication is not safe during pregnancy. It is unknown if this medication is excreted in breast milk. Albendazole Counseling:  I discussed with the patient the risks of albendazole including but not limited to cytopenia, kidney damage, nausea/vomiting and severe allergy. The patient understands that this medication is being used in an off-label manner. Azithromycin Pregnancy And Lactation Text: This medication is considered safe during pregnancy and is also secreted in breast milk. Quinolones Counseling:  I discussed with the patient the risks of fluoroquinolones including but not limited to GI upset, allergic reaction, drug rash, diarrhea, dizziness, photosensitivity, yeast infections, liver function test abnormalities, tendonitis/tendon rupture. Ketoconazole Counseling:   Patient counseled regarding improving absorption with orange juice. Adverse effects include but are not limited to breast enlargement, headache, diarrhea, nausea, upset stomach, liver function test abnormalities, taste disturbance, and stomach pain. There is a rare possibility of liver failure that can occur when taking ketoconazole. The patient understands that monitoring of LFTs may be required, especially at baseline. The patient verbalized understanding of the proper use and possible adverse effects of ketoconazole. All of the patient's questions and concerns were addressed. Cyclophosphamide Pregnancy And Lactation Text: This medication is Pregnancy Category D and it isn't considered safe during pregnancy. This medication is excreted in breast milk. Ilumya Counseling: I discussed with the patient the risks of tildrakizumab including but not limited to immunosuppression, malignancy, posterior leukoencephalopathy syndrome, and serious infections. The patient understands that monitoring is required including a PPD at baseline and must alert us or the primary physician if symptoms of infection or other concerning signs are noted. Colchicine Counseling:  Patient counseled regarding adverse effects including but not limited to stomach upset (nausea, vomiting, stomach pain, or diarrhea). Patient instructed to limit alcohol consumption while taking this medication. Colchicine may reduce blood counts especially with prolonged use. The patient understands that monitoring of kidney function and blood counts may be required, especially at baseline. The patient verbalized understanding of the proper use and possible adverse effects of colchicine. All of the patient's questions and concerns were addressed. Tremfya Counseling: I discussed with the patient the risks of guselkumab including but not limited to immunosuppression, serious infections, worsening of inflammatory bowel disease and drug reactions. The patient understands that monitoring is required including a PPD at baseline and must alert us or the primary physician if symptoms of infection or other concerning signs are noted. Niacinamide Counseling: I recommended taking niacin or niacinamide, also know as vitamin B3, twice daily. Recent evidence suggests that taking vitamin B3 (500 mg twice daily) can reduce the risk of actinic keratoses and non-melanoma skin cancers. Side effects of vitamin B3 include flushing and headache. SSKI Counseling:  I discussed with the patient the risks of SSKI including but not limited to thyroid abnormalities, metallic taste, GI upset, fever, headache, acne, arthralgias, paraesthesias, lymphadenopathy, easy bleeding, arrhythmias, and allergic reaction. Topical Clindamycin Counseling: Patient counseled that this medication may cause skin irritation or allergic reactions. In the event of skin irritation, the patient was advised to reduce the amount of the drug applied or use it less frequently. The patient verbalized understanding of the proper use and possible adverse effects of clindamycin. All of the patient's questions and concerns were addressed. Cyclosporine Counseling:  I discussed with the patient the risks of cyclosporine including but not limited to hypertension, gingival hyperplasia,myelosuppression, immunosuppression, liver damage, kidney damage, neurotoxicity, lymphoma, and serious infections. The patient understands that monitoring is required including baseline blood pressure, CBC, CMP, lipid panel and uric acid, and then 1-2 times monthly CMP and blood pressure. Spironolactone Pregnancy And Lactation Text: This medication can cause feminization of the male fetus and should be avoided during pregnancy. The active metabolite is also found in breast milk. Bactrim Counseling:  I discussed with the patient the risks of sulfa antibiotics including but not limited to GI upset, allergic reaction, drug rash, diarrhea, dizziness, photosensitivity, and yeast infections. Rarely, more serious reactions can occur including but not limited to aplastic anemia, agranulocytosis, methemoglobinemia, blood dyscrasias, liver or kidney failure, lung infiltrates or desquamative/blistering drug rashes. Ketoconazole Pregnancy And Lactation Text: This medication is Pregnancy Category C and it isn't know if it is safe during pregnancy. It is also excreted in breast milk and breast feeding isn't recommended. Rifampin Counseling: I discussed with the patient the risks of rifampin including but not limited to liver damage, kidney damage, red-orange body fluids, nausea/vomiting and severe allergy. Sski Pregnancy And Lactation Text: This medication is Pregnancy Category D and isn't considered safe during pregnancy. It is excreted in breast milk. Terbinafine Counseling: Patient counseling regarding adverse effects of terbinafine including but not limited to headache, diarrhea, rash, upset stomach, liver function test abnormalities, itching, taste/smell disturbance, nausea, abdominal pain, and flatulence. There is a rare possibility of liver failure that can occur when taking terbinafine. The patient understands that a baseline LFT and kidney function test may be required. The patient verbalized understanding of the proper use and possible adverse effects of terbinafine. All of the patient's questions and concerns were addressed. Bactrim Pregnancy And Lactation Text: This medication is Pregnancy Category D and is known to cause fetal risk. It is also excreted in breast milk. Niacinamide Pregnancy And Lactation Text: These medications are considered safe during pregnancy. Topical Clindamycin Pregnancy And Lactation Text: This medication is Pregnancy Category B and is considered safe during pregnancy. It is unknown if it is excreted in breast milk. Ivermectin Counseling:  Patient instructed to take medication on an empty stomach with a full glass of water. Patient informed of potential adverse effects including but not limited to nausea, diarrhea, dizziness, itching, and swelling of the extremities or lymph nodes. The patient verbalized understanding of the proper use and possible adverse effects of ivermectin. All of the patient's questions and concerns were addressed. Minoxidil Counseling: Minoxidil is a topical medication which can increase blood flow where it is applied. It is uncertain how this medication increases hair growth. Side effects are uncommon and include stinging and allergic reactions. Albendazole Pregnancy And Lactation Text: This medication is Pregnancy Category C and it isn't known if it is safe during pregnancy. It is also excreted in breast milk. Cyclosporine Pregnancy And Lactation Text: This medication is Pregnancy Category C and it isn't know if it is safe during pregnancy. This medication is excreted in breast milk. Annabella Counseling: Cheri Medici Counseling: I discussed with the patient the risks of Cheri Medici therapy including increased risk of infection, liver issues, headache, diarrhea, or cold symptoms. Live vaccines should be avoided. They were instructed to call if they have any problems. Infliximab Counseling:  I discussed with the patient the risks of infliximab including but not limited to myelosuppression, immunosuppression, autoimmune hepatitis, demyelinating diseases, lymphoma, and serious infections. The patient understands that monitoring is required including a PPD at baseline and must alert us or the primary physician if symptoms of infection or other concerning signs are noted. Dapsone Counseling: I discussed with the patient the risks of dapsone including but not limited to hemolytic anemia, agranulocytosis, rashes, methemoglobinemia, kidney failure, peripheral neuropathy, headaches, GI upset, and liver toxicity. Patients who start dapsone require monitoring including baseline LFTs and weekly CBCs for the first month, then every month thereafter. The patient verbalized understanding of the proper use and possible adverse effects of dapsone. All of the patient's questions and concerns were addressed. Calcipotriene Counseling:  I discussed with the patient the risks of calcipotriene including but not limited to erythema, scaling, itching, and irritation. Mirvaso Counseling: Elizabeth Gauze is a topical medication which can decrease superficial blood flow where applied. Side effects are uncommon and include stinging, redness and allergic reactions. Benzoyl Peroxide Counseling: Patient counseled that medicine may cause skin irritation and bleach clothing. In the event of skin irritation, the patient was advised to reduce the amount of the drug applied or use it less frequently. The patient verbalized understanding of the proper use and possible adverse effects of benzoyl peroxide. All of the patient's questions and concerns were addressed. Nsaids Counseling: NSAID Counseling: I discussed with the patient that NSAIDs should be taken with food. Prolonged use of NSAIDs can result in the development of stomach ulcers. Patient advised to stop taking NSAIDs if abdominal pain occurs. The patient verbalized understanding of the proper use and possible adverse effects of NSAIDs. All of the patient's questions and concerns were addressed. Thalidomide Counseling: I discussed with the patient the risks of thalidomide including but not limited to birth defects, anxiety, weakness, chest pain, dizziness, cough and severe allergy. Methotrexate Counseling:  Patient counseled regarding adverse effects of methotrexate including but not limited to nausea, vomiting, abnormalities in liver function tests. Patients may develop mouth sores, rash, diarrhea, and abnormalities in blood counts. The patient understands that monitoring is required including LFT's and blood counts. There is a rare possibility of scarring of the liver and lung problems that can occur when taking methotrexate. Persistent nausea, loss of appetite, pale stools, dark urine, cough, and shortness of breath should be reported immediately. Patient advised to discontinue methotrexate treatment at least three months before attempting to become pregnant. I discussed the need for folate supplements while taking methotrexate. These supplements can decrease side effects during methotrexate treatment. The patient verbalized understanding of the proper use and possible adverse effects of methotrexate. All of the patient's questions and concerns were addressed. Cephalexin Counseling: I counseled the patient regarding use of cephalexin as an antibiotic for prophylactic and/or therapeutic purposes. Cephalexin (commonly prescribed under brand name Keflex) is a cephalosporin antibiotic which is active against numerous classes of bacteria, including most skin bacteria. Side effects may include nausea, diarrhea, gastrointestinal upset, rash, hives, yeast infections, and in rare cases, hepatitis, kidney disease, seizures, fever, confusion, neurologic symptoms, and others. Patients with severe allergies to penicillin medications are cautioned that there is about a 10% incidence of cross-reactivity with cephalosporins. When possible, patients with penicillin allergies should use alternatives to cephalosporins for antibiotic therapy. Rifampin Pregnancy And Lactation Text: This medication is Pregnancy Category C and it isn't know if it is safe during pregnancy. It is also excreted in breast milk and should not be used if you are breast feeding. Cephalexin Pregnancy And Lactation Text: This medication is Pregnancy Category B and considered safe during pregnancy. It is also excreted in breast milk but can be used safely for shorter doses. Nsaids Pregnancy And Lactation Text: These medications are considered safe up to 30 weeks gestation. It is excreted in breast milk. Sarecycline Counseling: Patient advised regarding possible photosensitivity and discoloration of the teeth, skin, lips, tongue and gums. Patient instructed to avoid sunlight, if possible. When exposed to sunlight, patients should wear protective clothing, sunglasses, and sunscreen. The patient was instructed to call the office immediately if the following severe adverse effects occur:  hearing changes, easy bruising/bleeding, severe headache, or vision changes. The patient verbalized understanding of the proper use and possible adverse effects of sarecycline. All of the patient's questions and concerns were addressed. Dapsone Pregnancy And Lactation Text: This medication is Pregnancy Category C and is not considered safe during pregnancy or breast feeding. Topical Sulfur Applications Pregnancy And Lactation Text: This medication is Pregnancy Category C and has an unknown safety profile during pregnancy. It is unknown if this topical medication is excreted in breast milk. Calcipotriene Pregnancy And Lactation Text: This medication has not been proven safe during pregnancy. It is unknown if this medication is excreted in breast milk. Benzoyl Peroxide Pregnancy And Lactation Text: This medication is Pregnancy Category C. It is unknown if benzoyl peroxide is excreted in breast milk. Topical Sulfur Applications Counseling: Topical Sulfur Counseling: Patient counseled that this medication may cause skin irritation or allergic reactions. In the event of skin irritation, the patient was advised to reduce the amount of the drug applied or use it less frequently. The patient verbalized understanding of the proper use and possible adverse effects of topical sulfur application. All of the patient's questions and concerns were addressed. Methotrexate Pregnancy And Lactation Text: This medication is Pregnancy Category X and is known to cause fetal harm. This medication is excreted in breast milk. Xelnataliez Pregnancy And Lactation Text: This medication is Pregnancy Category D and is not considered safe during pregnancy. The risk during breast feeding is also uncertain. Rituxan Counseling:  I discussed with the patient the risks of Rituxan infusions. Side effects can include infusion reactions, severe drug rashes including mucocutaneous reactions, reactivation of latent hepatitis and other infections and rarely progressive multifocal leukoencephalopathy. All of the patient's questions and concerns were addressed. Clindamycin Counseling: I counseled the patient regarding use of clindamycin as an antibiotic for prophylactic and/or therapeutic purposes. Clindamycin is active against numerous classes of bacteria, including skin bacteria. Side effects may include nausea, diarrhea, gastrointestinal upset, rash, hives, yeast infections, and in rare cases, colitis. Xolair Counseling:  Patient informed of potential adverse effects including but not limited to fever, muscle aches, rash and allergic reactions. The patient verbalized understanding of the proper use and possible adverse effects of Xolair. All of the patient's questions and concerns were addressed. Erivedge Counseling- I discussed with the patient the risks of Erivedge including but not limited to nausea, vomiting, diarrhea, constipation, weight loss, changes in the sense of taste, decreased appetite, muscle spasms, and hair loss. The patient verbalized understanding of the proper use and possible adverse effects of Erivedge. All of the patient's questions and concerns were addressed. Picato Counseling:  I discussed with the patient the risks of Picato including but not limited to erythema, scaling, itching, weeping, crusting, and pain. Wartpeel Counseling:  I discussed with the patient the risks of Wartpeel including but not limited to erythema, scaling, itching, weeping, crusting, and pain. 5-Fu Counseling: 5-Fluorouracil Counseling:  I discussed with the patient the risks of 5-fluorouracil including but not limited to erythema, scaling, itching, weeping, crusting, and pain. Tranexamic Acid Counseling:  Patient advised of the small risk of bleeding problems with tranexamic acid. They were also instructed to call if they developed any nausea, vomiting or diarrhea. All of the patient's questions and concerns were addressed. Odomzo Counseling- I discussed with the patient the risks of Odomzo including but not limited to nausea, vomiting, diarrhea, constipation, weight loss, changes in the sense of taste, decreased appetite, muscle spasms, and hair loss. The patient verbalized understanding of the proper use and possible adverse effects of Odomzo. All of the patient's questions and concerns were addressed. Prednisone Counseling:  I discussed with the patient the risks of prolonged use of prednisone including but not limited to weight gain, insomnia, osteoporosis, mood changes, diabetes, susceptibility to infection, glaucoma and high blood pressure. In cases where prednisone use is prolonged, patients should be monitored with blood pressure checks, serum glucose levels and an eye exam.  Additionally, the patient may need to be placed on GI prophylaxis, PCP prophylaxis, and calcium and vitamin D supplementation and/or a bisphosphonate. The patient verbalized understanding of the proper use and the possible adverse effects of prednisone. All of the patient's questions and concerns were addressed. Xolair Pregnancy And Lactation Text: This medication is Pregnancy Category B and is considered safe during pregnancy. This medication is excreted in breast milk. Rituxan Pregnancy And Lactation Text: This medication is Pregnancy Category C and it isn't know if it is safe during pregnancy. It is unknown if this medication is excreted in breast milk but similar antibodies are known to be excreted. Tetracycline Counseling: Patient counseled regarding possible photosensitivity and increased risk for sunburn. Patient instructed to avoid sunlight, if possible. When exposed to sunlight, patients should wear protective clothing, sunglasses, and sunscreen. The patient was instructed to call the office immediately if the following severe adverse effects occur:  hearing changes, easy bruising/bleeding, severe headache, or vision changes. The patient verbalized understanding of the proper use and possible adverse effects of tetracycline. All of the patient's questions and concerns were addressed. Patient understands to avoid pregnancy while on therapy due to potential birth defects. Tranexamic Acid Pregnancy And Lactation Text: It is unknown if this medication is safe during pregnancy or breast feeding. Clindamycin Pregnancy And Lactation Text: This medication can be used in pregnancy if certain situations. Clindamycin is also present in breast milk. Carac Pregnancy And Lactation Text: This medication is Pregnancy Category X and contraindicated in pregnancy and in women who may become pregnant. It is unknown if this medication is excreted in breast milk. Carac Counseling:  I discussed with the patient the risks of Carac including but not limited to erythema, scaling, itching, weeping, crusting, and pain. Finasteride Counseling:  I discussed with the patient the risks of use of finasteride including but not limited to decreased libido, decreased ejaculate volume, gynecomastia, and depression. Women should not handle medication. All of the patient's questions and concerns were addressed. Zyclara Counseling:  I discussed with the patient the risks of imiquimod including but not limited to erythema, scaling, itching, weeping, crusting, and pain. Patient understands that the inflammatory response to imiquimod is variable from person to person and was educated regarded proper titration schedule. If flu-like symptoms develop, patient knows to discontinue the medication and contact us. Drysol Counseling:  I discussed with the patient the risks of drysol/aluminum chloride including but not limited to skin rash, itching, irritation, burning. Protopic Counseling: Patient may experience a mild burning sensation during topical application. Protopic is not approved in children less than 3years of age. There have been case reports of hematologic and skin malignancies in patients using topical calcineurin inhibitors although causality is questionable. Doxycycline Counseling:  Patient counseled regarding possible photosensitivity and increased risk for sunburn. Patient instructed to avoid sunlight, if possible. When exposed to sunlight, patients should wear protective clothing, sunglasses, and sunscreen. The patient was instructed to call the office immediately if the following severe adverse effects occur:  hearing changes, easy bruising/bleeding, severe headache, or vision changes. The patient verbalized understanding of the proper use and possible adverse effects of doxycycline. All of the patient's questions and concerns were addressed. Cimzia Counseling:  I discussed with the patient the risks of Cimzia including but not limited to immunosuppression, allergic reactions and infections. The patient understands that monitoring is required including a PPD at baseline and must alert us or the primary physician if symptoms of infection or other concerning signs are noted. Siliq Counseling:  I discussed with the patient the risks of Siliq including but not limited to new or worsening depression, suicidal thoughts and behavior, immunosuppression, malignancy, posterior leukoencephalopathy syndrome, and serious infections. The patient understands that monitoring is required including a PPD at baseline and must alert us or the primary physician if symptoms of infection or other concerning signs are noted. There is also a special program designed to monitor depression which is required with Siliq. Valtrex Counseling: I discussed with the patient the risks of valacyclovir including but not limited to kidney damage, nausea, vomiting and severe allergy. The patient understands that if the infection seems to be worsening or is not improving, they are to call. Cimzia Pregnancy And Lactation Text: This medication crosses the placenta but can be considered safe in certain situations. Cimzia may be excreted in breast milk. Valtrex Pregnancy And Lactation Text: this medication is Pregnancy Category B and is considered safe during pregnancy. This medication is not directly found in breast milk but it's metabolite acyclovir is present. Doxycycline Pregnancy And Lactation Text: This medication is Pregnancy Category D and not consider safe during pregnancy. It is also excreted in breast milk but is considered safe for shorter treatment courses. Acitretin Pregnancy And Lactation Text: This medication is Pregnancy Category X and should not be given to women who are pregnant or may become pregnant in the future. This medication is excreted in breast milk. Otezla Pregnancy And Lactation Text: This medication is Pregnancy Category C and it isn't known if it is safe during pregnancy. It is unknown if it is excreted in breast milk. Finasteride Pregnancy And Lactation Text: This medication is absolutely contraindicated during pregnancy. It is unknown if it is excreted in breast milk. Drysol Pregnancy And Lactation Text: This medication is considered safe during pregnancy and breast feeding. Protopic Pregnancy And Lactation Text: This medication is Pregnancy Category C. It is unknown if this medication is excreted in breast milk when applied topically. Acitretin Counseling:  I discussed with the patient the risks of acitretin including but not limited to hair loss, dry lips/skin/eyes, liver damage, hyperlipidemia, depression/suicidal ideation, photosensitivity. Serious rare side effects can include but are not limited to pancreatitis, pseudotumor cerebri, bony changes, clot formation/stroke/heart attack. Patient understands that alcohol is contraindicated since it can result in liver toxicity and significantly prolong the elimination of the drug by many years. Otezla Counseling: [place] Counseling: The side effects of [place] were discussed with the patient, including but not limited to worsening or new depression, weight loss, diarrhea, nausea, upper respiratory tract infection, and headache. Patient instructed to call the office should any adverse effect occur. The patient verbalized understanding of the proper use and possible adverse effects of Otezla. All the patient's questions and concerns were addressed. Oxybutynin Counseling:  I discussed with the patient the risks of oxybutynin including but not limited to skin rash, drowsiness, dry mouth, difficulty urinating, and blurred vision. Fluconazole Counseling:  Patient counseled regarding adverse effects of fluconazole including but not limited to headache, diarrhea, nausea, upset stomach, liver function test abnormalities, taste disturbance, and stomach pain. There is a rare possibility of liver failure that can occur when taking fluconazole. The patient understands that monitoring of LFTs and kidney function test may be required, especially at baseline. The patient verbalized understanding of the proper use and possible adverse effects of fluconazole. All of the patient's questions and concerns were addressed. Rhofade Counseling: Rhofade is a topical medication which can decrease superficial blood flow where applied. Side effects are uncommon and include stinging, redness and allergic reactions. Gabapentin Counseling: I discussed with the patient the risks of gabapentin including but not limited to dizziness, somnolence, fatigue and ataxia. Elidel Counseling: Patient may experience a mild burning sensation during topical application. Elidel is not approved in children less than 3years of age. There have been case reports of hematologic and skin malignancies in patients using topical calcineurin inhibitors although causality is questionable. Cosentyx Counseling:  I discussed with the patient the risks of Cosentyx including but not limited to worsening of Crohn's disease, immunosuppression, allergic reactions and infections. The patient understands that monitoring is required including a PPD at baseline and must alert us or the primary physician if symptoms of infection or other concerning signs are noted. Cimetidine Counseling:  I discussed with the patient the risks of Cimetidine including but not limited to gynecomastia, headache, diarrhea, nausea, drowsiness, arrhythmias, pancreatitis, skin rashes, psychosis, bone marrow suppression and kidney toxicity. Simponi Counseling:  I discussed with the patient the risks of golimumab including but not limited to myelosuppression, immunosuppression, autoimmune hepatitis, demyelinating diseases, lymphoma, and serious infections. The patient understands that monitoring is required including a PPD at baseline and must alert us or the primary physician if symptoms of infection or other concerning signs are noted. Opioid Counseling: I discussed with the patient the potential side effects of opioids including but not limited to addiction, altered mental status, and depression. I stressed avoiding alcohol, benzodiazepines, muscle relaxants and sleep aids unless specifically okayed by a physician. The patient verbalized understanding of the proper use and possible adverse effects of opioids. All of the patient's questions and concerns were addressed. They were instructed to flush the remaining pills down the toilet if they did not need them for pain. Erythromycin Pregnancy And Lactation Text: This medication is Pregnancy Category B and is considered safe during pregnancy. It is also excreted in breast milk. Bexarotene Counseling:  I discussed with the patient the risks of bexarotene including but not limited to hair loss, dry lips/skin/eyes, liver abnormalities, hyperlipidemia, pancreatitis, depression/suicidal ideation, photosensitivity, drug rash/allergic reactions, hypothyroidism, anemia, leukopenia, infection, cataracts, and teratogenicity. Patient understands that they will need regular blood tests to check lipid profile, liver function tests, white blood cell count, thyroid function tests and pregnancy test if applicable. Erythromycin Counseling:  I discussed with the patient the risks of erythromycin including but not limited to GI upset, allergic reaction, drug rash, diarrhea, increase in liver enzymes, and yeast infections.

## 2022-03-24 NOTE — PHYSICAL THERAPY INITIAL EVALUATION ADULT - BALANCE TRAINING, PT EVAL
[facility] -     Would like fasting labs -  Lipid panel,  Cmp, a1c,   Microalbumin/cr ratio,  tsh  
GOAL: Pt will improve balance during (static/dynamic) (sitting/standing) activities by at least 1 balance grade within 3-4 weeks to assist with greater independence during functional mobility and ADL's.

## 2022-03-24 NOTE — PHYSICAL THERAPY INITIAL EVALUATION ADULT - PERTINENT HX OF CURRENT PROBLEM, REHAB EVAL
89 year old male with PMHx of HTN, CAD, CABG, AAA s/p repair, CKD III presents to the ED c/o increasing weakness. Pt with recent hospital admission on 3/18, d/c on 3/21. Pt was admitted for non STEMI and acute kidney injury. Pt states that he has been feeling weak over past couple of days, with decreased urination.

## 2022-03-24 NOTE — ED PROVIDER NOTE - MUSCULOSKELETAL, MLM
Spine appears normal, range of motion is not limited, no muscle or joint tenderness. 5/5 strength extremities

## 2022-03-24 NOTE — PHYSICAL THERAPY INITIAL EVALUATION ADULT - TRANSFER TRAINING, PT EVAL
GOAL: Pt will perform sit to/from stand transfers independently with/without AD as needed within 4weeks.

## 2022-03-24 NOTE — ED ADULT NURSE NOTE - CHIEF COMPLAINT QUOTE
Pt BIBA from [facility] with report of generalized weakness and some difficulty urinating. Per report, he fell 2 days ago and was not evaluated at hospital. Dressing to elbow and head noted by EMS upon arrival today.  Pt denies LOC or dizziness. Alert and able to answer questions appropriately.  Per pt, he was recently d/c from [facility]. Chart review shows initial c/o chest pain on 3/18. Denies CP at this time.

## 2022-03-24 NOTE — ED ADULT TRIAGE NOTE - WILL THE PATIENT ACCEPT THE PFIZER COVID-19 VACCINE IF ELIGIBLE AND IT IS AVAILABLE?
Not applicable Xolair Counseling:  Patient informed of potential adverse effects including but not limited to fever, muscle aches, rash and allergic reactions.  The patient verbalized understanding of the proper use and possible adverse effects of Xolair.  All of the patient's questions and concerns were addressed.

## 2022-03-24 NOTE — PATIENT PROFILE ADULT - FALL HARM RISK - HARM RISK INTERVENTIONS

## 2022-03-24 NOTE — ED PROVIDER NOTE - PROGRESS NOTE DETAILS
Migue Christopher  Called and discussed wellness at [facility]. Pt sent in to ED for increased weakness, difficulty ambulating and difficulty urinating. No history of recent falls. Migue Christopher  Nursing straight cathed patient. About 700cc of urine output. Migue QUINTANA for Dr. Christopher  Discussed with Dr. Ward. Suggested pt be admitted to the hospital to check US. If pt of hydronephrosis, pt will need Trevino catheter. Migue CO for Dr. Christopher  Pt updated on results of tests and admission. Discussed admission with Dr. Carson. Migue QUINTANA for Dr. Christopher  Discussed with Dr. Ward. Suggested pt be admitted to the hospital and to check US.   If pt of hydronephrosis, pt will need Trevino catheter.

## 2022-03-24 NOTE — ED ADULT NURSE REASSESSMENT NOTE - NS ED NURSE REASSESS COMMENT FT1
Pt presents to er from [facility], as per EMS, pt was standing and felt weak feeling like the mirror was curved when he was looking at it, pt with skin abrasion to right forearm (unknown head strike) pt alert to person, place, denies chest pain, sob, pt retaining 638cc's of urine on bladder scan catheterized utilizing sterile technique as per [facility] policy and received 700cc's output, pt undressed and placed into yellow gown with red socks, stretcher in lowest position with call bell at bedside, will continue to monitor.

## 2022-03-24 NOTE — PHYSICAL THERAPY INITIAL EVALUATION ADULT - GENERAL OBSERVATIONS, REHAB EVAL
Pt seen for 45min PT Eval in ED. Pt s/p fall, weakness, urinary retention, DESIRAE. Pt rec'd semi supine in bed in NAD.

## 2022-03-25 ENCOUNTER — TRANSCRIPTION ENCOUNTER (OUTPATIENT)
Age: 87
End: 2022-03-25

## 2022-03-25 DIAGNOSIS — R33.9 RETENTION OF URINE, UNSPECIFIED: ICD-10-CM

## 2022-03-25 DIAGNOSIS — N17.9 ACUTE KIDNEY FAILURE, UNSPECIFIED: ICD-10-CM

## 2022-03-25 LAB
ALBUMIN SERPL ELPH-MCNC: 2.8 G/DL — LOW (ref 3.3–5)
ALP SERPL-CCNC: 104 U/L — SIGNIFICANT CHANGE UP (ref 40–120)
ALT FLD-CCNC: 25 U/L — SIGNIFICANT CHANGE UP (ref 12–78)
ANION GAP SERPL CALC-SCNC: 8 MMOL/L — SIGNIFICANT CHANGE UP (ref 5–17)
APTT BLD: 31.4 SEC — SIGNIFICANT CHANGE UP (ref 27.5–35.5)
AST SERPL-CCNC: 19 U/L — SIGNIFICANT CHANGE UP (ref 15–37)
BILIRUB SERPL-MCNC: 0.7 MG/DL — SIGNIFICANT CHANGE UP (ref 0.2–1.2)
BUN SERPL-MCNC: 49 MG/DL — HIGH (ref 7–23)
CALCIUM SERPL-MCNC: 8.4 MG/DL — LOW (ref 8.5–10.1)
CHLORIDE SERPL-SCNC: 106 MMOL/L — SIGNIFICANT CHANGE UP (ref 96–108)
CO2 SERPL-SCNC: 21 MMOL/L — LOW (ref 22–31)
CREAT SERPL-MCNC: 2.31 MG/DL — HIGH (ref 0.5–1.3)
CULTURE RESULTS: NO GROWTH — SIGNIFICANT CHANGE UP
EGFR: 26 ML/MIN/1.73M2 — LOW
GLUCOSE SERPL-MCNC: 88 MG/DL — SIGNIFICANT CHANGE UP (ref 70–99)
HCT VFR BLD CALC: 26.8 % — LOW (ref 39–50)
HGB BLD-MCNC: 8.4 G/DL — LOW (ref 13–17)
INR BLD: 1.26 RATIO — HIGH (ref 0.88–1.16)
MCHC RBC-ENTMCNC: 30.9 PG — SIGNIFICANT CHANGE UP (ref 27–34)
MCHC RBC-ENTMCNC: 31.3 GM/DL — LOW (ref 32–36)
MCV RBC AUTO: 98.5 FL — SIGNIFICANT CHANGE UP (ref 80–100)
PLATELET # BLD AUTO: 190 K/UL — SIGNIFICANT CHANGE UP (ref 150–400)
POTASSIUM SERPL-MCNC: 4.9 MMOL/L — SIGNIFICANT CHANGE UP (ref 3.5–5.3)
POTASSIUM SERPL-SCNC: 4.9 MMOL/L — SIGNIFICANT CHANGE UP (ref 3.5–5.3)
PROT SERPL-MCNC: 5.8 GM/DL — LOW (ref 6–8.3)
PROTHROM AB SERPL-ACNC: 14.6 SEC — HIGH (ref 10.5–13.4)
RBC # BLD: 2.72 M/UL — LOW (ref 4.2–5.8)
RBC # FLD: 14.4 % — SIGNIFICANT CHANGE UP (ref 10.3–14.5)
SODIUM SERPL-SCNC: 135 MMOL/L — SIGNIFICANT CHANGE UP (ref 135–145)
SPECIMEN SOURCE: SIGNIFICANT CHANGE UP
WBC # BLD: 7.54 K/UL — SIGNIFICANT CHANGE UP (ref 3.8–10.5)
WBC # FLD AUTO: 7.54 K/UL — SIGNIFICANT CHANGE UP (ref 3.8–10.5)

## 2022-03-25 PROCEDURE — 97116 GAIT TRAINING THERAPY: CPT | Mod: GP

## 2022-03-25 PROCEDURE — 97530 THERAPEUTIC ACTIVITIES: CPT | Mod: GP

## 2022-03-25 PROCEDURE — 80053 COMPREHEN METABOLIC PANEL: CPT

## 2022-03-25 PROCEDURE — 85027 COMPLETE CBC AUTOMATED: CPT

## 2022-03-25 PROCEDURE — 84300 ASSAY OF URINE SODIUM: CPT

## 2022-03-25 PROCEDURE — 85730 THROMBOPLASTIN TIME PARTIAL: CPT

## 2022-03-25 PROCEDURE — 82570 ASSAY OF URINE CREATININE: CPT

## 2022-03-25 PROCEDURE — 36415 COLL VENOUS BLD VENIPUNCTURE: CPT

## 2022-03-25 PROCEDURE — 99233 SBSQ HOSP IP/OBS HIGH 50: CPT | Mod: GC

## 2022-03-25 RX ORDER — SODIUM CHLORIDE 9 MG/ML
500 INJECTION INTRAMUSCULAR; INTRAVENOUS; SUBCUTANEOUS ONCE
Refills: 0 | Status: COMPLETED | OUTPATIENT
Start: 2022-03-25 | End: 2022-03-25

## 2022-03-25 RX ORDER — RAMIPRIL 5 MG
1 CAPSULE ORAL
Qty: 0 | Refills: 0 | DISCHARGE

## 2022-03-25 RX ORDER — SIMVASTATIN 20 MG/1
1 TABLET, FILM COATED ORAL
Qty: 0 | Refills: 0 | DISCHARGE

## 2022-03-25 RX ADMIN — Medication 250 MILLIGRAM(S): at 10:06

## 2022-03-25 RX ADMIN — Medication 5 MILLIGRAM(S): at 22:01

## 2022-03-25 RX ADMIN — Medication 100 MILLIGRAM(S): at 10:05

## 2022-03-25 RX ADMIN — SODIUM CHLORIDE 1000 MILLILITER(S): 9 INJECTION INTRAMUSCULAR; INTRAVENOUS; SUBCUTANEOUS at 12:30

## 2022-03-25 RX ADMIN — FINASTERIDE 5 MILLIGRAM(S): 5 TABLET, FILM COATED ORAL at 10:04

## 2022-03-25 RX ADMIN — TAMSULOSIN HYDROCHLORIDE 0.8 MILLIGRAM(S): 0.4 CAPSULE ORAL at 21:58

## 2022-03-25 RX ADMIN — RANOLAZINE 500 MILLIGRAM(S): 500 TABLET, FILM COATED, EXTENDED RELEASE ORAL at 21:58

## 2022-03-25 RX ADMIN — Medication 325 MILLIGRAM(S): at 10:05

## 2022-03-25 RX ADMIN — RANOLAZINE 500 MILLIGRAM(S): 500 TABLET, FILM COATED, EXTENDED RELEASE ORAL at 10:04

## 2022-03-25 RX ADMIN — Medication 50 MILLIGRAM(S): at 10:05

## 2022-03-25 RX ADMIN — POLYETHYLENE GLYCOL 3350 17 GRAM(S): 17 POWDER, FOR SOLUTION ORAL at 10:05

## 2022-03-25 RX ADMIN — CLOPIDOGREL BISULFATE 75 MILLIGRAM(S): 75 TABLET, FILM COATED ORAL at 10:04

## 2022-03-25 RX ADMIN — HEPARIN SODIUM 5000 UNIT(S): 5000 INJECTION INTRAVENOUS; SUBCUTANEOUS at 22:01

## 2022-03-25 RX ADMIN — Medication 100 MILLIGRAM(S): at 21:59

## 2022-03-25 RX ADMIN — HEPARIN SODIUM 5000 UNIT(S): 5000 INJECTION INTRAVENOUS; SUBCUTANEOUS at 10:06

## 2022-03-25 RX ADMIN — Medication 250 MILLIGRAM(S): at 21:58

## 2022-03-25 RX ADMIN — Medication 81 MILLIGRAM(S): at 10:04

## 2022-03-25 RX ADMIN — ATORVASTATIN CALCIUM 80 MILLIGRAM(S): 80 TABLET, FILM COATED ORAL at 21:58

## 2022-03-25 NOTE — DIETITIAN INITIAL EVALUATION ADULT. - PHYSCIAL ASSESSMENT
Tacho 15  wound, skin tear  PI st 2 coccyx  BM per pt "not for several days", none documented emaciated

## 2022-03-25 NOTE — DIETITIAN INITIAL EVALUATION ADULT. - ORAL INTAKE PTA/DIET HISTORY
Pt reports decreased appetite/po intake PTA x "a couple months" , doesn't like food at [facility], served low sodium/chol diet there, sometimes drinks Strawberry Ensure.

## 2022-03-25 NOTE — DISCHARGE NOTE PROVIDER - NSDCMRMEDTOKEN_GEN_ALL_CORE_FT
ascorbic acid 500 mg oral tablet: 1 tab(s) orally once a day  aspirin 81 mg oral delayed release tablet: 1 tab(s) orally once a day  bisacodyl 5 mg oral delayed release tablet: 1 tab(s) orally every 12 hours  clopidogrel 75 mg oral tablet: 1 tab(s) orally once a day  diazePAM 5 mg oral tablet: 1 tab(s) orally once a day (at bedtime), As needed, home med MDD:1  ferrous sulfate 325 mg (65 mg elemental iron) oral tablet: 1 tab(s) orally once a day  finasteride 5 mg oral tablet: 1 tab(s) orally once a day  furosemide 20 mg oral tablet: 1 tab(s) orally once a day (in the evening)  furosemide 40 mg oral tablet: 1 tab(s) orally once a day (in the morning)  melatonin 3 mg oral tablet: 1 tab(s) orally once a day (at bedtime), As needed, Insomnia  metoprolol succinate 50 mg oral tablet, extended release: 1 tab(s) orally once a day  pantoprazole 40 mg oral delayed release tablet: 1 tab(s) orally once a day (before a meal)  polyethylene glycol 3350 oral powder for reconstitution: 17 gram(s) orally once a day  ranolazine 500 mg oral tablet, extended release: 1 tab(s) orally 2 times a day  rosuvastatin 20 mg oral tablet: 1 tab(s) orally once a day  sulfamethoxazole-trimethoprim 400 mg-80 mg oral tablet: 1 tab(s) orally 2 times a day  ***Course Not Complete***  terazosin 5 mg oral capsule: 1 cap(s) orally once a day (at bedtime)   ascorbic acid 500 mg oral tablet: 1 tab(s) orally once a day  aspirin 81 mg oral delayed release tablet: 1 tab(s) orally once a day  bisacodyl 5 mg oral delayed release tablet: 1 tab(s) orally every 12 hours  clopidogrel 75 mg oral tablet: 1 tab(s) orally once a day  diazePAM 5 mg oral tablet: 1 tab(s) orally once a day (at bedtime), As needed, home med MDD:1  doxycycline monohydrate 100 mg oral capsule: 1 cap(s) orally every 12 hours for the next three days.   ferrous sulfate 325 mg (65 mg elemental iron) oral tablet: 1 tab(s) orally once a day  finasteride 5 mg oral tablet: 1 tab(s) orally once a day  furosemide 20 mg oral tablet: 1 tab(s) orally once a day (in the evening)  furosemide 40 mg oral tablet: 1 tab(s) orally once a day (in the morning)  melatonin 3 mg oral tablet: 1 tab(s) orally once a day (at bedtime), As needed, Insomnia  metoprolol succinate 50 mg oral tablet, extended release: 1 tab(s) orally once a day  pantoprazole 40 mg oral delayed release tablet: 1 tab(s) orally once a day (before a meal)  polyethylene glycol 3350 oral powder for reconstitution: 17 gram(s) orally once a day  ranolazine 500 mg oral tablet, extended release: 1 tab(s) orally 2 times a day  rosuvastatin 20 mg oral tablet: 1 tab(s) orally once a day  terazosin 5 mg oral capsule: 1 cap(s) orally once a day (at bedtime)

## 2022-03-25 NOTE — CONSULT NOTE ADULT - SUBJECTIVE AND OBJECTIVE BOX
89 year old male with PMHx of HTN, CAD, CABG, AAA s/p repair, CKD III presents to the ED c/o increasing weakness. Pt with recent hospital admission on 3/18, d/c on 3/22 w angina and CHF.    Pt states that he has been feeling weak over past couple of days, with decreased urination.  Nursing note from [facility] states that pt has not been feeling well and has had difficulty ambulating. Bladder scan revealed urinary retention, 700 ml urine after straight cath. Renal uSS revealed not hydro but pt was unable to void    currrently has Trevino in place and good UOP       Pat  PAST MEDICAL & SURGICAL HISTORY:  HTN (hypertension)    AAA (abdominal aortic aneurysm)    S/P CABG (coronary artery bypass graft)    History of right hip replacement    CAD (coronary artery disease)  s/p stent placed    S/P left inguinal hernia repair    S/P AAA repair  2020      Home Medications:  clopidogrel 75 mg oral tablet: 1 tab(s) orally once a day (24 Mar 2022 11:36)  furosemide 20 mg oral tablet: 1 tab(s) orally once a day (in the evening) (24 Mar 2022 11:36)  furosemide 40 mg oral tablet: 1 tab(s) orally once a day (in the morning) (24 Mar 2022 11:36)  ramipril 2.5 mg oral capsule: 1 cap(s) orally once a day (24 Mar 2022 11:36)  rosuvastatin 20 mg oral tablet: 1 tab(s) orally once a day (24 Mar 2022 11:36)      MEDICATIONS  (STANDING):  aspirin enteric coated 81 milliGRAM(s) Oral daily  atorvastatin 80 milliGRAM(s) Oral at bedtime  cefuroxime   Tablet 250 milliGRAM(s) Oral every 12 hours  clopidogrel Tablet 75 milliGRAM(s) Oral daily  doxycycline hyclate Capsule 100 milliGRAM(s) Oral every 12 hours  ferrous    sulfate 325 milliGRAM(s) Oral daily  finasteride 5 milliGRAM(s) Oral daily  heparin   Injectable 5000 Unit(s) SubCutaneous every 12 hours  metoprolol succinate ER 50 milliGRAM(s) Oral daily  pantoprazole    Tablet 40 milliGRAM(s) Oral before breakfast  polyethylene glycol 3350 17 Gram(s) Oral daily  ranolazine 500 milliGRAM(s) Oral two times a day  tamsulosin 0.8 milliGRAM(s) Oral at bedtime      Allergies    Broccoli (Unknown)  penicillin (Vomiting; Nausea)    Intolerances        SOCIAL HISTORY:  Denies ETOh,Smoking,     FAMILY HISTORY:      REVIEW OF SYSTEMS:    CONSTITUTIONAL: No weakness, fevers or chills  EYES/ENT: No visual changes;  No vertigo or throat pain   NECK: No pain or stiffness  RESPIRATORY: No cough, wheezing, hemoptysis; No shortness of breath  CARDIOVASCULAR: No chest pain or palpitations  GASTROINTESTINAL: No abdominal or epigastric pain. No nausea, vomiting, or hematemesis; No diarrhea or constipation. No melena or hematochezia.  GENITOURINARY: No dysuria, frequency or hematuria  NEUROLOGICAL: No numbness or weakness  SKIN: No itching, burning, rashes, or lesions   All other review of systems is negative unless indicated above.    VITAL:    Vital Signs Last 24 Hrs  T(C): 36.4 (24 Mar 2022 23:15), Max: 36.6 (24 Mar 2022 09:01)  T(F): 97.5 (24 Mar 2022 23:15), Max: 97.8 (24 Mar 2022 09:01)  HR: 59 (24 Mar 2022 23:15) (58 - 67)  BP: 118/52 (24 Mar 2022 23:15) (118/52 - 137/54)  BP(mean): 79 (24 Mar 2022 13:49) (79 - 79)  RR: 19 (24 Mar 2022 23:15) (18 - 20)  SpO2: 97% (24 Mar 2022 23:15) (95% - 97%)    I&O's Detail    24 Mar 2022 07:01  -  24 Mar 2022 23:49  --------------------------------------------------------  IN:  Total IN: 0 mL    OUT:    Indwelling Catheter - Urethral (mL): 800 mL  Total OUT: 800 mL    Total NET: -800 mL      PHYSICAL EXAM:    Constitutional: frail , and very [place]   HEENT: , EOMI,  MMM  Neck: No LAD, No JVD  Respiratory: CTAB  Cardiovascular: S1 and S2  Gastrointestinal: BS+, soft, NT/ND  Extremities: No peripheral edema  Neurological: A/O x 3, no focal deficits  :  Trevino + yellow urine   Skin: No rashes  Access: Not applicable    LABS:                                   8.2    7.23  )-----------( 202      ( 24 Mar 2022 09:52 )             25.3         132    |  102    |  56     ----------------------------<  96        24 Mar 2022 09:52  5.0     |  21     |  2.86     Ca    8.3        24 Mar 2022 09:52    Phos  4.0       24 Mar 2022 09:52    Mg     2.7       24 Mar 2022 09:52    TPro  6.4    /  Alb  2.9    /  TBili  0.5    /        24 Mar 2022 09:52  DBili  x      /  AST  22     /  ALT  22     /  AlkPhos  106          TPro  6.4  /  Alb  2.9<L>  /  TBili  0.5  /  DBili  x   /  AST  22  /  ALT  22  /  AlkPhos  106  03-24      Urine Studies:  Urinalysis Basic - ( 24 Mar 2022 09:52 )    Color: Yellow / Appearance: Clear / S.015 / pH: x  Gluc: x / Ketone: Negative  / Bili: Negative / Urobili: Negative   Blood: x / Protein: Negative / Nitrite: Negative   Leuk Esterase: Negative / RBC: x / WBC x   Sq Epi: x / Non Sq Epi: x / Bacteria: x        RADIOLOGY & ADDITIONAL STUDIES:                  
 89 year old male with PMHx of HTN, CAD, CABG, AAA s/p repair, CKD III presents to the ED c/o increasing weakness. Pt with recent hospital admission on 3/18, d/c on 3/21. Pt was admitted for non STEMI and acute kidney injury. Pt states that he has been feeling weak over past couple of days, with decreased urination. Pt states that he was standing in front of a mirror and saw that he was curved and unable to stand up straight. Pt normally ambulates with a walker. Nursing note from [facility] states that pt has not been feeding well and has had difficulty ambulating. Bladder scan revealed urinary retention, 700 ml urine after straight cath. Pt now sitting in chair with waite in bladder. Pt is hard of hearing and is comfortable with waite in place.    Patient History:    Past Medical, Past Surgical, and Family History:  PAST MEDICAL HISTORY:  AAA (abdominal aortic aneurysm)     HTN (hypertension).     PAST SURGICAL HISTORY:  CAD (coronary artery disease) s/p stent placed    History of right hip replacement     S/P AAA repair 7-    S/P CABG (coronary artery bypass graft)     S/P left inguinal hernia repair.    Allergies and Intolerances:        Allergies:  	penicillin: Drug, Vomiting, Nausea  	Broccoli: Food, Unknown    Home Medications:   * Patient Currently Takes Medications as of 24-Mar-2022 11:36 documented in Structured Notes  · 	sulfamethoxazole-trimethoprim 400 mg-80 mg oral tablet: Last Dose Taken:  , 1 tab(s) orally 2 times a day  	***Course Not Complete***  · 	ranolazine 500 mg oral tablet, extended release: Last Dose Taken:  , 1 tab(s) orally 2 times a day  · 	clopidogrel 75 mg oral tablet: Last Dose Taken:  , 1 tab(s) orally once a day  · 	diazePAM 5 mg oral tablet: Last Dose Taken:  , 1 tab(s) orally once a day (at bedtime), As needed, home med MDD:1  · 	ascorbic acid 500 mg oral tablet: Last Dose Taken:  , 1 tab(s) orally once a day  · 	pantoprazole 40 mg oral delayed release tablet: Last Dose Taken:  , 1 tab(s) orally once a day (before a meal)  · 	polyethylene glycol 3350 oral powder for reconstitution: Last Dose Taken:  , 17 gram(s) orally once a day  · 	bisacodyl 5 mg oral delayed release tablet: Last Dose Taken:  , 1 tab(s) orally every 12 hours  · 	ferrous sulfate 325 mg (65 mg elemental iron) oral tablet: Last Dose Taken:  , 1 tab(s) orally once a day  · 	metoprolol succinate 50 mg oral tablet, extended release: Last Dose Taken:  , 1 tab(s) orally once a day  · 	melatonin 3 mg oral tablet: Last Dose Taken:  , 1 tab(s) orally once a day (at bedtime), As needed, Insomnia  · 	aspirin 81 mg oral delayed release tablet: Last Dose Taken:  , 1 tab(s) orally once a day  · 	terazosin 5 mg oral capsule: Last Dose Taken:  , 1 cap(s) orally once a day (at bedtime)  · 	finasteride 5 mg oral tablet: Last Dose Taken:  , 1 tab(s) orally once a day  · 	furosemide 40 mg oral tablet: Last Dose Taken:  , 1 tab(s) orally once a day (in the morning)  · 	furosemide 20 mg oral tablet: Last Dose Taken:  , 1 tab(s) orally once a day (in the evening)  · 	ramipril 2.5 mg oral capsule: Last Dose Taken:  , 1 cap(s) orally once a day  · 	rosuvastatin 20 mg oral tablet: Last Dose Taken:  , 1 tab(s) orally once a day    .     Social History:  Social History (marital status, living situation, occupation, tobacco use, alcohol and drug use, and sexual history): See above    Other Review of Systems: All other review of systems negative, except as noted in HPI           Physical Exam:   88 y/o male resting in chair in NAD    Constitutional: NAD, awake and alert  Neurological: AAO x 3, no focal deficits  Neck: Soft and supple, No LAD, No JVD  Respiratory: Breath sounds are clear bilaterally, No wheezing, rales or rhonchi  Cardiovascular: S1 and S2, regular rate and rhythm; no Murmurs, gallops or rubs  ABD: Bowel Sounds present, soft, nontender, nondistended, no guarding, no rebound tenderness         No Bladder palp  Back: No CVA tenderness  :  Waite in place, urine yellow   Extremities: No peripheral edema  Skin: No rashes    Labs:  3/25  WBC- 7.5  Hct - 26.8  Cr- 2.31    3/19/22  Kidney sono - No hydronephrosis present    3/18/22  Urine Cx- No Growth

## 2022-03-25 NOTE — DISCHARGE NOTE PROVIDER - HOSPITAL COURSE
89M with PMHx HTN, CAD, CABG, AAA s/p repair, CKD III  presented to [facility] ED on 3/24 for weakness. In the ED, pt's vitals were stable. CBC was unremarkable. BMP was significant for Cr 2.86 (baseline 1.5-1.6). Pt was also found to have difficulty urinating during US Kidney/Bladder. US Kidney/Bladder showed no evidence of hydronephrosis. Pt was diagnosed with post-obstructive urinary retention and admitted for further management. Pt had indwelling waite placed which helped relieve pt's urinary symptoms. Pt felt much improved. Urology was consulted who believed pt's post-obstructive urinary retention to be secondary to BPH. Today, pt's vitals are stable. Pt is medically optimized for discharge.    Subjective  Pt seen and evalauted at bedside this AM.      REVIEW OF SYSTEMS:  CONSTITUTIONAL: No weakness, fevers or chills  RESPIRATORY: No cough, wheezing, hemoptysis; No shortness of breath  CARDIOVASCULAR: No chest pain or palpitations  GASTROINTESTINAL: No abdominal or epigastric pain. No nausea, vomiting, or hematemesis; No diarrhea or constipation. No melena or hematochezia.  GENITOURINARY: No dysuria, frequency or hematuria      PHYSICAL EXAM:  Vital Signs Last 24 Hrs  T(C): 36.4 (25 Mar 2022 14:52), Max: 36.4 (24 Mar 2022 23:15)  T(F): 97.5 (25 Mar 2022 14:52), Max: 97.5 (24 Mar 2022 23:15)  HR: 59 (25 Mar 2022 14:52) (55 - 59)  BP: 129/47 (25 Mar 2022 14:52) (116/46 - 129/47)  BP(mean): --  RR: 19 (25 Mar 2022 14:52) (19 - 19)  SpO2: 100% (25 Mar 2022 14:52) (97% - 100%)    Constitutional: Pt lying in bed, awake and alert, NAD  Respiratory: CTABL, No wheezing, rales or rhonchi  Cardiovascular: S1S2+, RRR, no M/G/R  Gastrointestinal: BS+, soft, NT/ND, no guarding, no rebound  : waite in place   89M with PMHx HTN, CAD, CABG, AAA s/p repair, CKD III  presented to [facility] ED on 3/24 for weakness. In the ED, pt's vitals were stable. CBC was unremarkable. BMP was significant for Cr 2.86 (baseline 1.5-1.6). Pt was also found to have difficulty urinating during US Kidney/Bladder. US Kidney/Bladder showed no evidence of hydronephrosis. Pt was diagnosed with post-obstructive urinary retention and admitted for further management. Pt had indwelling waite placed which helped relieve pt's urinary symptoms. Pt felt much improved. Urology was consulted who believed pt's post-obstructive urinary retention to be secondary to BPH. Today, pt's vitals are stable, patient seen and examined at bedside, creatine continuing to improve.  Pt is medically optimized for discharge with Waite catheter in place. May attempt trial of void and patient to follow up with urology and PCP.  Doxycycline 100 mg BID for the next 3 days will need to be continued for scalp wound.       REVIEW OF SYSTEMS:  CONSTITUTIONAL: No weakness, fevers or chills  RESPIRATORY: No cough, wheezing, hemoptysis; No shortness of breath  CARDIOVASCULAR: No chest pain or palpitations  GASTROINTESTINAL: No abdominal or epigastric pain. No nausea, vomiting, or hematemesis; No diarrhea or constipation. No melena or hematochezia.  GENITOURINARY: No dysuria, frequency or hematuria      PHYSICAL EXAM:  Vital Signs Last 24 Hrs  T(C): 36.4 (26 Mar 2022 07:34), Max: 36.4 (25 Mar 2022 14:52)  T(F): 97.6 (26 Mar 2022 07:34), Max: 97.6 (25 Mar 2022 23:30)  HR: 61 (26 Mar 2022 07:34) (59 - 61)  BP: 125/50 (26 Mar 2022 07:34) (119/49 - 129/47)  RR: 18 (26 Mar 2022 07:34) (18 - 19)  SpO2: 95% (26 Mar 2022 07:34) (95% - 100%)  Constitutional: Pt lying in bed, awake and alert, NAD  Respiratory: CTABL, No wheezing, rales or rhonchi  Cardiovascular: S1S2+, RRR, no M/G/R  Gastrointestinal: BS+, soft, NT/ND, no guarding, no rebound  : waite in place

## 2022-03-25 NOTE — DISCHARGE NOTE PROVIDER - CARE PROVIDER_API CALL
Boxer, Jonathan A  INTERNAL MEDICINE  [address] [place] [address]  Phone: [phone]  Fax: [phone]  Follow Up Time:

## 2022-03-25 NOTE — DIETITIAN INITIAL EVALUATION ADULT. - ADD RECOMMEND
1. Liberalize diet to Regular to optimize po/nutrient intake. 2. Add Ensure Enlive BID, chocolate pudding 3. MVI w/ minerals daily to ensure 100% RDA met 4. Add Vit C 500 mg BID, add Zinc Sulfate 220 mg x 10 days to promote wound healing. 5. Consider adding thiamine 100 mg daily 2/2 poor PO intake/ malnutrition 6. Monitor bowel movements, if no BM for >3 days, consider implementing bowel regimen. 7. RDN will continue to monitor PO intake, labs, hydration, and wt prn.

## 2022-03-25 NOTE — DIETITIAN INITIAL EVALUATION ADULT. - MALNUTRITION
Pt meets criteria for severe malnutrition in context of chronic illness r/t inability to consume sufficient energy/protein 2/2 PI stage 2 or > AEB mod/severe muscle/fat wasting, >5% wt loss in 3 mos, <75% ENN x 1 mo Pt meets criteria for severe malnutrition in context of chronic illness

## 2022-03-25 NOTE — DIETITIAN INITIAL EVALUATION ADULT. - OTHER INFO
89 year old male with PMHx of HTN, CAD, CABG, AAA s/p repair, CKD III presents to the ED c/o increasing weakness. Pt with recent hospital admission on 3/18, d/c on 3/21. Pt was admitted for non STEMI and acute kidney injury. Pt states that he has been feeling weak over past couple of days, with decreased urination. Pt states that he was standing in front of a mirror and saw that he was curved and unable to stand up straight. Pt normally ambulates with a walker. Nursing note from [facility] states that pt has not been feeding well and has had difficulty ambulating. Bladder scan revealed urinary retention, 700 ml urine after straight cath. I found him without Trevino. Was seen by nephrology who recommended admission.     Pt reports "ok" appetite, oatmeal breakfast today, omelette no toast lunch. Reports valium calms him and helps increase appetite, however, only wants to take it once/day. Previous RD note 3/19/22 dx severe PCM, wt: 151#, UBW: 155#. RD obtained bedscale wt today: 147#, 5% in 3 mos clin sig.  NFPE reveals mod/severe muscle/fat wasting.  Pt refuses Gelatein, will add Ensure Strawberry, chocolate pudding, pt willing.  See below recommendations.

## 2022-03-25 NOTE — DISCHARGE NOTE PROVIDER - NSDCCPCAREPLAN_GEN_ALL_CORE_FT
PRINCIPAL DISCHARGE DIAGNOSIS  Diagnosis: DESIRAE (acute kidney injury)  Assessment and Plan of Treatment: You were noted to have a temporary insult to your kidney function either at the time that you arrived at the hospital or during your stay here. We have monitored your kidney function with blood work during your time here and you are at a level that no longer requires continued hospital level care, but we do recommend that you follow up to continually have your kidney function checked. You can follow up with your primary care doctor and Urologist.      SECONDARY DISCHARGE DIAGNOSES  Diagnosis: Urinary retention  Assessment and Plan of Treatment: You have an indwelling Trevino catheter to alleviate your bladder of urine. Please continue to take your medications as prescribed. In [place] you will have trials of voiding. You will need to follow up with your primary care doctor for ongoing management and optimazation of BPH medications.    Diagnosis: Wound cellulitis  Assessment and Plan of Treatment: Scalp wound with cellulitis, present upon admission. Patient stated that he has had this scalp wound for a couple weeks now. on last admission, discharrged on BActrim SS 1 tab BID for 7 days which we switched to Ceftin to complete treatment. You will take doxyxycline 100mg two times a day for the next three days.

## 2022-03-25 NOTE — DIETITIAN INITIAL EVALUATION ADULT. - PERTINENT MEDS FT
MEDICATIONS  (STANDING):  aspirin enteric coated 81 milliGRAM(s) Oral daily  atorvastatin 80 milliGRAM(s) Oral at bedtime  cefuroxime   Tablet 250 milliGRAM(s) Oral every 12 hours  clopidogrel Tablet 75 milliGRAM(s) Oral daily  doxycycline hyclate Capsule 100 milliGRAM(s) Oral every 12 hours  ferrous    sulfate 325 milliGRAM(s) Oral daily  finasteride 5 milliGRAM(s) Oral daily  heparin   Injectable 5000 Unit(s) SubCutaneous every 12 hours  metoprolol succinate ER 50 milliGRAM(s) Oral daily  pantoprazole    Tablet 40 milliGRAM(s) Oral before breakfast  polyethylene glycol 3350 17 Gram(s) Oral daily  ranolazine 500 milliGRAM(s) Oral two times a day  tamsulosin 0.8 milliGRAM(s) Oral at bedtime    MEDICATIONS  (PRN):  acetaminophen     Tablet .. 650 milliGRAM(s) Oral every 6 hours PRN Mild Pain (1 - 3)  bisacodyl 5 milliGRAM(s) Oral every 12 hours PRN Constipation  diazepam    Tablet 5 milliGRAM(s) Oral at bedtime PRN insomnia anxiety  ondansetron Injectable 4 milliGRAM(s) IV Push every 6 hours PRN Nausea and/or Vomiting

## 2022-03-25 NOTE — CONSULT NOTE ADULT - ASSESSMENT
88 yo male with hx fo HTN, CABG, AAA, CKD 3 Cr 1.4- 1.6 previous admission now admitted with decreased urination w evidence of bladder retention       DESIRAE on CKD 3 - Obstrucitve Uropathy   - monitor Cr response to waite insertion    - trend labs   - Urology evaluation       d/w RN earlier today     Thank you for the courtesy of this consult. We will follow this patient with you.   Management is subject to change if new information becomes available or patient condition changes.  
A/P:  86 y/o male with urinary retention            Continue Flomax and Finasteride           Continue Trevino for now          Monitor Creat level          Above discussed with Dr. Srivastava

## 2022-03-26 ENCOUNTER — TRANSCRIPTION ENCOUNTER (OUTPATIENT)
Age: 87
End: 2022-03-26

## 2022-03-26 VITALS
TEMPERATURE: 98 F | SYSTOLIC BLOOD PRESSURE: 131 MMHG | RESPIRATION RATE: 18 BRPM | DIASTOLIC BLOOD PRESSURE: 50 MMHG | OXYGEN SATURATION: 95 % | HEART RATE: 57 BPM

## 2022-03-26 LAB
ALBUMIN SERPL ELPH-MCNC: 2.5 G/DL — LOW (ref 3.3–5)
ALP SERPL-CCNC: 103 U/L — SIGNIFICANT CHANGE UP (ref 40–120)
ALT FLD-CCNC: 22 U/L — SIGNIFICANT CHANGE UP (ref 12–78)
ANION GAP SERPL CALC-SCNC: 7 MMOL/L — SIGNIFICANT CHANGE UP (ref 5–17)
APTT BLD: 30.2 SEC — SIGNIFICANT CHANGE UP (ref 27.5–35.5)
AST SERPL-CCNC: 18 U/L — SIGNIFICANT CHANGE UP (ref 15–37)
BILIRUB SERPL-MCNC: 0.7 MG/DL — SIGNIFICANT CHANGE UP (ref 0.2–1.2)
BUN SERPL-MCNC: 42 MG/DL — HIGH (ref 7–23)
CALCIUM SERPL-MCNC: 8.4 MG/DL — LOW (ref 8.5–10.1)
CHLORIDE SERPL-SCNC: 106 MMOL/L — SIGNIFICANT CHANGE UP (ref 96–108)
CO2 SERPL-SCNC: 20 MMOL/L — LOW (ref 22–31)
CREAT ?TM UR-MCNC: 59 MG/DL — SIGNIFICANT CHANGE UP
CREAT SERPL-MCNC: 1.9 MG/DL — HIGH (ref 0.5–1.3)
EGFR: 33 ML/MIN/1.73M2 — LOW
GLUCOSE SERPL-MCNC: 95 MG/DL — SIGNIFICANT CHANGE UP (ref 70–99)
HCT VFR BLD CALC: 25 % — LOW (ref 39–50)
HGB BLD-MCNC: 8.1 G/DL — LOW (ref 13–17)
MCHC RBC-ENTMCNC: 30.3 PG — SIGNIFICANT CHANGE UP (ref 27–34)
MCHC RBC-ENTMCNC: 32.4 GM/DL — SIGNIFICANT CHANGE UP (ref 32–36)
MCV RBC AUTO: 93.6 FL — SIGNIFICANT CHANGE UP (ref 80–100)
PLATELET # BLD AUTO: 204 K/UL — SIGNIFICANT CHANGE UP (ref 150–400)
POTASSIUM SERPL-MCNC: 4.9 MMOL/L — SIGNIFICANT CHANGE UP (ref 3.5–5.3)
POTASSIUM SERPL-SCNC: 4.9 MMOL/L — SIGNIFICANT CHANGE UP (ref 3.5–5.3)
PROT SERPL-MCNC: 5.7 GM/DL — LOW (ref 6–8.3)
RBC # BLD: 2.67 M/UL — LOW (ref 4.2–5.8)
RBC # FLD: 14.2 % — SIGNIFICANT CHANGE UP (ref 10.3–14.5)
SODIUM SERPL-SCNC: 133 MMOL/L — LOW (ref 135–145)
SODIUM UR-SCNC: 71 MMOL/L — SIGNIFICANT CHANGE UP
WBC # BLD: 7.36 K/UL — SIGNIFICANT CHANGE UP (ref 3.8–10.5)
WBC # FLD AUTO: 7.36 K/UL — SIGNIFICANT CHANGE UP (ref 3.8–10.5)

## 2022-03-26 PROCEDURE — 99239 HOSP IP/OBS DSCHRG MGMT >30: CPT

## 2022-03-26 RX ORDER — DIAZEPAM 5 MG
2 TABLET ORAL ONCE
Refills: 0 | Status: DISCONTINUED | OUTPATIENT
Start: 2022-03-26 | End: 2022-03-26

## 2022-03-26 RX ORDER — SODIUM CHLORIDE 9 MG/ML
500 INJECTION INTRAMUSCULAR; INTRAVENOUS; SUBCUTANEOUS ONCE
Refills: 0 | Status: COMPLETED | OUTPATIENT
Start: 2022-03-26 | End: 2022-03-26

## 2022-03-26 RX ADMIN — SODIUM CHLORIDE 1000 MILLILITER(S): 9 INJECTION INTRAMUSCULAR; INTRAVENOUS; SUBCUTANEOUS at 11:44

## 2022-03-26 RX ADMIN — RANOLAZINE 500 MILLIGRAM(S): 500 TABLET, FILM COATED, EXTENDED RELEASE ORAL at 10:22

## 2022-03-26 RX ADMIN — PANTOPRAZOLE SODIUM 40 MILLIGRAM(S): 20 TABLET, DELAYED RELEASE ORAL at 10:22

## 2022-03-26 RX ADMIN — Medication 250 MILLIGRAM(S): at 10:22

## 2022-03-26 RX ADMIN — HEPARIN SODIUM 5000 UNIT(S): 5000 INJECTION INTRAVENOUS; SUBCUTANEOUS at 10:22

## 2022-03-26 RX ADMIN — Medication 81 MILLIGRAM(S): at 10:22

## 2022-03-26 RX ADMIN — POLYETHYLENE GLYCOL 3350 17 GRAM(S): 17 POWDER, FOR SOLUTION ORAL at 10:21

## 2022-03-26 RX ADMIN — Medication 100 MILLIGRAM(S): at 10:22

## 2022-03-26 RX ADMIN — Medication 50 MILLIGRAM(S): at 10:22

## 2022-03-26 RX ADMIN — Medication 2 MILLIGRAM(S): at 10:21

## 2022-03-26 RX ADMIN — FINASTERIDE 5 MILLIGRAM(S): 5 TABLET, FILM COATED ORAL at 10:23

## 2022-03-26 RX ADMIN — CLOPIDOGREL BISULFATE 75 MILLIGRAM(S): 75 TABLET, FILM COATED ORAL at 10:22

## 2022-03-26 RX ADMIN — Medication 325 MILLIGRAM(S): at 10:21

## 2022-03-26 NOTE — DISCHARGE NOTE NURSING/CASE MANAGEMENT/SOCIAL WORK - NSDCPEFALRISK_GEN_ALL_CORE
For information on Fall & Injury Prevention, visit: [email or web address] OR  [email or web address] OR  https://www.cdc.gov/steadi/patient.html

## 2022-03-26 NOTE — PROGRESS NOTE ADULT - ATTENDING COMMENTS
Patient is a 89 y.o. male who presents with chest pain and increasing weakness with PMH HTN, CAD, h/o CABG, s/p AAA repair, CKD III admitted for DESIRAE on CKD III and obstructive uropathy with urinary retention s/p waite insertion.
Patient is a 89 y.o. male who presents with chest pain and increasing weakness with PMH HTN, CAD, h/o CABG, s/p AAA repair, CKD III admitted for DESIRAE on CKD III and obstructive uropathy with urinary retention s/p waite insertion. DC to [facility].

## 2022-03-26 NOTE — PROGRESS NOTE ADULT - NUTRITIONAL ASSESSMENT
This patient has been assessed with a concern for Malnutrition and has been determined to have a diagnosis/diagnoses of Severe protein-calorie malnutrition.    This patient is being managed with:   Diet DASH/TLC-  Sodium & Cholesterol Restricted  Entered: Mar 24 2022 10:53AM    
This patient has been assessed with a concern for Malnutrition and has been determined to have a diagnosis/diagnoses of Severe protein-calorie malnutrition.    This patient is being managed with:   Diet DASH/TLC-  Sodium & Cholesterol Restricted  Entered: Mar 24 2022 10:53AM

## 2022-03-26 NOTE — PROGRESS NOTE ADULT - ASSESSMENT
89M with PMHx as above presents for DESIRAE 2/2 post-obstructive urinary retention.    #Acute on CKD 2/2 post-obstructive urinary retention  -likely 2/2 BPH  -On admission, Cr 2.86  -Cr improving, continue to trend  -Cont Tamsulosin  -Cont Finasteride  -Nephro recs appreciated  -Urology recs appreciated  -Cont Indwelling waite  -Cont Ceftin Q12H Day 2 of 3    #Scalp wound with cellulitis  -present on prev admision  -Cont Doxy until 3/27     #CAD  -Lipitor 80mg QHS  -Cont Metoprolol  -Cont Ranolazine    #DVT PPX  -Heparin    #Full Code    Case discussed with Dr Rene 
88 yo male with hx fo HTN, CABG, AAA, CKD 3 Cr 1.4- 1.6 previous admission now admitted with decreased urination w evidence of bladder retention       DESIRAE on CKD 3 - Obstrucitve Uropathy  Cr improving    - monitor Cr response to waite insertion    - trend labs   - Urology followup   -  continue to hold ACE/ARB till Cr reaches baseline then resume / diuretics as well     
89M with PMHx as above presents for DESIRAE 2/2 post-obstructive urinary retention.    #Acute on CKD 2/2 post-obstructive urinary retention  -likely 2/2 BPH  -On admission, Cr 2.86  -Cr improving, continue to trend  -Cont Tamsulosin  -Cont Finasteride  -Nephro recs appreciated  -Urology recs appreciated  -Cont Indwelling waite  -Cont Ceftin Q12H Day 2 of 3    #Scalp wound with cellulitis  -present on prev admision  -Cont Doxy until 3/27     #CAD  -Lipitor 80mg QHS  -Cont Metoprolol  -Cont Ranolazine    #DVT PPX  -Heparin    #Full Code    Case discussed with Dr Rene

## 2022-03-26 NOTE — PROGRESS NOTE ADULT - SUBJECTIVE AND OBJECTIVE BOX
89 year old male with PMHx of HTN, CAD, CABG, AAA s/p repair, CKD III presents to the ED c/o increasing weakness. Pt with recent hospital admission on 3/18, d/c on 3/21. Pt was admitted for non STEMI and acute kidney injury. Pt states that he has been feeling weak over past couple of days, with decreased urination. Pt states that he was standing in front of a mirror and saw that he was curved and unable to stand up straight. Pt normally ambulates with a walker. Nursing note from [facility] states that pt has not been feeding well and has had difficulty ambulating. Bladder scan revealed urinary retention, 700 ml urine after straight cath. I found him without Trevino. Was seen by nephrology who recommended admission.     Subjective   Pt seen and evaluated at bedside this AM. NAD.      REVIEW OF SYSTEMS:  CONSTITUTIONAL: No weakness, fevers or chills  EYES/ENT: No visual changes;  No vertigo or throat pain   NECK: No pain or stiffness  RESPIRATORY: No cough, wheezing, hemoptysis; No shortness of breath  CARDIOVASCULAR: No chest pain or palpitations  GASTROINTESTINAL: No abdominal or epigastric pain. No nausea, vomiting, or hematemesis; No diarrhea or constipation. No melena or hematochezia.  GENITOURINARY: No dysuria, frequency or hematuria  NEUROLOGICAL: No numbness or weakness  SKIN: No itching, burning, rashes, or lesions   All other review of systems is negative unless indicated above    PHYSICAL EXAM:  Vital Signs Last 24 Hrs  T(C): 36.4 (25 Mar 2022 14:52), Max: 36.4 (24 Mar 2022 23:15)  T(F): 97.5 (25 Mar 2022 14:52), Max: 97.5 (24 Mar 2022 23:15)  HR: 59 (25 Mar 2022 14:52) (55 - 59)  BP: 129/47 (25 Mar 2022 14:52) (116/46 - 129/47)  BP(mean): --  RR: 19 (25 Mar 2022 14:52) (19 - 19)  SpO2: 100% (25 Mar 2022 14:52) (97% - 100%)    Constitutional: Pt lying in bed, awake and alert, NAD  HEENT: EOMI, normal hearing, moist mucous membranes  Neck: Soft and supple, no JVD  Respiratory: CTABL, No wheezing, rales or rhonchi  Cardiovascular: S1S2+, RRR, no M/G/R  Gastrointestinal: BS+, soft, NT/ND, no guarding, no rebound  Extremities: No peripheral edema  Vascular: 2+ peripheral pulses  Neurological: AAOx3, no focal deficits  Musculoskeletal: 5/5 strength b/l upper and lower extremities  Skin: No rashes    MEDICATIONS:  MEDICATIONS  (STANDING):  aspirin enteric coated 81 milliGRAM(s) Oral daily  atorvastatin 80 milliGRAM(s) Oral at bedtime  cefuroxime   Tablet 250 milliGRAM(s) Oral every 12 hours  clopidogrel Tablet 75 milliGRAM(s) Oral daily  doxycycline hyclate Capsule 100 milliGRAM(s) Oral every 12 hours  ferrous    sulfate 325 milliGRAM(s) Oral daily  finasteride 5 milliGRAM(s) Oral daily  heparin   Injectable 5000 Unit(s) SubCutaneous every 12 hours  metoprolol succinate ER 50 milliGRAM(s) Oral daily  pantoprazole    Tablet 40 milliGRAM(s) Oral before breakfast  polyethylene glycol 3350 17 Gram(s) Oral daily  ranolazine 500 milliGRAM(s) Oral two times a day  tamsulosin 0.8 milliGRAM(s) Oral at bedtime      LABS: All Labs Reviewed:                        8.4    7.54  )-----------( 190      ( 25 Mar 2022 08:06 )             26.8     03-25    135  |  106  |  49<H>  ----------------------------<  88  4.9   |  21<L>  |  2.31<H>    Ca    8.4<L>      25 Mar 2022 08:06  Phos  4.0     03-24  Mg     2.7     03-24    TPro  5.8<L>  /  Alb  2.8<L>  /  TBili  0.7  /  DBili  x   /  AST  19  /  ALT  25  /  AlkPhos  104  03-25    PT/INR - ( 25 Mar 2022 08:06 )   PT: 14.6 sec;   INR: 1.26 ratio         PTT - ( 25 Mar 2022 08:06 )  PTT:31.4 sec      Blood Culture: 03-24 @ 09:52  Organism --  Gram Stain Blood -- Gram Stain --  Specimen Source Clean Catch Clean Catch (Midstream)  Culture-Blood --      I&O's Summary    24 Mar 2022 07:01  -  25 Mar 2022 07:00  --------------------------------------------------------  IN: 0 mL / OUT: 1325 mL / NET: -1325 mL    25 Mar 2022 07:01  -  25 Mar 2022 18:14  --------------------------------------------------------  IN: 0 mL / OUT: 375 mL / NET: -375 mL      CAPILLARY BLOOD GLUCOSE          RADIOLOGY/EKG:
 89 year old male with PMHx of HTN, CAD, CABG, AAA s/p repair, CKD III presents to the ED c/o increasing weakness. Pt with recent hospital admission on 3/18, d/c on 3/22 w angina and CHF.  hx of CKD     currrently has Trevino in place and good UOP       MEDICATIONS  (STANDING):  aspirin enteric coated 81 milliGRAM(s) Oral daily  atorvastatin 80 milliGRAM(s) Oral at bedtime  cefuroxime   Tablet 250 milliGRAM(s) Oral every 12 hours  clopidogrel Tablet 75 milliGRAM(s) Oral daily  doxycycline hyclate Capsule 100 milliGRAM(s) Oral every 12 hours  ferrous    sulfate 325 milliGRAM(s) Oral daily  finasteride 5 milliGRAM(s) Oral daily  heparin   Injectable 5000 Unit(s) SubCutaneous every 12 hours  metoprolol succinate ER 50 milliGRAM(s) Oral daily  pantoprazole    Tablet 40 milliGRAM(s) Oral before breakfast  polyethylene glycol 3350 17 Gram(s) Oral daily  ranolazine 500 milliGRAM(s) Oral two times a day  tamsulosin 0.8 milliGRAM(s) Oral at bedtime    Vital Signs Last 24 Hrs  T(C): 36.4 (25 Mar 2022 14:52), Max: 36.4 (24 Mar 2022 23:15)  T(F): 97.5 (25 Mar 2022 14:52), Max: 97.5 (24 Mar 2022 23:15)  HR: 59 (25 Mar 2022 14:52) (55 - 59)  BP: 129/47 (25 Mar 2022 14:52) (116/46 - 129/47)  BP(mean): --  RR: 19 (25 Mar 2022 14:52) (19 - 19)  SpO2: 100% (25 Mar 2022 14:52) (97% - 100%)    I&O's Detail    24 Mar 2022 07:01  -  25 Mar 2022 07:00  --------------------------------------------------------  IN:  Total IN: 0 mL    OUT:    Indwelling Catheter - Urethral (mL): 1325 mL  Total OUT: 1325 mL    Total NET: -1325 mL      25 Mar 2022 07:01  -  25 Mar 2022 19:31  --------------------------------------------------------  IN:  Total IN: 0 mL    OUT:    Indwelling Catheter - Urethral (mL): 375 mL  Total OUT: 375 mL    Total NET: -375 mL      PHYSICAL EXAM:    Constitutional: frail , and very [place]   HEENT: , EOMI,  MMM  Neck: No LAD, No JVD  Respiratory: CTAB  Cardiovascular: S1 and S2  Gastrointestinal: BS+, soft, NT/ND  Extremities: No peripheral edema  Neurological: A/O x 3  :  Trevino + yellow urine   Skin: No rashes  Access: Not applicable    LABS:    135    |  106    |  49     ----------------------------<  88        25 Mar 2022 08:06  4.9     |  21     |  2.31     132    |  102    |  56     ----------------------------<  96        24 Mar 2022 09:52  5.0     |  21     |  2.86     Ca    8.4        25 Mar 2022 08:06  Ca    8.3        24 Mar 2022 09:52    Phos  4.0       24 Mar 2022 09:52    Mg     2.7       24 Mar 2022 09:52    TPro  5.8    /  Alb  2.8    /  TBili  0.7    /        25 Mar 2022 08:06  DBili  x      /  AST  19     /  ALT  25     /  AlkPhos  104      TPro  6.4    /  Alb  2.9    /  TBili  0.5    /        24 Mar 2022 09:52  DBili  x      /  AST  22     /  ALT  22     /  AlkPhos  106                              8.4    7.54  )-----------( 190      ( 25 Mar 2022 08:06 )             26.8                         8.2    7.23  )-----------( 202      ( 24 Mar 2022 09:52 )             25.3             Urine Studies:  Urinalysis Basic - ( 24 Mar 2022 09:52 )    Color: Yellow / Appearance: Clear / S.015 / pH: x  Gluc: x / Ketone: Negative  / Bili: Negative / Urobili: Negative   Blood: x / Protein: Negative / Nitrite: Negative   Leuk Esterase: Negative / RBC: x / WBC x   Sq Epi: x / Non Sq Epi: x / Bacteria: x        RADIOLOGY & ADDITIONAL STUDIES:                  
 89 year old male with PMHx of HTN, CAD, CABG, AAA s/p repair, CKD III presents to the ED c/o increasing weakness. Pt with recent hospital admission on 3/18, d/c on 3/21. Pt was admitted for non STEMI and acute kidney injury. Pt states that he has been feeling weak over past couple of days, with decreased urination. Pt states that he was standing in front of a mirror and saw that he was curved and unable to stand up straight. Pt normally ambulates with a walker. Nursing note from [facility] states that pt has not been feeding well and has had difficulty ambulating. Bladder scan revealed urinary retention, 700 ml urine after straight cath. I found him without Trevino. Was seen by nephrology who recommended admission.     Subjective   Pt seen and evaluated at bedside this AM. NAD.      REVIEW OF SYSTEMS:  CONSTITUTIONAL: No weakness, fevers or chills  EYES/ENT: No visual changes;  No vertigo or throat pain   NECK: No pain or stiffness  RESPIRATORY: No cough, wheezing, hemoptysis; No shortness of breath  CARDIOVASCULAR: No chest pain or palpitations  GASTROINTESTINAL: No abdominal or epigastric pain. No nausea, vomiting, or hematemesis; No diarrhea or constipation. No melena or hematochezia.  GENITOURINARY: No dysuria, frequency or hematuria  NEUROLOGICAL: No numbness or weakness  SKIN: No itching, burning, rashes, or lesions   All other review of systems is negative unless indicated above    PHYSICAL EXAM:  Vital Signs Last 24 Hrs  T(C): 36.4 (25 Mar 2022 14:52), Max: 36.4 (24 Mar 2022 23:15)  T(F): 97.5 (25 Mar 2022 14:52), Max: 97.5 (24 Mar 2022 23:15)  HR: 59 (25 Mar 2022 14:52) (55 - 59)  BP: 129/47 (25 Mar 2022 14:52) (116/46 - 129/47)  BP(mean): --  RR: 19 (25 Mar 2022 14:52) (19 - 19)  SpO2: 100% (25 Mar 2022 14:52) (97% - 100%)    Constitutional: Pt lying in bed, awake and alert, NAD  HEENT: EOMI, normal hearing, moist mucous membranes  Neck: Soft and supple, no JVD  Respiratory: CTABL, No wheezing, rales or rhonchi  Cardiovascular: S1S2+, RRR, no M/G/R  Gastrointestinal: BS+, soft, NT/ND, no guarding, no rebound  Extremities: No peripheral edema  Vascular: 2+ peripheral pulses  Neurological: AAOx3, no focal deficits  Musculoskeletal: 5/5 strength b/l upper and lower extremities  Skin: No rashes    MEDICATIONS:  MEDICATIONS  (STANDING):  aspirin enteric coated 81 milliGRAM(s) Oral daily  atorvastatin 80 milliGRAM(s) Oral at bedtime  cefuroxime   Tablet 250 milliGRAM(s) Oral every 12 hours  clopidogrel Tablet 75 milliGRAM(s) Oral daily  doxycycline hyclate Capsule 100 milliGRAM(s) Oral every 12 hours  ferrous    sulfate 325 milliGRAM(s) Oral daily  finasteride 5 milliGRAM(s) Oral daily  heparin   Injectable 5000 Unit(s) SubCutaneous every 12 hours  metoprolol succinate ER 50 milliGRAM(s) Oral daily  pantoprazole    Tablet 40 milliGRAM(s) Oral before breakfast  polyethylene glycol 3350 17 Gram(s) Oral daily  ranolazine 500 milliGRAM(s) Oral two times a day  tamsulosin 0.8 milliGRAM(s) Oral at bedtime      LABS: All Labs Reviewed:                        8.4    7.54  )-----------( 190      ( 25 Mar 2022 08:06 )             26.8     03-25    135  |  106  |  49<H>  ----------------------------<  88  4.9   |  21<L>  |  2.31<H>    Ca    8.4<L>      25 Mar 2022 08:06  Phos  4.0     03-24  Mg     2.7     03-24    TPro  5.8<L>  /  Alb  2.8<L>  /  TBili  0.7  /  DBili  x   /  AST  19  /  ALT  25  /  AlkPhos  104  03-25    PT/INR - ( 25 Mar 2022 08:06 )   PT: 14.6 sec;   INR: 1.26 ratio         PTT - ( 25 Mar 2022 08:06 )  PTT:31.4 sec      Blood Culture: 03-24 @ 09:52  Organism --  Gram Stain Blood -- Gram Stain --  Specimen Source Clean Catch Clean Catch (Midstream)  Culture-Blood --      I&O's Summary    24 Mar 2022 07:01  -  25 Mar 2022 07:00  --------------------------------------------------------  IN: 0 mL / OUT: 1325 mL / NET: -1325 mL    25 Mar 2022 07:01  -  25 Mar 2022 18:14  --------------------------------------------------------  IN: 0 mL / OUT: 375 mL / NET: -375 mL      CAPILLARY BLOOD GLUCOSE          RADIOLOGY/EKG:

## 2022-03-26 NOTE — DISCHARGE NOTE NURSING/CASE MANAGEMENT/SOCIAL WORK - PATIENT PORTAL LINK FT
You can access the FollowMyHealth Patient Portal offered by [facility] by registering at the following website: [email or web address]. By joining [employer]’s FollowMyHealth portal, you will also be able to view your health information using other applications (apps) compatible with our system.

## 2022-04-09 DIAGNOSIS — Z88.0 ALLERGY STATUS TO PENICILLIN: ICD-10-CM

## 2022-04-09 DIAGNOSIS — Z79.02 LONG TERM (CURRENT) USE OF ANTITHROMBOTICS/ANTIPLATELETS: ICD-10-CM

## 2022-04-09 DIAGNOSIS — E43 UNSPECIFIED SEVERE PROTEIN-CALORIE MALNUTRITION: ICD-10-CM

## 2022-04-09 DIAGNOSIS — D64.9 ANEMIA, UNSPECIFIED: ICD-10-CM

## 2022-04-09 DIAGNOSIS — N40.1 BENIGN PROSTATIC HYPERPLASIA WITH LOWER URINARY TRACT SYMPTOMS: ICD-10-CM

## 2022-04-09 DIAGNOSIS — N17.9 ACUTE KIDNEY FAILURE, UNSPECIFIED: ICD-10-CM

## 2022-04-09 DIAGNOSIS — N18.30 CHRONIC KIDNEY DISEASE, STAGE 3 UNSPECIFIED: ICD-10-CM

## 2022-04-09 DIAGNOSIS — Z96.641 PRESENCE OF RIGHT ARTIFICIAL HIP JOINT: ICD-10-CM

## 2022-04-09 DIAGNOSIS — Z91.018 ALLERGY TO OTHER FOODS: ICD-10-CM

## 2022-04-09 DIAGNOSIS — L03.811 CELLULITIS OF HEAD [ANY PART, EXCEPT FACE]: ICD-10-CM

## 2022-04-09 DIAGNOSIS — R33.8 OTHER RETENTION OF URINE: ICD-10-CM

## 2022-04-09 DIAGNOSIS — Z95.5 PRESENCE OF CORONARY ANGIOPLASTY IMPLANT AND GRAFT: ICD-10-CM

## 2022-04-09 DIAGNOSIS — N13.9 OBSTRUCTIVE AND REFLUX UROPATHY, UNSPECIFIED: ICD-10-CM

## 2022-04-09 DIAGNOSIS — I12.9 HYPERTENSIVE CHRONIC KIDNEY DISEASE WITH STAGE 1 THROUGH STAGE 4 CHRONIC KIDNEY DISEASE, OR UNSPECIFIED CHRONIC KIDNEY DISEASE: ICD-10-CM

## 2022-04-09 DIAGNOSIS — Z95.1 PRESENCE OF AORTOCORONARY BYPASS GRAFT: ICD-10-CM

## 2022-04-09 DIAGNOSIS — I25.10 ATHEROSCLEROTIC HEART DISEASE OF NATIVE CORONARY ARTERY WITHOUT ANGINA PECTORIS: ICD-10-CM

## 2022-05-01 ENCOUNTER — EMERGENCY (EMERGENCY)
Facility: HOSPITAL | Age: 87
LOS: 0 days | Discharge: HOME | End: 2022-05-01
Attending: EMERGENCY MEDICINE
Payer: MEDICARE

## 2022-05-01 VITALS
DIASTOLIC BLOOD PRESSURE: 64 MMHG | SYSTOLIC BLOOD PRESSURE: 145 MMHG | HEART RATE: 70 BPM | OXYGEN SATURATION: 96 % | RESPIRATION RATE: 18 BRPM | TEMPERATURE: 98 F

## 2022-05-01 VITALS
SYSTOLIC BLOOD PRESSURE: 144 MMHG | TEMPERATURE: 98 F | HEART RATE: 81 BPM | RESPIRATION RATE: 18 BRPM | DIASTOLIC BLOOD PRESSURE: 58 MMHG | OXYGEN SATURATION: 95 % | HEIGHT: 67 IN

## 2022-05-01 DIAGNOSIS — Z98.890 OTHER SPECIFIED POSTPROCEDURAL STATES: Chronic | ICD-10-CM

## 2022-05-01 DIAGNOSIS — Z88.0 ALLERGY STATUS TO PENICILLIN: ICD-10-CM

## 2022-05-01 DIAGNOSIS — Z96.641 PRESENCE OF RIGHT ARTIFICIAL HIP JOINT: Chronic | ICD-10-CM

## 2022-05-01 DIAGNOSIS — Z91.09 OTHER ALLERGY STATUS, OTHER THAN TO DRUGS AND BIOLOGICAL SUBSTANCES: ICD-10-CM

## 2022-05-01 DIAGNOSIS — I25.10 ATHEROSCLEROTIC HEART DISEASE OF NATIVE CORONARY ARTERY WITHOUT ANGINA PECTORIS: Chronic | ICD-10-CM
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
## 2022-05-01 DIAGNOSIS — Z95.5 PRESENCE OF CORONARY ANGIOPLASTY IMPLANT AND GRAFT: ICD-10-CM
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
## 2022-05-01 DIAGNOSIS — I25.10 ATHEROSCLEROTIC HEART DISEASE OF NATIVE CORONARY ARTERY WITHOUT ANGINA PECTORIS: ICD-10-CM

## 2022-05-01 DIAGNOSIS — I71.4 ABDOMINAL AORTIC ANEURYSM, WITHOUT RUPTURE: ICD-10-CM

## 2022-05-01 DIAGNOSIS — I10 ESSENTIAL (PRIMARY) HYPERTENSION: ICD-10-CM

## 2022-05-01 DIAGNOSIS — Z95.1 PRESENCE OF AORTOCORONARY BYPASS GRAFT: Chronic | ICD-10-CM

## 2022-05-01 DIAGNOSIS — S09.90XA UNSPECIFIED INJURY OF HEAD, INITIAL ENCOUNTER: ICD-10-CM

## 2022-05-01 LAB
ALBUMIN SERPL ELPH-MCNC: 2.3 G/DL — LOW (ref 3.3–5)
ALP SERPL-CCNC: 96 U/L — SIGNIFICANT CHANGE UP (ref 40–120)
ALT FLD-CCNC: 16 U/L — SIGNIFICANT CHANGE UP (ref 12–78)
ANION GAP SERPL CALC-SCNC: 8 MMOL/L — SIGNIFICANT CHANGE UP (ref 5–17)
APTT BLD: 30.6 SEC — SIGNIFICANT CHANGE UP (ref 27.5–35.5)
AST SERPL-CCNC: 13 U/L — LOW (ref 15–37)
BASOPHILS # BLD AUTO: 0.04 K/UL — SIGNIFICANT CHANGE UP (ref 0–0.2)
BASOPHILS NFR BLD AUTO: 0.4 % — SIGNIFICANT CHANGE UP (ref 0–2)
BILIRUB SERPL-MCNC: 0.7 MG/DL — SIGNIFICANT CHANGE UP (ref 0.2–1.2)
BUN SERPL-MCNC: 39 MG/DL — HIGH (ref 7–23)
CALCIUM SERPL-MCNC: 8.4 MG/DL — LOW (ref 8.5–10.1)
CHLORIDE SERPL-SCNC: 97 MMOL/L — SIGNIFICANT CHANGE UP (ref 96–108)
CO2 SERPL-SCNC: 28 MMOL/L — SIGNIFICANT CHANGE UP (ref 22–31)
CREAT SERPL-MCNC: 1.75 MG/DL — HIGH (ref 0.5–1.3)
EGFR: 37 ML/MIN/1.73M2 — LOW
EOSINOPHIL # BLD AUTO: 0.38 K/UL — SIGNIFICANT CHANGE UP (ref 0–0.5)
EOSINOPHIL NFR BLD AUTO: 3.5 % — SIGNIFICANT CHANGE UP (ref 0–6)
GLUCOSE SERPL-MCNC: 108 MG/DL — HIGH (ref 70–99)
HCT VFR BLD CALC: 32.3 % — LOW (ref 39–50)
HGB BLD-MCNC: 10.4 G/DL — LOW (ref 13–17)
IMM GRANULOCYTES NFR BLD AUTO: 0.6 % — SIGNIFICANT CHANGE UP (ref 0–1.5)
INR BLD: 1.3 RATIO — HIGH (ref 0.88–1.16)
LYMPHOCYTES # BLD AUTO: 0.65 K/UL — LOW (ref 1–3.3)
LYMPHOCYTES # BLD AUTO: 6 % — LOW (ref 13–44)
MCHC RBC-ENTMCNC: 29.1 PG — SIGNIFICANT CHANGE UP (ref 27–34)
MCHC RBC-ENTMCNC: 32.2 GM/DL — SIGNIFICANT CHANGE UP (ref 32–36)
MCV RBC AUTO: 90.5 FL — SIGNIFICANT CHANGE UP (ref 80–100)
MONOCYTES # BLD AUTO: 0.72 K/UL — SIGNIFICANT CHANGE UP (ref 0–0.9)
MONOCYTES NFR BLD AUTO: 6.6 % — SIGNIFICANT CHANGE UP (ref 2–14)
NEUTROPHILS # BLD AUTO: 9.03 K/UL — HIGH (ref 1.8–7.4)
NEUTROPHILS NFR BLD AUTO: 82.9 % — HIGH (ref 43–77)
PLATELET # BLD AUTO: 227 K/UL — SIGNIFICANT CHANGE UP (ref 150–400)
POTASSIUM SERPL-MCNC: 3.8 MMOL/L — SIGNIFICANT CHANGE UP (ref 3.5–5.3)
POTASSIUM SERPL-SCNC: 3.8 MMOL/L — SIGNIFICANT CHANGE UP (ref 3.5–5.3)
PROT SERPL-MCNC: 6 GM/DL — SIGNIFICANT CHANGE UP (ref 6–8.3)
PROTHROM AB SERPL-ACNC: 15.1 SEC — HIGH (ref 10.5–13.4)
RBC # BLD: 3.57 M/UL — LOW (ref 4.2–5.8)
RBC # FLD: 14.6 % — HIGH (ref 10.3–14.5)
SODIUM SERPL-SCNC: 133 MMOL/L — LOW (ref 135–145)
WBC # BLD: 10.89 K/UL — HIGH (ref 3.8–10.5)
WBC # FLD AUTO: 10.89 K/UL — HIGH (ref 3.8–10.5)

## 2022-05-01 PROCEDURE — 72125 CT NECK SPINE W/O DYE: CPT | Mod: MA

## 2022-05-01 PROCEDURE — 85025 COMPLETE CBC W/AUTO DIFF WBC: CPT

## 2022-05-01 PROCEDURE — 93005 ELECTROCARDIOGRAM TRACING: CPT

## 2022-05-01 PROCEDURE — 71046 X-RAY EXAM CHEST 2 VIEWS: CPT

## 2022-05-01 PROCEDURE — 85730 THROMBOPLASTIN TIME PARTIAL: CPT

## 2022-05-01 PROCEDURE — 99284 EMERGENCY DEPT VISIT MOD MDM: CPT

## 2022-05-01 PROCEDURE — 99285 EMERGENCY DEPT VISIT HI MDM: CPT | Mod: 25

## 2022-05-01 PROCEDURE — 80053 COMPREHEN METABOLIC PANEL: CPT

## 2022-05-01 PROCEDURE — U0005: CPT

## 2022-05-01 PROCEDURE — 36415 COLL VENOUS BLD VENIPUNCTURE: CPT

## 2022-05-01 PROCEDURE — 70450 CT HEAD/BRAIN W/O DYE: CPT | Mod: 26,MA

## 2022-05-01 PROCEDURE — U0003: CPT

## 2022-05-01 PROCEDURE — 93010 ELECTROCARDIOGRAM REPORT: CPT

## 2022-05-01 PROCEDURE — 73562 X-RAY EXAM OF KNEE 3: CPT | Mod: LT

## 2022-05-01 PROCEDURE — 72170 X-RAY EXAM OF PELVIS: CPT

## 2022-05-01 PROCEDURE — 71046 X-RAY EXAM CHEST 2 VIEWS: CPT | Mod: 26

## 2022-05-01 PROCEDURE — 85610 PROTHROMBIN TIME: CPT

## 2022-05-01 PROCEDURE — 73562 X-RAY EXAM OF KNEE 3: CPT | Mod: 26,LT

## 2022-05-01 PROCEDURE — 72170 X-RAY EXAM OF PELVIS: CPT | Mod: 26

## 2022-05-01 PROCEDURE — 72125 CT NECK SPINE W/O DYE: CPT | Mod: 26,MA

## 2022-05-01 PROCEDURE — 70450 CT HEAD/BRAIN W/O DYE: CPT | Mod: MA

## 2022-05-01 NOTE — ED PROVIDER NOTE - PATIENT PORTAL LINK FT
You can access the FollowMyHealth Patient Portal offered by [facility] by registering at the following website: [email or web address]. By joining [facility]’s FollowMyHealth portal, you will also be able to view your health information using other applications (apps) compatible with our system.

## 2022-05-01 NOTE — ED PROVIDER NOTE - PROGRESS NOTE DETAILS
[facility] called.  P{t just arrived back from rehab.  Pt was trying to get pout of bed and fell and hit head on night stand and sent to ED for eval.

## 2022-05-01 NOTE — ED ADULT NURSE NOTE - CHIEF COMPLAINT QUOTE
Pt arrives to ED s/p assisted fall out of bed at [facility]. Pt was attempting to get out of bed when he started to fall and was assisted to floor by his wife. Pt denies hitting head, denies LOC. takes plavix daily. Has chronic waite. Skin tears to b/l arms. alert and oriented x 4.

## 2022-05-01 NOTE — ED ADULT TRIAGE NOTE - CHIEF COMPLAINT QUOTE
Pt arrives to ED s/p assisted fall out of bed at [facility]. Pt was attempting to get out of bed when he started to fall and was assisted to floor by his wife. Pt denies hitting head, denies LOC. takes plavix daily. Has chronic waite. Skin tears to b/l arms. alert and oriented x 4.

## 2022-05-01 NOTE — ED PROVIDER NOTE - OBJECTIVE STATEMENT
89-year-old patient presents emergency department today status post fall.  Patient states that he was feeling weak this morning and wife was trying to help him get out of bed lost balance and fell denies loss of conscious. patient currently with no pain patient.  Patient was brought to the emergency department by ambulance.  No travel no sick contacts.  Patient is unsure of his last tetanus.

## 2022-05-01 NOTE — ED PROVIDER NOTE - CARE PLAN
1 Principal Discharge DX:	Head injury  Secondary Diagnosis:	Multiple skin tears  Secondary Diagnosis:	Knee contusion  Secondary Diagnosis:	Fall

## 2022-05-01 NOTE — ED PROVIDER NOTE - NSFOLLOWUPINSTRUCTIONS_ED_ALL_ED_FT
Please call and follow up with your doctor in 1-3 days.    Use Tylenol (acetaminophen) 1000mg every 6 hours  as need for fever/pain.    Return to the Emergency Department for worsening or persistent symptoms, and/or ANY NEW OR CONCERNING SYMPTOMS. If you have issues obtaining follow up, please call: [phone] or [phone]  to obtain a doctor or specialist who takes your insurance in your area.    Head Injury    WHAT YOU NEED TO KNOW:    A head injury is most often caused by a blow to the head. This may occur from a fall, bicycle injury, sports injury, being struck in the head, or a motor vehicle accident.     DISCHARGE INSTRUCTIONS:    Call 911 or have someone else call for any of the following:     You cannot be woken.      You have a seizure.      You stop responding to others or you faint.      You have blurry or double vision.      Your speech becomes slurred or confused.      You have arm or leg weakness, loss of feeling, or new problems with coordination.      Your pupils are larger than usual or one pupil is a different size than the other.       You have blood or clear fluid coming out of your ears or nose.    Return to the emergency department if:     You have repeated or forceful vomiting.      You feel confused.      Your headache gets worse or becomes severe.      You or someone caring for you notices that you are harder to wake than usual.    Contact your healthcare provider if:     Your symptoms last longer than 6 weeks after the injury.      You have questions or concerns about your condition or care.    Medicines:     Acetaminophen decreases pain. Acetaminophen is available without a doctor's order. Ask how much to take and how often to take it. Follow directions. Acetaminophen can cause liver damage if not taken correctly.      Take your medicine as directed. Contact your healthcare provider if you think your medicine is not helping or if you have side effects. Tell him or her if you are allergic to any medicine. Keep a list of the medicines, vitamins, and herbs you take. Include the amounts, and when and why you take them. Bring the list or the pill bottles to follow-up visits. Carry your medicine list with you in case of an emergency.    Self-care:     Rest or do quiet activities for 24 to 48 hours. Limit your time watching TV, using the computer, or doing tasks that require a lot of thinking. Slowly return to your normal activities as directed. Do not play sports or do activities that may cause you to get hit in the head. Ask your healthcare provider when you can return to sports.       Apply ice on your head for 15 to 20 minutes every hour or as directed. Use an ice pack, or put crushed ice in a plastic bag. Cover it with a towel before you apply it to your skin. Ice helps prevent tissue damage and decreases swelling and pain.       Have someone stay with you for 24 hours or as directed. This person can monitor you for complications and call 911. When you are awake the person should ask you a few questions to see if you are thinking clearly. An example would be to ask your name or your address.     Prevent another head injury:     Wear a helmet that fits properly. Do this when you play sports, or ride a bike, scooter, or skateboard. Helmets help decrease your risk of a serious head injury. Talk to your healthcare provider about other ways you can protect yourself if you play sports.      Wear your seat belt every time you are in a car. This helps to decrease your risk for a head injury if you are in a car accident.     Follow up with your healthcare provider as directed: Write down your questions so you remember to ask them during your visits.    Fall Prevention    WHAT YOU NEED TO KNOW:    Fall prevention includes ways to make your home and other areas safer. It also includes ways you can move more carefully to prevent a fall. Health conditions that cause changes in your blood pressure, vision, or muscle strength and coordination may increase your risk for falls. Medicines may also increase your risk for falls if they make you dizzy, weak, or sleepy.     DISCHARGE INSTRUCTIONS:    Call 911 or have someone else call if:     You have fallen and are unconscious.      You have fallen and cannot move part of your body.    Contact your healthcare provider if:     You have fallen and have pain or a headache.      You have questions or concerns about your condition or care.    Fall prevention tips:     Stand or sit up slowly. This may help you keep your balance and prevent falls.      Use assistive devices as directed. Your healthcare provider may suggest that you use a cane or walker to help you keep your balance. You may need to have grab bars put in your bathroom near the toilet or in the shower.      Wear shoes that fit well and have soles that . Wear shoes both inside and outside. Use slippers with good . Do not wear shoes with high heels.      Wear a personal alarm. This is a device that allows you to call 911 if you fall and need help. Ask your healthcare provider for more information.      Stay active. Exercise can help strengthen your muscles and improve your balance. Your healthcare provider may recommend water aerobics or walking. He or she may also recommend physical therapy to improve your coordination. Never start an exercise program without talking to your healthcare provider first.       Manage your medical conditions. Keep all appointments with your healthcare providers. Visit your eye doctor as directed.           Home safety tips:     Add items to prevent falls in the bathroom. Put nonslip strips on your bath or shower floor to prevent you from slipping. Use a bath mat if you do not have carpet in the bathroom. This will prevent you from falling when you step out of the bath or shower. Use a shower seat so you do not need to stand while you shower. Sit on the toilet or a chair in your bathroom to dry yourself and put on clothing. This will prevent you from losing your balance from drying or dressing yourself while you are standing.       Keep paths clear. Remove books, shoes, and other objects from walkways and stairs. Place cords for telephones and lamps out of the way so that you do not need to walk over them. Tape them down if you cannot move them. Remove small rugs. If you cannot remove a rug, secure it with double-sided tape. This will prevent you from tripping.       Install bright lights in your home. Use night lights to help light paths to the bathroom or kitchen. Always turn on the light before you start walking.      Keep items you use often on shelves within reach. Do not use a step stool to help you reach an item.      Paint or place reflective tape on the edges of your stairs. This will help you see the stairs better.    Follow up with your healthcare provider as directed: Write down your questions so you remember to ask them during your visits.    Skin Tear    WHAT YOU NEED TO KNOW:    A skin tear occurs when the layers of weakened skin split open from an injury. It is important to treat and prevent skin tears to prevent infection.    DISCHARGE INSTRUCTIONS:    Call your doctor if:    You have a fever or chills.    Blood soaks through your bandage.    You have redness, swelling, pus, or a bad odor coming from your wound.    You have severe pain.    Your wound tears open again.    You have questions or concerns about your condition or care.  Medicines:    Medicines may be given to decrease pain or treat a bacterial infection.    Take your medicine as directed. Contact your healthcare provider if you think your medicine is not helping or if you have side effects. Tell him of her if you are allergic to any medicine. Keep a list of the medicines, vitamins, and herbs you take. Include the amounts, and when and why you take them. Bring the list or the pill bottles to follow-up visits. Carry your medicine list with you in case of an emergency.  Prevent a skin tear:    Clean, moisturize, and protect your skin. Baths, hot showers, and soap can dry your skin and increase your risk for skin tears. Take lukewarm showers, use mild soap as directed, and gently pat your skin dry. Use lotion to keep your skin moist after you shower. Wear long sleeves, pants, and protective footwear.    Move carefully. Ask for help if you cannot lift yourself. Do not drag your skin when you move.    Keep your home safe. Cover sharp corners, keep your pathways clear, and turn on lights so you can see clearly. Ask for more information if you have questions about home safety.    Drink liquids as directed. Ask your provider how much liquid to drink each day and which liquids are best for you. Liquids will help keep your skin moist and protected from another skin tear.    Eat high-protein foods to help with wound healing. Examples are lean meats, fish, low-fat dairy products, and beans.  Follow up with your doctor as directed: Write down your questions so you remember to ask them during your visits.

## 2022-05-08 ENCOUNTER — INPATIENT (INPATIENT)
Facility: HOSPITAL | Age: 87
LOS: 9 days | Discharge: HOME HEALTH | DRG: 698 | End: 2022-05-18
Attending: INTERNAL MEDICINE | Admitting: HOSPITALIST
Payer: MEDICARE

## 2022-05-08 VITALS
OXYGEN SATURATION: 97 % | DIASTOLIC BLOOD PRESSURE: 69 MMHG | RESPIRATION RATE: 18 BRPM | SYSTOLIC BLOOD PRESSURE: 111 MMHG | HEIGHT: 67 IN | WEIGHT: 145.06 LBS | HEART RATE: 89 BPM | TEMPERATURE: 99 F

## 2022-05-08 DIAGNOSIS — N39.0 URINARY TRACT INFECTION, SITE NOT SPECIFIED: ICD-10-CM

## 2022-05-08 DIAGNOSIS — Z98.890 OTHER SPECIFIED POSTPROCEDURAL STATES: Chronic | ICD-10-CM

## 2022-05-08 DIAGNOSIS — Z96.641 PRESENCE OF RIGHT ARTIFICIAL HIP JOINT: Chronic | ICD-10-CM

## 2022-05-08 DIAGNOSIS — I25.10 ATHEROSCLEROTIC HEART DISEASE OF NATIVE CORONARY ARTERY WITHOUT ANGINA PECTORIS: Chronic | ICD-10-CM

## 2022-05-08 DIAGNOSIS — Z95.1 PRESENCE OF AORTOCORONARY BYPASS GRAFT: Chronic | ICD-10-CM

## 2022-05-08 LAB
ADD ON TEST-SPECIMEN IN LAB: SIGNIFICANT CHANGE UP
ALBUMIN SERPL ELPH-MCNC: 2 G/DL — LOW (ref 3.3–5)
ALP SERPL-CCNC: 98 U/L — SIGNIFICANT CHANGE UP (ref 40–120)
ALT FLD-CCNC: 30 U/L — SIGNIFICANT CHANGE UP (ref 12–78)
ANION GAP SERPL CALC-SCNC: 7 MMOL/L — SIGNIFICANT CHANGE UP (ref 5–17)
APPEARANCE UR: ABNORMAL
AST SERPL-CCNC: 22 U/L — SIGNIFICANT CHANGE UP (ref 15–37)
BASOPHILS # BLD AUTO: 0.04 K/UL — SIGNIFICANT CHANGE UP (ref 0–0.2)
BASOPHILS NFR BLD AUTO: 0.3 % — SIGNIFICANT CHANGE UP (ref 0–2)
BILIRUB SERPL-MCNC: 0.4 MG/DL — SIGNIFICANT CHANGE UP (ref 0.2–1.2)
BILIRUB UR-MCNC: NEGATIVE — SIGNIFICANT CHANGE UP
BUN SERPL-MCNC: 36 MG/DL — HIGH (ref 7–23)
CALCIUM SERPL-MCNC: 8 MG/DL — LOW (ref 8.5–10.1)
CHLORIDE SERPL-SCNC: 95 MMOL/L — LOW (ref 96–108)
CO2 SERPL-SCNC: 29 MMOL/L — SIGNIFICANT CHANGE UP (ref 22–31)
COLOR SPEC: YELLOW — SIGNIFICANT CHANGE UP
CREAT SERPL-MCNC: 1.71 MG/DL — HIGH (ref 0.5–1.3)
DIFF PNL FLD: ABNORMAL
EGFR: 38 ML/MIN/1.73M2 — LOW
EOSINOPHIL # BLD AUTO: 0.14 K/UL — SIGNIFICANT CHANGE UP (ref 0–0.5)
EOSINOPHIL NFR BLD AUTO: 1.1 % — SIGNIFICANT CHANGE UP (ref 0–6)
GLUCOSE SERPL-MCNC: 128 MG/DL — HIGH (ref 70–99)
GLUCOSE UR QL: NEGATIVE — SIGNIFICANT CHANGE UP
HCT VFR BLD CALC: 29.9 % — LOW (ref 39–50)
HGB BLD-MCNC: 9.6 G/DL — LOW (ref 13–17)
IMM GRANULOCYTES NFR BLD AUTO: 0.8 % — SIGNIFICANT CHANGE UP (ref 0–1.5)
KETONES UR-MCNC: NEGATIVE — SIGNIFICANT CHANGE UP
LACTATE SERPL-SCNC: 1.5 MMOL/L — SIGNIFICANT CHANGE UP (ref 0.7–2)
LEUKOCYTE ESTERASE UR-ACNC: ABNORMAL
LYMPHOCYTES # BLD AUTO: 0.87 K/UL — LOW (ref 1–3.3)
LYMPHOCYTES # BLD AUTO: 6.8 % — LOW (ref 13–44)
MCHC RBC-ENTMCNC: 28.7 PG — SIGNIFICANT CHANGE UP (ref 27–34)
MCHC RBC-ENTMCNC: 32.1 GM/DL — SIGNIFICANT CHANGE UP (ref 32–36)
MCV RBC AUTO: 89.3 FL — SIGNIFICANT CHANGE UP (ref 80–100)
MONOCYTES # BLD AUTO: 0.87 K/UL — SIGNIFICANT CHANGE UP (ref 0–0.9)
MONOCYTES NFR BLD AUTO: 6.8 % — SIGNIFICANT CHANGE UP (ref 2–14)
NEUTROPHILS # BLD AUTO: 10.76 K/UL — HIGH (ref 1.8–7.4)
NEUTROPHILS NFR BLD AUTO: 84.2 % — HIGH (ref 43–77)
NITRITE UR-MCNC: NEGATIVE — SIGNIFICANT CHANGE UP
PH UR: 6.5 — SIGNIFICANT CHANGE UP (ref 5–8)
PLATELET # BLD AUTO: 241 K/UL — SIGNIFICANT CHANGE UP (ref 150–400)
POTASSIUM SERPL-MCNC: 4.4 MMOL/L — SIGNIFICANT CHANGE UP (ref 3.5–5.3)
POTASSIUM SERPL-SCNC: 4.4 MMOL/L — SIGNIFICANT CHANGE UP (ref 3.5–5.3)
PROT SERPL-MCNC: 5.9 GM/DL — LOW (ref 6–8.3)
PROT UR-MCNC: 100
RBC # BLD: 3.35 M/UL — LOW (ref 4.2–5.8)
RBC # FLD: 14.5 % — SIGNIFICANT CHANGE UP (ref 10.3–14.5)
SODIUM SERPL-SCNC: 131 MMOL/L — LOW (ref 135–145)
SP GR SPEC: 1.01 — SIGNIFICANT CHANGE UP (ref 1.01–1.02)
TROPONIN I, HIGH SENSITIVITY RESULT: 7.78 NG/L — SIGNIFICANT CHANGE UP
UROBILINOGEN FLD QL: NEGATIVE — SIGNIFICANT CHANGE UP
WBC # BLD: 12.78 K/UL — HIGH (ref 3.8–10.5)
WBC # FLD AUTO: 12.78 K/UL — HIGH (ref 3.8–10.5)

## 2022-05-08 PROCEDURE — 71045 X-RAY EXAM CHEST 1 VIEW: CPT | Mod: 26

## 2022-05-08 PROCEDURE — 99285 EMERGENCY DEPT VISIT HI MDM: CPT

## 2022-05-08 PROCEDURE — U0003: CPT

## 2022-05-08 PROCEDURE — 73620 X-RAY EXAM OF FOOT: CPT | Mod: 26,LT

## 2022-05-08 PROCEDURE — U0005: CPT

## 2022-05-08 PROCEDURE — 87070 CULTURE OTHR SPECIMN AEROBIC: CPT

## 2022-05-08 PROCEDURE — 85025 COMPLETE CBC W/AUTO DIFF WBC: CPT

## 2022-05-08 PROCEDURE — 97116 GAIT TRAINING THERAPY: CPT | Mod: GP

## 2022-05-08 PROCEDURE — 85027 COMPLETE CBC AUTOMATED: CPT

## 2022-05-08 PROCEDURE — 73620 X-RAY EXAM OF FOOT: CPT | Mod: LT

## 2022-05-08 PROCEDURE — 93010 ELECTROCARDIOGRAM REPORT: CPT

## 2022-05-08 PROCEDURE — 36415 COLL VENOUS BLD VENIPUNCTURE: CPT

## 2022-05-08 PROCEDURE — 87186 SC STD MICRODIL/AGAR DIL: CPT

## 2022-05-08 PROCEDURE — 87077 CULTURE AEROBIC IDENTIFY: CPT

## 2022-05-08 PROCEDURE — 97162 PT EVAL MOD COMPLEX 30 MIN: CPT | Mod: GP

## 2022-05-08 PROCEDURE — 99223 1ST HOSP IP/OBS HIGH 75: CPT

## 2022-05-08 PROCEDURE — 80048 BASIC METABOLIC PNL TOTAL CA: CPT

## 2022-05-08 PROCEDURE — 80053 COMPREHEN METABOLIC PANEL: CPT

## 2022-05-08 PROCEDURE — 97530 THERAPEUTIC ACTIVITIES: CPT | Mod: GP

## 2022-05-08 PROCEDURE — 83036 HEMOGLOBIN GLYCOSYLATED A1C: CPT

## 2022-05-08 PROCEDURE — 93308 TTE F-UP OR LMTD: CPT

## 2022-05-08 RX ORDER — CEFTRIAXONE 500 MG/1
1000 INJECTION, POWDER, FOR SOLUTION INTRAMUSCULAR; INTRAVENOUS ONCE
Refills: 0 | Status: COMPLETED | OUTPATIENT
Start: 2022-05-08 | End: 2022-05-08

## 2022-05-08 RX ORDER — POLYETHYLENE GLYCOL 3350 17 G/17G
17 POWDER, FOR SOLUTION ORAL DAILY
Refills: 0 | Status: DISCONTINUED | OUTPATIENT
Start: 2022-05-08 | End: 2022-05-18

## 2022-05-08 RX ORDER — FUROSEMIDE 40 MG
20 TABLET ORAL EVERY OTHER DAY
Refills: 0 | Status: DISCONTINUED | OUTPATIENT
Start: 2022-05-09 | End: 2022-05-18

## 2022-05-08 RX ORDER — FERROUS SULFATE 325(65) MG
325 TABLET ORAL DAILY
Refills: 0 | Status: DISCONTINUED | OUTPATIENT
Start: 2022-05-08 | End: 2022-05-18

## 2022-05-08 RX ORDER — ATORVASTATIN CALCIUM 80 MG/1
80 TABLET, FILM COATED ORAL AT BEDTIME
Refills: 0 | Status: DISCONTINUED | OUTPATIENT
Start: 2022-05-08 | End: 2022-05-18

## 2022-05-08 RX ORDER — ASCORBIC ACID 60 MG
500 TABLET,CHEWABLE ORAL DAILY
Refills: 0 | Status: DISCONTINUED | OUTPATIENT
Start: 2022-05-08 | End: 2022-05-18

## 2022-05-08 RX ORDER — CLOPIDOGREL BISULFATE 75 MG/1
75 TABLET, FILM COATED ORAL DAILY
Refills: 0 | Status: DISCONTINUED | OUTPATIENT
Start: 2022-05-08 | End: 2022-05-18

## 2022-05-08 RX ORDER — FUROSEMIDE 40 MG
40 TABLET ORAL
Refills: 0 | Status: DISCONTINUED | OUTPATIENT
Start: 2022-05-10 | End: 2022-05-18

## 2022-05-08 RX ORDER — FUROSEMIDE 40 MG
1 TABLET ORAL
Qty: 0 | Refills: 0 | DISCHARGE

## 2022-05-08 RX ORDER — ACETAMINOPHEN 500 MG
650 TABLET ORAL EVERY 6 HOURS
Refills: 0 | Status: DISCONTINUED | OUTPATIENT
Start: 2022-05-08 | End: 2022-05-18

## 2022-05-08 RX ORDER — MIRTAZAPINE 45 MG/1
7.5 TABLET, ORALLY DISINTEGRATING ORAL AT BEDTIME
Refills: 0 | Status: DISCONTINUED | OUTPATIENT
Start: 2022-05-08 | End: 2022-05-18

## 2022-05-08 RX ORDER — ASPIRIN/CALCIUM CARB/MAGNESIUM 324 MG
81 TABLET ORAL DAILY
Refills: 0 | Status: DISCONTINUED | OUTPATIENT
Start: 2022-05-08 | End: 2022-05-18

## 2022-05-08 RX ORDER — CEFTRIAXONE 500 MG/1
1000 INJECTION, POWDER, FOR SOLUTION INTRAMUSCULAR; INTRAVENOUS EVERY 24 HOURS
Refills: 0 | Status: COMPLETED | OUTPATIENT
Start: 2022-05-08 | End: 2022-05-11

## 2022-05-08 RX ORDER — PANTOPRAZOLE SODIUM 20 MG/1
40 TABLET, DELAYED RELEASE ORAL
Refills: 0 | Status: DISCONTINUED | OUTPATIENT
Start: 2022-05-08 | End: 2022-05-18

## 2022-05-08 RX ORDER — RANOLAZINE 500 MG/1
500 TABLET, FILM COATED, EXTENDED RELEASE ORAL
Refills: 0 | Status: DISCONTINUED | OUTPATIENT
Start: 2022-05-08 | End: 2022-05-18

## 2022-05-08 RX ORDER — LANOLIN ALCOHOL/MO/W.PET/CERES
5 CREAM (GRAM) TOPICAL AT BEDTIME
Refills: 0 | Status: DISCONTINUED | OUTPATIENT
Start: 2022-05-08 | End: 2022-05-18

## 2022-05-08 RX ORDER — FINASTERIDE 5 MG/1
5 TABLET, FILM COATED ORAL DAILY
Refills: 0 | Status: DISCONTINUED | OUTPATIENT
Start: 2022-05-08 | End: 2022-05-18

## 2022-05-08 RX ORDER — HEPARIN SODIUM 5000 [USP'U]/ML
5000 INJECTION INTRAVENOUS; SUBCUTANEOUS EVERY 12 HOURS
Refills: 0 | Status: DISCONTINUED | OUTPATIENT
Start: 2022-05-08 | End: 2022-05-18

## 2022-05-08 RX ORDER — DOXAZOSIN MESYLATE 4 MG
4 TABLET ORAL AT BEDTIME
Refills: 0 | Status: DISCONTINUED | OUTPATIENT
Start: 2022-05-08 | End: 2022-05-08

## 2022-05-08 RX ORDER — METOPROLOL TARTRATE 50 MG
50 TABLET ORAL DAILY
Refills: 0 | Status: DISCONTINUED | OUTPATIENT
Start: 2022-05-08 | End: 2022-05-15

## 2022-05-08 RX ORDER — MAGNESIUM HYDROXIDE 400 MG/1
15 TABLET, CHEWABLE ORAL DAILY
Refills: 0 | Status: DISCONTINUED | OUTPATIENT
Start: 2022-05-08 | End: 2022-05-18

## 2022-05-08 RX ORDER — ONDANSETRON 8 MG/1
4 TABLET, FILM COATED ORAL EVERY 8 HOURS
Refills: 0 | Status: DISCONTINUED | OUTPATIENT
Start: 2022-05-08 | End: 2022-05-18

## 2022-05-08 RX ORDER — FUROSEMIDE 40 MG
40 TABLET ORAL DAILY
Refills: 0 | Status: DISCONTINUED | OUTPATIENT
Start: 2022-05-08 | End: 2022-05-08

## 2022-05-08 RX ORDER — TAMSULOSIN HYDROCHLORIDE 0.4 MG/1
0.4 CAPSULE ORAL AT BEDTIME
Refills: 0 | Status: DISCONTINUED | OUTPATIENT
Start: 2022-05-08 | End: 2022-05-18

## 2022-05-08 RX ADMIN — HEPARIN SODIUM 5000 UNIT(S): 5000 INJECTION INTRAVENOUS; SUBCUTANEOUS at 23:07

## 2022-05-08 RX ADMIN — MIRTAZAPINE 7.5 MILLIGRAM(S): 45 TABLET, ORALLY DISINTEGRATING ORAL at 23:08

## 2022-05-08 RX ADMIN — ATORVASTATIN CALCIUM 80 MILLIGRAM(S): 80 TABLET, FILM COATED ORAL at 23:08

## 2022-05-08 RX ADMIN — RANOLAZINE 500 MILLIGRAM(S): 500 TABLET, FILM COATED, EXTENDED RELEASE ORAL at 23:08

## 2022-05-08 RX ADMIN — Medication 5 MILLIGRAM(S): at 23:08

## 2022-05-08 RX ADMIN — CEFTRIAXONE 100 MILLIGRAM(S): 500 INJECTION, POWDER, FOR SOLUTION INTRAMUSCULAR; INTRAVENOUS at 18:42

## 2022-05-08 NOTE — ED ADULT NURSE REASSESSMENT NOTE - NS ED NURSE REASSESS COMMENT FT1
Trevino catheter in place from facility, Trevino catheter removed by LUCIANA Dale as per Dr. Gonsales order. Penial region macerated and blood noted at head of penis. skin care performed. urine appeared cloudy yellow, with sediments noted.

## 2022-05-08 NOTE — H&P ADULT - ASSESSMENT
88 y/o M presents with rash on buttock and UTI     1. Leukcoytosis secondary to UTI vs. left heel ulcer/eschar vs. sacral decubitus vs. penile ulceration   - Admit to med/surg   - Does not meet SIRS criteria  - WBC 12.78, UA: moderate leukocyte esterase, occasional bacteria   - s/p Ceftriaxone in ER, will continue   - f/u UCx, BCx x 2, procalcitonin, ESR, CRP, XR left heel; adjust abx based on sensitivities  - Pressure ulcer on buttocks is stage 2, unlikely osteomyelitis   - Wound care consult   - Trend WBC, monitor for temperatures   - Tylenol for temperatures PRN   - Monitor off IVF for now   - Monitor BP closely   - Podiatry consult - Dr. Tobar   - ID consult - Dr. Reinoso   - Urology consult - Dr. Srivastava     2. Lower extremity swelling likely secondary to venous insufficiency and CHFpEF   - ECHO (12/2021): moderate MR, mild AR, mild TR, LA dilation, LVEF 50%, trace pericardial effusion   - Ordered BNP, ECHO   - Strict I+Os, daily weights   - Keep K > 4 and Mg > 2   - c/w home medications   - Encourage elevation of lower extremities b/l   - Lasix dose recently adjusted by Dr. Galan (nephrology) which was also discussed with Dr. Hyatt -> alternating 20 mg and 40 mg doses of lasix daily     3. Normocytic anemia   - Hb 9.6, monitor closely     4. Hyperglycemia   - Glucose 128, monitor closely   - Ordered HbA1c     5. Hypoalbuminemia   - Albumin 2.0   - Nutrition consult     6. History of HTN, CAD s/p CABG, AAA s/p repair, CKD Stage 3, CHFpEF, insomnia, obstructive/reflux uropathy, BPH   - c/w home medications; reviewed at the bedside and discussed that lasix dose is now alternating 20 mg and 40 mg and terazosin was d/c   - Cr 1.71 (baseline ~2 as per pt's son), monitor     DVT ppx: Heparin 5,000 units Q12H (hold for PLT <50,000)   Code status: Full code (Pt agrees to chest compressions and intubation if required).     *Please call and speak to the pt's son Nicolas Collazo (his other son Xavier is his HCP; however, the pt wanted me to update his son Nicolas who is his emergency contact) with updates regarding the plan of care  88 y/o M presents with rash on buttock and UTI     1. Leukcoytosis secondary to UTI vs. left heel ulcer/eschar vs. sacral decubitus vs. penile ulceration   - Admit to med/surg   - Does not meet SIRS criteria  - WBC 12.78, UA: moderate leukocyte esterase, occasional bacteria   - s/p Ceftriaxone in ER, will continue   - f/u UCx, BCx x 2, procalcitonin, ESR, CRP, XR left heel; adjust abx based on sensitivities  - Pressure ulcer on buttocks is stage 2, unlikely osteomyelitis   - Wound care consult   - Trend WBC, monitor for temperatures   - Tylenol for temperatures PRN   - Monitor off IVF for now   - Monitor BP closely   - Podiatry consult - Dr. Tobar   - ID consult - Dr. Reinoso   - Urology consult - Dr. Srivastava     2. Lower extremity swelling likely secondary to venous insufficiency and CHFpEF   - ECHO (12/2021): moderate MR, mild AR, mild TR, LA dilation, LVEF 50%, trace pericardial effusion   - Ordered BNP, ECHO   - Strict I+Os, daily weights   - Keep K > 4 and Mg > 2   - c/w home medications   - Encourage elevation of lower extremities b/l   - Lasix dose recently adjusted by Dr. Galan (nephrology) which was also discussed with Dr. Hyatt -> alternating 20 mg and 40 mg doses of lasix daily     3. Normocytic anemia   - Hb 9.6, monitor closely     4. Hyperglycemia   - Glucose 128, monitor closely   - Ordered HbA1c     5. Hypoalbuminemia   - Albumin 2.0   - Nutrition consult     6. History of HTN, CAD s/p CABG, AAA s/p repair, CKD Stage 3, CHFpEF, insomnia, obstructive/reflux uropathy, BPH   - c/w home medications; reviewed at the bedside and discussed that lasix dose is now alternating 20 mg and 40 mg and terazosin was d/c   - Cr 1.71 (baseline ~2 as per pt's son), monitor   - EKG shows possible PPM failure, will have PPM interrogated by EP     DVT ppx: Heparin 5,000 units Q12H (hold for PLT <50,000)   Code status: Full code (Pt agrees to chest compressions and intubation if required).     *Please call and speak to the pt's son Nicolas Collazo (his other son Xavier is his HCP; however, the pt wanted me to update his son Nicolas who is his emergency contact) with updates regarding the plan of care  89 y/o M presents with rash on buttock and UTI     1. Leukcoytosis secondary to UTI vs. left heel ulcer/eschar vs. sacral decubitus vs. 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 Tobar   - ID consult - Dr. Reinoso   - Urology consult - Dr. Srivastava     2. Lower extremity swelling likely secondary to venous insufficiency and CHFpEF   - ECHO (12/2021): moderate MR, mild AR, mild TR, LA dilation, LVEF 50%, trace pericardial effusion   - Ordered BNP, ECHO   - Strict I+Os, daily weights   - Keep K > 4 and Mg > 2   - c/w home medications   - Encourage elevation of lower extremities b/l   - Lasix dose recently adjusted by Dr. Galan (nephrology) which was also discussed with Dr. Hyatt -> alternating 20 mg and 40 mg doses of lasix daily     3. Normocytic anemia   - Hb 9.6, monitor closely     4. Hyperglycemia   - Glucose 128, monitor closely   - Ordered HbA1c     5. Hypoalbuminemia   - Albumin 2.0   - Nutrition consult     6. History of HTN, CAD s/p CABG, AAA s/p repair, CKD Stage 3, CHFpEF, insomnia, obstructive/reflux uropathy, BPH   - c/w home medications; reviewed at the bedside and discussed that lasix dose is now alternating 20 mg and 40 mg and terazosin was d/c   - Cr 1.71 (baseline ~2 as per pt's son), monitor   - EKG shows possible PPM failure, will have PPM interrogated by EP     DVT ppx: Heparin 5,000 units Q12H (hold for PLT <50,000)   Code status: Full code (Pt agrees to chest compressions and intubation if required).     *Please call and speak to the pt's son Nicolas Collazo (his other son Xavier is his HCP; however, the pt wanted me to update his son Nicolas who is his emergency contact) with updates regarding the plan of care

## 2022-05-08 NOTE — ED ADULT NURSE REASSESSMENT NOTE - NS ED NURSE REASSESS COMMENT FT1
Pt had BM, stool soft and formed, brown. Skin breakdown noted, see ED ADULT NURSE NOTE FOR FURTHER ASSESSMENT. Perianal care provided. linen and pt gown changed. Skin care provided to pt, pt turned and repositioned. Dr. Dru tang. Pt had BM, stool soft and formed, brown. Skin breakdown noted, see ED ADULT NURSE NOTE FOR FURTHER ASSESSMENT. Perianal care provided. linen and pt gown changed. Skin care provided to pt, pt turned and repositioned. Dr. Gonsales aware. Allevyn dressing applied.

## 2022-05-08 NOTE — ED ADULT NURSE NOTE - NSHOSCREENINGQ1_ED_ALL_ED
S: see above; tech note reviewed. ANDIE/DIAN noted above.  CC/HPI:      --GH pt      VISION STABLE since last eye exam/eval     Still gets occas \"HONEYCOMB VISION\" x 30 min; off/on ~ 3-4X/YR since ~ '04; none past few mo  no HA, N/V    No (other) assoc signs/symptoms  No other new visual c/o      Current need(s) for glasses reviewed: PG 5/16     Use of eye gtts reviewed  Brimonidine TID OU  Latanoprost OU qhs**   --FAITHFUL and correct per pt    MEDS verified   PMH/PROB/PSH/FH/SH: reviewed   POH: see above  ROS:  1) see HPI(S)/POH for ocular  ROS  2) denies recent/progressive HA's  3) general health fairly stable, feeling fine  Nb:   followed by Neurology since 4/06 w NPH (Hydrocephalus) s/p fall (no head inj):   Drs felt NPH may have caused fall, not as a result  MRI 12/06, 6/07, 6/13: stable (+- some incr microvasc white matter dz)  Neuro: Dr. Coreen Peña      SLE :       L/L/L:  OD: nl //OS: nl       CONJ:  OD: clear  //OS: clear         CORNEA:  OD: clear  //OS: clear       AC:  OD: d,q  // OS: d,q; IV OU       IRIS:  OD: clear  //OS: clear       LENS:  OD: 2+ NS  //OS: 2+ NS  Good dilation OU    FUNDUS: C/D  0.75 /0.65 w temp slope, + svp ou          vitr clear with nl V/P          mac: OD: clear               OS: clear          no break/RD    EXT EXAM:  normal  NPs: A&O x 3, no apparent distress    A:  see dx below; tech note reviewed and accepted    P:  see orders/disposition     GLAUC Hx:      dilated  10/05, 1/07, 1/08, 12/08, 10/09, 1/11, 1/12, 2/13, 2/14, 4/15, 5/16, 5/17 GH, 5/18 MF**      PACH: '05: NL OU      last VF 5/04, 4/05, 4/06, 5/07, 2/08, 3/09, 1/10, 5/11, 5/12, 6/13, 10/14, 1/16, 1/17, 11/17  --unrel/wnl      last GDX 7/05, 9/06      STRATUS OCT 2/08, 3/09, 4/10, 5/11, 9/12  CIRRUS (Spectral Domain) HD-OCT: '13, 4/15, 5/16, 11/17        gonio 2/02 KK (open), 1/04 (TH), 9/16      Photos 10/09      Tmax: 42/27 in 2002 [facility] (records rev'd)--> 20's on gtts;   Xal added OD 1/05--> ch to [facility] 7/05  (Ins)       other : rf: +fhx (sis);   + ASTHMA since ~ '03--> inhaler--AVOID BBL  d/c T 1/2 10/05(DX w ASTHMA) --> ch to [facility] P BID OU  --Brimon incr to TID 6/08**    --s/p ALT OD 6/09: 26/20 --> 20-22/20  Eduardo (Z) was just OD: OU since 10/09**    --changed to Latanoprost OU qhs ~ 10/14 ($)**    COMMENT: conditions stable, observation reccommended, pt concerned...reassured and questions answered   Rx for glasses given--PRN EXTRA SC (? FRAMELESS)      Cont current gtts:  Latanoprost OU qhs**   Brimon tid OU  --pt will CONT to try and get 2nd dose in before pm appts    VF WNL but unrel  --OCT stable ou +- ??      Q 6 MOS OK    check Visual Field: HVF 24-2 / OCT           No

## 2022-05-08 NOTE — H&P ADULT - NSHPOUTPATIENTPROVIDERS_GEN_ALL_CORE
PCP - Dr. Boxer   Cardiologist - Dr. Hyatt   Nephrologist - Dr. Galan   Urologist - Dr. Mat Park   EP - Dr. Merlos   All physicians are with [facility]

## 2022-05-08 NOTE — H&P ADULT - NSICDXPASTMEDICALHX_GEN_ALL_CORE_FT
PAST MEDICAL HISTORY:  AAA (abdominal aortic aneurysm)     BPH (benign prostatic hyperplasia)     CAD (coronary artery disease)     H/O urinary retention     History of CHF (congestive heart failure)     HTN (hypertension)     Stage 3 chronic kidney disease

## 2022-05-08 NOTE — H&P ADULT - HISTORY OF PRESENT ILLNESS
89 y/o M with PMH HTN, CAD s/p CABG, AAA s/p repair, CKD Stage 3, CHFpEF, insomnia, obstructive/reflux uropathy, BPH presents with a rash on the buttocks and UTI. Pt states that he lives at [facility] and there was concern because there was a rash on his buttock and the nurses thought he may have a UTI. Reports intermittent abd pain, constipation, loss of bowel control, swelling in his lower extremities at the end of the day. Also reports left heel ulcer. Denies fevers, chills, chest pain, SOB, N/V, diarrhea. The pt states that he recently saw a nephrologist who changed his lasix from 40 mg daily to alternating between 20 mg and 40 mg doses.     Prior admissions:   - 3/25/2022-3/26/2022: weakness -> post obstructive urinary retention -> waite catheter placed -> doxycycline for scalp wound     ER course: VSS. Labs: WBC 12.78, Hb 9.6, Na 131, BUN 36, Cr 1.71 (baseline ~1.8), Glucose 128, calcium 8.0 -> corrected 9.6, albumin 2.0. UA: cloudy, moderate leukocyte esterase, large blood, WBC 6-10, RBC 25-50, occasional bacteria. COVID negative.     EKG: pending     CXR: PPM present, rotated film, no consolidation, cardiomegaly, sternotomy wires, no effusion, no pneumothorax (personally reviewed).     Pt was given Ceftriaxone. He is being admitted to med/surg for further management.  88 y/o M with PMH HTN, CAD s/p CABG, AAA s/p repair, CKD Stage 3, CHFpEF, insomnia, obstructive/reflux uropathy, BPH presents with a rash on the buttocks and UTI. Pt states that he lives at [facility] and there was concern because there was a rash on his buttock and the nurses thought he may have a UTI. Reports intermittent abd pain, constipation, loss of bowel control, swelling in his lower extremities at the end of the day. Also reports left heel ulcer. Denies fevers, chills, chest pain, SOB, N/V, diarrhea. The pt states that he recently saw a nephrologist who changed his lasix from 40 mg daily to alternating between 20 mg and 40 mg doses.     Prior admissions:   - 3/25/2022-3/26/2022: weakness -> post obstructive urinary retention -> waite catheter placed -> doxycycline for scalp wound     ER course: VSS. Labs: WBC 12.78, Hb 9.6, Na 131, BUN 36, Cr 1.71 (baseline ~1.8), Glucose 128, calcium 8.0 -> corrected 9.6, albumin 2.0. UA: cloudy, moderate leukocyte esterase, large blood, WBC 6-10, RBC 25-50, occasional bacteria. COVID negative. EKG: Ventricularly paced rhythm, HR 78 bpm, pacer spikes visualized. CXR: PPM present, rotated film, no consolidation, cardiomegaly, sternotomy wires, no effusion, no pneumothorax (personally reviewed).     Pt was given Ceftriaxone. He is being admitted to med/surg for further management.

## 2022-05-08 NOTE — ED ADULT NURSE NOTE - OBJECTIVE STATEMENT
Pt BIBEMS from [facility] c/o "rule out urinary infection". pt denies complaints at this time. pt has waite catheter in place upon arrival that was inserted x2 weeks ago. Pt BIBEMS from [facility], c/o "rule out urinary infection". pt denies complaints at this time. pt has waite catheter in place upon arrival that was inserted x2 weeks ago. pt unkempt, has bruising to sacral region, B/L hip bruising. Pt incontinent of stool. Wound noted at head of penis, +blood, +pain upon palpation. pt denies pain at this time, pt states intermittent sacral pain at assisted living, multiple skin tears noted to all 4 extremities, and generalized bruising noted. see skin assessment below for further details.

## 2022-05-08 NOTE — ED PROVIDER NOTE - PROGRESS NOTE DETAILS
BARON Fenton attest that this documentation has been prepared under the direction and in the presence of Dr. Hanley Migue Hanley : Spoke to nursing supervisor Malgorzata who said nurse was concerned at [facility] because pt had blood at the tip of the penis and perineurium and yellow discharge. Nurse was concerned for infection so pt was sent to the ER. pt with uti, multiple sacral decubitus ulcers and unable to walk, pt lives at [facility], no soc ial work today will admit for tx 'Documentation as noted above by keiko Fenton. Agree with notes. Lashell BRENNER

## 2022-05-08 NOTE — H&P ADULT - NSHPREVIEWOFSYSTEMS_GEN_ALL_CORE
Constitutional: negative for fatigue, negative for fever, negative for chills, negative for decreased appetite.  Skin: negative for rashes, negative for open wounds, negative for jaundice.   Eyes: negative for blurry vision, negative for double vision.   Ears, nose, throat: negative for ear pain, negative for nasal congestion, negative for sore throat, negative for lymph node swelling.   Cardiovascular: negative for chest pain, negative for palpitations, negative for lower extremity swelling.   Respiratory: negative for shortness of breath, negative for wheezing, negative for cough.   Gastrointestinal: positive for abdominal pain, negative for nausea, negative for vomiting, negative for diarrhea, negative for constipation, negative for blood in the stool, negative for black tarry stools.   Genitourinary: negative for burning on urination, negative for urinary urgency or frequency, negative for blood in the urine.   Endocrine: negative for cold intolerance, negative for heat intolerance, negative for increased thirst.   Hematologic: negative for easy bruising or bleeding.   Musculoskeletal: negative for muscle/joint pain, negative for decreased range of motion.   Neurological: negative for dizziness, negative for headaches, negative for loss of consciousness, negative for motor weakness, negative for sensory deficits.   Psychiatric: negative for depression, negative for anxiety.

## 2022-05-08 NOTE — H&P ADULT - NSHPPHYSICALEXAM_GEN_ALL_CORE
ICU Vital Signs Last 24 Hrs  T(C): 37.4 (08 May 2022 17:24), Max: 37.4 (08 May 2022 17:24)  T(F): 99.3 (08 May 2022 17:24), Max: 99.3 (08 May 2022 17:24)  HR: 89 (08 May 2022 15:50) (89 - 89)  BP: 111/69 (08 May 2022 15:50) (111/69 - 111/69)  RR: 18 (08 May 2022 15:50) (18 - 18)  SpO2: 97% (08 May 2022 15:50) (97% - 97%)    General: Awake and alert, cooperative with exam. No acute distress.   Skin: Warm, dry, and pink.   Eyes: Pupils equal and reactive to light. Extraocular eye movements intact. No conjunctival injection, discharge, or scleral icterus.   HEENT: Atraumatic, normocephalic. Moist mucus membranes.   Cardiology: Normal S1, S2. No murmurs, rubs, or gallops. Regular rate and rhythm.   Respiratory: Lungs clear to ascultation bilaterally. Good air exchange. No wheezes, rales, or rhonchi. Normal chest expansion.   Gastrointestinal: Positive bowel sounds. Soft, non-tender, non-distended. No guarding, rigidity, or rebound tenderness. No hepatosplenomegaly.   Genitourinary: Trevino catheter draining cloudy yellow urine. Maceration of the glans penis, no purulent drainage or discharge, erythema present.   Buttocks: Stage 2 pressure ulcer.   Musculoskeletal: 5/5 motor strength in all extremities. Normal range of motion.   Extremities: 3+ peripheral edema bilaterally. Dorsalis pedis pulses 2+ bilaterally. Left heel pressure ulcer with eschar.   Neurological: A+Ox3 (person, place, and time). Cranial nerves 2-12 intact. Normal speech. No facial droop. No focal neurological deficits.   Psychiatric: Normal affect. Normal mood.

## 2022-05-08 NOTE — H&P ADULT - NSICDXFAMILYHX_GEN_ALL_CORE_FT
FAMILY HISTORY:  Father  Still living? Unknown  Family history of hyperglycemia, Age at diagnosis: Age Unknown    Mother  Still living? Unknown  FH: hypertension, Age at diagnosis: Age Unknown

## 2022-05-08 NOTE — ED PROVIDER NOTE - OBJECTIVE STATEMENT
89 y/o male with PMHx of HTN, CAD, CABG, AAA s/p repair, CKD III presents to the ED BIBEMS from [facility] because the nurse there thought pt had an infection. Pt states the nurse mentioned discharge coming from his penis, however is unsure. Denies fever, dysuria, back pain.

## 2022-05-09 LAB
A1C WITH ESTIMATED AVERAGE GLUCOSE RESULT: 5.3 % — SIGNIFICANT CHANGE UP (ref 4–5.6)
ALBUMIN SERPL ELPH-MCNC: 1.7 G/DL — LOW (ref 3.3–5)
ALP SERPL-CCNC: 79 U/L — SIGNIFICANT CHANGE UP (ref 40–120)
ALT FLD-CCNC: 23 U/L — SIGNIFICANT CHANGE UP (ref 12–78)
ANION GAP SERPL CALC-SCNC: 7 MMOL/L — SIGNIFICANT CHANGE UP (ref 5–17)
AST SERPL-CCNC: 17 U/L — SIGNIFICANT CHANGE UP (ref 15–37)
BASOPHILS # BLD AUTO: 0.04 K/UL — SIGNIFICANT CHANGE UP (ref 0–0.2)
BASOPHILS NFR BLD AUTO: 0.4 % — SIGNIFICANT CHANGE UP (ref 0–2)
BILIRUB SERPL-MCNC: 0.5 MG/DL — SIGNIFICANT CHANGE UP (ref 0.2–1.2)
BUN SERPL-MCNC: 32 MG/DL — HIGH (ref 7–23)
CALCIUM SERPL-MCNC: 8.1 MG/DL — LOW (ref 8.5–10.1)
CHLORIDE SERPL-SCNC: 97 MMOL/L — SIGNIFICANT CHANGE UP (ref 96–108)
CO2 SERPL-SCNC: 28 MMOL/L — SIGNIFICANT CHANGE UP (ref 22–31)
CREAT SERPL-MCNC: 1.44 MG/DL — HIGH (ref 0.5–1.3)
EGFR: 46 ML/MIN/1.73M2 — LOW
EOSINOPHIL # BLD AUTO: 0.31 K/UL — SIGNIFICANT CHANGE UP (ref 0–0.5)
EOSINOPHIL NFR BLD AUTO: 3.4 % — SIGNIFICANT CHANGE UP (ref 0–6)
ESTIMATED AVERAGE GLUCOSE: 105 MG/DL — SIGNIFICANT CHANGE UP (ref 68–114)
GLUCOSE SERPL-MCNC: 88 MG/DL — SIGNIFICANT CHANGE UP (ref 70–99)
HCT VFR BLD CALC: 29 % — LOW (ref 39–50)
HGB BLD-MCNC: 9.1 G/DL — LOW (ref 13–17)
IMM GRANULOCYTES NFR BLD AUTO: 0.9 % — SIGNIFICANT CHANGE UP (ref 0–1.5)
LYMPHOCYTES # BLD AUTO: 0.81 K/UL — LOW (ref 1–3.3)
LYMPHOCYTES # BLD AUTO: 9 % — LOW (ref 13–44)
MCHC RBC-ENTMCNC: 27.7 PG — SIGNIFICANT CHANGE UP (ref 27–34)
MCHC RBC-ENTMCNC: 31.4 GM/DL — LOW (ref 32–36)
MCV RBC AUTO: 88.4 FL — SIGNIFICANT CHANGE UP (ref 80–100)
MONOCYTES # BLD AUTO: 0.67 K/UL — SIGNIFICANT CHANGE UP (ref 0–0.9)
MONOCYTES NFR BLD AUTO: 7.4 % — SIGNIFICANT CHANGE UP (ref 2–14)
NEUTROPHILS # BLD AUTO: 7.12 K/UL — SIGNIFICANT CHANGE UP (ref 1.8–7.4)
NEUTROPHILS NFR BLD AUTO: 78.9 % — HIGH (ref 43–77)
PLATELET # BLD AUTO: 221 K/UL — SIGNIFICANT CHANGE UP (ref 150–400)
POTASSIUM SERPL-MCNC: 3.8 MMOL/L — SIGNIFICANT CHANGE UP (ref 3.5–5.3)
POTASSIUM SERPL-SCNC: 3.8 MMOL/L — SIGNIFICANT CHANGE UP (ref 3.5–5.3)
PROT SERPL-MCNC: 5.3 GM/DL — LOW (ref 6–8.3)
RBC # BLD: 3.28 M/UL — LOW (ref 4.2–5.8)
RBC # FLD: 14.5 % — SIGNIFICANT CHANGE UP (ref 10.3–14.5)
SODIUM SERPL-SCNC: 132 MMOL/L — LOW (ref 135–145)
WBC # BLD: 9.03 K/UL — SIGNIFICANT CHANGE UP (ref 3.8–10.5)
WBC # FLD AUTO: 9.03 K/UL — SIGNIFICANT CHANGE UP (ref 3.8–10.5)

## 2022-05-09 PROCEDURE — 93308 TTE F-UP OR LMTD: CPT | Mod: 26

## 2022-05-09 PROCEDURE — 99232 SBSQ HOSP IP/OBS MODERATE 35: CPT

## 2022-05-09 RX ADMIN — Medication 50 MILLIGRAM(S): at 09:16

## 2022-05-09 RX ADMIN — CLOPIDOGREL BISULFATE 75 MILLIGRAM(S): 75 TABLET, FILM COATED ORAL at 09:17

## 2022-05-09 RX ADMIN — ATORVASTATIN CALCIUM 80 MILLIGRAM(S): 80 TABLET, FILM COATED ORAL at 21:29

## 2022-05-09 RX ADMIN — PANTOPRAZOLE SODIUM 40 MILLIGRAM(S): 20 TABLET, DELAYED RELEASE ORAL at 05:27

## 2022-05-09 RX ADMIN — CEFTRIAXONE 100 MILLIGRAM(S): 500 INJECTION, POWDER, FOR SOLUTION INTRAMUSCULAR; INTRAVENOUS at 17:40

## 2022-05-09 RX ADMIN — HEPARIN SODIUM 5000 UNIT(S): 5000 INJECTION INTRAVENOUS; SUBCUTANEOUS at 21:29

## 2022-05-09 RX ADMIN — FINASTERIDE 5 MILLIGRAM(S): 5 TABLET, FILM COATED ORAL at 09:17

## 2022-05-09 RX ADMIN — HEPARIN SODIUM 5000 UNIT(S): 5000 INJECTION INTRAVENOUS; SUBCUTANEOUS at 09:17

## 2022-05-09 RX ADMIN — Medication 500 MILLIGRAM(S): at 09:17

## 2022-05-09 RX ADMIN — Medication 20 MILLIGRAM(S): at 09:17

## 2022-05-09 RX ADMIN — RANOLAZINE 500 MILLIGRAM(S): 500 TABLET, FILM COATED, EXTENDED RELEASE ORAL at 21:29

## 2022-05-09 RX ADMIN — Medication 325 MILLIGRAM(S): at 09:17

## 2022-05-09 RX ADMIN — RANOLAZINE 500 MILLIGRAM(S): 500 TABLET, FILM COATED, EXTENDED RELEASE ORAL at 09:16

## 2022-05-09 RX ADMIN — Medication 81 MILLIGRAM(S): at 09:17

## 2022-05-09 NOTE — PROGRESS NOTE ADULT - ASSESSMENT
Assesment: 88 y/o male see in the ED for the following   - Left heel pressure wound to the heel  - Right pre-ulcerative wound to the heel  - Pain in left Foot   -Difficulty with ambulation    Plan:   Chart reviewed and Patient evaluated;   Discussed diagnosis and treatment with patient  Re-applied betadine soaked gauze with dry sterile dressing to the left heel.   Will place nursing orders for local wound care to be applied daily, appreciated   X-rays reviewed : wet read Shows no osseous fx or soft tissue emphysema. Will follow up with final impression   Continue with IV antibiotics As Per ID, appreciated   Offloading to bilateral Heels while in bed  Patient demonstrated verbal understanding of all interventions and tolerated interventions well without any complications.   All additional care per medicine, appreciated   Wound to left heel does not appear infectious in nature and no surgical interventions warranted presently, will continue with local wound care  Case d/w with attending, Dr. Tobar will advise if plan changes   Podiatry will follow while in house.

## 2022-05-09 NOTE — DIETITIAN INITIAL EVALUATION ADULT - PERTINENT MEDS FT
MEDICATIONS  (STANDING):  ascorbic acid 500 milliGRAM(s) Oral daily  aspirin enteric coated 81 milliGRAM(s) Oral daily  atorvastatin 80 milliGRAM(s) Oral at bedtime  bisacodyl Oral Tab/Cap - Peds 5 milliGRAM(s) Oral every 12 hours  cefTRIAXone   IVPB 1000 milliGRAM(s) IV Intermittent every 24 hours  clopidogrel Tablet 75 milliGRAM(s) Oral daily  ferrous    sulfate 325 milliGRAM(s) Oral daily  finasteride 5 milliGRAM(s) Oral daily  furosemide    Tablet 40 milliGRAM(s) Oral <User Schedule>  furosemide   Oral Tab/Cap - Peds 20 milliGRAM(s) Oral every other day  heparin   Injectable 5000 Unit(s) SubCutaneous every 12 hours  melatonin 5 milliGRAM(s) Oral at bedtime  metoprolol succinate ER 50 milliGRAM(s) Oral daily  mirtazapine 7.5 milliGRAM(s) Oral at bedtime  pantoprazole    Tablet 40 milliGRAM(s) Oral before breakfast  polyethylene glycol 3350 17 Gram(s) Oral daily  ranolazine 500 milliGRAM(s) Oral two times a day  tamsulosin 0.4 milliGRAM(s) Oral at bedtime    MEDICATIONS  (PRN):  acetaminophen     Tablet .. 650 milliGRAM(s) Oral every 6 hours PRN Temp greater or equal to 38C (100.4F), Mild Pain (1 - 3)  aluminum hydroxide/magnesium hydroxide/simethicone Suspension 30 milliLiter(s) Oral every 4 hours PRN Dyspepsia  magnesium hydroxide Suspension 15 milliLiter(s) Oral daily PRN Constipation  ondansetron Injectable 4 milliGRAM(s) IV Push every 8 hours PRN Nausea and/or Vomiting

## 2022-05-09 NOTE — DIETITIAN INITIAL EVALUATION ADULT - SIGNS/SYMPTOMS
as evidenced by meeting <75% nutrient needs >1 mo, severe muscle/fat loss,  as evidenced by meeting <75% nutrient needs >1 mo, severe muscle/fat loss, 9.4% wt loss

## 2022-05-09 NOTE — PROGRESS NOTE ADULT - ASSESSMENT
88 y/o M presents with rash on buttock and UTI     1. Leukocytosis likely due to UTI   - UTI associated with chronic waite   - waite changed in ED    - cont w/ ceftriaxone for now.   - Does not meet SIRS criteria  - Last UCx (3/2021): MRSA proteus -> repeat urine culture pending cont w/ contact isolation   - WBC 12.78, UA: moderate leukocyte esterase, occasional bacteria   - f/u UCx, BCx x 2, procalcitonin, ESR,   - CRP elevated   - Pressure ulcer on buttocks is stage 2, unlikely osteomyelitis   - Wound care consult   - Trend WBC, monitor for temperatures   - Tylenol for temperatures PRN   - Monitor off IVF for now   - Monitor BP closely   - ID consult - Dr. Reinoso   - Urology consult - Dr. Srivastava     # left heel ulceration   - podiatry consulted   - xray negative for free air or bony involvement   - podiatry consulted. localized wound care.     # hyponatremia   - likely due to poor PO intake and diuresis.   - slowly improving   - montior and trend for now.     2. Lower extremity swelling likely secondary to venous insufficiency and CHFpEF   - ECHO (12/2021): moderate MR, mild AR, mild TR, LA dilation, LVEF 50%, trace pericardial effusion   - Ordered BNP, ECHO   - Strict I+Os, daily weights   - Keep K > 4 and Mg > 2   - c/w home medications   - Encourage elevation of lower extremities b/l   - Lasix dose recently adjusted by Dr. Galan (nephrology) which was also discussed with Dr. Hyatt -> alternating 20 mg and 40 mg doses of lasix daily     3. Normocytic anemia   - Hb 9.6, monitor closely     4. Hyperglycemia   - Ordered HbA1c     5. severe protein calorie malnutrition   - Albumin 2.0   - Nutrition consult     6. History of HTN, CAD s/p CABG, AAA s/p repair, CKD Stage 3, CHFpEF, insomnia, obstructive/reflux uropathy, BPH   - c/w home medications; reviewed at the bedside and discussed that lasix dose is now alternating 20 mg and 40 mg and terazosin was d/c   - Cr 1.4 (baseline ~2 as per pt's son), monitor 9 nepo Dr. Galan   - EKG shows possible PPM failure, will have PPM interrogated by EP     DVT ppx: Heparin 5,000 units Q12H (hold for PLT <50,000)   Code status: Full code (Pt agrees to chest compressions and intubation if required).     Tee delgado at bedside and updated on plan of care.

## 2022-05-09 NOTE — DIETITIAN INITIAL EVALUATION ADULT - PERTINENT LABORATORY DATA
05-09    132<L>  |  97  |  32<H>  ----------------------------<  88  3.8   |  28  |  1.44<H>    Ca    8.1<L>      09 May 2022 07:58    TPro  5.3<L>  /  Alb  1.7<L>  /  TBili  0.5  /  DBili  x   /  AST  17  /  ALT  23  /  AlkPhos  79  05-09  A1C with Estimated Average Glucose Result: 5.7 % (12-18-21 @ 05:29)

## 2022-05-09 NOTE — DIETITIAN INITIAL EVALUATION ADULT - ETIOLOGY
related to inability to meet sufficient protein-energy in setting of advanced age, persistent lack of appetite, skin breakdown

## 2022-05-09 NOTE — PHYSICAL THERAPY INITIAL EVALUATION ADULT - IMPAIRMENTS FOUND, PT EVAL
gait, locomotion, and balance/gross motor/integumentary integrity/joint integrity and mobility/muscle strength

## 2022-05-09 NOTE — PHYSICAL THERAPY INITIAL EVALUATION ADULT - GENERAL OBSERVATIONS, REHAB EVAL
Pt was found lying in bed, on contact precs, Pt participated in therex but refusing to attempt oob with PT

## 2022-05-09 NOTE — PATIENT PROFILE ADULT - FALL HARM RISK - HARM RISK INTERVENTIONS

## 2022-05-09 NOTE — DIETITIAN INITIAL EVALUATION ADULT - ORAL NUTRITION SUPPLEMENTS
Ensure Enlive TID to optimize po intake and provide an additional 350 kcal, 20g protein per serving.

## 2022-05-09 NOTE — DIETITIAN INITIAL EVALUATION ADULT - OTHER INFO
89 year old male with PMH of HTN, CAD s/p CABG, AAA s/p repair, CKD3, CHFpEF, insomnia, obstructive/reflux uropathy, BPH presents with a rash on the buttocks and UTI. Pt with intermittent abd pain, constipation, loss of bowel control, swelling in his lower extremities at the end of the day. Admitted with leukocytosis, lower extremity swelling likely secondary to venous insufficiency and CHFpEF.     Pt reports decreased appetite/po intake PTA due to disliking food at rehab facility he was at. Per transfer documentation, pt was receiving a FRAN diet. States he has lost ~15 lbs within the last couple of months. Appetite/po intake remains suboptimal. Noted with stage II pressure injury to coccyx and left heel ulcer/eschar. Pt receptive to receive Ensure Enlive with meals to optimize nutrition status. RD to follow up as feasible.    89 year old male with PMH of HTN, CAD s/p CABG, AAA s/p repair, CKD3, CHFpEF, insomnia, obstructive/reflux uropathy, BPH presents with a rash on the buttocks and UTI. Pt with intermittent abd pain, constipation, loss of bowel control, swelling in his lower extremities at the end of the day. Admitted with leukocytosis, lower extremity swelling likely secondary to venous insufficiency and CHFpEF.     Pt reports decreased appetite/po intake PTA due to disliking food at facility he was at. Per transfer documentation, pt was receiving a FRAN diet. States he has lost ~15 lbs within the last couple of months. Appetite/po intake remains suboptimal. Noted with stage II pressure injury to coccyx and left heel ulcer/eschar. Pt receptive to receive Ensure Enlive with meals to optimize nutrition status. RD to follow up as feasible.

## 2022-05-09 NOTE — DIETITIAN INITIAL EVALUATION ADULT - ADD RECOMMEND
Continue vitamin C and ferrous sulfate supplementation, add MVI daily. Continue Remeron to potentially increase appetite. Continue bowel regimen PRN. Encourage po intake, monitor diet tolerance, and provide assistance at meals as needed. Obtain daily weights to monitor trends.

## 2022-05-09 NOTE — PROGRESS NOTE ADULT - SUBJECTIVE AND OBJECTIVE BOX
Patient is a 89y old  Male who presents with a chief complaint of UTI, sacral ulcer (09 May 2022 13:57)      SUBJECTIVE:   HPI:  89 y/o M with PMH HTN, CAD s/p CABG, AAA s/p repair, CKD Stage 3, CHFpEF, insomnia, obstructive/reflux uropathy, BPH presents with a rash on the buttocks and UTI. Pt states that he lives at [facility] and there was concern because there was a rash on his buttock and the nurses thought he may have a UTI. Reports intermittent abd pain, constipation, loss of bowel control, swelling in his lower extremities at the end of the day. Also reports left heel ulcer. Denies fevers, chills, chest pain, SOB, N/V, diarrhea. The pt states that he recently saw a nephrologist who changed his lasix from 40 mg daily to alternating between 20 mg and 40 mg doses.     Prior admissions:   - 3/25/2022-3/26/2022: weakness -> post obstructive urinary retention -> waite catheter placed -> doxycycline for scalp wound   ER course: VSS. Labs: WBC 12.78, Hb 9.6, Na 131, BUN 36, Cr 1.71 (baseline ~1.8), Glucose 128, calcium 8.0 -> corrected 9.6, albumin 2.0. UA: cloudy, moderate leukocyte esterase, large blood, WBC 6-10, RBC 25-50, occasional bacteria. COVID negative. EKG: Ventricularly paced rhythm, HR 78 bpm, pacer spikes visualized. CXR: PPM present, rotated film, no consolidation, cardiomegaly, sternotomy wires, no effusion, no pneumothorax (personally reviewed).   Pt was given Ceftriaxone. He is being admitted to med/surg for further management.  (08 May 2022 20:24)    5/9: laying in bed, feeling better today, had good appetite. no pain or discomfort noted.     REVIEW OF SYSTEMS:    CONSTITUTIONAL: No weakness, fevers or chills  EYES/ENT: No visual changes;  No vertigo or throat pain   NECK: No pain or stiffness  RESPIRATORY: No cough, wheezing, hemoptysis; No shortness of breath  CARDIOVASCULAR: No chest pain or palpitations  GASTROINTESTINAL: No abdominal or epigastric pain. No nausea, vomiting, or hematemesis; No diarrhea or constipation. No melena or hematochezia.  GENITOURINARY: No dysuria, frequency or hematuria  NEUROLOGICAL: No numbness or weakness  SKIN: No itching, burning, rashes, or lesions   All other review of systems is negative unless indicated above      Vital Signs Last 24 Hrs  T(C): 36.3 (09 May 2022 09:30), Max: 37.4 (08 May 2022 17:24)  T(F): 97.3 (09 May 2022 09:30), Max: 99.3 (08 May 2022 17:24)  HR: 70 (09 May 2022 09:30) (70 - 89)  BP: 108/75 (09 May 2022 09:30) (108/75 - 123/44)  BP(mean): --  RR: 18 (09 May 2022 09:30) (18 - 18)  SpO2: 95% (09 May 2022 09:30) (95% - 97%)      PHYSICAL EXAM:    Constitutional: NAD, awake and alert, thin appearing.   HEENT: PERR, EOMI, Normal Hearing, MMM  Neck: Soft and supple, No LAD, No JVD  Respiratory: Breath sounds are clear bilaterally, No wheezing, rales or rhonchi  Cardiovascular: S1 and S2, regular rate and rhythm, no Murmurs, gallops or rubs  Gastrointestinal: Bowel Sounds present, soft, nontender, nondistended, no guarding, no rebound  GU_ waite caht in place, clear to cloudy yellow urine with penile ulceration   Extremities: No peripheral edema  Vascular: 2+ peripheral pulses  Neurological: A/O x 3, no focal deficits  Musculoskeletal: 5/5 strength b/l upper and lower extremities  Skin: No rashes, stage 2 sacral wound,     MEDICATIONS:  MEDICATIONS  (STANDING):  ascorbic acid 500 milliGRAM(s) Oral daily  aspirin enteric coated 81 milliGRAM(s) Oral daily  atorvastatin 80 milliGRAM(s) Oral at bedtime  bisacodyl Oral Tab/Cap - Peds 5 milliGRAM(s) Oral every 12 hours  cefTRIAXone   IVPB 1000 milliGRAM(s) IV Intermittent every 24 hours  clopidogrel Tablet 75 milliGRAM(s) Oral daily  ferrous    sulfate 325 milliGRAM(s) Oral daily  finasteride 5 milliGRAM(s) Oral daily  furosemide    Tablet 40 milliGRAM(s) Oral <User Schedule>  furosemide   Oral Tab/Cap - Peds 20 milliGRAM(s) Oral every other day  heparin   Injectable 5000 Unit(s) SubCutaneous every 12 hours  melatonin 5 milliGRAM(s) Oral at bedtime  metoprolol succinate ER 50 milliGRAM(s) Oral daily  mirtazapine 7.5 milliGRAM(s) Oral at bedtime  pantoprazole    Tablet 40 milliGRAM(s) Oral before breakfast  polyethylene glycol 3350 17 Gram(s) Oral daily  ranolazine 500 milliGRAM(s) Oral two times a day  tamsulosin 0.4 milliGRAM(s) Oral at bedtime      LABS: All Labs Reviewed:                        9.1    9.03  )-----------( 221      ( 09 May 2022 07:58 )             29.0     05-09    132<L>  |  97  |  32<H>  ----------------------------<  88  3.8   |  28  |  1.44<H>    Ca    8.1<L>      09 May 2022 07:58    TPro  5.3<L>  /  Alb  1.7<L>  /  TBili  0.5  /  DBili  x   /  AST  17  /  ALT  23  /  AlkPhos  79  05-09      RADIOLOGY/EKG:    < from: Xray Foot AP + Lateral, Left (05.08.22 @ 21:38) >  IMPRESSION:  Soft tissue ulcer with no gross cortical destruction. If there is   continued clinical concern follow-up MRI can be ordered    --- End of Report ---  MAGDALENA SPENCER DO; Attending Radiologist  This document has been electronically signed. May  9 2022  1:32PM    < end of copied text >

## 2022-05-09 NOTE — PHYSICAL THERAPY INITIAL EVALUATION ADULT - DIAGNOSIS, PT EVAL
Leukocytosis likely due to UTI, weakness, Left heel ulcer, sacral ulcer, Lower extremity swelling, mostly WC bound

## 2022-05-09 NOTE — PHYSICAL THERAPY INITIAL EVALUATION ADULT - PERTINENT HX OF CURRENT PROBLEM, REHAB EVAL
Pt presents with a rash on the buttocks and UTI. Reports intermittent abd pain, constipation, loss of bowel control, swelling in his lower extremities at the end of the day. Also reports left heel ulcer.,

## 2022-05-09 NOTE — PROGRESS NOTE ADULT - SUBJECTIVE AND OBJECTIVE BOX
Date of service: 5/9/22  CC/HPI: 89 y/o M with PMH HTN, CAD s/p CABG, AAA s/p repair, CKD Stage 3, CHFpEF, insomnia, obstructive/reflux uropathy, BPH presents with a rash on the buttocks and UTI. Pt states that he lives at [facility] and there was concern because there was a rash on his buttock and the nurses thought he may have a UTI. Podiatry was consulted for his left heel ulcer. Pt says that it is a pressure ulcer and that it is sensitive. . Patient is not very cooperative with further questioning at this time as he wants to get a bed in the hospital.     5/9; Pt seen at bedside for follow up of Lft heel wound. Pt resting comfortably in bed, NAD. Denies any new pedal complaints since last seen. States the left heel is still sensitive but better when offloaded.     Review of Systems: Constitutional: negative for fatigue, negative for fever, negative for chills, negative for decreased appetite.  Skin: negative for rashes, negative for open wounds, negative for jaundice.   Eyes: negative for blurry vision, negative for double vision.   Ears, nose, throat: negative for ear pain, negative for nasal congestion, negative for sore throat, negative for lymph node swelling.   Cardiovascular: negative for chest pain, negative for palpitations, negative for lower extremity swelling.   Respiratory: negative for shortness of breath, negative for wheezing, negative for cough.   Gastrointestinal: positive for abdominal pain, negative for nausea, negative for vomiting, negative for diarrhea, negative for constipation, negative for blood in the stool, negative for black tarry stools.   Genitourinary: negative for burning on urination, negative for urinary urgency or frequency, negative for blood in the urine.   Endocrine: negative for cold intolerance, negative for heat intolerance, negative for increased thirst.   Hematologic: negative for easy bruising or bleeding.   Musculoskeletal: negative for muscle/joint pain, negative for decreased range of motion.   Neurological: negative for dizziness, negative for headaches, negative for loss of consciousness, negative for motor weakness, negative for sensory deficits.   Psychiatric: negative for depression, negative for anxiety.  Skin: left heel wound, right heel pre-ulcerative ulcer    PMH: HTN (hypertension)  AAA (abdominal aortic aneurysm)  CAD (coronary artery disease)  Stage 3 chronic kidney disease  History of CHF (congestive heart failure)  BPH (benign prostatic hyperplasia)  H/O urinary retention    PSH:  S/P CABG (coronary artery bypass graft)  History of right hip replacement  CAD (coronary artery disease)  S/P left inguinal hernia repair  S/P AAA repair    Allergies:  Broccoli (Unknown)  penicillin (Vomiting; Nausea)    MEDICATIONS  (STANDING):  ascorbic acid 500 milliGRAM(s) Oral daily  aspirin enteric coated 81 milliGRAM(s) Oral daily  atorvastatin 80 milliGRAM(s) Oral at bedtime  bisacodyl Oral Tab/Cap - Peds 5 milliGRAM(s) Oral every 12 hours  cefTRIAXone   IVPB 1000 milliGRAM(s) IV Intermittent every 24 hours  clopidogrel Tablet 75 milliGRAM(s) Oral daily  ferrous    sulfate 325 milliGRAM(s) Oral daily  finasteride 5 milliGRAM(s) Oral daily  furosemide    Tablet 40 milliGRAM(s) Oral <User Schedule>  furosemide   Oral Tab/Cap - Peds 20 milliGRAM(s) Oral every other day  heparin   Injectable 5000 Unit(s) SubCutaneous every 12 hours  melatonin 5 milliGRAM(s) Oral at bedtime  metoprolol succinate ER 50 milliGRAM(s) Oral daily  mirtazapine 7.5 milliGRAM(s) Oral at bedtime  pantoprazole    Tablet 40 milliGRAM(s) Oral before breakfast  polyethylene glycol 3350 17 Gram(s) Oral daily  ranolazine 500 milliGRAM(s) Oral two times a day  tamsulosin 0.4 milliGRAM(s) Oral at bedtime      MEDICATIONS  (PRN):  acetaminophen     Tablet .. 650 milliGRAM(s) Oral every 6 hours PRN Temp greater or equal to 38C (100.4F), Mild Pain (1 - 3)  aluminum hydroxide/magnesium hydroxide/simethicone Suspension 30 milliLiter(s) Oral every 4 hours PRN Dyspepsia  magnesium hydroxide Suspension 15 milliLiter(s) Oral daily PRN Constipation  ondansetron Injectable 4 milliGRAM(s) IV Push every 8 hours PRN Nausea and/or Vomiting      Vitals  Vital Signs Last 24 Hrs  T(C): 36.3 (09 May 2022 09:30), Max: 37.4 (08 May 2022 17:24)  T(F): 97.3 (09 May 2022 09:30), Max: 99.3 (08 May 2022 17:24)  HR: 70 (09 May 2022 09:30) (70 - 89)  BP: 108/75 (09 May 2022 09:30) (108/75 - 123/44)  RR: 18 (09 May 2022 09:30) (18 - 18)  SpO2: 95% (09 May 2022 09:30) (95% - 97%)      Physical Exam:   Constitutiona: NAD, alert;  Derm:    Pre-uclerative wound to the right heel, no erythema, no pus, no drainage, no malodor, no probing.   Partial thickness ulceration noted to the left heel, no malodor, no drainage, no pus, no probing, no periwound erythema,  no clinical signs of infection.   Vascular: Dorsalis Pedis and Posterior Tibial pulses non palpable to the left foot, DP palpable to the right foot, PT non palpable.     Feet cool to touch  Neuro: Protective sensation intact to the level of the digits bilateral.  MSK: Muscle strength 4/5 in all major muscle groups of Right lower extremity. 5/5 in all muscle groups of LLE;  .  Pain on palpation to the left heel.         Labs:                                 9.1    9.03  )-----------( 221      ( 09 May 2022 07:58 )             29.0   05-09    132<L>  |  97  |  32<H>  ----------------------------<  88  3.8   |  28  |  1.44<H>    Ca    8.1<L>      09 May 2022 07:58    TPro  5.3<L>  /  Alb  1.7<L>  /  TBili  0.5  /  DBili  x   /  AST  17  /  ALT  23  /  AlkPhos  79  05-09        Rad  3 standard views of left foot performed: awaiting official results

## 2022-05-09 NOTE — PATIENT PROFILE ADULT - FUNCTIONAL ASSESSMENT - DAILY ACTIVITY ASSESSMENT TYPE
Admission Consent: The patient's consent was obtained including but not limited to risks of crusting, scabbing, blistering, scarring, darker or lighter pigmentary change, recurrence, incomplete removal and infection. Add 52 Modifier (Optional): no Total Number Of Lesions Treated: 8 Detail Level: Zone Post-Care Instructions: I reviewed with the patient in detail post-care instructions. Patient is to wear sunprotection, and avoid picking at any of the treated lesions. Pt may apply Vaseline to crusted or scabbing areas. Medical Necessity Information: It is in your best interest to select a reason for this procedure from the list below. All of these items fulfill various CMS LCD requirements except the new and changing color options. Number Of Freeze-Thaw Cycles: 3 freeze-thaw cycles Medical Necessity Clause: This procedure was medically necessary because the lesions that were treated were: Render Post Care In The Note?: yes Duration Of Freeze Thaw-Cycle (Seconds): 5

## 2022-05-09 NOTE — DIETITIAN NUTRITION RISK NOTIFICATION - ADDITIONAL COMMENTS/DIETITIAN RECOMMENDATIONS
Ensure Enlive TID to optimize po intake and provide an additional 350 kcal, 20g protein per serving.  Continue vitamin C and ferrous sulfate supplementation, add MVI daily. Continue Remeron to potentially increase appetite. Continue bowel regimen PRN. Encourage po intake, monitor diet tolerance, and provide assistance at meals as needed. Obtain daily weights to monitor trends.

## 2022-05-10 LAB
ANION GAP SERPL CALC-SCNC: 8 MMOL/L — SIGNIFICANT CHANGE UP (ref 5–17)
BUN SERPL-MCNC: 31 MG/DL — HIGH (ref 7–23)
CALCIUM SERPL-MCNC: 7.8 MG/DL — LOW (ref 8.5–10.1)
CHLORIDE SERPL-SCNC: 96 MMOL/L — SIGNIFICANT CHANGE UP (ref 96–108)
CO2 SERPL-SCNC: 27 MMOL/L — SIGNIFICANT CHANGE UP (ref 22–31)
CREAT SERPL-MCNC: 1.63 MG/DL — HIGH (ref 0.5–1.3)
EGFR: 40 ML/MIN/1.73M2 — LOW
GLUCOSE SERPL-MCNC: 119 MG/DL — HIGH (ref 70–99)
HCT VFR BLD CALC: 26.1 % — LOW (ref 39–50)
HGB BLD-MCNC: 8.5 G/DL — LOW (ref 13–17)
MCHC RBC-ENTMCNC: 28.7 PG — SIGNIFICANT CHANGE UP (ref 27–34)
MCHC RBC-ENTMCNC: 32.6 GM/DL — SIGNIFICANT CHANGE UP (ref 32–36)
MCV RBC AUTO: 88.2 FL — SIGNIFICANT CHANGE UP (ref 80–100)
PLATELET # BLD AUTO: 235 K/UL — SIGNIFICANT CHANGE UP (ref 150–400)
POTASSIUM SERPL-MCNC: 3.7 MMOL/L — SIGNIFICANT CHANGE UP (ref 3.5–5.3)
POTASSIUM SERPL-SCNC: 3.7 MMOL/L — SIGNIFICANT CHANGE UP (ref 3.5–5.3)
RBC # BLD: 2.96 M/UL — LOW (ref 4.2–5.8)
RBC # FLD: 14.5 % — SIGNIFICANT CHANGE UP (ref 10.3–14.5)
SODIUM SERPL-SCNC: 131 MMOL/L — LOW (ref 135–145)
WBC # BLD: 9.46 K/UL — SIGNIFICANT CHANGE UP (ref 3.8–10.5)
WBC # FLD AUTO: 9.46 K/UL — SIGNIFICANT CHANGE UP (ref 3.8–10.5)

## 2022-05-10 PROCEDURE — 99232 SBSQ HOSP IP/OBS MODERATE 35: CPT

## 2022-05-10 RX ORDER — VANCOMYCIN HCL 1 G
500 VIAL (EA) INTRAVENOUS EVERY 24 HOURS
Refills: 0 | Status: COMPLETED | OUTPATIENT
Start: 2022-05-10 | End: 2022-05-14

## 2022-05-10 RX ADMIN — CLOPIDOGREL BISULFATE 75 MILLIGRAM(S): 75 TABLET, FILM COATED ORAL at 10:54

## 2022-05-10 RX ADMIN — CEFTRIAXONE 100 MILLIGRAM(S): 500 INJECTION, POWDER, FOR SOLUTION INTRAMUSCULAR; INTRAVENOUS at 16:52

## 2022-05-10 RX ADMIN — RANOLAZINE 500 MILLIGRAM(S): 500 TABLET, FILM COATED, EXTENDED RELEASE ORAL at 10:56

## 2022-05-10 RX ADMIN — HEPARIN SODIUM 5000 UNIT(S): 5000 INJECTION INTRAVENOUS; SUBCUTANEOUS at 10:55

## 2022-05-10 RX ADMIN — HEPARIN SODIUM 5000 UNIT(S): 5000 INJECTION INTRAVENOUS; SUBCUTANEOUS at 21:21

## 2022-05-10 RX ADMIN — FINASTERIDE 5 MILLIGRAM(S): 5 TABLET, FILM COATED ORAL at 10:54

## 2022-05-10 RX ADMIN — Medication 500 MILLIGRAM(S): at 10:54

## 2022-05-10 RX ADMIN — ATORVASTATIN CALCIUM 80 MILLIGRAM(S): 80 TABLET, FILM COATED ORAL at 21:21

## 2022-05-10 RX ADMIN — RANOLAZINE 500 MILLIGRAM(S): 500 TABLET, FILM COATED, EXTENDED RELEASE ORAL at 21:20

## 2022-05-10 RX ADMIN — PANTOPRAZOLE SODIUM 40 MILLIGRAM(S): 20 TABLET, DELAYED RELEASE ORAL at 05:05

## 2022-05-10 RX ADMIN — Medication 81 MILLIGRAM(S): at 10:54

## 2022-05-10 RX ADMIN — Medication 325 MILLIGRAM(S): at 10:55

## 2022-05-10 RX ADMIN — Medication 5 MILLIGRAM(S): at 21:20

## 2022-05-10 RX ADMIN — Medication 100 MILLIGRAM(S): at 12:15

## 2022-05-10 RX ADMIN — MIRTAZAPINE 7.5 MILLIGRAM(S): 45 TABLET, ORALLY DISINTEGRATING ORAL at 21:21

## 2022-05-10 NOTE — PROGRESS NOTE ADULT - ASSESSMENT
Assesment: 88 y/o male see in the ED for the following   - Left heel pressure wound to the heel  - Right pre-ulcerative wound to the heel  - Pain in left Foot   -Difficulty with ambulation    Plan:   Chart reviewed and Patient evaluated;   Discussed diagnosis and treatment with patient  Re-applied betadine soaked gauze with dry sterile dressing to the left heel.   Nursing orders for local wound care to be applied daily placed, appreciated   X-rays reviewed : No evidence of cortical destruction on final read   Continue with IV antibiotics As Per ID, appreciated   Continue offloading to bilateral Heels while in bed  Patient demonstrated verbal understanding of all interventions and tolerated interventions well without any complications.   All additional care per medicine, appreciated   Wound to left heel does not appear infectious in nature and no surgical interventions warranted presently, will continue with local wound care per nursing   Case d/w with attending, Dr. Tobar will advise if plan changes   Podiatry will sign off at this time   Please re-consult as needed

## 2022-05-10 NOTE — PROGRESS NOTE ADULT - SUBJECTIVE AND OBJECTIVE BOX
Date of service: 5/10/22  CC/HPI: 89 y/o M with PMH HTN, CAD s/p CABG, AAA s/p repair, CKD Stage 3, CHFpEF, insomnia, obstructive/reflux uropathy, BPH presents with a rash on the buttocks and UTI. Pt states that he lives at [facility] and there was concern because there was a rash on his buttock and the nurses thought he may have a UTI. Podiatry was consulted for his left heel ulcer. Pt says that it is a pressure ulcer and that it is sensitive. . Patient is not very cooperative with further questioning at this time as he wants to get a bed in the hospital.     5/10; Pt seen at bedside for follow up of Lft heel wound by podiatry. Pt resting comfortably in bed, NAD. Denies any overnight events or  new pedal complaints when seen. States the left heel is still sensitive but better when offloaded in boots.     Review of Systems: Constitutional: negative for fatigue, negative for fever, negative for chills, negative for decreased appetite.  Skin: negative for rashes, negative for open wounds, negative for jaundice.   Eyes: negative for blurry vision, negative for double vision.   Ears, nose, throat: negative for ear pain, negative for nasal congestion, negative for sore throat, negative for lymph node swelling.   Cardiovascular: negative for chest pain, negative for palpitations, negative for lower extremity swelling.   Respiratory: negative for shortness of breath, negative for wheezing, negative for cough.   Gastrointestinal: positive for abdominal pain, negative for nausea, negative for vomiting, negative for diarrhea, negative for constipation, negative for blood in the stool, negative for black tarry stools.   Genitourinary: negative for burning on urination, negative for urinary urgency or frequency, negative for blood in the urine.   Endocrine: negative for cold intolerance, negative for heat intolerance, negative for increased thirst.   Hematologic: negative for easy bruising or bleeding.   Musculoskeletal: negative for muscle/joint pain, negative for decreased range of motion.   Neurological: negative for dizziness, negative for headaches, negative for loss of consciousness, negative for motor weakness, negative for sensory deficits.   Psychiatric: negative for depression, negative for anxiety.  Skin: left heel wound, right heel pre-ulcerative ulcer    PMH: HTN (hypertension)  AAA (abdominal aortic aneurysm)  CAD (coronary artery disease)  Stage 3 chronic kidney disease  History of CHF (congestive heart failure)  BPH (benign prostatic hyperplasia)  H/O urinary retention    PSH:  S/P CABG (coronary artery bypass graft)  History of right hip replacement  CAD (coronary artery disease)  S/P left inguinal hernia repair  S/P AAA repair    Allergies:  Broccoli (Unknown)  penicillin (Vomiting; Nausea)    MEDICATIONS  (STANDING):  ascorbic acid 500 milliGRAM(s) Oral daily  aspirin enteric coated 81 milliGRAM(s) Oral daily  atorvastatin 80 milliGRAM(s) Oral at bedtime  bisacodyl Oral Tab/Cap - Peds 5 milliGRAM(s) Oral every 12 hours  cefTRIAXone   IVPB 1000 milliGRAM(s) IV Intermittent every 24 hours  clopidogrel Tablet 75 milliGRAM(s) Oral daily  ferrous    sulfate 325 milliGRAM(s) Oral daily  finasteride 5 milliGRAM(s) Oral daily  furosemide    Tablet 40 milliGRAM(s) Oral <User Schedule>  furosemide   Oral Tab/Cap - Peds 20 milliGRAM(s) Oral every other day  heparin   Injectable 5000 Unit(s) SubCutaneous every 12 hours  melatonin 5 milliGRAM(s) Oral at bedtime  metoprolol succinate ER 50 milliGRAM(s) Oral daily  mirtazapine 7.5 milliGRAM(s) Oral at bedtime  pantoprazole    Tablet 40 milliGRAM(s) Oral before breakfast  polyethylene glycol 3350 17 Gram(s) Oral daily  ranolazine 500 milliGRAM(s) Oral two times a day  tamsulosin 0.4 milliGRAM(s) Oral at bedtime      MEDICATIONS  (PRN):  acetaminophen     Tablet .. 650 milliGRAM(s) Oral every 6 hours PRN Temp greater or equal to 38C (100.4F), Mild Pain (1 - 3)  aluminum hydroxide/magnesium hydroxide/simethicone Suspension 30 milliLiter(s) Oral every 4 hours PRN Dyspepsia  magnesium hydroxide Suspension 15 milliLiter(s) Oral daily PRN Constipation  ondansetron Injectable 4 milliGRAM(s) IV Push every 8 hours PRN Nausea and/or Vomiting      Vitals  Vital Signs Last 24 Hrs  T(C): 36.6 (10 May 2022 15:47), Max: 36.6 (10 May 2022 08:47)  T(F): 97.9 (10 May 2022 15:47), Max: 97.9 (10 May 2022 15:47)  HR: 65 (10 May 2022 15:47) (63 - 70)  BP: 121/41 (10 May 2022 15:47) (110/50 - 121/41)  BP(mean): --  RR: 16 (10 May 2022 15:47) (16 - 18)  SpO2: 97% (10 May 2022 15:47) (94% - 100%)    Physical Exam:   Constitutiona: NAD, alert;  Derm:    Pre-uclerative wound to the right heel, no erythema, no pus, no drainage, no malodor, no probing.   Partial thickness ulceration noted to the left heel, no malodor, no drainage, no pus, no probing, no periwound erythema,  no clinical signs of infection.   Vascular: Dorsalis Pedis and Posterior Tibial pulses non palpable to the left foot, DP palpable to the right foot, PT non palpable.     Feet cool to touch  Neuro: Protective sensation intact to the level of the digits bilateral.  MSK: Muscle strength 4/5 in all major muscle groups of Right lower extremity. 5/5 in all muscle groups of LLE;  .  Pain on palpation to the left heel.         Labs:                          8.5    9.46  )-----------( 235      ( 10 May 2022 09:23 )             26.1   05-10    131<L>  |  96  |  31<H>  ----------------------------<  119<H>  3.7   |  27  |  1.63<H>    Ca    7.8<L>      10 May 2022 09:23    TPro  5.3<L>  /  Alb  1.7<L>  /  TBili  0.5  /  DBili  x   /  AST  17  /  ALT  23  /  AlkPhos  79  05-09      Rad     < from: Xray Foot AP + Lateral, Left (05.08.22 @ 21:38) >  IMPRESSION:  Soft tissue ulcer with no gross cortical destruction. If there is   continued clinical concern follow-up MRI can be ordered

## 2022-05-10 NOTE — CONSULT NOTE ADULT - SUBJECTIVE AND OBJECTIVE BOX
89 with PMH HTN, CAD s/p CABG, AAA s/p repair, CKD Stage 3, CHFpEF, insomnia, obstructive/reflux uropathy, BPH presents with a rash on the buttocks and UTI. Pt states that he lives at [facility] and there was concern because there was a rash on his buttock and the nurses thought he may have a UT, recently seen in office and diuretics lowered due to rising renal fucntion and stable volume status. Admit and seen by uro and started on abx.     PAST MEDICAL & SURGICAL HISTORY:  HTN (hypertension)      AAA (abdominal aortic aneurysm)      CAD (coronary artery disease)      Stage 3 chronic kidney disease      History of CHF (congestive heart failure)      BPH (benign prostatic hyperplasia)      H/O urinary retention      S/P CABG (coronary artery bypass graft)      History of right hip replacement      CAD (coronary artery disease)  s/p stent placed      S/P left inguinal hernia repair      S/P AAA repair  2020      MEDICATIONS  (STANDING):  ascorbic acid 500 milliGRAM(s) Oral daily  aspirin enteric coated 81 milliGRAM(s) Oral daily  atorvastatin 80 milliGRAM(s) Oral at bedtime  bisacodyl Oral Tab/Cap - Peds 5 milliGRAM(s) Oral every 12 hours  cefTRIAXone   IVPB 1000 milliGRAM(s) IV Intermittent every 24 hours  clopidogrel Tablet 75 milliGRAM(s) Oral daily  ferrous    sulfate 325 milliGRAM(s) Oral daily  finasteride 5 milliGRAM(s) Oral daily  furosemide    Tablet 40 milliGRAM(s) Oral <User Schedule>  furosemide   Oral Tab/Cap - Peds 20 milliGRAM(s) Oral every other day  heparin   Injectable 5000 Unit(s) SubCutaneous every 12 hours  melatonin 5 milliGRAM(s) Oral at bedtime  metoprolol succinate ER 50 milliGRAM(s) Oral daily  mirtazapine 7.5 milliGRAM(s) Oral at bedtime  pantoprazole    Tablet 40 milliGRAM(s) Oral before breakfast  polyethylene glycol 3350 17 Gram(s) Oral daily  ranolazine 500 milliGRAM(s) Oral two times a day  tamsulosin 0.4 milliGRAM(s) Oral at bedtime  vancomycin  IVPB 500 milliGRAM(s) IV Intermittent every 24 hours    MEDICATIONS  (PRN):  acetaminophen     Tablet .. 650 milliGRAM(s) Oral every 6 hours PRN Temp greater or equal to 38C (100.4F), Mild Pain (1 - 3)  aluminum hydroxide/magnesium hydroxide/simethicone Suspension 30 milliLiter(s) Oral every 4 hours PRN Dyspepsia  magnesium hydroxide Suspension 15 milliLiter(s) Oral daily PRN Constipation  ondansetron Injectable 4 milliGRAM(s) IV Push every 8 hours PRN Nausea and/or Vomiting      Allergies    Broccoli (Unknown)  penicillin (Vomiting; Nausea)    Intolerances        SOCIAL HISTORY:  no etoh/cigg    FAMILY HISTORY:  FH: hypertension (Mother)    Family history of hyperglycemia (Father)        REVIEW OF SYSTEMS:    CONSTITUTIONAL: stable weakness, fevers or chills  EYES/ENT: No visual changes;  No vertigo or throat pain   NECK: No pain or stiffness  RESPIRATORY: No cough, wheezing, hemoptysis; No shortness of breath  CARDIOVASCULAR: No chest pain or palpitations  GASTROINTESTINAL: No abdominal or epigastric pain. No nausea, vomiting, or hematemesis; No diarrhea or constipation. No melena or hematochezia.  GENITOURINARY: No dysuria, frequency or hematuria  NEUROLOGICAL: No numbness or weakness  SKIN: No itching, burning, rashes, or lesions   All other review of systems is negative unless indicated above.      T(C): , Max: 36.6 (05-10-22 @ 08:47)  T(F): , Max: 97.8 (05-10-22 @ 08:47)  HR: 70 (05-10-22 @ 08:47)  BP: 113/44 (05-10-22 @ 08:47)  BP(mean): --  RR: 16 (05-10-22 @ 08:47)  SpO2: 94% (05-10-22 @ 08:47)  Wt(kg): --     @ 07:01  -  05-10 @ 07:00  --------------------------------------------------------  IN: 0 mL / OUT: 1825 mL / NET: -1825 mL          PHYSICAL EXAM:    Constitutional: NAD, frail  HEENT:  MM, temp wasting  Neck: No LAD, No JVD  Respiratory: dist  Cardiovascular: S1 and S2, RRR  Gastrointestinal: BS+, soft, NT/ND  Extremities: chronic peripheral edema  Neurological: Alert  :  Trevino  Skin: No rashes  Access: Not applicable        LABS:                        8.5    9.46  )-----------( 235      ( 10 May 2022 09:23 )             26.1     10 May 2022 09:23    131    |  96     |  31     ----------------------------<  119    3.7     |  27     |  1.63   09 May 2022 07:58    132    |  97     |  32     ----------------------------<  88     3.8     |  28     |  1.44   08 May 2022 16:52    131    |  95     |  36     ----------------------------<  128    4.4     |  29     |  1.71     Ca    7.8        10 May 2022 09:23  Ca    8.1        09 May 2022 07:58  Ca    8.0        08 May 2022 16:52    TPro  5.3    /  Alb  1.7    /  TBili  0.5    /  DBili  x      /  AST  17     /  ALT  23     /  AlkPhos  79     09 May 2022 07:58  TPro  5.9    /  Alb  2.0    /  TBili  0.4    /  DBili  x      /  AST  22     /  ALT  30     /  AlkPhos  98     08 May 2022 16:52          Urine Studies:  Urinalysis Basic - ( 08 May 2022 17:00 )    Color: Yellow / Appearance: very cloudy / S.015 / pH: x  Gluc: x / Ketone: Negative  / Bili: Negative / Urobili: Negative   Blood: x / Protein: 100 / Nitrite: Negative   Leuk Esterase: Moderate / RBC: 25-50 /HPF / WBC 6-10   Sq Epi: x / Non Sq Epi: Occasional / Bacteria: Occasional            RADIOLOGY & ADDITIONAL STUDIES:                  
Date of Consult: 5/9/22  CC/HPI: 89 y/o M with PMH HTN, CAD s/p CABG, AAA s/p repair, CKD Stage 3, CHFpEF, insomnia, obstructive/reflux uropathy, BPH presents with a rash on the buttocks and UTI. Pt states that he lives at [facility] and there was concern because there was a rash on his buttock and the nurses thought he may have a UTI. Podiatry was consulted for his left heel ulcer. Pt says that it is a pressure ulcer and that it is sensitive. . Patient is not very cooperative with further questioning at this time as he wants to get a bed in the hospital.     Review of Systems: Constitutional: negative for fatigue, negative for fever, negative for chills, negative for decreased appetite.  Skin: negative for rashes, negative for open wounds, negative for jaundice.   Eyes: negative for blurry vision, negative for double vision.   Ears, nose, throat: negative for ear pain, negative for nasal congestion, negative for sore throat, negative for lymph node swelling.   Cardiovascular: negative for chest pain, negative for palpitations, negative for lower extremity swelling.   Respiratory: negative for shortness of breath, negative for wheezing, negative for cough.   Gastrointestinal: positive for abdominal pain, negative for nausea, negative for vomiting, negative for diarrhea, negative for constipation, negative for blood in the stool, negative for black tarry stools.   Genitourinary: negative for burning on urination, negative for urinary urgency or frequency, negative for blood in the urine.   Endocrine: negative for cold intolerance, negative for heat intolerance, negative for increased thirst.   Hematologic: negative for easy bruising or bleeding.   Musculoskeletal: negative for muscle/joint pain, negative for decreased range of motion.   Neurological: negative for dizziness, negative for headaches, negative for loss of consciousness, negative for motor weakness, negative for sensory deficits.   Psychiatric: negative for depression, negative for anxiety.  Skin: left heel wound, right heel pre-ulcerative ulcer    PMH: HTN (hypertension)  AAA (abdominal aortic aneurysm)  CAD (coronary artery disease)  Stage 3 chronic kidney disease  History of CHF (congestive heart failure)  BPH (benign prostatic hyperplasia)  H/O urinary retention    PSH:  S/P CABG (coronary artery bypass graft)  History of right hip replacement  CAD (coronary artery disease)  S/P left inguinal hernia repair  S/P AAA repair    Allergies:  Broccoli (Unknown)  penicillin (Vomiting; Nausea)    MEDICATIONS  (STANDING):  ascorbic acid 500 milliGRAM(s) Oral daily  aspirin enteric coated 81 milliGRAM(s) Oral daily  atorvastatin 80 milliGRAM(s) Oral at bedtime  bisacodyl Oral Tab/Cap - Peds 5 milliGRAM(s) Oral every 12 hours  cefTRIAXone   IVPB 1000 milliGRAM(s) IV Intermittent every 24 hours  clopidogrel Tablet 75 milliGRAM(s) Oral daily  ferrous    sulfate 325 milliGRAM(s) Oral daily  finasteride 5 milliGRAM(s) Oral daily  furosemide    Tablet 40 milliGRAM(s) Oral <User Schedule>  furosemide   Oral Tab/Cap - Peds 20 milliGRAM(s) Oral every other day  heparin   Injectable 5000 Unit(s) SubCutaneous every 12 hours  melatonin 5 milliGRAM(s) Oral at bedtime  metoprolol succinate ER 50 milliGRAM(s) Oral daily  mirtazapine 7.5 milliGRAM(s) Oral at bedtime  pantoprazole    Tablet 40 milliGRAM(s) Oral before breakfast  polyethylene glycol 3350 17 Gram(s) Oral daily  ranolazine 500 milliGRAM(s) Oral two times a day  tamsulosin 0.4 milliGRAM(s) Oral at bedtime      MEDICATIONS  (PRN):  acetaminophen     Tablet .. 650 milliGRAM(s) Oral every 6 hours PRN Temp greater or equal to 38C (100.4F), Mild Pain (1 - 3)  aluminum hydroxide/magnesium hydroxide/simethicone Suspension 30 milliLiter(s) Oral every 4 hours PRN Dyspepsia  magnesium hydroxide Suspension 15 milliLiter(s) Oral daily PRN Constipation  ondansetron Injectable 4 milliGRAM(s) IV Push every 8 hours PRN Nausea and/or Vomiting      Vitals  T(F): 99.3 (05-08-22 @ 17:24), Max: 99.3 (05-08-22 @ 17:24)  HR: 89 (05-08-22 @ 15:50) (89 - 89)  BP: 111/69 (05-08-22 @ 15:50) (111/69 - 111/69)  RR: 18 (05-08-22 @ 15:50) (18 - 18)  SpO2: 97% (05-08-22 @ 15:50) (97% - 97%)  Wt(kg): --      Physical Exam:   Constitutiona: NAD, alert;  Derm:    Pre-uclerative wound to the right heel, no erythema, no pus, no drainage, no malodor, no probing.   Wound to the left heel, no malodor, no drainage, no pus, no probing, no clinical signs of infection.   Vascular: Dorsalis Pedis and Posterior Tibial pulses non palpable to the left foot, DP palpable to the right foot, PT non palpable.     Feet cool to touch  Neuro: Protective sensation intact to the level of the digits bilateral.  MSK: Muscle strength 4/5 in all major muscle groups of Right lower extremity. 5/5 in all muscle groups of LLE;  .  Pain on palpation to the left heel.         Labs:                          9.6    12.78 )-----------( 241      ( 08 May 2022 16:52 )             29.9     WBC Trend  12.78<H> Date (05-08 @ 16:52)  10.89<H> Date (05-01 @ 08:35)      Chem  05-08    131<L>  |  95<L>  |  36<H>  ----------------------------<  128<H>  4.4   |  29  |  1.71<H>    Ca    8.0<L>      08 May 2022 16:52    TPro  5.9<L>  /  Alb  2.0<L>  /  TBili  0.4  /  DBili  x   /  AST  22  /  ALT  30  /  AlkPhos  98  05-08        Rad  3 standard views of left foot performed: awaiting official results 
Patient is a 89y old  Male who presents with a chief complaint of UTI, sacral ulcer (09 May 2022 10:08)    HPI:  89 y/o M with PMH HTN, CAD s/p CABG, AAA s/p repair, CKD Stage 3, CHFpEF, insomnia, obstructive/reflux uropathy, BPH admitted on  from [facility] for evaluation of suspected "infection." [facility] and he was told to transfer to hospital for treatment of urinary tract infection; history per medical record as patient is unable to provide history.         PMH: as above  PSH: as above  Meds: per reconciliation sheet, noted below  MEDICATIONS  (STANDING):  ascorbic acid 500 milliGRAM(s) Oral daily  aspirin enteric coated 81 milliGRAM(s) Oral daily  atorvastatin 80 milliGRAM(s) Oral at bedtime  bisacodyl Oral Tab/Cap - Peds 5 milliGRAM(s) Oral every 12 hours  cefTRIAXone   IVPB 1000 milliGRAM(s) IV Intermittent every 24 hours  clopidogrel Tablet 75 milliGRAM(s) Oral daily  ferrous    sulfate 325 milliGRAM(s) Oral daily  finasteride 5 milliGRAM(s) Oral daily  furosemide    Tablet 40 milliGRAM(s) Oral <User Schedule>  furosemide   Oral Tab/Cap - Peds 20 milliGRAM(s) Oral every other day  heparin   Injectable 5000 Unit(s) SubCutaneous every 12 hours  melatonin 5 milliGRAM(s) Oral at bedtime  metoprolol succinate ER 50 milliGRAM(s) Oral daily  mirtazapine 7.5 milliGRAM(s) Oral at bedtime  pantoprazole    Tablet 40 milliGRAM(s) Oral before breakfast  polyethylene glycol 3350 17 Gram(s) Oral daily  ranolazine 500 milliGRAM(s) Oral two times a day  tamsulosin 0.4 milliGRAM(s) Oral at bedtime    MEDICATIONS  (PRN):  acetaminophen     Tablet .. 650 milliGRAM(s) Oral every 6 hours PRN Temp greater or equal to 38C (100.4F), Mild Pain (1 - 3)  aluminum hydroxide/magnesium hydroxide/simethicone Suspension 30 milliLiter(s) Oral every 4 hours PRN Dyspepsia  magnesium hydroxide Suspension 15 milliLiter(s) Oral daily PRN Constipation  ondansetron Injectable 4 milliGRAM(s) IV Push every 8 hours PRN Nausea and/or Vomiting    Allergies    Broccoli (Unknown)  penicillin (Vomiting; Nausea)    Intolerances      Social: no smoking, no alcohol, no illegal drugs; no recent travel, no exposure to TB  FAMILY HISTORY:  FH: hypertension (Mother)    Family history of hyperglycemia (Father)    ROS unable to obtain secondary to patient medical condition     Vital Signs Last 24 Hrs  T(C): 36.3 (09 May 2022 09:30), Max: 37.4 (08 May 2022 17:24)  T(F): 97.3 (09 May 2022 09:30), Max: 99.3 (08 May 2022 17:24)  HR: 70 (09 May 2022 09:30) (70 - 89)  BP: 108/75 (09 May 2022 09:30) (108/75 - 123/44)  BP(mean): --  RR: 18 (09 May 2022 09:30) (18 - 18)  SpO2: 95% (09 May 2022 09:30) (95% - 97%)  Daily Height in cm: 170.18 (08 May 2022 15:50)    Daily Weight in k (09 May 2022 06:18)    PE:    Constitutional: frail looking  HEENT: NC/AT, EOMI, PERRLA, conjunctivae clear; ears and nose atraumatic; pharynx clear  Neck: supple; thyroid not palpable, ecchymosis on left neck  Back: no tenderness  Respiratory: respiratory effort normal; clear to auscultation  Cardiovascular: S1S2 regular, no murmurs  Abdomen: soft, not tender, not distended, positive BS; no liver or spleen organomegaly  Genitourinary: no suprapubic tenderness  Musculoskeletal: no muscle tenderness, left heel with eschar about 6 cm diameter and surrounding erythema and skin desquamation; right heel with eschar about 2 cm diameter with surrounding erythema  Neurological/ Psychiatric:  moving all extremities  Skin: no rashes; no palpable lesions    Labs: all available labs reviewed                        9.1    9.03  )-----------( 221      ( 09 May 2022 07:58 )             29.0     05-    132<L>  |  97  |  32<H>  ----------------------------<  88  3.8   |  28  |  1.44<H>    Ca    8.1<L>      09 May 2022 07:58    TPro  5.3<L>  /  Alb  1.7<L>  /  TBili  0.5  /  DBili  x   /  AST  17  /  ALT  23  /  AlkPhos  79  05-09     LIVER FUNCTIONS - ( 09 May 2022 07:58 )  Alb: 1.7 g/dL / Pro: 5.3 gm/dL / ALK PHOS: 79 U/L / ALT: 23 U/L / AST: 17 U/L / GGT: x           Urinalysis Basic - ( 08 May 2022 17:00 )    Color: Yellow / Appearance: very cloudy / S.015 / pH: x  Gluc: x / Ketone: Negative  / Bili: Negative / Urobili: Negative   Blood: x / Protein: 100 / Nitrite: Negative   Leuk Esterase: Moderate / RBC: 25-50 /HPF / WBC 6-10   Sq Epi: x / Non Sq Epi: Occasional / Bacteria: Occasional    < from: Xray Foot AP + Lateral, Left (22 @ 21:38) >  IMPRESSION:  Soft tissue ulcer with no gross cortical destruction. If there is   continued clinical concern follow-up MRI can be ordered    < end of copied text >        Radiology: all available radiological tests reviewed    Advanced directives addressed: full resuscitation
HPI:  88 y/o M with PMH HTN, CAD s/p CABG, AAA s/p repair, CKD Stage 3, CHFpEF, insomnia, obstructive/reflux uropathy, BPH presents with a rash on the buttocks and UTI. Pt states that he lives at [facility] and there was concern because there was a rash on his buttock and the nurses thought he may have a UTI. Reports intermittent abd pain, constipation, loss of bowel control, swelling in his lower extremities at the end of the day. Also reports left heel ulcer. Denies fevers, chills, chest pain, SOB, N/V, diarrhea. The pt states that he recently saw a nephrologist who changed his lasix from 40 mg daily to alternating between 20 mg and 40 mg doses.   Prior admissions:   - 3/25/2022-3/26/2022: weakness -> post obstructive urinary retention -> waite catheter placed -> doxycycline for scalp wound   ER course: VSS. Labs: WBC 12.78, Hb 9.6, Na 131, BUN 36, Cr 1.71 (baseline ~1.8), Glucose 128, calcium 8.0 -> corrected 9.6, albumin 2.0. UA: cloudy, moderate leukocyte esterase, large blood, WBC 6-10, RBC 25-50, occasional bacteria. COVID negative. EKG: Ventricularly paced rhythm, HR 78 bpm, pacer spikes visualized. CXR: PPM present, rotated film, no consolidation, cardiomegaly, sternotomy wires, no effusion, no pneumothorax (personally reviewed).   Pt was given Ceftriaxone. He is being admitted to med/surg for further management.  (08 May 2022 20:24)    88 yo male with hx of BPH with urinary retention, ?chronic waite admitted for UTI/ sacral ulcer. Urology consulted for pt with penile discharge around the catheter from meatus. Patient seen at bedside and is a limited historian. He reports he has a urologist but does not recall his name. Pt denies any abd/flank pain, fevers, chills, urethral discharge or hematuria. He states he is not sure how long the waite has been in for. As per RN pt's waite was changed in the ER 2 days ago on admission    PAST MEDICAL & SURGICAL HISTORY:  HTN (hypertension)    AAA (abdominal aortic aneurysm)    CAD (coronary artery disease)    Stage 3 chronic kidney disease    History of CHF (congestive heart failure)    BPH (benign prostatic hyperplasia)    H/O urinary retention    S/P CABG (coronary artery bypass graft)    History of right hip replacement    CAD (coronary artery disease)  s/p stent placed    S/P left inguinal hernia repair    S/P AAA repair  2020        REVIEW OF SYSTEMS      Pt is a limited historian see HPI    MEDICATIONS  (STANDING):  ascorbic acid 500 milliGRAM(s) Oral daily  aspirin enteric coated 81 milliGRAM(s) Oral daily  atorvastatin 80 milliGRAM(s) Oral at bedtime  bisacodyl Oral Tab/Cap - Peds 5 milliGRAM(s) Oral every 12 hours  cefTRIAXone   IVPB 1000 milliGRAM(s) IV Intermittent every 24 hours  clopidogrel Tablet 75 milliGRAM(s) Oral daily  ferrous    sulfate 325 milliGRAM(s) Oral daily  finasteride 5 milliGRAM(s) Oral daily  furosemide    Tablet 40 milliGRAM(s) Oral <User Schedule>  furosemide   Oral Tab/Cap - Peds 20 milliGRAM(s) Oral every other day  heparin   Injectable 5000 Unit(s) SubCutaneous every 12 hours  melatonin 5 milliGRAM(s) Oral at bedtime  metoprolol succinate ER 50 milliGRAM(s) Oral daily  mirtazapine 7.5 milliGRAM(s) Oral at bedtime  pantoprazole    Tablet 40 milliGRAM(s) Oral before breakfast  polyethylene glycol 3350 17 Gram(s) Oral daily  ranolazine 500 milliGRAM(s) Oral two times a day  tamsulosin 0.4 milliGRAM(s) Oral at bedtime    MEDICATIONS  (PRN):  acetaminophen     Tablet .. 650 milliGRAM(s) Oral every 6 hours PRN Temp greater or equal to 38C (100.4F), Mild Pain (1 - 3)  aluminum hydroxide/magnesium hydroxide/simethicone Suspension 30 milliLiter(s) Oral every 4 hours PRN Dyspepsia  magnesium hydroxide Suspension 15 milliLiter(s) Oral daily PRN Constipation  ondansetron Injectable 4 milliGRAM(s) IV Push every 8 hours PRN Nausea and/or Vomiting      Allergies    Broccoli (Unknown)  penicillin (Vomiting; Nausea)    Intolerances        SOCIAL HISTORY:    FAMILY HISTORY:  FH: hypertension (Mother)    Family history of hyperglycemia (Father)        Vital Signs Last 24 Hrs  T(C): 36.6 (10 May 2022 08:47), Max: 36.6 (10 May 2022 08:47)  T(F): 97.8 (10 May 2022 08:47), Max: 97.8 (10 May 2022 08:47)  HR: 70 (10 May 2022 08:47) (63 - 70)  BP: 113/44 (10 May 2022 08:47) (110/50 - 113/49)  BP(mean): --  RR: 16 (10 May 2022 08:47) (16 - 18)  SpO2: 94% (10 May 2022 08:47) (94% - 100%)    PHYSICAL EXAM:      General: No distress, No anxiety  VITALS  T(C): 36.6 (05-10-22 @ 08:47), Max: 36.6 (05-10-22 @ 08:47)  HR: 70 (05-10-22 @ 08:47) (63 - 70)  BP: 113/44 (05-10-22 @ 08:47) (110/50 - 113/49)  RR: 16 (05-10-22 @ 08:47) (16 - 18)  SpO2: 94% (05-10-22 @ 08:47) (94% - 100%)            Skin     : No jaundice  HEENT: Normocephalic, no icterus , EOM full , No epistaxis  Lung    : No resp distress  Abdo:   : Soft, Non tender, No guarding, No distension   Back    : No CVAT b/l  Extremity: No calf tenderness   Genitalia Male: Waite noted with minimal mucus like discharge around waite and some erosion, non tender to palpation, no erythema, crepitus, induration or fluctuance.       LABS:                        8.5    9.46  )-----------( 235      ( 10 May 2022 09:23 )             26.1     05-10    131<L>  |  96  |  31<H>  ----------------------------<  119<H>  3.7   |  27  |  1.63<H>    Ca    7.8<L>      10 May 2022 09:23    TPro  5.3<L>  /  Alb  1.7<L>  /  TBili  0.5  /  DBili  x   /  AST  17  /  ALT  23  /  AlkPhos  79        Urinalysis Basic - ( 08 May 2022 17:00 )    Color: Yellow / Appearance: very cloudy / S.015 / pH: x  Gluc: x / Ketone: Negative  / Bili: Negative / Urobili: Negative   Blood: x / Protein: 100 / Nitrite: Negative   Leuk Esterase: Moderate / RBC: 25-50 /HPF / WBC 6-10   Sq Epi: x / Non Sq Epi: Occasional / Bacteria: Occasional      Culture - Urine (22 @ 17:00)   Specimen Source: Clean Catch Clean Catch (Midstream)   Culture Results:   >100,000 CFU/ml Gram positive organisms

## 2022-05-10 NOTE — CONSULT NOTE ADULT - ASSESSMENT
89 y/o M with PMH HTN, CAD s/p CABG, AAA s/p repair, CKD Stage 3, CHFpEF, insomnia, obstructive/reflux uropathy, BPH admitted on 5/8 from [facility] for evaluation of suspected "infection." [facility] and he was told to transfer to hospital for treatment of urinary tract infection; history per medical record as patient is unable to provide history.     1. Patient admitted with pyuria, rule out urinary tract infection; also noted with left heel ulcer, eschar and skin desquamation  - follow up cultures   - serial cbc and monitor temperature   - reviewed prior medical records to evaluate for resistant or atypical pathogens   - iv hydration and supportive care   - wound care per podiatry  - will continue ceftriaxone as ordered  - off loading of heels  2. other issues; per medicine
89 yo male with hx of BPH with urinary retention, ?chronic waite admitted for UTI/ sacral ulcer. Urology consulted for pt with penile discharge around the catheter from meatus.  As per RN pt's waite was changed in the ER on admission on 5/8/22. UCx + for gram positive organisms  Recommend  - Culture penile discharge  - Continue abx, adjust according to culture/sensitivities and F/U ID recs  - Follow up with his own urologist for further work up/ waite changes or trial of void    Case discussed with Dr. Ibarra  
Assesment: 89 y/o male see in the ED for the following   - Left heel pressure wound to the heel  - Right pre-ulcerative wound to the heel  - Pain in left Foot   -Difficulty with ambulation    Plan:   Chart reviewed and Patient evaluated; discussed treatment and plan with Dr. Baljinder gamble  Discussed diagnosis and treatment with patient  Applied betadine soaked gauze with dry sterile dressing to the left heel.   X-rays reviewed : wet read Shows no osseous fx or soft tissue emphysema.   Continue with IV antibiotics As Per ID/ED/MED  Offloading to bilateral Heels while in bed  Patient demonstrated verbal understanding of all interventions and tolerated interventions well without any complications.   Podiatry will follow while in house.
   89 with PMH HTN, CAD s/p CABG, AAA s/p repair, CKD Stage 3, CHFpEF, insomnia, obstructive/reflux uropathy, BPH presents with a rash on the buttocks and UTI with renal evaluation of CKD IIIb       CKD 3b   - Chronic urinary retention   -Renal function stable, near baseline   -Keep near even, m aintenance lasix ok for now  -If oral intake dose not improve, may need hold  -Trevino per     UTI  -Abx  -Cxs  renall dosed med      d/w RN, son and KAIDEN Bearden

## 2022-05-10 NOTE — PROGRESS NOTE ADULT - SUBJECTIVE AND OBJECTIVE BOX
Date of service: 05-10-22 @ 11:10        Patient lying in bed; RN noted pus around insertion of waite, patient is not sure about this    ROS: unable to obtain secondary to patient medical condition     MEDICATIONS  (STANDING):  ascorbic acid 500 milliGRAM(s) Oral daily  aspirin enteric coated 81 milliGRAM(s) Oral daily  atorvastatin 80 milliGRAM(s) Oral at bedtime  bisacodyl Oral Tab/Cap - Peds 5 milliGRAM(s) Oral every 12 hours  cefTRIAXone   IVPB 1000 milliGRAM(s) IV Intermittent every 24 hours  clopidogrel Tablet 75 milliGRAM(s) Oral daily  ferrous    sulfate 325 milliGRAM(s) Oral daily  finasteride 5 milliGRAM(s) Oral daily  furosemide    Tablet 40 milliGRAM(s) Oral <User Schedule>  furosemide   Oral Tab/Cap - Peds 20 milliGRAM(s) Oral every other day  heparin   Injectable 5000 Unit(s) SubCutaneous every 12 hours  melatonin 5 milliGRAM(s) Oral at bedtime  metoprolol succinate ER 50 milliGRAM(s) Oral daily  mirtazapine 7.5 milliGRAM(s) Oral at bedtime  pantoprazole    Tablet 40 milliGRAM(s) Oral before breakfast  polyethylene glycol 3350 17 Gram(s) Oral daily  ranolazine 500 milliGRAM(s) Oral two times a day  tamsulosin 0.4 milliGRAM(s) Oral at bedtime    MEDICATIONS  (PRN):  acetaminophen     Tablet .. 650 milliGRAM(s) Oral every 6 hours PRN Temp greater or equal to 38C (100.4F), Mild Pain (1 - 3)  aluminum hydroxide/magnesium hydroxide/simethicone Suspension 30 milliLiter(s) Oral every 4 hours PRN Dyspepsia  magnesium hydroxide Suspension 15 milliLiter(s) Oral daily PRN Constipation  ondansetron Injectable 4 milliGRAM(s) IV Push every 8 hours PRN Nausea and/or Vomiting      Vital Signs Last 24 Hrs  T(C): 36.6 (10 May 2022 08:47), Max: 36.6 (10 May 2022 08:47)  T(F): 97.8 (10 May 2022 08:47), Max: 97.8 (10 May 2022 08:47)  HR: 70 (10 May 2022 08:47) (63 - 70)  BP: 113/44 (10 May 2022 08:47) (110/50 - 113/49)  BP(mean): --  RR: 16 (10 May 2022 08:47) (16 - 18)  SpO2: 94% (10 May 2022 08:47) (94% - 100%)        Physical Exam:        Constitutional: frail looking  HEENT: NC/AT, EOMI, PERRLA, conjunctivae clear; ears and nose atraumatic; pharynx clear  Neck: supple; thyroid not palpable, ecchymosis on left neck  Back: no tenderness  Respiratory: respiratory effort normal; clear to auscultation  Cardiovascular: S1S2 regular, no murmurs  Abdomen: soft, not tender, not distended, positive BS; no liver or spleen organomegaly  Genitourinary: no suprapubic tenderness  Musculoskeletal: no muscle tenderness, left heel with eschar about 6 cm diameter and surrounding erythema and skin desquamation; right heel with eschar about 2 cm diameter with surrounding erythema  Neurological/ Psychiatric:  moving all extremities  Skin: no rashes; no palpable lesions    Labs: all available labs reviewed                             Labs:                        8.5    9.46  )-----------( 235      ( 10 May 2022 09:23 )             26.1     05-10    131<L>  |  96  |  31<H>  ----------------------------<  119<H>  3.7   |  27  |  1.63<H>    Ca    7.8<L>      10 May 2022 09:23    TPro  5.3<L>  /  Alb  1.7<L>  /  TBili  0.5  /  DBili  x   /  AST  17  /  ALT  23  /  AlkPhos  79  05-09           Cultures:       Culture - Blood (collected 05-08-22 @ 17:31)  Source: .Blood None  Preliminary Report (05-09-22 @ 21:01):    No growth to date.    Culture - Urine (collected 05-08-22 @ 17:00)  Source: Clean Catch Clean Catch (Midstream)  Preliminary Report (05-10-22 @ 05:53):    >100,000 CFU/ml Gram positive organisms    Culture - Blood (collected 05-08-22 @ 16:52)  Source: .Blood Blood  Preliminary Report (05-09-22 @ 21:01):    No growth to date.      C-Reactive Protein, Serum: 89 mg/L (05-08-22 @ 16:52)          < from: Xray Foot AP + Lateral, Left (05.08.22 @ 21:38) >  IMPRESSION:  Soft tissue ulcer with no gross cortical destruction. If there is   continued clinical concern follow-up MRI can be ordered    < end of copied text >        Radiology: all available radiological tests reviewed    Advanced directives addressed: full resuscitation

## 2022-05-10 NOTE — ADVANCED PRACTICE NURSE CONSULT - RECOMMEDATIONS
1)Continue to Elevate heels off of Mattress  2)Continue to Turn and position every 2 Hours  3)Consult Dietitian   4)Sacrum: Apply Triad Cream with every incontinent episode,.   5)Left Heel follow podiatry order   6) Apply Bilateral Total care boot with plastic removed to bilateral feet.   7) Monitor scabbing on BL Upper and lower extremities.   8) Order and Maintain Air Mattress.

## 2022-05-10 NOTE — ADVANCED PRACTICE NURSE CONSULT - ASSESSMENT
This is a 89 year old male admitted to the hospital on 5/8/2022 for admitting diagnosis of UTI. PMHx: CHF, BPH, CKD, HTN.   In to see patient on [facility] [place]:   Recommendation based on patients presentation, History, Tacho and currentl wounds present on admission is to place patient on an Air Mattress.   Sacral Wound: 8.4cm x 7cm x 0.1cm with Left Superior Sacrum 2cm x 1.2cm unknown depth % yellow Slough. Whole area from Coccyx to superior sacrum with Erythema, denuded and Hyperpigmented. This can be classified as Incontinent associated dermatitis with partial thickness skin loss and an unstage pressure injury present on admission. Patient is incontinent of stool does have an Indwelling catheter presently and noted purulent greenish drainage on the urethra. Catie RICHEY aware and cultured.  This periskin on sacrum is maceraed and denuded, so for this time remove foam and applied Triad Cream to whole area and placed 1 purple pad under patient.   Left Knee with 1.5cm x 1.8cm x 0.1cm with pink wound bed. Applied triad and covered with small foam.   Noted Multiple Ecchymotic areas and scabbing on Bilateral Lower Extremities and Upper Extremities. All scabs are dry and will keep open to air. Left Heel with 1.5cm x 1.2cmx 0.1cm with 100% eschar noted and Podiatry applying Betadine gauze and Kerlix to wound. This can be classified as an unstagable pressure injury. Following podiatry order and placing total carer boot to BL Lower Feet (hard plastic removed).   Bilateral Lower extremity with Hemosiderin Staining and ecchymosis

## 2022-05-10 NOTE — PROGRESS NOTE ADULT - SUBJECTIVE AND OBJECTIVE BOX
Patient is a 89y old  Male who presents with a chief complaint of UTI, sacral ulcer (09 May 2022 13:57)      SUBJECTIVE:   HPI:  89 y/o M with PMH HTN, CAD s/p CABG, AAA s/p repair, CKD Stage 3, CHFpEF, insomnia, obstructive/reflux uropathy, BPH presents with a rash on the buttocks and UTI. Pt states that he lives at [facility] and there was concern because there was a rash on his buttock and the nurses thought he may have a UTI. Reports intermittent abd pain, constipation, loss of bowel control, swelling in his lower extremities at the end of the day. Also reports left heel ulcer. Denies fevers, chills, chest pain, SOB, N/V, diarrhea. The pt states that he recently saw a nephrologist who changed his lasix from 40 mg daily to alternating between 20 mg and 40 mg doses.     Prior admissions:   - 3/25/2022-3/26/2022: weakness -> post obstructive urinary retention -> waite catheter placed -> doxycycline for scalp wound   ER course: VSS. Labs: WBC 12.78, Hb 9.6, Na 131, BUN 36, Cr 1.71 (baseline ~1.8), Glucose 128, calcium 8.0 -> corrected 9.6, albumin 2.0. UA: cloudy, moderate leukocyte esterase, large blood, WBC 6-10, RBC 25-50, occasional bacteria. COVID negative. EKG: Ventricularly paced rhythm, HR 78 bpm, pacer spikes visualized. CXR: PPM present, rotated film, no consolidation, cardiomegaly, sternotomy wires, no effusion, no pneumothorax (personally reviewed).   Pt was given Ceftriaxone. He is being admitted to med/surg for further management.  (08 May 2022 20:24)    : laying in bed, feeling better today, had good appetite. no pain or discomfort noted.   5/10: seen and examined laying in bed, had penile discharge this morning with some mid/upper abd pain, otherwise no other sx.     REVIEW OF SYSTEMS:    CONSTITUTIONAL: No weakness, fevers or chills  EYES/ENT: No visual changes;  No vertigo or throat pain   NECK: No pain or stiffness  RESPIRATORY: No cough, wheezing, hemoptysis; No shortness of breath  CARDIOVASCULAR: No chest pain or palpitations  GASTROINTESTINAL: No abdominal or epigastric pain. No nausea, vomiting, or hematemesis; No diarrhea or constipation. No melena or hematochezia.  GENITOURINARY: No dysuria, frequency or hematuria  NEUROLOGICAL: No numbness or weakness  SKIN: No itching, burning, rashes, or lesions   All other review of systems is negative unless indicated above      Vital Signs Last 24 Hrs  T(C): 36.6 (10 May 2022 08:47), Max: 36.6 (10 May 2022 08:47)  T(F): 97.8 (10 May 2022 08:47), Max: 97.8 (10 May 2022 08:47)  HR: 70 (10 May 2022 08:47) (63 - 70)  BP: 113/44 (10 May 2022 08:47) (110/50 - 113/49)  BP(mean): --  RR: 16 (10 May 2022 08:47) (16 - 18)  SpO2: 94% (10 May 2022 08:47) (94% - 100%)      PHYSICAL EXAM:    Constitutional: NAD, awake and alert, thin appearing.   HEENT: PERR, EOMI, Normal Hearing, MMM  Neck: Soft and supple, No LAD, No JVD  Respiratory: Breath sounds are clear bilaterally, No wheezing, rales or rhonchi  Cardiovascular: S1 and S2, regular rate and rhythm, no Murmurs, gallops or rubs  Gastrointestinal: Bowel Sounds present, soft, nontender, nondistended, no guarding, no rebound  GU_ waite caht in place, clear to cloudy yellow urine with penile ulceration   Extremities: No peripheral edema  Vascular: 2+ peripheral pulses  Neurological: A/O x 3, no focal deficits  Musculoskeletal: 5/5 strength b/l upper and lower extremities  Skin: No rashes, stage 2 sacral wound,     MEDICATIONS  (STANDING):  ascorbic acid 500 milliGRAM(s) Oral daily  aspirin enteric coated 81 milliGRAM(s) Oral daily  atorvastatin 80 milliGRAM(s) Oral at bedtime  bisacodyl Oral Tab/Cap - Peds 5 milliGRAM(s) Oral every 12 hours  cefTRIAXone   IVPB 1000 milliGRAM(s) IV Intermittent every 24 hours  clopidogrel Tablet 75 milliGRAM(s) Oral daily  ferrous    sulfate 325 milliGRAM(s) Oral daily  finasteride 5 milliGRAM(s) Oral daily  furosemide    Tablet 40 milliGRAM(s) Oral <User Schedule>  furosemide   Oral Tab/Cap - Peds 20 milliGRAM(s) Oral every other day  heparin   Injectable 5000 Unit(s) SubCutaneous every 12 hours  melatonin 5 milliGRAM(s) Oral at bedtime  metoprolol succinate ER 50 milliGRAM(s) Oral daily  mirtazapine 7.5 milliGRAM(s) Oral at bedtime  pantoprazole    Tablet 40 milliGRAM(s) Oral before breakfast  polyethylene glycol 3350 17 Gram(s) Oral daily  ranolazine 500 milliGRAM(s) Oral two times a day  tamsulosin 0.4 milliGRAM(s) Oral at bedtime  vancomycin  IVPB 500 milliGRAM(s) IV Intermittent every 24 hours      LABS: All Labs Reviewed:                            8.5    9.46  )-----------( 235      ( 10 May 2022 09:23 )             26.1     05-10    131<L>  |  96  |  31<H>  ----------------------------<  119<H>  3.7   |  27  |  1.63<H>    Ca    7.8<L>      10 May 2022 09:23    TPro  5.3<L>  /  Alb  1.7<L>  /  TBili  0.5  /  DBili  x   /  AST  17  /  ALT  23  /  AlkPhos  79  05-09        LIVER FUNCTIONS - ( 09 May 2022 07:58 )  Alb: 1.7 g/dL / Pro: 5.3 gm/dL / ALK PHOS: 79 U/L / ALT: 23 U/L / AST: 17 U/L / GGT: x             Urinalysis Basic - ( 08 May 2022 17:00 )    Color: Yellow / Appearance: very cloudy / S.015 / pH: x  Gluc: x / Ketone: Negative  / Bili: Negative / Urobili: Negative   Blood: x / Protein: 100 / Nitrite: Negative   Leuk Esterase: Moderate / RBC: 25-50 /HPF / WBC 6-10   Sq Epi: x / Non Sq Epi: Occasional / Bacteria: Occasional      RADIOLOGY/EKG:    < from: Xray Foot AP + Lateral, Left (22 @ 21:38) >  IMPRESSION:  Soft tissue ulcer with no gross cortical destruction. If there is   continued clinical concern follow-up MRI can be ordered    --- End of Report ---  MAGDALENA SPENCER DO; Attending Radiologist  This document has been electronically signed. May  9 2022  1:32PM    < end of copied text >

## 2022-05-10 NOTE — PROGRESS NOTE ADULT - ASSESSMENT
89 y/o M with PMH HTN, CAD s/p CABG, AAA s/p repair, CKD Stage 3, CHFpEF, insomnia, obstructive/reflux uropathy, BPH admitted on 5/8 from [facility] for evaluation of suspected "infection." [facility] and he was told to transfer to hospital for treatment of urinary tract infection; history per medical record as patient is unable to provide history.     1. Patient admitted with pyuria, rule out urinary tract infection; also noted with left heel ulcer, eschar and skin desquamation  - follow up cultures   - serial cbc and monitor temperature   - reviewed prior medical records to evaluate for resistant or atypical pathogens, patient had Enterococcus in past, now with Gram positive organisms in urine  - will start vancomycin  - iv hydration and supportive care   - wound care per podiatry  - will continue ceftriaxone as ordered  - off loading of heels  2. other issues; per medicine

## 2022-05-10 NOTE — PROGRESS NOTE ADULT - ASSESSMENT
89 y/o M presents with rash on buttock and UTI     1. Leukocytosis likely due to UTI   - UTI associated with chronic waite   - waite changed in ED    - cont w/ ceftriaxone for now. added renal dose vanco per ID   - Does not meet SIRS criteria  - Last UCx (3/2021): MRSA proteus -> repeat urine culture with gram positive organism - speciation and sensitivity pending   - WBC 9.4, UA: moderate leukocyte esterase, occasional bacteria   - BCx negative   - CRP elevated   - Pressure ulcer on buttocks is stage 2, unlikely osteomyelitis   - Wound care consult   - Trend WBC, monitor for temperatures   - Tylenol for temperatures PRN   - Monitor off IVF for now   - Monitor BP closely   - ID consult - ceftrixaone and vanco 500   - Urology consult - Dr. Srivastava     # left heel and penile ulceration   - urology consulted - culture sent   - podiatry consulted   - xray negative for free air or bony involvement   - podiatry consulted. localized wound care.     # hyponatremia   - likely due to poor PO intake and diuresis.   - slowly improving   - montior and trend for now.     2. Lower extremity swelling likely secondary to venous insufficiency and CHFpEF   - ECHO (12/2021): moderate MR, mild AR, mild TR, LA dilation, LVEF 50%, trace pericardial effusion   - Ordered BNP, ECHO   - Strict I+Os, daily weights   - Keep K > 4 and Mg > 2   - c/w home medications   - Encourage elevation of lower extremities b/l   - Lasix dose recently adjusted by Dr. Galan (nephrology) which was also discussed with Dr. Hyatt -> alternating 20 mg and 40 mg doses of lasix daily     3. Normocytic anemia   - Hb 8-9 baseline     4. Hyperglycemia   - HbA1c 5.3     5. Severe protein calorie malnutrition   - Albumin 2.0   - Nutrition consult     6. History of HTN, CAD s/p CABG, AAA s/p repair, CKD Stage 3, CHFpEF, insomnia, obstructive/reflux uropathy, BPH   - c/w home medications; reviewed at the bedside and discussed that lasix dose is now alternating 20 mg and 40 mg and terazosin was d/c   - Cr 1.4 (baseline ~2 as per pt's son), monitor - out pt neprho Dr. Galan Keep near even, m aintenance lasix ok for now  - EKG shows possible PPM failure, will have PPM interrogated by EP     DVT ppx: Heparin 5,000 units Q12H (hold for PLT <50,000)   Code status: Full code (Pt agrees to chest compressions and intubation if required).     Tee delgado at bedside and updated on plan of care. 5/9  88 y/o M presents with rash on buttock and UTI     1. Leukocytosis likely due to UTI   - UTI associated with chronic waite   - waite changed in ED    - cont w/ ceftriaxone for now. added renal dose vanco per ID   - Does not meet SIRS criteria  - Last UCx (3/2021): MRSA proteus -> repeat urine culture with gram positive organism - speciation and sensitivity pending   - WBC 9.4, UA: moderate leukocyte esterase, occasional bacteria   - BCx negative   - CRP elevated   - Pressure ulcer on buttocks is stage 2, unlikely osteomyelitis   - Wound care consult   - Trend WBC, monitor for temperatures   - Tylenol for temperatures PRN   - Monitor off IVF for now   - Monitor BP closely   - ID consult - ceftrixaone and vanco 500   - Urology consult - Dr. Srivastava     # left heel and penile ulceration   - urology consulted - culture sent   - podiatry consulted   - xray negative for free air or bony involvement   - podiatry consulted. localized wound care.     # hyponatremia   - likely due to poor PO intake and diuresis.   - slowly improving   - montior and trend for now.     2. Lower extremity swelling likely secondary to venous insufficiency and CHFpEF   - ECHO (12/2021): moderate MR, mild AR, mild TR, LA dilation, LVEF 50%, trace pericardial effusion   - Ordered BNP, ECHO   - Strict I+Os, daily weights   - Keep K > 4 and Mg > 2   - c/w home medications   - Encourage elevation of lower extremities b/l   - Lasix dose recently adjusted by Dr. Galan (nephrology) which was also discussed with Dr. Hyatt -> alternating 20 mg and 40 mg doses of lasix daily     3. Normocytic anemia   - Hb 8-9 baseline     4. Hyperglycemia   - HbA1c 5.3     5. Severe protein calorie malnutrition   - Albumin 2.0   - Nutrition consult     6. History of HTN, CAD s/p CABG, AAA s/p repair, CKD Stage 3, CHFpEF, insomnia, obstructive/reflux uropathy, BPH   - c/w home medications; reviewed at the bedside and discussed that lasix dose is now alternating 20 mg and 40 mg and terazosin was d/c   - Cr 1.4 (baseline ~2 as per pt's son), monitor - out pt neprho Dr. Galan Keep near even, m aintenance lasix ok for now  - EKG shows possible PPM failure, will have PPM interrogated by EP     DVT ppx: Heparin 5,000 units Q12H (hold for PLT <50,000)   Code status: Full code (Pt agrees to chest compressions and intubation if required).     Tee delgado at bedside and updated on plan of care. 5/9   called 5/10 no answer, left message with callback number

## 2022-05-11 LAB
-  AMPICILLIN/SULBACTAM: SIGNIFICANT CHANGE UP
-  CEFAZOLIN: SIGNIFICANT CHANGE UP
-  DAPTOMYCIN: SIGNIFICANT CHANGE UP
-  GENTAMICIN: SIGNIFICANT CHANGE UP
-  LINEZOLID: SIGNIFICANT CHANGE UP
-  OXACILLIN: SIGNIFICANT CHANGE UP
-  PENICILLIN: SIGNIFICANT CHANGE UP
-  RIFAMPIN: SIGNIFICANT CHANGE UP
-  TETRACYCLINE: SIGNIFICANT CHANGE UP
-  TRIMETHOPRIM/SULFAMETHOXAZOLE: SIGNIFICANT CHANGE UP
-  VANCOMYCIN: SIGNIFICANT CHANGE UP
ALBUMIN SERPL ELPH-MCNC: 1.6 G/DL — LOW (ref 3.3–5)
ALP SERPL-CCNC: 90 U/L — SIGNIFICANT CHANGE UP (ref 40–120)
ALT FLD-CCNC: 19 U/L — SIGNIFICANT CHANGE UP (ref 12–78)
ANION GAP SERPL CALC-SCNC: 7 MMOL/L — SIGNIFICANT CHANGE UP (ref 5–17)
AST SERPL-CCNC: 12 U/L — LOW (ref 15–37)
BILIRUB SERPL-MCNC: 0.4 MG/DL — SIGNIFICANT CHANGE UP (ref 0.2–1.2)
BUN SERPL-MCNC: 31 MG/DL — HIGH (ref 7–23)
CALCIUM SERPL-MCNC: 8 MG/DL — LOW (ref 8.5–10.1)
CHLORIDE SERPL-SCNC: 101 MMOL/L — SIGNIFICANT CHANGE UP (ref 96–108)
CO2 SERPL-SCNC: 27 MMOL/L — SIGNIFICANT CHANGE UP (ref 22–31)
CREAT SERPL-MCNC: 1.39 MG/DL — HIGH (ref 0.5–1.3)
EGFR: 48 ML/MIN/1.73M2 — LOW
GLUCOSE SERPL-MCNC: 105 MG/DL — HIGH (ref 70–99)
HCT VFR BLD CALC: 27.1 % — LOW (ref 39–50)
HGB BLD-MCNC: 8.9 G/DL — LOW (ref 13–17)
MCHC RBC-ENTMCNC: 28.9 PG — SIGNIFICANT CHANGE UP (ref 27–34)
MCHC RBC-ENTMCNC: 32.8 GM/DL — SIGNIFICANT CHANGE UP (ref 32–36)
MCV RBC AUTO: 88 FL — SIGNIFICANT CHANGE UP (ref 80–100)
METHOD TYPE: SIGNIFICANT CHANGE UP
PLATELET # BLD AUTO: 257 K/UL — SIGNIFICANT CHANGE UP (ref 150–400)
POTASSIUM SERPL-MCNC: 3.8 MMOL/L — SIGNIFICANT CHANGE UP (ref 3.5–5.3)
POTASSIUM SERPL-SCNC: 3.8 MMOL/L — SIGNIFICANT CHANGE UP (ref 3.5–5.3)
PROT SERPL-MCNC: 5.3 GM/DL — LOW (ref 6–8.3)
RBC # BLD: 3.08 M/UL — LOW (ref 4.2–5.8)
RBC # FLD: 14.6 % — HIGH (ref 10.3–14.5)
SODIUM SERPL-SCNC: 135 MMOL/L — SIGNIFICANT CHANGE UP (ref 135–145)
WBC # BLD: 9.66 K/UL — SIGNIFICANT CHANGE UP (ref 3.8–10.5)
WBC # FLD AUTO: 9.66 K/UL — SIGNIFICANT CHANGE UP (ref 3.8–10.5)

## 2022-05-11 PROCEDURE — 99232 SBSQ HOSP IP/OBS MODERATE 35: CPT

## 2022-05-11 RX ADMIN — Medication 20 MILLIGRAM(S): at 09:53

## 2022-05-11 RX ADMIN — Medication 5 MILLIGRAM(S): at 21:43

## 2022-05-11 RX ADMIN — Medication 100 MILLIGRAM(S): at 11:31

## 2022-05-11 RX ADMIN — MIRTAZAPINE 7.5 MILLIGRAM(S): 45 TABLET, ORALLY DISINTEGRATING ORAL at 21:43

## 2022-05-11 RX ADMIN — ATORVASTATIN CALCIUM 80 MILLIGRAM(S): 80 TABLET, FILM COATED ORAL at 21:44

## 2022-05-11 RX ADMIN — HEPARIN SODIUM 5000 UNIT(S): 5000 INJECTION INTRAVENOUS; SUBCUTANEOUS at 21:44

## 2022-05-11 RX ADMIN — Medication 500 MILLIGRAM(S): at 09:52

## 2022-05-11 RX ADMIN — Medication 50 MILLIGRAM(S): at 09:53

## 2022-05-11 RX ADMIN — RANOLAZINE 500 MILLIGRAM(S): 500 TABLET, FILM COATED, EXTENDED RELEASE ORAL at 09:52

## 2022-05-11 RX ADMIN — FINASTERIDE 5 MILLIGRAM(S): 5 TABLET, FILM COATED ORAL at 09:52

## 2022-05-11 RX ADMIN — HEPARIN SODIUM 5000 UNIT(S): 5000 INJECTION INTRAVENOUS; SUBCUTANEOUS at 09:53

## 2022-05-11 RX ADMIN — Medication 325 MILLIGRAM(S): at 09:53

## 2022-05-11 RX ADMIN — RANOLAZINE 500 MILLIGRAM(S): 500 TABLET, FILM COATED, EXTENDED RELEASE ORAL at 21:44

## 2022-05-11 RX ADMIN — Medication 81 MILLIGRAM(S): at 09:52

## 2022-05-11 RX ADMIN — PANTOPRAZOLE SODIUM 40 MILLIGRAM(S): 20 TABLET, DELAYED RELEASE ORAL at 06:11

## 2022-05-11 RX ADMIN — CLOPIDOGREL BISULFATE 75 MILLIGRAM(S): 75 TABLET, FILM COATED ORAL at 09:52

## 2022-05-11 RX ADMIN — CEFTRIAXONE 100 MILLIGRAM(S): 500 INJECTION, POWDER, FOR SOLUTION INTRAMUSCULAR; INTRAVENOUS at 17:11

## 2022-05-11 NOTE — PROGRESS NOTE ADULT - SUBJECTIVE AND OBJECTIVE BOX
Patient is a 89y old  Male who presents with a chief complaint of UTI, sacral ulcer (09 May 2022 13:57)      SUBJECTIVE:   HPI:  89 y/o M with PMH HTN, CAD s/p CABG, AAA s/p repair, CKD Stage 3, CHFpEF, insomnia, obstructive/reflux uropathy, BPH presents with a rash on the buttocks and UTI. Pt states that he lives at [facility] and there was concern because there was a rash on his buttock and the nurses thought he may have a UTI. Reports intermittent abd pain, constipation, loss of bowel control, swelling in his lower extremities at the end of the day. Also reports left heel ulcer. Denies fevers, chills, chest pain, SOB, N/V, diarrhea. The pt states that he recently saw a nephrologist who changed his lasix from 40 mg daily to alternating between 20 mg and 40 mg doses.     Prior admissions:   - 3/25/2022-3/26/2022: weakness -> post obstructive urinary retention -> waite catheter placed -> doxycycline for scalp wound   ER course: VSS. Labs: WBC 12.78, Hb 9.6, Na 131, BUN 36, Cr 1.71 (baseline ~1.8), Glucose 128, calcium 8.0 -> corrected 9.6, albumin 2.0. UA: cloudy, moderate leukocyte esterase, large blood, WBC 6-10, RBC 25-50, occasional bacteria. COVID negative. EKG: Ventricularly paced rhythm, HR 78 bpm, pacer spikes visualized. CXR: PPM present, rotated film, no consolidation, cardiomegaly, sternotomy wires, no effusion, no pneumothorax (personally reviewed).   Pt was given Ceftriaxone. He is being admitted to med/surg for further management.  (08 May 2022 20:24)    : laying in bed, feeling better today, had good appetite. no pain or discomfort noted.   5/10: seen and examined laying in bed, had penile discharge this morning with some mid/upper abd pain, otherwise no other sx.   : laying in bed, no new issues. MRSA in urine. tolerating abx well. no new issues thus far     REVIEW OF SYSTEMS:    CONSTITUTIONAL: No weakness, fevers or chills  EYES/ENT: No visual changes;  No vertigo or throat pain   NECK: No pain or stiffness  RESPIRATORY: No cough, wheezing, hemoptysis; No shortness of breath  CARDIOVASCULAR: No chest pain or palpitations  GASTROINTESTINAL: No abdominal or epigastric pain. No nausea, vomiting, or hematemesis; No diarrhea or constipation. No melena or hematochezia.  GENITOURINARY: No dysuria, frequency or hematuria  NEUROLOGICAL: No numbness or weakness  SKIN: No itching, burning, rashes, or lesions   All other review of systems is negative unless indicated above      Vital Signs Last 24 Hrs  T(C): 36.5 (11 May 2022 08:25), Max: 36.6 (10 May 2022 15:47)  T(F): 97.7 (11 May 2022 08:25), Max: 97.9 (10 May 2022 15:47)  HR: 68 (11 May 2022 08:25) (65 - 81)  BP: 119/40 (11 May 2022 08:25) (119/40 - 123/43)  BP(mean): --  RR: 18 (11 May 2022 08:25) (16 - 18)  SpO2: 97% (11 May 2022 08:25) (97% - 99%)      PHYSICAL EXAM:    Constitutional: NAD, awake and alert, thin appearing.   HEENT: PERR, EOMI, Normal Hearing, MMM  Neck: Soft and supple, No LAD, No JVD  Respiratory: Breath sounds are clear bilaterally, No wheezing, rales or rhonchi  Cardiovascular: S1 and S2, regular rate and rhythm, no Murmurs, gallops or rubs  Gastrointestinal: Bowel Sounds present, soft, nontender, nondistended, no guarding, no rebound  GU_ waite caht in place, clear to cloudy yellow urine with penile ulceration   Extremities: No peripheral edema  Vascular: 2+ peripheral pulses  Neurological: A/O x 3, no focal deficits  Musculoskeletal: 5/5 strength b/l upper and lower extremities  Skin: No rashes, stage 2 sacral wound,     MEDICATIONS  (STANDING):  ascorbic acid 500 milliGRAM(s) Oral daily  aspirin enteric coated 81 milliGRAM(s) Oral daily  atorvastatin 80 milliGRAM(s) Oral at bedtime  bisacodyl Oral Tab/Cap - Peds 5 milliGRAM(s) Oral every 12 hours  cefTRIAXone   IVPB 1000 milliGRAM(s) IV Intermittent every 24 hours  clopidogrel Tablet 75 milliGRAM(s) Oral daily  ferrous    sulfate 325 milliGRAM(s) Oral daily  finasteride 5 milliGRAM(s) Oral daily  furosemide    Tablet 40 milliGRAM(s) Oral <User Schedule>  furosemide   Oral Tab/Cap - Peds 20 milliGRAM(s) Oral every other day  heparin   Injectable 5000 Unit(s) SubCutaneous every 12 hours  melatonin 5 milliGRAM(s) Oral at bedtime  metoprolol succinate ER 50 milliGRAM(s) Oral daily  mirtazapine 7.5 milliGRAM(s) Oral at bedtime  pantoprazole    Tablet 40 milliGRAM(s) Oral before breakfast  polyethylene glycol 3350 17 Gram(s) Oral daily  ranolazine 500 milliGRAM(s) Oral two times a day  tamsulosin 0.4 milliGRAM(s) Oral at bedtime  vancomycin  IVPB 500 milliGRAM(s) IV Intermittent every 24 hours      LABS: All Labs Reviewed:                            8.5    9.46  )-----------( 235      ( 10 May 2022 09:23 )             26.1     05-10    131<L>  |  96  |  31<H>  ----------------------------<  119<H>  3.7   |  27  |  1.63<H>    Ca    7.8<L>      10 May 2022 09:23    TPro  5.3<L>  /  Alb  1.7<L>  /  TBili  0.5  /  DBili  x   /  AST  17  /  ALT  23  /  AlkPhos  79  05-09        LIVER FUNCTIONS - ( 09 May 2022 07:58 )  Alb: 1.7 g/dL / Pro: 5.3 gm/dL / ALK PHOS: 79 U/L / ALT: 23 U/L / AST: 17 U/L / GGT: x             Urinalysis Basic - ( 08 May 2022 17:00 )    Color: Yellow / Appearance: very cloudy / S.015 / pH: x  Gluc: x / Ketone: Negative  / Bili: Negative / Urobili: Negative   Blood: x / Protein: 100 / Nitrite: Negative   Leuk Esterase: Moderate / RBC: 25-50 /HPF / WBC 6-10   Sq Epi: x / Non Sq Epi: Occasional / Bacteria: Occasional      Culture - Urine (22 @ 17:00)    -  Gentamicin: S <=1 Should not be used as monotherapy    -  Linezolid: S 2    -  Oxacillin: R >2    -  Penicillin: R >8    -  Rifampin: S <=1 Should not be used as monotherapy    -  Tetra/Doxy: S <=1    -  Trimethoprim/Sulfamethoxazole: R >    -  Vancomycin: S 1    -  Ampicillin/Sulbactam: R <=8/4    -  Cefazolin: R <=4    -  Daptomycin: S 0.5    Specimen Source: Clean Catch Clean Catch (Midstream)    Culture Results:   >100,000 CFU/ml Methicillin Resistant Staphylococcus aureus  50,000 - 99,000 CFU/mL Gram Negative Rods Identification and  susceptibility to follow.    Organism Identification: Methicillin resistant Staphylococcus aureus    Organism: Methicillin resistant Staphylococcus aureus    Method Type: NASH      RADIOLOGY/EKG:    < from: Xray Foot AP + Lateral, Left (22 @ 21:38) >  IMPRESSION:  Soft tissue ulcer with no gross cortical destruction. If there is   continued clinical concern follow-up MRI can be ordered    --- End of Report ---  MAGDALENA SPENCER DO; Attending Radiologist  This document has been electronically signed. May  9 2022  1:32PM    < end of copied text >    ECHO      Summary     Limited study to assess left ventricular function and pericardial   effusion.   Estimated left ventricular ejection fraction is 45-50 %.   The left ventricle is normal in wall thickness.   The left ventricle cavity is mildly dilated.   Wall motion abnormalities are noted with mildly depressed LV systolic   function.   No evidence of pericardial effusion.   No evidence of pleural effusion.     Signature     ----------------------------------------------------------------   Electronically signed by Magdalena Ibarra MD(Interpreting   physician) on 2022 05:40 PM   ----------------------------------------------------------------

## 2022-05-11 NOTE — PROGRESS NOTE ADULT - ASSESSMENT
89 y/o M presents with rash on buttock and UTI     1. Leukocytosis likely due to UTI   - UTI associated with chronic waite   - waite changed in ED    - cont w/ ceftriaxone for now. added renal dose vanco per ID   - Does not meet SIRS criteria  - Urine culture with MRSA 100,000 sensitivies reviewed, now on vanco IV 50-90k gram neg rods   - WBC 9.4, UA: moderate leukocyte esterase, occasional bacteria   - BCx negative   - CRP elevated   - Pressure ulcer on buttocks is stage 2, unlikely osteomyelitis   - Wound care consult   - Trend WBC, monitor for temperatures   - Tylenol for temperatures PRN   - Monitor off IVF for now   - Monitor BP closely   - ID consult - ceftrixaone and vanco 500   - Urology consult - Dr. Srivastava     # left heel and penile ulceration   - urology consulted - culture sent   - podiatry consulted   - xray negative for free air or bony involvement   - podiatry consulted. localized wound care. - off load while in bed     # hyponatremia --- resolved   - likely due to poor PO intake and diuresis.   - slowly improving   - renal consulted   - monitor and trend for now.     2. Lower extremity swelling likely secondary to venous insufficiency and CHFpEF   - ECHO (12/2021): moderate MR, mild AR, mild TR, LA dilation, LVEF 50%, trace pericardial effusion   - Ordered BNP, ECHO   - Strict I+Os, daily weights   - Keep K > 4 and Mg > 2   - c/w home medications   - Encourage elevation of lower extremities b/l   - Lasix dose recently adjusted by Dr. Galan (nephrology) which was also discussed with Dr. Hyatt -> alternating 20 mg and 40 mg doses of lasix daily     3. Normocytic anemia   - Hb 8-9 baseline     4. Hyperglycemia   - HbA1c 5.3     5. Severe protein calorie malnutrition   - Albumin 2.0   - Nutrition consult  - liberalized diet      6. History of HTN, CAD s/p CABG, AAA s/p repair, CKD Stage 3, CHFpEF, insomnia, obstructive/reflux uropathy, BPH   - c/w home medications; reviewed at the bedside and discussed that lasix dose is now alternating 20 mg and 40 mg and terazosin was d/c   - Cr 1.4 (baseline ~2 as per pt's son), monitor - out pt neprho Dr. 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     Tee delgado at bedside and updated on plan of care. 5/9   called 5/10 no answer, left message with callback number

## 2022-05-12 LAB
-  AMIKACIN: SIGNIFICANT CHANGE UP
-  AMOXICILLIN/CLAVULANIC ACID: SIGNIFICANT CHANGE UP
-  AMPICILLIN/SULBACTAM: SIGNIFICANT CHANGE UP
-  AMPICILLIN: SIGNIFICANT CHANGE UP
-  AZTREONAM: SIGNIFICANT CHANGE UP
-  CEFAZOLIN: SIGNIFICANT CHANGE UP
-  CEFEPIME: SIGNIFICANT CHANGE UP
-  CEFOXITIN: SIGNIFICANT CHANGE UP
-  CEFTRIAXONE: SIGNIFICANT CHANGE UP
-  CIPROFLOXACIN: SIGNIFICANT CHANGE UP
-  ERTAPENEM: SIGNIFICANT CHANGE UP
-  GENTAMICIN: SIGNIFICANT CHANGE UP
-  IMIPENEM: SIGNIFICANT CHANGE UP
-  LEVOFLOXACIN: SIGNIFICANT CHANGE UP
-  MEROPENEM: SIGNIFICANT CHANGE UP
-  NITROFURANTOIN: SIGNIFICANT CHANGE UP
-  PIPERACILLIN/TAZOBACTAM: SIGNIFICANT CHANGE UP
-  TIGECYCLINE: SIGNIFICANT CHANGE UP
-  TOBRAMYCIN: SIGNIFICANT CHANGE UP
-  TRIMETHOPRIM/SULFAMETHOXAZOLE: SIGNIFICANT CHANGE UP
CULTURE RESULTS: SIGNIFICANT CHANGE UP
CULTURE RESULTS: SIGNIFICANT CHANGE UP
METHOD TYPE: SIGNIFICANT CHANGE UP
ORGANISM # SPEC MICROSCOPIC CNT: SIGNIFICANT CHANGE UP
SPECIMEN SOURCE: SIGNIFICANT CHANGE UP

## 2022-05-12 PROCEDURE — 99232 SBSQ HOSP IP/OBS MODERATE 35: CPT

## 2022-05-12 RX ADMIN — CLOPIDOGREL BISULFATE 75 MILLIGRAM(S): 75 TABLET, FILM COATED ORAL at 09:42

## 2022-05-12 RX ADMIN — MIRTAZAPINE 7.5 MILLIGRAM(S): 45 TABLET, ORALLY DISINTEGRATING ORAL at 22:10

## 2022-05-12 RX ADMIN — TAMSULOSIN HYDROCHLORIDE 0.4 MILLIGRAM(S): 0.4 CAPSULE ORAL at 22:10

## 2022-05-12 RX ADMIN — Medication 81 MILLIGRAM(S): at 09:43

## 2022-05-12 RX ADMIN — Medication 500 MILLIGRAM(S): at 09:42

## 2022-05-12 RX ADMIN — PANTOPRAZOLE SODIUM 40 MILLIGRAM(S): 20 TABLET, DELAYED RELEASE ORAL at 05:34

## 2022-05-12 RX ADMIN — Medication 325 MILLIGRAM(S): at 09:42

## 2022-05-12 RX ADMIN — Medication 100 MILLIGRAM(S): at 13:11

## 2022-05-12 RX ADMIN — RANOLAZINE 500 MILLIGRAM(S): 500 TABLET, FILM COATED, EXTENDED RELEASE ORAL at 09:42

## 2022-05-12 RX ADMIN — FINASTERIDE 5 MILLIGRAM(S): 5 TABLET, FILM COATED ORAL at 09:44

## 2022-05-12 RX ADMIN — HEPARIN SODIUM 5000 UNIT(S): 5000 INJECTION INTRAVENOUS; SUBCUTANEOUS at 09:42

## 2022-05-12 RX ADMIN — ATORVASTATIN CALCIUM 80 MILLIGRAM(S): 80 TABLET, FILM COATED ORAL at 22:10

## 2022-05-12 RX ADMIN — Medication 50 MILLIGRAM(S): at 09:42

## 2022-05-12 RX ADMIN — RANOLAZINE 500 MILLIGRAM(S): 500 TABLET, FILM COATED, EXTENDED RELEASE ORAL at 22:10

## 2022-05-12 RX ADMIN — Medication 40 MILLIGRAM(S): at 09:43

## 2022-05-12 RX ADMIN — HEPARIN SODIUM 5000 UNIT(S): 5000 INJECTION INTRAVENOUS; SUBCUTANEOUS at 22:11

## 2022-05-12 RX ADMIN — Medication 5 MILLIGRAM(S): at 22:10

## 2022-05-12 NOTE — PROGRESS NOTE ADULT - SUBJECTIVE AND OBJECTIVE BOX
Patient is a 89y old  Male who presents with a chief complaint of UTI, sacral ulcer (09 May 2022 13:57)      SUBJECTIVE:   HPI:  89 y/o M with PMH HTN, CAD s/p CABG, AAA s/p repair, CKD Stage 3, CHFpEF, insomnia, obstructive/reflux uropathy, BPH presents with a rash on the buttocks and UTI. Pt states that he lives at [facility] and there was concern because there was a rash on his buttock and the nurses thought he may have a UTI. Reports intermittent abd pain, constipation, loss of bowel control, swelling in his lower extremities at the end of the day. Also reports left heel ulcer. Denies fevers, chills, chest pain, SOB, N/V, diarrhea. The pt states that he recently saw a nephrologist who changed his lasix from 40 mg daily to alternating between 20 mg and 40 mg doses.     Prior admissions:   - 3/25/2022-3/26/2022: weakness -> post obstructive urinary retention -> waite catheter placed -> doxycycline for scalp wound   ER course: VSS. Labs: WBC 12.78, Hb 9.6, Na 131, BUN 36, Cr 1.71 (baseline ~1.8), Glucose 128, calcium 8.0 -> corrected 9.6, albumin 2.0. UA: cloudy, moderate leukocyte esterase, large blood, WBC 6-10, RBC 25-50, occasional bacteria. COVID negative. EKG: Ventricularly paced rhythm, HR 78 bpm, pacer spikes visualized. CXR: PPM present, rotated film, no consolidation, cardiomegaly, sternotomy wires, no effusion, no pneumothorax (personally reviewed).   Pt was given Ceftriaxone. He is being admitted to med/surg for further management.  (08 May 2022 20:24)    : laying in bed, feeling better today, had good appetite. no pain or discomfort noted.   5/10: seen and examined laying in bed, had penile discharge this morning with some mid/upper abd pain, otherwise no other sx.   : laying in bed, no new issues. MRSA in urine. tolerating abx well. no new issues thus far   : sitting up in bed, ambulated with PT today. still on abx, no new issues. contacted son via telephone kept appraised to situation     REVIEW OF SYSTEMS:    CONSTITUTIONAL: No weakness, fevers or chills  EYES/ENT: No visual changes;  No vertigo or throat pain   NECK: No pain or stiffness  RESPIRATORY: No cough, wheezing, hemoptysis; No shortness of breath  CARDIOVASCULAR: No chest pain or palpitations  GASTROINTESTINAL: No abdominal or epigastric pain. No nausea, vomiting, or hematemesis; No diarrhea or constipation. No melena or hematochezia.  GENITOURINARY: No dysuria, frequency or hematuria  NEUROLOGICAL: No numbness or weakness  SKIN: No itching, burning, rashes, or lesions   All other review of systems is negative unless indicated above    Vital Signs Last 24 Hrs  T(C): 36.5 (12 May 2022 07:15), Max: 36.7 (11 May 2022 20:59)  T(F): 97.7 (12 May 2022 07:15), Max: 98.1 (11 May 2022 20:59)  HR: 74 (12 May 2022 07:15) (69 - 78)  BP: 118/83 (12 May 2022 07:15) (112/51 - 121/50)  BP(mean): --  RR: 17 (11 May 2022 20:59) (17 - 18)  SpO2: 96% (12 May 2022 07:15) (95% - 96%)      PHYSICAL EXAM:    Constitutional: NAD, awake and alert, thin appearing.   HEENT: PERR, EOMI, Normal Hearing, MMM  Neck: Soft and supple, No LAD, No JVD  Respiratory: Breath sounds are clear bilaterally, No wheezing, rales or rhonchi  Cardiovascular: S1 and S2, regular rate and rhythm, no Murmurs, gallops or rubs  Gastrointestinal: Bowel Sounds present, soft, nontender, nondistended, no guarding, no rebound  : waite caht in place, clear to cloudy yellow urine with penile ulceration   Extremities: No peripheral edema  Vascular: 2+ peripheral pulses  Neurological: A/O x 3, no focal deficits  Musculoskeletal: 5/5 strength b/l upper and lower extremities  Skin: No rashes, stage 2 sacral wound,     MEDICATIONS  (STANDING):  ascorbic acid 500 milliGRAM(s) Oral daily  aspirin enteric coated 81 milliGRAM(s) Oral daily  atorvastatin 80 milliGRAM(s) Oral at bedtime  bisacodyl Oral Tab/Cap - Peds 5 milliGRAM(s) Oral every 12 hours  cefTRIAXone   IVPB 1000 milliGRAM(s) IV Intermittent every 24 hours  clopidogrel Tablet 75 milliGRAM(s) Oral daily  ferrous    sulfate 325 milliGRAM(s) Oral daily  finasteride 5 milliGRAM(s) Oral daily  furosemide    Tablet 40 milliGRAM(s) Oral <User Schedule>  furosemide   Oral Tab/Cap - Peds 20 milliGRAM(s) Oral every other day  heparin   Injectable 5000 Unit(s) SubCutaneous every 12 hours  melatonin 5 milliGRAM(s) Oral at bedtime  metoprolol succinate ER 50 milliGRAM(s) Oral daily  mirtazapine 7.5 milliGRAM(s) Oral at bedtime  pantoprazole    Tablet 40 milliGRAM(s) Oral before breakfast  polyethylene glycol 3350 17 Gram(s) Oral daily  ranolazine 500 milliGRAM(s) Oral two times a day  tamsulosin 0.4 milliGRAM(s) Oral at bedtime  vancomycin  IVPB 500 milliGRAM(s) IV Intermittent every 24 hours      LABS: All Labs Reviewed:                              8.9    9.66  )-----------( 257      ( 11 May 2022 07:41 )             27.1     05-11    135  |  101  |  31<H>  ----------------------------<  105<H>  3.8   |  27  |  1.39<H>    Ca    8.0<L>      11 May 2022 07:41    TPro  5.3<L>  /  Alb  1.6<L>  /  TBili  0.4  /  DBili  x   /  AST  12<L>  /  ALT  19  /  AlkPhos  90  05-11    LIVER FUNCTIONS - ( 11 May 2022 07:41 )  Alb: 1.6 g/dL / Pro: 5.3 gm/dL / ALK PHOS: 90 U/L / ALT: 19 U/L / AST: 12 U/L / GGT: x             Urinalysis Basic - ( 08 May 2022 17:00 )    Color: Yellow / Appearance: very cloudy / S.015 / pH: x  Gluc: x / Ketone: Negative  / Bili: Negative / Urobili: Negative   Blood: x / Protein: 100 / Nitrite: Negative   Leuk Esterase: Moderate / RBC: 25-50 /HPF / WBC 6-10   Sq Epi: x / Non Sq Epi: Occasional / Bacteria: Occasional      Culture - Urine (22 @ 17:00)    -  Gentamicin: S <=1 Should not be used as monotherapy    -  Linezolid: S 2    -  Oxacillin: R >2    -  Penicillin: R >8    -  Rifampin: S <=1 Should not be used as monotherapy    -  Tetra/Doxy: S <=1    -  Trimethoprim/Sulfamethoxazole: R >2/38    -  Vancomycin: S 1    -  Ampicillin/Sulbactam: R <=8/4    -  Cefazolin: R <=4    -  Daptomycin: S 0.5    Specimen Source: Clean Catch Clean Catch (Midstream)    Culture Results:   >100,000 CFU/ml Methicillin Resistant Staphylococcus aureus  50,000 - 99,000 CFU/mL Gram Negative Rods Identification and  susceptibility to follow.    Organism Identification: Methicillin resistant Staphylococcus aureus    Organism: Methicillin resistant Staphylococcus aureus    Method Type: NASH      RADIOLOGY/EKG:    < from: Xray Foot AP + Lateral, Left (22 @ 21:38) >  IMPRESSION:  Soft tissue ulcer with no gross cortical destruction. If there is   continued clinical concern follow-up MRI can be ordered    --- End of Report ---  MAGDALENA SPENCER DO; Attending Radiologist  This document has been electronically signed. May  9 2022  1:32PM    < end of copied text >    ECHO      Summary     Limited study to assess left ventricular function and pericardial   effusion.   Estimated left ventricular ejection fraction is 45-50 %.   The left ventricle is normal in wall thickness.   The left ventricle cavity is mildly dilated.   Wall motion abnormalities are noted with mildly depressed LV systolic   function.   No evidence of pericardial effusion.   No evidence of pleural effusion.     Signature     ----------------------------------------------------------------   Electronically signed by Magdalena Ibarra MD(Interpreting   physician) on 2022 05:40 PM   ----------------------------------------------------------------

## 2022-05-12 NOTE — PROGRESS NOTE ADULT - ASSESSMENT
89 y/o M with PMH HTN, CAD s/p CABG, AAA s/p repair, CKD Stage 3, CHFpEF, insomnia, obstructive/reflux uropathy, BPH admitted on 5/8 from [facility] for evaluation of suspected "infection." [facility] and he was told to transfer to hospital for treatment of urinary tract infection; history per medical record as patient is unable to provide history.     1. Patient admitted with pyuria, rule out urinary tract infection; also noted with left heel ulcer, eschar and skin desquamation  - follow up cultures   - serial cbc and monitor temperature   - MRSA in urine, most likely from ulcer on tip of penis, blood cultures are no growth, doubt bacteremia seeding the urine  - tolerating antibiotics without rashes or side effects   - complete 5 days of vancomycin  - completed 3 days of ceftriaxone  - off loading of heels  2. other issues; per medicine

## 2022-05-12 NOTE — PROGRESS NOTE ADULT - SUBJECTIVE AND OBJECTIVE BOX
Date of service: 05-12-22 @ 14:03      Patient sitting in chair; no complaints, afebrile      ROS: unable to obtain secondary to patient medical condition     MEDICATIONS  (STANDING):  ascorbic acid 500 milliGRAM(s) Oral daily  aspirin enteric coated 81 milliGRAM(s) Oral daily  atorvastatin 80 milliGRAM(s) Oral at bedtime  bisacodyl Oral Tab/Cap - Peds 5 milliGRAM(s) Oral every 12 hours  clopidogrel Tablet 75 milliGRAM(s) Oral daily  ferrous    sulfate 325 milliGRAM(s) Oral daily  finasteride 5 milliGRAM(s) Oral daily  furosemide    Tablet 40 milliGRAM(s) Oral <User Schedule>  furosemide   Oral Tab/Cap - Peds 20 milliGRAM(s) Oral every other day  heparin   Injectable 5000 Unit(s) SubCutaneous every 12 hours  melatonin 5 milliGRAM(s) Oral at bedtime  metoprolol succinate ER 50 milliGRAM(s) Oral daily  mirtazapine 7.5 milliGRAM(s) Oral at bedtime  pantoprazole    Tablet 40 milliGRAM(s) Oral before breakfast  polyethylene glycol 3350 17 Gram(s) Oral daily  ranolazine 500 milliGRAM(s) Oral two times a day  tamsulosin 0.4 milliGRAM(s) Oral at bedtime  vancomycin  IVPB 500 milliGRAM(s) IV Intermittent every 24 hours    MEDICATIONS  (PRN):  acetaminophen     Tablet .. 650 milliGRAM(s) Oral every 6 hours PRN Temp greater or equal to 38C (100.4F), Mild Pain (1 - 3)  aluminum hydroxide/magnesium hydroxide/simethicone Suspension 30 milliLiter(s) Oral every 4 hours PRN Dyspepsia  magnesium hydroxide Suspension 15 milliLiter(s) Oral daily PRN Constipation  ondansetron Injectable 4 milliGRAM(s) IV Push every 8 hours PRN Nausea and/or Vomiting      Vital Signs Last 24 Hrs  T(C): 36.5 (12 May 2022 07:15), Max: 36.7 (11 May 2022 20:59)  T(F): 97.7 (12 May 2022 07:15), Max: 98.1 (11 May 2022 20:59)  HR: 74 (12 May 2022 07:15) (69 - 78)  BP: 118/83 (12 May 2022 07:15) (112/51 - 121/50)  BP(mean): --  RR: 17 (11 May 2022 20:59) (17 - 18)  SpO2: 96% (12 May 2022 07:15) (95% - 96%)        Physical Exam:        Physical Exam:        Constitutional: frail looking  HEENT: NC/AT, EOMI, PERRLA, conjunctivae clear; ears and nose atraumatic; pharynx clear  Neck: supple; thyroid not palpable, ecchymosis on left neck  Back: no tenderness  Respiratory: respiratory effort normal; clear to auscultation  Cardiovascular: S1S2 regular, no murmurs  Abdomen: soft, not tender, not distended, positive BS; no liver or spleen organomegaly  Genitourinary: no suprapubic tenderness  Musculoskeletal: no muscle tenderness, left heel with eschar about 6 cm diameter and surrounding erythema and skin desquamation; right heel with eschar about 2 cm diameter with surrounding erythema  Neurological/ Psychiatric:  moving all extremities  Skin: no rashes; no palpable lesions    Labs: all available labs reviewed                             Labs:    Labs:                        8.9    9.66  )-----------( 257      ( 11 May 2022 07:41 )             27.1     05-11    135  |  101  |  31<H>  ----------------------------<  105<H>  3.8   |  27  |  1.39<H>    Ca    8.0<L>      11 May 2022 07:41    TPro  5.3<L>  /  Alb  1.6<L>  /  TBili  0.4  /  DBili  x   /  AST  12<L>  /  ALT  19  /  AlkPhos  90  05-11           Cultures:       Culture - Genital (collected 05-10-22 @ 11:30)  Source: .Genital Exudate Urethral  Preliminary Report (05-12-22 @ 13:05):    Numerous Staphylococcus aureus Susceptibility to follow.    Few Streptococcus anginosus "Susceptibilities not performed"    Culture - Blood (collected 05-08-22 @ 17:31)  Source: .Blood None  Preliminary Report (05-09-22 @ 21:01):    No growth to date.    Culture - Urine (collected 05-08-22 @ 17:00)  Source: Clean Catch Clean Catch (Midstream)  Preliminary Report (05-11-22 @ 20:44):    >100,000 CFU/ml Methicillin Resistant Staphylococcus aureus    50,000 - 99,000 CFU/mL Klebsiella aerogenes Susceptibility to follow.  Organism: Methicillin resistant Staphylococcus aureus (05-11-22 @ 10:59)  Organism: Methicillin resistant Staphylococcus aureus (05-11-22 @ 10:59)      -  Ampicillin/Sulbactam: R <=8/4      -  Cefazolin: R <=4      -  Daptomycin: S 0.5      -  Gentamicin: S <=1 Should not be used as monotherapy      -  Linezolid: S 2      -  Oxacillin: R >2      -  Penicillin: R >8      -  Rifampin: S <=1 Should not be used as monotherapy      -  Tetra/Doxy: S <=1      -  Trimethoprim/Sulfamethoxazole: R >2/38      -  Vancomycin: S 1      Method Type: NASH    Culture - Blood (collected 05-08-22 @ 16:52)  Source: .Blood Blood  Preliminary Report (05-09-22 @ 21:01):    No growth to date.      C-Reactive Protein, Serum: 89 mg/L (05-08-22 @ 16:52)        < from: Xray Foot AP + Lateral, Left (05.08.22 @ 21:38) >  IMPRESSION:  Soft tissue ulcer with no gross cortical destruction. If there is   continued clinical concern follow-up MRI can be ordered    < end of copied text >        Radiology: all available radiological tests reviewed    Advanced directives addressed: full resuscitation

## 2022-05-12 NOTE — PROGRESS NOTE ADULT - ASSESSMENT
89 y/o M presents with rash on buttock and UTI     1. Leukocytosis likely due to UTI   - UTI associated with chronic waite   - waite changed in ED    - s/p ceftriaxone for now.  cont w/ renal dose vanco per ID   - Does not meet SIRS criteria  - Urine culture with MRSA 100,000 sensitivies reviewed, now on vanco IV 50-90k gram neg rods   - WBC 9.4, UA: moderate leukocyte esterase, occasional bacteria   - BCx negative   - CRP elevated   - Pressure ulcer on buttocks is stage 2, unlikely osteomyelitis   - Wound care consult   - Trend WBC, monitor for temperatures   - Tylenol for temperatures PRN   - Monitor off IVF for now   - Monitor BP closely   - ID & Urology consult - Dr. Srivastava     # left heel and penile ulceration   - urology consulted - culture sent   - podiatry consulted   - xray negative for free air or bony involvement   - podiatry consulted. localized wound care. - off load while in bed     # hyponatremia --- resolved   - likely due to poor PO intake and diuresis.   - slowly improving   - renal consulted   - monitor and trend for now.     2. Lower extremity swelling likely secondary to venous insufficiency and CHFpEF   - ECHO (12/2021): moderate MR, mild AR, mild TR, LA dilation, LVEF 50%, trace pericardial effusion   - Ordered BNP, ECHO   - Strict I+Os, daily weights   - Keep K > 4 and Mg > 2   - c/w home medications   - Encourage elevation of lower extremities b/l   - Lasix dose recently adjusted by Dr. Galan (nephrology) which was also discussed with Dr. Hyatt -> alternating 20 mg and 40 mg doses of lasix daily     3. Normocytic anemia   - Hb 8-9 baseline     4. Hyperglycemia   - HbA1c 5.3     5. Severe protein calorie malnutrition   - Albumin 2.0   - Nutrition consult  - liberalized diet      6. History of HTN, CAD s/p CABG, AAA s/p repair, CKD Stage 3, CHFpEF, insomnia, obstructive/reflux uropathy, BPH   - c/w home medications; reviewed at the bedside and discussed that lasix dose is now alternating 20 mg and 40 mg and terazosin was d/c   - Cr 1.4 (baseline ~2 as per pt's son), monitor - out pt neprho Dr. 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     Tee valenzuela at bedside and updated on plan of care. 5/9   called 5/10 no answer, left message with callback number , contacted tee Valenzuela  using pt cell phone and kept updated on plan

## 2022-05-13 LAB
-  AMPICILLIN/SULBACTAM: SIGNIFICANT CHANGE UP
-  CEFAZOLIN: SIGNIFICANT CHANGE UP
-  CLINDAMYCIN: SIGNIFICANT CHANGE UP
-  DAPTOMYCIN: SIGNIFICANT CHANGE UP
-  ERYTHROMYCIN: SIGNIFICANT CHANGE UP
-  GENTAMICIN: SIGNIFICANT CHANGE UP
-  LINEZOLID: SIGNIFICANT CHANGE UP
-  OXACILLIN: SIGNIFICANT CHANGE UP
-  PENICILLIN: SIGNIFICANT CHANGE UP
-  RIFAMPIN: SIGNIFICANT CHANGE UP
-  TETRACYCLINE: SIGNIFICANT CHANGE UP
-  TRIMETHOPRIM/SULFAMETHOXAZOLE: SIGNIFICANT CHANGE UP
-  VANCOMYCIN: SIGNIFICANT CHANGE UP
ANION GAP SERPL CALC-SCNC: 6 MMOL/L — SIGNIFICANT CHANGE UP (ref 5–17)
BUN SERPL-MCNC: 46 MG/DL — HIGH (ref 7–23)
CALCIUM SERPL-MCNC: 8.3 MG/DL — LOW (ref 8.5–10.1)
CHLORIDE SERPL-SCNC: 99 MMOL/L — SIGNIFICANT CHANGE UP (ref 96–108)
CO2 SERPL-SCNC: 29 MMOL/L — SIGNIFICANT CHANGE UP (ref 22–31)
CREAT SERPL-MCNC: 1.47 MG/DL — HIGH (ref 0.5–1.3)
CULTURE RESULTS: SIGNIFICANT CHANGE UP
CULTURE RESULTS: SIGNIFICANT CHANGE UP
EGFR: 45 ML/MIN/1.73M2 — LOW
GLUCOSE SERPL-MCNC: 103 MG/DL — HIGH (ref 70–99)
HCT VFR BLD CALC: 25.7 % — LOW (ref 39–50)
HGB BLD-MCNC: 8.2 G/DL — LOW (ref 13–17)
MCHC RBC-ENTMCNC: 28.3 PG — SIGNIFICANT CHANGE UP (ref 27–34)
MCHC RBC-ENTMCNC: 31.9 GM/DL — LOW (ref 32–36)
MCV RBC AUTO: 88.6 FL — SIGNIFICANT CHANGE UP (ref 80–100)
METHOD TYPE: SIGNIFICANT CHANGE UP
PLATELET # BLD AUTO: 266 K/UL — SIGNIFICANT CHANGE UP (ref 150–400)
POTASSIUM SERPL-MCNC: 4.2 MMOL/L — SIGNIFICANT CHANGE UP (ref 3.5–5.3)
POTASSIUM SERPL-SCNC: 4.2 MMOL/L — SIGNIFICANT CHANGE UP (ref 3.5–5.3)
RBC # BLD: 2.9 M/UL — LOW (ref 4.2–5.8)
RBC # FLD: 14.6 % — HIGH (ref 10.3–14.5)
SODIUM SERPL-SCNC: 134 MMOL/L — LOW (ref 135–145)
SPECIMEN SOURCE: SIGNIFICANT CHANGE UP
SPECIMEN SOURCE: SIGNIFICANT CHANGE UP
WBC # BLD: 9.48 K/UL — SIGNIFICANT CHANGE UP (ref 3.8–10.5)
WBC # FLD AUTO: 9.48 K/UL — SIGNIFICANT CHANGE UP (ref 3.8–10.5)

## 2022-05-13 PROCEDURE — 99232 SBSQ HOSP IP/OBS MODERATE 35: CPT

## 2022-05-13 RX ADMIN — MIRTAZAPINE 7.5 MILLIGRAM(S): 45 TABLET, ORALLY DISINTEGRATING ORAL at 22:36

## 2022-05-13 RX ADMIN — RANOLAZINE 500 MILLIGRAM(S): 500 TABLET, FILM COATED, EXTENDED RELEASE ORAL at 10:35

## 2022-05-13 RX ADMIN — Medication 50 MILLIGRAM(S): at 10:36

## 2022-05-13 RX ADMIN — Medication 100 MILLIGRAM(S): at 10:36

## 2022-05-13 RX ADMIN — Medication 81 MILLIGRAM(S): at 10:35

## 2022-05-13 RX ADMIN — TAMSULOSIN HYDROCHLORIDE 0.4 MILLIGRAM(S): 0.4 CAPSULE ORAL at 22:36

## 2022-05-13 RX ADMIN — RANOLAZINE 500 MILLIGRAM(S): 500 TABLET, FILM COATED, EXTENDED RELEASE ORAL at 22:36

## 2022-05-13 RX ADMIN — ATORVASTATIN CALCIUM 80 MILLIGRAM(S): 80 TABLET, FILM COATED ORAL at 22:36

## 2022-05-13 RX ADMIN — PANTOPRAZOLE SODIUM 40 MILLIGRAM(S): 20 TABLET, DELAYED RELEASE ORAL at 05:58

## 2022-05-13 RX ADMIN — HEPARIN SODIUM 5000 UNIT(S): 5000 INJECTION INTRAVENOUS; SUBCUTANEOUS at 10:36

## 2022-05-13 RX ADMIN — CLOPIDOGREL BISULFATE 75 MILLIGRAM(S): 75 TABLET, FILM COATED ORAL at 10:35

## 2022-05-13 RX ADMIN — Medication 325 MILLIGRAM(S): at 10:37

## 2022-05-13 RX ADMIN — Medication 500 MILLIGRAM(S): at 10:35

## 2022-05-13 RX ADMIN — Medication 20 MILLIGRAM(S): at 10:35

## 2022-05-13 RX ADMIN — Medication 5 MILLIGRAM(S): at 22:36

## 2022-05-13 RX ADMIN — FINASTERIDE 5 MILLIGRAM(S): 5 TABLET, FILM COATED ORAL at 10:35

## 2022-05-13 RX ADMIN — HEPARIN SODIUM 5000 UNIT(S): 5000 INJECTION INTRAVENOUS; SUBCUTANEOUS at 22:37

## 2022-05-13 NOTE — PROGRESS NOTE ADULT - SUBJECTIVE AND OBJECTIVE BOX
Date of service: 05-13-22 @ 14:45      Patient lying in bed; no complaints, afebrile      ROS unable to obtain secondary to patient medical condition     MEDICATIONS  (STANDING):  ascorbic acid 500 milliGRAM(s) Oral daily  aspirin enteric coated 81 milliGRAM(s) Oral daily  atorvastatin 80 milliGRAM(s) Oral at bedtime  bisacodyl Oral Tab/Cap - Peds 5 milliGRAM(s) Oral every 12 hours  clopidogrel Tablet 75 milliGRAM(s) Oral daily  ferrous    sulfate 325 milliGRAM(s) Oral daily  finasteride 5 milliGRAM(s) Oral daily  furosemide    Tablet 40 milliGRAM(s) Oral <User Schedule>  furosemide   Oral Tab/Cap - Peds 20 milliGRAM(s) Oral every other day  heparin   Injectable 5000 Unit(s) SubCutaneous every 12 hours  melatonin 5 milliGRAM(s) Oral at bedtime  metoprolol succinate ER 50 milliGRAM(s) Oral daily  mirtazapine 7.5 milliGRAM(s) Oral at bedtime  pantoprazole    Tablet 40 milliGRAM(s) Oral before breakfast  polyethylene glycol 3350 17 Gram(s) Oral daily  ranolazine 500 milliGRAM(s) Oral two times a day  tamsulosin 0.4 milliGRAM(s) Oral at bedtime  vancomycin  IVPB 500 milliGRAM(s) IV Intermittent every 24 hours    MEDICATIONS  (PRN):  acetaminophen     Tablet .. 650 milliGRAM(s) Oral every 6 hours PRN Temp greater or equal to 38C (100.4F), Mild Pain (1 - 3)  aluminum hydroxide/magnesium hydroxide/simethicone Suspension 30 milliLiter(s) Oral every 4 hours PRN Dyspepsia  magnesium hydroxide Suspension 15 milliLiter(s) Oral daily PRN Constipation  ondansetron Injectable 4 milliGRAM(s) IV Push every 8 hours PRN Nausea and/or Vomiting      Vital Signs Last 24 Hrs  T(C): 36.5 (13 May 2022 08:56), Max: 36.8 (12 May 2022 23:59)  T(F): 97.7 (13 May 2022 08:56), Max: 98.3 (12 May 2022 23:59)  HR: 67 (13 May 2022 08:56) (65 - 69)  BP: 112/35 (13 May 2022 08:56) (108/39 - 115/52)  BP(mean): 65 (12 May 2022 16:22) (65 - 65)  RR: 17 (13 May 2022 08:56) (17 - 17)  SpO2: 96% (13 May 2022 08:56) (96% - 99%)        Physical Exam:        Constitutional: frail looking  HEENT: NC/AT, EOMI, PERRLA, conjunctivae clear; ears and nose atraumatic; pharynx clear  Neck: supple; thyroid not palpable, ecchymosis on left neck  Back: no tenderness  Respiratory: respiratory effort normal; clear to auscultation  Cardiovascular: S1S2 regular, no murmurs  Abdomen: soft, not tender, not distended, positive BS; no liver or spleen organomegaly  Genitourinary: no suprapubic tenderness  Musculoskeletal: no muscle tenderness, left heel with eschar about 6 cm diameter and surrounding erythema and skin desquamation; right heel with eschar about 2 cm diameter with surrounding erythema  Neurological/ Psychiatric:  moving all extremities  Skin: no rashes; no palpable lesions    Labs: all available labs reviewed                           Labs:                        8.2    9.48  )-----------( 266      ( 13 May 2022 07:43 )             25.7     05-13    134<L>  |  99  |  46<H>  ----------------------------<  103<H>  4.2   |  29  |  1.47<H>    Ca    8.3<L>      13 May 2022 07:43             Cultures:       Culture - Genital (collected 05-10-22 @ 11:30)  Source: .Genital Exudate Urethral  Preliminary Report (05-13-22 @ 09:14):    Numerous Methicillin Resistant Staphylococcus aureus    Routine vaginal guillermo  Organism: Methicillin resistant Staphylococcus aureus (05-13-22 @ 09:13)  Organism: Methicillin resistant Staphylococcus aureus (05-13-22 @ 09:13)      -  Ampicillin/Sulbactam: R <=8/4      -  Cefazolin: R 8      -  Clindamycin: S <=0.25      -  Daptomycin: S 0.5      -  Erythromycin: R >4      -  Gentamicin: S <=1 Should not be used as monotherapy      -  Linezolid: S 4      -  Oxacillin: R >2      -  Penicillin: R >8      -  Rifampin: S <=1 Should not be used as monotherapy      -  Tetra/Doxy: S 2      -  Trimethoprim/Sulfamethoxazole: R >2/38      -  Vancomycin: S 1      Method Type: NASH    Culture - Blood (collected 05-08-22 @ 17:31)  Source: .Blood None  Preliminary Report (05-09-22 @ 21:01):    No growth to date.    Culture - Urine (collected 05-08-22 @ 17:00)  Source: Clean Catch Clean Catch (Midstream)  Final Report (05-12-22 @ 20:18):    >=3 organisms. Probable collection contamination.    including    >100,000 CFU/ml Methicillin Resistant Staphylococcus aureus    50,000 - 99,000 CFU/mL Klebsiella aerogenes  Organism: Enterobacter aerogenes  Methicillin resistant Staphylococcus aureus (05-12-22 @ 20:16)  Organism: Methicillin resistant Staphylococcus aureus (05-12-22 @ 20:16)      -  Ampicillin/Sulbactam: R <=8/4      -  Cefazolin: R <=4      -  Daptomycin: S 0.5      -  Gentamicin: S <=1 Should not be used as monotherapy      -  Linezolid: S 2      -  Oxacillin: R >2      -  Penicillin: R >8      -  Rifampin: S <=1 Should not be used as monotherapy      -  Tetra/Doxy: S <=1      -  Trimethoprim/Sulfamethoxazole: R >2/38      -  Vancomycin: S 1      Method Type: NASH  Organism: Enterobacter aerogenes (05-12-22 @ 20:16)      -  Amikacin: S <=16      -  Amoxicillin/Clavulanic Acid: R >16/8      -  Ampicillin: R 16 These ampicillin results predict results for amoxicillin      -  Ampicillin/Sulbactam: R <=4/2 Enterobacter, Klebsiella aerogenes, Citrobacter, and Serratia may develop resistance during prolonged therapy (3-4 days)      -  Aztreonam: S <=4      -  Cefazolin: R >16      -  Cefepime: S <=2      -  Cefoxitin: R >16      -  Ceftriaxone: S <=1 Enterobacter, Klebsiella aerogenes, Citrobacter, and Serratia may develop resistance during prolonged therapy      -  Ciprofloxacin: S <=0.25      -  Ertapenem: S <=0.5      -  Gentamicin: S <=2      -  Imipenem: S <=1      -  Levofloxacin: S <=0.5      -  Meropenem: S <=1      -  Nitrofurantoin: I 64 Should not be used to treat pyelonephritis      -  Piperacillin/Tazobactam: S <=8      -  Tigecycline: S <=2      -  Tobramycin: S <=2      -  Trimethoprim/Sulfamethoxazole: S <=0.5/9.5      Method Type: NASH    Culture - Blood (collected 05-08-22 @ 16:52)  Source: .Blood Blood  Preliminary Report (05-09-22 @ 21:01):    No growth to date.      C-Reactive Protein, Serum: 89 mg/L (05-08-22 @ 16:52)              C-Reactive Protein, Serum: 89 mg/L (05-08-22 @ 16:52)        < from: Xray Foot AP + Lateral, Left (05.08.22 @ 21:38) >  IMPRESSION:  Soft tissue ulcer with no gross cortical destruction. If there is   continued clinical concern follow-up MRI can be ordered    < end of copied text >        Radiology: all available radiological tests reviewed    Advanced directives addressed: full resuscitation

## 2022-05-13 NOTE — PROGRESS NOTE ADULT - ASSESSMENT
89 y/o M with PMH HTN, CAD s/p CABG, AAA s/p repair, CKD Stage 3, CHFpEF, insomnia, obstructive/reflux uropathy, BPH admitted on 5/8 from [facility] for evaluation of suspected "infection." [facility] and he was told to transfer to hospital for treatment of urinary tract infection; history per medical record as patient is unable to provide history.     1. Patient admitted with pyuria, rule out urinary tract infection; also noted with left heel ulcer, eschar and skin desquamation  - follow up cultures   - serial cbc and monitor temperature   - MRSA in urine, most likely from ulcer on tip of penis, blood cultures are no growth, doubt bacteremia seeding the urine  - tolerating antibiotics without rashes or side effects   - complete 5 days of vancomycin, will stop after dose of 5/14  - completed 3 days of ceftriaxone, treated the Enterobacter aerogenes  - off loading of heels  2. other issues; per medicine  If further ID issues please reconsult

## 2022-05-13 NOTE — PROGRESS NOTE ADULT - ASSESSMENT
89 y/o M presents with rash on buttock and UTI     1. Leukocytosis likely due to UTI   - UTI associated with chronic waite   - waite changed in ED    - s/p ceftriaxone for now.  cont w/ renal dose vanco per ID   - vanco for 5 days (stop day 5/15)   - Does not meet SIRS criteria  - Urine culture with MRSA 100,000 sensitivies reviewed, now on vanco IV 50-90k gram neg rods   - WBC 9.4, UA: moderate leukocyte esterase, occasional bacteria   - BCx negative   - CRP elevated   - Pressure ulcer on buttocks is stage 2, unlikely osteomyelitis   - Wound care consult   - leucocytosis resolved   - Tylenol for temperatures PRN    - ID & Urology consult - Dr. Srivastava     # left heel and penile ulceration   - urology consulted - culture sent   - podiatry consulted   - xray negative for free air or bony involvement   - podiatry consulted. localized wound care. - off load while in bed     # hyponatremia --- baseline   - likely due to poor PO intake and diuresis.   - slowly improving   - renal consulted     2. Lower extremity swelling likely secondary to venous insufficiency and CHFpEF   - ECHO (12/2021): moderate MR, mild AR, mild TR, LA dilation, LVEF 50%, trace pericardial effusion   - Ordered BNP, ECHO   - Strict I+Os, daily weights   - Keep K > 4 and Mg > 2   - c/w home medications   - Encourage elevation of lower extremities b/l   - Lasix dose recently adjusted by Dr. Galan (nephrology) which was also discussed with Dr. Hyatt -> alternating 20 mg and 40 mg doses of lasix daily     3. Normocytic anemia   - Hb 8-9 baseline     4. Hyperglycemia   - HbA1c 5.3     5. Severe protein calorie malnutrition   - Albumin 2.0   - Nutrition consult  - liberalized diet      6. History of HTN, CAD s/p CABG, AAA s/p repair, CKD Stage 3, CHFpEF, insomnia, obstructive/reflux uropathy, BPH   - c/w home medications; reviewed at the bedside and discussed that lasix dose is now alternating 20 mg and 40 mg and terazosin was d/c   - Cr 1.4 (baseline ~2 as per pt's son), monitor - out pt neprho Dr. 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     Tee valenzuela at bedside and updated on plan of care. 5/9   called 5/10 no answer, left message with callback number , contacted tee Valenzuela  using pt cell phone and kept updated on plan  89 y/o M presents with rash on buttock and UTI     1. Leukocytosis likely due to UTI   - UTI associated with chronic waite   - waite changed in ED    - s/p ceftriaxone for now.  cont w/ renal dose vanco per ID   - vanco for 5 days (stop day 5/15)   - Does not meet SIRS criteria  - Urine culture with MRSA 100,000 sensitivies reviewed, now on vanco IV 50-90k gram neg rods   - WBC 9.4, UA: moderate leukocyte esterase, occasional bacteria   - BCx negative   - CRP elevated   - Pressure ulcer on buttocks is stage 2, unlikely osteomyelitis   - Wound care consult   - leucocytosis resolved   - Tylenol for temperatures PRN    - ID & Urology consult - Dr. Srivastava   - pt pdue for void trail out pt on Tuesday, will attempt void trial while in hospital     # left heel and penile ulceration   - urology consulted - culture sent   - podiatry consulted   - xray negative for free air or bony involvement   - podiatry consulted. localized wound care. - off load while in bed     # hyponatremia --- baseline   - likely due to poor PO intake and diuresis.   - slowly improving   - renal consulted     2. Lower extremity swelling likely secondary to venous insufficiency and CHFpEF   - ECHO (12/2021): moderate MR, mild AR, mild TR, LA dilation, LVEF 50%, trace pericardial effusion   - Ordered BNP, ECHO   - Strict I+Os, daily weights   - Keep K > 4 and Mg > 2   - c/w home medications   - Encourage elevation of lower extremities b/l   - Lasix dose recently adjusted by Dr. Galan (nephrology) which was also discussed with Dr. Hyatt -> alternating 20 mg and 40 mg doses of lasix daily     3. Normocytic anemia   - Hb 8-9 baseline     4. Hyperglycemia   - HbA1c 5.3     5. Severe protein calorie malnutrition   - Albumin 2.0   - Nutrition consult  - liberalized diet      6. History of HTN, CAD s/p CABG, AAA s/p repair, CKD Stage 3, CHFpEF, insomnia, obstructive/reflux uropathy, BPH   - c/w home medications; reviewed at the bedside and discussed that lasix dose is now alternating 20 mg and 40 mg and terazosin was d/c   - Cr 1.4 (baseline ~2 as per pt's son), monitor - out pt neprho Dr. 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     Tee valenzuela at bedside and updated on plan of care. 5/9   called 5/10 no answer, left message with callback number , contacted tee Valenzuela  using pt cell phone and kept updated on plan   5/13 updated at bedside agreeable to dc to [facility] over weekend if no events     dispo- complete abx course until 5/14 and void trial tonight. with plan for dc back to [facility] over weekend.

## 2022-05-13 NOTE — PROGRESS NOTE ADULT - SUBJECTIVE AND OBJECTIVE BOX
Patient is a 89y old  Male who presents with a chief complaint of UTI, sacral ulcer (09 May 2022 13:57)      SUBJECTIVE:   HPI:  88 y/o M with PMH HTN, CAD s/p CABG, AAA s/p repair, CKD Stage 3, CHFpEF, insomnia, obstructive/reflux uropathy, BPH presents with a rash on the buttocks and UTI. Pt states that he lives at [facility] and there was concern because there was a rash on his buttock and the nurses thought he may have a UTI. Reports intermittent abd pain, constipation, loss of bowel control, swelling in his lower extremities at the end of the day. Also reports left heel ulcer. Denies fevers, chills, chest pain, SOB, N/V, diarrhea. The pt states that he recently saw a nephrologist who changed his lasix from 40 mg daily to alternating between 20 mg and 40 mg doses.     Prior admissions:   - 3/25/2022-3/26/2022: weakness -> post obstructive urinary retention -> waite catheter placed -> doxycycline for scalp wound   ER course: VSS. Labs: WBC 12.78, Hb 9.6, Na 131, BUN 36, Cr 1.71 (baseline ~1.8), Glucose 128, calcium 8.0 -> corrected 9.6, albumin 2.0. UA: cloudy, moderate leukocyte esterase, large blood, WBC 6-10, RBC 25-50, occasional bacteria. COVID negative. EKG: Ventricularly paced rhythm, HR 78 bpm, pacer spikes visualized. CXR: PPM present, rotated film, no consolidation, cardiomegaly, sternotomy wires, no effusion, no pneumothorax (personally reviewed).   Pt was given Ceftriaxone. He is being admitted to med/surg for further management.  (08 May 2022 20:24)    : laying in bed, feeling better today, had good appetite. no pain or discomfort noted.   5/10: seen and examined laying in bed, had penile discharge this morning with some mid/upper abd pain, otherwise no other sx.   : laying in bed, no new issues. MRSA in urine. tolerating abx well. no new issues thus far   : sitting up in bed, ambulated with PT today. still on abx, no new issues. contacted son via telephone kept appraised to situation   : no new issues. tolerating abx. MRSA in genital culture.     REVIEW OF SYSTEMS:    CONSTITUTIONAL: No weakness, fevers or chills  EYES/ENT: No visual changes;  No vertigo or throat pain   NECK: No pain or stiffness  RESPIRATORY: No cough, wheezing, hemoptysis; No shortness of breath  CARDIOVASCULAR: No chest pain or palpitations  GASTROINTESTINAL: No abdominal or epigastric pain. No nausea, vomiting, or hematemesis; No diarrhea or constipation. No melena or hematochezia.  GENITOURINARY: No dysuria, frequency or hematuria  NEUROLOGICAL: No numbness or weakness  SKIN: No itching, burning, rashes, or lesions   All other review of systems is negative unless indicated above    Vital Signs Last 24 Hrs  T(C): 36.5 (13 May 2022 08:56), Max: 36.8 (12 May 2022 23:59)  T(F): 97.7 (13 May 2022 08:56), Max: 98.3 (12 May 2022 23:59)  HR: 67 (13 May 2022 08:56) (65 - 69)  BP: 112/35 (13 May 2022 08:56) (108/39 - 115/52)  BP(mean): 65 (12 May 2022 16:22) (65 - 65)  RR: 17 (13 May 2022 08:56) (17 - 17)  SpO2: 96% (13 May 2022 08:56) (96% - 99%)      PHYSICAL EXAM:    Constitutional: NAD, awake and alert, thin appearing.   HEENT: PERR, EOMI, Normal Hearing, MMM  Neck: Soft and supple, No LAD, No JVD  Respiratory: Breath sounds are clear bilaterally, No wheezing, rales or rhonchi  Cardiovascular: S1 and S2, regular rate and rhythm, no Murmurs, gallops or rubs  Gastrointestinal: Bowel Sounds present, soft, nontender, nondistended, no guarding, no rebound  : waite caht in place, clear to cloudy yellow urine with penile ulceration   Extremities: No peripheral edema  Vascular: 2+ peripheral pulses  Neurological: A/O x 3, no focal deficits  Musculoskeletal: 5/5 strength b/l upper and lower extremities  Skin: No rashes, stage 2 sacral wound,     MEDICATIONS  (STANDING):  ascorbic acid 500 milliGRAM(s) Oral daily  aspirin enteric coated 81 milliGRAM(s) Oral daily  atorvastatin 80 milliGRAM(s) Oral at bedtime  bisacodyl Oral Tab/Cap - Peds 5 milliGRAM(s) Oral every 12 hours  clopidogrel Tablet 75 milliGRAM(s) Oral daily  ferrous    sulfate 325 milliGRAM(s) Oral daily  finasteride 5 milliGRAM(s) Oral daily  furosemide    Tablet 40 milliGRAM(s) Oral <User Schedule>  furosemide   Oral Tab/Cap - Peds 20 milliGRAM(s) Oral every other day  heparin   Injectable 5000 Unit(s) SubCutaneous every 12 hours  melatonin 5 milliGRAM(s) Oral at bedtime  metoprolol succinate ER 50 milliGRAM(s) Oral daily  mirtazapine 7.5 milliGRAM(s) Oral at bedtime  pantoprazole    Tablet 40 milliGRAM(s) Oral before breakfast  polyethylene glycol 3350 17 Gram(s) Oral daily  ranolazine 500 milliGRAM(s) Oral two times a day  tamsulosin 0.4 milliGRAM(s) Oral at bedtime  vancomycin  IVPB 500 milliGRAM(s) IV Intermittent every 24 hours        LABS: All Labs Reviewed:                        8.2    9.48  )-----------( 266      ( 13 May 2022 07:43 )             25.7     05-13    134<L>  |  99  |  46<H>  ----------------------------<  103<H>  4.2   |  29  |  1.47<H>    Ca    8.3<L>      13 May 2022 07:43    Urinalysis Basic - ( 08 May 2022 17:00 )    Color: Yellow / Appearance: very cloudy / S.015 / pH: x  Gluc: x / Ketone: Negative  / Bili: Negative / Urobili: Negative   Blood: x / Protein: 100 / Nitrite: Negative   Leuk Esterase: Moderate / RBC: 25-50 /HPF / WBC 6-10   Sq Epi: x / Non Sq Epi: Occasional / Bacteria: Occasional      Culture - Urine (22 @ 17:00)    -  Gentamicin: S <=1 Should not be used as monotherapy    -  Linezolid: S 2    -  Oxacillin: R >2    -  Penicillin: R >8    -  Rifampin: S <=1 Should not be used as monotherapy    -  Tetra/Doxy: S <=1    -  Trimethoprim/Sulfamethoxazole: R >2/38    -  Vancomycin: S 1    -  Ampicillin/Sulbactam: R <=8/4    -  Cefazolin: R <=4    -  Daptomycin: S 0.5    Specimen Source: Clean Catch Clean Catch (Midstream)    Culture Results:   >100,000 CFU/ml Methicillin Resistant Staphylococcus aureus  50,000 - 99,000 CFU/mL Gram Negative Rods Identification and  susceptibility to follow.    Organism Identification: Methicillin resistant Staphylococcus aureus    Organism: Methicillin resistant Staphylococcus aureus    Method Type: [facility]    Culture - Genital (05.10.22 @ 11:30)    -  Penicillin: R >8    -  Rifampin: S <=1 Should not be used as monotherapy    -  Tetra/Doxy: S 2    -  Trimethoprim/Sulfamethoxazole: R >2/38    -  Vancomycin: S 1    -  Ampicillin/Sulbactam: R <=8/4    -  Cefazolin: R 8    -  Clindamycin: S <=0.25    -  Daptomycin: S 0.5    -  Erythromycin: R >4    -  Gentamicin: S <=1 Should not be used as monotherapy    -  Linezolid: S 4    -  Oxacillin: R >2    Specimen Source: .Genital Exudate Urethral    Culture Results:   Numerous Methicillin Resistant Staphylococcus aureus  Routine vaginal guillermo    Organism Identification: Methicillin resistant Staphylococcus aureus    Organism: Methicillin resistant Staphylococcus aureus    Method Type: [facility]        RADIOLOGY/EKG:    < from: Xray Foot AP + Lateral, Left (22 @ 21:38) >  IMPRESSION:  Soft tissue ulcer with no gross cortical destruction. If there is   continued clinical concern follow-up MRI can be ordered    --- End of Report ---  MAGDALENA SPENCER DO; Attending Radiologist  This document has been electronically signed. May  9 2022  1:32PM    < end of copied text >    ECHO      Summary     Limited study to assess left ventricular function and pericardial   effusion.   Estimated left ventricular ejection fraction is 45-50 %.   The left ventricle is normal in wall thickness.   The left ventricle cavity is mildly dilated.   Wall motion abnormalities are noted with mildly depressed LV systolic   function.   No evidence of pericardial effusion.   No evidence of pleural effusion.     Signature     ----------------------------------------------------------------   Electronically signed by Magdalena Ibarra MD([facility]   physician) on 2022 05:40 PM   ----------------------------------------------------------------

## 2022-05-14 LAB
CULTURE RESULTS: SIGNIFICANT CHANGE UP
ORGANISM # SPEC MICROSCOPIC CNT: SIGNIFICANT CHANGE UP
ORGANISM # SPEC MICROSCOPIC CNT: SIGNIFICANT CHANGE UP
SPECIMEN SOURCE: SIGNIFICANT CHANGE UP

## 2022-05-14 PROCEDURE — 99232 SBSQ HOSP IP/OBS MODERATE 35: CPT

## 2022-05-14 RX ADMIN — Medication 40 MILLIGRAM(S): at 10:13

## 2022-05-14 RX ADMIN — MIRTAZAPINE 7.5 MILLIGRAM(S): 45 TABLET, ORALLY DISINTEGRATING ORAL at 21:10

## 2022-05-14 RX ADMIN — HEPARIN SODIUM 5000 UNIT(S): 5000 INJECTION INTRAVENOUS; SUBCUTANEOUS at 21:12

## 2022-05-14 RX ADMIN — Medication 325 MILLIGRAM(S): at 10:13

## 2022-05-14 RX ADMIN — Medication 100 MILLIGRAM(S): at 13:51

## 2022-05-14 RX ADMIN — ATORVASTATIN CALCIUM 80 MILLIGRAM(S): 80 TABLET, FILM COATED ORAL at 21:10

## 2022-05-14 RX ADMIN — Medication 650 MILLIGRAM(S): at 03:54

## 2022-05-14 RX ADMIN — Medication 50 MILLIGRAM(S): at 10:13

## 2022-05-14 RX ADMIN — HEPARIN SODIUM 5000 UNIT(S): 5000 INJECTION INTRAVENOUS; SUBCUTANEOUS at 10:12

## 2022-05-14 RX ADMIN — PANTOPRAZOLE SODIUM 40 MILLIGRAM(S): 20 TABLET, DELAYED RELEASE ORAL at 06:46

## 2022-05-14 RX ADMIN — Medication 81 MILLIGRAM(S): at 10:12

## 2022-05-14 RX ADMIN — RANOLAZINE 500 MILLIGRAM(S): 500 TABLET, FILM COATED, EXTENDED RELEASE ORAL at 21:10

## 2022-05-14 RX ADMIN — RANOLAZINE 500 MILLIGRAM(S): 500 TABLET, FILM COATED, EXTENDED RELEASE ORAL at 10:12

## 2022-05-14 RX ADMIN — FINASTERIDE 5 MILLIGRAM(S): 5 TABLET, FILM COATED ORAL at 10:13

## 2022-05-14 RX ADMIN — CLOPIDOGREL BISULFATE 75 MILLIGRAM(S): 75 TABLET, FILM COATED ORAL at 10:13

## 2022-05-14 RX ADMIN — Medication 500 MILLIGRAM(S): at 10:13

## 2022-05-14 NOTE — PROGRESS NOTE ADULT - SUBJECTIVE AND OBJECTIVE BOX
chief complaint of UTI, sacral ulcer (09 May 2022 13:57)      SUBJECTIVE:   HPI:  89 y/o M with PMH HTN, CAD s/p CABG, AAA s/p repair, CKD Stage 3, CHFpEF, insomnia, obstructive/reflux uropathy, BPH presents with a rash on the buttocks and UTI. Pt states that he lives at [facility] and there was concern because there was a rash on his buttock and the nurses thought he may have a UTI. Reports intermittent abd pain, constipation, loss of bowel control, swelling in his lower extremities at the end of the day. Also reports left heel ulcer. Denies fevers, chills, chest pain, SOB, N/V, diarrhea. The pt states that he recently saw a nephrologist who changed his lasix from 40 mg daily to alternating between 20 mg and 40 mg doses.     Prior admissions:   - 3/25/2022-3/26/2022: weakness -> post obstructive urinary retention -> waite catheter placed -> doxycycline for scalp wound   ER course: VSS. Labs: WBC 12.78, Hb 9.6, Na 131, BUN 36, Cr 1.71 (baseline ~1.8), Glucose 128, calcium 8.0 -> corrected 9.6, albumin 2.0. UA: cloudy, moderate leukocyte esterase, large blood, WBC 6-10, RBC 25-50, occasional bacteria. COVID negative. EKG: Ventricularly paced rhythm, HR 78 bpm, pacer spikes visualized. CXR: PPM present, rotated film, no consolidation, cardiomegaly, sternotomy wires, no effusion, no pneumothorax (personally reviewed).   Pt was given Ceftriaxone. He is being admitted to med/surg for further management.  (08 May 2022 20:24)    5/9: laying in bed, feeling better today, had good appetite. no pain or discomfort noted.   5/10: seen and examined laying in bed, had penile discharge this morning with some mid/upper abd pain, otherwise no other sx.   5/11: laying in bed, no new issues. MRSA in urine. tolerating abx well. no new issues thus far   5/12: sitting up in bed, ambulated with PT today. still on abx, no new issues. contacted son via telephone kept appraised to situation   5/13: no new issues. tolerating abx. MRSA in genital culture.   5/14: In bed, alert, comfortable, no complaints.  Denies fever, chills, n, v, pain.    REVIEW OF SYSTEMS: All other review of systems is negative unless indicated above.    Vital Signs Last 24 Hrs  T(C): 36.3 (14 May 2022 08:32), Max: 36.7 (14 May 2022 00:07)  T(F): 97.4 (14 May 2022 08:32), Max: 98 (14 May 2022 00:07)  HR: 65 (14 May 2022 08:32) (65 - 70)  BP: 100/50 (14 May 2022 08:32) (100/50 - 114/55)  BP(mean): --  RR: 18 (14 May 2022 08:32) (17 - 18)  SpO2: 95% (14 May 2022 08:32) (95% - 97%)    PHYSICAL EXAM:    Constitutional: NAD, awake and alert, thin appearing.   HEENT: PERR, EOMI, Normal Hearing, MMM  Neck: Soft and supple, No LAD, No JVD  Respiratory: Breath sounds are clear bilaterally, No wheezing, rales or rhonchi  Cardiovascular: S1 and S2, regular rate and rhythm, no Murmurs, gallops or rubs  Gastrointestinal: Bowel Sounds present, soft, nontender, nondistended, no guarding, no rebound  : waite caht in place, clear to cloudy yellow urine with penile ulceration   Extremities: No peripheral edema  Vascular: 2+ peripheral pulses  Neurological: A/O x 3, no focal deficits  Musculoskeletal: 5/5 strength b/l upper and lower extremities  Skin: No rashes, stage 2 sacral wound,       MEDICATIONS  (STANDING):  ascorbic acid 500 milliGRAM(s) Oral daily  aspirin enteric coated 81 milliGRAM(s) Oral daily  atorvastatin 80 milliGRAM(s) Oral at bedtime  bisacodyl Oral Tab/Cap - Peds 5 milliGRAM(s) Oral every 12 hours  clopidogrel Tablet 75 milliGRAM(s) Oral daily  ferrous    sulfate 325 milliGRAM(s) Oral daily  finasteride 5 milliGRAM(s) Oral daily  furosemide    Tablet 40 milliGRAM(s) Oral <User Schedule>  furosemide   Oral Tab/Cap - Peds 20 milliGRAM(s) Oral every other day  heparin   Injectable 5000 Unit(s) SubCutaneous every 12 hours  melatonin 5 milliGRAM(s) Oral at bedtime  metoprolol succinate ER 50 milliGRAM(s) Oral daily  mirtazapine 7.5 milliGRAM(s) Oral at bedtime  pantoprazole    Tablet 40 milliGRAM(s) Oral before breakfast  polyethylene glycol 3350 17 Gram(s) Oral daily  ranolazine 500 milliGRAM(s) Oral two times a day  tamsulosin 0.4 milliGRAM(s) Oral at bedtime  vancomycin  IVPB 500 milliGRAM(s) IV Intermittent every 24 hours    MEDICATIONS  (PRN):  acetaminophen     Tablet .. 650 milliGRAM(s) Oral every 6 hours PRN Temp greater or equal to 38C (100.4F), Mild Pain (1 - 3)  aluminum hydroxide/magnesium hydroxide/simethicone Suspension 30 milliLiter(s) Oral every 4 hours PRN Dyspepsia  magnesium hydroxide Suspension 15 milliLiter(s) Oral daily PRN Constipation  ondansetron Injectable 4 milliGRAM(s) IV Push every 8 hours PRN Nausea and/or Vomiting                                8.2    9.48  )-----------( 266      ( 13 May 2022 07:43 )             25.7     05-13    134<L>  |  99  |  46<H>  ----------------------------<  103<H>  4.2   |  29  |  1.47<H>    Ca    8.3<L>      13 May 2022 07:43      CAPILLARY BLOOD GLUCOSE        Assessment and Plan:  89 y/o M presents with rash on buttock and UTI     UTI associated with chronic waite:   - waite changed in ED    - s/p ceftriaxone for now.  cont w/ renal dose vanco per ID   - vanco for 5 days (stop day 5/15)   - Does not meet SIRS criteria  - Urine culture with MRSA 100,000 sensitivies reviewed, now on vanco IV 50-90k gram neg rods   - WBC 9.4, UA: moderate leukocyte esterase, occasional bacteria   - BCx negative   - Pressure ulcer on buttocks is stage 2, unlikely osteomyelitis   - Wound care   - leucocytosis resolved   - ID & Urology consult - Dr. Srivastava   - pt due for void trail out pt on Tuesday, will attempt void trial while in hospital     # left heel and penile ulceration   - urology consulted - culture of penile ulceration sent; growing  MRSA  - xray negative for free air or bony involvement   - podiatry consulted. localized wound care. - off load while in bed         Lower extremity swelling likely secondary to venous insufficiency and CHFpEF   - ECHO (12/2021): moderate MR, mild AR, mild TR, LA dilation, LVEF 50%, trace pericardial effusion   - c/w home medications   - Encourage elevation of lower extremities b/l   - hyponatremia borderline, stable.   - Lasix dose recently adjusted by Dr. Galan (nephrology) which was also discussed with Dr. Hyatt -> alternating 20 mg and 40 mg doses of lasix daily     Normocytic anemia: Stable    Severe protein calorie malnutrition   - Albumin 2.0   - Nutrition consult  - liberalized diet      HTN, CAD s/p CABG with ICD, AAA s/p repair, CKD Stage 3, CHFpEF, insomnia, obstructive/reflux uropathy, BPH: STABLE  - c/w home medications; reviewed at the bedside and discussed that lasix dose is now alternating 20 mg and 40 mg and terazosin was d/c       DVT ppx:   -Heparin subc    Code status:   -Full      dispo:  -5/13 updated at bedside agreeable to dc to [facility] over weekend if no events   -f/u void trial  -dc back to [facility] tomorrow    Nutritional Assessment:  · Nutritional Assessment	This patient has been assessed with a concern for Malnutrition and has been determined to have a diagnosis/diagnoses of Severe protein-calorie malnutrition.    This patient is being managed with:   Diet DASH/TLC-  Sodium & Cholesterol Restricted  Entered: May  8 2022  7:59PM

## 2022-05-15 ENCOUNTER — TRANSCRIPTION ENCOUNTER (OUTPATIENT)
Age: 87
End: 2022-05-15

## 2022-05-15 PROCEDURE — 99232 SBSQ HOSP IP/OBS MODERATE 35: CPT

## 2022-05-15 RX ADMIN — RANOLAZINE 500 MILLIGRAM(S): 500 TABLET, FILM COATED, EXTENDED RELEASE ORAL at 21:53

## 2022-05-15 RX ADMIN — CLOPIDOGREL BISULFATE 75 MILLIGRAM(S): 75 TABLET, FILM COATED ORAL at 10:13

## 2022-05-15 RX ADMIN — Medication 20 MILLIGRAM(S): at 10:12

## 2022-05-15 RX ADMIN — Medication 50 MILLIGRAM(S): at 10:13

## 2022-05-15 RX ADMIN — HEPARIN SODIUM 5000 UNIT(S): 5000 INJECTION INTRAVENOUS; SUBCUTANEOUS at 21:54

## 2022-05-15 RX ADMIN — PANTOPRAZOLE SODIUM 40 MILLIGRAM(S): 20 TABLET, DELAYED RELEASE ORAL at 05:56

## 2022-05-15 RX ADMIN — Medication 325 MILLIGRAM(S): at 10:13

## 2022-05-15 RX ADMIN — FINASTERIDE 5 MILLIGRAM(S): 5 TABLET, FILM COATED ORAL at 14:27

## 2022-05-15 RX ADMIN — RANOLAZINE 500 MILLIGRAM(S): 500 TABLET, FILM COATED, EXTENDED RELEASE ORAL at 10:11

## 2022-05-15 RX ADMIN — HEPARIN SODIUM 5000 UNIT(S): 5000 INJECTION INTRAVENOUS; SUBCUTANEOUS at 10:13

## 2022-05-15 RX ADMIN — Medication 81 MILLIGRAM(S): at 10:11

## 2022-05-15 RX ADMIN — Medication 500 MILLIGRAM(S): at 10:11

## 2022-05-15 RX ADMIN — ATORVASTATIN CALCIUM 80 MILLIGRAM(S): 80 TABLET, FILM COATED ORAL at 21:53

## 2022-05-15 RX ADMIN — MIRTAZAPINE 7.5 MILLIGRAM(S): 45 TABLET, ORALLY DISINTEGRATING ORAL at 21:54

## 2022-05-15 NOTE — DISCHARGE NOTE PROVIDER - HOSPITAL COURSE
chief complaint of UTI, sacral ulcer (09 May 2022 13:57)      SUBJECTIVE:   HPI:  88 y/o M with PMH HTN, CAD s/p CABG, AAA s/p repair, CKD Stage 3, CHFpEF, insomnia, obstructive/reflux uropathy, BPH presents with a rash on the buttocks and UTI. Pt states that he lives at [facility] and there was concern because there was a rash on his buttock and the nurses thought he may have a UTI. Reports intermittent abd pain, constipation, loss of bowel control, swelling in his lower extremities at the end of the day. Also reports left heel ulcer. Denies fevers, chills, chest pain, SOB, N/V, diarrhea. The pt states that he recently saw a nephrologist who changed his lasix from 40 mg daily to alternating between 20 mg and 40 mg doses.     Hospital course:  Pt admitted and treated for MRSA UTI associated with chronic waite.  Pt treated with 5 days of IV vancomycin, tolerated treatment.  HD stable, afebrile, feeling well at baseline.   Pt also with penile ulceration with MRSA infection, again treated with the IV vanco x 5 days.  Pt also with left heel ulcer and pressure ulcer buttocks with no underlying infection.  Per podiatry he needs to off load while in bed.  In terms of his leg swelling due to venous insufficiency and chronic diastolic chf, it remains stable.  anemia stable. HTN, CAD s/p CABG with ICD, AAA s/p repair, CKD Stage 3, CHFpEF, insomnia, obstructive/reflux uropathy, BPH: STABLE.    REVIEW OF SYSTEMS: All other review of systems is negative unless indicated above.    Vital Signs Last 24 Hrs  T(C): 36.7 (15 May 2022 09:08), Max: 36.7 (15 May 2022 09:08)  T(F): 98 (15 May 2022 09:08), Max: 98 (15 May 2022 09:08)  HR: 66 (15 May 2022 09:08) (66 - 70)  BP: 108/49 (15 May 2022 09:08) (108/49 - 121/55)  BP(mean): --  RR: 18 (15 May 2022 09:08) (18 - 18)  SpO2: 97% (15 May 2022 09:08) (95% - 97%)    PHYSICAL EXAM:    Constitutional: NAD, awake and alert, thin appearing.   HEENT: PERR, EOMI, Normal Hearing, MMM  Neck: Soft and supple, No LAD, No JVD  Respiratory: Breath sounds are clear bilaterally, No wheezing, rales or rhonchi  Cardiovascular: S1 and S2, regular rate and rhythm, no Murmurs, gallops or rubs  Gastrointestinal: Bowel Sounds present, soft, nontender, nondistended, no guarding, no rebound  : waite caht in place, clear to cloudy yellow urine with penile ulceration   Extremities: No peripheral edema  Vascular: 2+ peripheral pulses  Neurological: A/O x 3, no focal deficits  Musculoskeletal: 5/5 strength b/l upper and lower extremities  Skin: No rashes, stage 2 sacral wound,     med/labs: Reviewed and interpreted       Assessment and Plan:  88 y/o M presents with rash on buttock and UTI     UTI associated with chronic waite:   - waite changed in ED    - s/p ceftriaxone for now.  cont w/ renal dose vanco per ID   - vanco x 5 days (stop day 5/15) completed   - Does not meet SIRS criteria  - Urine culture with MRSA 100,000 sensitivies reviewed, now on vanco IV 50-90k gram neg rods   - WBC 9.4, UA: moderate leukocyte esterase, occasional bacteria   - BCx negative   - Pressure ulcer on buttocks is stage 2, unlikely osteomyelitis   - Wound care   - leucocytosis resolved   - ID & Urology consult - Dr. Srivastava   - avoid void trial at this time to prevent worsening of symptoms.  Would recommend out patient void trial.    # left heel and penile ulceration   - urology consulted - culture of penile ulceration sent; growing  MRSA  - xray negative for free air or bony involvement   - podiatry consulted. localized wound care. - off load while in bed     Lower extremity swelling likely secondary to venous insufficiency and CHFpEF   - ECHO (12/2021): moderate MR, mild AR, mild TR, LA dilation, LVEF 50%, trace pericardial effusion   - c/w home medications   - Encourage elevation of lower extremities b/l   - hyponatremia borderline, stable.   - Lasix dose recently adjusted by Dr. Galan (nephrology) which was also discussed with Dr. Hyatt -> alternating 20 mg and 40 mg doses of lasix daily     Normocytic anemia: Stable    Severe protein calorie malnutrition   - Albumin 2.0   - Nutrition consult  - liberalized diet      HTN, CAD s/p CABG with ICD, AAA s/p repair, CKD Stage 3, CHFpEF, insomnia, obstructive/reflux uropathy, BPH: STABLE  - c/w home medications; reviewed at the bedside and discussed that lasix dose is now alternating 20 mg and 40 mg and terazosin was d/c       DVT ppx:   -Heparin subc    Code status:   -Full      dispo:  -d/c to [facility].  Attending Statement: 40 minutes spent on total encounter and discharge planning.             chief complaint of UTI, sacral ulcer (09 May 2022 13:57)      SUBJECTIVE:   HPI:  88 y/o M with PMH HTN, CAD s/p CABG, AAA s/p repair, CKD Stage 3, CHFpEF, insomnia, obstructive/reflux uropathy, BPH presents with a rash on the buttocks and UTI. Pt states that he lives at [facility] and there was concern because there was a rash on his buttock and the nurses thought he may have a UTI. Reports intermittent abd pain, constipation, loss of bowel control, swelling in his lower extremities at the end of the day. Also reports left heel ulcer. Denies fevers, chills, chest pain, SOB, N/V, diarrhea. The pt states that he recently saw a nephrologist who changed his lasix from 40 mg daily to alternating between 20 mg and 40 mg doses.     Hospital course:  Pt admitted and treated for MRSA UTI associated with chronic waite.  Pt treated with 5 days of IV vancomycin, tolerated treatment.  HD stable, afebrile, feeling well at baseline.   Pt also with penile ulceration with MRSA infection, again treated with the IV vanco x 5 days.  Pt also with left heel ulcer and pressure ulcer buttocks with no underlying infection.  Per podiatry he needs to off load while in bed.  In terms of his leg swelling due to venous insufficiency and chronic diastolic chf, it remains stable.  anemia stable. HTN, CAD s/p CABG with ICD, AAA s/p repair, CKD Stage 3, CHFpEF, insomnia, obstructive/reflux uropathy, BPH: STABLE.    REVIEW OF SYSTEMS: All other review of systems is negative unless indicated above.    Vital Signs Last 24 Hrs  T(C): 36.6 (18 May 2022 09:13), Max: 36.6 (18 May 2022 09:13)  T(F): 97.8 (18 May 2022 09:13), Max: 97.8 (18 May 2022 09:13)  HR: 73 (18 May 2022 09:13) (73 - 96)  BP: 110/45 (18 May 2022 09:13) (107/40 - 153/66)  BP(mean): --  RR: 18 (18 May 2022 09:13) (18 - 18)  SpO2: 94% (18 May 2022 09:13) (94% - 97%)    PHYSICAL EXAM:    Constitutional: NAD, awake and alert, thin appearing.   HEENT: PERR, EOMI, Normal Hearing, MMM  Neck: Soft and supple, No LAD, No JVD  Respiratory: Breath sounds are clear bilaterally, No wheezing, rales or rhonchi  Cardiovascular: S1 and S2, regular rate and rhythm, no Murmurs, gallops or rubs  Gastrointestinal: Bowel Sounds present, soft, nontender, nondistended, no guarding, no rebound  : waite caht in place, clear to cloudy yellow urine with penile ulceration   Extremities: No peripheral edema  Vascular: 2+ peripheral pulses  Neurological: A/O x 3, no focal deficits  Musculoskeletal: 5/5 strength b/l upper and lower extremities  Skin: No rashes, stage 2 sacral wound,     med/labs: Reviewed and interpreted       Assessment and Plan:  88 y/o M presents with rash on buttock and UTI     UTI associated with chronic waite:   - waite changed in ED    - s/p ceftriaxone for now.  cont w/ renal dose vanco per ID   - vanco x 5 days (stop day 5/15) completed   - Does not meet SIRS criteria  - Urine culture with MRSA 100,000 sensitivies reviewed, now on vanco IV 50-90k gram neg rods   - WBC 9.4, UA: moderate leukocyte esterase, occasional bacteria   - BCx negative   - Pressure ulcer on buttocks is stage 2, unlikely osteomyelitis   - Wound care   - leucocytosis resolved   - ID & Urology consult - Dr. Srivastava   - avoid void trial at this time to prevent worsening of symptoms.  Would recommend out patient void trial.    # left heel and penile ulceration   - urology consulted - culture of penile ulceration sent; growing  MRSA  - xray negative for free air or bony involvement   - podiatry consulted. localized wound care. - off load while in bed     Lower extremity swelling likely secondary to venous insufficiency and CHFpEF   - ECHO (12/2021): moderate MR, mild AR, mild TR, LA dilation, LVEF 50%, trace pericardial effusion   - c/w home medications   - Encourage elevation of lower extremities b/l   - hyponatremia borderline, stable.   - Lasix dose recently adjusted by Dr. Galan (nephrology) which was also discussed with Dr. Hyatt -> alternating 20 mg and 40 mg doses of lasix daily     Normocytic anemia: Stable    Severe protein calorie malnutrition   - Albumin 2.0   - Nutrition consult  - liberalized diet      HTN, CAD s/p CABG with ICD, AAA s/p repair, CKD Stage 3, CHFpEF, insomnia, obstructive/reflux uropathy, BPH: STABLE  - c/w home medications; reviewed at the bedside and discussed that lasix dose is now alternating 20 mg and 40 mg and terazosin was d/c       Code status:   -Full      dispo:  -d/c to [facility].    Attending Statement: 40 minutes spent on total encounter and discharge planning.

## 2022-05-15 NOTE — DISCHARGE NOTE PROVIDER - CARE PROVIDERS DIRECT ADDRESSES
[email or web address]

## 2022-05-15 NOTE — DISCHARGE NOTE PROVIDER - CARE PROVIDER_API CALL
Keith Hyatt)  Cardiovascular Disease  [address] [place] [address]  Phone: [phone]  Fax: [phone]  Follow Up Time: 1 week    Thad Galan  INTERNAL MEDICINE  [address] [place] [address]  Phone: [phone]  Fax: [phone]  Follow Up Time: 1 week    Xavier Rivero  Urology  [address] [place] [address]  Phone: [phone]  Fax: [phone])-  Follow Up Time: 1 week    Boxer, Jonathan A  INTERNAL MEDICINE  [address] [place] [address]  Phone: [phone]  Fax: [phone]  Follow Up Time: 1 week

## 2022-05-15 NOTE — DISCHARGE NOTE PROVIDER - NSDCMRMEDTOKEN_GEN_ALL_CORE_FT
ascorbic acid 500 mg oral tablet: 1 tab(s) orally once a day  aspirin 81 mg oral delayed release tablet: 1 tab(s) orally once a day  bisacodyl 5 mg oral delayed release tablet: 1 tab(s) orally every 12 hours  clopidogrel 75 mg oral tablet: 1 tab(s) orally once a day  ferrous sulfate 325 mg (65 mg elemental iron) oral tablet: 1 tab(s) orally once a day  finasteride 5 mg oral tablet: 1 tab(s) orally once a day  furosemide 20 mg oral tablet: 1 tab(s) orally every other day  furosemide 40 mg oral tablet: 1 tab(s) orally every other day  melatonin 5 mg oral tablet: 1 tab(s) orally once a day (at bedtime)  metoprolol succinate 50 mg oral tablet, extended release: 1 tab(s) orally once a day  Milk of Magnesia 8% oral suspension: 15 milliliter(s) orally once a day, As Needed  mirtazapine 7.5 mg oral tablet: 1 tab(s) orally once a day (at bedtime)  pantoprazole 40 mg oral delayed release tablet: 1 tab(s) orally once a day  polyethylene glycol 3350 oral powder for reconstitution: 17 gram(s) orally once a day  ranolazine 500 mg oral tablet, extended release: 1 tab(s) orally 2 times a day  rosuvastatin 20 mg oral tablet: 1 tab(s) orally once a day  tamsulosin 0.4 mg oral capsule: 1 cap(s) orally once a day (at bedtime)

## 2022-05-15 NOTE — DISCHARGE NOTE PROVIDER - PROVIDER TOKENS
PROVIDER:[TOKEN:[[id number]],FOLLOWUP:[1 week]],PROVIDER:[TOKEN:[[id number]],FOLLOWUP:[1 week]],PROVIDER:[TOKEN:[[id number]],FOLLOWUP:[1 week]],PROVIDER:[TOKEN:[[id number]],FOLLOWUP:[1 week]]

## 2022-05-15 NOTE — DISCHARGE NOTE PROVIDER - NSDCCPCAREPLAN_GEN_ALL_CORE_FT
PRINCIPAL DISCHARGE DIAGNOSIS  Diagnosis: Acute UTI  Assessment and Plan of Treatment: Due to MRSA - completed 5 days IV vanco.  Trevino changed.  Out patient void trial.  Out patient follow up with pcp and urology 3-7 days      SECONDARY DISCHARGE DIAGNOSES  Diagnosis: Sacral decubitus ulcer  Assessment and Plan of Treatment: heel ulcer   -offset heal  -frequent turning in bed  -wound care as needed

## 2022-05-16 LAB — SARS-COV-2 RNA SPEC QL NAA+PROBE: SIGNIFICANT CHANGE UP

## 2022-05-16 PROCEDURE — 99232 SBSQ HOSP IP/OBS MODERATE 35: CPT

## 2022-05-16 RX ADMIN — RANOLAZINE 500 MILLIGRAM(S): 500 TABLET, FILM COATED, EXTENDED RELEASE ORAL at 09:38

## 2022-05-16 RX ADMIN — FINASTERIDE 5 MILLIGRAM(S): 5 TABLET, FILM COATED ORAL at 09:38

## 2022-05-16 RX ADMIN — Medication 325 MILLIGRAM(S): at 09:38

## 2022-05-16 RX ADMIN — Medication 81 MILLIGRAM(S): at 09:38

## 2022-05-16 RX ADMIN — Medication 650 MILLIGRAM(S): at 19:55

## 2022-05-16 RX ADMIN — ATORVASTATIN CALCIUM 80 MILLIGRAM(S): 80 TABLET, FILM COATED ORAL at 21:11

## 2022-05-16 RX ADMIN — Medication 40 MILLIGRAM(S): at 09:38

## 2022-05-16 RX ADMIN — Medication 500 MILLIGRAM(S): at 09:39

## 2022-05-16 RX ADMIN — CLOPIDOGREL BISULFATE 75 MILLIGRAM(S): 75 TABLET, FILM COATED ORAL at 09:39

## 2022-05-16 RX ADMIN — PANTOPRAZOLE SODIUM 40 MILLIGRAM(S): 20 TABLET, DELAYED RELEASE ORAL at 05:35

## 2022-05-16 RX ADMIN — HEPARIN SODIUM 5000 UNIT(S): 5000 INJECTION INTRAVENOUS; SUBCUTANEOUS at 21:12

## 2022-05-16 RX ADMIN — RANOLAZINE 500 MILLIGRAM(S): 500 TABLET, FILM COATED, EXTENDED RELEASE ORAL at 21:10

## 2022-05-16 RX ADMIN — Medication 650 MILLIGRAM(S): at 21:14

## 2022-05-16 RX ADMIN — HEPARIN SODIUM 5000 UNIT(S): 5000 INJECTION INTRAVENOUS; SUBCUTANEOUS at 09:39

## 2022-05-16 RX ADMIN — MIRTAZAPINE 7.5 MILLIGRAM(S): 45 TABLET, ORALLY DISINTEGRATING ORAL at 21:11

## 2022-05-16 NOTE — PROGRESS NOTE ADULT - SUBJECTIVE AND OBJECTIVE BOX
chief complaint of UTI, sacral ulcer (09 May 2022 13:57)      SUBJECTIVE:   HPI:  88 y/o M with PMH HTN, CAD s/p CABG, AAA s/p repair, CKD Stage 3, CHFpEF, insomnia, obstructive/reflux uropathy, BPH presents with a rash on the buttocks and UTI. Pt states that he lives at [facility] and there was concern because there was a rash on his buttock and the nurses thought he may have a UTI. Reports intermittent abd pain, constipation, loss of bowel control, swelling in his lower extremities at the end of the day. Also reports left heel ulcer. Denies fevers, chills, chest pain, SOB, N/V, diarrhea. The pt states that he recently saw a nephrologist who changed his lasix from 40 mg daily to alternating between 20 mg and 40 mg doses.     Prior admissions:   - 3/25/2022-3/26/2022: weakness -> post obstructive urinary retention -> waite catheter placed -> doxycycline for scalp wound   ER course: VSS. Labs: WBC 12.78, Hb 9.6, Na 131, BUN 36, Cr 1.71 (baseline ~1.8), Glucose 128, calcium 8.0 -> corrected 9.6, albumin 2.0. UA: cloudy, moderate leukocyte esterase, large blood, WBC 6-10, RBC 25-50, occasional bacteria. COVID negative. EKG: Ventricularly paced rhythm, HR 78 bpm, pacer spikes visualized. CXR: PPM present, rotated film, no consolidation, cardiomegaly, sternotomy wires, no effusion, no pneumothorax (personally reviewed).   Pt was given Ceftriaxone. He is being admitted to med/surg for further management.  (08 May 2022 20:24)    5/9: laying in bed, feeling better today, had good appetite. no pain or discomfort noted.   5/10: seen and examined laying in bed, had penile discharge this morning with some mid/upper abd pain, otherwise no other sx.   5/11: laying in bed, no new issues. MRSA in urine. tolerating abx well. no new issues thus far   5/12: sitting up in bed, ambulated with PT today. still on abx, no new issues. contacted son via telephone kept appraised to situation   5/13: no new issues. tolerating abx. MRSA in genital culture.   5/14: In bed, alert, comfortable, no complaints.  Denies fever, chills, n, v, pain.  5/15: laying in bed, pending dc to [facility]. no new issues     REVIEW OF SYSTEMS: All other review of systems is negative unless indicated above.    Vital Signs Last 24 Hrs  T(C): 36.3 (16 May 2022 08:17), Max: 36.6 (15 May 2022 21:52)  T(F): 97.4 (16 May 2022 08:17), Max: 97.8 (15 May 2022 21:52)  HR: 72 (16 May 2022 08:17) (71 - 72)  BP: 107/45 (16 May 2022 08:17) (107/45 - 120/47)  BP(mean): --  RR: 18 (16 May 2022 08:17) (18 - 18)  SpO2: 97% (16 May 2022 08:17) (96% - 97%)      PHYSICAL EXAM:    Constitutional: NAD, awake and alert, thin appearing.   HEENT: PERR, EOMI, Normal Hearing, MMM  Neck: Soft and supple, No LAD, No JVD  Respiratory: Breath sounds are clear bilaterally, No wheezing, rales or rhonchi  Cardiovascular: S1 and S2, regular rate and rhythm, no Murmurs, gallops or rubs  Gastrointestinal: Bowel Sounds present, soft, nontender, nondistended, no guarding, no rebound  : waite caht in place, clear to cloudy yellow urine with penile ulceration   Extremities: No peripheral edema  Vascular: 2+ peripheral pulses  Neurological: A/O x 3, no focal deficits  Musculoskeletal: 5/5 strength b/l upper and lower extremities  Skin: No rashes, stage 2 sacral wound,       MEDICATIONS  (STANDING):  ascorbic acid 500 milliGRAM(s) Oral daily  aspirin enteric coated 81 milliGRAM(s) Oral daily  atorvastatin 80 milliGRAM(s) Oral at bedtime  bisacodyl Oral Tab/Cap - Peds 5 milliGRAM(s) Oral every 12 hours  clopidogrel Tablet 75 milliGRAM(s) Oral daily  ferrous    sulfate 325 milliGRAM(s) Oral daily  finasteride 5 milliGRAM(s) Oral daily  furosemide    Tablet 40 milliGRAM(s) Oral <User Schedule>  furosemide   Oral Tab/Cap - Peds 20 milliGRAM(s) Oral every other day  heparin   Injectable 5000 Unit(s) SubCutaneous every 12 hours  melatonin 5 milliGRAM(s) Oral at bedtime  metoprolol succinate ER 50 milliGRAM(s) Oral daily  mirtazapine 7.5 milliGRAM(s) Oral at bedtime  pantoprazole    Tablet 40 milliGRAM(s) Oral before breakfast  polyethylene glycol 3350 17 Gram(s) Oral daily  ranolazine 500 milliGRAM(s) Oral two times a day  tamsulosin 0.4 milliGRAM(s) Oral at bedtime  vancomycin  IVPB 500 milliGRAM(s) IV Intermittent every 24 hours    MEDICATIONS  (PRN):  acetaminophen     Tablet .. 650 milliGRAM(s) Oral every 6 hours PRN Temp greater or equal to 38C (100.4F), Mild Pain (1 - 3)  aluminum hydroxide/magnesium hydroxide/simethicone Suspension 30 milliLiter(s) Oral every 4 hours PRN Dyspepsia  magnesium hydroxide Suspension 15 milliLiter(s) Oral daily PRN Constipation  ondansetron Injectable 4 milliGRAM(s) IV Push every 8 hours PRN Nausea and/or Vomiting                                8.2    9.48  )-----------( 266      ( 13 May 2022 07:43 )             25.7     05-13    134<L>  |  99  |  46<H>  ----------------------------<  103<H>  4.2   |  29  |  1.47<H>    Ca    8.3<L>      13 May 2022 07:43      CAPILLARY BLOOD GLUCOSE        Assessment and Plan:  88 y/o M presents with rash on buttock and UTI     UTI associated with chronic waite:   - waite changed in ED    - s/p ceftriaxone for now.  cont w/ renal dose vanco per ID   - completed vanco for 5 days (stop day 5/15)   - Does not meet SIRS criteria  - Urine culture with MRSA 100,000 sensitivies reviewed, now on vanco IV 50-90k gram neg rods   - WBC 9.4, UA: moderate leukocyte esterase, occasional bacteria   - BCx negative   - Pressure ulcer on buttocks is stage 2, unlikely osteomyelitis   - Wound care   - leucocytosis resolved   - ID & Urology consult - Dr. Srivastava   - pt refused trial of void     # left heel and penile ulceration   - urology consulted - culture of penile ulceration sent; growing  MRSA  - xray negative for free air or bony involvement   - podiatry consulted. localized wound care. - off load while in bed       Lower extremity swelling likely secondary to venous insufficiency and CHFpEF   - ECHO (12/2021): moderate MR, mild AR, mild TR, LA dilation, LVEF 50%, trace pericardial effusion   - c/w home medications   - Encourage elevation of lower extremities b/l   - hyponatremia borderline, stable.   - Lasix dose recently adjusted by Dr. Galan (nephrology) which was also discussed with Dr. Hyatt -> alternating 20 mg and 40 mg doses of lasix daily     Normocytic anemia: Stable    Severe protein calorie malnutrition   - Albumin 2.0   - Nutrition consult  - liberalized diet      HTN, CAD s/p CABG with ICD, AAA s/p repair, CKD Stage 3, CHFpEF, insomnia, obstructive/reflux uropathy, BPH: STABLE  - c/w home medications; reviewed at the bedside and discussed that lasix dose is now alternating 20 mg and 40 mg and terazosin was d/c       DVT ppx:   -Heparin subc    Code status:   -Full      dispo:  -5/13 updated at bedside agreeable to dc to [facility] over weekend if no events   -f/u void trial  -dc to [facility]     Nutritional Assessment:  · Nutritional Assessment	This patient has been assessed with a concern for Malnutrition and has been determined to have a diagnosis/diagnoses of Severe protein-calorie malnutrition.    This patient is being managed with:   Diet DASH/TLC-  Sodium & Cholesterol Restricted  Entered: May  8 2022  7:59PM

## 2022-05-17 PROCEDURE — 99233 SBSQ HOSP IP/OBS HIGH 50: CPT

## 2022-05-17 RX ADMIN — TAMSULOSIN HYDROCHLORIDE 0.4 MILLIGRAM(S): 0.4 CAPSULE ORAL at 21:57

## 2022-05-17 RX ADMIN — PANTOPRAZOLE SODIUM 40 MILLIGRAM(S): 20 TABLET, DELAYED RELEASE ORAL at 05:36

## 2022-05-17 RX ADMIN — Medication 325 MILLIGRAM(S): at 10:00

## 2022-05-17 RX ADMIN — HEPARIN SODIUM 5000 UNIT(S): 5000 INJECTION INTRAVENOUS; SUBCUTANEOUS at 10:01

## 2022-05-17 RX ADMIN — FINASTERIDE 5 MILLIGRAM(S): 5 TABLET, FILM COATED ORAL at 10:00

## 2022-05-17 RX ADMIN — CLOPIDOGREL BISULFATE 75 MILLIGRAM(S): 75 TABLET, FILM COATED ORAL at 10:01

## 2022-05-17 RX ADMIN — Medication 500 MILLIGRAM(S): at 10:00

## 2022-05-17 RX ADMIN — RANOLAZINE 500 MILLIGRAM(S): 500 TABLET, FILM COATED, EXTENDED RELEASE ORAL at 10:00

## 2022-05-17 RX ADMIN — Medication 20 MILLIGRAM(S): at 10:00

## 2022-05-17 RX ADMIN — RANOLAZINE 500 MILLIGRAM(S): 500 TABLET, FILM COATED, EXTENDED RELEASE ORAL at 22:13

## 2022-05-17 RX ADMIN — Medication 81 MILLIGRAM(S): at 10:00

## 2022-05-17 RX ADMIN — Medication 5 MILLIGRAM(S): at 21:57

## 2022-05-17 RX ADMIN — ATORVASTATIN CALCIUM 80 MILLIGRAM(S): 80 TABLET, FILM COATED ORAL at 21:58

## 2022-05-17 NOTE — PROGRESS NOTE ADULT - PROVIDER SPECIALTY LIST ADULT
Hospitalist
Podiatry
Hospitalist
Infectious Disease
Hospitalist
Infectious Disease
Infectious Disease
Podiatry
Hospitalist

## 2022-05-17 NOTE — PROGRESS NOTE ADULT - NUTRITIONAL ASSESSMENT
This patient has been assessed with a concern for Malnutrition and has been determined to have a diagnosis/diagnoses of Severe protein-calorie malnutrition.    This patient is being managed with:   Diet DASH/TLC-  Sodium & Cholesterol Restricted  Entered: May  8 2022  7:59PM    

## 2022-05-17 NOTE — PROGRESS NOTE ADULT - SUBJECTIVE AND OBJECTIVE BOX
chief complaint of UTI, sacral ulcer (09 May 2022 13:57)      Patient is a 89 y.o male with extensive PMH HTN, CAD s/p CABG, AAA s/p repair, CKD Stage 3, CHFpEF, insomnia, obstructive/reflux uropathy, BPH presents with a rash on the buttocks and UTI. Pt states that he lives at [facility] and there was concern because there was a rash on his buttock and the nurses thought he may have a UTI. Reports intermittent abd pain, constipation, loss of bowel control, swelling in his lower extremities at the end of the day. Also reports left heel ulcer. Denies fevers, chills, chest pain, SOB, N/V, diarrhea. The pt states that he recently saw a nephrologist who changed his lasix from 40 mg daily to alternating between 20 mg and 40 mg doses.     Prior admissions:   - 3/25/2022-3/26/2022: weakness -> post obstructive urinary retention -> waite catheter placed -> doxycycline for scalp wound   ER course: VSS. Labs: WBC 12.78, Hb 9.6, Na 131, BUN 36, Cr 1.71 (baseline ~1.8), Glucose 128, calcium 8.0 -> corrected 9.6, albumin 2.0. UA: cloudy, moderate leukocyte esterase, large blood, WBC 6-10, RBC 25-50, occasional bacteria. COVID negative. EKG: Ventricularly paced rhythm, HR 78 bpm, pacer spikes visualized. CXR: PPM present, rotated film, no consolidation, cardiomegaly, sternotomy wires, no effusion, no pneumothorax (personally reviewed).     5/11: laying in bed, no new issues. MRSA in urine. tolerating abx well. no new issues thus far   5/13: no new issues. tolerating abx. MRSA in genital culture.     REVIEW OF SYSTEMS:   - All other review of systems is negative unless indicated above.    PHYSICAL EXAM:  T(C): 36.7 (17 May 2022 10:15), Max: 36.7 (17 May 2022 10:15)  T(F): 98 (17 May 2022 10:15), Max: 98 (17 May 2022 10:15)  HR: 77 (17 May 2022 10:15) (71 - 77)  BP: 127/46 (17 May 2022 10:15) (108/39 - 127/46)  RR: 16 (17 May 2022 10:15) (16 - 18)  SpO2: 100% (17 May 2022 10:15) (96% - 100%)  Constitutional: NAD, awake and alert, thin appearing.   HEENT: PERR, EOMI, Normal Hearing, MMM  Neck: Soft and supple, No LAD, No JVD  Respiratory: Breath sounds are clear bilaterally, No wheezing, rales or rhonchi  Cardiovascular: S1 and S2, regular rate and rhythm, no Murmurs, gallops or rubs  Gastrointestinal: Bowel Sounds present, soft, nontender, nondistended, no guarding, no rebound  : waite caht in place, clear to cloudy yellow urine with penile ulceration   Extremities: No peripheral edema  Vascular: 2+ peripheral pulses  Neurological: A/O x 3, no focal deficits  Musculoskeletal: 5/5 strength b/l upper and lower extremities  Skin: No rashes, stage 2 sacral wound,         Assessment and Plan:  89 y/o M presents with rash on buttock and UTI     # UTI associated with chronic waite  - s/p ceftriaxone for now.  cont w/ renal dose vanco per ID   - completed vanco for 5 days (stop day 5/15)   - Does not meet SIRS criteria  - Urine culture with MRSA 100,000 sensitivies reviewed, now on vanco IV 50-90k gram neg rods   - WBC 9.4, UA: moderate leukocyte esterase, occasional bacteria   - BCx negative   - Pressure ulcer on buttocks is stage 2, unlikely osteomyelitis   - Wound care   - leucocytosis resolved   - ID & Urology consult - Dr. Srivastava   - pt refused trial of void     # left heel and penile ulceration   - urology consulted - culture of penile ulceration sent; growing  MRSA  - xray negative for free air or bony involvement   - podiatry consulted. localized wound care. - off load while in bed     # Lower extremity swelling likely secondary to venous insufficiency and CHFpEF   - ECHO (12/2021): moderate MR, mild AR, mild TR, LA dilation, LVEF 50%, trace pericardial effusion   - c/w home medications   - Encourage elevation of lower extremities b/l   - hyponatremia borderline, stable.   - Lasix dose recently adjusted by Dr. Galan (nephrology) which was also discussed with Dr. Hyatt -> alternating 20 mg and 40 mg doses of lasix daily     # Normocytic anemia:  - Stable    # Severe protein calorie malnutrition   - Albumin 2.0   - Nutrition consult  - liberalized diet      HTN, CAD s/p CABG with ICD, AAA s/p repair, CKD Stage 3, CHFpEF, insomnia, obstructive/reflux uropathy, BPH: STABLE  - c/w home medications; reviewed at the bedside and discussed that lasix dose is now alternating 20 mg and 40 mg and terazosin was d/c     # DVT ppx:   -Heparin subc    # Code status:   - Full          chief complaint of UTI, sacral ulcer (09 May 2022 13:57)      Patient is a 89 y.o male with extensive PMH HTN, CAD s/p CABG, AAA s/p repair, CKD Stage 3, CHFpEF, insomnia, obstructive/reflux uropathy, BPH presents with a rash on the buttocks and UTI. Pt states that he lives at [facility] and there was concern because there was a rash on his buttock and the nurses thought he may have a UTI. Reports intermittent abd pain, constipation, loss of bowel control, swelling in his lower extremities at the end of the day. Also reports left heel ulcer. Denies fevers, chills, chest pain, SOB, N/V, diarrhea. The pt states that he recently saw a nephrologist who changed his lasix from 40 mg daily to alternating between 20 mg and 40 mg doses.     Prior admissions:   - 3/25/2022-3/26/2022: weakness -> post obstructive urinary retention -> waite catheter placed -> doxycycline for scalp wound   ER course: VSS. Labs: WBC 12.78, Hb 9.6, Na 131, BUN 36, Cr 1.71 (baseline ~1.8), Glucose 128, calcium 8.0 -> corrected 9.6, albumin 2.0. UA: cloudy, moderate leukocyte esterase, large blood, WBC 6-10, RBC 25-50, occasional bacteria. COVID negative. EKG: Ventricularly paced rhythm, HR 78 bpm, pacer spikes visualized. CXR: PPM present, rotated film, no consolidation, cardiomegaly, sternotomy wires, no effusion, no pneumothorax (personally reviewed).     Patient denies any HA, CP, SOB. Comfortable. Poor po intake. Normal BM. has chronic waite      REVIEW OF SYSTEMS:   - All other review of systems is negative unless indicated above.    PHYSICAL EXAM:  T(C): 36.7 (17 May 2022 10:15), Max: 36.7 (17 May 2022 10:15)  T(F): 98 (17 May 2022 10:15), Max: 98 (17 May 2022 10:15)  HR: 77 (17 May 2022 10:15) (71 - 77)  BP: 127/46 (17 May 2022 10:15) (108/39 - 127/46)  RR: 16 (17 May 2022 10:15) (16 - 18)  SpO2: 100% (17 May 2022 10:15) (96% - 100%)  Constitutional: NAD, awake and alert, thin appearing.   HEENT: PERR, EOMI, Normal Hearing, MMM  Neck: Soft and supple, No LAD, No JVD  Respiratory: Breath sounds are clear bilaterally, No wheezing, rales or rhonchi  Cardiovascular: S1 and S2, regular rate and rhythm, no Murmurs, gallops or rubs  Gastrointestinal: Bowel Sounds present, soft, nontender, nondistended, no guarding, no rebound  : waite caht in place, clear to cloudy yellow urine with penile ulceration   Extremities: No peripheral edema  Vascular: 2+ peripheral pulses  Neurological: A/O x 3, no focal deficits  Musculoskeletal: 5/5 strength b/l upper and lower extremities  Skin: No rashes, stage 2 sacral wound,         Assessment and Plan:  89 y/o M presents with rash on buttock and UTI     # UTI associated with chronic waite  - s/p ceftriaxone for now.  cont w/ renal dose vanco per ID   - completed vanco for 5 days (stop day 5/15)   - Does not meet SIRS criteria  - Urine culture with MRSA 100,000 sensitivies reviewed, now on vanco IV 50-90k gram neg rods   - WBC 9.4, UA: moderate leukocyte esterase, occasional bacteria   - BCx negative   - Pressure ulcer on buttocks is stage 2, unlikely osteomyelitis   - Wound care   - leucocytosis resolved   - ID & Urology consult - Dr. Srivastava   - pt refused trial of void     # left heel and penile ulceration   - urology consulted - culture of penile ulceration sent; growing  MRSA  - xray negative for free air or bony involvement   - podiatry consulted. localized wound care. - off load while in bed     # Lower extremity swelling likely secondary to venous insufficiency and CHFpEF   - ECHO (12/2021): moderate MR, mild AR, mild TR, LA dilation, LVEF 50%, trace pericardial effusion   - c/w home medications   - Encourage elevation of lower extremities b/l   - hyponatremia borderline, stable.   - Lasix dose recently adjusted by Dr. Galan (nephrology) which was also discussed with Dr. Hyatt -> alternating 20 mg and 40 mg doses of lasix daily     # Normocytic anemia:  - Stable    # Severe protein calorie malnutrition   - Albumin 2.0   - Nutrition consult  - liberalized diet      HTN, CAD s/p CABG with ICD, AAA s/p repair, CKD Stage 3, CHFpEF, insomnia, obstructive/reflux uropathy, BPH: STABLE  - c/w home medications; reviewed at the bedside and discussed that lasix dose is now alternating 20 mg and 40 mg and terazosin was d/c     # DVT ppx:   -Heparin subc    # Code status:   - Full

## 2022-05-17 NOTE — PROGRESS NOTE ADULT - CONVERSATION DETAILS
Vanna Rayna 12/24/2021 5:59 PM CST    This [facility] message has been forwarded to you from a monitored pool.  Please do not reply to this message.  All responses should be sent directly to the patient.        ----- Message -----  From: Goldie Boyle  Sent: 12/24/2021 12:47 PM CST  To: [facility] Default Message Pool  Subject: medication     The pharmacist filled 7 days of vyvanse for me to get through the end of the year. Could the DrAlma submit a new prescription for 90 days of Vyvanse for next year. (I have a new insurance plan and needed to get through the end of the year to start a new year deductible which is $3500/yr.)    Also, could the doctor submit a prescription for focalin dexmethylphenidate oral tablet 10 mg (2/day) CURRENTLY I TAKE FOCALIN XR 25 but the cost is prohibitive at this time. 90 days would be 180 tablets for the prescription.     The pharmacist (SANTHOSH) said just switching to the tablet maybe would be a better option than switching to dextroamphetamine (adderall) since I already take the focalin, dexmethylphenidate. Insurance info (Focalin) Tier 1  QL (2 EA per 1 day)    Please advise if you have other questions. Thank you.      Patient at this time is anticiated to be discharged home.     He noted that, he feels great and he wants to continue being full code.    Patient at this time refusing palliative care meeting as he feels better and was told he is being disharged from the hospital    Full code

## 2022-05-17 NOTE — PROGRESS NOTE ADULT - REASON FOR ADMISSION
UTI, sacral ulcer

## 2022-05-18 ENCOUNTER — TRANSCRIPTION ENCOUNTER (OUTPATIENT)
Age: 87
End: 2022-05-18

## 2022-05-18 VITALS
TEMPERATURE: 98 F | SYSTOLIC BLOOD PRESSURE: 110 MMHG | HEART RATE: 73 BPM | OXYGEN SATURATION: 94 % | DIASTOLIC BLOOD PRESSURE: 45 MMHG | RESPIRATION RATE: 18 BRPM

## 2022-05-18 PROCEDURE — 99239 HOSP IP/OBS DSCHRG MGMT >30: CPT

## 2022-05-18 RX ADMIN — CLOPIDOGREL BISULFATE 75 MILLIGRAM(S): 75 TABLET, FILM COATED ORAL at 09:53

## 2022-05-18 RX ADMIN — RANOLAZINE 500 MILLIGRAM(S): 500 TABLET, FILM COATED, EXTENDED RELEASE ORAL at 09:52

## 2022-05-18 RX ADMIN — Medication 81 MILLIGRAM(S): at 09:54

## 2022-05-18 RX ADMIN — PANTOPRAZOLE SODIUM 40 MILLIGRAM(S): 20 TABLET, DELAYED RELEASE ORAL at 06:05

## 2022-05-18 RX ADMIN — HEPARIN SODIUM 5000 UNIT(S): 5000 INJECTION INTRAVENOUS; SUBCUTANEOUS at 09:52

## 2022-05-18 RX ADMIN — FINASTERIDE 5 MILLIGRAM(S): 5 TABLET, FILM COATED ORAL at 09:52

## 2022-05-18 RX ADMIN — Medication 325 MILLIGRAM(S): at 09:53

## 2022-05-18 RX ADMIN — Medication 40 MILLIGRAM(S): at 09:52

## 2022-05-18 RX ADMIN — Medication 500 MILLIGRAM(S): at 09:54

## 2022-05-18 NOTE — DISCHARGE NOTE NURSING/CASE MANAGEMENT/SOCIAL WORK - NSDCPEFALRISK_GEN_ALL_CORE
For information on Fall & Injury Prevention, visit: [email or web address] OR  [email or web address] OR  https://www.cdc.gov/steadi/patient.html

## 2022-05-21 ENCOUNTER — INPATIENT (INPATIENT)
Facility: HOSPITAL | Age: 87
LOS: 18 days | Discharge: HOSPICE | DRG: 391 | End: 2022-06-09
Attending: INTERNAL MEDICINE | Admitting: FAMILY MEDICINE
Payer: MEDICARE

## 2022-05-21 VITALS
OXYGEN SATURATION: 97 % | DIASTOLIC BLOOD PRESSURE: 58 MMHG | TEMPERATURE: 98 F | SYSTOLIC BLOOD PRESSURE: 118 MMHG | RESPIRATION RATE: 17 BRPM | HEART RATE: 83 BPM | HEIGHT: 67 IN

## 2022-05-21 DIAGNOSIS — R19.7 DIARRHEA, UNSPECIFIED: ICD-10-CM

## 2022-05-21 DIAGNOSIS — Z96.641 PRESENCE OF RIGHT ARTIFICIAL HIP JOINT: Chronic | ICD-10-CM

## 2022-05-21 DIAGNOSIS — Z98.890 OTHER SPECIFIED POSTPROCEDURAL STATES: Chronic | ICD-10-CM

## 2022-05-21 DIAGNOSIS — I25.10 ATHEROSCLEROTIC HEART DISEASE OF NATIVE CORONARY ARTERY WITHOUT ANGINA PECTORIS: Chronic | ICD-10-CM

## 2022-05-21 DIAGNOSIS — Z95.1 PRESENCE OF AORTOCORONARY BYPASS GRAFT: Chronic | ICD-10-CM

## 2022-05-21 PROBLEM — Z87.898 PERSONAL HISTORY OF OTHER SPECIFIED CONDITIONS: Chronic | Status: ACTIVE | Noted: 2022-05-08

## 2022-05-21 PROBLEM — Z86.79 PERSONAL HISTORY OF OTHER DISEASES OF THE CIRCULATORY SYSTEM: Chronic | Status: ACTIVE | Noted: 2022-05-08

## 2022-05-21 PROBLEM — N40.0 BENIGN PROSTATIC HYPERPLASIA WITHOUT LOWER URINARY TRACT SYMPTOMS: Chronic | Status: ACTIVE | Noted: 2022-05-08

## 2022-05-21 PROBLEM — N18.30 CHRONIC KIDNEY DISEASE, STAGE 3 UNSPECIFIED: Chronic | Status: ACTIVE | Noted: 2022-05-08

## 2022-05-21 LAB
ALBUMIN SERPL ELPH-MCNC: 2.1 G/DL — LOW (ref 3.3–5)
ALP SERPL-CCNC: 98 U/L — SIGNIFICANT CHANGE UP (ref 40–120)
ALT FLD-CCNC: 28 U/L — SIGNIFICANT CHANGE UP (ref 12–78)
ANION GAP SERPL CALC-SCNC: 6 MMOL/L — SIGNIFICANT CHANGE UP (ref 5–17)
ANION GAP SERPL CALC-SCNC: 8 MMOL/L — SIGNIFICANT CHANGE UP (ref 5–17)
APPEARANCE UR: CLEAR — SIGNIFICANT CHANGE UP
APTT BLD: 28.8 SEC — SIGNIFICANT CHANGE UP (ref 27.5–35.5)
AST SERPL-CCNC: 20 U/L — SIGNIFICANT CHANGE UP (ref 15–37)
BASOPHILS # BLD AUTO: 0.05 K/UL — SIGNIFICANT CHANGE UP (ref 0–0.2)
BASOPHILS NFR BLD AUTO: 0.3 % — SIGNIFICANT CHANGE UP (ref 0–2)
BILIRUB SERPL-MCNC: 0.6 MG/DL — SIGNIFICANT CHANGE UP (ref 0.2–1.2)
BILIRUB UR-MCNC: NEGATIVE — SIGNIFICANT CHANGE UP
BUN SERPL-MCNC: 42 MG/DL — HIGH (ref 7–23)
BUN SERPL-MCNC: 44 MG/DL — HIGH (ref 7–23)
CALCIUM SERPL-MCNC: 8 MG/DL — LOW (ref 8.5–10.1)
CALCIUM SERPL-MCNC: 8.3 MG/DL — LOW (ref 8.5–10.1)
CHLORIDE SERPL-SCNC: 94 MMOL/L — LOW (ref 96–108)
CHLORIDE SERPL-SCNC: 97 MMOL/L — SIGNIFICANT CHANGE UP (ref 96–108)
CO2 SERPL-SCNC: 27 MMOL/L — SIGNIFICANT CHANGE UP (ref 22–31)
CO2 SERPL-SCNC: 29 MMOL/L — SIGNIFICANT CHANGE UP (ref 22–31)
COLOR SPEC: YELLOW — SIGNIFICANT CHANGE UP
CREAT SERPL-MCNC: 1.64 MG/DL — HIGH (ref 0.5–1.3)
CREAT SERPL-MCNC: 1.86 MG/DL — HIGH (ref 0.5–1.3)
DIFF PNL FLD: ABNORMAL
EGFR: 34 ML/MIN/1.73M2 — LOW
EGFR: 40 ML/MIN/1.73M2 — LOW
EOSINOPHIL # BLD AUTO: 0.02 K/UL — SIGNIFICANT CHANGE UP (ref 0–0.5)
EOSINOPHIL NFR BLD AUTO: 0.1 % — SIGNIFICANT CHANGE UP (ref 0–6)
GLUCOSE SERPL-MCNC: 116 MG/DL — HIGH (ref 70–99)
GLUCOSE SERPL-MCNC: 124 MG/DL — HIGH (ref 70–99)
GLUCOSE UR QL: NEGATIVE — SIGNIFICANT CHANGE UP
HCT VFR BLD CALC: 24.6 % — LOW (ref 39–50)
HCT VFR BLD CALC: 26 % — LOW (ref 39–50)
HGB BLD-MCNC: 7.9 G/DL — LOW (ref 13–17)
HGB BLD-MCNC: 8.2 G/DL — LOW (ref 13–17)
IMM GRANULOCYTES NFR BLD AUTO: 0.8 % — SIGNIFICANT CHANGE UP (ref 0–1.5)
INR BLD: 1.3 RATIO — HIGH (ref 0.88–1.16)
KETONES UR-MCNC: NEGATIVE — SIGNIFICANT CHANGE UP
LEUKOCYTE ESTERASE UR-ACNC: ABNORMAL
LIDOCAIN IGE QN: 70 U/L — LOW (ref 73–393)
LYMPHOCYTES # BLD AUTO: 1.13 K/UL — SIGNIFICANT CHANGE UP (ref 1–3.3)
LYMPHOCYTES # BLD AUTO: 6.4 % — LOW (ref 13–44)
MCHC RBC-ENTMCNC: 28.2 PG — SIGNIFICANT CHANGE UP (ref 27–34)
MCHC RBC-ENTMCNC: 31.5 GM/DL — LOW (ref 32–36)
MCV RBC AUTO: 89.3 FL — SIGNIFICANT CHANGE UP (ref 80–100)
MONOCYTES # BLD AUTO: 1.17 K/UL — HIGH (ref 0–0.9)
MONOCYTES NFR BLD AUTO: 6.6 % — SIGNIFICANT CHANGE UP (ref 2–14)
NEUTROPHILS # BLD AUTO: 15.26 K/UL — HIGH (ref 1.8–7.4)
NEUTROPHILS NFR BLD AUTO: 85.8 % — HIGH (ref 43–77)
NITRITE UR-MCNC: NEGATIVE — SIGNIFICANT CHANGE UP
OB PNL STL: NEGATIVE — SIGNIFICANT CHANGE UP
PH UR: 7 — SIGNIFICANT CHANGE UP (ref 5–8)
PLATELET # BLD AUTO: 418 K/UL — HIGH (ref 150–400)
POTASSIUM SERPL-MCNC: 4.1 MMOL/L — SIGNIFICANT CHANGE UP (ref 3.5–5.3)
POTASSIUM SERPL-MCNC: 4.4 MMOL/L — SIGNIFICANT CHANGE UP (ref 3.5–5.3)
POTASSIUM SERPL-SCNC: 4.1 MMOL/L — SIGNIFICANT CHANGE UP (ref 3.5–5.3)
POTASSIUM SERPL-SCNC: 4.4 MMOL/L — SIGNIFICANT CHANGE UP (ref 3.5–5.3)
PROT SERPL-MCNC: 6.5 GM/DL — SIGNIFICANT CHANGE UP (ref 6–8.3)
PROT UR-MCNC: 30 MG/DL
PROTHROM AB SERPL-ACNC: 15.1 SEC — HIGH (ref 10.5–13.4)
RBC # BLD: 2.91 M/UL — LOW (ref 4.2–5.8)
RBC # FLD: 16.4 % — HIGH (ref 10.3–14.5)
SARS-COV-2 RNA SPEC QL NAA+PROBE: SIGNIFICANT CHANGE UP
SODIUM SERPL-SCNC: 129 MMOL/L — LOW (ref 135–145)
SODIUM SERPL-SCNC: 132 MMOL/L — LOW (ref 135–145)
SP GR SPEC: 1 — LOW (ref 1.01–1.02)
UROBILINOGEN FLD QL: NEGATIVE — SIGNIFICANT CHANGE UP
WBC # BLD: 17.77 K/UL — HIGH (ref 3.8–10.5)
WBC # FLD AUTO: 17.77 K/UL — HIGH (ref 3.8–10.5)

## 2022-05-21 PROCEDURE — 84484 ASSAY OF TROPONIN QUANT: CPT

## 2022-05-21 PROCEDURE — 80202 ASSAY OF VANCOMYCIN: CPT

## 2022-05-21 PROCEDURE — 83605 ASSAY OF LACTIC ACID: CPT

## 2022-05-21 PROCEDURE — 92610 EVALUATE SWALLOWING FUNCTION: CPT | Mod: GN

## 2022-05-21 PROCEDURE — 82656 EL-1 FECAL QUAL/SEMIQ: CPT

## 2022-05-21 PROCEDURE — 85610 PROTHROMBIN TIME: CPT

## 2022-05-21 PROCEDURE — 80053 COMPREHEN METABOLIC PANEL: CPT

## 2022-05-21 PROCEDURE — 85025 COMPLETE CBC W/AUTO DIFF WBC: CPT

## 2022-05-21 PROCEDURE — 93005 ELECTROCARDIOGRAM TRACING: CPT

## 2022-05-21 PROCEDURE — 81001 URINALYSIS AUTO W/SCOPE: CPT

## 2022-05-21 PROCEDURE — 82728 ASSAY OF FERRITIN: CPT

## 2022-05-21 PROCEDURE — 71045 X-RAY EXAM CHEST 1 VIEW: CPT

## 2022-05-21 PROCEDURE — 87040 BLOOD CULTURE FOR BACTERIA: CPT

## 2022-05-21 PROCEDURE — 87507 IADNA-DNA/RNA PROBE TQ 12-25: CPT

## 2022-05-21 PROCEDURE — 82962 GLUCOSE BLOOD TEST: CPT

## 2022-05-21 PROCEDURE — 99223 1ST HOSP IP/OBS HIGH 75: CPT

## 2022-05-21 PROCEDURE — 71045 X-RAY EXAM CHEST 1 VIEW: CPT | Mod: 26

## 2022-05-21 PROCEDURE — P9047: CPT | Mod: JG

## 2022-05-21 PROCEDURE — 84100 ASSAY OF PHOSPHORUS: CPT

## 2022-05-21 PROCEDURE — 85730 THROMBOPLASTIN TIME PARTIAL: CPT

## 2022-05-21 PROCEDURE — 86900 BLOOD TYPING SEROLOGIC ABO: CPT

## 2022-05-21 PROCEDURE — 85018 HEMOGLOBIN: CPT

## 2022-05-21 PROCEDURE — 83550 IRON BINDING TEST: CPT

## 2022-05-21 PROCEDURE — 82272 OCCULT BLD FECES 1-3 TESTS: CPT

## 2022-05-21 PROCEDURE — 97116 GAIT TRAINING THERAPY: CPT | Mod: GP

## 2022-05-21 PROCEDURE — 83540 ASSAY OF IRON: CPT

## 2022-05-21 PROCEDURE — 86901 BLOOD TYPING SEROLOGIC RH(D): CPT

## 2022-05-21 PROCEDURE — 85027 COMPLETE CBC AUTOMATED: CPT

## 2022-05-21 PROCEDURE — 76770 US EXAM ABDO BACK WALL COMP: CPT

## 2022-05-21 PROCEDURE — 74176 CT ABD & PELVIS W/O CONTRAST: CPT

## 2022-05-21 PROCEDURE — 80048 BASIC METABOLIC PNL TOTAL CA: CPT

## 2022-05-21 PROCEDURE — 83735 ASSAY OF MAGNESIUM: CPT

## 2022-05-21 PROCEDURE — 36415 COLL VENOUS BLD VENIPUNCTURE: CPT

## 2022-05-21 PROCEDURE — 82533 TOTAL CORTISOL: CPT

## 2022-05-21 PROCEDURE — 84145 PROCALCITONIN (PCT): CPT

## 2022-05-21 PROCEDURE — 99285 EMERGENCY DEPT VISIT HI MDM: CPT

## 2022-05-21 PROCEDURE — U0005: CPT

## 2022-05-21 PROCEDURE — U0003: CPT

## 2022-05-21 PROCEDURE — 85014 HEMATOCRIT: CPT

## 2022-05-21 PROCEDURE — 86850 RBC ANTIBODY SCREEN: CPT

## 2022-05-21 PROCEDURE — 73502 X-RAY EXAM HIP UNI 2-3 VIEWS: CPT | Mod: LT

## 2022-05-21 RX ORDER — FINASTERIDE 5 MG/1
5 TABLET, FILM COATED ORAL DAILY
Refills: 0 | Status: DISCONTINUED | OUTPATIENT
Start: 2022-05-21 | End: 2022-06-09

## 2022-05-21 RX ORDER — FUROSEMIDE 40 MG
1 TABLET ORAL
Qty: 0 | Refills: 0 | DISCHARGE

## 2022-05-21 RX ORDER — LANOLIN ALCOHOL/MO/W.PET/CERES
5 CREAM (GRAM) TOPICAL AT BEDTIME
Refills: 0 | Status: DISCONTINUED | OUTPATIENT
Start: 2022-05-21 | End: 2022-06-09

## 2022-05-21 RX ORDER — LANOLIN ALCOHOL/MO/W.PET/CERES
1 CREAM (GRAM) TOPICAL
Qty: 0 | Refills: 0 | DISCHARGE

## 2022-05-21 RX ORDER — MIRTAZAPINE 45 MG/1
1 TABLET, ORALLY DISINTEGRATING ORAL
Qty: 0 | Refills: 0 | DISCHARGE

## 2022-05-21 RX ORDER — SODIUM CHLORIDE 9 MG/ML
1000 INJECTION INTRAMUSCULAR; INTRAVENOUS; SUBCUTANEOUS
Refills: 0 | Status: DISCONTINUED | OUTPATIENT
Start: 2022-05-21 | End: 2022-05-24

## 2022-05-21 RX ORDER — ASCORBIC ACID 60 MG
500 TABLET,CHEWABLE ORAL DAILY
Refills: 0 | Status: DISCONTINUED | OUTPATIENT
Start: 2022-05-21 | End: 2022-06-09

## 2022-05-21 RX ORDER — DIPHENHYDRAMINE HCL 50 MG
2 CAPSULE ORAL
Qty: 0 | Refills: 0 | DISCHARGE

## 2022-05-21 RX ORDER — SODIUM CHLORIDE 9 MG/ML
1000 INJECTION INTRAMUSCULAR; INTRAVENOUS; SUBCUTANEOUS ONCE
Refills: 0 | Status: COMPLETED | OUTPATIENT
Start: 2022-05-21 | End: 2022-05-21

## 2022-05-21 RX ORDER — ACETAMINOPHEN 500 MG
650 TABLET ORAL EVERY 6 HOURS
Refills: 0 | Status: DISCONTINUED | OUTPATIENT
Start: 2022-05-21 | End: 2022-06-09

## 2022-05-21 RX ORDER — FERROUS SULFATE 325(65) MG
325 TABLET ORAL DAILY
Refills: 0 | Status: DISCONTINUED | OUTPATIENT
Start: 2022-05-21 | End: 2022-06-09

## 2022-05-21 RX ORDER — PANTOPRAZOLE SODIUM 20 MG/1
40 TABLET, DELAYED RELEASE ORAL
Refills: 0 | Status: DISCONTINUED | OUTPATIENT
Start: 2022-05-21 | End: 2022-06-09

## 2022-05-21 RX ORDER — ATORVASTATIN CALCIUM 80 MG/1
80 TABLET, FILM COATED ORAL AT BEDTIME
Refills: 0 | Status: DISCONTINUED | OUTPATIENT
Start: 2022-05-21 | End: 2022-06-09

## 2022-05-21 RX ORDER — RANOLAZINE 500 MG/1
500 TABLET, FILM COATED, EXTENDED RELEASE ORAL
Refills: 0 | Status: DISCONTINUED | OUTPATIENT
Start: 2022-05-21 | End: 2022-06-09

## 2022-05-21 RX ORDER — ASPIRIN/CALCIUM CARB/MAGNESIUM 324 MG
1 TABLET ORAL
Qty: 0 | Refills: 0 | DISCHARGE

## 2022-05-21 RX ORDER — VANCOMYCIN HCL 1 G
250 VIAL (EA) INTRAVENOUS ONCE
Refills: 0 | Status: COMPLETED | OUTPATIENT
Start: 2022-05-21 | End: 2022-05-21

## 2022-05-21 RX ORDER — LANOLIN ALCOHOL/MO/W.PET/CERES
3 CREAM (GRAM) TOPICAL AT BEDTIME
Refills: 0 | Status: DISCONTINUED | OUTPATIENT
Start: 2022-05-21 | End: 2022-05-21

## 2022-05-21 RX ORDER — TAMSULOSIN HYDROCHLORIDE 0.4 MG/1
0.4 CAPSULE ORAL AT BEDTIME
Refills: 0 | Status: DISCONTINUED | OUTPATIENT
Start: 2022-05-21 | End: 2022-06-09

## 2022-05-21 RX ORDER — ONDANSETRON 8 MG/1
4 TABLET, FILM COATED ORAL EVERY 8 HOURS
Refills: 0 | Status: DISCONTINUED | OUTPATIENT
Start: 2022-05-21 | End: 2022-06-09

## 2022-05-21 RX ORDER — METOPROLOL TARTRATE 50 MG
50 TABLET ORAL DAILY
Refills: 0 | Status: DISCONTINUED | OUTPATIENT
Start: 2022-05-21 | End: 2022-05-27

## 2022-05-21 RX ORDER — VANCOMYCIN HCL 1 G
125 VIAL (EA) INTRAVENOUS EVERY 6 HOURS
Refills: 0 | Status: DISCONTINUED | OUTPATIENT
Start: 2022-05-21 | End: 2022-05-23

## 2022-05-21 RX ADMIN — ATORVASTATIN CALCIUM 80 MILLIGRAM(S): 80 TABLET, FILM COATED ORAL at 21:16

## 2022-05-21 RX ADMIN — SODIUM CHLORIDE 65 MILLILITER(S): 9 INJECTION INTRAMUSCULAR; INTRAVENOUS; SUBCUTANEOUS at 21:15

## 2022-05-21 RX ADMIN — TAMSULOSIN HYDROCHLORIDE 0.4 MILLIGRAM(S): 0.4 CAPSULE ORAL at 21:16

## 2022-05-21 RX ADMIN — RANOLAZINE 500 MILLIGRAM(S): 500 TABLET, FILM COATED, EXTENDED RELEASE ORAL at 21:16

## 2022-05-21 RX ADMIN — Medication 250 MILLIGRAM(S): at 14:18

## 2022-05-21 RX ADMIN — Medication 125 MILLIGRAM(S): at 20:19

## 2022-05-21 RX ADMIN — Medication 125 MILLIGRAM(S): at 23:15

## 2022-05-21 RX ADMIN — SODIUM CHLORIDE 2000 MILLILITER(S): 9 INJECTION INTRAMUSCULAR; INTRAVENOUS; SUBCUTANEOUS at 11:56

## 2022-05-21 NOTE — PHARMACOTHERAPY INTERVENTION NOTE - COMMENTS
Medication history complete, reviewed with patient's list  from  [facility] and confirmed with [facility]

## 2022-05-21 NOTE — ED PROVIDER NOTE - OBJECTIVE STATEMENT
88 y/o male with a PMHx of urinary retention, BPH, CHF, CKD, CAD s/p CABG, AAA, HTN presents to the ED c/o diarrhea since yesterday. Resides at [facility]. Reports he was sent to [facility] for evaluation of his diarrhea. Reports he has been in [facility] 05/18. Denies fever or chills.

## 2022-05-21 NOTE — ED ADULT NURSE REASSESSMENT NOTE - STATUS
awaiting bed, no change [facility] Division of Hospital Medicine  Roni Cintron MD  In House Pager [phone]    Patient is a 89y old  Male who presents with a chief complaint of Slurred speech (2021 15:21)      SUBJECTIVE / OVERNIGHT EVENTS:  No overnight events. Labs and vitals reviewed. confused and agitated overnight, likely delirium.   Patient seen and examined at bedside, no acute complaints.  No fever, no chills, no SOB, no CP, no n/v/d, no abd pain, no dysuria      MEDICATIONS  (STANDING):  aspirin  chewable 81 milliGRAM(s) Oral daily  atorvastatin 80 milliGRAM(s) Oral at bedtime  dextrose 40% Gel 15 Gram(s) Oral once  dextrose 5%. 1000 milliLiter(s) (50 mL/Hr) IV Continuous <Continuous>  dextrose 5%. 1000 milliLiter(s) (100 mL/Hr) IV Continuous <Continuous>  dextrose 50% Injectable 25 Gram(s) IV Push once  dextrose 50% Injectable 12.5 Gram(s) IV Push once  dextrose 50% Injectable 25 Gram(s) IV Push once  glucagon  Injectable 1 milliGRAM(s) IntraMuscular once  heparin   Injectable 5000 Unit(s) SubCutaneous every 8 hours  insulin lispro (ADMELOG) corrective regimen sliding scale   SubCutaneous three times a day before meals  insulin lispro (ADMELOG) corrective regimen sliding scale   SubCutaneous at bedtime  latanoprost 0.005% Ophthalmic Solution 1 Drop(s) Both EYES at bedtime  levETIRAcetam 500 milliGRAM(s) Oral two times a day  levoFLOXacin  Tablet 250 milliGRAM(s) Oral every 24 hours  melatonin 9 milliGRAM(s) Oral at bedtime  tamsulosin 0.4 milliGRAM(s) Oral at bedtime  ticagrelor 60 milliGRAM(s) Oral every 12 hours    MEDICATIONS  (PRN):    CAPILLARY BLOOD GLUCOSE      POCT Blood Glucose.: 162 mg/dL (10 Jluis 2021 08:21)  POCT Blood Glucose.: 181 mg/dL (2021 21:45)  POCT Blood Glucose.: 193 mg/dL (2021 19:25)  POCT Blood Glucose.: 197 mg/dL (2021 13:29)  POCT Blood Glucose.: 212 mg/dL (2021 12:16)    I&O's Summary    2021 07:01  -  10 Jluis 2021 07:00  --------------------------------------------------------  IN: 118 mL / OUT: 1000 mL / NET: -882 mL        PHYSICAL EXAM:  Vital Signs Last 24 Hrs  T(C): 36.4 (10 Jluis 2021 06:39), Max: 36.6 (2021 13:30)  T(F): 97.6 (10 Jluis 2021 06:39), Max: 97.8 (2021 13:30)  HR: 60 (10 Jluis 2021 06:39) (60 - 70)  BP: 127/77 (10 Jluis 2021 06:39) (127/77 - 170/86)  BP(mean): --  RR: 18 (10 Jluis 2021 06:39) (18 - 18)  SpO2: 97% (10 Jluis 2021 06:39) (97% - 100%)    Gen: NAD; resting in bed.  Pulm: no respiratory distress; CTA b/l; no wheezing  Cards: RRR, nl S1/S2; no obvious murmurs  Abd: soft; NT on exam  Ext: no cyanosis; no edema  Skin: no rash; no cyanosis    LABS:                        13.0   5.82  )-----------( 139      ( 10 Jluis 2021 07:30 )             39.8     06-10    142  |  107  |  25<H>  ----------------------------<  178<H>  4.1   |  22  |  1.50<H>    Ca    9.2      10 Jluis 2021 07:30  Phos  2.6     06-10  Mg     1.9     06-10    TPro  7.1  /  Alb  4.0  /  TBili  0.4  /  DBili  x   /  AST  14  /  ALT  13  /  AlkPhos  62  06-08    PT/INR - ( 2021 09:55 )   PT: 12.3 sec;   INR: 1.08 ratio         PTT - ( 2021 09:55 )  PTT:28.3 sec      Urinalysis Basic - ( 2021 23:31 )    Color: Colorless / Appearance: Clear / S.008 / pH: x  Gluc: x / Ketone: Negative  / Bili: Negative / Urobili: <2 mg/dL   Blood: x / Protein: 30 mg/dL / Nitrite: Negative   Leuk Esterase: Moderate / RBC: 0 /HPF / WBC 16 /HPF   Sq Epi: x / Non Sq Epi: 1 /HPF / Bacteria: Many        Culture - Urine (collected 2021 20:21)  Source: .Urine Clean Catch (Midstream)  Final Report (2021 21:26):    <10,000 CFU/mL Normal Urogenital Kristine        RADIOLOGY & ADDITIONAL TESTS:  Results Reviewed: Y  Imaging Personally Reviewed: Y  Electrocardiogram Personally Reviewed: Y    COORDINATION OF CARE:  Care Discussed with Consultants/Other Providers [Y/N]: Y  Prior or Outpatient Records Reviewed [Y/N]: Y   [facility] Division of Hospital Medicine  Roni Cintron MD  In House Pager [phone]    Patient is a 89y old  Male who presents with a chief complaint of Slurred speech (2021 15:21)      SUBJECTIVE / OVERNIGHT EVENTS:  No overnight events. Labs and vitals reviewed. confused and agitated overnight, likely delirium.   Patient seen and examined at bedside, no acute complaints. pleasant in chair, with wife at bedside.   No fever, no chills, no SOB, no CP, no n/v/d, no abd pain, no dysuria      MEDICATIONS  (STANDING):  aspirin  chewable 81 milliGRAM(s) Oral daily  atorvastatin 80 milliGRAM(s) Oral at bedtime  dextrose 40% Gel 15 Gram(s) Oral once  dextrose 5%. 1000 milliLiter(s) (50 mL/Hr) IV Continuous <Continuous>  dextrose 5%. 1000 milliLiter(s) (100 mL/Hr) IV Continuous <Continuous>  dextrose 50% Injectable 25 Gram(s) IV Push once  dextrose 50% Injectable 12.5 Gram(s) IV Push once  dextrose 50% Injectable 25 Gram(s) IV Push once  glucagon  Injectable 1 milliGRAM(s) IntraMuscular once  heparin   Injectable 5000 Unit(s) SubCutaneous every 8 hours  insulin lispro (ADMELOG) corrective regimen sliding scale   SubCutaneous three times a day before meals  insulin lispro (ADMELOG) corrective regimen sliding scale   SubCutaneous at bedtime  latanoprost 0.005% Ophthalmic Solution 1 Drop(s) Both EYES at bedtime  levETIRAcetam 500 milliGRAM(s) Oral two times a day  levoFLOXacin  Tablet 250 milliGRAM(s) Oral every 24 hours  melatonin 9 milliGRAM(s) Oral at bedtime  tamsulosin 0.4 milliGRAM(s) Oral at bedtime  ticagrelor 60 milliGRAM(s) Oral every 12 hours    MEDICATIONS  (PRN):    CAPILLARY BLOOD GLUCOSE      POCT Blood Glucose.: 162 mg/dL (10 Jluis 2021 08:21)  POCT Blood Glucose.: 181 mg/dL (2021 21:45)  POCT Blood Glucose.: 193 mg/dL (2021 19:25)  POCT Blood Glucose.: 197 mg/dL (2021 13:29)  POCT Blood Glucose.: 212 mg/dL (2021 12:16)    I&O's Summary    2021 07:01  -  10 Jluis 2021 07:00  --------------------------------------------------------  IN: 118 mL / OUT: 1000 mL / NET: -882 mL        PHYSICAL EXAM:  Vital Signs Last 24 Hrs  T(C): 36.4 (10 Jluis 2021 06:39), Max: 36.6 (2021 13:30)  T(F): 97.6 (10 Jluis 2021 06:39), Max: 97.8 (2021 13:30)  HR: 60 (10 Jluis 2021 06:39) (60 - 70)  BP: 127/77 (10 Jluis 2021 06:39) (127/77 - 170/86)  BP(mean): --  RR: 18 (10 Jluis 2021 06:39) (18 - 18)  SpO2: 97% (10 Jluis 2021 06:39) (97% - 100%)    Gen: NAD; resting in bed.  Pulm: no respiratory distress; CTA b/l; no wheezing  Cards: RRR, nl S1/S2; no obvious murmurs  Abd: soft; NT on exam  Ext: no cyanosis; no edema  Skin: no rash; no cyanosis    LABS:                        13.0   5.82  )-----------( 139      ( 10 Jluis 2021 07:30 )             39.8     06-10    142  |  107  |  25<H>  ----------------------------<  178<H>  4.1   |  22  |  1.50<H>    Ca    9.2      10 Jluis 2021 07:30  Phos  2.6     06-10  Mg     1.9     06-10    TPro  7.1  /  Alb  4.0  /  TBili  0.4  /  DBili  x   /  AST  14  /  ALT  13  /  AlkPhos  62  06-08    PT/INR - ( 2021 09:55 )   PT: 12.3 sec;   INR: 1.08 ratio         PTT - ( 2021 09:55 )  PTT:28.3 sec      Urinalysis Basic - ( 2021 23:31 )    Color: Colorless / Appearance: Clear / S.008 / pH: x  Gluc: x / Ketone: Negative  / Bili: Negative / Urobili: <2 mg/dL   Blood: x / Protein: 30 mg/dL / Nitrite: Negative   Leuk Esterase: Moderate / RBC: 0 /HPF / WBC 16 /HPF   Sq Epi: x / Non Sq Epi: 1 /HPF / Bacteria: Many        Culture - Urine (collected 2021 20:21)  Source: .Urine Clean Catch (Midstream)  Final Report (2021 21:26):    <10,000 CFU/mL Normal Urogenital Kristine        RADIOLOGY & ADDITIONAL TESTS:  Results Reviewed: Y  Imaging Personally Reviewed: Y  Electrocardiogram Personally Reviewed: Y    COORDINATION OF CARE:  Care Discussed with Consultants/Other Providers [Y/N]: Y  Prior or Outpatient Records Reviewed [Y/N]: Y

## 2022-05-21 NOTE — ED ADULT NURSE REASSESSMENT NOTE - NS ED NURSE REASSESS COMMENT FT1
Pt received AAOx3, VSS, denies pain. Inc care and dressing changed to wound on buttocks. Pt repositioned and linens changed. Trevino draining without issues. Nsg will cont to monitor.

## 2022-05-21 NOTE — ED ADULT NURSE NOTE - OBJECTIVE STATEMENT
pt arrived via amb from [facility], pt has  black dark diarrhea started yesterday.pt denies pain n/m. pt arrived with Trevino clear urine draining. pt has stage2/ 3 pressure ulcer on his sacral area size about 15cm on 12cm applied dry foam dry dressing.Pt has hematoma to left flank pain denies fall.

## 2022-05-21 NOTE — H&P ADULT - HISTORY OF PRESENT ILLNESS
88 y/o M with PMH HTN, CAD s/p CABG, AAA s/p repair, CKD Stage 3, CHFpEF, insomnia, obstructive uropathy, BPH, Dz reflux sent from SNF for evaluation of diarrhea  88 y/o M with PMH HTN, CAD s/p CABG, AAA s/p repair, CKD Stage 3, CHFpEF, insomnia, obstructive uropathy, BPH, Dz reflux sent from SNF for evaluation of diarrhea. Pt is poor historian due to hearing impairment and  Dementia. Reports that developed diarrhea overnight, not sure how many BMs had, as wearing diapers. He denies fevers or chills, has poor appetite but not new. No abd pain or cramping. Pt reports that his wife also is with loose stools for a few days.   In ED: labs with elevated WBCs, elevated BUN/CR.  Pt was given 1000ml IVF bolus and PO VAnco x 1.   During assessment with RN Pt had large dark, tarry stool

## 2022-05-21 NOTE — ED ADULT NURSE NOTE - CHPI ED NUR SYMPTOMS NEG
no abdominal distension/no blood in stool/no burning urination/no chills/no fever/no nausea/no vomiting

## 2022-05-21 NOTE — ED PROVIDER NOTE - GASTROINTESTINAL, MLM
Abdomen soft, non-tender, no guarding. Guaiac test, Guaiac negative, Lot [id number] Exp 1/31/2023, QC passed. +pressure ulcer on sacrum. +indwelling Trevino catheter

## 2022-05-21 NOTE — PATIENT PROFILE ADULT - FALL HARM RISK - HARM RISK INTERVENTIONS

## 2022-05-21 NOTE — ED PROVIDER NOTE - CONSTITUTIONAL, MLM
normal... Well appearing, awake, alert, oriented to person, place, time/situation and in no apparent distress., +cachetic.

## 2022-05-21 NOTE — H&P ADULT - ASSESSMENT
89 y/o M with PMH HTN, CAD s/p CABG, AAA s/p repair, CKD Stage 3, CHFpEF, insomnia, obstructive uropathy, BPH, Dz reflux  admitted for:       1. Diarrhea with leucocytosis, suspect infectious , high risk for C.Diff   No signs of Sepsis   admit  to med surg   Isolation  F/u CDIFF PCR, will order GI PCR   Gentle IVF   Monitor closely for abd pain   ID eval       2. Abnormal UA . r/o UTI  Trevino changed in ED   Pt was recently Tx with IV Vanco  for MRSA UTI and Ureteritis   F/u UCX  ID eval         3. DESIRAE/CKD 3, likely prerenal. Dehydration   Cr 1.86, baseline ~1.4  S/p IVF bolus  C/w Gentle IVF a sPt still with loose stools   Hold lasix   Monitor Is and OS   Labs in am      4. Hyponatremia, acute on chronic  labs on 5/13   Likely due to GI losses and on lasix   Monitor closely        5. Chronic Diastolic CHF and venous insufficiency  not in failure, dehydrated    Hold lasix for now  Daily weight       6. BPH, Urinary retention. Chronic Trevino   Changed in ED  Pt is on Flomax and terazosin?    C/w Flomax only for now      7. Chronic Anemia  Stool tarry , r/o GI bleed, but also takes iron supplements   will send FOBT, Type and screen, repeat H/H now   Monitor H/H transfuse PRN  PO PPI, hold Plavix and ASA, reeval in am       8. CAD, s/p CABG  no CP  C/w Lipitor, Metoprolol  ASA, Plavix on hold,  resume if no evidence of bleeding and FOBT neg       9. Pressure ulcers: decubital and L heal  present on admission  Local wound care  Turn and position   Wound care eval        10 DVT PPX: low risk , will order    89 y/o M with PMH HTN, CAD s/p CABG, AAA s/p repair, CKD Stage 3, CHFpEF, insomnia, obstructive uropathy, BPH, Dz reflux  admitted for:       1. Diarrhea with leucocytosis, suspect infectious , high risk for C.Diff   No signs of Sepsis   admit  to med surg   Isolation  F/u CDIFF PCR, will order GI PCR   Gentle IVF   Monitor closely for abd pain   ID eval       2. Abnormal UA . r/o UTI  Trevino changed in ED   Pt was recently Tx with IV Vanco  for MRSA UTI and Ureteritis   F/u UCX  ID eval         3. DESIRAE/CKD 3, likely prerenal. Dehydration   Cr 1.86, baseline ~1.4  S/p IVF bolus  C/w Gentle IVF a sPt still with loose stools   Hold lasix   Monitor Is and OS   Labs in am      4. Hyponatremia, acute on chronic  labs on 5/13   Likely due to GI losses and on lasix   Monitor closely        5. Chronic Diastolic CHF and venous insufficiency  not in failure, dehydrated    Hold lasix for now  Daily weight       6. BPH, Urinary retention. Chronic Trevino   Changed in ED  Pt is on Flomax and terazosin?    C/w Flomax only for now      7. Chronic Anemia  Stool tarry , r/o GI bleed, but also takes iron supplements   will send FOBT, Type and screen, repeat H/H now   Monitor H/H transfuse PRN  PO PPI, hold Plavix and ASA, reeval in am       8. CAD, s/p CABG  no CP  C/w Lipitor, Metoprolol  ASA, Plavix on hold,  resume if no evidence of bleeding and FOBT neg       9. Pressure ulcers: decubital and L heal  present on admission  Local wound care  Turn and position   Wound care eval        10 DVT PPX: low risk , will order     Spoke to Pt;s son Huan, updated on findings and POC. Also Code status discussed, for now FULL CODE, but will further discuss with family

## 2022-05-21 NOTE — H&P ADULT - NSHPPHYSICALEXAM_GEN_ALL_CORE
Vital Signs Last 24 Hrs  T(C): 36.7 (21 May 2022 14:22), Max: 36.7 (21 May 2022 14:22)  T(F): 98 (21 May 2022 14:22), Max: 98 (21 May 2022 14:22)  HR: 72 (21 May 2022 14:22) (72 - 83)  BP: 115/45 (21 May 2022 14:22) (115/45 - 118/58)  RR: 17 (21 May 2022 14:22) (17 - 17)  SpO2: 98% (21 May 2022 14:22) (97% - 98%)    PHYSICAL EXAM:    General: Well developed; well nourished; in no acute distress  Eyes: PERRLA, EOMI; conjunctiva and sclera clear  Head: Normocephalic; atraumatic  ENMT: No nasal discharge; airway clear  Neck: Supple; non tender; no masses  Respiratory: No wheezes, rales or rhonchi  Cardiovascular: Regular rate and rhythm. S1 and S2 Normal; No murmurs, gallops or rubs  Gastrointestinal: Soft non-tender non-distended; Normal bowel sounds  Genitourinary: No  suprapubic  tenderness  Extremities: Normal range of motion, No clubbing, cyanosis or edema  Vascular: Peripheral pulses palpable 2+ bilaterally  Neurological: Alert and oriented x4  Skin: Warm and dry. No acute rash  Lymph Nodes: No acute cervical adenopathy  Musculoskeletal: Normal muscle tone, without deformities  Psychiatric: Cooperative and appropriate Vital Signs Last 24 Hrs  T(C): 36.7 (21 May 2022 14:22), Max: 36.7 (21 May 2022 14:22)  T(F): 98 (21 May 2022 14:22), Max: 98 (21 May 2022 14:22)  HR: 72 (21 May 2022 14:22) (72 - 83)  BP: 115/45 (21 May 2022 14:22) (115/45 - 118/58)  RR: 17 (21 May 2022 14:22) (17 - 17)  SpO2: 98% (21 May 2022 14:22) (97% - 98%)    PHYSICAL EXAM:    General: Well developed; frail elderly male; in no acute distress  Eyes: EOMI; conjunctiva and sclera clear  Head: Normocephalic; atraumatic  ENMT: No nasal discharge; airway clear, dry oral mucosa   Neck: Supple; non tender; no masses,  Respiratory: Decreased BS at bases. No wheezes, rales or rhonchi  Cardiovascular: Regular rate and rhythm. S1 and S2 Normal;   Gastrointestinal: Soft non-tender non-distended; Normal bowel sounds  Genitourinary: No  suprapubic  tenderness. Trevino in place draining yellow urine    Extremities: No  edema  Vascular: Peripheral pulses palpable 2+ bilaterally  Neurological: Alert and oriented x 2-3, no facial asymmetry, speech is clear, no weakness   Skin:  sacral superficial  skin breakdown.    Musculoskeletal: Normal muscle tone, without deformities  Psychiatric: Cooperative and appropriate

## 2022-05-22 LAB
ANION GAP SERPL CALC-SCNC: 9 MMOL/L — SIGNIFICANT CHANGE UP (ref 5–17)
BUN SERPL-MCNC: 34 MG/DL — HIGH (ref 7–23)
C DIFF BY PCR RESULT: SIGNIFICANT CHANGE UP
C DIFF TOX GENS STL QL NAA+PROBE: SIGNIFICANT CHANGE UP
CALCIUM SERPL-MCNC: 7.9 MG/DL — LOW (ref 8.5–10.1)
CHLORIDE SERPL-SCNC: 102 MMOL/L — SIGNIFICANT CHANGE UP (ref 96–108)
CO2 SERPL-SCNC: 23 MMOL/L — SIGNIFICANT CHANGE UP (ref 22–31)
CREAT SERPL-MCNC: 1.38 MG/DL — HIGH (ref 0.5–1.3)
CULTURE RESULTS: SIGNIFICANT CHANGE UP
CULTURE RESULTS: SIGNIFICANT CHANGE UP
EGFR: 49 ML/MIN/1.73M2 — LOW
FERRITIN SERPL-MCNC: 404 NG/ML — HIGH (ref 30–400)
GLUCOSE SERPL-MCNC: 87 MG/DL — SIGNIFICANT CHANGE UP (ref 70–99)
HCT VFR BLD CALC: 24.9 % — LOW (ref 39–50)
HGB BLD-MCNC: 8 G/DL — LOW (ref 13–17)
IRON SATN MFR SERPL: 12 % — LOW (ref 16–55)
IRON SATN MFR SERPL: 24 UG/DL — LOW (ref 45–165)
MAGNESIUM SERPL-MCNC: 2.5 MG/DL — SIGNIFICANT CHANGE UP (ref 1.6–2.6)
MCHC RBC-ENTMCNC: 29 PG — SIGNIFICANT CHANGE UP (ref 27–34)
MCHC RBC-ENTMCNC: 32.1 GM/DL — SIGNIFICANT CHANGE UP (ref 32–36)
MCV RBC AUTO: 90.2 FL — SIGNIFICANT CHANGE UP (ref 80–100)
PHOSPHATE SERPL-MCNC: 3 MG/DL — SIGNIFICANT CHANGE UP (ref 2.5–4.5)
PLATELET # BLD AUTO: 335 K/UL — SIGNIFICANT CHANGE UP (ref 150–400)
POTASSIUM SERPL-MCNC: 3.7 MMOL/L — SIGNIFICANT CHANGE UP (ref 3.5–5.3)
POTASSIUM SERPL-SCNC: 3.7 MMOL/L — SIGNIFICANT CHANGE UP (ref 3.5–5.3)
RBC # BLD: 2.76 M/UL — LOW (ref 4.2–5.8)
RBC # FLD: 16.8 % — HIGH (ref 10.3–14.5)
SODIUM SERPL-SCNC: 134 MMOL/L — LOW (ref 135–145)
SPECIMEN SOURCE: SIGNIFICANT CHANGE UP
SPECIMEN SOURCE: SIGNIFICANT CHANGE UP
TIBC SERPL-MCNC: 201 UG/DL — LOW (ref 220–430)
UIBC SERPL-MCNC: 177 UG/DL — SIGNIFICANT CHANGE UP (ref 110–370)
WBC # BLD: 10.54 K/UL — HIGH (ref 3.8–10.5)
WBC # FLD AUTO: 10.54 K/UL — HIGH (ref 3.8–10.5)

## 2022-05-22 PROCEDURE — 99232 SBSQ HOSP IP/OBS MODERATE 35: CPT

## 2022-05-22 RX ADMIN — Medication 125 MILLIGRAM(S): at 17:33

## 2022-05-22 RX ADMIN — Medication 125 MILLIGRAM(S): at 05:26

## 2022-05-22 RX ADMIN — RANOLAZINE 500 MILLIGRAM(S): 500 TABLET, FILM COATED, EXTENDED RELEASE ORAL at 21:17

## 2022-05-22 RX ADMIN — PANTOPRAZOLE SODIUM 40 MILLIGRAM(S): 20 TABLET, DELAYED RELEASE ORAL at 05:25

## 2022-05-22 RX ADMIN — Medication 500 MILLIGRAM(S): at 09:14

## 2022-05-22 RX ADMIN — Medication 325 MILLIGRAM(S): at 09:14

## 2022-05-22 RX ADMIN — ATORVASTATIN CALCIUM 80 MILLIGRAM(S): 80 TABLET, FILM COATED ORAL at 21:17

## 2022-05-22 RX ADMIN — TAMSULOSIN HYDROCHLORIDE 0.4 MILLIGRAM(S): 0.4 CAPSULE ORAL at 21:17

## 2022-05-22 RX ADMIN — Medication 125 MILLIGRAM(S): at 11:26

## 2022-05-22 RX ADMIN — FINASTERIDE 5 MILLIGRAM(S): 5 TABLET, FILM COATED ORAL at 09:14

## 2022-05-22 RX ADMIN — Medication 50 MILLIGRAM(S): at 09:14

## 2022-05-22 RX ADMIN — Medication 125 MILLIGRAM(S): at 23:42

## 2022-05-22 RX ADMIN — RANOLAZINE 500 MILLIGRAM(S): 500 TABLET, FILM COATED, EXTENDED RELEASE ORAL at 09:14

## 2022-05-22 NOTE — PROGRESS NOTE ADULT - ASSESSMENT
88 y/o M with PMH HTN, CAD s/p CABG, AAA s/p repair, CKD Stage 3, CHFpEF, insomnia, obstructive uropathy, BPH, Dz reflux  admitted for:       1. Diarrhea with leucocytosis, suspect infectious , high risk for C.Diff   No signs of Sepsis   admit  to med surg   Isolation  F/u CDIFF PCR, will order GI PCR   Gentle IVF   Monitor closely for abd pain   ID eval       2. Abnormal UA . r/o UTI  Trevino changed in ED   Pt was recently Tx with IV Vanco  for MRSA UTI and Ureteritis   F/u UCX  ID eval         3. DESIRAE/CKD 3, likely prerenal. Dehydration   Cr 1.86, baseline ~1.4  S/p IVF bolus  C/w Gentle IVF a sPt still with loose stools   Hold lasix   Monitor Is and OS   Labs in am      4. Hyponatremia, acute on chronic  labs on 5/13   Likely due to GI losses and on lasix   Monitor closely        5. Chronic Diastolic CHF and venous insufficiency  not in failure, dehydrated    Hold lasix for now  Daily weight       6. BPH, Urinary retention. Chronic Trevino   Changed in ED  Pt is on Flomax and terazosin?    C/w Flomax only for now      7. Chronic Anemia  - stool OB:  negative.  PO PPI, hold Plavix and ASA, reeval in am       8. CAD, s/p CABG  no CP  C/w Lipitor, Metoprolol  ASA, Plavix on hold,  resume if no evidence of bleeding and FOBT neg       9. Pressure ulcers: decubital and L heal  present on admission  Local wound care  Turn and position   Wound care eval        10 DVT PPX: low risk , will order     Spoke to Pt;s son Huan, updated on findings and POC. Also Code status discussed, for now FULL CODE, but will further discuss with family

## 2022-05-22 NOTE — CONSULT NOTE ADULT - SUBJECTIVE AND OBJECTIVE BOX
Patient is a 89y old  Male who presents with a chief complaint of diarrhea (21 May 2022 14:32)    HPI:  89 y/o M with PMH HTN, CAD s/p CABG, AAA s/p repair, CKD Stage 3, CHFpEF, insomnia, obstructive uropathy, BPH, Dz reflux sent from SNF for evaluation of diarrhea. Pt is poor historian due to hearing impairment and  Dementia. Reports that developed diarrhea overnight, not sure how many BMs had, as wearing diapers. He denies fevers or chills, has poor appetite but not new. No abd pain or cramping. Pt reports that his wife also is with loose stools for a few days. In ED: labs with elevated WBCs, elevated BUN/CR.  Pt was given 1000ml IVF bolus and PO Vanco x 1.  During assessment with RN Pt had large dark, tarry stool. Was given oral vancomycin c diff pending.      PMH: as above  PSH: as above  Meds: per reconciliation sheet, noted below  MEDICATIONS  (STANDING):  ascorbic acid 500 milliGRAM(s) Oral daily  atorvastatin 80 milliGRAM(s) Oral at bedtime  ferrous    sulfate 325 milliGRAM(s) Oral daily  finasteride 5 milliGRAM(s) Oral daily  melatonin 5 milliGRAM(s) Oral at bedtime  metoprolol succinate ER 50 milliGRAM(s) Oral daily  pantoprazole    Tablet 40 milliGRAM(s) Oral before breakfast  ranolazine 500 milliGRAM(s) Oral two times a day  sodium chloride 0.9%. 1000 milliLiter(s) (65 mL/Hr) IV Continuous <Continuous>  tamsulosin 0.4 milliGRAM(s) Oral at bedtime  vancomycin    Solution 125 milliGRAM(s) Oral every 6 hours      Allergies    Broccoli (Unknown)  penicillin (Vomiting; Nausea)    Intolerances      Social: no smoking, no alcohol, no illegal drugs; no recent travel, no exposure to TB  FAMILY HISTORY:  FH: hypertension (Mother)    Family history of hyperglycemia (Father)       no history of premature cardiovascular disease in first degree relatives    ROS: unable to obtain d/t medical condition     All other systems reviewed and are negative    Vital Signs Last 24 Hrs  T(C): 36.5 (22 May 2022 00:45), Max: 37.2 (21 May 2022 16:55)  T(F): 97.7 (22 May 2022 00:45), Max: 99 (21 May 2022 16:55)  HR: 73 (22 May 2022 00:45) (71 - 77)  BP: 100/45 (22 May 2022 00:45) (100/45 - 124/59)  BP(mean): 69 (21 May 2022 20:13) (69 - 77)  RR: 18 (22 May 2022 00:45) (16 - 18)  SpO2: 94% (22 May 2022 00:45) (93% - 98%)  Daily     Daily Weight in k (22 May 2022 05:33)    PE:  Constitutional: frail looking  HEENT: NC/AT, EOMI, PERRLA, conjunctivae clear; ears and nose atraumatic; pharynx benign  Neck: supple; thyroid not palpable  Back: no tenderness  Respiratory: respiratory effort normal; clear to auscultation  Cardiovascular: S1S2 regular, no murmurs  Abdomen: soft, not tender, not distended, positive BS; liver and spleen WNL  Genitourinary: no suprapubic tenderness  Lymphatic: no LN palpable  Musculoskeletal: no muscle tenderness, no joint swelling or tenderness  Extremities: no pedal edema  Neurological/ Psychiatric: AxOx3, Judgement and insight normal;  moving all extremities  Skin: no rashes; no palpable lesions    Labs: all available labs reviewed                        8.0    10.54 )-----------( 335      ( 22 May 2022 08:47 )             24.9     05-22    134<L>  |  102  |  34<H>  ----------------------------<  87  3.7   |  23  |  1.38<H>    Ca    7.9<L>      22 May 2022 08:47  Phos  3.0     05-22  Mg     2.5     05-22    TPro  6.5  /  Alb  2.1<L>  /  TBili  0.6  /  DBili  x   /  AST  20  /  ALT  28  /  AlkPhos  98  05-21     LIVER FUNCTIONS - ( 21 May 2022 11:17 )  Alb: 2.1 g/dL / Pro: 6.5 gm/dL / ALK PHOS: 98 U/L / ALT: 28 U/L / AST: 20 U/L / GGT: x           Urinalysis Basic - ( 21 May 2022 11:18 )    Color: Yellow / Appearance: Clear / S.005 / pH: x  Gluc: x / Ketone: Negative  / Bili: Negative / Urobili: Negative   Blood: x / Protein: 30 mg/dL / Nitrite: Negative   Leuk Esterase: Moderate / RBC: 6-10 /HPF / WBC 6-10   Sq Epi: x / Non Sq Epi: Few / Bacteria: Occasional        Culture Results:   GI PCR Results: NOT detected  *******Please Note:*******  GI panel PCR evaluates for:  Campylobacter, Plesiomonas shigelloides, Salmonella,  Vibrio, Yersinia enterocolitica, Enteroaggregative  Escherichia coli (EAEC), Enteropathogenic E.coli (EPEC),  Enterotoxigenic E. coli (ETEC) lt/st, Shiga-like  toxin-producing E. coli (STEC) stx1/stx2,  Shigella/ Enteroinvasive E. coli (EIEC), Cryptosporidium,  Cyclospora cayetanensis, Entamoeba histolytica,  Giardia lamblia, Adenovirus F 40/41, Astrovirus,  Norovirus GI/GII, Rotavirus A, Sapovirus ( @ 21:15)    Radiology: all available radiological tests reviewed      ACC: [id number] EXAM:  XR CHEST PORTABLE URGENT 1V                          PROCEDURE DATE:  2022          INTERPRETATION:  Portable chest radiograph    CLINICAL INFORMATION: Diarrhea    TECHNIQUE:  Portable  AP chest radiograph.    COMPARISON:2022 chest radiograph .    FINDINGS:  CATHETERS AND TUBES: None    PULMONARY: The visualized lungs are clear of airspace consolidations or   effusions.   No pneumothorax.    HEART/VASCULAR: The  heart is enlarged in transverse diameter. Status   post coronary artery bypass graft procedure. Cardiac device wire leads   are within right atrium and right ventricle.    BONES: Visualized osseous structures are intact. Mild thoracic   dextroscoliosis.    IMPRESSION:   No radiographic evidence of active chest disease.      Advanced directives addressed: full resuscitation

## 2022-05-22 NOTE — PROGRESS NOTE ADULT - SUBJECTIVE AND OBJECTIVE BOX
CC:  Patient is a 89y old  Male who presents with a chief complaint of diarrhea (22 May 2022 11:13)    SUBJECTIVE:     -no new complaints or issues at current time.    ROS:  all other review of systems are negative unless indicated above.    acetaminophen     Tablet .. 650 milliGRAM(s) Oral every 6 hours PRN  aluminum hydroxide/magnesium hydroxide/simethicone Suspension 30 milliLiter(s) Oral every 4 hours PRN  ascorbic acid 500 milliGRAM(s) Oral daily  atorvastatin 80 milliGRAM(s) Oral at bedtime  ferrous    sulfate 325 milliGRAM(s) Oral daily  finasteride 5 milliGRAM(s) Oral daily  melatonin 5 milliGRAM(s) Oral at bedtime  metoprolol succinate ER 50 milliGRAM(s) Oral daily  ondansetron Injectable 4 milliGRAM(s) IV Push every 8 hours PRN  pantoprazole    Tablet 40 milliGRAM(s) Oral before breakfast  ranolazine 500 milliGRAM(s) Oral two times a day  sodium chloride 0.9%. 1000 milliLiter(s) IV Continuous <Continuous>  tamsulosin 0.4 milliGRAM(s) Oral at bedtime  vancomycin    Solution 125 milliGRAM(s) Oral every 6 hours    T(C): 36.5 (05-22-22 @ 00:45), Max: 37.2 (05-21-22 @ 16:55)  HR: 73 (05-22-22 @ 00:45) (71 - 77)  BP: 100/45 (05-22-22 @ 00:45) (100/45 - 121/51)  RR: 18 (05-22-22 @ 00:45) (16 - 18)  SpO2: 94% (05-22-22 @ 00:45) (93% - 97%)    Constitutional: NAD.   HEENT: PERRL, EOMI, MMM.  Neck: Soft and supple, No carotid bruit, No JVD  Respiratory: Breath sounds are clear bilaterally, No wheezing, rales or rhonchi  Cardiovascular: S1 and S2, regular rate and rhythm, no murmur, rub or gallop.  Gastrointestinal: Bowel Sounds present, soft, nontender, nondistended, no guarding, no rebound, no mass.  Extremities: No peripheral edema  Vascular: 2+ peripheral pulses  Neurological: A/O x , no focal deficits  Musculoskeletal: 5/5 strength b/l upper and lower extremities  Skin:  no visible rashes.                         8.0    10.54 )-----------( 335      ( 22 May 2022 08:47 )             24.9     PT/INR - ( 21 May 2022 11:17 )   PT: 15.1 sec;   INR: 1.30 ratio         PTT - ( 21 May 2022 11:17 )  PTT:28.8 sec  05-22    134<L>  |  102  |  34<H>  ----------------------------<  87  3.7   |  23  |  1.38<H>    Ca    7.9<L>      22 May 2022 08:47  Phos  3.0     05-22  Mg     2.5     05-22    TPro  6.5  /  Alb  2.1<L>  /  TBili  0.6  /  DBili  x   /  AST  20  /  ALT  28  /  AlkPhos  98  05-21

## 2022-05-23 DIAGNOSIS — L89.322 PRESSURE ULCER OF LEFT BUTTOCK, STAGE 2: ICD-10-CM

## 2022-05-23 DIAGNOSIS — Z95.810 PRESENCE OF AUTOMATIC (IMPLANTABLE) CARDIAC DEFIBRILLATOR: ICD-10-CM

## 2022-05-23 DIAGNOSIS — L89.312 PRESSURE ULCER OF RIGHT BUTTOCK, STAGE 2: ICD-10-CM

## 2022-05-23 DIAGNOSIS — K59.00 CONSTIPATION, UNSPECIFIED: ICD-10-CM

## 2022-05-23 DIAGNOSIS — I13.0 HYPERTENSIVE HEART AND CHRONIC KIDNEY DISEASE WITH HEART FAILURE AND STAGE 1 THROUGH STAGE 4 CHRONIC KIDNEY DISEASE, OR UNSPECIFIED CHRONIC KIDNEY DISEASE: ICD-10-CM

## 2022-05-23 DIAGNOSIS — Z96.641 PRESENCE OF RIGHT ARTIFICIAL HIP JOINT: ICD-10-CM

## 2022-05-23 DIAGNOSIS — E43 UNSPECIFIED SEVERE PROTEIN-CALORIE MALNUTRITION: ICD-10-CM

## 2022-05-23 DIAGNOSIS — Z95.1 PRESENCE OF AORTOCORONARY BYPASS GRAFT: ICD-10-CM
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
## 2022-05-23 DIAGNOSIS — I87.2 VENOUS INSUFFICIENCY (CHRONIC) (PERIPHERAL): ICD-10-CM

## 2022-05-23 DIAGNOSIS — R33.8 OTHER RETENTION OF URINE: ICD-10-CM
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
## 2022-05-23 DIAGNOSIS — N48.5 ULCER OF PENIS: ICD-10-CM

## 2022-05-23 DIAGNOSIS — T83.511A INFECTION AND INFLAMMATORY REACTION DUE TO INDWELLING URETHRAL CATHETER, INITIAL ENCOUNTER: ICD-10-CM

## 2022-05-23 DIAGNOSIS — E88.09 OTHER DISORDERS OF PLASMA-PROTEIN METABOLISM, NOT ELSEWHERE CLASSIFIED: ICD-10-CM

## 2022-05-23 DIAGNOSIS — D64.9 ANEMIA, UNSPECIFIED: ICD-10-CM

## 2022-05-23 DIAGNOSIS — R73.9 HYPERGLYCEMIA, UNSPECIFIED: ICD-10-CM

## 2022-05-23 DIAGNOSIS — I50.32 CHRONIC DIASTOLIC (CONGESTIVE) HEART FAILURE: ICD-10-CM

## 2022-05-23 DIAGNOSIS — N40.1 BENIGN PROSTATIC HYPERPLASIA WITH LOWER URINARY TRACT SYMPTOMS: ICD-10-CM

## 2022-05-23 DIAGNOSIS — N39.0 URINARY TRACT INFECTION, SITE NOT SPECIFIED: ICD-10-CM

## 2022-05-23 DIAGNOSIS — N18.32 CHRONIC KIDNEY DISEASE, STAGE 3B: ICD-10-CM

## 2022-05-23 DIAGNOSIS — B95.62 METHICILLIN RESISTANT STAPHYLOCOCCUS AUREUS INFECTION AS THE CAUSE OF DISEASES CLASSIFIED ELSEWHERE: ICD-10-CM

## 2022-05-23 DIAGNOSIS — I27.20 PULMONARY HYPERTENSION, UNSPECIFIED: ICD-10-CM

## 2022-05-23 LAB
ANION GAP SERPL CALC-SCNC: 10 MMOL/L — SIGNIFICANT CHANGE UP (ref 5–17)
BUN SERPL-MCNC: 34 MG/DL — HIGH (ref 7–23)
CALCIUM SERPL-MCNC: 7.8 MG/DL — LOW (ref 8.5–10.1)
CHLORIDE SERPL-SCNC: 100 MMOL/L — SIGNIFICANT CHANGE UP (ref 96–108)
CO2 SERPL-SCNC: 24 MMOL/L — SIGNIFICANT CHANGE UP (ref 22–31)
CREAT SERPL-MCNC: 1.44 MG/DL — HIGH (ref 0.5–1.3)
EGFR: 46 ML/MIN/1.73M2 — LOW
GLUCOSE SERPL-MCNC: 105 MG/DL — HIGH (ref 70–99)
HCT VFR BLD CALC: 24.4 % — LOW (ref 39–50)
HGB BLD-MCNC: 7.7 G/DL — LOW (ref 13–17)
MCHC RBC-ENTMCNC: 28.2 PG — SIGNIFICANT CHANGE UP (ref 27–34)
MCHC RBC-ENTMCNC: 31.6 GM/DL — LOW (ref 32–36)
MCV RBC AUTO: 89.4 FL — SIGNIFICANT CHANGE UP (ref 80–100)
PLATELET # BLD AUTO: 357 K/UL — SIGNIFICANT CHANGE UP (ref 150–400)
POTASSIUM SERPL-MCNC: 4 MMOL/L — SIGNIFICANT CHANGE UP (ref 3.5–5.3)
POTASSIUM SERPL-SCNC: 4 MMOL/L — SIGNIFICANT CHANGE UP (ref 3.5–5.3)
RBC # BLD: 2.73 M/UL — LOW (ref 4.2–5.8)
RBC # FLD: 16.8 % — HIGH (ref 10.3–14.5)
SODIUM SERPL-SCNC: 134 MMOL/L — LOW (ref 135–145)
WBC # BLD: 10.37 K/UL — SIGNIFICANT CHANGE UP (ref 3.8–10.5)
WBC # FLD AUTO: 10.37 K/UL — SIGNIFICANT CHANGE UP (ref 3.8–10.5)

## 2022-05-23 PROCEDURE — 99232 SBSQ HOSP IP/OBS MODERATE 35: CPT

## 2022-05-23 RX ADMIN — FINASTERIDE 5 MILLIGRAM(S): 5 TABLET, FILM COATED ORAL at 11:02

## 2022-05-23 RX ADMIN — Medication 500 MILLIGRAM(S): at 11:02

## 2022-05-23 RX ADMIN — Medication 325 MILLIGRAM(S): at 11:02

## 2022-05-23 RX ADMIN — Medication 50 MILLIGRAM(S): at 11:01

## 2022-05-23 RX ADMIN — ATORVASTATIN CALCIUM 80 MILLIGRAM(S): 80 TABLET, FILM COATED ORAL at 21:36

## 2022-05-23 RX ADMIN — TAMSULOSIN HYDROCHLORIDE 0.4 MILLIGRAM(S): 0.4 CAPSULE ORAL at 21:36

## 2022-05-23 RX ADMIN — RANOLAZINE 500 MILLIGRAM(S): 500 TABLET, FILM COATED, EXTENDED RELEASE ORAL at 21:36

## 2022-05-23 RX ADMIN — Medication 125 MILLIGRAM(S): at 05:42

## 2022-05-23 RX ADMIN — RANOLAZINE 500 MILLIGRAM(S): 500 TABLET, FILM COATED, EXTENDED RELEASE ORAL at 11:02

## 2022-05-23 RX ADMIN — PANTOPRAZOLE SODIUM 40 MILLIGRAM(S): 20 TABLET, DELAYED RELEASE ORAL at 05:42

## 2022-05-23 NOTE — DIETITIAN INITIAL EVALUATION ADULT - NSFNSPHYEXAMSKINFT_GEN_A_CORE
Pressure Injury 1: sacrum, Unstageable  Pressure Injury 2: Left:,heel, Unstageable  Pressure Injury 3: none, none  Pressure Injury 4: none, none  Pressure Injury 5: none, none  Pressure Injury 6: none, none  Pressure Injury 7: none, none  Pressure Injury 8: none, none  Pressure Injury 9: none, none  Pressure Injury 10: none, none  Pressure Injury 11: none, none Pressure Injury 1: sacrum, Unstageable  Pressure Injury 2: Left:,heel, Unstageable

## 2022-05-23 NOTE — PROGRESS NOTE ADULT - ASSESSMENT
88 y/o M with PMH HTN, CAD s/p CABG, AAA s/p repair, CKD Stage 3, CHFpEF, insomnia, obstructive uropathy, BPH, Dz reflux sent from SNF for evaluation of diarrhea. Pt is poor historian due to hearing impairment and  Dementia. Reports that developed diarrhea overnight, not sure how many BMs had, as wearing diapers. He denies fevers or chills, has poor appetite but not new. No abd pain or cramping. Pt reports that his wife also is with loose stools for a few days. In ED: labs with elevated WBCs, elevated BUN/CR.  Pt was given 1000ml IVF bolus and PO Vanco x 1.  During assessment with RN Pt had large dark, tarry stool. Was given oral vancomycin c diff pending.    1. diarrhea. leukocytosis - resolved. ckd 3  - urine cx no growth   - cdiff, gi pcr negative  - dc oral vancomycin  - observe off antibiotics   - waite care  - monitor temps  - supportive care  - fu cbc    2 other issues - care per medicine

## 2022-05-23 NOTE — DIETITIAN INITIAL EVALUATION ADULT - ADD RECOMMEND
liberalize diet to regular.  Add ensure enlive 8 oz tid.  Record PO intake in EMR after each meal (nursing.) Encourage PO intake. Daily weight.  Tray set-up, feed assist as needed.  Monitor PO intake, tolerance, labs and weight.

## 2022-05-23 NOTE — PROGRESS NOTE ADULT - SUBJECTIVE AND OBJECTIVE BOX
CC:  Patient is a 89y old  Male who presents with a chief complaint of Diarrhea     (23 May 2022 13:27)    SUBJECTIVE:     -no new complaints or issues at current time.    ROS:  all other review of systems are negative unless indicated above.    acetaminophen     Tablet .. 650 milliGRAM(s) Oral every 6 hours PRN  aluminum hydroxide/magnesium hydroxide/simethicone Suspension 30 milliLiter(s) Oral every 4 hours PRN  ascorbic acid 500 milliGRAM(s) Oral daily  atorvastatin 80 milliGRAM(s) Oral at bedtime  ferrous    sulfate 325 milliGRAM(s) Oral daily  finasteride 5 milliGRAM(s) Oral daily  melatonin 5 milliGRAM(s) Oral at bedtime  metoprolol succinate ER 50 milliGRAM(s) Oral daily  ondansetron Injectable 4 milliGRAM(s) IV Push every 8 hours PRN  pantoprazole    Tablet 40 milliGRAM(s) Oral before breakfast  ranolazine 500 milliGRAM(s) Oral two times a day  sodium chloride 0.9%. 1000 milliLiter(s) IV Continuous <Continuous>  tamsulosin 0.4 milliGRAM(s) Oral at bedtime    T(C): 36.4 (05-23-22 @ 15:24), Max: 36.6 (05-22-22 @ 21:17)  HR: 79 (05-23-22 @ 15:24) (66 - 79)  BP: 115/54 (05-23-22 @ 15:24) (79/24 - 115/54)  RR: 18 (05-23-22 @ 15:24) (18 - 18)  SpO2: 100% (05-23-22 @ 15:24) (93% - 100%)    Constitutional: NAD.   HEENT: PERRL, EOMI, MMM.  Neck: Soft and supple, No carotid bruit, No JVD  Respiratory: Breath sounds are clear bilaterally, No wheezing, rales or rhonchi  Cardiovascular: S1 and S2, regular rate and rhythm, no murmur, rub or gallop.  Gastrointestinal: Bowel Sounds present, soft, nontender, nondistended, no guarding, no rebound, no mass.  Extremities: No peripheral edema  Vascular: 2+ peripheral pulses  Neurological: A/O x , no focal deficits  Musculoskeletal: 5/5 strength b/l upper and lower extremities  Skin:  no visible rashes.                         7.7    10.37 )-----------( 357      ( 23 May 2022 09:24 )             24.4       05-23    134<L>  |  100  |  34<H>  ----------------------------<  105<H>  4.0   |  24  |  1.44<H>    Ca    7.8<L>      23 May 2022 09:24  Phos  3.0     05-22  Mg     2.5     05-22

## 2022-05-23 NOTE — DIETITIAN INITIAL EVALUATION ADULT - PERTINENT LABORATORY DATA
05-23    134<L>  |  100  |  34<H>  ----------------------------<  105<H>  4.0   |  24  |  1.44<H>    Ca    7.8<L>      23 May 2022 09:24  Phos  3.0     05-22  Mg     2.5     05-22    A1C with Estimated Average Glucose Result: 5.3 % (05-09-22 @ 07:58)  A1C with Estimated Average Glucose Result: 5.7 % (12-18-21 @ 05:29)

## 2022-05-23 NOTE — DIETITIAN INITIAL EVALUATION ADULT - OTHER INFO
89 y/o M with PMH HTN, CAD s/p CABG, AAA s/p repair, CKD Stage 3, CHFpEF, insomnia, obstructive uropathy, BPH, Dz reflux sent from SNF for evaluation of diarrhea. Pt is poor historian due to hearing impairment and  Dementia. Reports that developed diarrhea overnight, not sure how many BMs had, as wearing diapers. He denies fevers or chills, has poor appetite but not new. No abd pain or cramping. Pt reports that his wife also is with loose stools for a few days.   In ED: labs with elevated WBCs, elevated BUN/CR.  Pt was given 1000ml IVF bolus and PO VAnco x 1.   During assessment with RN Pt had large dark, tarry stool    88 y/o M with PMH HTN, CAD s/p CABG, AAA s/p repair, CKD Stage 3, CHFpEF, insomnia, obstructive uropathy, BPH, Dz reflux sent from SNF for evaluation of diarrhea. Pt is poor historian due to hearing impairment and  Dementia. Reports that developed diarrhea overnight, not sure how many BMs had, as wearing diapers. He denies fevers or chills, has poor appetite but not new. No abd pain or cramping. Pt reports that his wife also is with loose stools for a few days.   In ED: labs with elevated WBCs, elevated BUN/CR.  Pt was given 1000ml IVF bolus and PO VAnco x 1.   During assessment with RN Pt had large dark, tarry stool   NFPE performed, pt at risk for severe malnutrition  Pt was asleep on interview

## 2022-05-23 NOTE — PROGRESS NOTE ADULT - SUBJECTIVE AND OBJECTIVE BOX
Date of service: 22 @ 11:07    pt seen and examined   bm overnight loose  afebrile  laying in bed, nad     ROS: unable to obtain d/t medical condition    MEDICATIONS  (STANDING):  ascorbic acid 500 milliGRAM(s) Oral daily  atorvastatin 80 milliGRAM(s) Oral at bedtime  ferrous    sulfate 325 milliGRAM(s) Oral daily  finasteride 5 milliGRAM(s) Oral daily  melatonin 5 milliGRAM(s) Oral at bedtime  metoprolol succinate ER 50 milliGRAM(s) Oral daily  pantoprazole    Tablet 40 milliGRAM(s) Oral before breakfast  ranolazine 500 milliGRAM(s) Oral two times a day  sodium chloride 0.9%. 1000 milliLiter(s) (65 mL/Hr) IV Continuous <Continuous>  tamsulosin 0.4 milliGRAM(s) Oral at bedtime    Vital Signs Last 24 Hrs  T(C): 36.3 (23 May 2022 08:18), Max: 36.6 (22 May 2022 21:17)  T(F): 97.3 (23 May 2022 08:18), Max: 97.9 (22 May 2022 21:17)  HR: 66 (23 May 2022 08:18) (66 - 77)  BP: 112/48 (23 May 2022 08:20) (79/24 - 112/48)  BP(mean): --  RR: 18 (23 May 2022 08:18) (18 - 18)  SpO2: 93% (23 May 2022 08:18) (93% - 95%)      PE:  Constitutional: frail looking  HEENT: NC/AT, EOMI, PERRLA, conjunctivae clear; ears and nose atraumatic; pharynx benign  Neck: supple; thyroid not palpable  Back: no tenderness  Respiratory: respiratory effort normal; clear to auscultation  Cardiovascular: S1S2 regular, no murmurs  Abdomen: soft, not tender, not distended, positive BS; liver and spleen WNL  Genitourinary: no suprapubic tenderness  Lymphatic: no LN palpable  Musculoskeletal: no muscle tenderness, no joint swelling or tenderness  Extremities: no pedal edema  Neurological/ Psychiatric: AxOx3, Judgement and insight normal;  moving all extremities  Skin: no rashes; no palpable lesions    Labs: all available labs reviewed                                   7.7    10.37 )-----------( 357      ( 23 May 2022 09:24 )             24.4     05-23    134<L>  |  100  |  34<H>  ----------------------------<  105<H>  4.0   |  24  |  1.44<H>    Ca    7.8<L>      23 May 2022 09:24  Phos  3.0     05-  Mg     2.5     -    TPro  6.5  /  Alb  2.1<L>  /  TBili  0.6  /  DBili  x   /  AST  20  /  ALT  28  /  AlkPhos  98  05-      LIVER FUNCTIONS - ( 21 May 2022 11:17 )  Alb: 2.1 g/dL / Pro: 6.5 gm/dL / ALK PHOS: 98 U/L / ALT: 28 U/L / AST: 20 U/L / GGT: x           Urinalysis Basic - ( 21 May 2022 11:18 )    Color: Yellow / Appearance: Clear / S.005 / pH: x  Gluc: x / Ketone: Negative  / Bili: Negative / Urobili: Negative   Blood: x / Protein: 30 mg/dL / Nitrite: Negative   Leuk Esterase: Moderate / RBC: 6-10 /HPF / WBC 6-10   Sq Epi: x / Non Sq Epi: Few / Bacteria: Occasional      Culture Results:   GI PCR Results: NOT detected  *******Please Note:*******  GI panel PCR evaluates for:  Campylobacter, Plesiomonas shigelloides, Salmonella,  Vibrio, Yersinia enterocolitica, Enteroaggregative  Escherichia coli (EAEC), Enteropathogenic E.coli (EPEC),  Enterotoxigenic E. coli (ETEC) lt/st, Shiga-like  toxin-producing E. coli (STEC) stx1/stx2,  Shigella/ Enteroinvasive E. coli (EIEC), Cryptosporidium,  Cyclospora cayetanensis, Entamoeba histolytica,  Giardia lamblia, Adenovirus F 40/41, Astrovirus,  Norovirus GI/GII, Rotavirus A, Sapovirus ( @ 21:15)    Radiology: all available radiological tests reviewed      ACC: [id number] EXAM:  XR CHEST PORTABLE URGENT 1V                          PROCEDURE DATE:  2022          INTERPRETATION:  Portable chest radiograph    CLINICAL INFORMATION: Diarrhea    TECHNIQUE:  Portable  AP chest radiograph.    COMPARISON:2022 chest radiograph .    FINDINGS:  CATHETERS AND TUBES: None    PULMONARY: The visualized lungs are clear of airspace consolidations or   effusions.   No pneumothorax.    HEART/VASCULAR: The  heart is enlarged in transverse diameter. Status   post coronary artery bypass graft procedure. Cardiac device wire leads   are within right atrium and right ventricle.    BONES: Visualized osseous structures are intact. Mild thoracic   dextroscoliosis.    IMPRESSION:   No radiographic evidence of active chest disease.      Advanced directives addressed: full resuscitation

## 2022-05-23 NOTE — DIETITIAN INITIAL EVALUATION ADULT - PERTINENT MEDS FT
MEDICATIONS  (STANDING):  ascorbic acid 500 milliGRAM(s) Oral daily  atorvastatin 80 milliGRAM(s) Oral at bedtime  ferrous    sulfate 325 milliGRAM(s) Oral daily  finasteride 5 milliGRAM(s) Oral daily  melatonin 5 milliGRAM(s) Oral at bedtime  metoprolol succinate ER 50 milliGRAM(s) Oral daily  pantoprazole    Tablet 40 milliGRAM(s) Oral before breakfast  ranolazine 500 milliGRAM(s) Oral two times a day  sodium chloride 0.9%. 1000 milliLiter(s) (65 mL/Hr) IV Continuous <Continuous>  tamsulosin 0.4 milliGRAM(s) Oral at bedtime    MEDICATIONS  (PRN):  acetaminophen     Tablet .. 650 milliGRAM(s) Oral every 6 hours PRN Temp greater or equal to 38C (100.4F), Mild Pain (1 - 3)  aluminum hydroxide/magnesium hydroxide/simethicone Suspension 30 milliLiter(s) Oral every 4 hours PRN Dyspepsia  ondansetron Injectable 4 milliGRAM(s) IV Push every 8 hours PRN Nausea and/or Vomiting

## 2022-05-23 NOTE — DIETITIAN INITIAL EVALUATION ADULT - NAME AND PHONE
Codi Brothers RDN, CDN, [facility]      [phone]   [email or web address]

## 2022-05-24 LAB
ANION GAP SERPL CALC-SCNC: 6 MMOL/L — SIGNIFICANT CHANGE UP (ref 5–17)
BUN SERPL-MCNC: 37 MG/DL — HIGH (ref 7–23)
CALCIUM SERPL-MCNC: 7.9 MG/DL — LOW (ref 8.5–10.1)
CHLORIDE SERPL-SCNC: 100 MMOL/L — SIGNIFICANT CHANGE UP (ref 96–108)
CO2 SERPL-SCNC: 27 MMOL/L — SIGNIFICANT CHANGE UP (ref 22–31)
CREAT SERPL-MCNC: 1.36 MG/DL — HIGH (ref 0.5–1.3)
EGFR: 50 ML/MIN/1.73M2 — LOW
GLUCOSE SERPL-MCNC: 110 MG/DL — HIGH (ref 70–99)
HCT VFR BLD CALC: 22.6 % — LOW (ref 39–50)
HGB BLD-MCNC: 7.2 G/DL — LOW (ref 13–17)
MCHC RBC-ENTMCNC: 28.3 PG — SIGNIFICANT CHANGE UP (ref 27–34)
MCHC RBC-ENTMCNC: 31.9 GM/DL — LOW (ref 32–36)
MCV RBC AUTO: 89 FL — SIGNIFICANT CHANGE UP (ref 80–100)
PLATELET # BLD AUTO: 344 K/UL — SIGNIFICANT CHANGE UP (ref 150–400)
POTASSIUM SERPL-MCNC: 3.9 MMOL/L — SIGNIFICANT CHANGE UP (ref 3.5–5.3)
POTASSIUM SERPL-SCNC: 3.9 MMOL/L — SIGNIFICANT CHANGE UP (ref 3.5–5.3)
RBC # BLD: 2.54 M/UL — LOW (ref 4.2–5.8)
RBC # FLD: 17 % — HIGH (ref 10.3–14.5)
SODIUM SERPL-SCNC: 133 MMOL/L — LOW (ref 135–145)
WBC # BLD: 8.89 K/UL — SIGNIFICANT CHANGE UP (ref 3.8–10.5)
WBC # FLD AUTO: 8.89 K/UL — SIGNIFICANT CHANGE UP (ref 3.8–10.5)

## 2022-05-24 PROCEDURE — 99232 SBSQ HOSP IP/OBS MODERATE 35: CPT

## 2022-05-24 PROCEDURE — 73502 X-RAY EXAM HIP UNI 2-3 VIEWS: CPT | Mod: 26,LT

## 2022-05-24 RX ORDER — ASPIRIN/CALCIUM CARB/MAGNESIUM 324 MG
81 TABLET ORAL DAILY
Refills: 0 | Status: DISCONTINUED | OUTPATIENT
Start: 2022-05-24 | End: 2022-05-29

## 2022-05-24 RX ORDER — HEPARIN SODIUM 5000 [USP'U]/ML
5000 INJECTION INTRAVENOUS; SUBCUTANEOUS EVERY 12 HOURS
Refills: 0 | Status: DISCONTINUED | OUTPATIENT
Start: 2022-05-24 | End: 2022-05-28

## 2022-05-24 RX ORDER — CLOPIDOGREL BISULFATE 75 MG/1
75 TABLET, FILM COATED ORAL DAILY
Refills: 0 | Status: DISCONTINUED | OUTPATIENT
Start: 2022-05-24 | End: 2022-05-29

## 2022-05-24 RX ADMIN — HEPARIN SODIUM 5000 UNIT(S): 5000 INJECTION INTRAVENOUS; SUBCUTANEOUS at 21:58

## 2022-05-24 RX ADMIN — ONDANSETRON 4 MILLIGRAM(S): 8 TABLET, FILM COATED ORAL at 09:19

## 2022-05-24 RX ADMIN — CLOPIDOGREL BISULFATE 75 MILLIGRAM(S): 75 TABLET, FILM COATED ORAL at 14:27

## 2022-05-24 RX ADMIN — FINASTERIDE 5 MILLIGRAM(S): 5 TABLET, FILM COATED ORAL at 09:19

## 2022-05-24 RX ADMIN — Medication 500 MILLIGRAM(S): at 09:19

## 2022-05-24 RX ADMIN — Medication 325 MILLIGRAM(S): at 09:19

## 2022-05-24 RX ADMIN — Medication 5 MILLIGRAM(S): at 21:58

## 2022-05-24 RX ADMIN — RANOLAZINE 500 MILLIGRAM(S): 500 TABLET, FILM COATED, EXTENDED RELEASE ORAL at 21:58

## 2022-05-24 RX ADMIN — RANOLAZINE 500 MILLIGRAM(S): 500 TABLET, FILM COATED, EXTENDED RELEASE ORAL at 09:19

## 2022-05-24 RX ADMIN — ATORVASTATIN CALCIUM 80 MILLIGRAM(S): 80 TABLET, FILM COATED ORAL at 21:58

## 2022-05-24 RX ADMIN — Medication 81 MILLIGRAM(S): at 14:27

## 2022-05-24 RX ADMIN — PANTOPRAZOLE SODIUM 40 MILLIGRAM(S): 20 TABLET, DELAYED RELEASE ORAL at 06:02

## 2022-05-24 RX ADMIN — TAMSULOSIN HYDROCHLORIDE 0.4 MILLIGRAM(S): 0.4 CAPSULE ORAL at 21:58

## 2022-05-24 NOTE — PROGRESS NOTE ADULT - SUBJECTIVE AND OBJECTIVE BOX
CC:  Patient is a 89y old  Male who presents with a chief complaint of diarrhea (23 May 2022 16:54)    SUBJECTIVE:     -no new complaints or issues at current time.    ROS:  all other review of systems are negative unless indicated above.    acetaminophen     Tablet .. 650 milliGRAM(s) Oral every 6 hours PRN  aluminum hydroxide/magnesium hydroxide/simethicone Suspension 30 milliLiter(s) Oral every 4 hours PRN  ascorbic acid 500 milliGRAM(s) Oral daily  atorvastatin 80 milliGRAM(s) Oral at bedtime  ferrous    sulfate 325 milliGRAM(s) Oral daily  finasteride 5 milliGRAM(s) Oral daily  melatonin 5 milliGRAM(s) Oral at bedtime  metoprolol succinate ER 50 milliGRAM(s) Oral daily  ondansetron Injectable 4 milliGRAM(s) IV Push every 8 hours PRN  pantoprazole    Tablet 40 milliGRAM(s) Oral before breakfast  ranolazine 500 milliGRAM(s) Oral two times a day  sodium chloride 0.9%. 1000 milliLiter(s) IV Continuous <Continuous>  tamsulosin 0.4 milliGRAM(s) Oral at bedtime    T(C): 36.6 (05-24-22 @ 07:57), Max: 36.9 (05-23-22 @ 23:15)  HR: 69 (05-24-22 @ 07:57) (69 - 79)  BP: 106/45 (05-24-22 @ 07:57) (103/42 - 115/54)  RR: 16 (05-24-22 @ 07:57) (16 - 18)  SpO2: 97% (05-24-22 @ 07:57) (97% - 100%)    Constitutional: NAD.   HEENT: PERRL, EOMI, MMM.  Neck: Soft and supple, No carotid bruit, No JVD  Respiratory: Breath sounds are clear bilaterally, No wheezing, rales or rhonchi  Cardiovascular: S1 and S2, regular rate and rhythm, no murmur, rub or gallop.  Gastrointestinal: Bowel Sounds present, soft, nontender, nondistended, no guarding, no rebound, no mass.  Extremities: No peripheral edema  Vascular: 2+ peripheral pulses  Neurological: A/O x , no focal deficits  Musculoskeletal: 5/5 strength b/l upper and lower extremities  Skin:  no visible rashes.                         7.2    8.89  )-----------( 344      ( 24 May 2022 08:53 )             22.6     Occult Blood, Feces: Negative (05.21.22 @ 21:15) `    05-24    133<L>  |  100  |  37<H>  ----------------------------<  110<H>  3.9   |  27  |  1.36<H>    Ca    7.9<L>      24 May 2022 08:53    Culture Results:   GI PCR Results: NOT detected   Clostridium difficile Toxin by PCR (05.21.22 @ 11:17)   C Diff by PCR Result: NotDetec     < from: Xray Chest 1 View- PORTABLE-Urgent (05.21.22 @ 12:04) >  IMPRESSION:   No radiographic evidence of active chest disease.    < end of copied text >

## 2022-05-24 NOTE — PROGRESS NOTE ADULT - ASSESSMENT
89M.  presented from [facility] to  ED on 05/21/2022 c/o diarrhea and discomfort at Trevino catheter site.    diarrhea.  - hx of recent ABx use.  - resolved.  - GI PCR:  negative.  - C. dif. PCR:  negative.    anemia - chronic.  - stool OB:  negative.  - Hbg 7.2.  - type and screen and repeat HH.  - will consider transfusion if HH drops further.    L hip deformity.  - hx R AZAM.  - denied knowledge of recent fall.  no pain.  - stat L hip xray.  - further recommendations pending above.    pressure ulcers - POA.  - LWC.  - wound care RN.    pyuria.  hx urinary retention-chronic Trevino, BPH and obstructive uropathy.  - hx MRSA UTI and ureteritis.  - Trevino catheter changed in ED.  - UCx:  no significant growth.  - Flomax.  - ID.    DESIRAE upon CKD-3.  - prerenal.  - Cr normalized to baseline (1.4).  - DC IVFs.  - continue to hold Lasix for now, will consider resuming tomorrow.  - BMP in AM.    hx HFpEF.  - compensated.  - BB.  - holding Lasix.    hx CAD - CABG.  - AP + BB + ST.    other chronic stable medical issues:  HFpEF; AAA- repaired;  HTN.  - continue medical management.    DVT prophylaxis.  - UFH sq.    AD.  - no limits set at current time.    disposition.  - 2W.    communication.  - 2W RN.  - 2W CM.  - 05/22 patient's son:  update given and case discussed.    89M.  presented from [facility] to  ED on 05/21/2022 c/o diarrhea and discomfort at Trevino catheter site.    diarrhea.  - hx of recent ABx use.  - resolved.  - GI PCR:  negative.  - C. dif. PCR:  negative.    anemia - chronic.  - multifactorial.  - stool OB:  negative.  - Hbg 7.2.  - type and screen and repeat HH.  - will consider transfusion if HH drops further.    L hip deformity.  - hx R AZAM.  - denied knowledge of recent fall.  no pain.  - stat L hip xray.  - further recommendations pending above.    pressure ulcers - POA.  - LWC.  - optimize nutrition.  - wound care RN.    severe-protein malnutrition.  - liberalize diet to regular.  - ensure Enlive 8oz tid.  - encourage PO.  - daily weight.  - nutritionist.    pyuria.  hx urinary retention-chronic Trevino, BPH and obstructive uropathy.  - hx MRSA UTI and ureteritis.  - Trevino catheter changed in ED.  - UCx:  no significant growth.  - Flomax.  - ID.    DESIRAE upon CKD-3.  - prerenal.  - Cr normalized to baseline (1.4).  - DC IVFs.  - continue to hold Lasix for now, will consider resuming tomorrow.  - BMP in AM.    hx HFpEF.  - compensated.  - BB.  - holding Lasix.    hx CAD - CABG.  - AP + BB + ST.    other chronic stable medical issues:  HFpEF; AAA- repaired;  HTN.  - continue medical management.    DVT prophylaxis.  - UFH sq.    AD.  - no limits set at current time.    disposition.  - 2W.    communication.  - 2W RN.  - 2W CM.  - 05/22 patient's son:  update given and case discussed.    89M.  presented from [facility] to  ED on 05/21/2022 c/o diarrhea and discomfort at Trevino catheter site.    diarrhea.  - hx of recent ABx use.  - resolved.  - GI PCR:  negative.  - C. dif. PCR:  negative.    anemia - chronic.  - multifactorial:  dilutional;  malnutrition.  - stool OB:  negative.  - Hbg 7.2.  - type and screen and repeat HH.  - will consider transfusion if HH drops further.  - Fe + vitamine C.    L hip deformity.  - hx R AZAM.  - denied knowledge of recent fall.  no pain.  - stat L hip xray.  - further recommendations pending above.    pressure ulcers - POA.  - LWC.  - optimize nutrition.  - wound care RN.    severe-protein malnutrition.  - liberalize diet to regular.  - ensure Enlive 8oz tid.  - encourage PO.  - daily weight.  - nutritionist.    pyuria.  hx urinary retention-chronic Trevino, BPH and obstructive uropathy.  - hx MRSA UTI and ureteritis.  - Trevino catheter changed in ED.  - UCx:  no significant growth.  - Flomax.  - ID.    DESIRAE upon CKD-3.  - prerenal.  - Cr normalized to baseline (1.4).  - DC IVFs.  - continue to hold Lasix for now, will consider resuming tomorrow.  - BMP in AM.    hx HFpEF.  - compensated.  - BB.  - holding Lasix.    hx CAD - CABG.  - AP + BB + ST.    other chronic stable medical issues:  HFpEF; AAA- repaired;  HTN.  - continue medical management.    DVT prophylaxis.  - UFH sq.    AD.  - no limits set at current time.    disposition.  - 2W.    communication.  - 2W RN.  - 2W CM.  - 05/22 patient's son:  update given and case discussed.

## 2022-05-25 LAB
ANION GAP SERPL CALC-SCNC: 4 MMOL/L — LOW (ref 5–17)
BUN SERPL-MCNC: 34 MG/DL — HIGH (ref 7–23)
CALCIUM SERPL-MCNC: 8.2 MG/DL — LOW (ref 8.5–10.1)
CHLORIDE SERPL-SCNC: 99 MMOL/L — SIGNIFICANT CHANGE UP (ref 96–108)
CO2 SERPL-SCNC: 27 MMOL/L — SIGNIFICANT CHANGE UP (ref 22–31)
CREAT SERPL-MCNC: 1.37 MG/DL — HIGH (ref 0.5–1.3)
EGFR: 49 ML/MIN/1.73M2 — LOW
GLUCOSE SERPL-MCNC: 103 MG/DL — HIGH (ref 70–99)
HCT VFR BLD CALC: 23.9 % — LOW (ref 39–50)
HGB BLD-MCNC: 7.8 G/DL — LOW (ref 13–17)
MCHC RBC-ENTMCNC: 28.6 PG — SIGNIFICANT CHANGE UP (ref 27–34)
MCHC RBC-ENTMCNC: 32.6 GM/DL — SIGNIFICANT CHANGE UP (ref 32–36)
MCV RBC AUTO: 87.5 FL — SIGNIFICANT CHANGE UP (ref 80–100)
PLATELET # BLD AUTO: 362 K/UL — SIGNIFICANT CHANGE UP (ref 150–400)
POTASSIUM SERPL-MCNC: 4.8 MMOL/L — SIGNIFICANT CHANGE UP (ref 3.5–5.3)
POTASSIUM SERPL-SCNC: 4.8 MMOL/L — SIGNIFICANT CHANGE UP (ref 3.5–5.3)
RBC # BLD: 2.73 M/UL — LOW (ref 4.2–5.8)
RBC # FLD: 16.9 % — HIGH (ref 10.3–14.5)
SODIUM SERPL-SCNC: 130 MMOL/L — LOW (ref 135–145)
WBC # BLD: 9.37 K/UL — SIGNIFICANT CHANGE UP (ref 3.8–10.5)
WBC # FLD AUTO: 9.37 K/UL — SIGNIFICANT CHANGE UP (ref 3.8–10.5)

## 2022-05-25 PROCEDURE — 99232 SBSQ HOSP IP/OBS MODERATE 35: CPT

## 2022-05-25 PROCEDURE — 74176 CT ABD & PELVIS W/O CONTRAST: CPT | Mod: 26

## 2022-05-25 RX ADMIN — RANOLAZINE 500 MILLIGRAM(S): 500 TABLET, FILM COATED, EXTENDED RELEASE ORAL at 09:51

## 2022-05-25 RX ADMIN — Medication 325 MILLIGRAM(S): at 09:51

## 2022-05-25 RX ADMIN — HEPARIN SODIUM 5000 UNIT(S): 5000 INJECTION INTRAVENOUS; SUBCUTANEOUS at 09:51

## 2022-05-25 RX ADMIN — PANTOPRAZOLE SODIUM 40 MILLIGRAM(S): 20 TABLET, DELAYED RELEASE ORAL at 05:46

## 2022-05-25 RX ADMIN — TAMSULOSIN HYDROCHLORIDE 0.4 MILLIGRAM(S): 0.4 CAPSULE ORAL at 20:48

## 2022-05-25 RX ADMIN — ATORVASTATIN CALCIUM 80 MILLIGRAM(S): 80 TABLET, FILM COATED ORAL at 20:48

## 2022-05-25 RX ADMIN — RANOLAZINE 500 MILLIGRAM(S): 500 TABLET, FILM COATED, EXTENDED RELEASE ORAL at 20:48

## 2022-05-25 RX ADMIN — Medication 5 MILLIGRAM(S): at 20:48

## 2022-05-25 RX ADMIN — CLOPIDOGREL BISULFATE 75 MILLIGRAM(S): 75 TABLET, FILM COATED ORAL at 09:51

## 2022-05-25 RX ADMIN — HEPARIN SODIUM 5000 UNIT(S): 5000 INJECTION INTRAVENOUS; SUBCUTANEOUS at 20:47

## 2022-05-25 RX ADMIN — Medication 81 MILLIGRAM(S): at 09:51

## 2022-05-25 RX ADMIN — Medication 500 MILLIGRAM(S): at 09:51

## 2022-05-25 RX ADMIN — FINASTERIDE 5 MILLIGRAM(S): 5 TABLET, FILM COATED ORAL at 09:51

## 2022-05-25 NOTE — CHART NOTE - NSCHARTNOTEFT_GEN_A_CORE
Clinical Nutrition Follow Up Note:    *  88 y/o M with PMH HTN, CAD s/p CABG, AAA s/p repair, CKD Stage 3, CHFpEF, insomnia, obstructive uropathy, BPH, Dz reflux sent from SNF for evaluation of diarrhea. Pt is poor historian due to hearing impairment and  Dementia. Reports that developed diarrhea overnight, not sure how many BMs had, as wearing diapers. He denies fevers or chills, has poor appetite but not new.    Current Status:  Pt w/improved tootie/po intake: oatmeal breakfast, OJ, drinking Ensure 1-3/day; denies diarrhea today. Previous RD note 5/23/22 dx severe PCM wt: 141#. Wt 5/23/22: 132#. IBW: 48#+/-10%. Encouraged po intake. See below recommendations.     *Wt Hx:    Height (cm): 170.2 (05-21-22 @ 11:12), 170.2 (05-08-22 @ 15:50), 170.2 (05-01-22 @ 08:17)  Weight (kg): 65.8 (05-08-22 @ 15:50)  BMI (kg/m2): 22.7 (05-21-22 @ 11:12), 22.7 (05-08-22 @ 15:50)  BSA (m2): 1.76 (05-21-22 @ 11:12), 1.76 (05-08-22 @ 15:50)    *Labs Reviewed:  05-25    130<L>  |  99  |  34<H>  ----------------------------<  103<H>  4.8   |  27  |  1.37<H>    Ca    8.2<L>      25 May 2022 09:42        BMI: BMI (kg/m2): 22.7 (05-21-22 @ 11:12)  HbA1c: A1C with Estimated Average Glucose Result: 5.3 % (05-09-22 @ 07:58)    Glucose:   BP: 108/46 (05-25-22 @ 07:56) (79/24 - 115/54)  Lipid Panel: Date/Time: 12-18-21 @ 05:29  Cholesterol, Serum: 101  Direct LDL: --  HDL Cholesterol, Serum: 40  Total Cholesterol/HDL Ration Measurement: --  Triglycerides, Serum: 77      *pertinent meds:  MEDICATIONS  (STANDING):  ascorbic acid 500 milliGRAM(s) Oral daily  aspirin enteric coated 81 milliGRAM(s) Oral daily  atorvastatin 80 milliGRAM(s) Oral at bedtime  clopidogrel Tablet 75 milliGRAM(s) Oral daily  ferrous    sulfate 325 milliGRAM(s) Oral daily  finasteride 5 milliGRAM(s) Oral daily  heparin   Injectable 5000 Unit(s) SubCutaneous every 12 hours  melatonin 5 milliGRAM(s) Oral at bedtime  metoprolol succinate ER 50 milliGRAM(s) Oral daily  pantoprazole    Tablet 40 milliGRAM(s) Oral before breakfast  ranolazine 500 milliGRAM(s) Oral two times a day  tamsulosin 0.4 milliGRAM(s) Oral at bedtime    MEDICATIONS  (PRN):  acetaminophen     Tablet .. 650 milliGRAM(s) Oral every 6 hours PRN Temp greater or equal to 38C (100.4F), Mild Pain (1 - 3)  aluminum hydroxide/magnesium hydroxide/simethicone Suspension 30 milliLiter(s) Oral every 4 hours PRN Dyspepsia  ondansetron Injectable 4 milliGRAM(s) IV Push every 8 hours PRN Nausea and/or Vomiting      *I and O's:    05-24-22 @ 07:01  -  05-25-22 @ 07:00  --------------------------------------------------------  IN:  Total IN: 0 mL    OUT:    Indwelling Catheter - Urethral (mL): 1000 mL  Total OUT: 1000 mL    Total NET: -1000 mL          *(+) BM on 5/24: pt reports no diarrhea however fecal incont per chart    *alicia score of  :13   Pressure Injury 1: sacrum, Unstageable  Pressure Injury 2: Left:,heel, Unstageable  edema .none    *PO intake, meeting ~75% % of estimated nutr needs.    *Malnutrition dx: 5/23/22:  Pt meets criteria for severe protein-calorie malnutrition in context of chronic disease  Inadequate PO intake 2/2 hx of CHF, CKD, diarrhea, AEB muscle wasting, fat wasting    Diet, Regular:   Supplement Feeding Modality:  Oral  Ensure Enlive Cans or Servings Per Day:  3       Frequency:  Three Times a day (05-24-22 @ 11:58) [Active]            Estimated Needs: Based on 60Kg current wt 5/23/22   Calories: 1500 - 1800 Kcal (25 - 30 Kcal/Kg)  Protein: 108 - 120 g (1.8-2.0 g/Kg)  Fluids:  1500 - 1800 mL (25-30 mL/Kg)        Recommendations:  1. Encourage po intake of REgular diet plus Ensure Enlive Vanilla or STrawberry TID  2. Add Gelatein BID  3. Add thiamine 100 mg daily 2/2 poor PO intake/ malnutrition   4. Add Vit C 500 mg BID, add Zinc Sulfate 220 mg x 10 days to promote wound healing.   5. Add MVI w/ minerals daily to ensure 100% RDA met   6. RDN will continue to monitor PO intake, labs, hydration, and wt prn.     Leticia Guerra, MS, RDN, CDN, FAND [phone]

## 2022-05-25 NOTE — CONSULT NOTE ADULT - SUBJECTIVE AND OBJECTIVE BOX
HPI:  89 y/o M with PMH HTN, CAD s/p CABG, AAA s/p repair, CKD Stage 3, CHFpEF, insomnia, obstructive uropathy, BPH, Dz reflux sent from SNF for evaluation of diarrhea. Pt is poor historian due to hearing impairment and  Dementia. Reports that developed diarrhea overnight, not sure how many BMs had, as wearing diapers. He denies fevers or chills, has poor appetite but not new. No abd pain or cramping. Pt reports that his wife also is with loose stools for a few days.   In ED: labs with elevated WBCs, elevated BUN/CR.  Pt was given 1000ml IVF bolus and PO VAnco x 1.   During assessment with RN Pt had large dark, tarry stool  (21 May 2022 14:32)      PAST MEDICAL & SURGICAL HISTORY:  HTN (hypertension)      AAA (abdominal aortic aneurysm)      CAD (coronary artery disease)      Stage 3 chronic kidney disease      History of CHF (congestive heart failure)      BPH (benign prostatic hyperplasia)      H/O urinary retention      S/P CABG (coronary artery bypass graft)      History of right hip replacement      CAD (coronary artery disease)  s/p stent placed      S/P left inguinal hernia repair      S/P AAA repair  7-          MEDICATIONS  (STANDING):  ascorbic acid 500 milliGRAM(s) Oral daily  aspirin enteric coated 81 milliGRAM(s) Oral daily  atorvastatin 80 milliGRAM(s) Oral at bedtime  clopidogrel Tablet 75 milliGRAM(s) Oral daily  ferrous    sulfate 325 milliGRAM(s) Oral daily  finasteride 5 milliGRAM(s) Oral daily  heparin   Injectable 5000 Unit(s) SubCutaneous every 12 hours  melatonin 5 milliGRAM(s) Oral at bedtime  metoprolol succinate ER 50 milliGRAM(s) Oral daily  pantoprazole    Tablet 40 milliGRAM(s) Oral before breakfast  ranolazine 500 milliGRAM(s) Oral two times a day  tamsulosin 0.4 milliGRAM(s) Oral at bedtime    MEDICATIONS  (PRN):  acetaminophen     Tablet .. 650 milliGRAM(s) Oral every 6 hours PRN Temp greater or equal to 38C (100.4F), Mild Pain (1 - 3)  aluminum hydroxide/magnesium hydroxide/simethicone Suspension 30 milliLiter(s) Oral every 4 hours PRN Dyspepsia  ondansetron Injectable 4 milliGRAM(s) IV Push every 8 hours PRN Nausea and/or Vomiting      Allergies    Broccoli (Unknown)  penicillin (Vomiting; Nausea)    Intolerances        SOCIAL HISTORY:    FAMILY HISTORY:  FH: hypertension (Mother)    Family history of hyperglycemia (Father)     Non-contributory    REVIEW OF SYSTEMS      General:	    Respiratory and Thorax:  	  Cardiovascular:	    Gastrointestinal:	    Musculoskeletal:	   Vital Signs Last 24 Hrs  T(C): 36.3 (25 May 2022 07:56), Max: 36.3 (25 May 2022 07:56)  T(F): 97.4 (25 May 2022 07:56), Max: 97.4 (25 May 2022 07:56)  HR: 78 (25 May 2022 07:56) (74 - 78)  BP: 108/46 (25 May 2022 07:56) (92/38 - 109/49)  BP(mean): --  RR: 18 (25 May 2022 07:56) (18 - 19)  SpO2: 92% (25 May 2022 07:56) (92% - 96%)    HEENT :No Pallor.No icterus. EOMI,PERLAA  Chest : Clear to Auscultation  CVS : S1S2 Normal.No murmurs.  Abdomen: Soft.Non tender .Normal bowel sounds.No Organomegaly.  CNS: .No focal deficit.  EXT: Normal Range of motion.No pitting edema.    LABS:                        7.8    9.37  )-----------( 362      ( 25 May 2022 09:42 )             23.9     05-25    130<L>  |  99  |  34<H>  ----------------------------<  103<H>  4.8   |  27  |  1.37<H>    Ca    8.2<L>      25 May 2022 09:42            RADIOLOGY & ADDITIONAL STUDIES:

## 2022-05-25 NOTE — PHYSICAL THERAPY INITIAL EVALUATION ADULT - DISCHARGE DISPOSITION, PT EVAL
vs resume HHA at [facility]? (pt likely requires close to fulltime/total care at this time other than feeding)/extended care facility/rehabilitation facility

## 2022-05-25 NOTE — ADVANCED PRACTICE NURSE CONSULT - RECOMMEDATIONS
1)Continue to Elevate heels off of Mattress  2)Continue to Turn and position every 2 Hours  3)Consult Dietitian   4)Sacrum: Apply Triad Cream with every incontinent episode, and cover with a foam   5)Left Heel and Right heel apply Betadine to gauze and wrap with Kerlix daily.   6) Apply Bilateral Total care boot with plastic removed to bilateral feet.   7) Maintain Air Mattress.   8) Maintain 1 purple pad under patient

## 2022-05-25 NOTE — PROGRESS NOTE ADULT - SUBJECTIVE AND OBJECTIVE BOX
CC:  Patient is a 89y old  Male who presents with a chief complaint of diarrhea (25 May 2022 14:15)    SUBJECTIVE:     -no new complaints or issues at current time.    ROS:  all other review of systems are negative unless indicated above.    acetaminophen     Tablet .. 650 milliGRAM(s) Oral every 6 hours PRN  aluminum hydroxide/magnesium hydroxide/simethicone Suspension 30 milliLiter(s) Oral every 4 hours PRN  ascorbic acid 500 milliGRAM(s) Oral daily  aspirin enteric coated 81 milliGRAM(s) Oral daily  atorvastatin 80 milliGRAM(s) Oral at bedtime  clopidogrel Tablet 75 milliGRAM(s) Oral daily  ferrous    sulfate 325 milliGRAM(s) Oral daily  finasteride 5 milliGRAM(s) Oral daily  heparin   Injectable 5000 Unit(s) SubCutaneous every 12 hours  melatonin 5 milliGRAM(s) Oral at bedtime  metoprolol succinate ER 50 milliGRAM(s) Oral daily  ondansetron Injectable 4 milliGRAM(s) IV Push every 8 hours PRN  pantoprazole    Tablet 40 milliGRAM(s) Oral before breakfast  ranolazine 500 milliGRAM(s) Oral two times a day  tamsulosin 0.4 milliGRAM(s) Oral at bedtime    T(C): 36.3 (05-25-22 @ 07:56), Max: 36.3 (05-25-22 @ 07:56)  HR: 78 (05-25-22 @ 07:56) (74 - 78)  BP: 108/46 (05-25-22 @ 07:56) (92/38 - 109/49)  RR: 18 (05-25-22 @ 07:56) (18 - 19)  SpO2: 92% (05-25-22 @ 07:56) (92% - 96%)    Constitutional: NAD.   HEENT: PERRL, EOMI, MMM.  Neck: Soft and supple, No carotid bruit, No JVD  Respiratory: Breath sounds are clear bilaterally, No wheezing, rales or rhonchi  Cardiovascular: S1 and S2, regular rate and rhythm, no murmur, rub or gallop.  Gastrointestinal: Bowel Sounds present, soft, nontender, nondistended, no guarding, no rebound, no mass.  Extremities: No peripheral edema  Vascular: 2+ peripheral pulses  Neurological: A/O x , no focal deficits  Musculoskeletal: 5/5 strength b/l upper and lower extremities  Skin:  no visible rashes.                         7.8    9.37  )-----------( 362      ( 25 May 2022 09:42 )             23.9       05-25    130<L>  |  99  |  34<H>  ----------------------------<  103<H>  4.8   |  27  |  1.37<H>    Ca    8.2<L>      25 May 2022 09:42    `    < from: Xray Hip w/ Pelvis 2 or 3 Views, Left (05.24.22 @ 12:43) >  IMPRESSION:  No acute radiographic findings, if there is continued clinical concern   follow-up CT can be ordered    < end of copied text >    < from: Xray Chest 1 View- PORTABLE-Urgent (05.21.22 @ 12:04) >  IMPRESSION:   No radiographic evidence of active chest disease.    < end of copied text >

## 2022-05-25 NOTE — ADVANCED PRACTICE NURSE CONSULT - ASSESSMENT
This is a 89 year old male admitted to the hospital on 5/8/2022 for admitting diagnosis of UTI. PMHx: CHF, BPH, CKD, HTN.   In to see patient on [address].    Recommendation based on patients presentation, History, T[place] and current [facility]   Sacral Wound: 8.4cm x 7cm x 0.2cm with Left Superior Sacrum 1.8cm x 1.3cm unknown depth % yellow Slough. Whole area from Coccyx to superior sacrum with Erythema, denuded and Hyperpigmented. This can be classified as Incontinent associated dermatitis with partial thickness skin loss and an unstageable pressure injury present on admission. Patient is incontinent of stool does have an Indwelling catheter.  This richa skin on sacrum is macerated and denuded, so for this time remove foam and applied Triad Cream to whole area and placed 1 purple pad under patient.   Noted Multiple Ecchymotic areas on Bilateral Lower Extremities and Upper Extremities. Left Heel with 1.5cm x 1.2cmx 0.1cm with 100% eschar noted  applying Betadine gauze and Kerlix to wound. This can be classified as an unstagable pressure injury. Right Heel with 1.4cm x 2 cm x unknown % eschar noted. Betadine to gauze applied and wrapped with Kerlix  and placing total carer boot to BL Lower Feet (hard plastic removed).   Bilateral Lower extremity with Hemosiderin Staining and ecchymosis

## 2022-05-25 NOTE — PHYSICAL THERAPY INITIAL EVALUATION ADULT - PERTINENT HX OF CURRENT PROBLEM, REHAB EVAL
presented from [facility] (per pt TERESITA) to  ED on 05/21/2022 c/o diarrhea and discomfort at Trevino catheter site. C. diff. PCR:  negative. anemia-chronic. H/H 7.8/23.9 (up from 7.2) XR L hip done d/t "deformity"- neg.

## 2022-05-25 NOTE — PROGRESS NOTE ADULT - ASSESSMENT
89M.  presented from [facility] to  ED on 05/21/2022 c/o diarrhea and discomfort at Trevino catheter site.    diarrhea.  - hx of recent ABx use.  - water stool continues.  - GI PCR:  negative.  - C. dif. PCR:  negative.  - GI consult.    anemia - chronic.  - multifactorial:  dilutional;  malnutrition.  - stool OB:  negative.  - Hbg trending upward, 7.8.  - consider transfusion PRN.  - Fe + vitamine C.    L hip ecchymosis.  - hx R AZAM.  - denied knowledge of recent fall.  no pain.  - L hip xray:  negative.  - further recommendations pending above.    pressure ulcers - POA.  - LWC.  - optimize nutrition.  - wound care RN.    severe-protein malnutrition.  - liberalize diet to regular.  - ensure Enlive 8oz tid.  - encourage PO.  - daily weight.  - nutritionist.    pyuria.  hx urinary retention-chronic Trevino, BPH and obstructive uropathy.  - hx MRSA UTI and ureteritis.  - Trevino catheter changed in ED.  - UCx:  no significant growth.  - Flomax.  - observing off ABx.  - ID.    DESIRAE upon CKD-3.  - prerenal.  - Cr normalized to baseline (1.4).  - IVF:  discontinued.  - continue to hold Lasix for now, consider resuming tomorrow.  - BMP in AM.    hx HFpEF.  - compensated.  - BB.  - holding Lasix.    hx CAD - CABG.  - AP + BB + ST.    debility.  - PT.    other chronic stable medical issues:  HFpEF; AAA- repaired;  HTN.  - continue medical management.    DVT prophylaxis.  - UFH sq.    AD.  - no limits set at current time.    disposition.  - 2W.    communication.  - 2W RN.  - 2W assistant RN manager.  - 2W CM.  - 05/22;  05/23 patient's son:  update given and case discussed.

## 2022-05-26 LAB
ANION GAP SERPL CALC-SCNC: 5 MMOL/L — SIGNIFICANT CHANGE UP (ref 5–17)
BUN SERPL-MCNC: 34 MG/DL — HIGH (ref 7–23)
CALCIUM SERPL-MCNC: 7.9 MG/DL — LOW (ref 8.5–10.1)
CHLORIDE SERPL-SCNC: 96 MMOL/L — SIGNIFICANT CHANGE UP (ref 96–108)
CO2 SERPL-SCNC: 27 MMOL/L — SIGNIFICANT CHANGE UP (ref 22–31)
CREAT SERPL-MCNC: 1.38 MG/DL — HIGH (ref 0.5–1.3)
EGFR: 49 ML/MIN/1.73M2 — LOW
GLUCOSE SERPL-MCNC: 96 MG/DL — SIGNIFICANT CHANGE UP (ref 70–99)
POTASSIUM SERPL-MCNC: 5 MMOL/L — SIGNIFICANT CHANGE UP (ref 3.5–5.3)
POTASSIUM SERPL-SCNC: 5 MMOL/L — SIGNIFICANT CHANGE UP (ref 3.5–5.3)
SODIUM SERPL-SCNC: 128 MMOL/L — LOW (ref 135–145)

## 2022-05-26 PROCEDURE — 99232 SBSQ HOSP IP/OBS MODERATE 35: CPT

## 2022-05-26 RX ORDER — SODIUM CHLORIDE 9 MG/ML
1000 INJECTION INTRAMUSCULAR; INTRAVENOUS; SUBCUTANEOUS
Refills: 0 | Status: DISCONTINUED | OUTPATIENT
Start: 2022-05-26 | End: 2022-05-27

## 2022-05-26 RX ORDER — LOPERAMIDE HCL 2 MG
2 TABLET ORAL
Refills: 0 | Status: DISCONTINUED | OUTPATIENT
Start: 2022-05-26 | End: 2022-05-29

## 2022-05-26 RX ADMIN — ATORVASTATIN CALCIUM 80 MILLIGRAM(S): 80 TABLET, FILM COATED ORAL at 20:54

## 2022-05-26 RX ADMIN — HEPARIN SODIUM 5000 UNIT(S): 5000 INJECTION INTRAVENOUS; SUBCUTANEOUS at 20:54

## 2022-05-26 RX ADMIN — SODIUM CHLORIDE 80 MILLILITER(S): 9 INJECTION INTRAMUSCULAR; INTRAVENOUS; SUBCUTANEOUS at 20:55

## 2022-05-26 RX ADMIN — CLOPIDOGREL BISULFATE 75 MILLIGRAM(S): 75 TABLET, FILM COATED ORAL at 09:38

## 2022-05-26 RX ADMIN — Medication 500 MILLIGRAM(S): at 09:39

## 2022-05-26 RX ADMIN — SODIUM CHLORIDE 80 MILLILITER(S): 9 INJECTION INTRAMUSCULAR; INTRAVENOUS; SUBCUTANEOUS at 12:56

## 2022-05-26 RX ADMIN — Medication 50 MILLIGRAM(S): at 09:39

## 2022-05-26 RX ADMIN — PANTOPRAZOLE SODIUM 40 MILLIGRAM(S): 20 TABLET, DELAYED RELEASE ORAL at 05:44

## 2022-05-26 RX ADMIN — Medication 81 MILLIGRAM(S): at 09:39

## 2022-05-26 RX ADMIN — RANOLAZINE 500 MILLIGRAM(S): 500 TABLET, FILM COATED, EXTENDED RELEASE ORAL at 20:54

## 2022-05-26 RX ADMIN — SODIUM CHLORIDE 60 MILLILITER(S): 9 INJECTION INTRAMUSCULAR; INTRAVENOUS; SUBCUTANEOUS at 10:42

## 2022-05-26 RX ADMIN — Medication 325 MILLIGRAM(S): at 09:39

## 2022-05-26 RX ADMIN — TAMSULOSIN HYDROCHLORIDE 0.4 MILLIGRAM(S): 0.4 CAPSULE ORAL at 20:54

## 2022-05-26 RX ADMIN — FINASTERIDE 5 MILLIGRAM(S): 5 TABLET, FILM COATED ORAL at 09:39

## 2022-05-26 RX ADMIN — HEPARIN SODIUM 5000 UNIT(S): 5000 INJECTION INTRAVENOUS; SUBCUTANEOUS at 09:39

## 2022-05-26 RX ADMIN — Medication 2 MILLIGRAM(S): at 14:17

## 2022-05-26 RX ADMIN — RANOLAZINE 500 MILLIGRAM(S): 500 TABLET, FILM COATED, EXTENDED RELEASE ORAL at 09:39

## 2022-05-26 RX ADMIN — Medication 2 MILLIGRAM(S): at 20:54

## 2022-05-26 NOTE — PROGRESS NOTE ADULT - SUBJECTIVE AND OBJECTIVE BOX
Interval History:    MEDICATIONS  (STANDING):  ascorbic acid 500 milliGRAM(s) Oral daily  aspirin enteric coated 81 milliGRAM(s) Oral daily  atorvastatin 80 milliGRAM(s) Oral at bedtime  clopidogrel Tablet 75 milliGRAM(s) Oral daily  ferrous    sulfate 325 milliGRAM(s) Oral daily  finasteride 5 milliGRAM(s) Oral daily  heparin   Injectable 5000 Unit(s) SubCutaneous every 12 hours  loperamide 2 milliGRAM(s) Oral three times a day  melatonin 5 milliGRAM(s) Oral at bedtime  metoprolol succinate ER 50 milliGRAM(s) Oral daily  pantoprazole    Tablet 40 milliGRAM(s) Oral before breakfast  ranolazine 500 milliGRAM(s) Oral two times a day  sodium chloride 0.9%. 1000 milliLiter(s) (80 mL/Hr) IV Continuous <Continuous>  tamsulosin 0.4 milliGRAM(s) Oral at bedtime    MEDICATIONS  (PRN):  acetaminophen     Tablet .. 650 milliGRAM(s) Oral every 6 hours PRN Temp greater or equal to 38C (100.4F), Mild Pain (1 - 3)  aluminum hydroxide/magnesium hydroxide/simethicone Suspension 30 milliLiter(s) Oral every 4 hours PRN Dyspepsia  ondansetron Injectable 4 milliGRAM(s) IV Push every 8 hours PRN Nausea and/or Vomiting      Daily     Daily Weight in k.7 (26 May 2022 05:20)  BMI: 22.7 (05-21 @ 11:12)  Change in Weight:  Vital Signs Last 24 Hrs  T(C): 36 (26 May 2022 15:35), Max: 36.4 (25 May 2022 23:09)  T(F): 96.8 (26 May 2022 15:35), Max: 97.6 (25 May 2022 23:09)  HR: 69 (26 May 2022 15:35) (69 - 79)  BP: 102/62 (26 May 2022 15:35) (102/62 - 121/48)  BP(mean): --  RR: 18 (26 May 2022 15:35) (18 - 18)  SpO2: 100% (26 May 2022 15:35) (92% - 100%)  I&O's Detail    25 May 2022 07:01  -  26 May 2022 07:00  --------------------------------------------------------  IN:  Total IN: 0 mL    OUT:    Indwelling Catheter - Urethral (mL): 900 mL  Total OUT: 900 mL    Total NET: -900 mL      26 May 2022 07:01  -  26 May 2022 18:29  --------------------------------------------------------  IN:  Total IN: 0 mL    OUT:    Indwelling Catheter - Urethral (mL): 450 mL  Total OUT: 450 mL    Total NET: -450 mL          PHYSICAL EXAM  General:  no distress  HEENT:    Normal appearance of conjunctiva, ears, nose, lips, oropharynx, and oral mucosa, anicteric.  Abd: Soft Non tender  Lab Results:                        7.8    9.37  )-----------( 362      ( 25 May 2022 09:42 )             23.9     05-26    128<L>  |  96  |  34<H>  ----------------------------<  96  5.0   |  27  |  1.38<H>    Ca    7.9<L>      26 May 2022 17:11              Stool Results:          RADIOLOGY RESULTS:    SURGICAL PATHOLOGY:

## 2022-05-26 NOTE — PROGRESS NOTE ADULT - SUBJECTIVE AND OBJECTIVE BOX
Cheif complaints and Diagnosis: Diahrrea    Subjective: 3 episodes of diahrrea so far since last night, liquid; patient feels disappointed by ongoing diahrrea, fatigued      REVIEW OF SYSTEMS:    CONSTITUTIONAL: No weakness, fevers or chills  EYES/ENT: No visual changes;  No vertigo or throat pain   NECK: No pain or stiffness  RESPIRATORY: No cough, wheezing, hemoptysis; No shortness of breath  CARDIOVASCULAR: No chest pain or palpitations  GASTROINTESTINAL: No abdominal or epigastric pain. No nausea, vomiting, or hematemesis; No diarrhea or constipation. No melena or hematochezia.  GENITOURINARY: No dysuria, frequency or hematuria  NEUROLOGICAL: No numbness or weakness  SKIN: No itching, burning, rashes, or lesions   All other review of systems is negative unless indicated above      Vital Signs Last 24 Hrs  T(C): 36.4 (26 May 2022 07:30), Max: 36.8 (25 May 2022 15:13)  T(F): 97.6 (26 May 2022 07:30), Max: 98.2 (25 May 2022 15:13)  HR: 77 (26 May 2022 07:30) (77 - 82)  BP: 121/48 (26 May 2022 07:30) (107/45 - 124/53)  BP(mean): --  RR: 18 (26 May 2022 07:30) (18 - 18)  SpO2: 93% (26 May 2022 07:30) (92% - 96%)    HEENT:   pupils equal and reactive, EOMI, no oropharyngeal lesions, erythema, exudates, oral thrush    NECK:   supple, no carotid bruits, no palpable lymph nodes, no thyromegaly    CV:  +S1, +S2, regular, no murmurs or rubs    RESP:   lungs clear to auscultation bilaterally, no wheezing, rales, rhonchi, good air entry bilaterally    BREAST:  not examined    GI:  abdomen soft, non-tender, non-distended, normal BS, no bruits, no abdominal masses, no palpable masses    RECTAL:  not examined    :  not examined    MSK:   normal muscle tone, no atrophy, no rigidity, no contractions    EXT:   no clubbing, no cyanosis, no edema, no calf pain, swelling or erythema    VASCULAR:  pulses equal and symmetric in the upper and lower extremities    NEURO:  AAOX3, no focal neurological deficits, follows all commands, able to move extremities spontaneously    SKIN:  no ulcers, lesions or rashes    MEDICATIONS  (STANDING):  ascorbic acid 500 milliGRAM(s) Oral daily  aspirin enteric coated 81 milliGRAM(s) Oral daily  atorvastatin 80 milliGRAM(s) Oral at bedtime  clopidogrel Tablet 75 milliGRAM(s) Oral daily  ferrous    sulfate 325 milliGRAM(s) Oral daily  finasteride 5 milliGRAM(s) Oral daily  heparin   Injectable 5000 Unit(s) SubCutaneous every 12 hours  loperamide 2 milliGRAM(s) Oral three times a day  melatonin 5 milliGRAM(s) Oral at bedtime  metoprolol succinate ER 50 milliGRAM(s) Oral daily  pantoprazole    Tablet 40 milliGRAM(s) Oral before breakfast  ranolazine 500 milliGRAM(s) Oral two times a day  sodium chloride 0.9%. 1000 milliLiter(s) (80 mL/Hr) IV Continuous <Continuous>  tamsulosin 0.4 milliGRAM(s) Oral at bedtime    MEDICATIONS  (PRN):  acetaminophen     Tablet .. 650 milliGRAM(s) Oral every 6 hours PRN Temp greater or equal to 38C (100.4F), Mild Pain (1 - 3)  aluminum hydroxide/magnesium hydroxide/simethicone Suspension 30 milliLiter(s) Oral every 4 hours PRN Dyspepsia  ondansetron Injectable 4 milliGRAM(s) IV Push every 8 hours PRN Nausea and/or Vomiting      25 May 2022 09:42    130    |  99     |  34     ----------------------------<  103    4.8     |  27     |  1.37     Ca    8.2        25 May 2022 09:42    CBC Full  -  ( 25 May 2022 09:42 )  WBC Count : 9.37 K/uL  Hemoglobin : 7.8 g/dL  Hematocrit : 23.9 %  Platelet Count - Automated : 362 K/uL  Mean Cell Volume : 87.5 fl  Mean Cell Hemoglobin : 28.6 pg  Mean Cell Hemoglobin Concentration : 32.6 gm/dL  Auto Neutrophil # : x  Auto Lymphocyte # : x  Auto Monocyte # : x  Auto Eosinophil # : x  Auto Basophil # : x  Auto Neutrophil % : x  Auto Lymphocyte % : x  Auto Monocyte % : x  Auto Eosinophil % : x  Auto Basophil % : x          Assessment and Plan:       88 y/o M with PMH HTN, CAD s/p CABG, AAA s/p repair, CKD Stage 3, CHFpEF, insomnia, obstructive uropathy, BPH, Dz reflux, Telida, dementia,  sent from SNF for evaluation of diarrhea.       diarrhea.  - hx of recent ABx use.  - water stool continues.  - GI PCR:  negative.  - C. dif. PCR:  negative.  - GI consult.  -start immodium TID   -dark urine noted at bedside, patient chris getting dehydrated >> start ivf NS @80cc/hr    Hyponatremia secondary to dehydration and GI losses  -start ivf    Stage II CKD  -bl is 1.6-1.4  -currently at baseline.   -note: there is no douglas on this admission    anemia - chronic.  - multifactorial:  dilutional;  malnutrition.  - stool OB:  negative.  - Hbg trending upward, 7.8.  - consider transfusion PRN.  - Fe + vitamine C.    L hip ecchymosis.  - hx R AZAM.  - denied knowledge of recent fall.  no pain.  - L hip xray:  negative.  - further recommendations pending above.    pressure ulcers - POA.  - LWC.  - optimize nutrition.  - wound care RN.    severe-protein malnutrition.  - liberalize diet to regular.  - ensure Enlive 8oz tid.  - encourage PO.  - daily weight.  - nutritionist.    pyuria.  hx urinary retention-chronic Trevino, BPH and obstructive uropathy.  - hx MRSA UTI and ureteritis.  - Trevino catheter changed in ED.  - UCx:  no significant growth.  - Flomax.  - observing off ABx.  - ID.      hx HFpEF.  - compensated.  - BB.  - holding Lasix.    hx CAD - CABG.  - AP + BB + ST.    debility.  - PT.    other chronic stable medical issues:  HFpEF; AAA- repaired;  HTN.  - continue medical management.    DVT prophylaxis.  - UFH sq.    AD.  - no limits set at current time.    disposition.  - 2W.    communication.  - 2W RN.  - 2W assistant RN manager.  - 2W CM.  - 05/22;  05/23 patient's son:  update given and case discussed.

## 2022-05-26 NOTE — PROGRESS NOTE ADULT - ASSESSMENT
Diarrhea improving  Chronic Stool incontinence  CTT : Inconclusive  REC  Lactose free diet  Imodium as needed  Conservative GI care  Will follow as needed

## 2022-05-27 ENCOUNTER — TRANSCRIPTION ENCOUNTER (OUTPATIENT)
Age: 87
End: 2022-05-27

## 2022-05-27 LAB
ANION GAP SERPL CALC-SCNC: 5 MMOL/L — SIGNIFICANT CHANGE UP (ref 5–17)
BUN SERPL-MCNC: 30 MG/DL — HIGH (ref 7–23)
CALCIUM SERPL-MCNC: 8.1 MG/DL — LOW (ref 8.5–10.1)
CHLORIDE SERPL-SCNC: 100 MMOL/L — SIGNIFICANT CHANGE UP (ref 96–108)
CO2 SERPL-SCNC: 26 MMOL/L — SIGNIFICANT CHANGE UP (ref 22–31)
CREAT SERPL-MCNC: 1.31 MG/DL — HIGH (ref 0.5–1.3)
EGFR: 52 ML/MIN/1.73M2 — LOW
GLUCOSE SERPL-MCNC: 113 MG/DL — HIGH (ref 70–99)
HCT VFR BLD CALC: 24.9 % — LOW (ref 39–50)
HGB BLD-MCNC: 7.8 G/DL — LOW (ref 13–17)
MCHC RBC-ENTMCNC: 28.3 PG — SIGNIFICANT CHANGE UP (ref 27–34)
MCHC RBC-ENTMCNC: 31.3 GM/DL — LOW (ref 32–36)
MCV RBC AUTO: 90.2 FL — SIGNIFICANT CHANGE UP (ref 80–100)
PLATELET # BLD AUTO: 327 K/UL — SIGNIFICANT CHANGE UP (ref 150–400)
POTASSIUM SERPL-MCNC: 4.5 MMOL/L — SIGNIFICANT CHANGE UP (ref 3.5–5.3)
POTASSIUM SERPL-SCNC: 4.5 MMOL/L — SIGNIFICANT CHANGE UP (ref 3.5–5.3)
RBC # BLD: 2.76 M/UL — LOW (ref 4.2–5.8)
RBC # FLD: 17.2 % — HIGH (ref 10.3–14.5)
SODIUM SERPL-SCNC: 131 MMOL/L — LOW (ref 135–145)
WBC # BLD: 8.94 K/UL — SIGNIFICANT CHANGE UP (ref 3.8–10.5)
WBC # FLD AUTO: 8.94 K/UL — SIGNIFICANT CHANGE UP (ref 3.8–10.5)

## 2022-05-27 PROCEDURE — 99232 SBSQ HOSP IP/OBS MODERATE 35: CPT

## 2022-05-27 RX ORDER — LOPERAMIDE HCL 2 MG
1 TABLET ORAL
Qty: 0 | Refills: 0 | DISCHARGE
Start: 2022-05-27

## 2022-05-27 RX ADMIN — HEPARIN SODIUM 5000 UNIT(S): 5000 INJECTION INTRAVENOUS; SUBCUTANEOUS at 10:01

## 2022-05-27 RX ADMIN — Medication 325 MILLIGRAM(S): at 10:01

## 2022-05-27 RX ADMIN — RANOLAZINE 500 MILLIGRAM(S): 500 TABLET, FILM COATED, EXTENDED RELEASE ORAL at 21:24

## 2022-05-27 RX ADMIN — PANTOPRAZOLE SODIUM 40 MILLIGRAM(S): 20 TABLET, DELAYED RELEASE ORAL at 05:55

## 2022-05-27 RX ADMIN — Medication 81 MILLIGRAM(S): at 10:01

## 2022-05-27 RX ADMIN — Medication 2 MILLIGRAM(S): at 05:55

## 2022-05-27 RX ADMIN — Medication 2 MILLIGRAM(S): at 17:17

## 2022-05-27 RX ADMIN — Medication 500 MILLIGRAM(S): at 10:01

## 2022-05-27 RX ADMIN — HEPARIN SODIUM 5000 UNIT(S): 5000 INJECTION INTRAVENOUS; SUBCUTANEOUS at 21:24

## 2022-05-27 RX ADMIN — ATORVASTATIN CALCIUM 80 MILLIGRAM(S): 80 TABLET, FILM COATED ORAL at 21:24

## 2022-05-27 RX ADMIN — RANOLAZINE 500 MILLIGRAM(S): 500 TABLET, FILM COATED, EXTENDED RELEASE ORAL at 10:01

## 2022-05-27 RX ADMIN — FINASTERIDE 5 MILLIGRAM(S): 5 TABLET, FILM COATED ORAL at 10:01

## 2022-05-27 RX ADMIN — TAMSULOSIN HYDROCHLORIDE 0.4 MILLIGRAM(S): 0.4 CAPSULE ORAL at 21:24

## 2022-05-27 RX ADMIN — SODIUM CHLORIDE 80 MILLILITER(S): 9 INJECTION INTRAMUSCULAR; INTRAVENOUS; SUBCUTANEOUS at 05:54

## 2022-05-27 RX ADMIN — CLOPIDOGREL BISULFATE 75 MILLIGRAM(S): 75 TABLET, FILM COATED ORAL at 10:00

## 2022-05-27 NOTE — DISCHARGE NOTE NURSING/CASE MANAGEMENT/SOCIAL WORK - NSDCPEFALRISK_GEN_ALL_CORE
For information on Fall & Injury Prevention, visit: [email or web address] OR  [email or web address] OR  https://www.cdc.gov/steadi/patient.html

## 2022-05-27 NOTE — DISCHARGE NOTE NURSING/CASE MANAGEMENT/SOCIAL WORK - PATIENT PORTAL LINK FT
You can access the FollowMyHealth Patient Portal offered by [facility] by registering at the following website: [email or web address]. By joining [facility]’s FollowMyHealth portal, you will also be able to view your health information using other applications (apps) compatible with our system.

## 2022-05-27 NOTE — PROGRESS NOTE ADULT - SUBJECTIVE AND OBJECTIVE BOX
Cheif complaints and Diagnosis: persistent diahrrea    Subjective: no complaints      REVIEW OF SYSTEMS:    CONSTITUTIONAL: No weakness, fevers or chills  EYES/ENT: No visual changes;  No vertigo or throat pain   NECK: No pain or stiffness  RESPIRATORY: No cough, wheezing, hemoptysis; No shortness of breath  CARDIOVASCULAR: No chest pain or palpitations  GASTROINTESTINAL: No abdominal or epigastric pain. No nausea, vomiting, or hematemesis; No diarrhea or constipation. No melena or hematochezia.  GENITOURINARY: No dysuria, frequency or hematuria  NEUROLOGICAL: No numbness or weakness  SKIN: No itching, burning, rashes, or lesions   All other review of systems is negative unless indicated above      Vital Signs Last 24 Hrs  T(C): 36.2 (27 May 2022 15:40), Max: 36.2 (26 May 2022 23:07)  T(F): 97.1 (27 May 2022 15:40), Max: 97.2 (26 May 2022 23:07)  HR: 76 (27 May 2022 15:40) (72 - 79)  BP: 117/56 (27 May 2022 15:40) (101/47 - 117/56)  BP(mean): --  RR: 18 (27 May 2022 15:40) (17 - 18)  SpO2: 96% (27 May 2022 15:40) (96% - 98%)    HEENT:   pupils equal and reactive, EOMI, no oropharyngeal lesions, erythema, exudates, oral thrush    NECK:   supple, no carotid bruits, no palpable lymph nodes, no thyromegaly    CV:  +S1, +S2, regular, no murmurs or rubs    RESP:   lungs clear to auscultation bilaterally, no wheezing, rales, rhonchi, good air entry bilaterally    BREAST:  not examined    GI:  abdomen soft, non-tender, non-distended, normal BS, no bruits, no abdominal masses, no palpable masses    RECTAL:  not examined    :  not examined    MSK:   normal muscle tone, no atrophy, no rigidity, no contractions    EXT:   no clubbing, no cyanosis, no edema, no calf pain, swelling or erythema    VASCULAR:  pulses equal and symmetric in the upper and lower extremities    NEURO:  AAOX3, no focal neurological deficits, follows all commands, able to move extremities spontaneously    SKIN:  no ulcers, lesions or rashes    MEDICATIONS  (STANDING):  ascorbic acid 500 milliGRAM(s) Oral daily  aspirin enteric coated 81 milliGRAM(s) Oral daily  atorvastatin 80 milliGRAM(s) Oral at bedtime  clopidogrel Tablet 75 milliGRAM(s) Oral daily  ferrous    sulfate 325 milliGRAM(s) Oral daily  finasteride 5 milliGRAM(s) Oral daily  heparin   Injectable 5000 Unit(s) SubCutaneous every 12 hours  loperamide 2 milliGRAM(s) Oral two times a day  melatonin 5 milliGRAM(s) Oral at bedtime  metoprolol succinate ER 50 milliGRAM(s) Oral daily  pantoprazole    Tablet 40 milliGRAM(s) Oral before breakfast  ranolazine 500 milliGRAM(s) Oral two times a day  sodium chloride 0.9%. 1000 milliLiter(s) (80 mL/Hr) IV Continuous <Continuous>  tamsulosin 0.4 milliGRAM(s) Oral at bedtime    MEDICATIONS  (PRN):  acetaminophen     Tablet .. 650 milliGRAM(s) Oral every 6 hours PRN Temp greater or equal to 38C (100.4F), Mild Pain (1 - 3)  aluminum hydroxide/magnesium hydroxide/simethicone Suspension 30 milliLiter(s) Oral every 4 hours PRN Dyspepsia  ondansetron Injectable 4 milliGRAM(s) IV Push every 8 hours PRN Nausea and/or Vomiting      27 May 2022 09:20    131    |  100    |  30     ----------------------------<  113    4.5     |  26     |  1.31     Ca    8.1        27 May 2022 09:20    CBC Full  -  ( 27 May 2022 09:20 )  WBC Count : 8.94 K/uL  Hemoglobin : 7.8 g/dL  Hematocrit : 24.9 %  Platelet Count - Automated : 327 K/uL  Mean Cell Volume : 90.2 fl  Mean Cell Hemoglobin : 28.3 pg  Mean Cell Hemoglobin Concentration : 31.3 gm/dL  Auto Neutrophil # : x  Auto Lymphocyte # : x  Auto Monocyte # : x  Auto Eosinophil # : x  Auto Basophil # : x  Auto Neutrophil % : x  Auto Lymphocyte % : x  Auto Monocyte % : x  Auto Eosinophil % : x  Auto Basophil % : x            Assessment and Plan:       89 y/o M with PMH HTN, CAD s/p CABG, AAA s/p repair, CKD Stage 3, CHFpEF, insomnia, obstructive uropathy, BPH, Dz reflux, [place], dementia,  sent from SNF for evaluation of diarrhea.       diarrhea.  - hx of recent ABx use.  - watery stool has improved; stool is now formed. Immodium working well.   - GI PCR:  negative.  - C. dif. PCR:  negative.  - GI consult.  -taper immodium to BID     Hyponatremia secondary to dehydration and GI losses  -c/w ivf      Stage II CKD  -bl is 1.6-1.4  -currently at baseline.   -note: there is no douglas on this admission    anemia - chronic.  - multifactorial:  dilutional;  malnutrition.  - stool OB:  negative.  - Hbg trending upward, 7.8.  - consider transfusion PRN.  - Fe + vitamine C.    L hip ecchymosis.  - hx R AZAM.  - denied knowledge of recent fall.  no pain.  - L hip xray:  negative.  - further recommendations pending above.    pressure ulcers - POA.  - LWC.  - optimize nutrition.  - wound care RN.    severe-protein malnutrition.  - liberalize diet to regular.  - ensure Enlive 8oz tid.  - encourage PO.  - daily weight.  - nutritionist.    pyuria.  hx urinary retention-chronic Trevino, BPH and obstructive uropathy.  - hx MRSA UTI and ureteritis.  - Trevino catheter changed in ED.  - UCx:  no significant growth.  - Flomax.  - observing off ABx.  - ID.      hx HFpEF.  - compensated.  - BB.  - holding Lasix.    HTN- thus far, during this admission , patient has been with low end BP  -d/c Toprolol    hx CAD - CABG.  - AP + BB + ST.    debility.  - PT.    other chronic stable medical issues:  HFpEF; AAA- repaired;  HTN.  - continue medical management.    DVT prophylaxis.  - UFH sq.    AD.  - no limits set at current time.    disposition.  - 2W.    communication.  - 2W RN.  - 2W assistant RN manager.  - 2W CM.  - 05/22;  05/23 patient's son:  update given and case discussed.     Dispo anticipate [place] if diahrrea continues to improve.

## 2022-05-28 LAB
ANION GAP SERPL CALC-SCNC: 8 MMOL/L — SIGNIFICANT CHANGE UP (ref 5–17)
BUN SERPL-MCNC: 33 MG/DL — HIGH (ref 7–23)
CALCIUM SERPL-MCNC: 7.9 MG/DL — LOW (ref 8.5–10.1)
CHLORIDE SERPL-SCNC: 100 MMOL/L — SIGNIFICANT CHANGE UP (ref 96–108)
CO2 SERPL-SCNC: 23 MMOL/L — SIGNIFICANT CHANGE UP (ref 22–31)
CREAT SERPL-MCNC: 1.33 MG/DL — HIGH (ref 0.5–1.3)
EGFR: 51 ML/MIN/1.73M2 — LOW
GLUCOSE SERPL-MCNC: 111 MG/DL — HIGH (ref 70–99)
OB PNL STL: POSITIVE
POTASSIUM SERPL-MCNC: 4.8 MMOL/L — SIGNIFICANT CHANGE UP (ref 3.5–5.3)
POTASSIUM SERPL-SCNC: 4.8 MMOL/L — SIGNIFICANT CHANGE UP (ref 3.5–5.3)
SARS-COV-2 RNA SPEC QL NAA+PROBE: SIGNIFICANT CHANGE UP
SODIUM SERPL-SCNC: 131 MMOL/L — LOW (ref 135–145)
TROPONIN I, HIGH SENSITIVITY RESULT: 17.52 NG/L — SIGNIFICANT CHANGE UP

## 2022-05-28 PROCEDURE — 93010 ELECTROCARDIOGRAM REPORT: CPT

## 2022-05-28 PROCEDURE — 99233 SBSQ HOSP IP/OBS HIGH 50: CPT

## 2022-05-28 PROCEDURE — 71045 X-RAY EXAM CHEST 1 VIEW: CPT | Mod: 26

## 2022-05-28 RX ORDER — FUROSEMIDE 40 MG
40 TABLET ORAL
Refills: 0 | Status: COMPLETED | OUTPATIENT
Start: 2022-05-28 | End: 2022-05-29

## 2022-05-28 RX ORDER — ALPRAZOLAM 0.25 MG
0.5 TABLET ORAL AT BEDTIME
Refills: 0 | Status: DISCONTINUED | OUTPATIENT
Start: 2022-05-28 | End: 2022-05-31

## 2022-05-28 RX ORDER — QUETIAPINE FUMARATE 200 MG/1
12.5 TABLET, FILM COATED ORAL ONCE
Refills: 0 | Status: DISCONTINUED | OUTPATIENT
Start: 2022-05-28 | End: 2022-05-28

## 2022-05-28 RX ORDER — CEFEPIME 1 G/1
1000 INJECTION, POWDER, FOR SOLUTION INTRAMUSCULAR; INTRAVENOUS EVERY 24 HOURS
Refills: 0 | Status: DISCONTINUED | OUTPATIENT
Start: 2022-05-29 | End: 2022-05-30

## 2022-05-28 RX ORDER — CEFEPIME 1 G/1
INJECTION, POWDER, FOR SOLUTION INTRAMUSCULAR; INTRAVENOUS
Refills: 0 | Status: DISCONTINUED | OUTPATIENT
Start: 2022-05-28 | End: 2022-05-28

## 2022-05-28 RX ORDER — CEFEPIME 1 G/1
INJECTION, POWDER, FOR SOLUTION INTRAMUSCULAR; INTRAVENOUS
Refills: 0 | Status: DISCONTINUED | OUTPATIENT
Start: 2022-05-28 | End: 2022-05-30

## 2022-05-28 RX ORDER — CEFEPIME 1 G/1
1000 INJECTION, POWDER, FOR SOLUTION INTRAMUSCULAR; INTRAVENOUS ONCE
Refills: 0 | Status: COMPLETED | OUTPATIENT
Start: 2022-05-28 | End: 2022-05-28

## 2022-05-28 RX ORDER — ALPRAZOLAM 0.25 MG
0.25 TABLET ORAL ONCE
Refills: 0 | Status: DISCONTINUED | OUTPATIENT
Start: 2022-05-28 | End: 2022-05-28

## 2022-05-28 RX ADMIN — Medication 0.25 MILLIGRAM(S): at 01:46

## 2022-05-28 RX ADMIN — ATORVASTATIN CALCIUM 80 MILLIGRAM(S): 80 TABLET, FILM COATED ORAL at 21:07

## 2022-05-28 RX ADMIN — Medication 5 MILLIGRAM(S): at 21:07

## 2022-05-28 RX ADMIN — TAMSULOSIN HYDROCHLORIDE 0.4 MILLIGRAM(S): 0.4 CAPSULE ORAL at 21:07

## 2022-05-28 RX ADMIN — Medication 0.5 MILLIGRAM(S): at 21:07

## 2022-05-28 RX ADMIN — Medication 325 MILLIGRAM(S): at 09:37

## 2022-05-28 RX ADMIN — Medication 2 MILLIGRAM(S): at 05:31

## 2022-05-28 RX ADMIN — Medication 40 MILLIGRAM(S): at 14:17

## 2022-05-28 RX ADMIN — PANTOPRAZOLE SODIUM 40 MILLIGRAM(S): 20 TABLET, DELAYED RELEASE ORAL at 05:31

## 2022-05-28 RX ADMIN — Medication 500 MILLIGRAM(S): at 09:36

## 2022-05-28 RX ADMIN — FINASTERIDE 5 MILLIGRAM(S): 5 TABLET, FILM COATED ORAL at 09:37

## 2022-05-28 RX ADMIN — CLOPIDOGREL BISULFATE 75 MILLIGRAM(S): 75 TABLET, FILM COATED ORAL at 09:37

## 2022-05-28 RX ADMIN — RANOLAZINE 500 MILLIGRAM(S): 500 TABLET, FILM COATED, EXTENDED RELEASE ORAL at 09:37

## 2022-05-28 RX ADMIN — Medication 81 MILLIGRAM(S): at 09:37

## 2022-05-28 RX ADMIN — RANOLAZINE 500 MILLIGRAM(S): 500 TABLET, FILM COATED, EXTENDED RELEASE ORAL at 21:07

## 2022-05-28 RX ADMIN — HEPARIN SODIUM 5000 UNIT(S): 5000 INJECTION INTRAVENOUS; SUBCUTANEOUS at 09:37

## 2022-05-28 RX ADMIN — Medication 2 MILLIGRAM(S): at 17:59

## 2022-05-28 RX ADMIN — CEFEPIME 1000 MILLIGRAM(S): 1 INJECTION, POWDER, FOR SOLUTION INTRAMUSCULAR; INTRAVENOUS at 16:37

## 2022-05-28 NOTE — PROGRESS NOTE ADULT - SUBJECTIVE AND OBJECTIVE BOX
Cheif complaints and Diagnosis: diahrrea    Subjective: patient overnight became hypoxic to 70-80s on roomair, requiring supplemental o2. this morning he is 80's on room air. says he doesnt feel that great today.       REVIEW OF SYSTEMS:    CONSTITUTIONAL: No weakness, fevers or chills  EYES/ENT: No visual changes;  No vertigo or throat pain   NECK: No pain or stiffness  RESPIRATORY: No cough, wheezing, hemoptysis; No shortness of breath  CARDIOVASCULAR: No chest pain or palpitations  GASTROINTESTINAL: No abdominal or epigastric pain. No nausea, vomiting, or hematemesis; No diarrhea or constipation. No melena or hematochezia.  GENITOURINARY: No dysuria, frequency or hematuria  NEUROLOGICAL: No numbness or weakness  SKIN: No itching, burning, rashes, or lesions   All other review of systems is negative unless indicated above      Vital Signs Last 24 Hrs  T(C): 36.4 (28 May 2022 15:07), Max: 37 (27 May 2022 23:12)  T(F): 97.5 (28 May 2022 15:07), Max: 98.6 (27 May 2022 23:12)  HR: 76 (28 May 2022 15:07) (74 - 104)  BP: 107/34 (28 May 2022 15:07) (100/84 - 113/61)  BP(mean): --  RR: 18 (28 May 2022 15:07) (18 - 20)  SpO2: 95% (28 May 2022 15:07) (77% - 100%)    HEENT:   pupils equal and reactive, EOMI, no oropharyngeal lesions, erythema, exudates, oral thrush    NECK:   supple, no carotid bruits, no palpable lymph nodes, no thyromegaly    CV:  +S1, +S2, regular, no murmurs or rubs    RESP:   lungs clear to auscultation bilaterally, no wheezing, rales, rhonchi, good air entry bilaterally    BREAST:  not examined    GI:  abdomen soft, non-tender, non-distended, normal BS, no bruits, no abdominal masses, no palpable masses    RECTAL:  not examined    :  not examined    MSK:   normal muscle tone, no atrophy, no rigidity, no contractions    EXT:   no clubbing, no cyanosis, no edema, no calf pain, swelling or erythema    VASCULAR:  pulses equal and symmetric in the upper and lower extremities    NEURO:  AAOX3, no focal neurological deficits, follows all commands, able to move extremities spontaneously    SKIN:  no ulcers, lesions or rashes    MEDICATIONS  (STANDING):  ALPRAZolam 0.5 milliGRAM(s) Oral at bedtime  ascorbic acid 500 milliGRAM(s) Oral daily  aspirin enteric coated 81 milliGRAM(s) Oral daily  atorvastatin 80 milliGRAM(s) Oral at bedtime  cefepime   IVPB      clopidogrel Tablet 75 milliGRAM(s) Oral daily  ferrous    sulfate 325 milliGRAM(s) Oral daily  finasteride 5 milliGRAM(s) Oral daily  furosemide   Injectable 40 milliGRAM(s) IV Push two times a day  heparin   Injectable 5000 Unit(s) SubCutaneous every 12 hours  loperamide 2 milliGRAM(s) Oral two times a day  melatonin 5 milliGRAM(s) Oral at bedtime  pantoprazole    Tablet 40 milliGRAM(s) Oral before breakfast  ranolazine 500 milliGRAM(s) Oral two times a day  tamsulosin 0.4 milliGRAM(s) Oral at bedtime    MEDICATIONS  (PRN):  acetaminophen     Tablet .. 650 milliGRAM(s) Oral every 6 hours PRN Temp greater or equal to 38C (100.4F), Mild Pain (1 - 3)  aluminum hydroxide/magnesium hydroxide/simethicone Suspension 30 milliLiter(s) Oral every 4 hours PRN Dyspepsia  ondansetron Injectable 4 milliGRAM(s) IV Push every 8 hours PRN Nausea and/or Vomiting      28 May 2022 08:45    131    |  100    |  33     ----------------------------<  111    4.8     |  23     |  1.33     Ca    7.9        28 May 2022 08:45    CBC Full  -  ( 27 May 2022 09:20 )  WBC Count : 8.94 K/uL  Hemoglobin : 7.8 g/dL  Hematocrit : 24.9 %  Platelet Count - Automated : 327 K/uL  Mean Cell Volume : 90.2 fl  Mean Cell Hemoglobin : 28.3 pg  Mean Cell Hemoglobin Concentration : 31.3 gm/dL  Auto Neutrophil # : x  Auto Lymphocyte # : x  Auto Monocyte # : x  Auto Eosinophil # : x  Auto Basophil # : x  Auto Neutrophil % : x  Auto Lymphocyte % : x  Auto Monocyte % : x  Auto Eosinophil % : x  Auto Basophil % : x          Assessment and Plan:       89 y/o M with PMH HTN, CAD s/p CABG, AAA s/p repair, CKD Stage 3, CHFpEF, insomnia, obstructive uropathy, BPH, Dz reflux, [place], dementia,  sent from SNF for evaluation of diarrhea.       diarrhea.  - hx of recent ABx use. possibly related to that.? reactive?   - watery stool has improved; stool is now formed. Immodium working well.   - GI PCR:  negative.  - C. dif. PCR:  negative.  - GI consult.  -taper immodium to BID       Acute hypoxic respiratory failure secondary to aspiration pna and acute ssytolic chf  -start iv lasix x 2 doses  -start cefepime  -elevate head 45 deg at all times        Hyponatremia secondary to dehydration and GI losses  -c/w ivf      Stage II CKD  -bl is 1.6-1.4  -currently at baseline.   -note: there is no douglas on this admission    anemia - chronic.  - multifactorial:  dilutional;  malnutrition.  - stool OB:  negative.  - Hbg trending upward, 7.8.  - consider transfusion PRN.  - Fe + vitamine C.    L hip ecchymosis.  - hx R AZAM.  - denied knowledge of recent fall.  no pain.  - L hip xray:  negative.  - further recommendations pending above.    pressure ulcers - POA.  - LWC.  - optimize nutrition.  - wound care RN.    severe-protein malnutrition.  - liberalize diet to regular.  - ensure Enlive 8oz tid.  - encourage PO.  - daily weight.  - nutritionist.    pyuria.  hx urinary retention-chronic Trevino, BPH and obstructive uropathy.  - hx MRSA UTI and ureteritis.  - Trevino catheter changed in ED.  - UCx:  no significant growth.  - Flomax.  - observing off ABx.  - ID.      hx HFpEF.  - compensated.  - BB.  - holding Lasix.    HTN- thus far, during this admission , patient has been with low end BP  -d/c Toprolol    hx CAD - CABG.  - AP + BB + ST.    debility.  - PT.    other chronic stable medical issues:  HFpEF; AAA- repaired;  HTN.  - continue medical management.    DVT prophylaxis.  - UFH sq.    AD.  - no limits set at current time.    disposition.  - 2W.    communication.  - 2W RN.  - 2W assistant RN manager.  - 2W CM.  - 05/22;  05/23 patient's son:  update given and case discussed.       Dispo: patient will be here for abx and iv lasix till Tuesday     Cheif complaints and Diagnosis: diahrrea    Subjective: patient overnight became hypoxic to 70-80s on roomair, requiring supplemental o2. this morning he is 80's on room air. says he doesnt feel that great today.   Nursing reports 2 black liquid BM today.       REVIEW OF SYSTEMS:    CONSTITUTIONAL: No weakness, fevers or chills  EYES/ENT: No visual changes;  No vertigo or throat pain   NECK: No pain or stiffness  RESPIRATORY: No cough, wheezing, hemoptysis; No shortness of breath  CARDIOVASCULAR: No chest pain or palpitations  GASTROINTESTINAL: No abdominal or epigastric pain. No nausea, vomiting, or hematemesis; No diarrhea or constipation. No melena or hematochezia.  GENITOURINARY: No dysuria, frequency or hematuria  NEUROLOGICAL: No numbness or weakness  SKIN: No itching, burning, rashes, or lesions   All other review of systems is negative unless indicated above      Vital Signs Last 24 Hrs  T(C): 36.4 (28 May 2022 15:07), Max: 37 (27 May 2022 23:12)  T(F): 97.5 (28 May 2022 15:07), Max: 98.6 (27 May 2022 23:12)  HR: 76 (28 May 2022 15:07) (74 - 104)  BP: 107/34 (28 May 2022 15:07) (100/84 - 113/61)  BP(mean): --  RR: 18 (28 May 2022 15:07) (18 - 20)  SpO2: 95% (28 May 2022 15:07) (77% - 100%)    HEENT:   pupils equal and reactive, EOMI, no oropharyngeal lesions, erythema, exudates, oral thrush    NECK:   supple, no carotid bruits, no palpable lymph nodes, no thyromegaly    CV:  +S1, +S2, regular, no murmurs or rubs    RESP:   lungs clear to auscultation bilaterally, no wheezing, rales, rhonchi, good air entry bilaterally    BREAST:  not examined    GI:  abdomen soft, non-tender, non-distended, normal BS, no bruits, no abdominal masses, no palpable masses    RECTAL:  not examined    :  not examined    MSK:   normal muscle tone, no atrophy, no rigidity, no contractions    EXT:   no clubbing, no cyanosis, no edema, no calf pain, swelling or erythema    VASCULAR:  pulses equal and symmetric in the upper and lower extremities    NEURO:  AAOX3, no focal neurological deficits, follows all commands, able to move extremities spontaneously    SKIN:  no ulcers, lesions or rashes    MEDICATIONS  (STANDING):  ALPRAZolam 0.5 milliGRAM(s) Oral at bedtime  ascorbic acid 500 milliGRAM(s) Oral daily  aspirin enteric coated 81 milliGRAM(s) Oral daily  atorvastatin 80 milliGRAM(s) Oral at bedtime  cefepime   IVPB      clopidogrel Tablet 75 milliGRAM(s) Oral daily  ferrous    sulfate 325 milliGRAM(s) Oral daily  finasteride 5 milliGRAM(s) Oral daily  furosemide   Injectable 40 milliGRAM(s) IV Push two times a day  heparin   Injectable 5000 Unit(s) SubCutaneous every 12 hours  loperamide 2 milliGRAM(s) Oral two times a day  melatonin 5 milliGRAM(s) Oral at bedtime  pantoprazole    Tablet 40 milliGRAM(s) Oral before breakfast  ranolazine 500 milliGRAM(s) Oral two times a day  tamsulosin 0.4 milliGRAM(s) Oral at bedtime    MEDICATIONS  (PRN):  acetaminophen     Tablet .. 650 milliGRAM(s) Oral every 6 hours PRN Temp greater or equal to 38C (100.4F), Mild Pain (1 - 3)  aluminum hydroxide/magnesium hydroxide/simethicone Suspension 30 milliLiter(s) Oral every 4 hours PRN Dyspepsia  ondansetron Injectable 4 milliGRAM(s) IV Push every 8 hours PRN Nausea and/or Vomiting      28 May 2022 08:45    131    |  100    |  33     ----------------------------<  111    4.8     |  23     |  1.33     Ca    7.9        28 May 2022 08:45    CBC Full  -  ( 27 May 2022 09:20 )  WBC Count : 8.94 K/uL  Hemoglobin : 7.8 g/dL  Hematocrit : 24.9 %  Platelet Count - Automated : 327 K/uL  Mean Cell Volume : 90.2 fl  Mean Cell Hemoglobin : 28.3 pg  Mean Cell Hemoglobin Concentration : 31.3 gm/dL  Auto Neutrophil # : x  Auto Lymphocyte # : x  Auto Monocyte # : x  Auto Eosinophil # : x  Auto Basophil # : x  Auto Neutrophil % : x  Auto Lymphocyte % : x  Auto Monocyte % : x  Auto Eosinophil % : x  Auto Basophil % : x          Assessment and Plan:       89 y/o M with PMH HTN, CAD s/p CABG, AAA s/p repair, CKD Stage 3, CHFpEF, insomnia, obstructive uropathy, BPH, Dz reflux, [place], dementia,  sent from SNF for evaluation of diarrhea.       diarrhea.  - hx of recent ABx use. possibly related to that.? reactive?   - watery stool has improved; stool is now formed. Immodium working well.   - GI PCR:  negative.  - C. dif. PCR:  negative.  - GI consult.  -taper immodium to BID       Acute hypoxic respiratory failure secondary to aspiration pna and acute ssytolic chf  -start iv lasix x 2 doses  -start cefepime  -elevate head 45 deg at all times        Black liquid diahrrea 5/28  -stop heparin sc  -f/u cbc   -nursing to send stool occult  -c/w asp plavix for  now patient with signifcant cardiac hx    Hyponatremia secondary to dehydration and GI losses  -c/w ivf      Stage II CKD  -bl is 1.6-1.4  -currently at baseline.   -note: there is no douglas on this admission    anemia - chronic.  - multifactorial:  dilutional;  malnutrition.  - stool OB:  negative.  - Hbg trending upward, 7.8.  - consider transfusion PRN.  - Fe + vitamine C.    L hip ecchymosis.  - hx R AZAM.  - denied knowledge of recent fall.  no pain.  - L hip xray:  negative.  - further recommendations pending above.    pressure ulcers - POA.  - LWC.  - optimize nutrition.  - wound care RN.    severe-protein malnutrition.  - liberalize diet to regular.  - ensure Enlive 8oz tid.  - encourage PO.  - daily weight.  - nutritionist.    pyuria.  hx urinary retention-chronic Trevino, BPH and obstructive uropathy.  - hx MRSA UTI and ureteritis.  - Trevino catheter changed in ED.  - UCx:  no significant growth.  - Flomax.  - observing off ABx.  - ID.      hx HFpEF.  - compensated.  - BB.  - holding Lasix.    HTN- thus far, during this admission , patient has been with low end BP  -d/c Toprolol    hx CAD - CABG.  - AP + BB + ST.    debility.  - PT.    other chronic stable medical issues:  HFpEF; AAA- repaired;  HTN.  - continue medical management.    DVT prophylaxis.  - UFH sq.    AD.  - no limits set at current time.    disposition.  - 2W.    communication.  - 2W RN.  - 2W assistant RN manager.  - 2W CM.  - 05/22;  05/23 patient's son:  update given and case discussed.       Dispo: patient will be here for abx and iv lasix till Tuesday

## 2022-05-28 NOTE — CHART NOTE - NSCHARTNOTEFT_GEN_A_CORE
HPI: 88 y/o M with PMH HTN, CAD s/p CABG, AAA s/p repair, CKD Stage 3, CHFpEF, insomnia, obstructive uropathy, BPH, Dz reflux sent from SNF for evaluation of diarrhea. Pt is poor historian due to hearing impairment and  Dementia. Reports that developed diarrhea overnight, not sure how many BMs had, as wearing diapers. He denies fevers or chills, has poor appetite but not new. No abd pain or cramping. Pt reports that his wife also is with loose stools for a few days. Pt admitted for watery stool; also has hx of MRSA UTI.    Subjective: Called overnight for confused and chest pressure. Pt reports that he woke up confused, believing that he was in his son's house, and then experienced "chest heaviness" along with fast breathing and heart racing. Chest heaviness has no alleviating/aggravating factors, not related to activity, no radiation of pain or diaphoresis.     Vital Signs Last 24 Hrs  T(C): 36.6 (28 May 2022 01:15), Max: 37 (27 May 2022 23:12)  T(F): 97.8 (28 May 2022 01:15), Max: 98.6 (27 May 2022 23:12)  HR: 104 (28 May 2022 01:15) (72 - 104)  BP: 109/62 (28 May 2022 01:15) (101/47 - 117/56)  BP(mean): --  RR: 20 (28 May 2022 01:27) (17 - 20)  SpO2: 97% (28 May 2022 01:27) (89% - 98%)    PHYSICAL EXAM:  GENERAL: NAD, lying in bed, anxious  HEAD:  Atraumatic, Normocephalic  EYES: EOMI, PERRLA, conjunctiva and sclera clear  ENT: Moist mucous membranes  NECK: Supple, No JVD  CHEST/LUNG: Clear to auscultation bilaterally; No rales, rhonchi, wheezing, or rubs. Unlabored respirations  HEART: Slightly tachycardic; no murmurs, rubs, or gallops  ABDOMEN: Bowel sounds present; Soft, Nontender, Nondistended. No hepatomegally  EXTREMITIES:  No clubbing, cyanosis, or edema  NERVOUS SYSTEM:  Alert & Oriented X3, speech clear. No new deficits   MSK: Normal muscle tone, no atrophy, no rigidity, no contractions  SKIN: No new rashes or lesions    #Chest Pressure likely 2/2 anxiety  - Pt is anxious with tachycardia and tachypnea, no anginal cp sxs  - Ordered EKG: no acute changes  - Ordered Trops: WNL  - Ordered Xanax 0.25 mg    #Confusion 2/2 hospital-induced disorientation  - Pt reassured that this is a normal reaction to being in the hospital    UPDATE: Pt is calmer and chest pressure has resolved

## 2022-05-29 LAB
ANION GAP SERPL CALC-SCNC: 8 MMOL/L — SIGNIFICANT CHANGE UP (ref 5–17)
BASOPHILS # BLD AUTO: 0.03 K/UL — SIGNIFICANT CHANGE UP (ref 0–0.2)
BASOPHILS NFR BLD AUTO: 0.2 % — SIGNIFICANT CHANGE UP (ref 0–2)
BUN SERPL-MCNC: 38 MG/DL — HIGH (ref 7–23)
CALCIUM SERPL-MCNC: 7.9 MG/DL — LOW (ref 8.5–10.1)
CHLORIDE SERPL-SCNC: 99 MMOL/L — SIGNIFICANT CHANGE UP (ref 96–108)
CO2 SERPL-SCNC: 26 MMOL/L — SIGNIFICANT CHANGE UP (ref 22–31)
CREAT SERPL-MCNC: 1.58 MG/DL — HIGH (ref 0.5–1.3)
EGFR: 42 ML/MIN/1.73M2 — LOW
EOSINOPHIL # BLD AUTO: 0.01 K/UL — SIGNIFICANT CHANGE UP (ref 0–0.5)
EOSINOPHIL NFR BLD AUTO: 0.1 % — SIGNIFICANT CHANGE UP (ref 0–6)
GLUCOSE SERPL-MCNC: 95 MG/DL — SIGNIFICANT CHANGE UP (ref 70–99)
HCT VFR BLD CALC: 22.5 % — LOW (ref 39–50)
HCT VFR BLD CALC: 25.3 % — LOW (ref 39–50)
HGB BLD-MCNC: 7.2 G/DL — LOW (ref 13–17)
HGB BLD-MCNC: 7.8 G/DL — LOW (ref 13–17)
IMM GRANULOCYTES NFR BLD AUTO: 0.7 % — SIGNIFICANT CHANGE UP (ref 0–1.5)
LYMPHOCYTES # BLD AUTO: 0.83 K/UL — LOW (ref 1–3.3)
LYMPHOCYTES # BLD AUTO: 4.9 % — LOW (ref 13–44)
MCHC RBC-ENTMCNC: 27.8 PG — SIGNIFICANT CHANGE UP (ref 27–34)
MCHC RBC-ENTMCNC: 28 PG — SIGNIFICANT CHANGE UP (ref 27–34)
MCHC RBC-ENTMCNC: 30.8 GM/DL — LOW (ref 32–36)
MCHC RBC-ENTMCNC: 32 GM/DL — SIGNIFICANT CHANGE UP (ref 32–36)
MCV RBC AUTO: 87.5 FL — SIGNIFICANT CHANGE UP (ref 80–100)
MCV RBC AUTO: 90 FL — SIGNIFICANT CHANGE UP (ref 80–100)
MONOCYTES # BLD AUTO: 0.68 K/UL — SIGNIFICANT CHANGE UP (ref 0–0.9)
MONOCYTES NFR BLD AUTO: 4 % — SIGNIFICANT CHANGE UP (ref 2–14)
NEUTROPHILS # BLD AUTO: 15.18 K/UL — HIGH (ref 1.8–7.4)
NEUTROPHILS NFR BLD AUTO: 90.1 % — HIGH (ref 43–77)
PLATELET # BLD AUTO: 261 K/UL — SIGNIFICANT CHANGE UP (ref 150–400)
PLATELET # BLD AUTO: 267 K/UL — SIGNIFICANT CHANGE UP (ref 150–400)
POTASSIUM SERPL-MCNC: 4.1 MMOL/L — SIGNIFICANT CHANGE UP (ref 3.5–5.3)
POTASSIUM SERPL-SCNC: 4.1 MMOL/L — SIGNIFICANT CHANGE UP (ref 3.5–5.3)
RBC # BLD: 2.57 M/UL — LOW (ref 4.2–5.8)
RBC # BLD: 2.81 M/UL — LOW (ref 4.2–5.8)
RBC # FLD: 17.2 % — HIGH (ref 10.3–14.5)
RBC # FLD: 17.2 % — HIGH (ref 10.3–14.5)
SODIUM SERPL-SCNC: 133 MMOL/L — LOW (ref 135–145)
WBC # BLD: 16.84 K/UL — HIGH (ref 3.8–10.5)
WBC # BLD: 8.05 K/UL — SIGNIFICANT CHANGE UP (ref 3.8–10.5)
WBC # FLD AUTO: 16.84 K/UL — HIGH (ref 3.8–10.5)
WBC # FLD AUTO: 8.05 K/UL — SIGNIFICANT CHANGE UP (ref 3.8–10.5)

## 2022-05-29 PROCEDURE — 99233 SBSQ HOSP IP/OBS HIGH 50: CPT

## 2022-05-29 RX ORDER — VANCOMYCIN HCL 1 G
1 VIAL (EA) INTRAVENOUS ONCE
Refills: 0 | Status: DISCONTINUED | OUTPATIENT
Start: 2022-05-29 | End: 2022-05-29

## 2022-05-29 RX ORDER — VANCOMYCIN HCL 1 G
1000 VIAL (EA) INTRAVENOUS ONCE
Refills: 0 | Status: COMPLETED | OUTPATIENT
Start: 2022-05-29 | End: 2022-05-29

## 2022-05-29 RX ORDER — LOPERAMIDE HCL 2 MG
2 TABLET ORAL DAILY
Refills: 0 | Status: DISCONTINUED | OUTPATIENT
Start: 2022-05-30 | End: 2022-06-09

## 2022-05-29 RX ORDER — SODIUM CHLORIDE 9 MG/ML
1000 INJECTION, SOLUTION INTRAVENOUS ONCE
Refills: 0 | Status: COMPLETED | OUTPATIENT
Start: 2022-05-29 | End: 2022-05-29

## 2022-05-29 RX ADMIN — Medication 500 MILLIGRAM(S): at 09:52

## 2022-05-29 RX ADMIN — CEFEPIME 1000 MILLIGRAM(S): 1 INJECTION, POWDER, FOR SOLUTION INTRAMUSCULAR; INTRAVENOUS at 15:36

## 2022-05-29 RX ADMIN — Medication 2 MILLIGRAM(S): at 05:29

## 2022-05-29 RX ADMIN — Medication 250 MILLIGRAM(S): at 23:51

## 2022-05-29 RX ADMIN — RANOLAZINE 500 MILLIGRAM(S): 500 TABLET, FILM COATED, EXTENDED RELEASE ORAL at 20:14

## 2022-05-29 RX ADMIN — SODIUM CHLORIDE 1000 MILLILITER(S): 9 INJECTION, SOLUTION INTRAVENOUS at 23:16

## 2022-05-29 RX ADMIN — PANTOPRAZOLE SODIUM 40 MILLIGRAM(S): 20 TABLET, DELAYED RELEASE ORAL at 05:29

## 2022-05-29 RX ADMIN — FINASTERIDE 5 MILLIGRAM(S): 5 TABLET, FILM COATED ORAL at 09:52

## 2022-05-29 RX ADMIN — Medication 325 MILLIGRAM(S): at 09:52

## 2022-05-29 RX ADMIN — RANOLAZINE 500 MILLIGRAM(S): 500 TABLET, FILM COATED, EXTENDED RELEASE ORAL at 09:52

## 2022-05-29 RX ADMIN — ATORVASTATIN CALCIUM 80 MILLIGRAM(S): 80 TABLET, FILM COATED ORAL at 20:14

## 2022-05-29 RX ADMIN — Medication 40 MILLIGRAM(S): at 05:29

## 2022-05-29 RX ADMIN — Medication 0.5 MILLIGRAM(S): at 17:53

## 2022-05-29 RX ADMIN — TAMSULOSIN HYDROCHLORIDE 0.4 MILLIGRAM(S): 0.4 CAPSULE ORAL at 20:14

## 2022-05-29 RX ADMIN — Medication 650 MILLIGRAM(S): at 21:29

## 2022-05-29 RX ADMIN — Medication 5 MILLIGRAM(S): at 20:14

## 2022-05-29 NOTE — PROGRESS NOTE ADULT - SUBJECTIVE AND OBJECTIVE BOX
Cheif complaints and Diagnosis: anemia/ diahrrea/ black stools/ acute chf exacerbation    Subjective:       REVIEW OF SYSTEMS:    CONSTITUTIONAL: No weakness, fevers or chills  EYES/ENT: No visual changes;  No vertigo or throat pain   NECK: No pain or stiffness  RESPIRATORY: No cough, wheezing, hemoptysis; No shortness of breath  CARDIOVASCULAR: No chest pain or palpitations  GASTROINTESTINAL: No abdominal or epigastric pain. No nausea, vomiting, or hematemesis; No diarrhea or constipation. No melena or hematochezia.  GENITOURINARY: No dysuria, frequency or hematuria  NEUROLOGICAL: No numbness or weakness  SKIN: No itching, burning, rashes, or lesions   All other review of systems is negative unless indicated above      Vital Signs Last 24 Hrs  T(C): 36.4 (29 May 2022 08:06), Max: 36.5 (28 May 2022 23:21)  T(F): 97.6 (29 May 2022 08:06), Max: 97.7 (28 May 2022 23:21)  HR: 70 (29 May 2022 08:06) (70 - 85)  BP: 95/45 (29 May 2022 08:06) (95/45 - 107/34)  BP(mean): --  RR: 18 (29 May 2022 08:06) (17 - 18)  SpO2: 93% (29 May 2022 08:06) (77% - 100%)    HEENT:   pupils equal and reactive, EOMI, no oropharyngeal lesions, erythema, exudates, oral thrush    NECK:   supple, no carotid bruits, no palpable lymph nodes, no thyromegaly    CV:  +S1, +S2, regular, no murmurs or rubs    RESP:   lungs clear to auscultation bilaterally, no wheezing, rales, rhonchi, good air entry bilaterally    BREAST:  not examined    GI:  abdomen soft, non-tender, non-distended, normal BS, no bruits, no abdominal masses, no palpable masses    RECTAL:  not examined    :  not examined    MSK:   normal muscle tone, no atrophy, no rigidity, no contractions    EXT:   no clubbing, no cyanosis, no edema, no calf pain, swelling or erythema    VASCULAR:  pulses equal and symmetric in the upper and lower extremities    NEURO:  AAOX3, no focal neurological deficits, follows all commands, able to move extremities spontaneously    SKIN:  no ulcers, lesions or rashes    MEDICATIONS  (STANDING):  ALPRAZolam 0.5 milliGRAM(s) Oral at bedtime  ascorbic acid 500 milliGRAM(s) Oral daily  atorvastatin 80 milliGRAM(s) Oral at bedtime  cefepime  Injectable.      cefepime  Injectable. 1000 milliGRAM(s) IV Push every 24 hours  ferrous    sulfate 325 milliGRAM(s) Oral daily  finasteride 5 milliGRAM(s) Oral daily  loperamide 2 milliGRAM(s) Oral two times a day  melatonin 5 milliGRAM(s) Oral at bedtime  pantoprazole    Tablet 40 milliGRAM(s) Oral before breakfast  ranolazine 500 milliGRAM(s) Oral two times a day  tamsulosin 0.4 milliGRAM(s) Oral at bedtime    MEDICATIONS  (PRN):  acetaminophen     Tablet .. 650 milliGRAM(s) Oral every 6 hours PRN Temp greater or equal to 38C (100.4F), Mild Pain (1 - 3)  aluminum hydroxide/magnesium hydroxide/simethicone Suspension 30 milliLiter(s) Oral every 4 hours PRN Dyspepsia  ondansetron Injectable 4 milliGRAM(s) IV Push every 8 hours PRN Nausea and/or Vomiting      28 May 2022 08:45    131    |  100    |  33     ----------------------------<  111    4.8     |  23     |  1.33     Ca    7.9        28 May 2022 08:45                Assessment and Plan:       88 y/o M with PMH HTN, CAD s/p CABG, AAA s/p repair, CKD Stage 3, CHFpEF, insomnia, obstructive uropathy, BPH, Dz reflux, [place], dementia,  sent from SNF for evaluation of diarrhea.       diarrhea.  - hx of recent ABx use. possibly related to that.? reactive?   - watery stool has improved; stool is now formed. Immodium working well.   - GI PCR:  negative.  - C. dif. PCR:  negative.  - GI consult.  -taper immodium to BID       Acute hypoxic respiratory failure secondary to aspiration pna and acute ssytolic chf  -start iv lasix x 2 doses  -start cefepime  -elevate head 45 deg at all times        Black liquid diahrrea 5/28  -stop heparin sc, hold asp /plavix till GI / cardio evaluation  -f/u cbc   -nursing to send stool occult >> positive        Hyponatremia secondary to dehydration and GI losses  -c/w ivf      Stage II CKD  -bl is 1.6-1.4  -currently at baseline.   -note: there is no douglas on this admission    anemia - chronic.  - multifactorial:  dilutional;  malnutrition.  - stool OB:  negative.  - Hbg trending upward, 7.8.  - consider transfusion PRN.  - Fe + vitamine C.    L hip ecchymosis.  - hx R AZAM.  - denied knowledge of recent fall.  no pain.  - L hip xray:  negative.  - further recommendations pending above.    pressure ulcers - POA.  - LWC.  - optimize nutrition.  - wound care RN.    severe-protein malnutrition.  - liberalize diet to regular.  - ensure Enlive 8oz tid.  - encourage PO.  - daily weight.  - nutritionist.    pyuria.  hx urinary retention-chronic Trevino, BPH and obstructive uropathy.  - hx MRSA UTI and ureteritis.  - Trevino catheter changed in ED.  - UCx:  no significant growth.  - Flomax.  - observing off ABx.  - ID.      hx HFpEF.  - compensated.  - BB.  - holding Lasix.    HTN- thus far, during this admission , patient has been with low end BP  -d/c Toprolol    hx CAD - CABG.  - AP + BB + ST.  -asp/plavix on hold due to anemia and black stools >> cardio consult to weigh in on risk factors    debility.  - PT.    other chronic stable medical issues:  HFpEF; AAA- repaired;  HTN.  - continue medical management.    DVT prophylaxis.  - UFH sq.    AD.  - no limits set at current time.    disposition.  - 2W.    communication.  - 2W RN.  - 2W assistant RN manager.  - 2W CM.  - 05/22;  05/23 patient's son:  update given and case discussed.       Dispo: patient will be here for abx and iv lasix till Tuesday       Cheif complaints and Diagnosis: anemia/ diahrrea/ black stools/ acute chf exacerbation    Subjective: no complaints; o2 sat improved today      REVIEW OF SYSTEMS:    CONSTITUTIONAL: No weakness, fevers or chills  EYES/ENT: No visual changes;  No vertigo or throat pain   NECK: No pain or stiffness  RESPIRATORY: No cough, wheezing, hemoptysis; No shortness of breath  CARDIOVASCULAR: No chest pain or palpitations  GASTROINTESTINAL: No abdominal or epigastric pain. No nausea, vomiting, or hematemesis; No diarrhea or constipation. No melena or hematochezia.  GENITOURINARY: No dysuria, frequency or hematuria  NEUROLOGICAL: No numbness or weakness  SKIN: No itching, burning, rashes, or lesions   All other review of systems is negative unless indicated above      Vital Signs Last 24 Hrs  T(C): 36.4 (29 May 2022 08:06), Max: 36.5 (28 May 2022 23:21)  T(F): 97.6 (29 May 2022 08:06), Max: 97.7 (28 May 2022 23:21)  HR: 70 (29 May 2022 08:06) (70 - 85)  BP: 95/45 (29 May 2022 08:06) (95/45 - 107/34)  BP(mean): --  RR: 18 (29 May 2022 08:06) (17 - 18)  SpO2: 93% (29 May 2022 08:06) (77% - 100%)    HEENT:   pupils equal and reactive, EOMI, no oropharyngeal lesions, erythema, exudates, oral thrush    NECK:   supple, no carotid bruits, no palpable lymph nodes, no thyromegaly    CV:  +S1, +S2, regular, no murmurs or rubs    RESP:   lungs clear to auscultation bilaterally, no wheezing, rales, rhonchi, good air entry bilaterally    BREAST:  not examined    GI:  abdomen soft, non-tender, non-distended, normal BS, no bruits, no abdominal masses, no palpable masses    RECTAL:  not examined    :  not examined    MSK:   normal muscle tone, no atrophy, no rigidity, no contractions    EXT:   no clubbing, no cyanosis, no edema, no calf pain, swelling or erythema    VASCULAR:  pulses equal and symmetric in the upper and lower extremities    NEURO:  AAOX3, no focal neurological deficits, follows all commands, able to move extremities spontaneously    SKIN:  no ulcers, lesions or rashes    MEDICATIONS  (STANDING):  ALPRAZolam 0.5 milliGRAM(s) Oral at bedtime  ascorbic acid 500 milliGRAM(s) Oral daily  atorvastatin 80 milliGRAM(s) Oral at bedtime  cefepime  Injectable.      cefepime  Injectable. 1000 milliGRAM(s) IV Push every 24 hours  ferrous    sulfate 325 milliGRAM(s) Oral daily  finasteride 5 milliGRAM(s) Oral daily  loperamide 2 milliGRAM(s) Oral two times a day  melatonin 5 milliGRAM(s) Oral at bedtime  pantoprazole    Tablet 40 milliGRAM(s) Oral before breakfast  ranolazine 500 milliGRAM(s) Oral two times a day  tamsulosin 0.4 milliGRAM(s) Oral at bedtime    MEDICATIONS  (PRN):  acetaminophen     Tablet .. 650 milliGRAM(s) Oral every 6 hours PRN Temp greater or equal to 38C (100.4F), Mild Pain (1 - 3)  aluminum hydroxide/magnesium hydroxide/simethicone Suspension 30 milliLiter(s) Oral every 4 hours PRN Dyspepsia  ondansetron Injectable 4 milliGRAM(s) IV Push every 8 hours PRN Nausea and/or Vomiting      28 May 2022 08:45    131    |  100    |  33     ----------------------------<  111    4.8     |  23     |  1.33     Ca    7.9        28 May 2022 08:45                Assessment and Plan:       88 y/o M with PMH HTN, CAD s/p CABG, AAA s/p repair, CKD Stage 3, CHFpEF, insomnia, obstructive uropathy, BPH, Dz reflux, [place], dementia,  sent from SNF for evaluation of diarrhea.       diarrhea.  - hx of recent ABx use. possibly related to that.? reactive?   - watery stool has improved; stool is now formed. Immodium working well.   - GI PCR:  negative.  - C. dif. PCR:  negative.  - GI consult.  -taper immodium to daily       Acute hypoxic respiratory failure secondary to aspiration pna and acute ssytolic chf 5/28  -s/p iv lasix x 2 doses 5/28  -start cefepime  -elevate head 45 deg at all times        Black liquid diahrrea 5/28  -stop heparin sc, hold asp /plavix till GI / cardio evaluation  -f/u cbc   -nursing to send stool occult >> positive  -d/w with Dr. Purvis. patient too high risk at this time for EGD / colo and not stable enough for procedure. c/w monitoring  -hb stable 7.8        Hyponatremia secondary to dehydration and GI losses  -c/w ivf      Stage II CKD  -bl is 1.6-1.4  -currently at baseline.   -note: there is no douglas on this admission    anemia - chronic.  - multifactorial:  dilutional;  malnutrition.  - stool OB:  negative.  - Hbg trending upward, 7.8.  - consider transfusion PRN.  - Fe + vitamine C.    L hip ecchymosis.  - hx R AZAM.  - denied knowledge of recent fall.  no pain.  - L hip xray:  negative.  - further recommendations pending above.    pressure ulcers - POA.  - LWC.  - optimize nutrition.  - wound care RN.    severe-protein malnutrition.  - liberalize diet to regular.  - ensure Enlive 8oz tid.  - encourage PO.  - daily weight.  - nutritionist.    pyuria.  hx urinary retention-chronic Trevino, BPH and obstructive uropathy.  - hx MRSA UTI and ureteritis.  - Trevino catheter changed in ED.  - UCx:  no significant growth.  - Flomax.  - observing off ABx.  - ID.      hx HFpEF.  - compensated.  - BB.  - holding Lasix.    HTN- thus far, during this admission , patient has been with low end BP  -d/c Toprolol    hx CAD - CABG.  - AP + BB + ST.  -asp/plavix on hold due to anemia and black stools >> cardio consult to weigh in on risk factors    debility.  - PT.    other chronic stable medical issues:  HFpEF; AAA- repaired;  HTN.  - continue medical management.    DVT prophylaxis.  - UFH sq.    AD.  - no limits set at current time.    disposition.  - 2W.    communication.  - 2W RN.  - 2W assistant RN manager.  - 2W CM.  - 05/22;  05/23 patient's son:  update given and case discussed.       Dispo: patient will be here for abx and iv lasix prn till Tuesday

## 2022-05-30 LAB
ALBUMIN SERPL ELPH-MCNC: 1.5 G/DL — LOW (ref 3.3–5)
ALP SERPL-CCNC: 106 U/L — SIGNIFICANT CHANGE UP (ref 40–120)
ALT FLD-CCNC: 20 U/L — SIGNIFICANT CHANGE UP (ref 12–78)
ANION GAP SERPL CALC-SCNC: 10 MMOL/L — SIGNIFICANT CHANGE UP (ref 5–17)
ANION GAP SERPL CALC-SCNC: 8 MMOL/L — SIGNIFICANT CHANGE UP (ref 5–17)
APPEARANCE UR: CLEAR — SIGNIFICANT CHANGE UP
APTT BLD: 29.1 SEC — SIGNIFICANT CHANGE UP (ref 27.5–35.5)
APTT BLD: 29.5 SEC — SIGNIFICANT CHANGE UP (ref 27.5–35.5)
AST SERPL-CCNC: 21 U/L — SIGNIFICANT CHANGE UP (ref 15–37)
BILIRUB SERPL-MCNC: 0.6 MG/DL — SIGNIFICANT CHANGE UP (ref 0.2–1.2)
BILIRUB UR-MCNC: NEGATIVE — SIGNIFICANT CHANGE UP
BUN SERPL-MCNC: 43 MG/DL — HIGH (ref 7–23)
BUN SERPL-MCNC: 44 MG/DL — HIGH (ref 7–23)
CALCIUM SERPL-MCNC: 7.7 MG/DL — LOW (ref 8.5–10.1)
CALCIUM SERPL-MCNC: 7.7 MG/DL — LOW (ref 8.5–10.1)
CHLORIDE SERPL-SCNC: 97 MMOL/L — SIGNIFICANT CHANGE UP (ref 96–108)
CHLORIDE SERPL-SCNC: 98 MMOL/L — SIGNIFICANT CHANGE UP (ref 96–108)
CO2 SERPL-SCNC: 24 MMOL/L — SIGNIFICANT CHANGE UP (ref 22–31)
CO2 SERPL-SCNC: 25 MMOL/L — SIGNIFICANT CHANGE UP (ref 22–31)
COLOR SPEC: YELLOW — SIGNIFICANT CHANGE UP
CREAT SERPL-MCNC: 1.91 MG/DL — HIGH (ref 0.5–1.3)
CREAT SERPL-MCNC: 1.92 MG/DL — HIGH (ref 0.5–1.3)
DIFF PNL FLD: ABNORMAL
EGFR: 33 ML/MIN/1.73M2 — LOW
EGFR: 33 ML/MIN/1.73M2 — LOW
GLUCOSE SERPL-MCNC: 129 MG/DL — HIGH (ref 70–99)
GLUCOSE SERPL-MCNC: 144 MG/DL — HIGH (ref 70–99)
GLUCOSE UR QL: NEGATIVE — SIGNIFICANT CHANGE UP
HCT VFR BLD CALC: 24.7 % — LOW (ref 39–50)
HGB BLD-MCNC: 7.7 G/DL — LOW (ref 13–17)
INR BLD: 1.49 RATIO — HIGH (ref 0.88–1.16)
INR BLD: 1.54 RATIO — HIGH (ref 0.88–1.16)
KETONES UR-MCNC: NEGATIVE — SIGNIFICANT CHANGE UP
LACTATE SERPL-SCNC: 1.9 MMOL/L — SIGNIFICANT CHANGE UP (ref 0.7–2)
LACTATE SERPL-SCNC: 2.5 MMOL/L — HIGH (ref 0.7–2)
LEUKOCYTE ESTERASE UR-ACNC: ABNORMAL
MAGNESIUM SERPL-MCNC: 1.9 MG/DL — SIGNIFICANT CHANGE UP (ref 1.6–2.6)
MCHC RBC-ENTMCNC: 27.6 PG — SIGNIFICANT CHANGE UP (ref 27–34)
MCHC RBC-ENTMCNC: 31.2 GM/DL — LOW (ref 32–36)
MCV RBC AUTO: 88.5 FL — SIGNIFICANT CHANGE UP (ref 80–100)
NITRITE UR-MCNC: NEGATIVE — SIGNIFICANT CHANGE UP
PH UR: 5 — SIGNIFICANT CHANGE UP (ref 5–8)
PHOSPHATE SERPL-MCNC: 4.6 MG/DL — HIGH (ref 2.5–4.5)
PLATELET # BLD AUTO: 282 K/UL — SIGNIFICANT CHANGE UP (ref 150–400)
POTASSIUM SERPL-MCNC: 4.7 MMOL/L — SIGNIFICANT CHANGE UP (ref 3.5–5.3)
POTASSIUM SERPL-MCNC: 4.8 MMOL/L — SIGNIFICANT CHANGE UP (ref 3.5–5.3)
POTASSIUM SERPL-SCNC: 4.7 MMOL/L — SIGNIFICANT CHANGE UP (ref 3.5–5.3)
POTASSIUM SERPL-SCNC: 4.8 MMOL/L — SIGNIFICANT CHANGE UP (ref 3.5–5.3)
PROT SERPL-MCNC: 5.4 GM/DL — LOW (ref 6–8.3)
PROT UR-MCNC: 15
PROTHROM AB SERPL-ACNC: 17.4 SEC — HIGH (ref 10.5–13.4)
PROTHROM AB SERPL-ACNC: 18 SEC — HIGH (ref 10.5–13.4)
RBC # BLD: 2.79 M/UL — LOW (ref 4.2–5.8)
RBC # FLD: 17.2 % — HIGH (ref 10.3–14.5)
SODIUM SERPL-SCNC: 130 MMOL/L — LOW (ref 135–145)
SODIUM SERPL-SCNC: 132 MMOL/L — LOW (ref 135–145)
SP GR SPEC: 1.01 — SIGNIFICANT CHANGE UP (ref 1.01–1.02)
UROBILINOGEN FLD QL: NEGATIVE — SIGNIFICANT CHANGE UP
VANCOMYCIN FLD-MCNC: 15.7 UG/ML — SIGNIFICANT CHANGE UP (ref 10–20)
WBC # BLD: 17.09 K/UL — HIGH (ref 3.8–10.5)
WBC # FLD AUTO: 17.09 K/UL — HIGH (ref 3.8–10.5)

## 2022-05-30 PROCEDURE — 99291 CRITICAL CARE FIRST HOUR: CPT

## 2022-05-30 PROCEDURE — 71045 X-RAY EXAM CHEST 1 VIEW: CPT | Mod: 26,77

## 2022-05-30 PROCEDURE — 71045 X-RAY EXAM CHEST 1 VIEW: CPT | Mod: 26

## 2022-05-30 PROCEDURE — 76770 US EXAM ABDO BACK WALL COMP: CPT | Mod: 26

## 2022-05-30 RX ORDER — SODIUM CHLORIDE 9 MG/ML
1000 INJECTION, SOLUTION INTRAVENOUS
Refills: 0 | Status: COMPLETED | OUTPATIENT
Start: 2022-05-30 | End: 2022-05-30

## 2022-05-30 RX ORDER — MIDODRINE HYDROCHLORIDE 2.5 MG/1
10 TABLET ORAL EVERY 8 HOURS
Refills: 0 | Status: DISCONTINUED | OUTPATIENT
Start: 2022-05-30 | End: 2022-06-02

## 2022-05-30 RX ORDER — MEROPENEM 1 G/30ML
1000 INJECTION INTRAVENOUS EVERY 12 HOURS
Refills: 0 | Status: COMPLETED | OUTPATIENT
Start: 2022-05-30 | End: 2022-06-03

## 2022-05-30 RX ORDER — ACETAMINOPHEN 500 MG
1000 TABLET ORAL EVERY 6 HOURS
Refills: 0 | Status: DISCONTINUED | OUTPATIENT
Start: 2022-05-30 | End: 2022-06-09

## 2022-05-30 RX ORDER — NOREPINEPHRINE BITARTRATE/D5W 8 MG/250ML
0.05 PLASTIC BAG, INJECTION (ML) INTRAVENOUS
Qty: 16 | Refills: 0 | Status: DISCONTINUED | OUTPATIENT
Start: 2022-05-30 | End: 2022-06-08

## 2022-05-30 RX ORDER — SODIUM CHLORIDE 9 MG/ML
500 INJECTION INTRAMUSCULAR; INTRAVENOUS; SUBCUTANEOUS ONCE
Refills: 0 | Status: COMPLETED | OUTPATIENT
Start: 2022-05-30 | End: 2022-05-30

## 2022-05-30 RX ORDER — MIDODRINE HYDROCHLORIDE 2.5 MG/1
10 TABLET ORAL ONCE
Refills: 0 | Status: COMPLETED | OUTPATIENT
Start: 2022-05-30 | End: 2022-05-30

## 2022-05-30 RX ORDER — PHENYLEPHRINE HYDROCHLORIDE 10 MG/ML
0.1 INJECTION INTRAVENOUS
Qty: 40 | Refills: 0 | Status: DISCONTINUED | OUTPATIENT
Start: 2022-05-30 | End: 2022-05-31

## 2022-05-30 RX ORDER — CHLORHEXIDINE GLUCONATE 213 G/1000ML
1 SOLUTION TOPICAL
Refills: 0 | Status: DISCONTINUED | OUTPATIENT
Start: 2022-05-30 | End: 2022-06-09

## 2022-05-30 RX ORDER — MEROPENEM 1 G/30ML
1000 INJECTION INTRAVENOUS ONCE
Refills: 0 | Status: COMPLETED | OUTPATIENT
Start: 2022-05-30 | End: 2022-05-30

## 2022-05-30 RX ADMIN — Medication 5 MILLIGRAM(S): at 21:41

## 2022-05-30 RX ADMIN — Medication 3.26 MICROGRAM(S)/KG/MIN: at 05:28

## 2022-05-30 RX ADMIN — MEROPENEM 100 MILLIGRAM(S): 1 INJECTION INTRAVENOUS at 00:48

## 2022-05-30 RX ADMIN — MIDODRINE HYDROCHLORIDE 10 MILLIGRAM(S): 2.5 TABLET ORAL at 00:25

## 2022-05-30 RX ADMIN — CHLORHEXIDINE GLUCONATE 1 APPLICATION(S): 213 SOLUTION TOPICAL at 11:19

## 2022-05-30 RX ADMIN — RANOLAZINE 500 MILLIGRAM(S): 500 TABLET, FILM COATED, EXTENDED RELEASE ORAL at 21:41

## 2022-05-30 RX ADMIN — MEROPENEM 100 MILLIGRAM(S): 1 INJECTION INTRAVENOUS at 11:21

## 2022-05-30 RX ADMIN — MIDODRINE HYDROCHLORIDE 10 MILLIGRAM(S): 2.5 TABLET ORAL at 16:02

## 2022-05-30 RX ADMIN — SODIUM CHLORIDE 1000 MILLILITER(S): 9 INJECTION INTRAMUSCULAR; INTRAVENOUS; SUBCUTANEOUS at 00:35

## 2022-05-30 RX ADMIN — FINASTERIDE 5 MILLIGRAM(S): 5 TABLET, FILM COATED ORAL at 11:18

## 2022-05-30 RX ADMIN — SODIUM CHLORIDE 50 MILLILITER(S): 9 INJECTION, SOLUTION INTRAVENOUS at 01:49

## 2022-05-30 RX ADMIN — Medication 2 MILLIGRAM(S): at 11:19

## 2022-05-30 RX ADMIN — MIDODRINE HYDROCHLORIDE 10 MILLIGRAM(S): 2.5 TABLET ORAL at 21:42

## 2022-05-30 RX ADMIN — Medication 500 MILLIGRAM(S): at 11:18

## 2022-05-30 RX ADMIN — MEROPENEM 100 MILLIGRAM(S): 1 INJECTION INTRAVENOUS at 21:51

## 2022-05-30 RX ADMIN — RANOLAZINE 500 MILLIGRAM(S): 500 TABLET, FILM COATED, EXTENDED RELEASE ORAL at 11:18

## 2022-05-30 RX ADMIN — Medication 325 MILLIGRAM(S): at 11:17

## 2022-05-30 RX ADMIN — PHENYLEPHRINE HYDROCHLORIDE 2.61 MICROGRAM(S)/KG/MIN: 10 INJECTION INTRAVENOUS at 01:49

## 2022-05-30 RX ADMIN — PANTOPRAZOLE SODIUM 40 MILLIGRAM(S): 20 TABLET, DELAYED RELEASE ORAL at 11:19

## 2022-05-30 RX ADMIN — TAMSULOSIN HYDROCHLORIDE 0.4 MILLIGRAM(S): 0.4 CAPSULE ORAL at 21:41

## 2022-05-30 RX ADMIN — PHENYLEPHRINE HYDROCHLORIDE 2.61 MICROGRAM(S)/KG/MIN: 10 INJECTION INTRAVENOUS at 03:30

## 2022-05-30 RX ADMIN — ATORVASTATIN CALCIUM 80 MILLIGRAM(S): 80 TABLET, FILM COATED ORAL at 21:41

## 2022-05-30 NOTE — CONSULT NOTE ADULT - ASSESSMENT
88 y/o M with PMH HTN, CAD s/p CABG, AAA s/p repair, CKD Stage 3, CHFpEF, insomnia, obstructive uropathy, BPH, Dz reflux admitted to CCU last night with hypotension, Sepsis.     5/30/22  looks surprisingly good this morning.   lungs are clear on examination so the low dose infusion of lactated ringers is appropriate.   continue Midodrine and fluids and also start to wean down the pressors.   following the hematocrit and if bleeding continues, would transfuse blood. Plavix and Aspirin on hold.   supportive care.

## 2022-05-30 NOTE — PROVIDER CONTACT NOTE (EICU) - BACKGROUND
89M from SNF  HTN, CAD s/p CABG, AAA s/p repair, CKD Stage 3 obstructive uropathy chronic waite multiple decubiti admitted with loose stools, FOBT+, DAPT discontinued, now with leukocytosis, fever and hypotension. Transferred to ICU for pressor support

## 2022-05-30 NOTE — SWALLOW BEDSIDE ASSESSMENT ADULT - SLP GENERAL OBSERVATIONS
seated in bed, sleepy but rousable and then able to participate fully.  messages episodically needed repetition 2/2 Pt's hearing loss. necessary to remove the mask and speak very clearly with over articulation.

## 2022-05-30 NOTE — PROCEDURE NOTE - NSPOSTPRCRAD_GEN_A_CORE
central line located in the superior vena cava/no pneumothorax/post-procedure radiography performed
central line located in the superior vena cava

## 2022-05-30 NOTE — SWALLOW BEDSIDE ASSESSMENT ADULT - COMMENTS
8 y/o male with HTN, CAD s/p CABG, AAA s/p repair, CKD Stage 3, CHFpEF, insomnia, obstructive uropathy with chronic waite, BPH and reflux sent from SNF for evaluation of diarrhea. Pt is poor historian due to hearing impairment and  Dementia. Reports that developed diarrhea overnight, not sure how many BMs had, as wearing diapers. He denies fevers or chills, has poor appetite but not new. No abd pain or cramping. Pt reports that his wife also is with loose stools for a few days.   reportedly also [place].   Service is asked to evaluate pt for po intake, and determine level of diet consistency.

## 2022-05-30 NOTE — PROGRESS NOTE ADULT - SUBJECTIVE AND OBJECTIVE BOX
Code sepsis       89M from SNF  HTN, CAD s/p CABG, AAA s/p repair, CKD Stage 3 obstructive uropathy chronic waite multiple decubiti     In the hosp for loose stools FTT   has recently developed black stools with a drifting hgb.  DAPT/UFH sq stopped       Now with fever new leukocytosis hypotension  and lactic acidosis that is not responsive to fluids.  Septic shock     Has received 1.5 liters.  Deferring any additional fluids as he has a reduced EF edema on exam and B lines on POCUS     Noted worsening renal failure.      Suspect aspiration PNA as the source of sepsis as he is coughing with liquids and cxr has worsened.        Move to critical care  Upgade ABX  at risk for MDR organisms  vanco x1   Cultures blood u/a check CXR  Type and cross  Midodrine PO  Type and screen  Transfuse if hgb< 7   Pressor if needed to defend the MAP

## 2022-05-30 NOTE — SWALLOW BEDSIDE ASSESSMENT ADULT - ORAL PREPARATORY PHASE
METFORMIN START UP DOSING  Week 1 : 1 tab daily with dinner  Week 2 : 2 tabs daily with dinner  Week 3: 1 tab with breakfast and 2 tabs with dinner  Week 4+ 2 tabs twice daily with meals Within functional limits

## 2022-05-30 NOTE — PROGRESS NOTE ADULT - SUBJECTIVE AND OBJECTIVE BOX
Hospital D # 9  CCU 3 1  [facility] CVL # 0    CC:  Shock     HPI:    89 y/o male with HTN, CAD s/p CABG, AAA s/p repair, CKD Stage 3, CHFpEF, insomnia, obstructive uropathy with chronic waite, BPH and reflux sent from SNF for evaluation of diarrhea. Pt is poor historian due to hearing impairment and  Dementia. Reports that developed diarrhea overnight, not sure how many BMs had, as wearing diapers. He denies fevers or chills, has poor appetite but not new. No abd pain or cramping. Pt reports that his wife also is with loose stools for a few days.   Stool PCR and CDI NEGATIVE  He is tx to CCU on  for septic shock.  Lactate > 2.  Fever 101.8  and WBC 17    :  Pt seen and examined in CCU during IDR.  On norepi gtt 0.4 Hypothermic.  Lethargic and arousable.  WBC 17  Cr 1.9  Started on [place] O/N.  CXR-- Personally reviewed--effusions.  + UA.      PMH:  As above.     PSH:  As above.     FH: Non Contributory other than those listed in HPI    Social History:  Unobtainable due to clinical condition     MEDICATIONS  (STANDING):  ALPRAZolam 0.5 milliGRAM(s) Oral at bedtime  ascorbic acid 500 milliGRAM(s) Oral daily  atorvastatin 80 milliGRAM(s) Oral at bedtime  chlorhexidine 4% Liquid 1 Application(s) Topical <User Schedule>  ferrous    sulfate 325 milliGRAM(s) Oral daily  finasteride 5 milliGRAM(s) Oral daily  loperamide 2 milliGRAM(s) Oral daily  melatonin 5 milliGRAM(s) Oral at bedtime  meropenem  IVPB 1000 milliGRAM(s) IV Intermittent every 12 hours  midodrine 10 milliGRAM(s) Oral every 8 hours  norepinephrine Infusion 0.05 MICROgram(s)/kG/Min (3.26 mL/Hr) IV Continuous <Continuous>  pantoprazole    Tablet 40 milliGRAM(s) Oral before breakfast  phenylephrine    Infusion 0.1 MICROgram(s)/kG/Min (2.61 mL/Hr) IV Continuous <Continuous>  ranolazine 500 milliGRAM(s) Oral two times a day  tamsulosin 0.4 milliGRAM(s) Oral at bedtime    MEDICATIONS  (PRN):  acetaminophen     Tablet .. 650 milliGRAM(s) Oral every 6 hours PRN Temp greater or equal to 38C (100.4F), Mild Pain (1 - 3)  acetaminophen     Tablet .. 1000 milliGRAM(s) Oral every 6 hours PRN Temp greater or equal to 38C (100.4F), Moderate Pain (4 - 6)  aluminum hydroxide/magnesium hydroxide/simethicone Suspension 30 milliLiter(s) Oral every 4 hours PRN Dyspepsia  ondansetron Injectable 4 milliGRAM(s) IV Push every 8 hours PRN Nausea and/or Vomiting      Allergies: NKDA    ROS:  SEE BELOW        ICU Vital Signs Last 24 Hrs  T(C): 37 (30 May 2022 09:00), Max: 37.5 (29 May 2022 20:27)  T(F): 98.6 (30 May 2022 09:00), Max: 99.5 (29 May 2022 20:27)  HR: 81 (30 May 2022 10:00) (54 - 111)  BP: 113/53 (30 May 2022 09:00) (67/53 - 120/55)  BP(mean): 68 (30 May 2022 09:00) (48 - 77)  ABP: --  ABP(mean): --  RR: 21 (30 May 2022 10:00) (16 - 30)  SpO2: 100% (30 May 2022 10:00) (90% - 100%)          I&O's Summary    29 May 2022 07:01  -  30 May 2022 07:00  --------------------------------------------------------  IN: 790 mL / OUT: 1250 mL / NET: -460 mL        Physical Exam:  SEE BELOW                          7.7    17.09 )-----------( 282      ( 30 May 2022 06:40 )             24.7       05-30    130<L>  |  97  |  44<H>  ----------------------------<  144<H>  4.8   |  25  |  1.91<H>    Ca    7.7<L>      30 May 2022 06:40  Phos  4.6     05-30  Mg     1.9     05-30    TPro  5.4<L>  /  Alb  1.5<L>  /  TBili  0.6  /  DBili  x   /  AST  21  /  ALT  20  /  AlkPhos  106  05-                Urinalysis Basic - ( 29 May 2022 22:55 )    Color: Yellow / Appearance: Clear / S.015 / pH: x  Gluc: x / Ketone: Negative  / Bili: Negative / Urobili: Negative   Blood: x / Protein: 15 / Nitrite: Negative   Leuk Esterase: Moderate / RBC: 25-50 /HPF / WBC >50   Sq Epi: x / Non Sq Epi: Few / Bacteria: Many        DVT Prophylaxis:                                                            Contraindication:     Advanced Directives:    Discussed with:    Visit Information:  Time spent excluding procedure:      ** Time is exclusive of billed procedures and/or teaching and/or routine family updates.

## 2022-05-30 NOTE — PROVIDER CONTACT NOTE (EICU) - ACTION/TREATMENT ORDERED:
ordered CXR to confirm R IJ TLC placement. PAs Greg and Karlee to follow up results  ordered norepinephrine drip to replace phenylephrine drip, bedside PAs to discontinue phenylephrine once titrated off  urine culture ordered based on positive UA, also discussed renal imaging to eval for hydronephrosis/kidney stone as would favor  source of infection given rapid development of leukocytosis and hypotension

## 2022-05-30 NOTE — SWALLOW BEDSIDE ASSESSMENT ADULT - SWALLOW EVAL: DIAGNOSIS
age-appropriate swallow function for regular consistency diet.  pt able to verbalize needs and intentions without evidence of primary linguistic pathology. or motor speech deficits. pt is [place] face pt 6 ft away and remove mask to speak.

## 2022-05-30 NOTE — PROCEDURE NOTE - NSINDICATIONS_GEN_A_CORE
emergency venous access/venous access
critical illness/emergency venous access/venous access/volume resuscitation

## 2022-05-30 NOTE — CHART NOTE - NSREFPHYEXINPTDOCREFER_GEN_ALL_CORE
Laying in hospital bed, NAD. Neuro answering questions and following simple commands. Abdomen distended, soft, non-tender. Skin warm, dry

## 2022-05-30 NOTE — SWALLOW BEDSIDE ASSESSMENT ADULT - SWALLOW EVAL: RECOMMENDED FEEDING/EATING TECHNIQUES
allow for swallow between intakes/alternate food with liquid/check mouth frequently for oral residue/pocketing/crush medication (when feasible)/hard swallow w/ each bite or sip/maintain upright posture during/after eating for 30 mins/no straws/position upright (90 degrees)/small sips/bites

## 2022-05-30 NOTE — CHART NOTE - NSCHARTNOTEFT_GEN_A_CORE
Code sepsis was called on patient for hypotension SBP 70s and febrile rectal temp of 101.8 per bedside nurse. Patient alert, oriented, answering questions and following simple commands. Sepsis protocol initiated and only 1L LR given due to recent CHF exacerbation, CKD, and diuresis past 48 hours. Patient's BP did not respond appropriately to 1L bolus, SBP continues in high 70s to low 80s. Patient transferred to the CCU for further evaluation.    Neuro - Patient has PMHx of Dementia, but is alert, oriented, answering questions and following commands appropriately. Continue to monitor   CV - Chronic diastolic CHF, 1L LR given, maintenance IVF @50cc/hr, hold lasix for possible dehydration, repeat CXR, CAD hold plavix for GI bleed, Cardiology following  Pulm - SPO2 98% on 3L NC, titrate as needed, PNA on cefepime, repeat CXR, broaden spectrum with Meropenem  GI - Guaiac +, multiple black liquid/tarry stools, trend H&H, type & screen ordered, transfuse as needed, aspiration precautions, bedside swallow eval, GI following  Renal - CTN baseline 1.3-1.4, trend BMP, IVF maintenance, avoid nephrotoxic medications  Heme - Hold chemical DVT PPx for GI bleed, SCDs, trend H&H  ID - D/C cefepime and switch to Meropenem for PNA, Vanco x 1, BCx and UCx pending, WBC 16.84 trend CBC, reconsult ID Code sepsis was called on patient for hypotension SBP 70s and febrile rectal temp of 101.8 per bedside nurse. Patient alert, oriented, answering questions and following simple commands. Sepsis protocol initiated and only 1L LR given due to recent CHF exacerbation, CKD, and diuresis past 48 hours. Patient's BP did not respond appropriately to 1L bolus, SBP continues in high 70s to low 80s. Patient transferred to the CCU for further evaluation.    Neuro - Patient has PMHx of Dementia, but is alert, oriented, answering questions and following commands appropriately. Continue to monitor   CV - Chronic diastolic CHF, 1L LR given, maintenance IVF @50cc/hr, hold lasix for possible dehydration, repeat CXR, CAD hold plavix for GI bleed, Howie gtt for SBP support, Midordrine 10mg x 1, trend H&H, type and screen ordered, transfuse as needed, Cardiology following  Pulm - SPO2 98% on 3L NC, titrate as needed, PNA on cefepime, repeat CXR, broaden spectrum with Meropenem  GI - Guaiac +, multiple black liquid/tarry stools, trend H&H, type & screen ordered, transfuse as needed, aspiration precautions, bedside swallow eval for aspiration risk, GI following  Renal - CTN 1.98 baseline 1.3-1.4, trend BMP, IVF maintenance, avoid nephrotoxic medications  Heme - Hold chemical DVT PPx for GI bleed, SCDs, trend H&H  ID - D/C cefepime and switch to Meropenem for PNA, Vanco x 1, Vanco trough in am, BCx and UCx pending, WBC 16.84 trend CBC, reconsult ID Code sepsis was called on patient for hypotension SBP 70s and febrile rectal temp of 101.8 per bedside nurse. Patient alert, oriented, answering questions and following simple commands. Sepsis protocol initiated and only 1L LR given due to recent CHF exacerbation, CKD, and diuresis past 48 hours. Patient's BP did not respond appropriately to 1L bolus, SBP continues in high 70s to low 80s. Patient transferred to the CCU for further evaluation.    Neuro - Patient has PMHx of Dementia, but is alert, oriented, answering questions and following commands appropriately. Continue to monitor   CV - Chronic diastolic CHF, 1L LR given, maintenance IVF @50cc/hr, hold lasix for possible dehydration, repeat CXR, CAD hold plavix for GI bleed, Howie gtt for SBP support, Midordrine 10mg x 1, trend H&H, type and screen ordered, transfuse as needed, Cardiology following  Pulm - SPO2 98% on 3L NC, titrate as needed, PNA on cefepime, repeat CXR, broaden spectrum with Meropenem  GI - Guaiac +, multiple black liquid/tarry stools, trend H&H, type & screen ordered, transfuse as needed, aspiration precautions, bedside swallow eval for aspiration risk, GI following  Renal - DESIRAE CTN 1.98 baseline 1.3-1.4, trend BMP, IVF maintenance, avoid nephrotoxic medications  Heme - Hold chemical DVT PPx for GI bleed, SCDs, trend H&H  ID - D/C cefepime and switch to Meropenem for PNA, Vanco x 1, Vanco trough in am, BCx and UCx pending, WBC 16.84 trend CBC, reconsult ID    Case discussed with Dr Goyal, EICU attending

## 2022-05-30 NOTE — CONSULT NOTE ADULT - SUBJECTIVE AND OBJECTIVE BOX
88 y/o M with PMH HTN, CAD s/p CABG, AAA s/p repair, CKD Stage 3, CHFpEF, insomnia, obstructive uropathy, BPH, Dz reflux sent from SNF for evaluation of diarrhea. Pt is poor historian due to hearing impairment and  Dementia. Reports that developed diarrhea overnight, not sure how many BMs had, as wearing diapers. He denies fevers or chills, has poor appetite but not new. No abd pain or cramping. Pt reports that his wife also is with loose stools for a few days.   In ED: labs with elevated WBCs, elevated BUN/CR.  Pt was given 1000ml IVF bolus and PO VAnco x 1.   During assessment with RN Pt had large dark, tarry stool  (21 May 2022 14:32)    5/30/22  Last night, code sepsis was called on patient for hypotension SBP 70s and febrile rectal temp of 101.8.  Sepsis protocol initiated and only 1L LR given due to recent CHF exacerbation, CKD, and diuresis past 48 hours. Patient's BP did not respond appropriately to 1L bolus, SBP continues in high 70s to low 80s.  1L LR given, maintenance IVF @50cc/hr, hold lasix for possible dehydration, repeat CXR,   Plavix held for GI bleed, Howie gtt for SBP support, Midordrine 10mg x 1,   Treated for PNA with cefepime and Meropenem  Stool was guaiac positive    Today, continues with Midodrine QID and on Levophed.   he is alert and oriented.   His only complaint is that he isn't hungry.   Lung are clear on examination.       PAST MEDICAL & SURGICAL HISTORY:  HTN (hypertension)      AAA (abdominal aortic aneurysm)      CAD (coronary artery disease)      Stage 3 chronic kidney disease      History of CHF (congestive heart failure)      BPH (benign prostatic hyperplasia)      H/O urinary retention      S/P CABG (coronary artery bypass graft)      History of right hip replacement      CAD (coronary artery disease)  s/p stent placed      S/P left inguinal hernia repair      S/P AAA repair  7-        MEDICATIONS  (STANDING):  ALPRAZolam 0.5 milliGRAM(s) Oral at bedtime  ascorbic acid 500 milliGRAM(s) Oral daily  atorvastatin 80 milliGRAM(s) Oral at bedtime  chlorhexidine 4% Liquid 1 Application(s) Topical <User Schedule>  ferrous    sulfate 325 milliGRAM(s) Oral daily  finasteride 5 milliGRAM(s) Oral daily  loperamide 2 milliGRAM(s) Oral daily  melatonin 5 milliGRAM(s) Oral at bedtime  meropenem  IVPB 1000 milliGRAM(s) IV Intermittent every 12 hours  midodrine 10 milliGRAM(s) Oral every 8 hours  norepinephrine Infusion 0.05 MICROgram(s)/kG/Min (3.26 mL/Hr) IV Continuous <Continuous>  pantoprazole    Tablet 40 milliGRAM(s) Oral before breakfast  phenylephrine    Infusion 0.1 MICROgram(s)/kG/Min (2.61 mL/Hr) IV Continuous <Continuous>  ranolazine 500 milliGRAM(s) Oral two times a day  tamsulosin 0.4 milliGRAM(s) Oral at bedtime    MEDICATIONS  (PRN):  acetaminophen     Tablet .. 650 milliGRAM(s) Oral every 6 hours PRN Temp greater or equal to 38C (100.4F), Mild Pain (1 - 3)  acetaminophen     Tablet .. 1000 milliGRAM(s) Oral every 6 hours PRN Temp greater or equal to 38C (100.4F), Moderate Pain (4 - 6)  aluminum hydroxide/magnesium hydroxide/simethicone Suspension 30 milliLiter(s) Oral every 4 hours PRN Dyspepsia  ondansetron Injectable 4 milliGRAM(s) IV Push every 8 hours PRN Nausea and/or Vomiting    Allergies    Broccoli (Unknown)  penicillin (Vomiting; Nausea)    Intolerances      FAMILY HISTORY:  FH: hypertension (Mother)    Family history of hyperglycemia (Father)          REVIEW OF SYSTEMS:    CONSTITUTIONAL: No weakness, fevers or chills  EYES/ENT: No visual changes;  No vertigo or throat pain   NECK: No pain or stiffness  RESPIRATORY: No cough, wheezing, hemoptysis; No shortness of breath  CARDIOVASCULAR: No chest pain or palpitations  GASTROINTESTINAL: No abdominal or epigastric pain. No nausea, vomiting, or hematemesis; No diarrhea or constipation. No melena or hematochezia.  GENITOURINARY: No dysuria, frequency or hematuria  NEUROLOGICAL: No numbness or weakness  SKIN: No itching, burning, rashes, or lesions   All other review of systems is negative unless indicated above      PHYSICAL EXAM:  Daily     Daily   Vital Signs Last 24 Hrs  T(C): 35.3 (30 May 2022 04:00), Max: 37.5 (29 May 2022 20:27)  T(F): 95.5 (30 May 2022 04:00), Max: 99.5 (29 May 2022 20:27)  HR: 89 (30 May 2022 07:00) (54 - 111)  BP: 110/47 (30 May 2022 07:00) (67/53 - 120/55)  BP(mean): 63 (30 May 2022 07:00) (48 - 77)  RR: 25 (30 May 2022 07:00) (16 - 30)  SpO2: 100% (30 May 2022 07:00) (90% - 100%)    Constitutional: NAD, awake and alert, well-developed  HEENT: PERR, EOMI, Normal Hearing, MMM  Neck: Soft and supple, No LAD, No JVD  Respiratory: Breath sounds are clear bilaterally, No wheezing, rales or rhonchi  Cardiovascular: S1 and S2, regular rate and rhythm, no Murmurs, gallops or rubs  Gastrointestinal: Bowel Sounds present, soft, nontender, nondistended, no guarding, no rebound  Extremities: No peripheral edema  Vascular: 2+ peripheral pulses  Neurological: A/O x 3, no focal deficits  Musculoskeletal: 5/5 strength b/l upper and lower extremities  Skin: No rashes    LABS: All Labs Reviewed:                        7.7    17.09 )-----------( 282      ( 30 May 2022 06:40 )             24.7     05-30    130<L>  |  97  |  44<H>  ----------------------------<  144<H>  4.8   |  25  |  1.91<H>    Ca    7.7<L>      30 May 2022 06:40  Phos  4.6     05-30  Mg     1.9     05-30    TPro  5.4<L>  /  Alb  1.5<L>  /  TBili  0.6  /  DBili  x   /  AST  21  /  ALT  20  /  AlkPhos  106  05-29    PT/INR - ( 30 May 2022 06:40 )   PT: 18.0 sec;   INR: 1.54 ratio         PTT - ( 30 May 2022 06:40 )  PTT:29.1 sec      Blood Culture:     RADIOLOGY:< from: Xray Chest 1 View- PORTABLE-Urgent (Xray Chest 1 View- PORTABLE-Urgent .) (05.28.22 @ 11:20) >  ACC: [id number] EXAM:  XR CHEST PORTABLE URGENT 1V                          PROCEDURE DATE:  05/28/2022          INTERPRETATION:  Clinical Information:  Possible aspiration. Hypoxia.    Technique: AP chest image.    Comparison: XR Chest 5/21/2022. CTthorax 5/25/2022.    Findings/  Impression:    Bilateral perihilar and basilar opacities with air bronchograms noted,   left greater than right. These are new compared to the prior CXR from   5/21/2022. There are also bilateral pleural effusions, left greater than   right. Consider interval development of pneumonia versus fluid overload.    Cardiomediastinal silhouette is poorly evaluated due to adjacent   opacities, but appears to be enlarged.    Osseous structures are unremarkable for age.      < end of copied text >  < from: CT Abdomen and Pelvis w/ Oral Cont (05.25.22 @ 17:02) >  ACC: [id number] EXAM:  CT ABDOMEN AND PELVIS OC                          PROCEDURE DATE:  05/25/2022          INTERPRETATION:  CLINICAL INFORMATION: Diarrhea. Weight loss.    COMPARISON: CT abdomen pelvis 7/27/2020.    CONTRAST/COMPLICATIONS:  IV Contrast: NONE  Oral Contrast: Gastroview  Complications: None reported at time of study completion    PROCEDURE:  CT of the Abdomen and Pelvis was performed.  Sagittal and coronal reformats were performed.    FINDINGS:  Evaluation of the solid organs and vasculature is limited without   intravenous contrast.    LOWER CHEST: Cardiomegaly. Partially imaged cardiac device leads. Small   bilateral pleural effusions with adjacent compressive atelectasis.    LIVER: Within normal limits.  BILE DUCTS: Normal caliber.  GALLBLADDER: Cholelithiasis.  SPLEEN: Within normal limits.  PANCREAS: Within normal limits.  ADRENALS: Within normal limits.  KIDNEYS/URETERS: No hydronephrosis. Bilateral renal cysts.    BLADDER: Collapsed with Trevino catheter.  REPRODUCTIVE ORGANS: Prostate within normal limits.    BOWEL: No bowel obstruction. Submucosal fat deposition in the appendix.   Streak artifact from patient's right hip arthroplasty degrades images   through the pelvis. Moderate amount of stool throughout thecolon. Rectal   wall thickening and presacral edema.  PERITONEUM: No ascites.  VESSELS: Atherosclerotic changes. Status post aortobiiliac stent graft   placement. Excluded aneurysm sac measures 5.3 x 4.7 cm, previously 5.5 x   5.3 cm. SMA and bilateral renal artery stents are also noted.  RETROPERITONEUM/LYMPH NODES: No lymphadenopathy.  ABDOMINAL WALL: Within normal limits.  BONES: Degenerative changes.    IMPRESSION:  Moderate amount of stool throughout the colon. Rectal wall thickening and   presacral edema, suggestive of proctitis/stercoral colitis.    Small bilateral pleural effusions with adjacent compressive atelectasis.    < end of copied text >      EKG:< from: 12 Lead ECG (05.28.22 @ 02:19) >  Ventricular Rate 98 BPM    Atrial Rate 98 BPM    P-R Interval 160 ms    QRS Duration 160 ms    Q-T Interval 410 ms    QTC Calculation(Bazett) 523 ms    R Axis -57 degrees    T Axis 101 degrees    Diagnosis Line Electronic ventricular pacemaker  When compared with ECG of 28-MAY-2022 02:19,  No significant change was found    < end of copied text >      < from: CT Abdomen and Pelvis w/ Oral Cont (05.25.22 @ 17:02) >  ACC: [id number] EXAM:  CT ABDOMEN AND PELVIS OC                          PROCEDURE DATE:  05/25/2022          INTERPRETATION:  CLINICAL INFORMATION: Diarrhea. Weight loss.    COMPARISON: CT abdomen pelvis 7/27/2020.    CONTRAST/COMPLICATIONS:  IV Contrast: NONE  Oral Contrast: Gastroview  Complications: None reported at time of study completion    PROCEDURE:  CT of the Abdomen and Pelvis was performed.  Sagittal and coronal reformats were performed.    FINDINGS:  Evaluation of the solid organs and vasculature is limited without   intravenous contrast.    LOWER CHEST: Cardiomegaly. Partially imaged cardiac device leads. Small   bilateral pleural effusions with adjacent compressive atelectasis.    LIVER: Within normal limits.  BILE DUCTS: Normal caliber.  GALLBLADDER: Cholelithiasis.  SPLEEN: Within normal limits.  PANCREAS: Within normal limits.  ADRENALS: Within normal limits.  KIDNEYS/URETERS: No hydronephrosis. Bilateral renal cysts.    BLADDER: Collapsed with Trevino catheter.  REPRODUCTIVE ORGANS: Prostate within normal limits.    BOWEL: No bowel obstruction. Submucosal fat deposition in the appendix.   Streak artifact from patient's right hip arthroplasty degrades images   through the pelvis. Moderate amount of stool throughout thecolon. Rectal   wall thickening and presacral edema.  PERITONEUM: No ascites.  VESSELS: Atherosclerotic changes. Status post aortobiiliac stent graft   placement. Excluded aneurysm sac measures 5.3 x 4.7 cm, previously 5.5 x   5.3 cm. SMA and bilateral renal artery stents are also noted.  RETROPERITONEUM/LYMPH NODES: No lymphadenopathy.  ABDOMINAL WALL: Within normal limits.  BONES: Degenerative changes.    IMPRESSION:  Moderate amount of stool throughout the colon. Rectal wall thickening and   presacral edema, suggestive of proctitis/stercoral colitis.    Small bilateral pleural effusions with adjacent compressive atelectasis.    < end of copied text >  CARDIOLOGY TESTING:  < from: Transthoracic Echocardiogram Follow Up (05.09.22 @ 15:22) >  ACC: [id number] EXAM:  ECHO TTE WO CON FOLLOW UP LTD                          PROCEDURE DATE:  05/09/2022          INTERPRETATION:  Transthoracic Echocardiography Report (TTE)     Demographics     Patient name            TAHIRA STEWART      Age      89 year(s)     Med Rec #               [id number]            Gender         Male     Account #               [id number]         Date of Birth  06/16/1932     Interpreting Physician  Mart Ibarra MD      Room Number    0545     Referring Physician     Jennifer Shields      Sonographer    Eric Dubon     Date of study           05/09/2022 09:11 AM     Height                  66.93 in             Weight         149.92 pounds    Type of Study:     TTE procedure: ECHO TTE WO CON FOLLOW UP LTD     BP: 113/49 mmHg     Technical Quality: Fair    Indications   1) R60.9 - Edema    M-Mode Measurements (cm)     LVEDd: 6.12 cm                       LVESd: 4.72 cm   IVSEd: 1.09 cm   LVPWd: 1.06 cm     Findings     Left Ventricle   Limited study to assess left ventricular function and pericardial   effusion.   Estimated left ventricular ejection fraction is 45-50 %.   The left ventricle is normal in wall thickness.   The left ventricle cavity is mildly dilated.   Wall motion abnormalities are noted with mildly depressed LV systolic   function.     Pericardial Effusion   No evidence of pericardial effusion.     Pleural Effusion   No evidence of pleural effusion.     Impression     Summary     Limited study to assess left ventricular function and pericardial   effusion.   Estimated left ventricular ejection fraction is 45-50 %.   The left ventricle is normal in wall thickness.   The left ventricle cavity is mildly dilated.   Wall motion abnormalities are noted with mildly depressed LV systolic   function.   No evidence of pericardial effusion.   No evidence of pleural effusion.    < end of copied text >  < from: Cardiac Catheterization (12.22.21 @ 14:53) >   Angiographic Findings     Cardiac Arteries and Lesion Findings    LMCA: Minor irregularities.    LAD:     Prox LAD: 100% stenosis.     Comments:Distal vessel large caliber. Fills via LIMA.    LCx:     Prox CX: Ostial.100% stenosis.    RCA:     Prox RCA: Ostial.100% stenosis.     Comments:Distal vessel fills via SVG.    Ramus:     Ramus: Ostial.50% stenosis.The lesion was discrete.     Comments:Distal vessel medium caliber, mild diffuse disease. Patent   stent.    Graft Lesions     Aorta Left to 1st Diag: Proximal anastomosis.100% stenosis.     Aorta Left to 1st Ob Laura: Proximal anastomosis.100% stenosis.    Cardiac Grafts  -There is a LIMAgraft that originates at the LIMA and attaches to the   Dist  LAD.No disease.  -There is a Vein graft that originates at the Aorta Right and attaches to  the R PDA.Mild diffuse disease.  -There is a Vein graft that originates at the Aorta Left and attaches to   the  1st Diag.  -There is a Vein graft that originates at the Aorta Left and attaches to   the  1st Ob Laura.    VA  Ventriculography Findings:  LV angiogram not performed because of elevated S. Creatinine.     Impression     Diagnostic Conclusions     Three Vessel coronary artery disease.   Patent LIMA to LAD.   Patent SVG to RPDA.   Occluded SVG to D1 and OM. NO additional grafts seen on aortogram   Elevated left ventricular end-diastolic pressure 20 mm Hg.   No aortic valve stenosis.      < end of copied text >

## 2022-05-30 NOTE — SWALLOW BEDSIDE ASSESSMENT ADULT - NS SPL SWALLOW CLINIC TRIAL FT
oral-pharyngeal swallow is within functional limits for age and to remain on regular consistency.  meds as tolerated. use cup for drinking. Disc with NSg. pt is at baseline and Service will not follow.

## 2022-05-30 NOTE — PROGRESS NOTE ADULT - ASSESSMENT
IMP:    89 y/o male with HTN, CAD s/p CABG, AAA s/p repair, CKD Stage 3, CHFpEF, insomnia, obstructive uropathy with chronic waite, BPH and reflux admitted with diarrhea--Stool specimen negative for PCR and C. diff   Tx to CCU for hypotension and Shock likely sepsis--?? Urinary source given chronic waite.  + UA    Critically ill.  High risk for acute decompensation and deterioration including death   Patient requires critical care for support of one or more vital organ systems with a high probability of imminent or life threatening deterioration in his/her condition     Plan:    Cont with IV Dorina (1)  Actively titrate pressors to keep MAP > 60.  Start Mido 10 q8  Follow up BCx  Limit IVF given CXR findings  HOB > 30  PO when more awake  DVT prophy--SCD.  Follow H/H.  If stable over next 24-48 hrs, will consider resuming sqhep  Palliative care consult    CCU care-- d/w ICU staff on multi disciplinary rounds

## 2022-05-30 NOTE — PROVIDER CONTACT NOTE (EICU) - ASSESSMENT
89 yo M with septic shock, possible due to aspiration PNA, also with grossly positive UA. Patient seen using two-way [employer] AV technology upon arrival in ICU.   -admit to ICU   -maintain MAP 65-70   -continue antibiotics  -follow up blood and urine cultures

## 2022-05-30 NOTE — PROVIDER CONTACT NOTE (EICU) - SITUATION
[place] from bedside Rosa Mendez for assistance with  s/p R IJ TLC placement
Called by CC JASON Garza regarding ICU transfer from medicine floor

## 2022-05-31 DIAGNOSIS — A41.9 SEPSIS, UNSPECIFIED ORGANISM: ICD-10-CM

## 2022-05-31 LAB
ANION GAP SERPL CALC-SCNC: 8 MMOL/L — SIGNIFICANT CHANGE UP (ref 5–17)
BUN SERPL-MCNC: 39 MG/DL — HIGH (ref 7–23)
CALCIUM SERPL-MCNC: 7.7 MG/DL — LOW (ref 8.5–10.1)
CHLORIDE SERPL-SCNC: 101 MMOL/L — SIGNIFICANT CHANGE UP (ref 96–108)
CO2 SERPL-SCNC: 22 MMOL/L — SIGNIFICANT CHANGE UP (ref 22–31)
CREAT SERPL-MCNC: 1.53 MG/DL — HIGH (ref 0.5–1.3)
EGFR: 43 ML/MIN/1.73M2 — LOW
GLUCOSE SERPL-MCNC: 117 MG/DL — HIGH (ref 70–99)
HCT VFR BLD CALC: 22.2 % — LOW (ref 39–50)
HGB BLD-MCNC: 7.5 G/DL — LOW (ref 13–17)
MAGNESIUM SERPL-MCNC: 2.1 MG/DL — SIGNIFICANT CHANGE UP (ref 1.6–2.6)
MCHC RBC-ENTMCNC: 28.7 PG — SIGNIFICANT CHANGE UP (ref 27–34)
MCHC RBC-ENTMCNC: 33.8 GM/DL — SIGNIFICANT CHANGE UP (ref 32–36)
MCV RBC AUTO: 85.1 FL — SIGNIFICANT CHANGE UP (ref 80–100)
PHOSPHATE SERPL-MCNC: 3.7 MG/DL — SIGNIFICANT CHANGE UP (ref 2.5–4.5)
PLATELET # BLD AUTO: 105 K/UL — LOW (ref 150–400)
POTASSIUM SERPL-MCNC: 4.3 MMOL/L — SIGNIFICANT CHANGE UP (ref 3.5–5.3)
POTASSIUM SERPL-SCNC: 4.3 MMOL/L — SIGNIFICANT CHANGE UP (ref 3.5–5.3)
RBC # BLD: 2.61 M/UL — LOW (ref 4.2–5.8)
RBC # FLD: 17.2 % — HIGH (ref 10.3–14.5)
SODIUM SERPL-SCNC: 131 MMOL/L — LOW (ref 135–145)
WBC # BLD: 11.5 K/UL — HIGH (ref 3.8–10.5)
WBC # FLD AUTO: 11.5 K/UL — HIGH (ref 3.8–10.5)

## 2022-05-31 PROCEDURE — 99291 CRITICAL CARE FIRST HOUR: CPT

## 2022-05-31 PROCEDURE — 99498 ADVNCD CARE PLAN ADDL 30 MIN: CPT

## 2022-05-31 PROCEDURE — 99497 ADVNCD CARE PLAN 30 MIN: CPT | Mod: 25

## 2022-05-31 PROCEDURE — 99221 1ST HOSP IP/OBS SF/LOW 40: CPT

## 2022-05-31 RX ADMIN — MIDODRINE HYDROCHLORIDE 10 MILLIGRAM(S): 2.5 TABLET ORAL at 15:04

## 2022-05-31 RX ADMIN — MIDODRINE HYDROCHLORIDE 10 MILLIGRAM(S): 2.5 TABLET ORAL at 21:46

## 2022-05-31 RX ADMIN — MEROPENEM 100 MILLIGRAM(S): 1 INJECTION INTRAVENOUS at 10:03

## 2022-05-31 RX ADMIN — Medication 500 MILLIGRAM(S): at 10:03

## 2022-05-31 RX ADMIN — Medication 3.26 MICROGRAM(S)/KG/MIN: at 17:55

## 2022-05-31 RX ADMIN — Medication 5 MILLIGRAM(S): at 21:46

## 2022-05-31 RX ADMIN — RANOLAZINE 500 MILLIGRAM(S): 500 TABLET, FILM COATED, EXTENDED RELEASE ORAL at 10:04

## 2022-05-31 RX ADMIN — Medication 3.26 MICROGRAM(S)/KG/MIN: at 05:32

## 2022-05-31 RX ADMIN — FINASTERIDE 5 MILLIGRAM(S): 5 TABLET, FILM COATED ORAL at 10:04

## 2022-05-31 RX ADMIN — ATORVASTATIN CALCIUM 80 MILLIGRAM(S): 80 TABLET, FILM COATED ORAL at 21:46

## 2022-05-31 RX ADMIN — CHLORHEXIDINE GLUCONATE 1 APPLICATION(S): 213 SOLUTION TOPICAL at 10:10

## 2022-05-31 RX ADMIN — Medication 325 MILLIGRAM(S): at 10:03

## 2022-05-31 RX ADMIN — Medication 2 MILLIGRAM(S): at 10:04

## 2022-05-31 RX ADMIN — TAMSULOSIN HYDROCHLORIDE 0.4 MILLIGRAM(S): 0.4 CAPSULE ORAL at 21:46

## 2022-05-31 RX ADMIN — PANTOPRAZOLE SODIUM 40 MILLIGRAM(S): 20 TABLET, DELAYED RELEASE ORAL at 10:05

## 2022-05-31 RX ADMIN — RANOLAZINE 500 MILLIGRAM(S): 500 TABLET, FILM COATED, EXTENDED RELEASE ORAL at 21:46

## 2022-05-31 RX ADMIN — MEROPENEM 100 MILLIGRAM(S): 1 INJECTION INTRAVENOUS at 21:45

## 2022-05-31 RX ADMIN — MIDODRINE HYDROCHLORIDE 10 MILLIGRAM(S): 2.5 TABLET ORAL at 05:32

## 2022-05-31 NOTE — PROGRESS NOTE ADULT - ASSESSMENT
IMP:    88 y/o male with HTN, CAD s/p CABG, AAA s/p repair, CKD Stage 3, CHFpEF, insomnia, obstructive uropathy with chronic waite, BPH and reflux admitted with diarrhea--Stool specimen negative for PCR and C. diff   Tx to CCU for hypotension and Shock likely sepsis--?? Urinary source given chronic waite.  + UA vs Decubs    Critically ill.  High risk for acute decompensation and deterioration including death   Patient requires critical care for support of one or more vital organ systems with a high probability of imminent or life threatening deterioration in his/her condition     Plan:    Cont with IV Dorina (2).  ID re evaluation   Actively titrate pressors to keep MAP > 60.  Cont Mido 10 q8  Follow up BCx  Limit IVF given CXR findings  HOB > 30  PO diet  DVT prophy--SCD.  Follow H/H.  If stable over next 24-48 hrs, will consider resuming sqhep  Palliative care consult    CCU care-- d/w ICU staff on multi disciplinary rounds and pt-- All concerns addressed including but not limited to diagnosis, treatment plan and overall prognosis

## 2022-05-31 NOTE — GOALS OF CARE CONVERSATION - ADVANCED CARE PLANNING - CONVERSATION/DISCUSSION
Palliative Care Referral Diagnosis/Prognosis/MOLST Discussed/Treatment Options/Palliative Care Referral

## 2022-05-31 NOTE — CONSULT NOTE ADULT - ASSESSMENT
Process of Care  --Reviewed dx/treatment problems and alignment with Goals of Care    Physical Aspects of Care  --Pain  patient denies at this time  c/w current managment    --Bowel Regimen  denies constipation  risk for constipation d/t immobility  daily dulcolax    --Dyspnea  No SOB at this time  comfortable and in NAD    --Nausea Vomiting  denies    --Weakness  PT as tolerated     Psychological and Psychiatric Aspects of Care:   --Greif/Bereavment: emotional support provided  --Hx of psychiatric dx: none  -Pastoral Care Available PRN     Social Aspects of Care  -SW involved     Cultural Aspects  -Primary Language: English    Goals of Care:          Prognosis:    Ethical and Legal Aspects:   NA        Capacity:  HCP/Surrogate:      Code Status:  MOLST:  Dispo Plan:    Discussed With: Case coordinated with attending and SW and RN     Time Spent: 90 minutes including the care, coordination and counseling of this patient, 50% of which was spent coordinating and counseling.  Process of Care  --Reviewed dx/treatment problems and alignment with Goals of Care    Physical Aspects of Care  --Pain  patient denies at this time  c/w current managment    --Bowel Regimen  denies constipation  risk for constipation d/t immobility  daily dulcolax    --Dyspnea  No SOB at this time  comfortable and in NAD    --Nausea Vomiting  denies    --Weakness  PT as tolerated     Psychological and Psychiatric Aspects of Care:   --Greif/Bereavment: emotional support provided  --Hx of psychiatric dx: none  -Pastoral Care Available PRN     Social Aspects of Care  -SW involved     Cultural Aspects  -Primary Language: English    Goals of Care:     We discussed Palliative Care team being a supportive team when a patient has ongoing illnesses.  We also discussed that it is not an end of life care service, but can help navigate symptoms and emotional support throughout their hospital stay here.       CPR/DNR discussed at length,   please refer to [facility] note      Prognosis:    Ethical and Legal Aspects:   NA        Capacity: pt w/ clear capacity   Surrogate: pt  defers to  danny (son)    Code Status: full code  MOLST: full code  Dispo Plan: pending d/t medical status    Discussed With: Case coordinated with attending and SW and RN     Time Spent: 90 minutes including the care, coordination and counseling of this patient, 50% of which was spent coordinating and counseling.

## 2022-05-31 NOTE — CONSULT NOTE ADULT - CONSULT REASON
Diarrhea
complex goals of care 2/2 to septic shock
diarrhea
Septic Shock, Cardiomyopathy, Hypotension, GI Bleed

## 2022-05-31 NOTE — PROGRESS NOTE ADULT - SUBJECTIVE AND OBJECTIVE BOX
Hospital D # 10  CCU # 2  [facility] CVL # 1    CC:  Shock     HPI:    88 y/o male with HTN, CAD s/p CABG, AAA s/p repair, CKD Stage 3, CHFpEF, insomnia, obstructive uropathy with chronic waite, BPH and reflux sent from SNF for evaluation of diarrhea. Pt is poor historian due to hearing impairment and Dementia. Reports that developed diarrhea overnight, not sure how many BMs had, as wearing diapers. He denies fevers or chills, has poor appetite but not new. No abd pain or cramping. Pt reports that his wife also is with loose stools for a few days.   Stool PCR and CDI NEGATIVE  He is tx to CCU on  for septic shock.  Lactate > 2.  Fever 101.8  and WBC 17    :  Pt seen and examined in CCU during IDR.  On norepi gtt 0.4 Hypothermic.  Lethargic and arousable.  WBC 17  Cr 1.9  Started on [place] O/N.  CXR-- Personally reviewed--effusions.  + UA.     :  Pt seen and examined in CCU during IDR.  Remains on pressors.  Awake.  BCx NTD.  Hypothermic.  WBC 11  Plats 105  Cr 1.5      PMH:  As above.     PSH:  As above.     FH: Non Contributory other than those listed in HPI    Social History:  NC    MEDICATIONS  (STANDING):  ALPRAZolam 0.5 milliGRAM(s) Oral at bedtime  ascorbic acid 500 milliGRAM(s) Oral daily  atorvastatin 80 milliGRAM(s) Oral at bedtime  chlorhexidine 4% Liquid 1 Application(s) Topical <User Schedule>  ferrous    sulfate 325 milliGRAM(s) Oral daily  finasteride 5 milliGRAM(s) Oral daily  loperamide 2 milliGRAM(s) Oral daily  melatonin 5 milliGRAM(s) Oral at bedtime  meropenem  IVPB 1000 milliGRAM(s) IV Intermittent every 12 hours  midodrine 10 milliGRAM(s) Oral every 8 hours  norepinephrine Infusion 0.05 MICROgram(s)/kG/Min (3.26 mL/Hr) IV Continuous <Continuous>  pantoprazole    Tablet 40 milliGRAM(s) Oral before breakfast  ranolazine 500 milliGRAM(s) Oral two times a day  tamsulosin 0.4 milliGRAM(s) Oral at bedtime    MEDICATIONS  (PRN):  acetaminophen     Tablet .. 650 milliGRAM(s) Oral every 6 hours PRN Temp greater or equal to 38C (100.4F), Mild Pain (1 - 3)  acetaminophen     Tablet .. 1000 milliGRAM(s) Oral every 6 hours PRN Temp greater or equal to 38C (100.4F), Moderate Pain (4 - 6)  aluminum hydroxide/magnesium hydroxide/simethicone Suspension 30 milliLiter(s) Oral every 4 hours PRN Dyspepsia  ondansetron Injectable 4 milliGRAM(s) IV Push every 8 hours PRN Nausea and/or Vomiting      Allergies: NKDA    ROS:  SEE BELOW        ICU Vital Signs Last 24 Hrs  T(C): 36.4 (31 May 2022 10:08), Max: 36.4 (31 May 2022 10:08)  T(F): 97.6 (31 May 2022 10:08), Max: 97.6 (31 May 2022 10:08)  HR: 89 (31 May 2022 07:30) (81 - 102)  BP: 107/51 (31 May 2022 07:30) (99/62 - 123/59)  BP(mean): 65 (31 May 2022 07:30) (62 - 85)  ABP: --  ABP(mean): --  RR: 22 (31 May 2022 07:30) (15 - 28)  SpO2: 99% (31 May 2022 07:30) (79% - 100%)          I&O's Summary    30 May 2022 07:01  -  31 May 2022 07:00  --------------------------------------------------------  IN: 1037 mL / OUT: 700 mL / NET: 337 mL        Physical Exam:  SEE BELOW                          7.5    11.50 )-----------( 105      ( 31 May 2022 06:26 )             22.2       -    131<L>  |  101  |  39<H>  ----------------------------<  117<H>  4.3   |  22  |  1.53<H>    Ca    7.7<L>      31 May 2022 06:26  Phos  3.7     -  Mg     2.1     -    TPro  5.4<L>  /  Alb  1.5<L>  /  TBili  0.6  /  DBili  x   /  AST  21  /  ALT  20  /  AlkPhos  106                  Urinalysis Basic - ( 29 May 2022 22:55 )    Color: Yellow / Appearance: Clear / S.015 / pH: x  Gluc: x / Ketone: Negative  / Bili: Negative / Urobili: Negative   Blood: x / Protein: 15 / Nitrite: Negative   Leuk Esterase: Moderate / RBC: 25-50 /HPF / WBC >50   Sq Epi: x / Non Sq Epi: Few / Bacteria: Many        DVT Prophylaxis:                                                            Contraindication:     Advanced Directives:    Discussed with:    Visit Information:  Time spent excluding procedure:      ** Time is exclusive of billed procedures and/or teaching and/or routine family updates.

## 2022-05-31 NOTE — PROGRESS NOTE ADULT - SUBJECTIVE AND OBJECTIVE BOX
Patient is a 89y old  Male who presents with a chief complaint of diarrhea (30 May 2022 11:40)    HPI:  88 y/o M with PMH HTN, CAD s/p CABG, AAA s/p repair, CKD Stage 3, CHFpEF, insomnia, obstructive uropathy, BPH, Dz reflux sent from SNF for evaluation of diarrhea. Transferred to ICU for hypotension and septic shock. Patient currently requiring Levophed gtt to maintain MAP >65      Allergies: Broccoli  penicillin    PAST MEDICAL & SURGICAL HISTORY:  HTN (hypertension)      AAA (abdominal aortic aneurysm)      CAD (coronary artery disease)      Stage 3 chronic kidney disease      History of CHF (congestive heart failure)      BPH (benign prostatic hyperplasia)      H/O urinary retention      S/P CABG (coronary artery bypass graft)      History of right hip replacement      CAD (coronary artery disease)  s/p stent placed      S/P left inguinal hernia repair      S/P AAA repair  2020        FAMILY HISTORY:  FH: hypertension (Mother)    Family history of hyperglycemia (Father)      SOCIAL HISTORY:    Home Medications:    Review of Systems:  Unable to participate in ROS    T(F): 96.2 (22 @ 00:00), Max: 98.6 (22 @ 09:00)  HR: 91 (22 @ 02:00) (72 - 102)  BP: 105/47 (22 @ 02:00) (76/57 - 123/59)  RR: 21 (22 @ 02:00) (15 - 30)  SpO2: 96% (22 @ 02:00)  Wt(kg): --    CAPILLARY BLOOD GLUCOSE      POCT Blood Glucose.: 148 mg/dL (29 May 2022 22:41)    I&O's Summary    29 May 2022 07:01  -  30 May 2022 07:00  --------------------------------------------------------  IN: 790 mL / OUT: 1250 mL / NET: -460 mL    30 May 2022 07:  -  31 May 2022 03:17  --------------------------------------------------------  IN: 747 mL / OUT: 300 mL / NET: 447 mL        Physical Exam:     Gen: critically ill appearing  Neuro: lethargic yet arousable  CVS: +S1S2  Resp: CTA  Abd: soft, NT, ND  Ext: warm, dry, no edema  Skin: well perfused    Meds:  meropenem  IVPB 1000 milliGRAM(s) IV Intermittent every 12 hours    midodrine 10 milliGRAM(s) Oral every 8 hours  norepinephrine Infusion 0.05 MICROgram(s)/kG/Min IV Continuous <Continuous>  phenylephrine    Infusion 0.1 MICROgram(s)/kG/Min IV Continuous <Continuous>  ranolazine 500 milliGRAM(s) Oral two times a day  tamsulosin 0.4 milliGRAM(s) Oral at bedtime     atorvastatin 80 milliGRAM(s) Oral at bedtime  finasteride 5 milliGRAM(s) Oral daily        acetaminophen     Tablet .. 650 milliGRAM(s) Oral every 6 hours PRN  acetaminophen     Tablet .. 1000 milliGRAM(s) Oral every 6 hours PRN  ALPRAZolam 0.5 milliGRAM(s) Oral at bedtime  melatonin 5 milliGRAM(s) Oral at bedtime  ondansetron Injectable 4 milliGRAM(s) IV Push every 8 hours PRN           aluminum hydroxide/magnesium hydroxide/simethicone Suspension 30 milliLiter(s) Oral every 4 hours PRN  loperamide 2 milliGRAM(s) Oral daily  pantoprazole    Tablet 40 milliGRAM(s) Oral before breakfast        ascorbic acid 500 milliGRAM(s) Oral daily  ferrous    sulfate 325 milliGRAM(s) Oral daily        chlorhexidine 4% Liquid 1 Application(s) Topical <User Schedule>                              7.7    17.09 )-----------( 282      ( 30 May 2022 06:40 )             24.7       05-30    130<L>  |  97  |  44<H>  ----------------------------<  144<H>  4.8   |  25  |  1.91<H>    Ca    7.7<L>      30 May 2022 06:40  Phos  4.6     05-30  Mg     1.9     05-30    TPro  5.4<L>  /  Alb  1.5<L>  /  TBili  0.6  /  DBili  x   /  AST  21  /  ALT  20  /  AlkPhos  106      Lactate 1.9            @ 06:40          PT/INR - ( 30 May 2022 06:40 )   PT: 18.0 sec;   INR: 1.54 ratio         PTT - ( 30 May 2022 06:40 )  PTT:29.1 sec  Urinalysis Basic - ( 29 May 2022 22:55 )    Color: Yellow / Appearance: Clear / S.015 / pH: x  Gluc: x / Ketone: Negative  / Bili: Negative / Urobili: Negative   Blood: x / Protein: 15 / Nitrite: Negative   Leuk Esterase: Moderate / RBC: 25-50 /HPF / WBC >50   Sq Epi: x / Non Sq Epi: Few / Bacteria: Many              Radiology:   < from: US Kidney and Bladder (22 @ 07:57) >    ACC: [id number] EXAM:  US KIDNEYS AND BLADDER                          PROCEDURE DATE:  2022          INTERPRETATION:  CLINICAL INFORMATION: Urosepsis.    COMPARISON: Abdominal CT dated 2022 and renal ultrasound dated   2022.    TECHNIQUE: Sonography of the kidneys and bladder.    FINDINGS:  Right kidney: 9.4 cm. Normal in size without hydronephrosis. There are   scattered simple appearing renal cysts with the largest measuring 3.7 cm   in the midpole. Mild increased echogenicity.    Left kidney: 13.2 cm. Normal in size without hydronephrosis. There is 6.4   cm simple appearing cyst in the midpole. Mild increased echogenicity.    Urinary bladder: Urinary bladder is collapsed around a Trevino catheter   limiting its evaluation.    IMPRESSION:  Bilateral simple appearing renal cysts. No hydronephrosis.    Mild increase renal echogenicity suggestive of medical renal disease.    Urinary bladder is collapsed around a Trevino catheter.        --- End of Report ---            CHERELLE PINEDA MD; Attending Radiologist  This document has been electronically signed. May 30 2022  8:42AM    < end of copied text >      Problems  -Septic Shock  -UTI  -Acute renal failure    Assessment/Plan:    -Waxing and Waning mental status; hx of Dementia  -Septic shock; actively titrating Levophed gtt for MAP >65. Additional Midodrine for BP support  -Acute renal failure likely secondary to ATN/Shock state. Renal US negative. Strict I's and O's  -UA grossly positive. Empiric abx coverage w/ Meropenem. Follow cultures.  -PCR negative for C.diff  -Monitor H/H due to concern for GIB. Holding chemical DVT ppx.    CC time: 38 minutes including time spent reviewing chart, ordering tests/labs, discussing with interdisciplinary team. Not including time spent performing procedures.

## 2022-05-31 NOTE — PROGRESS NOTE ADULT - SUBJECTIVE AND OBJECTIVE BOX
Date of service: 05-31-22 @ 16:06      Patient lying in bed; has been transferred to icu due to septic shock; afebrile, has no specific complaints, however nursing notes large sacral decubitus      ROS unable to obtain secondary to patient medical condition     MEDICATIONS  (STANDING):  ALPRAZolam 0.5 milliGRAM(s) Oral at bedtime  ascorbic acid 500 milliGRAM(s) Oral daily  atorvastatin 80 milliGRAM(s) Oral at bedtime  chlorhexidine 4% Liquid 1 Application(s) Topical <User Schedule>  ferrous    sulfate 325 milliGRAM(s) Oral daily  finasteride 5 milliGRAM(s) Oral daily  loperamide 2 milliGRAM(s) Oral daily  melatonin 5 milliGRAM(s) Oral at bedtime  meropenem  IVPB 1000 milliGRAM(s) IV Intermittent every 12 hours  midodrine 10 milliGRAM(s) Oral every 8 hours  norepinephrine Infusion 0.05 MICROgram(s)/kG/Min (3.26 mL/Hr) IV Continuous <Continuous>  pantoprazole    Tablet 40 milliGRAM(s) Oral before breakfast  ranolazine 500 milliGRAM(s) Oral two times a day  tamsulosin 0.4 milliGRAM(s) Oral at bedtime    MEDICATIONS  (PRN):  acetaminophen     Tablet .. 650 milliGRAM(s) Oral every 6 hours PRN Temp greater or equal to 38C (100.4F), Mild Pain (1 - 3)  acetaminophen     Tablet .. 1000 milliGRAM(s) Oral every 6 hours PRN Temp greater or equal to 38C (100.4F), Moderate Pain (4 - 6)  aluminum hydroxide/magnesium hydroxide/simethicone Suspension 30 milliLiter(s) Oral every 4 hours PRN Dyspepsia  ondansetron Injectable 4 milliGRAM(s) IV Push every 8 hours PRN Nausea and/or Vomiting      Vital Signs Last 24 Hrs  T(C): 36.4 (31 May 2022 10:08), Max: 36.4 (31 May 2022 10:08)  T(F): 97.6 (31 May 2022 10:08), Max: 97.6 (31 May 2022 10:08)  HR: 88 (31 May 2022 13:00) (83 - 102)  BP: 103/46 (31 May 2022 13:00) (90/46 - 118/55)  BP(mean): 61 (31 May 2022 13:00) (54 - 85)  RR: 21 (31 May 2022 13:00) (17 - 28)  SpO2: 94% (31 May 2022 12:30) (79% - 100%)        Physical Exam:          PE:  Constitutional: frail looking  HEENT: NC/AT, EOMI, PERRLA, conjunctivae clear; ears and nose atraumatic; pharynx benign  Neck: supple; thyroid not palpable; right neck IJ TLC  Back: no tenderness  Respiratory: respiratory effort normal; clear to auscultation  Cardiovascular: S1S2 regular, no murmurs  Abdomen: soft, not tender, not distended, positive BS; liver and spleen WNL  Genitourinary: no suprapubic tenderness  Musculoskeletal: no muscle tenderness, no joint swelling or tenderness  Extremities: no pedal edema  Neurological/ Psychiatric:  moving all extremities  Skin: no rashes; no palpable lesions    Labs: all available labs reviewed                                 Labs:                        7.5    11.50 )-----------( 105      ( 31 May 2022 06:26 )             22.2     05-31    131<L>  |  101  |  39<H>  ----------------------------<  117<H>  4.3   |  22  |  1.53<H>    Ca    7.7<L>      31 May 2022 06:26  Phos  3.7     05-31  Mg     2.1     05-31    TPro  5.4<L>  /  Alb  1.5<L>  /  TBili  0.6  /  DBili  x   /  AST  21  /  ALT  20  /  AlkPhos  106  05-29       Vancomycin Level, Random: 15.7 ug/mL (05-30 @ 06:40)      Cultures:       Culture - Blood (collected 05-29-22 @ 23:35)  Source: .Blood None  Preliminary Report (05-31-22 @ 07:00):    No growth to date.    Culture - Blood (collected 05-29-22 @ 23:24)  Source: .Blood None  Preliminary Report (05-31-22 @ 07:00):    No growth to date.      Ferritin, Serum: 404 ng/mL (05-21-22 @ 18:15)    < from: CT Abdomen and Pelvis w/ Oral Cont (05.25.22 @ 17:02) >    IMPRESSION:  Moderate amount of stool throughout the colon. Rectal wall thickening and   presacral edema, suggestive of proctitis/stercoral colitis.    Small bilateral pleural effusions with adjacent compressive atelectasis.    < end of copied text >      < from: Xray Chest 1 View-PORTABLE IMMEDIATE (Xray Chest 1 View-PORTABLE IMMEDIATE .) (05.30.22 @ 00:42) >  COMPARISON: 5/28/2022    FINDINGS/  IMPRESSION:    The left ICD isunchanged.    Stable pulmonary edema and bilateral pleural effusions. Bibasilar   opacities are unchanged.    < end of copied text >      Radiology: all available radiological tests reviewed      ACC: [id number] EXAM:  XR CHEST PORTABLE URGENT 1V                          PROCEDURE DATE:  05/21/2022          INTERPRETATION:  Portable chest radiograph    CLINICAL INFORMATION: Diarrhea    TECHNIQUE:  Portable  AP chest radiograph.    COMPARISON:5/8/2022 chest radiograph .    FINDINGS:  CATHETERS AND TUBES: None    PULMONARY: The visualized lungs are clear of airspace consolidations or   effusions.   No pneumothorax.    HEART/VASCULAR: The  heart is enlarged in transverse diameter. Status   post coronary artery bypass graft procedure. Cardiac device wire leads   are within right atrium and right ventricle.    BONES: Visualized osseous structures are intact. Mild thoracic   dextroscoliosis.    IMPRESSION:   No radiographic evidence of active chest disease.      Advanced directives addressed: full resuscitation

## 2022-05-31 NOTE — PROGRESS NOTE ADULT - ASSESSMENT
89 y/o M with PMH HTN, CAD s/p CABG, AAA s/p repair, CKD Stage 3, CHFpEF, insomnia, obstructive uropathy, BPH, Dz reflux sent from SNF for evaluation of diarrhea. Pt is poor historian due to hearing impairment and  Dementia. Reports that developed diarrhea overnight, not sure how many BMs had, as wearing diapers. He denies fevers or chills, has poor appetite but not new. No abd pain or cramping. Pt reports that his wife also is with loose stools for a few days. In ED: labs with elevated WBCs, elevated BUN/CR.  Pt was given 1000ml IVF bolus and PO Vanco x 1.  During assessment with RN Pt had large dark, tarry stool. Was given oral vancomycin c diff pending.    1. diarrhea. leukocytosis - resolved. ckd 3  - initially admitted with diarrhea; hospital course complicated by septic shock of unclear etiology, possibly stercoral colitis, versus infected sacral decubitus  - follow up cultures   - does not appear to have pneumonia based on imaging  - will continue meropenem as ordered for now  - will review sacral decubitus with nursing    2 other issues - care per medicine

## 2022-05-31 NOTE — CONSULT NOTE ADULT - SUBJECTIVE AND OBJECTIVE BOX
HPI:    89 y/o M with PMH HTN, CAD s/p CABG, AAA s/p repair, CKD Stage 3, CHFpEF, insomnia, obstructive uropathy, BPH, Dz reflux sent from SNF for evaluation on 21st of may  for  diarrhea. As per recrods Pt is poor historian due to hearing impairment and  Dementia. Reports that developed diarrhea overnight, not sure how many BMs had, as wearing diapers. He denied fevers or chills, had poor appetite but not new. No abd pain or cramping. Pt reports that his wife also is with loose stools for a few days.    Transferred to ICU for hypotension and septic shock. Patient currently requiring Levophed gtt to maintain MAP >65.  Stool specimen negative for PCR and C. diff   Tx to CCU for hypotension and Shock likely sepsis--?? Urinary source given chronic waite.  + UA    22           PAIN: ( )Yes   ( )No  Level:  Location:  Intensity:    /10  Quality:  Aggravating Factors:  Alleviating Factors:  Radiation:  Duration/Timing:  Impact on ADLs:    DYSPNEA: ( ) Yes  ( ) No  Level:    PAST MEDICAL & SURGICAL HISTORY:  HTN (hypertension)      AAA (abdominal aortic aneurysm)      CAD (coronary artery disease)      Stage 3 chronic kidney disease      History of CHF (congestive heart failure)      BPH (benign prostatic hyperplasia)      H/O urinary retention      S/P CABG (coronary artery bypass graft)      History of right hip replacement      CAD (coronary artery disease)  s/p stent placed      S/P left inguinal hernia repair      S/P AAA repair  2020          SOCIAL HX:    Hx opiate tolerance ( )YES  ( )NO    Baseline ADLs  (Prior to Admission)  ( ) Independent   ( )Dependent    FAMILY HISTORY:  FH: hypertension (Mother)    Family history of hyperglycemia (Father)        Review of Systems:    Anxiety-  Depression-  Physical Discomfort-  Dyspnea-  Constipation-  Diarrhea-  Nausea-  Vomiting-  Anorexia-  Weight Loss-   Cough-  Secretions-  Fatigue-  Weakness-  Delirium-    All other systems reviewed and negative  Unable to obtain/Limited due to:      PHYSICAL EXAM:    Vital Signs Last 24 Hrs  T(C): 35.9 (31 May 2022 06:30), Max: 37 (30 May 2022 09:00)  T(F): 96.7 (31 May 2022 06:30), Max: 98.6 (30 May 2022 09:00)  HR: 89 (31 May 2022 07:30) (81 - 102)  BP: 107/51 (31 May 2022 07:30) (99/62 - 123/59)  BP(mean): 65 (31 May 2022 07:30) (62 - 85)  RR: 22 (31 May 2022 07:30) (15 - 28)  SpO2: 99% (31 May 2022 07:30) (79% - 100%)  Daily     Daily Weight in k.6 (31 May 2022 06:30)    PPSV2:   %  FAST:    General:  Mental Status:  HEENT:  Lungs:  Cardiac:  GI:  :  Ext:  Neuro:      LABS:                        7.5    11.50 )-----------( 105      ( 31 May 2022 06:26 )             22.2         131<L>  |  101  |  39<H>  ----------------------------<  117<H>  4.3   |  22  |  1.53<H>    Ca    7.7<L>      31 May 2022 06:26  Phos  3.7       Mg     2.1         TPro  5.4<L>  /  Alb  1.5<L>  /  TBili  0.6  /  DBili  x   /  AST  21  /  ALT  20  /  AlkPhos  106  05-29    PT/INR - ( 30 May 2022 06:40 )   PT: 18.0 sec;   INR: 1.54 ratio         PTT - ( 30 May 2022 06:40 )  PTT:29.1 sec  Albumin: Albumin, Serum: 1.5 g/dL ( @ 23:24)      Allergies    Broccoli (Unknown)  penicillin (Vomiting; Nausea)    Intolerances      MEDICATIONS  (STANDING):  ALPRAZolam 0.5 milliGRAM(s) Oral at bedtime  ascorbic acid 500 milliGRAM(s) Oral daily  atorvastatin 80 milliGRAM(s) Oral at bedtime  chlorhexidine 4% Liquid 1 Application(s) Topical <User Schedule>  ferrous    sulfate 325 milliGRAM(s) Oral daily  finasteride 5 milliGRAM(s) Oral daily  loperamide 2 milliGRAM(s) Oral daily  melatonin 5 milliGRAM(s) Oral at bedtime  meropenem  IVPB 1000 milliGRAM(s) IV Intermittent every 12 hours  midodrine 10 milliGRAM(s) Oral every 8 hours  norepinephrine Infusion 0.05 MICROgram(s)/kG/Min (3.26 mL/Hr) IV Continuous <Continuous>  pantoprazole    Tablet 40 milliGRAM(s) Oral before breakfast  ranolazine 500 milliGRAM(s) Oral two times a day  tamsulosin 0.4 milliGRAM(s) Oral at bedtime    MEDICATIONS  (PRN):  acetaminophen     Tablet .. 650 milliGRAM(s) Oral every 6 hours PRN Temp greater or equal to 38C (100.4F), Mild Pain (1 - 3)  acetaminophen     Tablet .. 1000 milliGRAM(s) Oral every 6 hours PRN Temp greater or equal to 38C (100.4F), Moderate Pain (4 - 6)  aluminum hydroxide/magnesium hydroxide/simethicone Suspension 30 milliLiter(s) Oral every 4 hours PRN Dyspepsia  ondansetron Injectable 4 milliGRAM(s) IV Push every 8 hours PRN Nausea and/or Vomiting      RADIOLOGY/ADDITIONAL STUDIES:   HPI:    88 y/o M with PMH HTN, CAD s/p CABG, AAA s/p repair, CKD Stage 3, CHFpEF, insomnia, obstructive uropathy, BPH, Dz reflux sent from SNF for evaluation on 21st of may  for  diarrhea. As per recrods Pt is poor historian due to hearing impairment and  Dementia. Reports that developed diarrhea overnight, not sure how many BMs had, as wearing diapers. He denied fevers or chills, had poor appetite but not new. No abd pain or cramping. Pt reports that his wife also is with loose stools for a few days.    Transferred to ICU for hypotension and septic shock. Patient currently requiring Levophed gtt to maintain MAP >65.  Stool specimen negative for PCR and C. diff   Tx to CCU for hypotension and Shock likely sepsis--?? Urinary source given chronic waite.  + UA    22  pt seen and examined  [place] and weak BUT clearly w/ capacity and understanding of medical problems  pt reports he has not had discussion of CPR/ Life-support w/ his sons.   requested team called them directly to discuss so he can then discuss w/ them as well in private.   He denied pain nausea vomiting sob.   He was eating breakfast but very slowly as his appetite is not very high.          PAIN: ( )Yes   (x )No     DYSPNEA: ( ) Yes  x( ) No  Level:    PAST MEDICAL & SURGICAL HISTORY:  HTN (hypertension)      AAA (abdominal aortic aneurysm)      CAD (coronary artery disease)      Stage 3 chronic kidney disease      History of CHF (congestive heart failure)      BPH (benign prostatic hyperplasia)      H/O urinary retention      S/P CABG (coronary artery bypass graft)      History of right hip replacement      CAD (coronary artery disease)  s/p stent placed      S/P left inguinal hernia repair      S/P AAA repair  2020          SOCIAL HX:    Hx opiate tolerance ( )YES  (x)NO    Baseline ADLs  (Prior to Admission)  ( ) Independent   (x )Dependent    FAMILY HISTORY:  FH: hypertension (Mother)    Family history of hyperglycemia (Father)        Review of Systems:    Anxiety- denies  Depression-denies  Physical Discomfort- in NAD  Dyspnea-denies  Constipation-denies  Diarrhea-denies  Nausea-denies  Vomiting-denies  Anorexia-denies  Weight Loss- denies  Cough-denies  Secretions-denies  Fatigue-denies  Weakness-denies  Delirium-denies    All other systems reviewed and negative      PHYSICAL EXAM:    Vital Signs Last 24 Hrs  T(C): 35.9 (31 May 2022 06:30), Max: 37 (30 May 2022 09:00)  T(F): 96.7 (31 May 2022 06:30), Max: 98.6 (30 May 2022 09:00)  HR: 89 (31 May 2022 07:30) (81 - 102)  BP: 107/51 (31 May 2022 07:30) (99/62 - 123/59)  BP(mean): 65 (31 May 2022 07:30) (62 - 85)  RR: 22 (31 May 2022 07:30) (15 - 28)  SpO2: 99% (31 May 2022 07:30) (79% - 100%)  Daily     Daily Weight in k.6 (31 May 2022 06:30)    PPSV2:  40 %  FAST:    General: calm in NAD  Mental Status: awake alert oriented x3  HEENT: eomi, perrl  Lungs: ctabl b/l bs no wheezing or rales  Cardiac: s1s2 no mgr  GI: soft nontender +BS  : voids  Ext: moves all 4 extremities spontaneously  Neuro: no gross findings      LABS:                        7.5    11.50 )-----------( 105      ( 31 May 2022 06:26 )             22.2         131<L>  |  101  |  39<H>  ----------------------------<  117<H>  4.3   |  22  |  1.53<H>    Ca    7.7<L>      31 May 2022 06:26  Phos  3.7       Mg     2.1         TPro  5.4<L>  /  Alb  1.5<L>  /  TBili  0.6  /  DBili  x   /  AST  21  /  ALT  20  /  AlkPhos  106  0529    PT/INR - ( 30 May 2022 06:40 )   PT: 18.0 sec;   INR: 1.54 ratio         PTT - ( 30 May 2022 06:40 )  PTT:29.1 sec  Albumin: Albumin, Serum: 1.5 g/dL ( @ 23:24)      Allergies    Broccoli (Unknown)  penicillin (Vomiting; Nausea)    Intolerances      MEDICATIONS  (STANDING):  ALPRAZolam 0.5 milliGRAM(s) Oral at bedtime  ascorbic acid 500 milliGRAM(s) Oral daily  atorvastatin 80 milliGRAM(s) Oral at bedtime  chlorhexidine 4% Liquid 1 Application(s) Topical <User Schedule>  ferrous    sulfate 325 milliGRAM(s) Oral daily  finasteride 5 milliGRAM(s) Oral daily  loperamide 2 milliGRAM(s) Oral daily  melatonin 5 milliGRAM(s) Oral at bedtime  meropenem  IVPB 1000 milliGRAM(s) IV Intermittent every 12 hours  midodrine 10 milliGRAM(s) Oral every 8 hours  norepinephrine Infusion 0.05 MICROgram(s)/kG/Min (3.26 mL/Hr) IV Continuous <Continuous>  pantoprazole    Tablet 40 milliGRAM(s) Oral before breakfast  ranolazine 500 milliGRAM(s) Oral two times a day  tamsulosin 0.4 milliGRAM(s) Oral at bedtime    MEDICATIONS  (PRN):  acetaminophen     Tablet .. 650 milliGRAM(s) Oral every 6 hours PRN Temp greater or equal to 38C (100.4F), Mild Pain (1 - 3)  acetaminophen     Tablet .. 1000 milliGRAM(s) Oral every 6 hours PRN Temp greater or equal to 38C (100.4F), Moderate Pain (4 - 6)  aluminum hydroxide/magnesium hydroxide/simethicone Suspension 30 milliLiter(s) Oral every 4 hours PRN Dyspepsia  ondansetron Injectable 4 milliGRAM(s) IV Push every 8 hours PRN Nausea and/or Vomiting      RADIOLOGY/ADDITIONAL STUDIES:

## 2022-06-01 LAB
ANION GAP SERPL CALC-SCNC: 7 MMOL/L — SIGNIFICANT CHANGE UP (ref 5–17)
BUN SERPL-MCNC: 38 MG/DL — HIGH (ref 7–23)
CALCIUM SERPL-MCNC: 8.2 MG/DL — LOW (ref 8.5–10.1)
CHLORIDE SERPL-SCNC: 102 MMOL/L — SIGNIFICANT CHANGE UP (ref 96–108)
CO2 SERPL-SCNC: 24 MMOL/L — SIGNIFICANT CHANGE UP (ref 22–31)
CREAT SERPL-MCNC: 1.43 MG/DL — HIGH (ref 0.5–1.3)
EGFR: 47 ML/MIN/1.73M2 — LOW
ELASTASE PANC STL-MCNT: 127 — LOW
GLUCOSE SERPL-MCNC: 109 MG/DL — HIGH (ref 70–99)
HCT VFR BLD CALC: 25.4 % — LOW (ref 39–50)
HGB BLD-MCNC: 8 G/DL — LOW (ref 13–17)
MAGNESIUM SERPL-MCNC: 2.1 MG/DL — SIGNIFICANT CHANGE UP (ref 1.6–2.6)
MCHC RBC-ENTMCNC: 28.1 PG — SIGNIFICANT CHANGE UP (ref 27–34)
MCHC RBC-ENTMCNC: 31.5 GM/DL — LOW (ref 32–36)
MCV RBC AUTO: 89.1 FL — SIGNIFICANT CHANGE UP (ref 80–100)
PHOSPHATE SERPL-MCNC: 3.1 MG/DL — SIGNIFICANT CHANGE UP (ref 2.5–4.5)
PLATELET # BLD AUTO: 226 K/UL — SIGNIFICANT CHANGE UP (ref 150–400)
POTASSIUM SERPL-MCNC: 4.1 MMOL/L — SIGNIFICANT CHANGE UP (ref 3.5–5.3)
POTASSIUM SERPL-SCNC: 4.1 MMOL/L — SIGNIFICANT CHANGE UP (ref 3.5–5.3)
RBC # BLD: 2.85 M/UL — LOW (ref 4.2–5.8)
RBC # FLD: 17.3 % — HIGH (ref 10.3–14.5)
SODIUM SERPL-SCNC: 133 MMOL/L — LOW (ref 135–145)
WBC # BLD: 11.62 K/UL — HIGH (ref 3.8–10.5)
WBC # FLD AUTO: 11.62 K/UL — HIGH (ref 3.8–10.5)

## 2022-06-01 PROCEDURE — 99291 CRITICAL CARE FIRST HOUR: CPT

## 2022-06-01 RX ORDER — HEPARIN SODIUM 5000 [USP'U]/ML
5000 INJECTION INTRAVENOUS; SUBCUTANEOUS EVERY 12 HOURS
Refills: 0 | Status: DISCONTINUED | OUTPATIENT
Start: 2022-06-01 | End: 2022-06-09

## 2022-06-01 RX ADMIN — Medication 325 MILLIGRAM(S): at 10:40

## 2022-06-01 RX ADMIN — MIDODRINE HYDROCHLORIDE 10 MILLIGRAM(S): 2.5 TABLET ORAL at 14:31

## 2022-06-01 RX ADMIN — ATORVASTATIN CALCIUM 80 MILLIGRAM(S): 80 TABLET, FILM COATED ORAL at 22:40

## 2022-06-01 RX ADMIN — CHLORHEXIDINE GLUCONATE 1 APPLICATION(S): 213 SOLUTION TOPICAL at 10:39

## 2022-06-01 RX ADMIN — PANTOPRAZOLE SODIUM 40 MILLIGRAM(S): 20 TABLET, DELAYED RELEASE ORAL at 06:09

## 2022-06-01 RX ADMIN — HEPARIN SODIUM 5000 UNIT(S): 5000 INJECTION INTRAVENOUS; SUBCUTANEOUS at 22:41

## 2022-06-01 RX ADMIN — Medication 2 MILLIGRAM(S): at 10:40

## 2022-06-01 RX ADMIN — FINASTERIDE 5 MILLIGRAM(S): 5 TABLET, FILM COATED ORAL at 10:40

## 2022-06-01 RX ADMIN — MIDODRINE HYDROCHLORIDE 10 MILLIGRAM(S): 2.5 TABLET ORAL at 22:40

## 2022-06-01 RX ADMIN — RANOLAZINE 500 MILLIGRAM(S): 500 TABLET, FILM COATED, EXTENDED RELEASE ORAL at 22:41

## 2022-06-01 RX ADMIN — RANOLAZINE 500 MILLIGRAM(S): 500 TABLET, FILM COATED, EXTENDED RELEASE ORAL at 10:40

## 2022-06-01 RX ADMIN — TAMSULOSIN HYDROCHLORIDE 0.4 MILLIGRAM(S): 0.4 CAPSULE ORAL at 22:40

## 2022-06-01 RX ADMIN — MEROPENEM 100 MILLIGRAM(S): 1 INJECTION INTRAVENOUS at 22:43

## 2022-06-01 RX ADMIN — Medication 3.26 MICROGRAM(S)/KG/MIN: at 10:39

## 2022-06-01 RX ADMIN — MIDODRINE HYDROCHLORIDE 10 MILLIGRAM(S): 2.5 TABLET ORAL at 06:09

## 2022-06-01 RX ADMIN — Medication 500 MILLIGRAM(S): at 10:40

## 2022-06-01 RX ADMIN — Medication 5 MILLIGRAM(S): at 22:41

## 2022-06-01 NOTE — PROGRESS NOTE ADULT - SUBJECTIVE AND OBJECTIVE BOX
Hospital D # 11  CCU # 3  [facility] CVL # 2    CC:  Shock     HPI:    88 y/o male with HTN, CAD s/p CABG, AAA s/p repair, CKD Stage 3, CHFpEF, insomnia, obstructive uropathy with chronic waite, BPH and reflux sent from SNF for evaluation of diarrhea. Pt is poor historian due to hearing impairment and Dementia. Reports that developed diarrhea overnight, not sure how many BMs had, as wearing diapers. He denies fevers or chills, has poor appetite but not new. No abd pain or cramping. Pt reports that his wife also is with loose stools for a few days.   Stool PCR and CDI NEGATIVE  He is tx to CCU on 5/29 for septic shock.  Lactate > 2.  Fever 101.8  and WBC 17    5/30:  Pt seen and examined in CCU during IDR.  On norepi gtt 0.4 Hypothermic.  Lethargic and arousable.  WBC 17  Cr 1.9  Started on Dorina O/N.  CXR-- Personally reviewed--effusions.  + UA.     5/31:  Pt seen and examined in CCU during IDR.  Remains on pressors.  Awake.  BCx NTD.  Hypothermic.  WBC 11  Plats 105  Cr 1.5      6/1:  Pt seen and examined in CCU during IDR.  Remains on pressors.  Palliative care input appreciated.  Awake.  Tm 97.6  WBC 11 Plats 226  Cr 1.4  BCx NTD    PMH:  As above.     PSH:  As above.     FH: Non Contributory other than those listed in HPI    Social History:  NC    MEDICATIONS  (STANDING):  ascorbic acid 500 milliGRAM(s) Oral daily  atorvastatin 80 milliGRAM(s) Oral at bedtime  chlorhexidine 4% Liquid 1 Application(s) Topical <User Schedule>  ferrous    sulfate 325 milliGRAM(s) Oral daily  finasteride 5 milliGRAM(s) Oral daily  loperamide 2 milliGRAM(s) Oral daily  melatonin 5 milliGRAM(s) Oral at bedtime  meropenem  IVPB 1000 milliGRAM(s) IV Intermittent every 12 hours  midodrine 10 milliGRAM(s) Oral every 8 hours  norepinephrine Infusion 0.05 MICROgram(s)/kG/Min (3.26 mL/Hr) IV Continuous <Continuous>  pantoprazole    Tablet 40 milliGRAM(s) Oral before breakfast  ranolazine 500 milliGRAM(s) Oral two times a day  tamsulosin 0.4 milliGRAM(s) Oral at bedtime    MEDICATIONS  (PRN):  acetaminophen     Tablet .. 650 milliGRAM(s) Oral every 6 hours PRN Temp greater or equal to 38C (100.4F), Mild Pain (1 - 3)  acetaminophen     Tablet .. 1000 milliGRAM(s) Oral every 6 hours PRN Temp greater or equal to 38C (100.4F), Moderate Pain (4 - 6)  aluminum hydroxide/magnesium hydroxide/simethicone Suspension 30 milliLiter(s) Oral every 4 hours PRN Dyspepsia  ondansetron Injectable 4 milliGRAM(s) IV Push every 8 hours PRN Nausea and/or Vomiting      Allergies: NKDA    ROS:  SEE BELOW        ICU Vital Signs Last 24 Hrs  T(C): 36.3 (01 Jun 2022 07:44), Max: 36.3 (01 Jun 2022 06:00)  T(F): 97.3 (01 Jun 2022 07:44), Max: 97.4 (01 Jun 2022 06:00)  HR: 84 (01 Jun 2022 06:37) (82 - 95)  BP: 109/52 (01 Jun 2022 06:37) (90/46 - 115/51)  BP(mean): 65 (01 Jun 2022 06:37) (54 - 70)  ABP: --  ABP(mean): --  RR: 21 (01 Jun 2022 06:37) (14 - 26)  SpO2: 96% (01 Jun 2022 06:37) (93% - 100%)          I&O's Summary    31 May 2022 07:01  -  01 Jun 2022 07:00  --------------------------------------------------------  IN: 1152 mL / OUT: 700 mL / NET: 452 mL        Physical Exam:  SEE BELOW                          8.0    11.62 )-----------( 226      ( 01 Jun 2022 06:15 )             25.4       06-01    133<L>  |  102  |  38<H>  ----------------------------<  109<H>  4.1   |  24  |  1.43<H>    Ca    8.2<L>      01 Jun 2022 06:15  Phos  3.1     06-01  Mg     2.1     06-01                      DVT Prophylaxis:                                                            Contraindication:     Advanced Directives:    Discussed with:    Visit Information:  Time spent excluding procedure:      ** Time is exclusive of billed procedures and/or teaching and/or routine family updates.

## 2022-06-01 NOTE — PROGRESS NOTE ADULT - ASSESSMENT
IMP:    88 y/o male with HTN, CAD s/p CABG, AAA s/p repair, CKD Stage 3, CHFpEF, insomnia, obstructive uropathy with chronic waite, BPH and reflux admitted with diarrhea--Stool specimen negative for PCR and C. diff   Tx to CCU for hypotension and Shock likely sepsis--?? Urinary source given chronic waite.  + UA vs Decubs    Critically ill.  High risk for acute decompensation and deterioration including death   Patient requires critical care for support of one or more vital organ systems with a high probability of imminent or life threatening deterioration in his/her condition     Plan:    Cont with IV Dorina (3).  ID re evaluation appreciated   Actively titrate pressors to keep MAP > 60.  Cont Mido 10 q8  Follow up BCx  Limit IVF given CXR findings  HOB > 30  PO diet  DVT prophy--SCD--resume sqhep today   Palliative care follow up    CCU care-- d/w ICU staff on multi disciplinary rounds and pt-- All concerns addressed including but not limited to diagnosis, treatment plan and overall prognosis

## 2022-06-01 NOTE — PROGRESS NOTE ADULT - SUBJECTIVE AND OBJECTIVE BOX
Patient is a 89y old  Male who presents with a chief complaint of diarrhea (31 May 2022 16:05)    BRIEF HOSPITAL COURSE:   89 y/o male with HTN, CAD s/p CABG, AAA s/p repair, CKD Stage 3, CHFpEF, insomnia, obstructive uropathy with chronic waite, BPH and reflux admitted with diarrhea--Stool specimen negative for PCR and C. diff  Tx to CCU for hypotension and Shock likely septic 2/2 Urosepsis given chronic waite vs. Decubs    Events last 24 hours:   -Remains in vasopressor-dependent shock state w/ Levophed infusion, requirements decreasing, now on 0.3mcg/kg/m.   -SCr improving. Good UOP.    PAST MEDICAL & SURGICAL HISTORY:  HTN (hypertension)  AAA (abdominal aortic aneurysm)  CAD (coronary artery disease)  Stage 3 chronic kidney disease  History of CHF (congestive heart failure)  BPH (benign prostatic hyperplasia)  H/O urinary retention  S/P CABG (coronary artery bypass graft)  History of right hip replacement  CAD (coronary artery disease)  s/p stent placed  S/P left inguinal hernia repair  S/P AAA repair  7-    Review of Systems:  CONSTITUTIONAL: No fever, chills, or fatigue  EYES: No eye pain, visual disturbances, or discharge  ENMT:  No difficulty hearing, tinnitus, vertigo; No sinus or throat pain  NECK: No pain or stiffness  RESPIRATORY: No cough, wheezing, chills or hemoptysis; No shortness of breath  CARDIOVASCULAR: No chest pain, palpitations, dizziness, or leg swelling  GASTROINTESTINAL: No abdominal or epigastric pain. No nausea, vomiting, or hematemesis; No diarrhea or constipation. No melena or hematochezia.  GENITOURINARY: No dysuria, frequency, hematuria, or incontinence  NEUROLOGICAL: No headaches, memory loss, loss of strength, numbness, or tremors  SKIN: No itching, burning, rashes, or lesions   MUSCULOSKELETAL: No joint pain or swelling; No muscle, back, or extremity pain  PSYCHIATRIC: No depression, anxiety, mood swings, or difficulty sleeping    Medications:  meropenem  IVPB 1000 milliGRAM(s) IV Intermittent every 12 hours  midodrine 10 milliGRAM(s) Oral every 8 hours  norepinephrine Infusion 0.05 MICROgram(s)/kG/Min IV Continuous <Continuous>  ranolazine 500 milliGRAM(s) Oral two times a day  tamsulosin 0.4 milliGRAM(s) Oral at bedtime  acetaminophen     Tablet .. 650 milliGRAM(s) Oral every 6 hours PRN  acetaminophen     Tablet .. 1000 milliGRAM(s) Oral every 6 hours PRN  melatonin 5 milliGRAM(s) Oral at bedtime  ondansetron Injectable 4 milliGRAM(s) IV Push every 8 hours PRN  aluminum hydroxide/magnesium hydroxide/simethicone Suspension 30 milliLiter(s) Oral every 4 hours PRN  loperamide 2 milliGRAM(s) Oral daily  pantoprazole    Tablet 40 milliGRAM(s) Oral before breakfast  atorvastatin 80 milliGRAM(s) Oral at bedtime  finasteride 5 milliGRAM(s) Oral daily  ascorbic acid 500 milliGRAM(s) Oral daily  ferrous    sulfate 325 milliGRAM(s) Oral daily  chlorhexidine 4% Liquid 1 Application(s) Topical <User Schedule>    ICU Vital Signs Last 24 Hrs  T(C): 36.2 (31 May 2022 21:27), Max: 36.4 (31 May 2022 10:08)  T(F): 97.1 (31 May 2022 21:27), Max: 97.6 (31 May 2022 10:08)  HR: 87 (31 May 2022 23:30) (82 - 95)  BP: 101/51 (31 May 2022 23:30) (90/46 - 113/55)  BP(mean): 62 (31 May 2022 23:30) (54 - 69)  ABP: --  ABP(mean): --  RR: 22 (31 May 2022 23:30) (14 - 27)  SpO2: 100% (31 May 2022 23:30) (94% - 100%)    I&O's Detail  30 May 2022 07:01  -  31 May 2022 07:00  --------------------------------------------------------  IN:    IV PiggyBack: 50 mL    Norepinephrine: 487 mL    Oral Fluid: 500 mL  Total IN: 1037 mL  OUT:    Indwelling Catheter - Urethral (mL): 700 mL  Total OUT: 700 mL  Total NET: 337 mL    31 May 2022 07:01  -  01 Jun 2022 00:00  --------------------------------------------------------  IN:    Norepinephrine: 252 mL    Oral Fluid: 900 mL  Total IN: 1152 mL  OUT:    Indwelling Catheter - Urethral (mL): 300 mL  Total OUT: 300 mL  Total NET: 852 mL    LABS:                      7.5    11.50 )-----------( 105      ( 31 May 2022 06:26 )             22.2     05-31  131<L>  |  101  |  39<H>  ----------------------------<  117<H>  4.3   |  22  |  1.53<H>  Ca    7.7<L>      31 May 2022 06:26  Phos  3.7     05-31  Mg     2.1     05-31    CAPILLARY BLOOD GLUCOSE    PT/INR - ( 30 May 2022 06:40 )   PT: 18.0 sec;   INR: 1.54 ratio    PTT - ( 30 May 2022 06:40 )  PTT:29.1 sec    CULTURES:  Culture Results:   No growth to date. (05-29-22 @ 23:35)  Culture Results:   No growth to date. (05-29-22 @ 23:24)      Physical Examination:  General: Lethargic appearing, arousable.  HEENT: PERRL.  NECK: Supple.   PULM: Clear to auscultation bilaterally.  CVS: s1/s2.  ABD: Soft, nondistended, nontender, normoactive bowel sounds.  EXT: No edema, nontender  SKIN: Warm.    RADIOLOGY:   < from: US Kidney and Bladder (05.30.22 @ 07:57) >  ACC: [id number] EXAM:  US KIDNEYS AND BLADDER                        PROCEDURE DATE:  05/30/2022    IMPRESSION:  Bilateral simple appearing renal cysts. No hydronephrosis.  Mild increase renal echogenicity suggestive of medical renal disease.  Urinary bladder is collapsed around a Waite catheter.      89 y/o male with HTN, CAD s/p CABG, AAA s/p repair, CKD Stage 3, CHFpEF, insomnia, obstructive uropathy with chronic waite, BPH and reflux admitted with diarrhea--Stool specimen negative for PCR and C. diff  Tx to CCU for hypotension and Shock likely septic 2/2 Urosepsis given chronic waite vs. Decubs  Assessment:  1. Septic Shock  2. Urosepsis  3. DESIRAE on CKD    Plan:  NEURO:  CV:  RESP:  RENAL:  GI:  ENDO:  ID:  HEME:  DISPO:        Patient is a 89y old  Male who presents with a chief complaint of diarrhea (31 May 2022 16:05)    BRIEF HOSPITAL COURSE:   89 y/o male with HTN, CAD s/p CABG, AAA s/p repair, CKD Stage 3, CHFpEF, insomnia, obstructive uropathy with chronic waite, BPH and reflux admitted with diarrhea--Stool specimen negative for PCR and C. diff  Tx to CCU for hypotension and Shock likely septic 2/2 Urosepsis given chronic waite vs. Decubs    Events last 24 hours:   -Remains in vasopressor-dependent shock state w/ Levophed infusion, requirements decreasing, now on 0.3mcg/kg/m.   -SCr improving. Good UOP.    PAST MEDICAL & SURGICAL HISTORY:  HTN (hypertension)  AAA (abdominal aortic aneurysm)  CAD (coronary artery disease)  Stage 3 chronic kidney disease  History of CHF (congestive heart failure)  BPH (benign prostatic hyperplasia)  H/O urinary retention  S/P CABG (coronary artery bypass graft)  History of right hip replacement  CAD (coronary artery disease)  s/p stent placed  S/P left inguinal hernia repair  S/P AAA repair  7-    Review of Systems:  CONSTITUTIONAL: No fever, chills, or fatigue  EYES: No eye pain, visual disturbances, or discharge  ENMT:  No difficulty hearing, tinnitus, vertigo; No sinus or throat pain  NECK: No pain or stiffness  RESPIRATORY: No cough, wheezing, chills or hemoptysis; No shortness of breath  CARDIOVASCULAR: No chest pain, palpitations, dizziness, or leg swelling  GASTROINTESTINAL: No abdominal or epigastric pain. No nausea, vomiting, or hematemesis; No diarrhea or constipation. No melena or hematochezia.  GENITOURINARY: No dysuria, frequency, hematuria, or incontinence  NEUROLOGICAL: No headaches, memory loss, loss of strength, numbness, or tremors  SKIN: No itching, burning, rashes, or lesions   MUSCULOSKELETAL: No joint pain or swelling; No muscle, back, or extremity pain  PSYCHIATRIC: No depression, anxiety, mood swings, or difficulty sleeping    Medications:  meropenem  IVPB 1000 milliGRAM(s) IV Intermittent every 12 hours  midodrine 10 milliGRAM(s) Oral every 8 hours  norepinephrine Infusion 0.05 MICROgram(s)/kG/Min IV Continuous <Continuous>  ranolazine 500 milliGRAM(s) Oral two times a day  tamsulosin 0.4 milliGRAM(s) Oral at bedtime  acetaminophen     Tablet .. 650 milliGRAM(s) Oral every 6 hours PRN  acetaminophen     Tablet .. 1000 milliGRAM(s) Oral every 6 hours PRN  melatonin 5 milliGRAM(s) Oral at bedtime  ondansetron Injectable 4 milliGRAM(s) IV Push every 8 hours PRN  aluminum hydroxide/magnesium hydroxide/simethicone Suspension 30 milliLiter(s) Oral every 4 hours PRN  loperamide 2 milliGRAM(s) Oral daily  pantoprazole    Tablet 40 milliGRAM(s) Oral before breakfast  atorvastatin 80 milliGRAM(s) Oral at bedtime  finasteride 5 milliGRAM(s) Oral daily  ascorbic acid 500 milliGRAM(s) Oral daily  ferrous    sulfate 325 milliGRAM(s) Oral daily  chlorhexidine 4% Liquid 1 Application(s) Topical <User Schedule>    ICU Vital Signs Last 24 Hrs  T(C): 36.2 (31 May 2022 21:27), Max: 36.4 (31 May 2022 10:08)  T(F): 97.1 (31 May 2022 21:27), Max: 97.6 (31 May 2022 10:08)  HR: 87 (31 May 2022 23:30) (82 - 95)  BP: 101/51 (31 May 2022 23:30) (90/46 - 113/55)  BP(mean): 62 (31 May 2022 23:30) (54 - 69)  ABP: --  ABP(mean): --  RR: 22 (31 May 2022 23:30) (14 - 27)  SpO2: 100% (31 May 2022 23:30) (94% - 100%)    I&O's Detail  30 May 2022 07:01  -  31 May 2022 07:00  --------------------------------------------------------  IN:    IV PiggyBack: 50 mL    Norepinephrine: 487 mL    Oral Fluid: 500 mL  Total IN: 1037 mL  OUT:    Indwelling Catheter - Urethral (mL): 700 mL  Total OUT: 700 mL  Total NET: 337 mL    31 May 2022 07:01  -  01 Jun 2022 00:00  --------------------------------------------------------  IN:    Norepinephrine: 252 mL    Oral Fluid: 900 mL  Total IN: 1152 mL  OUT:    Indwelling Catheter - Urethral (mL): 300 mL  Total OUT: 300 mL  Total NET: 852 mL    LABS:                      7.5    11.50 )-----------( 105      ( 31 May 2022 06:26 )             22.2     05-31  131<L>  |  101  |  39<H>  ----------------------------<  117<H>  4.3   |  22  |  1.53<H>  Ca    7.7<L>      31 May 2022 06:26  Phos  3.7     05-31  Mg     2.1     05-31    CAPILLARY BLOOD GLUCOSE    PT/INR - ( 30 May 2022 06:40 )   PT: 18.0 sec;   INR: 1.54 ratio    PTT - ( 30 May 2022 06:40 )  PTT:29.1 sec    CULTURES:  Culture Results:   No growth to date. (05-29-22 @ 23:35)  Culture Results:   No growth to date. (05-29-22 @ 23:24)      Physical Examination:  General: Lethargic appearing, arousable.  HEENT: PERRL.  NECK: Supple.   PULM: Clear to auscultation bilaterally.  CVS: s1/s2.  ABD: Soft, nondistended, nontender, normoactive bowel sounds.  EXT: No edema, nontender  SKIN: Warm.    RADIOLOGY:   < from: US Kidney and Bladder (05.30.22 @ 07:57) >  ACC: [id number] EXAM:  US KIDNEYS AND BLADDER                        PROCEDURE DATE:  05/30/2022    IMPRESSION:  Bilateral simple appearing renal cysts. No hydronephrosis.  Mild increase renal echogenicity suggestive of medical renal disease.  Urinary bladder is collapsed around a Waite catheter.      89 y/o male with HTN, CAD s/p CABG, AAA s/p repair, CKD Stage 3, CHFpEF, insomnia, obstructive uropathy with chronic waite, BPH and reflux admitted with diarrhea--Stool specimen negative for PCR and C. diff  Tx to CCU for hypotension and Shock likely septic 2/2 Urosepsis given chronic waite vs. Decubs  Assessment:  1. Septic Shock  2. Urosepsis  3. DESIRAE on CKD    Plan:  NEURO:  -Monitor mental status closely, avoid neurosuppresants.     CV:  -Remains in vasopressor-dependent shock state w/ Levophed infusion, actively titrating to maintain goal MAP >65 monitoring end points of perfusion. Levophed requirements decreasing this evening. Midodrine to facilitate weaning off vasopressors.  -Keep K~4 and Mg>2 for optimal arrhythmia suppression.  -Statin.    RESP:  -No acute issues, satting well on NC.     RENAL:  -SCr improving, good UOP.   -trend lytes/Scr daily with BMP  -I's and O's, goal UOP 0.5 cc/kg/hr  -renal dose meds and avoid nephrotoxins     GI:  -Regular diet.  -Protonix QD.     ENDO:  -Aggressive glycemic control to limit FS glucose to <180mg/dl. BS WNL.    ID:  -BloodCx NGTD. CDiff PCR negative. Empiric Dorina.     HEME:  -DVT ppx w/ SCDs. Likely can start Pharmacological DVT ppx w/ SC Heparin in next 24h if Hgb remains stable.     DISPO: Full Code.    CRITICAL CARE TIME SPENT:  45 minutes of critical care time spent providing medical care for patient's acute illness/conditions that impairs at least one vital organ system and/or poses a high risk of imminent or life threatening deterioration in the patient's condition. It includes time spent evaluating and treating the patient's acute illness as well as time spent reviewing labs, radiology, discussing goals of care with patient and/or patient's family, and discussing the case with a multidisciplinary team, including the eICU, in an effort to prevent further life threatening deterioration or end organ damage. This time is independent of any procedures performed.

## 2022-06-02 LAB
ANION GAP SERPL CALC-SCNC: 6 MMOL/L — SIGNIFICANT CHANGE UP (ref 5–17)
BUN SERPL-MCNC: 33 MG/DL — HIGH (ref 7–23)
CALCIUM SERPL-MCNC: 8 MG/DL — LOW (ref 8.5–10.1)
CHLORIDE SERPL-SCNC: 103 MMOL/L — SIGNIFICANT CHANGE UP (ref 96–108)
CO2 SERPL-SCNC: 25 MMOL/L — SIGNIFICANT CHANGE UP (ref 22–31)
CREAT SERPL-MCNC: 1.23 MG/DL — SIGNIFICANT CHANGE UP (ref 0.5–1.3)
EGFR: 56 ML/MIN/1.73M2 — LOW
GLUCOSE SERPL-MCNC: 96 MG/DL — SIGNIFICANT CHANGE UP (ref 70–99)
HCT VFR BLD CALC: 23.7 % — LOW (ref 39–50)
HGB BLD-MCNC: 7.7 G/DL — LOW (ref 13–17)
MAGNESIUM SERPL-MCNC: 2.2 MG/DL — SIGNIFICANT CHANGE UP (ref 1.6–2.6)
MCHC RBC-ENTMCNC: 28.4 PG — SIGNIFICANT CHANGE UP (ref 27–34)
MCHC RBC-ENTMCNC: 32.5 GM/DL — SIGNIFICANT CHANGE UP (ref 32–36)
MCV RBC AUTO: 87.5 FL — SIGNIFICANT CHANGE UP (ref 80–100)
PHOSPHATE SERPL-MCNC: 3 MG/DL — SIGNIFICANT CHANGE UP (ref 2.5–4.5)
PLATELET # BLD AUTO: 221 K/UL — SIGNIFICANT CHANGE UP (ref 150–400)
POTASSIUM SERPL-MCNC: 4.2 MMOL/L — SIGNIFICANT CHANGE UP (ref 3.5–5.3)
POTASSIUM SERPL-SCNC: 4.2 MMOL/L — SIGNIFICANT CHANGE UP (ref 3.5–5.3)
RBC # BLD: 2.71 M/UL — LOW (ref 4.2–5.8)
RBC # FLD: 17.4 % — HIGH (ref 10.3–14.5)
SODIUM SERPL-SCNC: 134 MMOL/L — LOW (ref 135–145)
WBC # BLD: 7.99 K/UL — SIGNIFICANT CHANGE UP (ref 3.8–10.5)
WBC # FLD AUTO: 7.99 K/UL — SIGNIFICANT CHANGE UP (ref 3.8–10.5)

## 2022-06-02 PROCEDURE — 99497 ADVNCD CARE PLAN 30 MIN: CPT

## 2022-06-02 PROCEDURE — 99291 CRITICAL CARE FIRST HOUR: CPT

## 2022-06-02 PROCEDURE — 99233 SBSQ HOSP IP/OBS HIGH 50: CPT

## 2022-06-02 RX ORDER — MIDODRINE HYDROCHLORIDE 2.5 MG/1
15 TABLET ORAL EVERY 8 HOURS
Refills: 0 | Status: DISCONTINUED | OUTPATIENT
Start: 2022-06-02 | End: 2022-06-09

## 2022-06-02 RX ADMIN — CHLORHEXIDINE GLUCONATE 1 APPLICATION(S): 213 SOLUTION TOPICAL at 10:42

## 2022-06-02 RX ADMIN — TAMSULOSIN HYDROCHLORIDE 0.4 MILLIGRAM(S): 0.4 CAPSULE ORAL at 22:53

## 2022-06-02 RX ADMIN — MIDODRINE HYDROCHLORIDE 15 MILLIGRAM(S): 2.5 TABLET ORAL at 15:12

## 2022-06-02 RX ADMIN — MIDODRINE HYDROCHLORIDE 15 MILLIGRAM(S): 2.5 TABLET ORAL at 06:25

## 2022-06-02 RX ADMIN — Medication 325 MILLIGRAM(S): at 10:42

## 2022-06-02 RX ADMIN — Medication 3.26 MICROGRAM(S)/KG/MIN: at 22:53

## 2022-06-02 RX ADMIN — HEPARIN SODIUM 5000 UNIT(S): 5000 INJECTION INTRAVENOUS; SUBCUTANEOUS at 22:52

## 2022-06-02 RX ADMIN — Medication 2 MILLIGRAM(S): at 10:42

## 2022-06-02 RX ADMIN — MEROPENEM 100 MILLIGRAM(S): 1 INJECTION INTRAVENOUS at 22:52

## 2022-06-02 RX ADMIN — FINASTERIDE 5 MILLIGRAM(S): 5 TABLET, FILM COATED ORAL at 10:42

## 2022-06-02 RX ADMIN — ATORVASTATIN CALCIUM 80 MILLIGRAM(S): 80 TABLET, FILM COATED ORAL at 22:53

## 2022-06-02 RX ADMIN — PANTOPRAZOLE SODIUM 40 MILLIGRAM(S): 20 TABLET, DELAYED RELEASE ORAL at 06:25

## 2022-06-02 RX ADMIN — Medication 3.26 MICROGRAM(S)/KG/MIN: at 08:10

## 2022-06-02 RX ADMIN — RANOLAZINE 500 MILLIGRAM(S): 500 TABLET, FILM COATED, EXTENDED RELEASE ORAL at 10:43

## 2022-06-02 RX ADMIN — RANOLAZINE 500 MILLIGRAM(S): 500 TABLET, FILM COATED, EXTENDED RELEASE ORAL at 22:52

## 2022-06-02 RX ADMIN — Medication 500 MILLIGRAM(S): at 10:42

## 2022-06-02 RX ADMIN — MEROPENEM 100 MILLIGRAM(S): 1 INJECTION INTRAVENOUS at 10:43

## 2022-06-02 RX ADMIN — HEPARIN SODIUM 5000 UNIT(S): 5000 INJECTION INTRAVENOUS; SUBCUTANEOUS at 10:42

## 2022-06-02 RX ADMIN — MIDODRINE HYDROCHLORIDE 15 MILLIGRAM(S): 2.5 TABLET ORAL at 22:53

## 2022-06-02 NOTE — PROGRESS NOTE ADULT - SUBJECTIVE AND OBJECTIVE BOX
HPI:      PAIN: ( )Yes   ( )No  Level:  Location:  Intensity:    /10  Quality:  Aggravating Factors:  Alleviating Factors:  Radiation:  Duration/Timing:  Impact on ADLs:    DYSPNEA: ( ) Yes  ( ) No  Level:        Review of Systems:    Anxiety-  Depression-  Physical Discomfort-  Dyspnea-  Constipation-  Diarrhea-  Nausea-  Vomiting-  Anorexia-  Weight Loss-   Cough-  Secretions-  Fatigue-  Weakness-  Delirium-    All other systems reviewed and negative  Unable to obtain/Limited due to:        PHYSICAL EXAM:    Vital Signs Last 24 Hrs  T(C): 36.1 (2022 11:53), Max: 36.2 (2022 00:30)  T(F): 96.9 (2022 11:53), Max: 97.2 (2022 06:30)  HR: 81 (2022 16:00) (75 - 88)  BP: 108/48 (2022 15:30) (101/45 - 115/79)  BP(mean): 63 (2022 15:30) (60 - 86)  RR: 18 (2022 16:00) (14 - 25)  SpO2: 88% (2022 11:00) (88% - 100%)  Daily     Daily Weight in k.2 (2022 06:30)    PPSV2:   %  FAST:    General:  HEENT:  Lungs:  Cardiac:  GI:  :  Ext:  Neuro:      LABS:                        7.7    7.99  )-----------( 221      ( 2022 06:31 )             23.7     06-02    134<L>  |  103  |  33<H>  ----------------------------<  96  4.2   |  25  |  1.23    Ca    8.0<L>      2022 06:31  Phos  3.0     06-02  Mg     2.2     06-02        Albumin: Albumin, Serum: 1.5 g/dL ( @ 23:24)      Allergies    Broccoli (Unknown)  penicillin (Vomiting; Nausea)    Intolerances      MEDICATIONS  (STANDING):  ascorbic acid 500 milliGRAM(s) Oral daily  atorvastatin 80 milliGRAM(s) Oral at bedtime  chlorhexidine 4% Liquid 1 Application(s) Topical <User Schedule>  ferrous    sulfate 325 milliGRAM(s) Oral daily  finasteride 5 milliGRAM(s) Oral daily  heparin   Injectable 5000 Unit(s) SubCutaneous every 12 hours  loperamide 2 milliGRAM(s) Oral daily  melatonin 5 milliGRAM(s) Oral at bedtime  meropenem  IVPB 1000 milliGRAM(s) IV Intermittent every 12 hours  midodrine 15 milliGRAM(s) Oral every 8 hours  norepinephrine Infusion 0.05 MICROgram(s)/kG/Min (3.26 mL/Hr) IV Continuous <Continuous>  pantoprazole    Tablet 40 milliGRAM(s) Oral before breakfast  ranolazine 500 milliGRAM(s) Oral two times a day  tamsulosin 0.4 milliGRAM(s) Oral at bedtime    MEDICATIONS  (PRN):  acetaminophen     Tablet .. 650 milliGRAM(s) Oral every 6 hours PRN Temp greater or equal to 38C (100.4F), Mild Pain (1 - 3)  acetaminophen     Tablet .. 1000 milliGRAM(s) Oral every 6 hours PRN Temp greater or equal to 38C (100.4F), Moderate Pain (4 - 6)  aluminum hydroxide/magnesium hydroxide/simethicone Suspension 30 milliLiter(s) Oral every 4 hours PRN Dyspepsia  ondansetron Injectable 4 milliGRAM(s) IV Push every 8 hours PRN Nausea and/or Vomiting      RADIOLOGY:   HPI:  Patient was seen and examined.  Denies nausea, vomiting, shortness of breath, chest pain, headaches, abdominal pain, constipation He remains on pressors in  NAD . Wanted team to call Nicolas again to discuss code status.   Patient was having a difficult time understanding the  current medical state and discussino of cpr//mv    PAIN: ( )Yes   (x )No     DYSPNEA: ( ) Yes  ( x) No  Level:        Review of Systems:    Anxiety- denies  Depression-denies  Physical Discomfort- in NAD  Dyspnea-denies  Constipation-denies  Diarrhea-denies  Nausea-denies  Vomiting-denies  Anorexia-++  Weight Loss- ++  Cough-denies  Secretions-denies  Fatigue-++  Weakness-++  Delirium- no    All other systems reviewed and negative         PHYSICAL EXAM:    Vital Signs Last 24 Hrs  T(C): 36.1 (2022 11:53), Max: 36.2 (2022 00:30)  T(F): 96.9 (2022 11:53), Max: 97.2 (2022 06:30)  HR: 81 (2022 16:00) (75 - 88)  BP: 108/48 (2022 15:30) (101/45 - 115/79)  BP(mean): 63 (2022 15:30) (60 - 86)  RR: 18 (2022 16:00) (14 - 25)  SpO2: 88% (2022 11:00) (88% - 100%)  Daily     Daily Weight in k.2 (2022 06:30)      PPSV2:  40 %  FAST:    General: calm in NAD  Mental Status: awake alert oriented x3  HEENT: eomi, perrl  Lungs: ctabl b/l bs no wheezing or rales  Cardiac: s1s2 no mgr  GI: soft nontender +BS  : voids  Ext: moves all 4 extremities spontaneously  Neuro: no gross findings      LABS:                        7.7    7.99  )-----------( 221      ( 2022 06:31 )             23.7     06-02    134<L>  |  103  |  33<H>  ----------------------------<  96  4.2   |  25  |  1.23    Ca    8.0<L>      2022 06:31  Phos  3.0     06-02  Mg     2.2     06-02        Albumin: Albumin, Serum: 1.5 g/dL ( @ 23:24)      Allergies    Broccoli (Unknown)  penicillin (Vomiting; Nausea)    Intolerances      MEDICATIONS  (STANDING):  ascorbic acid 500 milliGRAM(s) Oral daily  atorvastatin 80 milliGRAM(s) Oral at bedtime  chlorhexidine 4% Liquid 1 Application(s) Topical <User Schedule>  ferrous    sulfate 325 milliGRAM(s) Oral daily  finasteride 5 milliGRAM(s) Oral daily  heparin   Injectable 5000 Unit(s) SubCutaneous every 12 hours  loperamide 2 milliGRAM(s) Oral daily  melatonin 5 milliGRAM(s) Oral at bedtime  meropenem  IVPB 1000 milliGRAM(s) IV Intermittent every 12 hours  midodrine 15 milliGRAM(s) Oral every 8 hours  norepinephrine Infusion 0.05 MICROgram(s)/kG/Min (3.26 mL/Hr) IV Continuous <Continuous>  pantoprazole    Tablet 40 milliGRAM(s) Oral before breakfast  ranolazine 500 milliGRAM(s) Oral two times a day  tamsulosin 0.4 milliGRAM(s) Oral at bedtime    MEDICATIONS  (PRN):  acetaminophen     Tablet .. 650 milliGRAM(s) Oral every 6 hours PRN Temp greater or equal to 38C (100.4F), Mild Pain (1 - 3)  acetaminophen     Tablet .. 1000 milliGRAM(s) Oral every 6 hours PRN Temp greater or equal to 38C (100.4F), Moderate Pain (4 - 6)  aluminum hydroxide/magnesium hydroxide/simethicone Suspension 30 milliLiter(s) Oral every 4 hours PRN Dyspepsia  ondansetron Injectable 4 milliGRAM(s) IV Push every 8 hours PRN Nausea and/or Vomiting      RADIOLOGY:

## 2022-06-02 NOTE — PROGRESS NOTE ADULT - SUBJECTIVE AND OBJECTIVE BOX
Date of service: 06-02-22 @ 14:22    Patient lying in bed; does not want to eat and has no appetite, has still need for pressors, no diarrhea, afebrile        ROS unable to obtain secondary to patient medical condition     MEDICATIONS  (STANDING):  ascorbic acid 500 milliGRAM(s) Oral daily  atorvastatin 80 milliGRAM(s) Oral at bedtime  chlorhexidine 4% Liquid 1 Application(s) Topical <User Schedule>  ferrous    sulfate 325 milliGRAM(s) Oral daily  finasteride 5 milliGRAM(s) Oral daily  heparin   Injectable 5000 Unit(s) SubCutaneous every 12 hours  loperamide 2 milliGRAM(s) Oral daily  melatonin 5 milliGRAM(s) Oral at bedtime  meropenem  IVPB 1000 milliGRAM(s) IV Intermittent every 12 hours  midodrine 15 milliGRAM(s) Oral every 8 hours  norepinephrine Infusion 0.05 MICROgram(s)/kG/Min (3.26 mL/Hr) IV Continuous <Continuous>  pantoprazole    Tablet 40 milliGRAM(s) Oral before breakfast  ranolazine 500 milliGRAM(s) Oral two times a day  tamsulosin 0.4 milliGRAM(s) Oral at bedtime    MEDICATIONS  (PRN):  acetaminophen     Tablet .. 650 milliGRAM(s) Oral every 6 hours PRN Temp greater or equal to 38C (100.4F), Mild Pain (1 - 3)  acetaminophen     Tablet .. 1000 milliGRAM(s) Oral every 6 hours PRN Temp greater or equal to 38C (100.4F), Moderate Pain (4 - 6)  aluminum hydroxide/magnesium hydroxide/simethicone Suspension 30 milliLiter(s) Oral every 4 hours PRN Dyspepsia  ondansetron Injectable 4 milliGRAM(s) IV Push every 8 hours PRN Nausea and/or Vomiting      Vital Signs Last 24 Hrs  T(C): 36.1 (02 Jun 2022 11:53), Max: 36.2 (02 Jun 2022 00:30)  T(F): 96.9 (02 Jun 2022 11:53), Max: 97.2 (02 Jun 2022 06:30)  HR: 84 (02 Jun 2022 13:00) (75 - 88)  BP: 110/53 (02 Jun 2022 13:00) (101/45 - 115/79)  BP(mean): 67 (02 Jun 2022 13:00) (59 - 86)  RR: 19 (02 Jun 2022 13:00) (14 - 25)  SpO2: 88% (02 Jun 2022 11:00) (88% - 100%)        Physical Exam:      PE:  Constitutional: frail looking  HEENT: NC/AT, EOMI, PERRLA, conjunctivae clear; ears and nose atraumatic; pharynx benign  Neck: supple; thyroid not palpable; right neck IJ TLC  Back: no tenderness  Respiratory: respiratory effort normal; clear to auscultation  Cardiovascular: S1S2 regular, no murmurs  Abdomen: soft, not tender, not distended, positive BS; liver and spleen WNL  Genitourinary: no suprapubic tenderness  Musculoskeletal: no muscle tenderness, no joint swelling or tenderness  Extremities: no pedal edema  Neurological/ Psychiatric:  moving all extremities  Skin: no rashes; no palpable lesions    Labs: all available labs reviewed                                 Labs:  Labs:                        7.7    7.99  )-----------( 221      ( 02 Jun 2022 06:31 )             23.7     06-02    134<L>  |  103  |  33<H>  ----------------------------<  96  4.2   |  25  |  1.23    Ca    8.0<L>      02 Jun 2022 06:31  Phos  3.0     06-02  Mg     2.2     06-02             Cultures:       Culture - Blood (collected 05-29-22 @ 23:35)  Source: .Blood None  Preliminary Report (05-31-22 @ 07:00):    No growth to date.    Culture - Blood (collected 05-29-22 @ 23:24)  Source: .Blood None  Preliminary Report (05-31-22 @ 07:00):    No growth to date.      Ferritin, Serum: 404 ng/mL (05-21-22 @ 18:15)      Ferritin, Serum: 404 ng/mL (05-21-22 @ 18:15)    < from: CT Abdomen and Pelvis w/ Oral Cont (05.25.22 @ 17:02) >    IMPRESSION:  Moderate amount of stool throughout the colon. Rectal wall thickening and   presacral edema, suggestive of proctitis/stercoral colitis.    Small bilateral pleural effusions with adjacent compressive atelectasis.    < end of copied text >      < from: Xray Chest 1 View-PORTABLE IMMEDIATE (Xray Chest 1 View-PORTABLE IMMEDIATE .) (05.30.22 @ 00:42) >  COMPARISON: 5/28/2022    FINDINGS/  IMPRESSION:    The left ICD isunchanged.    Stable pulmonary edema and bilateral pleural effusions. Bibasilar   opacities are unchanged.    < end of copied text >      Radiology: all available radiological tests reviewed      ACC: [id number] EXAM:  XR CHEST PORTABLE URGENT 1V                          PROCEDURE DATE:  05/21/2022          INTERPRETATION:  Portable chest radiograph    CLINICAL INFORMATION: Diarrhea    TECHNIQUE:  Portable  AP chest radiograph.    COMPARISON:5/8/2022 chest radiograph .    FINDINGS:  CATHETERS AND TUBES: None    PULMONARY: The visualized lungs are clear of airspace consolidations or   effusions.   No pneumothorax.    HEART/VASCULAR: The  heart is enlarged in transverse diameter. Status   post coronary artery bypass graft procedure. Cardiac device wire leads   are within right atrium and right ventricle.    BONES: Visualized osseous structures are intact. Mild thoracic   dextroscoliosis.    IMPRESSION:   No radiographic evidence of active chest disease.      Advanced directives addressed: full resuscitation

## 2022-06-02 NOTE — PROGRESS NOTE ADULT - ASSESSMENT
88 y/o M with PMH HTN, CAD s/p CABG, AAA s/p repair, CKD Stage 3, CHFpEF, insomnia, obstructive uropathy, BPH, Dz reflux sent from SNF for evaluation of diarrhea. Pt is poor historian due to hearing impairment and  Dementia. Reports that developed diarrhea overnight, not sure how many BMs had, as wearing diapers. He denies fevers or chills, has poor appetite but not new. No abd pain or cramping. Pt reports that his wife also is with loose stools for a few days. In ED: labs with elevated WBCs, elevated BUN/CR.  Pt was given 1000ml IVF bolus and PO Vanco x 1.  During assessment with RN Pt had large dark, tarry stool. Was given oral vancomycin c diff pending.    1. diarrhea. leukocytosis - resolved. ckd 3  - initially admitted with diarrhea; hospital course complicated by septic shock of unclear etiology, possibly stercoral colitis, versus infected sacral decubitus  - follow up cultures   - does not appear to have pneumonia based on imaging  - day #4 meropenem  - encouraged diet, discussed with nursing  - pressors per icu    2 other issues - care per medicine

## 2022-06-02 NOTE — PROGRESS NOTE ADULT - ASSESSMENT
IMP:    89 y/o male with HTN, CAD s/p CABG, AAA s/p repair, CKD Stage 3, CHFpEF, insomnia, obstructive uropathy with chronic waite, BPH and reflux admitted with diarrhea--Stool specimen negative for PCR and C. diff   Tx to CCU for hypotension and Shock likely sepsis--?? Urinary source given chronic waite and  + UA vs Decubs.  Not eating now which is ominous sign      Critically ill.  High risk for acute decompensation and deterioration including death   Patient requires critical care for support of one or more vital organ systems with a high probability of imminent or life threatening deterioration in his/her condition     Plan:    Cont with IV Dorina (4).  ID re evaluation appreciated   Actively titrate pressors to keep MAP > 60.  Mido increased to 15 q8  Follow up BCx  Limit IVF given CXR findings  HOB > 30  Encourage PO diet--as d/w palliative, artifical feeding will not change his overall outcome.  I do not favor feeding tube  DVT prophy--SCD and sqhep   Palliative care follow up    CCU care-- d/w ICU staff on multi disciplinary rounds and palliative care at bedside

## 2022-06-02 NOTE — PROGRESS NOTE ADULT - ASSESSMENT
Process of Care  --Reviewed dx/treatment problems and alignment with Goals of Care    Physical Aspects of Care  --Pain  patient denies at this time  c/w current managment    --Bowel Regimen  denies constipation  risk for constipation d/t immobility  daily dulcolax    --Dyspnea  No SOB at this time  comfortable and in NAD    --Nausea Vomiting  denies    --Weakness  PT as tolerated     Psychological and Psychiatric Aspects of Care:   --Greif/Bereavment: emotional support provided  --Hx of psychiatric dx: none  -Pastoral Care Available PRN     Social Aspects of Care  -SW involved     Cultural Aspects  -Primary Language: English    Goals of Care:     We discussed Palliative Care team being a supportive team when a patient has ongoing illnesses.  We also discussed that it is not an end of life care service, but can help navigate symptoms and emotional support throughout their hospital stay here.       CPR/DNR discussed at length,   please refer to [facility] note      Prognosis:    Ethical and Legal Aspects:   NA        Capacity: pt w/ clear capacity   Surrogate: pt  defers to  danny (son)    Code Status: full code  MOLST: full code  Dispo Plan: pending d/t medical status    Discussed With: Case coordinated with attending and SW and RN     Time Spent: 90 minutes including the care, coordination and counseling of this patient, 50% of which was spent coordinating and counseling.  Process of Care  --Reviewed dx/treatment problems and alignment with Goals of Care    Physical Aspects of Care  --Pain  patient denies at this time  c/w current managment    --Bowel Regimen  denies constipation  risk for constipation d/t immobility  daily dulcolax    --Dyspnea  No SOB at this time  comfortable and in NAD    --Nausea Vomiting  denies    --Weakness  PT as tolerated     Psychological and Psychiatric Aspects of Care:   --Greif/Bereavment: emotional support provided  --Hx of psychiatric dx: none  -Pastoral Care Available PRN       Social Aspects of Care  -SW involved     Cultural Aspects  -Primary Language: English    Goals of Care:     We discussed Palliative Care team being a supportive team when a patient has ongoing illnesses.  We also discussed that it is not an end of life care service, but can help navigate symptoms and emotional support throughout their hospital stay here.     6/2   please refer to [facility] note  advanced directives addressed again  DNR DNI         Ethical and Legal Aspects:   NA        Capacity: pt w/ clear capacity   Surrogate: pt  defers to  danny (son)    Code Status: full code  MOLST: DNR DNI   Dispo Plan: pt remains on pressors, if unable to wean, comfort may need to be next step    Discussed With: Case coordinated with attending and SW and RN     Time Spent:  45 minutes including the care, coordination and counseling of this patient, 50% of which was spent coordinating and counseling.

## 2022-06-02 NOTE — PROGRESS NOTE ADULT - SUBJECTIVE AND OBJECTIVE BOX
Patient is a 89y old  Male who presents with a chief complaint of diarrhea (01 Jun 2022 10:59)    BRIEF HOSPITAL COURSE:   88 y/o male with HTN, CAD s/p CABG, AAA s/p repair, CKD Stage 3, CHFpEF, insomnia, obstructive uropathy with chronic waite, BPH and reflux admitted with diarrhea--Stool specimen negative for PCR and C. diff  Tx to CCU for hypotension and Shock likely septic 2/2 Urosepsis given chronic waite vs. Decubs    Events last 24 hours:   -Remains in vasopressor-dependent shock state w/ Levophed infusion.   -SCr continues to improve daily, good UOP.  -Afebrile.     PAST MEDICAL & SURGICAL HISTORY:  HTN (hypertension)  AAA (abdominal aortic aneurysm)  CAD (coronary artery disease)  Stage 3 chronic kidney disease  History of CHF (congestive heart failure)  BPH (benign prostatic hyperplasia)  H/O urinary retention  S/P CABG (coronary artery bypass graft)  History of right hip replacement  CAD (coronary artery disease)  s/p stent placed  S/P left inguinal hernia repair  S/P AAA repair  7-    Review of Systems:  CONSTITUTIONAL: No fever, chills, or fatigue  EYES: No eye pain, visual disturbances, or discharge  ENMT:  No difficulty hearing, tinnitus, vertigo; No sinus or throat pain  NECK: No pain or stiffness  RESPIRATORY: No cough, wheezing, chills or hemoptysis; No shortness of breath  CARDIOVASCULAR: No chest pain, palpitations, dizziness, or leg swelling  GASTROINTESTINAL: No abdominal or epigastric pain. No nausea, vomiting, or hematemesis; No diarrhea or constipation. No melena or hematochezia.  GENITOURINARY: No dysuria, frequency, hematuria, or incontinence  NEUROLOGICAL: No headaches, memory loss, loss of strength, numbness, or tremors  SKIN: No itching, burning, rashes, or lesions   MUSCULOSKELETAL: No joint pain or swelling; No muscle, back, or extremity pain  PSYCHIATRIC: No depression, anxiety, mood swings, or difficulty sleeping    Medications:  meropenem  IVPB 1000 milliGRAM(s) IV Intermittent every 12 hours  midodrine 15 milliGRAM(s) Oral every 8 hours  norepinephrine Infusion 0.05 MICROgram(s)/kG/Min IV Continuous <Continuous>  ranolazine 500 milliGRAM(s) Oral two times a day  tamsulosin 0.4 milliGRAM(s) Oral at bedtime  acetaminophen     Tablet .. 650 milliGRAM(s) Oral every 6 hours PRN  acetaminophen     Tablet .. 1000 milliGRAM(s) Oral every 6 hours PRN  melatonin 5 milliGRAM(s) Oral at bedtime  ondansetron Injectable 4 milliGRAM(s) IV Push every 8 hours PRN  heparin   Injectable 5000 Unit(s) SubCutaneous every 12 hours  aluminum hydroxide/magnesium hydroxide/simethicone Suspension 30 milliLiter(s) Oral every 4 hours PRN  loperamide 2 milliGRAM(s) Oral daily  pantoprazole    Tablet 40 milliGRAM(s) Oral before breakfast  atorvastatin 80 milliGRAM(s) Oral at bedtime  finasteride 5 milliGRAM(s) Oral daily  ascorbic acid 500 milliGRAM(s) Oral daily  ferrous    sulfate 325 milliGRAM(s) Oral daily  chlorhexidine 4% Liquid 1 Application(s) Topical <User Schedule>    ICU Vital Signs Last 24 Hrs  T(C): 35.9 (01 Jun 2022 16:00), Max: 36.3 (01 Jun 2022 06:00)  T(F): 96.7 (01 Jun 2022 16:00), Max: 97.4 (01 Jun 2022 06:00)  HR: 82 (02 Jun 2022 00:00) (75 - 89)  BP: 108/52 (02 Jun 2022 00:00) (99/51 - 115/51)  BP(mean): 66 (02 Jun 2022 00:00) (59 - 79)  ABP: --  ABP(mean): --  RR: 15 (02 Jun 2022 00:00) (14 - 26)  SpO2: 99% (02 Jun 2022 00:00) (91% - 100%)    I&O's Detail  31 May 2022 07:01  -  01 Jun 2022 07:00  --------------------------------------------------------  IN:    Norepinephrine: 252 mL    Oral Fluid: 900 mL  Total IN: 1152 mL  OUT:    Indwelling Catheter - Urethral (mL): 300 mL    Voided (mL): 400 mL  Total OUT: 700 mL  Total NET: 452 mL    01 Jun 2022 07:01  -  02 Jun 2022 00:20  --------------------------------------------------------  IN:    Norepinephrine: 218 mL  Total IN: 218 mL  OUT:    Indwelling Catheter - Urethral (mL): 300 mL  Total OUT: 300 mL  Total NET: -82 mL    LABS:                    8.0    11.62 )-----------( 226      ( 01 Jun 2022 06:15 )             25.4     06-01  133<L>  |  102  |  38<H>  ----------------------------<  109<H>  4.1   |  24  |  1.43<H>  Ca    8.2<L>      01 Jun 2022 06:15  Phos  3.1     06-01  Mg     2.1     06-01    CAPILLARY BLOOD GLUCOSE    CULTURES:  Culture Results:   No growth to date. (05-29-22 @ 23:35)  Culture Results:   No growth to date. (05-29-22 @ 23:24)      Physical Examination:  General: Lethargic appearing, arousable.  HEENT: PERRL.  NECK: Supple.   PULM: Clear to auscultation bilaterally.  CVS: s1/s2.  ABD: Soft, nondistended, nontender, normoactive bowel sounds.  EXT: No edema, nontender  SKIN: Warm.    RADIOLOGY:   < from: US Kidney and Bladder (05.30.22 @ 07:57) >  ACC: [id number] EXAM:  US KIDNEYS AND BLADDER                        PROCEDURE DATE:  05/30/2022    IMPRESSION:  Bilateral simple appearing renal cysts. No hydronephrosis.  Mild increase renal echogenicity suggestive of medical renal disease.  Urinary bladder is collapsed around a Waite catheter.      88 y/o male with HTN, CAD s/p CABG, AAA s/p repair, CKD Stage 3, CHFpEF, insomnia, obstructive uropathy with chronic waite, BPH and reflux admitted with diarrhea--Stool specimen negative for PCR and C. diff  Tx to CCU for hypotension and Shock likely septic 2/2 Urosepsis given chronic waite vs. Decubs  Assessment:  1. Septic Shock  2. Urosepsis  3. DESIRAE on CKD    Plan:  NEURO:  -Melatonin for adequate sleep/wake cycle.     CV:  -Remains in vasopressor-dependent shock state w/ Levophed infusion, actively titrating to maintain goal MAP >65 monitoring end points of perfusion. Increase Midodrine to 15mg q8h to further facilitate weaning off vasopressors.  -Keep K~4 and Mg>2 for optimal arrhythmia suppression.  -Statin.    RESP:  -No acute issues, satting well on NC.     RENAL:  -SCr continues to improve daily, good UOP.   -trend lytes/Scr daily with BMP  -I's and O's, goal UOP 0.5 cc/kg/hr  -renal dose meds and avoid nephrotoxins     GI:  -Regular diet.  -Protonix QD.     ENDO:  -Aggressive glycemic control to limit FS glucose to <180mg/dl. BS WNL.    ID:  -BloodCx NGTD. CDiff PCR negative. Empiric Dorina.     HEME:  -DVT ppx w/ SC Heparin.     CRITICAL CARE TIME SPENT:  42 minutes of critical care time spent providing medical care for patient's acute illness/conditions that impairs at least one vital organ system and/or poses a high risk of imminent or life threatening deterioration in the patient's condition. It includes time spent evaluating and treating the patient's acute illness as well as time spent reviewing labs, radiology, discussing goals of care with patient and/or patient's family, and discussing the case with a multidisciplinary team, including the eICU, in an effort to prevent further life threatening deterioration or end organ damage. This time is independent of any procedures performed.

## 2022-06-02 NOTE — PROGRESS NOTE ADULT - SUBJECTIVE AND OBJECTIVE BOX
Hospital D # 12  CCU # 4  [facility] CVL # 3    CC:  Shock     HPI:    89 y/o male with HTN, CAD s/p CABG, AAA s/p repair, CKD Stage 3, CHFpEF, insomnia, obstructive uropathy with chronic waite, BPH and reflux sent from SNF for evaluation of diarrhea. Pt is poor historian due to hearing impairment and Dementia. Reports that developed diarrhea overnight, not sure how many BMs had, as wearing diapers. He denies fevers or chills, has poor appetite but not new. No abd pain or cramping. Pt reports that his wife also is with loose stools for a few days.   Stool PCR and CDI NEGATIVE  He is tx to CCU on 5/29 for septic shock.  Lactate > 2.  Fever 101.8  and WBC 17    5/30:  Pt seen and examined in CCU during IDR.  On norepi gtt 0.4 Hypothermic.  Lethargic and arousable.  WBC 17  Cr 1.9  Started on Dorina O/N.  CXR-- Personally reviewed--effusions.  + UA.     5/31:  Pt seen and examined in CCU during IDR.  Remains on pressors.  Awake.  BCx NTD.  Hypothermic.  WBC 11  Plats 105  Cr 1.5      6/1:  Pt seen and examined in CCU during IDR.  Remains on pressors.  Palliative care input appreciated.  Awake.  Tm 97.6  WBC 11 Plats 226  Cr 1.4  BCx NTD    6/2:  Pt seen and examined in CCU during IDR.  Loss will to eat.  Remains on pressors.  BCx NTD.  Tm 97.4  WBC  8  Cr 1.2  Not doing well.  Case d/w palliative care at bedside    PMH:  As above.     PSH:  As above.     FH: Non Contributory other than those listed in HPI    Social History:  NC    MEDICATIONS  (STANDING):  ascorbic acid 500 milliGRAM(s) Oral daily  atorvastatin 80 milliGRAM(s) Oral at bedtime  chlorhexidine 4% Liquid 1 Application(s) Topical <User Schedule>  ferrous    sulfate 325 milliGRAM(s) Oral daily  finasteride 5 milliGRAM(s) Oral daily  heparin   Injectable 5000 Unit(s) SubCutaneous every 12 hours  loperamide 2 milliGRAM(s) Oral daily  melatonin 5 milliGRAM(s) Oral at bedtime  meropenem  IVPB 1000 milliGRAM(s) IV Intermittent every 12 hours  midodrine 15 milliGRAM(s) Oral every 8 hours  norepinephrine Infusion 0.05 MICROgram(s)/kG/Min (3.26 mL/Hr) IV Continuous <Continuous>  pantoprazole    Tablet 40 milliGRAM(s) Oral before breakfast  ranolazine 500 milliGRAM(s) Oral two times a day  tamsulosin 0.4 milliGRAM(s) Oral at bedtime    MEDICATIONS  (PRN):  acetaminophen     Tablet .. 650 milliGRAM(s) Oral every 6 hours PRN Temp greater or equal to 38C (100.4F), Mild Pain (1 - 3)  acetaminophen     Tablet .. 1000 milliGRAM(s) Oral every 6 hours PRN Temp greater or equal to 38C (100.4F), Moderate Pain (4 - 6)  aluminum hydroxide/magnesium hydroxide/simethicone Suspension 30 milliLiter(s) Oral every 4 hours PRN Dyspepsia  ondansetron Injectable 4 milliGRAM(s) IV Push every 8 hours PRN Nausea and/or Vomiting      Allergies: NKDA    ROS:  SEE BELOW        ICU Vital Signs Last 24 Hrs  T(C): 36.2 (02 Jun 2022 06:30), Max: 36.2 (02 Jun 2022 00:30)  T(F): 97.2 (02 Jun 2022 06:30), Max: 97.2 (02 Jun 2022 06:30)  HR: 80 (02 Jun 2022 10:00) (75 - 88)  BP: 110/49 (02 Jun 2022 10:00) (99/51 - 115/79)  BP(mean): 64 (02 Jun 2022 10:00) (59 - 86)  ABP: --  ABP(mean): --  RR: 16 (02 Jun 2022 10:00) (14 - 25)  SpO2: 100% (02 Jun 2022 10:00) (91% - 100%)          I&O's Summary    01 Jun 2022 07:01  -  02 Jun 2022 07:00  --------------------------------------------------------  IN: 707 mL / OUT: 600 mL / NET: 107 mL        Physical Exam:  SEE BELOW                          7.7    7.99  )-----------( 221      ( 02 Jun 2022 06:31 )             23.7       06-02    134<L>  |  103  |  33<H>  ----------------------------<  96  4.2   |  25  |  1.23    Ca    8.0<L>      02 Jun 2022 06:31  Phos  3.0     06-02  Mg     2.2     06-02                      DVT Prophylaxis:                                                            Contraindication:     Advanced Directives:    Discussed with:    Visit Information:  Time spent excluding procedure:      ** Time is exclusive of billed procedures and/or teaching and/or routine family updates.

## 2022-06-03 LAB
ANION GAP SERPL CALC-SCNC: 8 MMOL/L — SIGNIFICANT CHANGE UP (ref 5–17)
BUN SERPL-MCNC: 31 MG/DL — HIGH (ref 7–23)
CALCIUM SERPL-MCNC: 8 MG/DL — LOW (ref 8.5–10.1)
CHLORIDE SERPL-SCNC: 103 MMOL/L — SIGNIFICANT CHANGE UP (ref 96–108)
CO2 SERPL-SCNC: 22 MMOL/L — SIGNIFICANT CHANGE UP (ref 22–31)
CREAT SERPL-MCNC: 1.2 MG/DL — SIGNIFICANT CHANGE UP (ref 0.5–1.3)
EGFR: 58 ML/MIN/1.73M2 — LOW
GLUCOSE SERPL-MCNC: 79 MG/DL — SIGNIFICANT CHANGE UP (ref 70–99)
HCT VFR BLD CALC: 25 % — LOW (ref 39–50)
HGB BLD-MCNC: 7.8 G/DL — LOW (ref 13–17)
MAGNESIUM SERPL-MCNC: 2.2 MG/DL — SIGNIFICANT CHANGE UP (ref 1.6–2.6)
MCHC RBC-ENTMCNC: 27.9 PG — SIGNIFICANT CHANGE UP (ref 27–34)
MCHC RBC-ENTMCNC: 31.2 GM/DL — LOW (ref 32–36)
MCV RBC AUTO: 89.3 FL — SIGNIFICANT CHANGE UP (ref 80–100)
PHOSPHATE SERPL-MCNC: 3.1 MG/DL — SIGNIFICANT CHANGE UP (ref 2.5–4.5)
PLATELET # BLD AUTO: 195 K/UL — SIGNIFICANT CHANGE UP (ref 150–400)
POTASSIUM SERPL-MCNC: 4.5 MMOL/L — SIGNIFICANT CHANGE UP (ref 3.5–5.3)
POTASSIUM SERPL-SCNC: 4.5 MMOL/L — SIGNIFICANT CHANGE UP (ref 3.5–5.3)
RBC # BLD: 2.8 M/UL — LOW (ref 4.2–5.8)
RBC # FLD: 17.8 % — HIGH (ref 10.3–14.5)
SODIUM SERPL-SCNC: 133 MMOL/L — LOW (ref 135–145)
WBC # BLD: 7.17 K/UL — SIGNIFICANT CHANGE UP (ref 3.8–10.5)
WBC # FLD AUTO: 7.17 K/UL — SIGNIFICANT CHANGE UP (ref 3.8–10.5)

## 2022-06-03 PROCEDURE — 99233 SBSQ HOSP IP/OBS HIGH 50: CPT

## 2022-06-03 PROCEDURE — 99497 ADVNCD CARE PLAN 30 MIN: CPT

## 2022-06-03 PROCEDURE — 99291 CRITICAL CARE FIRST HOUR: CPT

## 2022-06-03 RX ADMIN — ATORVASTATIN CALCIUM 80 MILLIGRAM(S): 80 TABLET, FILM COATED ORAL at 23:26

## 2022-06-03 RX ADMIN — FINASTERIDE 5 MILLIGRAM(S): 5 TABLET, FILM COATED ORAL at 10:57

## 2022-06-03 RX ADMIN — MIDODRINE HYDROCHLORIDE 15 MILLIGRAM(S): 2.5 TABLET ORAL at 17:28

## 2022-06-03 RX ADMIN — HEPARIN SODIUM 5000 UNIT(S): 5000 INJECTION INTRAVENOUS; SUBCUTANEOUS at 23:26

## 2022-06-03 RX ADMIN — Medication 3.26 MICROGRAM(S)/KG/MIN: at 17:37

## 2022-06-03 RX ADMIN — TAMSULOSIN HYDROCHLORIDE 0.4 MILLIGRAM(S): 0.4 CAPSULE ORAL at 23:26

## 2022-06-03 RX ADMIN — PANTOPRAZOLE SODIUM 40 MILLIGRAM(S): 20 TABLET, DELAYED RELEASE ORAL at 06:04

## 2022-06-03 RX ADMIN — HEPARIN SODIUM 5000 UNIT(S): 5000 INJECTION INTRAVENOUS; SUBCUTANEOUS at 10:56

## 2022-06-03 RX ADMIN — MEROPENEM 100 MILLIGRAM(S): 1 INJECTION INTRAVENOUS at 23:30

## 2022-06-03 RX ADMIN — MIDODRINE HYDROCHLORIDE 15 MILLIGRAM(S): 2.5 TABLET ORAL at 06:05

## 2022-06-03 RX ADMIN — Medication 3.26 MICROGRAM(S)/KG/MIN: at 23:25

## 2022-06-03 RX ADMIN — Medication 500 MILLIGRAM(S): at 10:56

## 2022-06-03 RX ADMIN — RANOLAZINE 500 MILLIGRAM(S): 500 TABLET, FILM COATED, EXTENDED RELEASE ORAL at 10:56

## 2022-06-03 RX ADMIN — Medication 2 MILLIGRAM(S): at 10:56

## 2022-06-03 RX ADMIN — Medication 325 MILLIGRAM(S): at 10:57

## 2022-06-03 RX ADMIN — MIDODRINE HYDROCHLORIDE 15 MILLIGRAM(S): 2.5 TABLET ORAL at 23:26

## 2022-06-03 RX ADMIN — CHLORHEXIDINE GLUCONATE 1 APPLICATION(S): 213 SOLUTION TOPICAL at 06:05

## 2022-06-03 RX ADMIN — RANOLAZINE 500 MILLIGRAM(S): 500 TABLET, FILM COATED, EXTENDED RELEASE ORAL at 23:26

## 2022-06-03 RX ADMIN — MEROPENEM 100 MILLIGRAM(S): 1 INJECTION INTRAVENOUS at 10:55

## 2022-06-03 NOTE — PROGRESS NOTE ADULT - SUBJECTIVE AND OBJECTIVE BOX
Date of service: 06-03-22 @ 14:51    Patient sitting in bed; still no appetite or desire to eat, is weak, still on pressors      ROS unable to obtain secondary to patient medical condition     MEDICATIONS  (STANDING):  ascorbic acid 500 milliGRAM(s) Oral daily  atorvastatin 80 milliGRAM(s) Oral at bedtime  chlorhexidine 4% Liquid 1 Application(s) Topical <User Schedule>  ferrous    sulfate 325 milliGRAM(s) Oral daily  finasteride 5 milliGRAM(s) Oral daily  heparin   Injectable 5000 Unit(s) SubCutaneous every 12 hours  loperamide 2 milliGRAM(s) Oral daily  melatonin 5 milliGRAM(s) Oral at bedtime  meropenem  IVPB 1000 milliGRAM(s) IV Intermittent every 12 hours  midodrine 15 milliGRAM(s) Oral every 8 hours  norepinephrine Infusion 0.05 MICROgram(s)/kG/Min (3.26 mL/Hr) IV Continuous <Continuous>  pantoprazole    Tablet 40 milliGRAM(s) Oral before breakfast  ranolazine 500 milliGRAM(s) Oral two times a day  tamsulosin 0.4 milliGRAM(s) Oral at bedtime    MEDICATIONS  (PRN):  acetaminophen     Tablet .. 650 milliGRAM(s) Oral every 6 hours PRN Temp greater or equal to 38C (100.4F), Mild Pain (1 - 3)  acetaminophen     Tablet .. 1000 milliGRAM(s) Oral every 6 hours PRN Temp greater or equal to 38C (100.4F), Moderate Pain (4 - 6)  aluminum hydroxide/magnesium hydroxide/simethicone Suspension 30 milliLiter(s) Oral every 4 hours PRN Dyspepsia  ondansetron Injectable 4 milliGRAM(s) IV Push every 8 hours PRN Nausea and/or Vomiting      Vital Signs Last 24 Hrs  T(C): 36.8 (03 Jun 2022 13:00), Max: 36.8 (03 Jun 2022 13:00)  T(F): 98.2 (03 Jun 2022 13:00), Max: 98.2 (03 Jun 2022 13:00)  HR: 81 (03 Jun 2022 14:00) (67 - 88)  BP: 119/58 (03 Jun 2022 14:00) (102/43 - 120/46)  BP(mean): 71 (03 Jun 2022 14:00) (58 - 81)  RR: 24 (03 Jun 2022 14:00) (14 - 24)  SpO2: 96% (03 Jun 2022 14:00) (90% - 100%)        Physical Exam:          PE:  Constitutional: frail looking  HEENT: NC/AT, EOMI, PERRLA, conjunctivae clear; ears and nose atraumatic; pharynx benign  Neck: supple; thyroid not palpable; right neck IJ TLC  Back: no tenderness  Respiratory: respiratory effort normal; clear to auscultation  Cardiovascular: S1S2 regular, no murmurs  Abdomen: soft, not tender, not distended, positive BS; liver and spleen WNL  Genitourinary: no suprapubic tenderness  Musculoskeletal: no muscle tenderness, no joint swelling or tenderness  Extremities: no pedal edema  Neurological/ Psychiatric:  moving all extremities  Skin: no rashes; no palpable lesions    Labs: all available labs reviewed                                Labs:                        7.8    7.17  )-----------( 195      ( 03 Jun 2022 05:29 )             25.0     06-03    133<L>  |  103  |  31<H>  ----------------------------<  79  4.5   |  22  |  1.20    Ca    8.0<L>      03 Jun 2022 05:29  Phos  3.1     06-03  Mg     2.2     06-03             Cultures:       Culture - Blood (collected 05-29-22 @ 23:35)  Source: .Blood None  Preliminary Report (05-31-22 @ 07:00):    No growth to date.    Culture - Blood (collected 05-29-22 @ 23:24)  Source: .Blood None  Preliminary Report (05-31-22 @ 07:00):    No growth to date.              < from: CT Abdomen and Pelvis w/ Oral Cont (05.25.22 @ 17:02) >    IMPRESSION:  Moderate amount of stool throughout the colon. Rectal wall thickening and   presacral edema, suggestive of proctitis/stercoral colitis.    Small bilateral pleural effusions with adjacent compressive atelectasis.    < end of copied text >      < from: Xray Chest 1 View-PORTABLE IMMEDIATE (Xray Chest 1 View-PORTABLE IMMEDIATE .) (05.30.22 @ 00:42) >  COMPARISON: 5/28/2022    FINDINGS/  IMPRESSION:    The left ICD isunchanged.    Stable pulmonary edema and bilateral pleural effusions. Bibasilar   opacities are unchanged.    < end of copied text >      Radiology: all available radiological tests reviewed      ACC: [id number] EXAM:  XR CHEST PORTABLE URGENT 1V                          PROCEDURE DATE:  05/21/2022          INTERPRETATION:  Portable chest radiograph    CLINICAL INFORMATION: Diarrhea    TECHNIQUE:  Portable  AP chest radiograph.    COMPARISON:5/8/2022 chest radiograph .    FINDINGS:  CATHETERS AND TUBES: None    PULMONARY: The visualized lungs are clear of airspace consolidations or   effusions.   No pneumothorax.    HEART/VASCULAR: The  heart is enlarged in transverse diameter. Status   post coronary artery bypass graft procedure. Cardiac device wire leads   are within right atrium and right ventricle.    BONES: Visualized osseous structures are intact. Mild thoracic   dextroscoliosis.    IMPRESSION:   No radiographic evidence of active chest disease.      Advanced directives addressed: full resuscitation

## 2022-06-03 NOTE — PROVIDER CONTACT NOTE (OTHER) - SITUATION
notified office; spoke to Leah
pt woke up confused; claimed he was hallucinating. c/o chest heaviness. o2 sat only 80% but fingertips are os cold, warm compress and O2 @ 2LPM given.

## 2022-06-03 NOTE — CHART NOTE - NSCHARTNOTEFT_GEN_A_CORE
SW attempted to reach pt's son Nicolas to follow up & offer support. Message was left ([phone]). Our team will continue to follow.

## 2022-06-03 NOTE — PROGRESS NOTE ADULT - ASSESSMENT
IMP:    88 y/o male with HTN, CAD s/p CABG, AAA s/p repair, CKD Stage 3, CHFpEF, insomnia, obstructive uropathy with chronic waite, BPH and reflux admitted with diarrhea--Stool specimen negative for PCR and C. diff--Tx to CCU for hypotension and Shock likely sepsis--?? Urinary source given chronic waite and  + UA vs Decubs.  Not eating now which is ominous sign.  Unable to establish intrinsic vaso tone WO pressors    Critically ill.  High risk for acute decompensation and deterioration including death   Patient requires critical care for support of one or more vital organ systems with a high probability of imminent or life threatening deterioration in his/her condition     Plan:    Cont with IV Dorina (5).  ID re evaluation appreciated   Actively titrate pressors to keep MAP > 60.  Mido increased to 15 q8  Follow up BCx  Limit IVF given CXR findings  HOB > 30  Encourage PO diet--as d/w palliative, artifical feeding will not change his overall outcome.  I do not favor feeding tube placement   DVT prophy--SCD and sqhep   Palliative care follow up    CCU care-- d/w ICU staff on multi disciplinary rounds and palliative care at bedside

## 2022-06-03 NOTE — PROGRESS NOTE ADULT - SUBJECTIVE AND OBJECTIVE BOX
Hospital D # 13  CCU # 5  [facility] CVL # 4    CC:  Shock     HPI:    88 y/o male with HTN, CAD s/p CABG, AAA s/p repair, CKD Stage 3, CHFpEF, insomnia, obstructive uropathy with chronic waite, BPH and reflux sent from SNF for evaluation of diarrhea. Pt is poor historian due to hearing impairment and Dementia. Reports that developed diarrhea overnight, not sure how many BMs had, as wearing diapers. He denies fevers or chills, has poor appetite but not new. No abd pain or cramping. Pt reports that his wife also is with loose stools for a few days.   Stool PCR and CDI NEGATIVE  He is tx to CCU on 5/29 for septic shock.  Lactate > 2.  Fever 101.8  and WBC 17    5/30:  Pt seen and examined in CCU during IDR.  On norepi gtt 0.4 Hypothermic.  Lethargic and arousable.  WBC 17  Cr 1.9  Started on Dorina O/N.  CXR-- Personally reviewed--effusions.  + UA.     5/31:  Pt seen and examined in CCU during IDR.  Remains on pressors.  Awake.  BCx NTD.  Hypothermic.  WBC 11  Plats 105  Cr 1.5      6/1:  Pt seen and examined in CCU during IDR.  Remains on pressors.  Palliative care input appreciated.  Awake.  Tm 97.6  WBC 11 Plats 226  Cr 1.4  BCx NTD    6/2:  Pt seen and examined in CCU during IDR.  Loss will to eat.  Remains on pressors.  BCx NTD.  Tm 97.4  WBC  8  Cr 1.2  Not doing well.  Case d/w palliative care at bedside    6/3:  Pt seen and examined in CCU during IDR.  Still not eating much.  Remains on pressors.  Tm 98.0  WBC 7  Cr 1.2   Case d/w Palliative care at bedside    PMH:  As above.     PSH:  As above.     FH: Non Contributory other than those listed in HPI    Social History:  NC    MEDICATIONS  (STANDING):  ascorbic acid 500 milliGRAM(s) Oral daily  atorvastatin 80 milliGRAM(s) Oral at bedtime  chlorhexidine 4% Liquid 1 Application(s) Topical <User Schedule>  ferrous    sulfate 325 milliGRAM(s) Oral daily  finasteride 5 milliGRAM(s) Oral daily  heparin   Injectable 5000 Unit(s) SubCutaneous every 12 hours  loperamide 2 milliGRAM(s) Oral daily  melatonin 5 milliGRAM(s) Oral at bedtime  meropenem  IVPB 1000 milliGRAM(s) IV Intermittent every 12 hours  midodrine 15 milliGRAM(s) Oral every 8 hours  norepinephrine Infusion 0.05 MICROgram(s)/kG/Min (3.26 mL/Hr) IV Continuous <Continuous>  pantoprazole    Tablet 40 milliGRAM(s) Oral before breakfast  ranolazine 500 milliGRAM(s) Oral two times a day  tamsulosin 0.4 milliGRAM(s) Oral at bedtime    MEDICATIONS  (PRN):  acetaminophen     Tablet .. 650 milliGRAM(s) Oral every 6 hours PRN Temp greater or equal to 38C (100.4F), Mild Pain (1 - 3)  acetaminophen     Tablet .. 1000 milliGRAM(s) Oral every 6 hours PRN Temp greater or equal to 38C (100.4F), Moderate Pain (4 - 6)  aluminum hydroxide/magnesium hydroxide/simethicone Suspension 30 milliLiter(s) Oral every 4 hours PRN Dyspepsia  ondansetron Injectable 4 milliGRAM(s) IV Push every 8 hours PRN Nausea and/or Vomiting      Allergies: NKDA    ROS:  SEE BELOW        ICU Vital Signs Last 24 Hrs  T(C): 36.7 (03 Jun 2022 05:55), Max: 36.7 (03 Jun 2022 05:55)  T(F): 98 (03 Jun 2022 05:55), Max: 98 (03 Jun 2022 05:55)  HR: 73 (03 Jun 2022 05:30) (67 - 88)  BP: 102/46 (03 Jun 2022 05:30) (102/43 - 116/57)  BP(mean): 60 (03 Jun 2022 05:30) (58 - 72)  ABP: --  ABP(mean): --  RR: 17 (03 Jun 2022 05:30) (15 - 25)  SpO2: 98% (03 Jun 2022 05:30) (88% - 100%)          I&O's Summary    02 Jun 2022 07:01  -  03 Jun 2022 07:00  --------------------------------------------------------  IN: 814 mL / OUT: 300 mL / NET: 514 mL        Physical Exam:  SEE BELOW                          7.8    7.17  )-----------( 195      ( 03 Jun 2022 05:29 )             25.0       06-03    133<L>  |  103  |  31<H>  ----------------------------<  79  4.5   |  22  |  1.20    Ca    8.0<L>      03 Jun 2022 05:29  Phos  3.1     06-03  Mg     2.2     06-03                      DVT Prophylaxis:                                                            Contraindication:     Advanced Directives:    Discussed with:    Visit Information:  Time spent excluding procedure:      ** Time is exclusive of billed procedures and/or teaching and/or routine family updates.

## 2022-06-03 NOTE — PROGRESS NOTE ADULT - ASSESSMENT
Process of Care  --Reviewed dx/treatment problems and alignment with Goals of Care    Physical Aspects of Care  --Pain  patient denies at this time  c/w current management    --Bowel Regimen  denies constipation  risk for constipation d/t immobility  daily dulcolax    --Dyspnea  No SOB at this time  comfortable and in NAD    --Nausea Vomiting  denies    --Weakness  PT as tolerated     Psychological and Psychiatric Aspects of Care:   --Greif/Bereavment: emotional support provided  --Hx of psychiatric dx: none  -Pastoral Care Available PRN       Social Aspects of Care  -SW involved     Cultural Aspects  -Primary Language: English    Goals of Care:     We discussed Palliative Care team being a supportive team when a patient has ongoing illnesses.  We also discussed that it is not an end of life care service, but can help navigate symptoms and emotional support throughout their hospital stay here.     6/2   please refer to [facility] note  advanced directives addressed again  DNR DNI      6/3  case d/w Nicolas and his wife Larissa   Patient continues to require pressor support  decreased PO intake  signficant cardiac disease  comfort/hospice and discontinuation of pressor support discussed  they are discussing as a family and will let teams know.   please refer to [facility] for further info       Ethical and Legal Aspects:   NA      Capacity: pt w/ clear capacity   Surrogate: pt  defers to  nicolas (son)    Code Status: full code  MOLST: DNR DNI   Dispo Plan:  awaiting family decision on comfort measures  Discussed With: Case coordinated with attending and SW and RN     Time Spent:  45 minutes including the care, coordination and counseling of this patient, 50% of which was spent coordinating and counseling.

## 2022-06-03 NOTE — PROGRESS NOTE ADULT - ASSESSMENT
89 yo m pmhx HTN, CAD s/p CABG, AAA s/p repair, CKD3, HFpEF, insomnia, BPH, obstructive uropathy with chronic waite admitted with diarrhea with course complicated by shock in setting of UTI vs decubs and desirae on ckd.      NEURO:  CV:  RESP:  RENAL: DESIRAE on CKD, improving, avoid nephrotoxic meds, renally dose meds, trend urine output, bun/cr and electrolytes, replace lytes, prn. waite in place  GI: Regular diet  ENDO: Glucose low normal on am labs, poct q6hr added.   ID: Completed abx therapy  HEME: Heparin for vte ppx   DISPO: DNR/I    Critical Care time: 40 mins assessing presenting problems of acute illness that poses high probability of life threatening deterioration or end organ damage/dysfunction.  Medical decision making including Initiating plan of care, reviewing data, reviewing radiology, discussing with multidisciplinary team, non inclusive of procedures, discussing goals of care with patient/family  89 yo m pmhx HTN, CAD s/p CABG, AAA s/p repair, CKD3, HFpEF, insomnia, BPH, obstructive uropathy with chronic waite admitted with diarrhea with course complicated by shock in setting of UTI vs decubs and desirae on ckd.      NEURO: no active issues  CV: Shock state requiring vasopressor therapy, actively titrating levophed for MAP >65. Trial of crysatlloid ivf to help wean vasopressor therapy.  midodrine as adjunctive therapy   RESP: NC to maintain spo2 >92%  RENAL: DESIRAE on CKD, improving, avoid nephrotoxic meds, renally dose meds, trend urine output, bun/cr and electrolytes, replace lytes, prn. waite in place. bph on tamsulosin and proscar  GI: Regular diet  ENDO: Glucose low normal on am labs, poct q6hr added.   ID: Completed abx therapy  HEME: Heparin for vte ppx   DISPO: DNR/I    Critical Care time: 40 mins assessing presenting problems of acute illness that poses high probability of life threatening deterioration or end organ damage/dysfunction.  Medical decision making including Initiating plan of care, reviewing data, reviewing radiology, discussing with multidisciplinary team, non inclusive of procedures, discussing goals of care with patient/family

## 2022-06-03 NOTE — PROGRESS NOTE ADULT - SUBJECTIVE AND OBJECTIVE BOX
Patient is a 89y old  Male who presents with a chief complaint of diarrhea (03 Jun 2022 14:50)      BRIEF HOSPITAL COURSE:   88 yo m pmhx HTN, CAD s/p CABG, AAA s/p repair, CKD3, HFpEF, insomnia, BPH, obstructive uropathy with chronic waite admitted with diarrhea with course complicated by shock in setting of UTI vs. decubs.      Events last 24 hours:         PAST MEDICAL & SURGICAL HISTORY:  HTN (hypertension)  AAA (abdominal aortic aneurysm)  CAD (coronary artery disease)  Stage 3 chronic kidney disease  History of CHF (congestive heart failure)  BPH (benign prostatic hyperplasia)  H/O urinary retention  S/P CABG (coronary artery bypass graft)  History of right hip replacement  CAD (coronary artery disease)  s/p stent placed  S/P left inguinal hernia repair  S/P AAA repair  7-      Allergies  Broccoli (Unknown)  penicillin (Vomiting; Nausea)      FAMILY HISTORY:  FH: hypertension (Mother)  Family history of hyperglycemia (Father)      Social History:   From home      Review of Systems:  +generalized malaise      Physical Examination:    General: No acute distress.      HEENT: Pupils equal, reactive to light.  Symmetric.    PULM: Clear to auscultation bilaterally, no significant sputum production    CVS: Regular rate and rhythm, no murmurs, rubs, or gallops    ABD: Soft, nondistended, nontender, normoactive bowel sounds, no masses    EXT: No edema, nontender    SKIN: Warm and well perfused, no rashes noted.    NEURO: Alert, oriented, interactive, nonfocal      Medications:  midodrine 15 milliGRAM(s) Oral every 8 hours  norepinephrine Infusion 0.05 MICROgram(s)/kG/Min IV Continuous <Continuous>  ranolazine 500 milliGRAM(s) Oral two times a day  tamsulosin 0.4 milliGRAM(s) Oral at bedtime  acetaminophen     Tablet .. 650 milliGRAM(s) Oral every 6 hours PRN  acetaminophen     Tablet .. 1000 milliGRAM(s) Oral every 6 hours PRN  melatonin 5 milliGRAM(s) Oral at bedtime  ondansetron Injectable 4 milliGRAM(s) IV Push every 8 hours PRN  heparin   Injectable 5000 Unit(s) SubCutaneous every 12 hours  aluminum hydroxide/magnesium hydroxide/simethicone Suspension 30 milliLiter(s) Oral every 4 hours PRN  loperamide 2 milliGRAM(s) Oral daily  pantoprazole    Tablet 40 milliGRAM(s) Oral before breakfast  atorvastatin 80 milliGRAM(s) Oral at bedtime  finasteride 5 milliGRAM(s) Oral daily  ascorbic acid 500 milliGRAM(s) Oral daily  ferrous    sulfate 325 milliGRAM(s) Oral daily  chlorhexidine 4% Liquid 1 Application(s) Topical <User Schedule>      ICU Vital Signs Last 24 Hrs  T(C): 36.4 (04 Jun 2022 01:00), Max: 36.8 (03 Jun 2022 13:00)  T(F): 97.5 (04 Jun 2022 01:00), Max: 98.2 (03 Jun 2022 13:00)  HR: 96 (03 Jun 2022 21:00) (67 - 96)  BP: 112/56 (03 Jun 2022 21:00) (102/43 - 123/51)  BP(mean): 68 (03 Jun 2022 21:00) (58 - 81)  ABP: --  ABP(mean): --  RR: 24 (03 Jun 2022 21:00) (14 - 24)  SpO2: 100% (03 Jun 2022 21:00) (89% - 100%)    Vital Signs Last 24 Hrs  T(C): 36.4 (04 Jun 2022 01:00), Max: 36.8 (03 Jun 2022 13:00)  T(F): 97.5 (04 Jun 2022 01:00), Max: 98.2 (03 Jun 2022 13:00)  HR: 96 (03 Jun 2022 21:00) (67 - 96)  BP: 112/56 (03 Jun 2022 21:00) (102/43 - 123/51)  BP(mean): 68 (03 Jun 2022 21:00) (58 - 81)  RR: 24 (03 Jun 2022 21:00) (14 - 24)  SpO2: 100% (03 Jun 2022 21:00) (89% - 100%)      I&O's Detail    02 Jun 2022 07:01  -  03 Jun 2022 07:00  --------------------------------------------------------  IN:    IV PiggyBack: 100 mL    Norepinephrine: 464 mL    Oral Fluid: 250 mL  Total IN: 814 mL    OUT:    Indwelling Catheter - Urethral (mL): 300 mL  Total OUT: 300 mL  Total NET: 514 mL      LABS:                        7.8    7.17  )-----------( 195      ( 03 Jun 2022 05:29 )             25.0     06-03    133<L>  |  103  |  31<H>  ----------------------------<  79  4.5   |  22  |  1.20    Ca    8.0<L>      03 Jun 2022 05:29  Phos  3.1     06-03  Mg     2.2     06-03      CULTURES:  Culture Results:   No growth to date. (05-29 @ 23:35)  Culture Results:   No growth to date. (05-29 @ 23:24)        RADIOLOGY:   < from: US Kidney and Bladder (05.30.22 @ 07:57) >  ACC: [id number] EXAM:  US KIDNEYS AND BLADDER                          PROCEDURE DATE:  05/30/2022      INTERPRETATION:  CLINICAL INFORMATION: Urosepsis.    COMPARISON: Abdominal CT dated 05/25/2022 and renal ultrasound dated   03/24/2022.    TECHNIQUE: Sonography of the kidneys and bladder.    FINDINGS:  Right kidney: 9.4 cm. Normal in size without hydronephrosis. There are   scattered simple appearing renal cysts with the largest measuring 3.7 cm   in the midpole. Mild increased echogenicity.    Left kidney: 13.2 cm. Normal in size without hydronephrosis. There is 6.4   cm simple appearing cyst in the midpole. Mild increased echogenicity.    Urinary bladder: Urinary bladder is collapsed around a Waite catheter   limiting its evaluation.    IMPRESSION:  Bilateral simple appearing renal cysts. No hydronephrosis.    Mild increase renal echogenicity suggestive of medical renal disease.    Urinary bladder is collapsed around a Waite catheter.    --- End of Report ---    CHERELLE PINEDA MD; Attending Radiologist  This document has been electronically signed. May 30 2022  8:42AM    < end of copied text >      SUPPLEMENTAL O2: NC  LINES: Peripheral, CVC,  HD  IVF:   WAITE: Y  PPx:   CONTACT: Patient is a 89y old  Male who presents with a chief complaint of diarrhea (03 Jun 2022 14:50)      BRIEF HOSPITAL COURSE:   89 yo m pmhx HTN, CAD s/p CABG, AAA s/p repair, CKD3, HFpEF, insomnia, BPH, obstructive uropathy with chronic waite admitted with diarrhea with course complicated by shock in setting of UTI vs decubs and douglas on ckd.      Events last 24 hours:         PAST MEDICAL & SURGICAL HISTORY:  HTN (hypertension)  AAA (abdominal aortic aneurysm)  CAD (coronary artery disease)  Stage 3 chronic kidney disease  History of CHF (congestive heart failure)  BPH (benign prostatic hyperplasia)  H/O urinary retention  S/P CABG (coronary artery bypass graft)  History of right hip replacement  CAD (coronary artery disease)  s/p stent placed  S/P left inguinal hernia repair  S/P AAA repair  7-      Allergies  Broccoli (Unknown)  penicillin (Vomiting; Nausea)      FAMILY HISTORY:  FH: hypertension (Mother)  Family history of hyperglycemia (Father)      Social History:   From home      Review of Systems:  +generalized malaise      Physical Examination:    General: No acute distress.      HEENT: Pupils equal, reactive to light.  Symmetric.    PULM: Clear to auscultation bilaterally, no significant sputum production    CVS: Regular rate and rhythm, no murmurs, rubs, or gallops    ABD: Soft, nondistended, nontender, normoactive bowel sounds, no masses    EXT: No edema, nontender    SKIN: Warm and well perfused, no rashes noted.    NEURO: Alert, oriented, interactive, nonfocal      Medications:  midodrine 15 milliGRAM(s) Oral every 8 hours  norepinephrine Infusion 0.05 MICROgram(s)/kG/Min IV Continuous <Continuous>  ranolazine 500 milliGRAM(s) Oral two times a day  tamsulosin 0.4 milliGRAM(s) Oral at bedtime  acetaminophen     Tablet .. 650 milliGRAM(s) Oral every 6 hours PRN  acetaminophen     Tablet .. 1000 milliGRAM(s) Oral every 6 hours PRN  melatonin 5 milliGRAM(s) Oral at bedtime  ondansetron Injectable 4 milliGRAM(s) IV Push every 8 hours PRN  heparin   Injectable 5000 Unit(s) SubCutaneous every 12 hours  aluminum hydroxide/magnesium hydroxide/simethicone Suspension 30 milliLiter(s) Oral every 4 hours PRN  loperamide 2 milliGRAM(s) Oral daily  pantoprazole    Tablet 40 milliGRAM(s) Oral before breakfast  atorvastatin 80 milliGRAM(s) Oral at bedtime  finasteride 5 milliGRAM(s) Oral daily  ascorbic acid 500 milliGRAM(s) Oral daily  ferrous    sulfate 325 milliGRAM(s) Oral daily  chlorhexidine 4% Liquid 1 Application(s) Topical <User Schedule>      ICU Vital Signs Last 24 Hrs  T(C): 36.4 (04 Jun 2022 01:00), Max: 36.8 (03 Jun 2022 13:00)  T(F): 97.5 (04 Jun 2022 01:00), Max: 98.2 (03 Jun 2022 13:00)  HR: 96 (03 Jun 2022 21:00) (67 - 96)  BP: 112/56 (03 Jun 2022 21:00) (102/43 - 123/51)  BP(mean): 68 (03 Jun 2022 21:00) (58 - 81)  ABP: --  ABP(mean): --  RR: 24 (03 Jun 2022 21:00) (14 - 24)  SpO2: 100% (03 Jun 2022 21:00) (89% - 100%)    Vital Signs Last 24 Hrs  T(C): 36.4 (04 Jun 2022 01:00), Max: 36.8 (03 Jun 2022 13:00)  T(F): 97.5 (04 Jun 2022 01:00), Max: 98.2 (03 Jun 2022 13:00)  HR: 96 (03 Jun 2022 21:00) (67 - 96)  BP: 112/56 (03 Jun 2022 21:00) (102/43 - 123/51)  BP(mean): 68 (03 Jun 2022 21:00) (58 - 81)  RR: 24 (03 Jun 2022 21:00) (14 - 24)  SpO2: 100% (03 Jun 2022 21:00) (89% - 100%)      I&O's Detail    02 Jun 2022 07:01  -  03 Jun 2022 07:00  --------------------------------------------------------  IN:    IV PiggyBack: 100 mL    Norepinephrine: 464 mL    Oral Fluid: 250 mL  Total IN: 814 mL    OUT:    Indwelling Catheter - Urethral (mL): 300 mL  Total OUT: 300 mL  Total NET: 514 mL      LABS:                        7.8    7.17  )-----------( 195      ( 03 Jun 2022 05:29 )             25.0     06-03    133<L>  |  103  |  31<H>  ----------------------------<  79  4.5   |  22  |  1.20    Ca    8.0<L>      03 Jun 2022 05:29  Phos  3.1     06-03  Mg     2.2     06-03      CULTURES:  Culture Results:   No growth to date. (05-29 @ 23:35)  Culture Results:   No growth to date. (05-29 @ 23:24)        RADIOLOGY:   < from: US Kidney and Bladder (05.30.22 @ 07:57) >  ACC: [id number] EXAM:  US KIDNEYS AND BLADDER                          PROCEDURE DATE:  05/30/2022      INTERPRETATION:  CLINICAL INFORMATION: Urosepsis.    COMPARISON: Abdominal CT dated 05/25/2022 and renal ultrasound dated   03/24/2022.    TECHNIQUE: Sonography of the kidneys and bladder.    FINDINGS:  Right kidney: 9.4 cm. Normal in size without hydronephrosis. There are   scattered simple appearing renal cysts with the largest measuring 3.7 cm   in the midpole. Mild increased echogenicity.    Left kidney: 13.2 cm. Normal in size without hydronephrosis. There is 6.4   cm simple appearing cyst in the midpole. Mild increased echogenicity.    Urinary bladder: Urinary bladder is collapsed around a Waite catheter   limiting its evaluation.    IMPRESSION:  Bilateral simple appearing renal cysts. No hydronephrosis.    Mild increase renal echogenicity suggestive of medical renal disease.    Urinary bladder is collapsed around a Waite catheter.    --- End of Report ---    CHERELLE PINEDA MD; Attending Radiologist  This document has been electronically signed. May 30 2022  8:42AM    < end of copied text >      SUPPLEMENTAL O2: NC  LINES: Peripheral, CVC,  HD  IVF:   WAITE: Y  PPx:   CONTACT: Patient is a 89y old  Male who presents with a chief complaint of diarrhea (03 Jun 2022 14:50)      BRIEF HOSPITAL COURSE:   88 yo m pmhx HTN, CAD s/p CABG, AAA s/p repair, CKD3, HFpEF, insomnia, BPH, obstructive uropathy with chronic waite admitted with diarrhea with course complicated by shock in setting of UTI vs decubs and douglas on ckd.      Events last 24 hours:   Patient remains on levophed, requirements slowly weaning. continue midodrine.  completed abx therapy.  Trial of ivf to aid in weaning vasopressor therapy.       PAST MEDICAL & SURGICAL HISTORY:  HTN (hypertension)  AAA (abdominal aortic aneurysm)  CAD (coronary artery disease)  Stage 3 chronic kidney disease  History of CHF (congestive heart failure)  BPH (benign prostatic hyperplasia)  H/O urinary retention  S/P CABG (coronary artery bypass graft)  History of right hip replacement  CAD (coronary artery disease)  s/p stent placed  S/P left inguinal hernia repair  S/P AAA repair  7-      Allergies  Broccoli (Unknown)  penicillin (Vomiting; Nausea)      FAMILY HISTORY:  FH: hypertension (Mother)  Family history of hyperglycemia (Father)      Social History:   From home      Review of Systems:  +generalized malaise      Physical Examination:    General: Elderly male, lying in bed, nad    HEENT: nc/at, nc in place    PULM: symmetrical throax expansion upon respiration.  grossly clear to auscultation bilaterally    CVS: Regular rate and rhythm, no murmurs, rubs, or gallops    ABD: Soft, nondistended, nontender, normoactive bowel sounds, no masses    EXT: + edema,     SKIN: Warm     NEURO: lethargic      Medications:  midodrine 15 milliGRAM(s) Oral every 8 hours  norepinephrine Infusion 0.05 MICROgram(s)/kG/Min IV Continuous <Continuous>  ranolazine 500 milliGRAM(s) Oral two times a day  tamsulosin 0.4 milliGRAM(s) Oral at bedtime  acetaminophen     Tablet .. 650 milliGRAM(s) Oral every 6 hours PRN  acetaminophen     Tablet .. 1000 milliGRAM(s) Oral every 6 hours PRN  melatonin 5 milliGRAM(s) Oral at bedtime  ondansetron Injectable 4 milliGRAM(s) IV Push every 8 hours PRN  heparin   Injectable 5000 Unit(s) SubCutaneous every 12 hours  aluminum hydroxide/magnesium hydroxide/simethicone Suspension 30 milliLiter(s) Oral every 4 hours PRN  loperamide 2 milliGRAM(s) Oral daily  pantoprazole    Tablet 40 milliGRAM(s) Oral before breakfast  atorvastatin 80 milliGRAM(s) Oral at bedtime  finasteride 5 milliGRAM(s) Oral daily  ascorbic acid 500 milliGRAM(s) Oral daily  ferrous    sulfate 325 milliGRAM(s) Oral daily  chlorhexidine 4% Liquid 1 Application(s) Topical <User Schedule>      ICU Vital Signs Last 24 Hrs  T(C): 36.4 (04 Jun 2022 01:00), Max: 36.8 (03 Jun 2022 13:00)  T(F): 97.5 (04 Jun 2022 01:00), Max: 98.2 (03 Jun 2022 13:00)  HR: 96 (03 Jun 2022 21:00) (67 - 96)  BP: 112/56 (03 Jun 2022 21:00) (102/43 - 123/51)  BP(mean): 68 (03 Jun 2022 21:00) (58 - 81)  ABP: --  ABP(mean): --  RR: 24 (03 Jun 2022 21:00) (14 - 24)  SpO2: 100% (03 Jun 2022 21:00) (89% - 100%)    Vital Signs Last 24 Hrs  T(C): 36.4 (04 Jun 2022 01:00), Max: 36.8 (03 Jun 2022 13:00)  T(F): 97.5 (04 Jun 2022 01:00), Max: 98.2 (03 Jun 2022 13:00)  HR: 96 (03 Jun 2022 21:00) (67 - 96)  BP: 112/56 (03 Jun 2022 21:00) (102/43 - 123/51)  BP(mean): 68 (03 Jun 2022 21:00) (58 - 81)  RR: 24 (03 Jun 2022 21:00) (14 - 24)  SpO2: 100% (03 Jun 2022 21:00) (89% - 100%)      I&O's Detail    02 Jun 2022 07:01  -  03 Jun 2022 07:00  --------------------------------------------------------  IN:    IV PiggyBack: 100 mL    Norepinephrine: 464 mL    Oral Fluid: 250 mL  Total IN: 814 mL    OUT:    Indwelling Catheter - Urethral (mL): 300 mL  Total OUT: 300 mL  Total NET: 514 mL      LABS:                        7.8    7.17  )-----------( 195      ( 03 Jun 2022 05:29 )             25.0     06-03    133<L>  |  103  |  31<H>  ----------------------------<  79  4.5   |  22  |  1.20    Ca    8.0<L>      03 Jun 2022 05:29  Phos  3.1     06-03  Mg     2.2     06-03      CULTURES:  Culture Results:   No growth to date. (05-29 @ 23:35)  Culture Results:   No growth to date. (05-29 @ 23:24)        RADIOLOGY:   < from: US Kidney and Bladder (05.30.22 @ 07:57) >  ACC: [id number] EXAM:  US KIDNEYS AND BLADDER                          PROCEDURE DATE:  05/30/2022      INTERPRETATION:  CLINICAL INFORMATION: Urosepsis.    COMPARISON: Abdominal CT dated 05/25/2022 and renal ultrasound dated   03/24/2022.    TECHNIQUE: Sonography of the kidneys and bladder.    FINDINGS:  Right kidney: 9.4 cm. Normal in size without hydronephrosis. There are   scattered simple appearing renal cysts with the largest measuring 3.7 cm   in the midpole. Mild increased echogenicity.    Left kidney: 13.2 cm. Normal in size without hydronephrosis. There is 6.4   cm simple appearing cyst in the midpole. Mild increased echogenicity.    Urinary bladder: Urinary bladder is collapsed around a Waite catheter   limiting its evaluation.    IMPRESSION:  Bilateral simple appearing renal cysts. No hydronephrosis.    Mild increase renal echogenicity suggestive of medical renal disease.    Urinary bladder is collapsed around a Waite catheter.    --- End of Report ---    CHERELLE PINEDA MD; Attending Radiologist  This document has been electronically signed. May 30 2022  8:42AM    < end of copied text >      SUPPLEMENTAL O2: NC  LINES: Peripheral, CVC,  HD  IVF:   WAITE: Y  PPx:   CONTACT:

## 2022-06-03 NOTE — PROGRESS NOTE ADULT - SUBJECTIVE AND OBJECTIVE BOX
HPI:      PAIN: ( )Yes   ( )No  Level:  Location:  Intensity:    /10  Quality:  Aggravating Factors:  Alleviating Factors:  Radiation:  Duration/Timing:  Impact on ADLs:    DYSPNEA: ( ) Yes  ( ) No  Level:        Review of Systems:    Anxiety-  Depression-  Physical Discomfort-  Dyspnea-  Constipation-  Diarrhea-  Nausea-  Vomiting-  Anorexia-  Weight Loss-   Cough-  Secretions-  Fatigue-  Weakness-  Delirium-    All other systems reviewed and negative  Unable to obtain/Limited due to:        PHYSICAL EXAM:    Vital Signs Last 24 Hrs  T(C): 36.7 (2022 05:55), Max: 36.7 (2022 05:55)  T(F): 98 (2022 05:55), Max: 98 (2022 05:55)  HR: 73 (2022 05:30) (67 - 88)  BP: 102/46 (2022 05:30) (102/43 - 116/57)  BP(mean): 60 (2022 05:30) (58 - 72)  RR: 17 (2022 05:30) (15 - 23)  SpO2: 98% (2022 05:30) (90% - 100%)  Daily     Daily Weight in k (2022 05:55)    PPSV2:   %  FAST:    General:  HEENT:  Lungs:  Cardiac:  GI:  :  Ext:  Neuro:      LABS:                        7.8    7.17  )-----------( 195      ( 2022 05:29 )             25.0     06-03    133<L>  |  103  |  31<H>  ----------------------------<  79  4.5   |  22  |  1.20    Ca    8.0<L>      2022 05:29  Phos  3.1     06-03  Mg     2.2     06-03        Albumin: Albumin, Serum: 1.5 g/dL ( @ 23:24)      Allergies    Broccoli (Unknown)  penicillin (Vomiting; Nausea)    Intolerances      MEDICATIONS  (STANDING):  ascorbic acid 500 milliGRAM(s) Oral daily  atorvastatin 80 milliGRAM(s) Oral at bedtime  chlorhexidine 4% Liquid 1 Application(s) Topical <User Schedule>  ferrous    sulfate 325 milliGRAM(s) Oral daily  finasteride 5 milliGRAM(s) Oral daily  heparin   Injectable 5000 Unit(s) SubCutaneous every 12 hours  loperamide 2 milliGRAM(s) Oral daily  melatonin 5 milliGRAM(s) Oral at bedtime  meropenem  IVPB 1000 milliGRAM(s) IV Intermittent every 12 hours  midodrine 15 milliGRAM(s) Oral every 8 hours  norepinephrine Infusion 0.05 MICROgram(s)/kG/Min (3.26 mL/Hr) IV Continuous <Continuous>  pantoprazole    Tablet 40 milliGRAM(s) Oral before breakfast  ranolazine 500 milliGRAM(s) Oral two times a day  tamsulosin 0.4 milliGRAM(s) Oral at bedtime    MEDICATIONS  (PRN):  acetaminophen     Tablet .. 650 milliGRAM(s) Oral every 6 hours PRN Temp greater or equal to 38C (100.4F), Mild Pain (1 - 3)  acetaminophen     Tablet .. 1000 milliGRAM(s) Oral every 6 hours PRN Temp greater or equal to 38C (100.4F), Moderate Pain (4 - 6)  aluminum hydroxide/magnesium hydroxide/simethicone Suspension 30 milliLiter(s) Oral every 4 hours PRN Dyspepsia  ondansetron Injectable 4 milliGRAM(s) IV Push every 8 hours PRN Nausea and/or Vomiting      RADIOLOGY:   HPI:  Patient was seen and examined.  Denies nausea, vomiting, shortness of breath, chest pain, headaches, abdominal pain, constipation He remains on pressors in  NAD . Wanted team to call [facility] again to discuss code status.   Patient was having a difficult time understanding the  current medical state and discussino of cpr//mv      6/3  Patient was seen and examined.  Denies nausea, vomiting, shortness of breath, chest pain, headaches, abdominal pain, constipation       PAIN: ( )Yes   (x )No     DYSPNEA: ( ) Yes  ( x) No  Level:        Review of Systems:    Anxiety- denies  Depression-denies  Physical Discomfort- in NAD  Dyspnea-denies  Constipation-denies  Diarrhea-denies  Nausea-denies  Vomiting-denies  Anorexia-++  Weight Loss- ++  Cough-denies  Secretions-denies  Fatigue-++  Weakness-++  Delirium- no    All other systems reviewed and negative         PHYSICAL EXAM:    Vital Signs Last 24 Hrs  T(C): 36.7 (2022 05:55), Max: 36.7 (2022 05:55)  T(F): 98 (2022 05:55), Max: 98 (2022 05:55)  HR: 73 (2022 05:30) (67 - 88)  BP: 102/46 (2022 05:30) (102/43 - 116/57)  BP(mean): 60 (2022 05:30) (58 - 72)  RR: 17 (2022 05:30) (15 - 23)  SpO2: 98% (2022 05:30) (90% - 100%)  Daily     Daily Weight in k (2022 05:55)      PPSV2:  40 %  FAST:    General: calm in NAD  Mental Status: awake alert oriented x2  HEENT: eomi,    Lungs: ctabl b/l bs    Cardiac: s1s2 no mgr  GI: soft nontender +BS  : voids  Ext: moves all 4 extremities spontaneously  Neuro: no gross findings        LABS:                        7.8    7.17  )-----------( 195      ( 2022 05:29 )             25.0     06-03    133<L>  |  103  |  31<H>  ----------------------------<  79  4.5   |  22  |  1.20    Ca    8.0<L>      2022 05:29  Phos  3.1     06-03  Mg     2.2     06-03        Albumin: Albumin, Serum: 1.5 g/dL ( @ 23:24)      Allergies    Broccoli (Unknown)  penicillin (Vomiting; Nausea)    Intolerances      MEDICATIONS  (STANDING):  ascorbic acid 500 milliGRAM(s) Oral daily  atorvastatin 80 milliGRAM(s) Oral at bedtime  chlorhexidine 4% Liquid 1 Application(s) Topical <User Schedule>  ferrous    sulfate 325 milliGRAM(s) Oral daily  finasteride 5 milliGRAM(s) Oral daily  heparin   Injectable 5000 Unit(s) SubCutaneous every 12 hours  loperamide 2 milliGRAM(s) Oral daily  melatonin 5 milliGRAM(s) Oral at bedtime  meropenem  IVPB 1000 milliGRAM(s) IV Intermittent every 12 hours  midodrine 15 milliGRAM(s) Oral every 8 hours  norepinephrine Infusion 0.05 MICROgram(s)/kG/Min (3.26 mL/Hr) IV Continuous <Continuous>  pantoprazole    Tablet 40 milliGRAM(s) Oral before breakfast  ranolazine 500 milliGRAM(s) Oral two times a day  tamsulosin 0.4 milliGRAM(s) Oral at bedtime    MEDICATIONS  (PRN):  acetaminophen     Tablet .. 650 milliGRAM(s) Oral every 6 hours PRN Temp greater or equal to 38C (100.4F), Mild Pain (1 - 3)  acetaminophen     Tablet .. 1000 milliGRAM(s) Oral every 6 hours PRN Temp greater or equal to 38C (100.4F), Moderate Pain (4 - 6)  aluminum hydroxide/magnesium hydroxide/simethicone Suspension 30 milliLiter(s) Oral every 4 hours PRN Dyspepsia  ondansetron Injectable 4 milliGRAM(s) IV Push every 8 hours PRN Nausea and/or Vomiting      RADIOLOGY:

## 2022-06-03 NOTE — PROGRESS NOTE ADULT - ASSESSMENT
88 y/o M with PMH HTN, CAD s/p CABG, AAA s/p repair, CKD Stage 3, CHFpEF, insomnia, obstructive uropathy, BPH, Dz reflux sent from SNF for evaluation of diarrhea. Pt is poor historian due to hearing impairment and  Dementia. Reports that developed diarrhea overnight, not sure how many BMs had, as wearing diapers. He denies fevers or chills, has poor appetite but not new. No abd pain or cramping. Pt reports that his wife also is with loose stools for a few days. In ED: labs with elevated WBCs, elevated BUN/CR.  Pt was given 1000ml IVF bolus and PO Vanco x 1.  During assessment with RN Pt had large dark, tarry stool. Was given oral vancomycin c diff pending.    1. diarrhea. leukocytosis - resolved. ckd 3  - initially admitted with diarrhea; hospital course complicated by septic shock of unclear etiology, possibly stercoral colitis, versus infected sacral decubitus  - follow up cultures   - does not appear to have pneumonia based on imaging  - day #5 meropenem; will stop antibiotics after todays doses  - encouraged diet, discussed with nursing  - pressors per icu    2 other issues - care per medicine

## 2022-06-04 PROCEDURE — 99291 CRITICAL CARE FIRST HOUR: CPT

## 2022-06-04 RX ORDER — SODIUM CHLORIDE 9 MG/ML
500 INJECTION, SOLUTION INTRAVENOUS ONCE
Refills: 0 | Status: COMPLETED | OUTPATIENT
Start: 2022-06-04 | End: 2022-06-04

## 2022-06-04 RX ADMIN — SODIUM CHLORIDE 333.33 MILLILITER(S): 9 INJECTION, SOLUTION INTRAVENOUS at 05:20

## 2022-06-04 RX ADMIN — FINASTERIDE 5 MILLIGRAM(S): 5 TABLET, FILM COATED ORAL at 09:27

## 2022-06-04 RX ADMIN — HEPARIN SODIUM 5000 UNIT(S): 5000 INJECTION INTRAVENOUS; SUBCUTANEOUS at 22:30

## 2022-06-04 RX ADMIN — HEPARIN SODIUM 5000 UNIT(S): 5000 INJECTION INTRAVENOUS; SUBCUTANEOUS at 09:21

## 2022-06-04 RX ADMIN — RANOLAZINE 500 MILLIGRAM(S): 500 TABLET, FILM COATED, EXTENDED RELEASE ORAL at 09:21

## 2022-06-04 RX ADMIN — CHLORHEXIDINE GLUCONATE 1 APPLICATION(S): 213 SOLUTION TOPICAL at 09:25

## 2022-06-04 RX ADMIN — MIDODRINE HYDROCHLORIDE 15 MILLIGRAM(S): 2.5 TABLET ORAL at 05:19

## 2022-06-04 RX ADMIN — Medication 325 MILLIGRAM(S): at 09:26

## 2022-06-04 RX ADMIN — Medication 500 MILLIGRAM(S): at 09:23

## 2022-06-04 RX ADMIN — MIDODRINE HYDROCHLORIDE 15 MILLIGRAM(S): 2.5 TABLET ORAL at 18:10

## 2022-06-04 NOTE — PROGRESS NOTE ADULT - ASSESSMENT
IMP:    89 y/o male with HTN, CAD s/p CABG, AAA s/p repair, CKD Stage 3, CHFpEF, insomnia, obstructive uropathy with chronic waite, BPH and reflux admitted with diarrhea--Stool specimen negative for PCR and C. diff--Tx to CCU for hypotension and Shock likely sepsis--?? Urinary source given chronic waite and  + UA vs Decubs.  Not eating now which is ominous sign.  Unable to establish intrinsic vaso tone WO pressors    Critically ill.  High risk for acute decompensation and deterioration including death   Patient requires critical care for support of one or more vital organ systems with a high probability of imminent or life threatening deterioration in his/her condition     Plan:    Cont with IV Dorina (6).  ID re evaluation appreciated   Actively titrate pressors to keep MAP > 60.  Mido increased to 15 q8  Follow up BCx  Limit IVF given CXR findings  HOB > 30  Encourage PO diet--as d/w palliative, artifical feeding will not change his overall outcome.  I do not favor feeding tube placement   DVT prophy--SCD and sqhep   Palliative care follow up    CCU care-- d/w ICU staff on multi disciplinary rounds

## 2022-06-04 NOTE — PROGRESS NOTE ADULT - SUBJECTIVE AND OBJECTIVE BOX
Hospital D # 14  CCU # 6  [facility] CVL # 5    CC:  Shock     HPI:    89 y/o male with HTN, CAD s/p CABG, AAA s/p repair, CKD Stage 3, CHFpEF, insomnia, obstructive uropathy with chronic waite, BPH and reflux sent from SNF for evaluation of diarrhea. Pt is poor historian due to hearing impairment and Dementia. Reports that developed diarrhea overnight, not sure how many BMs had, as wearing diapers. He denies fevers or chills, has poor appetite but not new. No abd pain or cramping. Pt reports that his wife also is with loose stools for a few days.   Stool PCR and CDI NEGATIVE  He is tx to CCU on 5/29 for septic shock.  Lactate > 2.  Fever 101.8  and WBC 17    5/30:  Pt seen and examined in CCU during IDR.  On norepi gtt 0.4 Hypothermic.  Lethargic and arousable.  WBC 17  Cr 1.9  Started on Dorina O/N.  CXR-- Personally reviewed--effusions.  + UA.     5/31:  Pt seen and examined in CCU during IDR.  Remains on pressors.  Awake.  BCx NTD.  Hypothermic.  WBC 11  Plats 105  Cr 1.5      6/1:  Pt seen and examined in CCU during IDR.  Remains on pressors.  Palliative care input appreciated.  Awake.  Tm 97.6  WBC 11 Plats 226  Cr 1.4  BCx NTD    6/2:  Pt seen and examined in CCU during IDR.  Loss will to eat.  Remains on pressors.  BCx NTD.  Tm 97.4  WBC  8  Cr 1.2  Not doing well.  Case d/w palliative care at bedside    6/3:  Pt seen and examined in CCU during IDR.  Still not eating much.  Remains on pressors.  Tm 98.0  WBC 7  Cr 1.2   Case d/w Palliative care at bedside    6/4:  Pt seen and examined in CCU during IDR.   Remains on pressors.  Tm 98.2  Doing poorly overall.      PMH:  As above.     PSH:  As above.     FH: Non Contributory other than those listed in HPI    Social History:  NC    MEDICATIONS  (STANDING):  ascorbic acid 500 milliGRAM(s) Oral daily  atorvastatin 80 milliGRAM(s) Oral at bedtime  chlorhexidine 4% Liquid 1 Application(s) Topical <User Schedule>  ferrous    sulfate 325 milliGRAM(s) Oral daily  finasteride 5 milliGRAM(s) Oral daily  heparin   Injectable 5000 Unit(s) SubCutaneous every 12 hours  loperamide 2 milliGRAM(s) Oral daily  melatonin 5 milliGRAM(s) Oral at bedtime  midodrine 15 milliGRAM(s) Oral every 8 hours  norepinephrine Infusion 0.05 MICROgram(s)/kG/Min (3.26 mL/Hr) IV Continuous <Continuous>  pantoprazole    Tablet 40 milliGRAM(s) Oral before breakfast  ranolazine 500 milliGRAM(s) Oral two times a day  tamsulosin 0.4 milliGRAM(s) Oral at bedtime    MEDICATIONS  (PRN):  acetaminophen     Tablet .. 650 milliGRAM(s) Oral every 6 hours PRN Temp greater or equal to 38C (100.4F), Mild Pain (1 - 3)  acetaminophen     Tablet .. 1000 milliGRAM(s) Oral every 6 hours PRN Temp greater or equal to 38C (100.4F), Moderate Pain (4 - 6)  aluminum hydroxide/magnesium hydroxide/simethicone Suspension 30 milliLiter(s) Oral every 4 hours PRN Dyspepsia  ondansetron Injectable 4 milliGRAM(s) IV Push every 8 hours PRN Nausea and/or Vomiting      Allergies: NKDA    ROS:  SEE BELOW        ICU Vital Signs Last 24 Hrs  T(C): 36.4 (04 Jun 2022 07:55), Max: 36.8 (03 Jun 2022 13:00)  T(F): 97.5 (04 Jun 2022 07:55), Max: 98.2 (03 Jun 2022 13:00)  HR: 75 (04 Jun 2022 08:00) (72 - 96)  BP: 114/44 (04 Jun 2022 08:00) (105/46 - 123/51)  BP(mean): 62 (04 Jun 2022 08:00) (60 - 81)  ABP: --  ABP(mean): --  RR: 15 (04 Jun 2022 08:00) (14 - 24)  SpO2: 99% (04 Jun 2022 08:00) (89% - 100%)          I&O's Summary    03 Jun 2022 07:01  -  04 Jun 2022 07:00  --------------------------------------------------------  IN: 816 mL / OUT: 350 mL / NET: 466 mL        Physical Exam:  SEE BELOW                          7.8    7.17  )-----------( 195      ( 03 Jun 2022 05:29 )             25.0       06-03    133<L>  |  103  |  31<H>  ----------------------------<  79  4.5   |  22  |  1.20    Ca    8.0<L>      03 Jun 2022 05:29  Phos  3.1     06-03  Mg     2.2     06-03                      DVT Prophylaxis:                                                            Contraindication:     Advanced Directives:    Discussed with:    Visit Information:  Time spent excluding procedure:      ** Time is exclusive of billed procedures and/or teaching and/or routine family updates.

## 2022-06-05 PROCEDURE — 99291 CRITICAL CARE FIRST HOUR: CPT

## 2022-06-05 RX ADMIN — RANOLAZINE 500 MILLIGRAM(S): 500 TABLET, FILM COATED, EXTENDED RELEASE ORAL at 09:17

## 2022-06-05 RX ADMIN — MIDODRINE HYDROCHLORIDE 15 MILLIGRAM(S): 2.5 TABLET ORAL at 01:46

## 2022-06-05 RX ADMIN — HEPARIN SODIUM 5000 UNIT(S): 5000 INJECTION INTRAVENOUS; SUBCUTANEOUS at 23:50

## 2022-06-05 RX ADMIN — FINASTERIDE 5 MILLIGRAM(S): 5 TABLET, FILM COATED ORAL at 08:52

## 2022-06-05 RX ADMIN — Medication 325 MILLIGRAM(S): at 08:53

## 2022-06-05 RX ADMIN — ATORVASTATIN CALCIUM 80 MILLIGRAM(S): 80 TABLET, FILM COATED ORAL at 01:45

## 2022-06-05 RX ADMIN — RANOLAZINE 500 MILLIGRAM(S): 500 TABLET, FILM COATED, EXTENDED RELEASE ORAL at 23:49

## 2022-06-05 RX ADMIN — Medication 2 MILLIGRAM(S): at 08:52

## 2022-06-05 RX ADMIN — HEPARIN SODIUM 5000 UNIT(S): 5000 INJECTION INTRAVENOUS; SUBCUTANEOUS at 08:51

## 2022-06-05 RX ADMIN — ATORVASTATIN CALCIUM 80 MILLIGRAM(S): 80 TABLET, FILM COATED ORAL at 23:49

## 2022-06-05 RX ADMIN — TAMSULOSIN HYDROCHLORIDE 0.4 MILLIGRAM(S): 0.4 CAPSULE ORAL at 23:50

## 2022-06-05 RX ADMIN — TAMSULOSIN HYDROCHLORIDE 0.4 MILLIGRAM(S): 0.4 CAPSULE ORAL at 01:49

## 2022-06-05 RX ADMIN — MIDODRINE HYDROCHLORIDE 15 MILLIGRAM(S): 2.5 TABLET ORAL at 05:37

## 2022-06-05 RX ADMIN — MIDODRINE HYDROCHLORIDE 15 MILLIGRAM(S): 2.5 TABLET ORAL at 23:50

## 2022-06-05 RX ADMIN — PANTOPRAZOLE SODIUM 40 MILLIGRAM(S): 20 TABLET, DELAYED RELEASE ORAL at 08:52

## 2022-06-05 RX ADMIN — MIDODRINE HYDROCHLORIDE 15 MILLIGRAM(S): 2.5 TABLET ORAL at 14:12

## 2022-06-05 RX ADMIN — Medication 3.26 MICROGRAM(S)/KG/MIN: at 03:06

## 2022-06-05 RX ADMIN — CHLORHEXIDINE GLUCONATE 1 APPLICATION(S): 213 SOLUTION TOPICAL at 05:37

## 2022-06-05 RX ADMIN — RANOLAZINE 500 MILLIGRAM(S): 500 TABLET, FILM COATED, EXTENDED RELEASE ORAL at 01:48

## 2022-06-05 RX ADMIN — Medication 500 MILLIGRAM(S): at 08:53

## 2022-06-05 NOTE — PROGRESS NOTE ADULT - ASSESSMENT
IMP:    88 y/o male with HTN, CAD s/p CABG, AAA s/p repair, CHFpEF, insomnia, obstructive uropathy with chronic waite, BPH and reflux admitted with diarrhea--Stool specimen negative for PCR and C. diff--Tx to CCU for hypotension and Shock likely sepsis--?? Urinary source given chronic waite and  + UA vs Decubs.  Not eating now which is ominous sign.  Unable to establish intrinsic vaso tone WO pressors  DESIRAE related to ATN from sepsis     Critically ill.  High risk for acute decompensation and deterioration including death   Patient requires critical care for support of one or more vital organ systems with a high probability of imminent or life threatening deterioration in his/her condition     Plan:    Cont with IV Dorina (7).  ID re evaluation appreciated   Actively titrate pressors to keep MAP > 60.  Mido increased to 15 q8  Check AM cortisol  Follow up BCx  Limit IVF given CXR findings  HOB > 30  Encourage PO diet--as d/w palliative, artifical feeding will not change his overall outcome.  I do not favor feeding tube placement   DVT prophy--SCD and sqhep   Palliative care follow up  Check labs in AM    CCU care-- d/w ICU staff on multi disciplinary rounds and pt IMP:    88 y/o male with HTN, CAD s/p CABG, AAA s/p repair, CHFpEF, insomnia, obstructive uropathy with chronic waite, BPH and reflux admitted with diarrhea--Stool specimen negative for PCR and C. diff--Tx to CCU for hypotension and Shock likely sepsis--?? Urinary source given chronic waite and  + UA vs Decubs.  Not eating now which is ominous sign.  Unable to establish intrinsic vaso tone WO pressors  DESIRAE related to ATN from sepsis     Critically ill.  High risk for acute decompensation and deterioration including death   Patient requires critical care for support of one or more vital organ systems with a high probability of imminent or life threatening deterioration in his/her condition     Plan:    Completed course of Dorina  Actively titrate pressors to keep MAP > 60.  Mido increased to 15 q8  Check AM cortisol  Follow up BCx  Limit IVF given CXR findings  HOB > 30  Encourage PO diet--as d/w palliative, artifical feeding will not change his overall outcome.  I do not favor feeding tube placement   DVT prophy--SCD and sqhep   Palliative care follow up  Check labs in AM    CCU care-- d/w ICU staff on multi disciplinary rounds and pt

## 2022-06-05 NOTE — PROGRESS NOTE ADULT - NEUROLOGICAL DETAILS
Grossly non focal
Encephalopathic
Grossly non focal

## 2022-06-05 NOTE — PROGRESS NOTE ADULT - COMMENTS
Unobtainable due to clinical condition
ROS o/w negative

## 2022-06-05 NOTE — PROGRESS NOTE ADULT - GASTROINTESTINAL DETAILS
soft/nontender/no guarding/no rigidity
soft/nontender/no guarding/no rigidity
soft/nontender
soft/nontender
soft/nontender/no guarding/no rigidity
soft/nontender
soft/nontender/no guarding

## 2022-06-05 NOTE — PROGRESS NOTE ADULT - SUBJECTIVE AND OBJECTIVE BOX
Hospital D # 15  CCU # 7  [facility] CVL # 6    CC:  Shock     HPI:    89 y/o male with HTN, CAD s/p CABG, AAA s/p repair, CKD Stage 3, CHFpEF, insomnia, obstructive uropathy with chronic waite, BPH and reflux sent from SNF for evaluation of diarrhea. Pt is poor historian due to hearing impairment and Dementia. Reports that developed diarrhea overnight, not sure how many BMs had, as wearing diapers. He denies fevers or chills, has poor appetite but not new. No abd pain or cramping. Pt reports that his wife also is with loose stools for a few days.   Stool PCR and CDI NEGATIVE  He is tx to CCU on 5/29 for septic shock.  Lactate > 2.  Fever 101.8  and WBC 17    5/30:  Pt seen and examined in CCU during IDR.  On norepi gtt 0.4 Hypothermic.  Lethargic and arousable.  WBC 17  Cr 1.9  Started on Dorina O/N.  CXR-- Personally reviewed--effusions.  + UA.     5/31:  Pt seen and examined in CCU during IDR.  Remains on pressors.  Awake.  BCx NTD.  Hypothermic.  WBC 11  Plats 105  Cr 1.5      6/1:  Pt seen and examined in CCU during IDR.  Remains on pressors.  Palliative care input appreciated.  Awake.  Tm 97.6  WBC 11 Plats 226  Cr 1.4  BCx NTD    6/2:  Pt seen and examined in CCU during IDR.  Loss will to eat.  Remains on pressors.  BCx NTD.  Tm 97.4  WBC  8  Cr 1.2  Not doing well.  Case d/w palliative care at bedside    6/3:  Pt seen and examined in CCU during IDR.  Still not eating much.  Remains on pressors.  Tm 98.0  WBC 7  Cr 1.2   Case d/w Palliative care at bedside    6/4:  Pt seen and examined in CCU during IDR.   Remains on pressors.  Tm 98.2  Doing poorly overall    6/5:  Pt seen and examined in CCU during IDR.  Very limited improvement.  Remains on pressors.  Still not eating.  Tm 98.0        PMH:  As above.     PSH:  As above.     FH: Non Contributory other than those listed in HPI    Social History:  NC    MEDICATIONS  (STANDING):  ascorbic acid 500 milliGRAM(s) Oral daily  atorvastatin 80 milliGRAM(s) Oral at bedtime  chlorhexidine 4% Liquid 1 Application(s) Topical <User Schedule>  ferrous    sulfate 325 milliGRAM(s) Oral daily  finasteride 5 milliGRAM(s) Oral daily  heparin   Injectable 5000 Unit(s) SubCutaneous every 12 hours  loperamide 2 milliGRAM(s) Oral daily  melatonin 5 milliGRAM(s) Oral at bedtime  midodrine 15 milliGRAM(s) Oral every 8 hours  norepinephrine Infusion 0.05 MICROgram(s)/kG/Min (3.26 mL/Hr) IV Continuous <Continuous>  pantoprazole    Tablet 40 milliGRAM(s) Oral before breakfast  ranolazine 500 milliGRAM(s) Oral two times a day  tamsulosin 0.4 milliGRAM(s) Oral at bedtime    MEDICATIONS  (PRN):  acetaminophen     Tablet .. 650 milliGRAM(s) Oral every 6 hours PRN Temp greater or equal to 38C (100.4F), Mild Pain (1 - 3)  acetaminophen     Tablet .. 1000 milliGRAM(s) Oral every 6 hours PRN Temp greater or equal to 38C (100.4F), Moderate Pain (4 - 6)  aluminum hydroxide/magnesium hydroxide/simethicone Suspension 30 milliLiter(s) Oral every 4 hours PRN Dyspepsia  ondansetron Injectable 4 milliGRAM(s) IV Push every 8 hours PRN Nausea and/or Vomiting      Allergies: NKDA    ROS:  SEE BELOW        ICU Vital Signs Last 24 Hrs  T(C): 36.3 (05 Jun 2022 06:30), Max: 36.7 (04 Jun 2022 12:00)  T(F): 97.3 (05 Jun 2022 06:30), Max: 98 (04 Jun 2022 12:00)  HR: 74 (05 Jun 2022 08:00) (71 - 92)  BP: 104/48 (05 Jun 2022 07:30) (82/39 - 122/71)  BP(mean): 60 (05 Jun 2022 07:30) (49 - 83)  ABP: --  ABP(mean): --  RR: 14 (05 Jun 2022 08:00) (14 - 25)  SpO2: 95% (05 Jun 2022 08:00) (92% - 100%)          I&O's Summary    04 Jun 2022 07:01  -  05 Jun 2022 07:00  --------------------------------------------------------  IN: 840 mL / OUT: 550 mL / NET: 290 mL        Physical Exam:  SEE BELOW                              DVT Prophylaxis:                                                            Contraindication:     Advanced Directives:    Discussed with:    Visit Information:  Time spent excluding procedure:      ** Time is exclusive of billed procedures and/or teaching and/or routine family updates.

## 2022-06-05 NOTE — PROGRESS NOTE ADULT - MS EXT PE MLT D E PC
no cyanosis/no pedal edema
no cyanosis/no pedal edema
no cyanosis/pedal edema
no cyanosis/no pedal edema
no cyanosis/pedal edema

## 2022-06-05 NOTE — PROGRESS NOTE ADULT - RS GEN PE MLT RESP DETAILS PC
airway patent/breath sounds equal

## 2022-06-06 LAB
ANION GAP SERPL CALC-SCNC: 5 MMOL/L — SIGNIFICANT CHANGE UP (ref 5–17)
BUN SERPL-MCNC: 23 MG/DL — SIGNIFICANT CHANGE UP (ref 7–23)
CALCIUM SERPL-MCNC: 7.8 MG/DL — LOW (ref 8.5–10.1)
CHLORIDE SERPL-SCNC: 105 MMOL/L — SIGNIFICANT CHANGE UP (ref 96–108)
CO2 SERPL-SCNC: 25 MMOL/L — SIGNIFICANT CHANGE UP (ref 22–31)
CORTIS AM PEAK SERPL-MCNC: 18 UG/DL — SIGNIFICANT CHANGE UP (ref 6–18.4)
CREAT SERPL-MCNC: 1.02 MG/DL — SIGNIFICANT CHANGE UP (ref 0.5–1.3)
EGFR: 70 ML/MIN/1.73M2 — SIGNIFICANT CHANGE UP
GLUCOSE SERPL-MCNC: 113 MG/DL — HIGH (ref 70–99)
HCT VFR BLD CALC: 26.6 % — LOW (ref 39–50)
HGB BLD-MCNC: 8.5 G/DL — LOW (ref 13–17)
MAGNESIUM SERPL-MCNC: 2.1 MG/DL — SIGNIFICANT CHANGE UP (ref 1.6–2.6)
MCHC RBC-ENTMCNC: 28.1 PG — SIGNIFICANT CHANGE UP (ref 27–34)
MCHC RBC-ENTMCNC: 32 GM/DL — SIGNIFICANT CHANGE UP (ref 32–36)
MCV RBC AUTO: 88.1 FL — SIGNIFICANT CHANGE UP (ref 80–100)
PHOSPHATE SERPL-MCNC: 2.2 MG/DL — LOW (ref 2.5–4.5)
PLATELET # BLD AUTO: 184 K/UL — SIGNIFICANT CHANGE UP (ref 150–400)
POTASSIUM SERPL-MCNC: 4.3 MMOL/L — SIGNIFICANT CHANGE UP (ref 3.5–5.3)
POTASSIUM SERPL-SCNC: 4.3 MMOL/L — SIGNIFICANT CHANGE UP (ref 3.5–5.3)
RBC # BLD: 3.02 M/UL — LOW (ref 4.2–5.8)
RBC # FLD: 18.1 % — HIGH (ref 10.3–14.5)
SODIUM SERPL-SCNC: 135 MMOL/L — SIGNIFICANT CHANGE UP (ref 135–145)
WBC # BLD: 7.48 K/UL — SIGNIFICANT CHANGE UP (ref 3.8–10.5)
WBC # FLD AUTO: 7.48 K/UL — SIGNIFICANT CHANGE UP (ref 3.8–10.5)

## 2022-06-06 PROCEDURE — 99232 SBSQ HOSP IP/OBS MODERATE 35: CPT

## 2022-06-06 PROCEDURE — 99291 CRITICAL CARE FIRST HOUR: CPT

## 2022-06-06 PROCEDURE — 99497 ADVNCD CARE PLAN 30 MIN: CPT

## 2022-06-06 RX ORDER — ALBUMIN HUMAN 25 %
50 VIAL (ML) INTRAVENOUS ONCE
Refills: 0 | Status: COMPLETED | OUTPATIENT
Start: 2022-06-06 | End: 2022-06-06

## 2022-06-06 RX ORDER — HYDROCORTISONE 20 MG
50 TABLET ORAL EVERY 8 HOURS
Refills: 0 | Status: DISCONTINUED | OUTPATIENT
Start: 2022-06-06 | End: 2022-06-08

## 2022-06-06 RX ORDER — SODIUM CHLORIDE 9 MG/ML
1000 INJECTION INTRAMUSCULAR; INTRAVENOUS; SUBCUTANEOUS
Refills: 0 | Status: DISCONTINUED | OUTPATIENT
Start: 2022-06-06 | End: 2022-06-09

## 2022-06-06 RX ADMIN — FINASTERIDE 5 MILLIGRAM(S): 5 TABLET, FILM COATED ORAL at 10:31

## 2022-06-06 RX ADMIN — Medication 50 MILLILITER(S): at 22:37

## 2022-06-06 RX ADMIN — TAMSULOSIN HYDROCHLORIDE 0.4 MILLIGRAM(S): 0.4 CAPSULE ORAL at 22:29

## 2022-06-06 RX ADMIN — HEPARIN SODIUM 5000 UNIT(S): 5000 INJECTION INTRAVENOUS; SUBCUTANEOUS at 22:30

## 2022-06-06 RX ADMIN — RANOLAZINE 500 MILLIGRAM(S): 500 TABLET, FILM COATED, EXTENDED RELEASE ORAL at 22:30

## 2022-06-06 RX ADMIN — Medication 3.26 MICROGRAM(S)/KG/MIN: at 06:08

## 2022-06-06 RX ADMIN — SODIUM CHLORIDE 75 MILLILITER(S): 9 INJECTION INTRAMUSCULAR; INTRAVENOUS; SUBCUTANEOUS at 13:29

## 2022-06-06 RX ADMIN — MIDODRINE HYDROCHLORIDE 15 MILLIGRAM(S): 2.5 TABLET ORAL at 22:30

## 2022-06-06 RX ADMIN — Medication 50 MILLIGRAM(S): at 22:29

## 2022-06-06 RX ADMIN — Medication 50 MILLIGRAM(S): at 14:58

## 2022-06-06 RX ADMIN — ATORVASTATIN CALCIUM 80 MILLIGRAM(S): 80 TABLET, FILM COATED ORAL at 22:29

## 2022-06-06 RX ADMIN — MIDODRINE HYDROCHLORIDE 15 MILLIGRAM(S): 2.5 TABLET ORAL at 06:07

## 2022-06-06 RX ADMIN — HEPARIN SODIUM 5000 UNIT(S): 5000 INJECTION INTRAVENOUS; SUBCUTANEOUS at 10:30

## 2022-06-06 RX ADMIN — MIDODRINE HYDROCHLORIDE 15 MILLIGRAM(S): 2.5 TABLET ORAL at 14:58

## 2022-06-06 RX ADMIN — Medication 325 MILLIGRAM(S): at 10:31

## 2022-06-06 RX ADMIN — PANTOPRAZOLE SODIUM 40 MILLIGRAM(S): 20 TABLET, DELAYED RELEASE ORAL at 10:30

## 2022-06-06 RX ADMIN — Medication 500 MILLIGRAM(S): at 10:30

## 2022-06-06 RX ADMIN — RANOLAZINE 500 MILLIGRAM(S): 500 TABLET, FILM COATED, EXTENDED RELEASE ORAL at 10:30

## 2022-06-06 NOTE — PROGRESS NOTE ADULT - SUBJECTIVE AND OBJECTIVE BOX
HPI:      PAIN: ( )Yes   ( )No  Level:  Location:  Intensity:    /10  Quality:  Aggravating Factors:  Alleviating Factors:  Radiation:  Duration/Timing:  Impact on ADLs:    DYSPNEA: ( ) Yes  ( ) No  Level:        Review of Systems:    Anxiety-  Depression-  Physical Discomfort-  Dyspnea-  Constipation-  Diarrhea-  Nausea-  Vomiting-  Anorexia-  Weight Loss-   Cough-  Secretions-  Fatigue-  Weakness-  Delirium-    All other systems reviewed and negative  Unable to obtain/Limited due to:        PHYSICAL EXAM:    Vital Signs Last 24 Hrs  T(C): 36.6 (2022 12:00), Max: 36.6 (2022 12:00)  T(F): 97.8 (2022 12:00), Max: 97.8 (2022 12:00)  HR: 87 (2022 15:30) (72 - 89)  BP: 109/51 (2022 15:30) (100/48 - 121/49)  BP(mean): 65 (2022 15:30) (58 - 70)  RR: 18 (2022 15:30) (14 - 20)  SpO2: 100% (2022 15:30) (81% - 100%)  Daily     Daily Weight in k.9 (2022 06:17)    PPSV2:   %  FAST:    General:  HEENT:  Lungs:  Cardiac:  GI:  :  Ext:  Neuro:      LABS:                        8.5    7.48  )-----------( 184      ( 2022 06:23 )             26.6     06-06    135  |  105  |  23  ----------------------------<  113<H>  4.3   |  25  |  1.02    Ca    7.8<L>      2022 06:23  Phos  2.2     06-06  Mg     2.1     06-06        Albumin:     Allergies    Broccoli (Unknown)  penicillin (Vomiting; Nausea)    Intolerances      MEDICATIONS  (STANDING):  ascorbic acid 500 milliGRAM(s) Oral daily  atorvastatin 80 milliGRAM(s) Oral at bedtime  chlorhexidine 4% Liquid 1 Application(s) Topical <User Schedule>  ferrous    sulfate 325 milliGRAM(s) Oral daily  finasteride 5 milliGRAM(s) Oral daily  heparin   Injectable 5000 Unit(s) SubCutaneous every 12 hours  hydrocortisone sodium succinate Injectable 50 milliGRAM(s) IV Push every 8 hours  loperamide 2 milliGRAM(s) Oral daily  melatonin 5 milliGRAM(s) Oral at bedtime  midodrine 15 milliGRAM(s) Oral every 8 hours  norepinephrine Infusion 0.05 MICROgram(s)/kG/Min (3.26 mL/Hr) IV Continuous <Continuous>  pantoprazole    Tablet 40 milliGRAM(s) Oral before breakfast  ranolazine 500 milliGRAM(s) Oral two times a day  sodium chloride 0.9%. 1000 milliLiter(s) (75 mL/Hr) IV Continuous <Continuous>  tamsulosin 0.4 milliGRAM(s) Oral at bedtime    MEDICATIONS  (PRN):  acetaminophen     Tablet .. 650 milliGRAM(s) Oral every 6 hours PRN Temp greater or equal to 38C (100.4F), Mild Pain (1 - 3)  acetaminophen     Tablet .. 1000 milliGRAM(s) Oral every 6 hours PRN Temp greater or equal to 38C (100.4F), Moderate Pain (4 - 6)  aluminum hydroxide/magnesium hydroxide/simethicone Suspension 30 milliLiter(s) Oral every 4 hours PRN Dyspepsia  ondansetron Injectable 4 milliGRAM(s) IV Push every 8 hours PRN Nausea and/or Vomiting      RADIOLOGY:   HPI:    pt was overwhelmed with decisions and w/o insight into his medical care.   no pain nausea or vomiting.           PAIN: ( )Yes   ( x)No       DYSPNEA: ( ) Yes  (x ) No  Level:        Review of Systems:       Anxiety- denies  Depression-denies  Physical Discomfort- in NAD  Dyspnea-denies  Constipation-denies  Diarrhea-denies  Nausea-denies  Vomiting-denies  Anorexia-++  Weight Loss- ++  Cough-denies  Secretions-denies  Fatigue-++  Weakness-++  Delirium- no    All other systems reviewed and negative         PHYSICAL EXAM:    Vital Signs Last 24 Hrs  T(C): 36.6 (2022 12:00), Max: 36.6 (2022 12:00)  T(F): 97.8 (2022 12:00), Max: 97.8 (2022 12:00)  HR: 87 (2022 15:30) (72 - 89)  BP: 109/51 (2022 15:30) (100/48 - 121/49)  BP(mean): 65 (2022 15:30) (58 - 70)  RR: 18 (2022 15:30) (14 - 20)  SpO2: 100% (2022 15:30) (81% - 100%)  Daily     Daily Weight in k.9 (2022 06:17)    PPSV2:  20 %  FAST:    General: calm in NAD  Mental Status: awake alert oriented x2  HEENT: eomi, perrl  Lungs: ctabl b/l bs  Cardiac: s1s2 no mgr  GI: soft nontender +BS  : voids  Ext: moves all 4 extremities spontaneously  Neuro: no gross findings       LABS:                        8.5    7.48  )-----------( 184      ( 2022 06:23 )             26.6     06-06    135  |  105  |  23  ----------------------------<  113<H>  4.3   |  25  |  1.02    Ca    7.8<L>      2022 06:23  Phos  2.2     06-06  Mg     2.1     06-06        Albumin:     Allergies    Broccoli (Unknown)  penicillin (Vomiting; Nausea)    Intolerances      MEDICATIONS  (STANDING):  ascorbic acid 500 milliGRAM(s) Oral daily  atorvastatin 80 milliGRAM(s) Oral at bedtime  chlorhexidine 4% Liquid 1 Application(s) Topical <User Schedule>  ferrous    sulfate 325 milliGRAM(s) Oral daily  finasteride 5 milliGRAM(s) Oral daily  heparin   Injectable 5000 Unit(s) SubCutaneous every 12 hours  hydrocortisone sodium succinate Injectable 50 milliGRAM(s) IV Push every 8 hours  loperamide 2 milliGRAM(s) Oral daily  melatonin 5 milliGRAM(s) Oral at bedtime  midodrine 15 milliGRAM(s) Oral every 8 hours  norepinephrine Infusion 0.05 MICROgram(s)/kG/Min (3.26 mL/Hr) IV Continuous <Continuous>  pantoprazole    Tablet 40 milliGRAM(s) Oral before breakfast  ranolazine 500 milliGRAM(s) Oral two times a day  sodium chloride 0.9%. 1000 milliLiter(s) (75 mL/Hr) IV Continuous <Continuous>  tamsulosin 0.4 milliGRAM(s) Oral at bedtime    MEDICATIONS  (PRN):  acetaminophen     Tablet .. 650 milliGRAM(s) Oral every 6 hours PRN Temp greater or equal to 38C (100.4F), Mild Pain (1 - 3)  acetaminophen     Tablet .. 1000 milliGRAM(s) Oral every 6 hours PRN Temp greater or equal to 38C (100.4F), Moderate Pain (4 - 6)  aluminum hydroxide/magnesium hydroxide/simethicone Suspension 30 milliLiter(s) Oral every 4 hours PRN Dyspepsia  ondansetron Injectable 4 milliGRAM(s) IV Push every 8 hours PRN Nausea and/or Vomiting      RADIOLOGY:

## 2022-06-06 NOTE — PROGRESS NOTE ADULT - ASSESSMENT
89 y/o male with HTN, CAD s/p CABG, AAA s/p repair, CHFpEF, insomnia, obstructive uropathy with chronic waite, BPH and reflux admitted with diarrhea--Stool specimen negative for PCR and C. diff--Tx to CCU for hypotension and Shock likely sepsis--?? Urinary source given chronic waite and  + UA vs Decubs.  Not eating much  DESIRAE related to ATN from sepsis     Critically ill.  High risk for acute decompensation and deterioration including death   Patient requires critical care for support of one or more vital organ systems with a high probability of imminent or life threatening deterioration in his/her condition     Plan:  septic shock possible from UTI  Completed course of Dorina  no clear source infection srinivas check procal  Actively titrate pressors to keep MAP > 60.  Mido increased to 15 q8  Check AM cortisol  will give trial of steroids  hydration   Encourage PO diet--palliative care input appreciated  DVT prophy--SCD and sqhep

## 2022-06-06 NOTE — PROGRESS NOTE ADULT - ASSESSMENT
Process of Care  --Reviewed dx/treatment problems and alignment with Goals of Care    Physical Aspects of Care  --Pain  patient denies at this time  c/w current managment    --Bowel Regimen  denies constipation  risk for constipation d/t immobility  daily dulcolax    --Dyspnea  No SOB at this time  comfortable and in NAD    --Nausea Vomiting  denies    --Weakness  PT as tolerated     Psychological and Psychiatric Aspects of Care:   --Greif/Bereavment: emotional support provided  --Hx of psychiatric dx: none  -Pastoral Care Available PRN       Social Aspects of Care  -SW involved     Cultural Aspects  -Primary Language: English    Goals of Care:     We discussed Palliative Care team being a supportive team when a patient has ongoing illnesses.  We also discussed that it is not an end of life care service, but can help navigate symptoms and emotional support throughout their hospital stay here.     6/2   please refer to GOC note  advanced directives addressed again  DNR DNI    6/6  pt states advanced directive/goc discussions confuse him  he's in NAD   but unable to really follow discussion regarding hospice, comfort.  he denies pain n/v/sob     Ethical and Legal Aspects:   NA      Capacity: pt  staets he cannot understand medical discussion today, defers to son   Surrogate: pt  defers to  danny (son)    Code Status: full code  MOLST: DNR DNI   Dispo Plan: pt remains on pressors, if unable to wean, comfort may need to be next step    Discussed With: Case coordinated with attending and SW and RN     Time Spent:  45 minutes including the care, coordination and counseling of this patient, 50% of which was spent coordinating and counseling.

## 2022-06-06 NOTE — PROGRESS NOTE ADULT - CONVERSATION DETAILS
Pt is currently DNR DNI   We discussed further advanced directives of comfort and hospice or weaning from pressors.  He was overwhelmed w/ the discussion and defers to his son again hopes his son would better understand and decide for him.

## 2022-06-07 LAB
ANION GAP SERPL CALC-SCNC: 10 MMOL/L — SIGNIFICANT CHANGE UP (ref 5–17)
BUN SERPL-MCNC: 24 MG/DL — HIGH (ref 7–23)
CALCIUM SERPL-MCNC: 7.9 MG/DL — LOW (ref 8.5–10.1)
CHLORIDE SERPL-SCNC: 104 MMOL/L — SIGNIFICANT CHANGE UP (ref 96–108)
CO2 SERPL-SCNC: 20 MMOL/L — LOW (ref 22–31)
CREAT SERPL-MCNC: 1.25 MG/DL — SIGNIFICANT CHANGE UP (ref 0.5–1.3)
EGFR: 55 ML/MIN/1.73M2 — LOW
GLUCOSE SERPL-MCNC: 185 MG/DL — HIGH (ref 70–99)
HCT VFR BLD CALC: 26.9 % — LOW (ref 39–50)
HGB BLD-MCNC: 8.5 G/DL — LOW (ref 13–17)
MCHC RBC-ENTMCNC: 27.8 PG — SIGNIFICANT CHANGE UP (ref 27–34)
MCHC RBC-ENTMCNC: 31.6 GM/DL — LOW (ref 32–36)
MCV RBC AUTO: 87.9 FL — SIGNIFICANT CHANGE UP (ref 80–100)
PLATELET # BLD AUTO: 246 K/UL — SIGNIFICANT CHANGE UP (ref 150–400)
POTASSIUM SERPL-MCNC: 4.9 MMOL/L — SIGNIFICANT CHANGE UP (ref 3.5–5.3)
POTASSIUM SERPL-SCNC: 4.9 MMOL/L — SIGNIFICANT CHANGE UP (ref 3.5–5.3)
PROCALCITONIN SERPL-MCNC: 0.16 NG/ML — HIGH (ref 0.02–0.1)
RBC # BLD: 3.06 M/UL — LOW (ref 4.2–5.8)
RBC # FLD: 18.3 % — HIGH (ref 10.3–14.5)
SODIUM SERPL-SCNC: 134 MMOL/L — LOW (ref 135–145)
WBC # BLD: 8.42 K/UL — SIGNIFICANT CHANGE UP (ref 3.8–10.5)
WBC # FLD AUTO: 8.42 K/UL — SIGNIFICANT CHANGE UP (ref 3.8–10.5)

## 2022-06-07 PROCEDURE — 99291 CRITICAL CARE FIRST HOUR: CPT

## 2022-06-07 PROCEDURE — 99233 SBSQ HOSP IP/OBS HIGH 50: CPT

## 2022-06-07 RX ORDER — MORPHINE SULFATE 50 MG/1
2 CAPSULE, EXTENDED RELEASE ORAL EVERY 4 HOURS
Refills: 0 | Status: DISCONTINUED | OUTPATIENT
Start: 2022-06-07 | End: 2022-06-09

## 2022-06-07 RX ORDER — MORPHINE SULFATE 50 MG/1
2 CAPSULE, EXTENDED RELEASE ORAL ONCE
Refills: 0 | Status: DISCONTINUED | OUTPATIENT
Start: 2022-06-07 | End: 2022-06-07

## 2022-06-07 RX ADMIN — Medication 50 MILLIGRAM(S): at 05:46

## 2022-06-07 RX ADMIN — Medication 5 MILLIGRAM(S): at 21:09

## 2022-06-07 RX ADMIN — PANTOPRAZOLE SODIUM 40 MILLIGRAM(S): 20 TABLET, DELAYED RELEASE ORAL at 10:22

## 2022-06-07 RX ADMIN — HEPARIN SODIUM 5000 UNIT(S): 5000 INJECTION INTRAVENOUS; SUBCUTANEOUS at 21:09

## 2022-06-07 RX ADMIN — FINASTERIDE 5 MILLIGRAM(S): 5 TABLET, FILM COATED ORAL at 10:22

## 2022-06-07 RX ADMIN — MIDODRINE HYDROCHLORIDE 15 MILLIGRAM(S): 2.5 TABLET ORAL at 13:45

## 2022-06-07 RX ADMIN — RANOLAZINE 500 MILLIGRAM(S): 500 TABLET, FILM COATED, EXTENDED RELEASE ORAL at 21:10

## 2022-06-07 RX ADMIN — RANOLAZINE 500 MILLIGRAM(S): 500 TABLET, FILM COATED, EXTENDED RELEASE ORAL at 10:30

## 2022-06-07 RX ADMIN — Medication 500 MILLIGRAM(S): at 10:22

## 2022-06-07 RX ADMIN — SODIUM CHLORIDE 75 MILLILITER(S): 9 INJECTION INTRAMUSCULAR; INTRAVENOUS; SUBCUTANEOUS at 05:47

## 2022-06-07 RX ADMIN — HEPARIN SODIUM 5000 UNIT(S): 5000 INJECTION INTRAVENOUS; SUBCUTANEOUS at 10:22

## 2022-06-07 RX ADMIN — TAMSULOSIN HYDROCHLORIDE 0.4 MILLIGRAM(S): 0.4 CAPSULE ORAL at 21:10

## 2022-06-07 RX ADMIN — ATORVASTATIN CALCIUM 80 MILLIGRAM(S): 80 TABLET, FILM COATED ORAL at 21:09

## 2022-06-07 RX ADMIN — CHLORHEXIDINE GLUCONATE 1 APPLICATION(S): 213 SOLUTION TOPICAL at 10:21

## 2022-06-07 RX ADMIN — Medication 2 MILLIGRAM(S): at 10:22

## 2022-06-07 RX ADMIN — Medication 325 MILLIGRAM(S): at 10:22

## 2022-06-07 RX ADMIN — Medication 50 MILLIGRAM(S): at 21:09

## 2022-06-07 RX ADMIN — MORPHINE SULFATE 2 MILLIGRAM(S): 50 CAPSULE, EXTENDED RELEASE ORAL at 06:54

## 2022-06-07 RX ADMIN — MIDODRINE HYDROCHLORIDE 15 MILLIGRAM(S): 2.5 TABLET ORAL at 05:47

## 2022-06-07 RX ADMIN — MORPHINE SULFATE 2 MILLIGRAM(S): 50 CAPSULE, EXTENDED RELEASE ORAL at 07:28

## 2022-06-07 RX ADMIN — Medication 3.26 MICROGRAM(S)/KG/MIN: at 05:48

## 2022-06-07 RX ADMIN — MORPHINE SULFATE 2 MILLIGRAM(S): 50 CAPSULE, EXTENDED RELEASE ORAL at 06:22

## 2022-06-07 RX ADMIN — Medication 50 MILLIGRAM(S): at 13:45

## 2022-06-07 RX ADMIN — MIDODRINE HYDROCHLORIDE 15 MILLIGRAM(S): 2.5 TABLET ORAL at 21:10

## 2022-06-07 NOTE — PROGRESS NOTE ADULT - ASSESSMENT
89 y/o male with HTN, CAD s/p CABG, AAA s/p repair, CHFpEF, insomnia, obstructive uropathy with chronic waite, BPH and reflux admitted with diarrhea--Stool specimen negative for PCR and C. diff--Tx to CCU for hypotension and Shock likely sepsis--?? Urinary source given chronic waite and  + UA vs Decubs.  Not eating much  DESIRAE related to ATN from sepsis     Critically ill.  High risk for acute decompensation and deterioration including death   Patient requires critical care for support of one or more vital organ systems with a high probability of imminent or life threatening deterioration in his/her condition     Plan:  septic shock possible from UTI  Completed course of Dorina  no other clear source infection   Actively titrate pressors to keep MAP > 60.  Mido increased to 15 q8  on stress steroids  hydration   Encourage PO diet--palliative care input appreciated  DVT prophy--SCD and sqhep

## 2022-06-07 NOTE — PROGRESS NOTE ADULT - SUBJECTIVE AND OBJECTIVE BOX
Events Overnight:  Patient weak, on 0.07 of Levophed, not eating much    HPI:       88 y/o male with HTN, CAD s/p CABG, AAA s/p repair, CKD Stage 3, CHFpEF, insomnia,   chronic waite, BPH  SNF for evaluation of diarrhea.      Stool PCR and CDI NEGATIVE  He is tx to CCU on 5/29 for septic shock.  Lactate > 2.  Fever 101.8  and WBC 17  CUltures negative, ct only some stercol colitis, was treated with 5 days of meropenema now d/c  creatinine better, wbc improved but still pressor dependent  cxr bilateral effusions  ct no hydronephrosis  Poor PO intake     PMH:       as above    ROS difficult to obtain due weakness,   no chest pain, no sob, no abdominal pain, no fevers shills       poor PO intake    MEDICATIONS  (STANDING):  ascorbic acid 500 milliGRAM(s) Oral daily  atorvastatin 80 milliGRAM(s) Oral at bedtime  chlorhexidine 4% Liquid 1 Application(s) Topical <User Schedule>  ferrous    sulfate 325 milliGRAM(s) Oral daily  finasteride 5 milliGRAM(s) Oral daily  heparin   Injectable 5000 Unit(s) SubCutaneous every 12 hours  hydrocortisone sodium succinate Injectable 50 milliGRAM(s) IV Push every 8 hours  loperamide 2 milliGRAM(s) Oral daily  melatonin 5 milliGRAM(s) Oral at bedtime  midodrine 15 milliGRAM(s) Oral every 8 hours  norepinephrine Infusion 0.05 MICROgram(s)/kG/Min (3.26 mL/Hr) IV Continuous <Continuous>  pantoprazole    Tablet 40 milliGRAM(s) Oral before breakfast  ranolazine 500 milliGRAM(s) Oral two times a day  sodium chloride 0.9%. 1000 milliLiter(s) (75 mL/Hr) IV Continuous <Continuous>  tamsulosin 0.4 milliGRAM(s) Oral at bedtime    MEDICATIONS  (PRN):  acetaminophen     Tablet .. 650 milliGRAM(s) Oral every 6 hours PRN Temp greater or equal to 38C (100.4F), Mild Pain (1 - 3)  acetaminophen     Tablet .. 1000 milliGRAM(s) Oral every 6 hours PRN Temp greater or equal to 38C (100.4F), Moderate Pain (4 - 6)  aluminum hydroxide/magnesium hydroxide/simethicone Suspension 30 milliLiter(s) Oral every 4 hours PRN Dyspepsia  ondansetron Injectable 4 milliGRAM(s) IV Push every 8 hours PRN Nausea and/or Vomiting    CU Vital Signs Last 24 Hrs  T(C): 36.2 (07 Jun 2022 05:00), Max: 36.6 (06 Jun 2022 12:00)  T(F): 97.1 (07 Jun 2022 05:00), Max: 97.8 (06 Jun 2022 12:00)  HR: 110 (07 Jun 2022 07:30) (77 - 118)  BP: 107/60 (07 Jun 2022 07:30) (94/59 - 123/52)  BP(mean): 69 (07 Jun 2022 07:30) (59 - 77)  ABP: --  ABP(mean): --  RR: 28 (07 Jun 2022 07:30) (13 - 32)  SpO2: 100% (07 Jun 2022 07:30) (81% - 100%)    I&O's Summary    06 Jun 2022 07:01  -  07 Jun 2022 07:00  --------------------------------------------------------  IN: 1437 mL / OUT: 360 mL / NET: 1077 mL    Physical Exam    General - weak, no distress  Neuro slightly confused, no focal  neck right sided IJ  lungs dec bs at bases  cv rrr  abdomen soft, no tender  extremities in splints   rosy                          8.5    8.42  )-----------( 246      ( 07 Jun 2022 06:36 )             26.9       06-07    134<L>  |  104  |  24<H>  ----------------------------<  185<H>  4.9   |  20<L>  |  1.25    Ca    7.9<L>      07 Jun 2022 06:36  Phos  2.2     06-06  Mg     2.1     06-06    DVT Prophylaxis: Heparin subq                                                                 Advanced Directives: Full Code

## 2022-06-07 NOTE — PROGRESS NOTE ADULT - SUBJECTIVE AND OBJECTIVE BOX
HPI:  Patient was seen and examined.  Denies nausea, vomiting, shortness of breath, chest pain, headaches, abdominal pain, constipation   Hes in no acute distress, tired and continues to defer decisions to his s on    PAIN: ( )Yes   (x )No     DYSPNEA: ( ) Yes  (x ) No  Level:        Review of Systems:    Anxiety- deniess  Depression- denies  Physical Discomfort- denies  Dyspnea- denies  Constipation- denies  Diarrhea- denies  Nausea- denies  Vomiting- denies  Anorexia- significant decrease PO  Weight Loss-  denies  Cough- denies  Secretions- denies  Fatigue- ++ but improved from yesterday  Weakness- denies  Delirium- denies     All other systems reviewed and negative           PHYSICAL EXAM:    Vital Signs Last 24 Hrs  T(C): 36 (2022 09:44), Max: 36.6 (2022 12:00)  T(F): 96.8 (2022 09:44), Max: 97.8 (2022 12:00)  HR: 101 (2022 09:30) (77 - 118)  BP: 99/56 (2022 09:30) (94/59 - 123/52)  BP(mean): 66 (2022 09:30) (59 - 77)  RR: 15 (2022 09:30) (13 - 32)  SpO2: 99% (2022 09:30) (81% - 100%)  Daily     Daily Weight in k.6 (2022 05:00)    PPSV2: 30  %  FAST:    General: calm in NAD  Mental Status: awake alert oriented x3  HEENT: eomi, perrl  Lungs: ctabl b/l bs  Cardiac: s1s2 no mgr  GI: soft nontender +BS  : voids  Ext: moves all 4 extremities spontaneously  Neuro: no gross findings        LABS:                        8.5    8.42  )-----------( 246      ( 2022 06:36 )             26.9     06-07    134<L>  |  104  |  24<H>  ----------------------------<  185<H>  4.9   |  20<L>  |  1.25    Ca    7.9<L>      2022 06:36  Phos  2.2     06-06  Mg     2.1     06-06        Albumin:     Allergies    Broccoli (Unknown)  penicillin (Vomiting; Nausea)    Intolerances      MEDICATIONS  (STANDING):  ascorbic acid 500 milliGRAM(s) Oral daily  atorvastatin 80 milliGRAM(s) Oral at bedtime  chlorhexidine 4% Liquid 1 Application(s) Topical <User Schedule>  ferrous    sulfate 325 milliGRAM(s) Oral daily  finasteride 5 milliGRAM(s) Oral daily  heparin   Injectable 5000 Unit(s) SubCutaneous every 12 hours  hydrocortisone sodium succinate Injectable 50 milliGRAM(s) IV Push every 8 hours  loperamide 2 milliGRAM(s) Oral daily  melatonin 5 milliGRAM(s) Oral at bedtime  midodrine 15 milliGRAM(s) Oral every 8 hours  norepinephrine Infusion 0.05 MICROgram(s)/kG/Min (3.26 mL/Hr) IV Continuous <Continuous>  pantoprazole    Tablet 40 milliGRAM(s) Oral before breakfast  ranolazine 500 milliGRAM(s) Oral two times a day  sodium chloride 0.9%. 1000 milliLiter(s) (75 mL/Hr) IV Continuous <Continuous>  tamsulosin 0.4 milliGRAM(s) Oral at bedtime    MEDICATIONS  (PRN):  acetaminophen     Tablet .. 650 milliGRAM(s) Oral every 6 hours PRN Temp greater or equal to 38C (100.4F), Mild Pain (1 - 3)  acetaminophen     Tablet .. 1000 milliGRAM(s) Oral every 6 hours PRN Temp greater or equal to 38C (100.4F), Moderate Pain (4 - 6)  aluminum hydroxide/magnesium hydroxide/simethicone Suspension 30 milliLiter(s) Oral every 4 hours PRN Dyspepsia  morphine  - Injectable 2 milliGRAM(s) IV Push every 4 hours PRN Severe Pain (7 - 10)  ondansetron Injectable 4 milliGRAM(s) IV Push every 8 hours PRN Nausea and/or Vomiting      RADIOLOGY:

## 2022-06-07 NOTE — PROGRESS NOTE ADULT - CRITICAL CARE SERVICES PROVIDED
Patient is critically ill, requiring critical care services.

## 2022-06-07 NOTE — PROGRESS NOTE ADULT - ASSESSMENT
Process of Care  --Reviewed dx/treatment problems and alignment with Goals of Care    Physical Aspects of Care  --Pain  patient denies at this time  c/w current managment    --Bowel Regimen  denies constipation  risk for constipation d/t immobility  daily dulcolax    --Dyspnea  No SOB at this time  comfortable and in NAD    --Nausea Vomiting  denies    --Weakness  PT as tolerated     Psychological and Psychiatric Aspects of Care:   --Greif/Bereavment: emotional support provided  --Hx of psychiatric dx: none  -Pastoral Care Available PRN       Social Aspects of Care  -SW involved     Cultural Aspects  -Primary Language: English    Goals of Care:     We discussed Palliative Care team being a supportive team when a patient has ongoing illnesses.  We also discussed that it is not an end of life care service, but can help navigate symptoms and emotional support throughout their hospital stay here.     6/2   please refer to GOC note  advanced directives addressed again  DNR DNI    6/6  pt states advanced directive/goc discussions confuse him  he's in NAD   but unable to really follow discussion regarding hospice, comfort.  he denies pain n/v/sob       6/7  long discussion w/ family   theyre coming in later to see how patient does and if pressors could be held  goal for them would be DC pressors safely and send home w/ hospice.      Ethical and Legal Aspects:   NA      Capacity: deffers to his son Nicolas   Surrogate: pt  defers to  nicolas (son)    Code Status: DNR DNI  MOLST: DNR DNI   Dispo Plan: pt remains on pressors, if unable to wean, comfort may need to be next step    Discussed With: Case coordinated with attending and SW and RN     Time Spent:  45 minutes including the care, coordination and counseling of this patient, 50% of which was spent coordinating and counseling.

## 2022-06-08 LAB — SARS-COV-2 RNA SPEC QL NAA+PROBE: SIGNIFICANT CHANGE UP

## 2022-06-08 PROCEDURE — 99497 ADVNCD CARE PLAN 30 MIN: CPT

## 2022-06-08 PROCEDURE — 99232 SBSQ HOSP IP/OBS MODERATE 35: CPT

## 2022-06-08 RX ORDER — HYDROCORTISONE 20 MG
25 TABLET ORAL EVERY 12 HOURS
Refills: 0 | Status: DISCONTINUED | OUTPATIENT
Start: 2022-06-08 | End: 2022-06-09

## 2022-06-08 RX ORDER — HYDROMORPHONE HYDROCHLORIDE 2 MG/ML
1 INJECTION INTRAMUSCULAR; INTRAVENOUS; SUBCUTANEOUS ONCE
Refills: 0 | Status: DISCONTINUED | OUTPATIENT
Start: 2022-06-08 | End: 2022-06-08

## 2022-06-08 RX ADMIN — Medication 325 MILLIGRAM(S): at 09:42

## 2022-06-08 RX ADMIN — Medication 50 MILLIGRAM(S): at 06:00

## 2022-06-08 RX ADMIN — TAMSULOSIN HYDROCHLORIDE 0.4 MILLIGRAM(S): 0.4 CAPSULE ORAL at 21:14

## 2022-06-08 RX ADMIN — HYDROMORPHONE HYDROCHLORIDE 1 MILLIGRAM(S): 2 INJECTION INTRAMUSCULAR; INTRAVENOUS; SUBCUTANEOUS at 19:17

## 2022-06-08 RX ADMIN — MORPHINE SULFATE 2 MILLIGRAM(S): 50 CAPSULE, EXTENDED RELEASE ORAL at 18:17

## 2022-06-08 RX ADMIN — RANOLAZINE 500 MILLIGRAM(S): 500 TABLET, FILM COATED, EXTENDED RELEASE ORAL at 21:19

## 2022-06-08 RX ADMIN — MIDODRINE HYDROCHLORIDE 15 MILLIGRAM(S): 2.5 TABLET ORAL at 14:43

## 2022-06-08 RX ADMIN — PANTOPRAZOLE SODIUM 40 MILLIGRAM(S): 20 TABLET, DELAYED RELEASE ORAL at 09:41

## 2022-06-08 RX ADMIN — HYDROMORPHONE HYDROCHLORIDE 1 MILLIGRAM(S): 2 INJECTION INTRAMUSCULAR; INTRAVENOUS; SUBCUTANEOUS at 20:13

## 2022-06-08 RX ADMIN — RANOLAZINE 500 MILLIGRAM(S): 500 TABLET, FILM COATED, EXTENDED RELEASE ORAL at 09:41

## 2022-06-08 RX ADMIN — MORPHINE SULFATE 2 MILLIGRAM(S): 50 CAPSULE, EXTENDED RELEASE ORAL at 10:05

## 2022-06-08 RX ADMIN — MORPHINE SULFATE 2 MILLIGRAM(S): 50 CAPSULE, EXTENDED RELEASE ORAL at 06:26

## 2022-06-08 RX ADMIN — Medication 5 MILLIGRAM(S): at 21:14

## 2022-06-08 RX ADMIN — Medication 500 MILLIGRAM(S): at 09:41

## 2022-06-08 RX ADMIN — Medication 25 MILLIGRAM(S): at 09:41

## 2022-06-08 RX ADMIN — HEPARIN SODIUM 5000 UNIT(S): 5000 INJECTION INTRAVENOUS; SUBCUTANEOUS at 21:14

## 2022-06-08 RX ADMIN — MIDODRINE HYDROCHLORIDE 15 MILLIGRAM(S): 2.5 TABLET ORAL at 21:14

## 2022-06-08 RX ADMIN — CHLORHEXIDINE GLUCONATE 1 APPLICATION(S): 213 SOLUTION TOPICAL at 09:42

## 2022-06-08 RX ADMIN — HEPARIN SODIUM 5000 UNIT(S): 5000 INJECTION INTRAVENOUS; SUBCUTANEOUS at 09:41

## 2022-06-08 RX ADMIN — Medication 25 MILLIGRAM(S): at 21:14

## 2022-06-08 RX ADMIN — MORPHINE SULFATE 2 MILLIGRAM(S): 50 CAPSULE, EXTENDED RELEASE ORAL at 22:39

## 2022-06-08 RX ADMIN — MIDODRINE HYDROCHLORIDE 15 MILLIGRAM(S): 2.5 TABLET ORAL at 06:00

## 2022-06-08 RX ADMIN — ATORVASTATIN CALCIUM 80 MILLIGRAM(S): 80 TABLET, FILM COATED ORAL at 21:14

## 2022-06-08 RX ADMIN — SODIUM CHLORIDE 75 MILLILITER(S): 9 INJECTION INTRAMUSCULAR; INTRAVENOUS; SUBCUTANEOUS at 14:43

## 2022-06-08 RX ADMIN — FINASTERIDE 5 MILLIGRAM(S): 5 TABLET, FILM COATED ORAL at 09:41

## 2022-06-08 NOTE — GOALS OF CARE CONVERSATION - ADVANCED CARE PLANNING - WHAT MATTERS MOST
For patient to be comfortable. Family hopeful that pt can have discharge plan that allows him to be with his wife.
For patient to return home & maintain his comfort. Hopes for pt & his wife to be reunited.
Patient wants to get better and requested team to discuss CPR and lifesupport w/ Nicolas so he can discuss again later

## 2022-06-08 NOTE — PROVIDER CONTACT NOTE (CHANGE IN STATUS NOTIFICATION) - SITUATION
Pt turned and positioned. Complained of SOB, anxiety. 2mg morphine given. Pt complained it did work post admin.

## 2022-06-08 NOTE — PROGRESS NOTE ADULT - ASSESSMENT
88 y/o male with HTN, CAD s/p CABG, AAA s/p repair, CHFpEF, insomnia, obstructive uropathy with chronic waite, BPH and reflux admitted with diarrhea--Stool specimen negative for PCR and C. diff--Tx to CCU for hypotension and Shock likely sepsis--?? Urinary source given chronic waite and  + UA vs Decubs.  Not eating much  DESIRAE related to ATN from sepsis        Plan:  septic shock possible from UTI improved, now off pressors  Completed course of Dorina  no other clear source infection   off pressors, continue midodrine  on stress steroids start titrating down  hydration   Encourage PO diet--does not eat, no feeding tube palliative care input appreciated  will d/w family GOC and disposition options  DVT prophy--SCD and sqhep      89 y/o male with HTN, CAD s/p CABG, AAA s/p repair, CHFpEF, insomnia, obstructive uropathy with chronic waite, BPH and reflux admitted with diarrhea--Stool specimen negative for PCR and C. diff--Tx to CCU for hypotension and Shock likely sepsis--?? Urinary source given chronic waite and  + UA vs Decubs.  Not eating much  DESIRAE related to ATN from sepsis        Plan:  septic shock possible from UTI improved, now off pressors  Completed course of Dorina  no other clear source infection   off pressors, continue midodrine  DESIRAE from sespsis, creatinine improved  on stress steroids start titrating down  hydration  d/c central line  resume bblocker when bp improved   Encourage PO diet--does not eat, no feeding tube palliative care input appreciated  will d/w family GOC and disposition options  DVT prophy--SCD and sqhep      89 y/o male with HTN, CAD s/p CABG, AAA s/p repair, CHFpEF, insomnia, obstructive uropathy with chronic waite, BPH and reflux admitted with diarrhea--Stool specimen negative for PCR and C. diff--Tx to CCU for hypotension and Shock likely sepsis--?? Urinary source given chronic waite and  + UA vs Decubs.  Not eating much  DESIRAE related to ATN from sepsis        Plan:  septic shock possible from UTI improved, now off pressors  Completed course of Dorina  no other clear source infection   off pressors, continue midodrine  DESIRAE from sespsis, creatinine improved  on stress steroids start titrating down  hydration  d/c central line  resume bblocker when bp improved   Encourage PO diet--does not eat, no feeding tube palliative care input appreciated    I had discussion with patient son, would likely not want pressors restarted, but would want to rediscuss if needed  will d/w family GOC and disposition options  DVT prophy--SCD and sqhep      89 y/o male with HTN, CAD s/p CABG, AAA s/p repair, CHFpEF, insomnia, obstructive uropathy with chronic waite, BPH and reflux admitted with diarrhea--Stool specimen negative for PCR and C. diff--Tx to CCU for hypotension and Shock likely sepsis--?? Urinary source given chronic waite and  + UA vs Decubs.  Not eating much  DESIRAE related to ATN from sepsis        Plan:  septic shock possible from UTI improved, now off pressors  Completed course of Dorina  no other clear source infection   off pressors, continue midodrine  DESIRAE from sespsis, creatinine improved  on stress steroids start titrating down  hydration  d/c central line  resume bblocker when bp improved   Encourage PO diet--does not eat, no feeding tube palliative care input appreciated  May be transferred to floor , signedd out to Hospitalist Magdi    I had discussion with patient son, would likely not want pressors restarted, but would want to rediscuss if needed  will d/w family GOC and disposition options  DVT prophy--SCD and sqhep

## 2022-06-08 NOTE — GOALS OF CARE CONVERSATION - ADVANCED CARE PLANNING - TREATMENT GUIDELINE COMMENT
.
GoC adressed pt is DNR DNI at this time, and family wants to move towards comfort measures with hope to focus on symptoms only and no longer disease oriented treatment

## 2022-06-08 NOTE — GOALS OF CARE CONVERSATION - ADVANCED CARE PLANNING - FAMILY/CHILD(REN)
Nicolas ([phone])
rita Valenzuela ([phone])
rita Valenzuela
Nicolas Collazo
son Nicolas & daughter-in-law

## 2022-06-08 NOTE — PROGRESS NOTE ADULT - NUTRITIONAL ASSESSMENT
This patient has been assessed with a concern for Malnutrition and has been determined to have a diagnosis/diagnoses of Severe protein-calorie malnutrition.    This patient is being managed with:   Diet Regular-  Supplement Feeding Modality:  Oral  Ensure Enlive Cans or Servings Per Day:  3       Frequency:  Three Times a day  Entered: May 24 2022 11:57AM    
This patient has been assessed with a concern for Malnutrition and has been determined to have a diagnosis/diagnoses of Severe protein-calorie malnutrition.    This patient is being managed with:   Diet DASH/TLC-  Sodium & Cholesterol Restricted  Entered: May 21 2022  1:28PM    
This patient has been assessed with a concern for Malnutrition and has been determined to have a diagnosis/diagnoses of Severe protein-calorie malnutrition.    This patient is being managed with:   Diet Regular-  Supplement Feeding Modality:  Oral  Ensure Enlive Cans or Servings Per Day:  3       Frequency:  Three Times a day  Entered: May 24 2022 11:57AM    
This patient has been assessed with a concern for Malnutrition and has been determined to have a diagnosis/diagnoses of Severe protein-calorie malnutrition.    This patient is being managed with:   Diet Regular-  Supplement Feeding Modality:  Oral  Ensure Enlive Cans or Servings Per Day:  3       Frequency:  Three Times a day  Entered: May 24 2022 11:57AM      This patient has been assessed with a concern for Malnutrition and has been determined to have a diagnosis/diagnoses of Severe protein-calorie malnutrition.    This patient is being managed with:   Diet Regular-  Supplement Feeding Modality:  Oral  Ensure Enlive Cans or Servings Per Day:  3       Frequency:  Three Times a day  Entered: May 24 2022 11:57AM    
This patient has been assessed with a concern for Malnutrition and has been determined to have a diagnosis/diagnoses of Severe protein-calorie malnutrition.    This patient is being managed with:   Diet DASH/TLC-  Sodium & Cholesterol Restricted  Entered: May 21 2022  1:28PM    
This patient has been assessed with a concern for Malnutrition and has been determined to have a diagnosis/diagnoses of Severe protein-calorie malnutrition.    This patient is being managed with:   Diet Regular-  Supplement Feeding Modality:  Oral  Ensure Enlive Cans or Servings Per Day:  3       Frequency:  Three Times a day  Entered: May 24 2022 11:57AM    

## 2022-06-08 NOTE — GOALS OF CARE CONVERSATION - ADVANCED CARE PLANNING - CONVERSATION DETAILS
ROGELIO spoke with pt's son Nicolas to follow up on our last discussion. He reports that he has been in to visit his father the last few days & they were able to speak about pt's wishes. He explains that in the event that pt's heart were to stop, he would not want CPR or to be intubated. Hospice philosophy was also reviewed as Nicolas spoke about options for the future.    MOLST reviewed & completed to reflect DNR/DNI wishes. MOLST placed on chart. Emotional support provided. Nicolas expressed appreciation for call. Our team will continue to follow.    Due to pandemic and visitation restrictions, conversations held over the phone with health care proxy/surrogate.
HPI: As per medical record,   88 y/o M with PMH HTN, CAD s/p CABG, AAA s/p repair, CKD Stage 3, CHFpEF, insomnia, obstructive uropathy, BPH, Dz reflux sent from SNF for evaluation of diarrhea. Pt is poor historian due to hearing impairment and  Dementia. Reports that developed diarrhea overnight, not sure how many BMs had, as wearing diapers. He denies fevers or chills, has poor appetite but not new. No abd pain or cramping. Pt reports that his wife also is with loose stools for a few days.  (21 May 2022 14:32)      PERTINENT PMH REVIEWED:  [ x ] YES [ ] NO           Primary Contact: rita Valenzuela ([phone])    HCP [  ] Surrogate [ x  ] Guardian [   ]   - Pt defers to son Nicolas    Mental Status: Despite dx of dementia, pt appears alert & oriented at this time.   Concerns of Depression [  ] Pt denies  Anxiety [   ] Pt denies  Baseline ADLs (prior to admission):  Independent [ ] moderately [ ] fully   Dependent   [ x ] moderately [ ]fully    Family Meeting attendees: Pt & son Nicolas participated in brief discussion.     Anticipated Grief: Patient[  ] Family [ x ]    Caregiver Norris Assessed: Yes [ x ] No [  ]    Mormonism: Gnosticist    Spiritual Concerns: None reported.  available.     Goals of Care: No limits set at this time. Family to discuss amongst themselves.     Previous Services: [facility]    ADVANCE DIRECTIVES: No Limits Set. Full Code. Family to discuss amongst themselves.     Anticipated D/C Plan: Return to [facility] with Live-in Aide                     Summary: SW met with patient at bedside to introduce self & offer support. Palliative SW role explained. Pt appeared to be [place], alert, & oriented at this time. Pt denies any pain/discomfort, depression or anxiety. When reviewing pt's wishes re resuscitation, pt requested our team call his son Nicolas to review options & then they would discuss together as a family.     With pt's permission SW spoke with pt's son Nicolas via telephone. He explains that both his parents are currently in the hospital. He reports that family have discussed pt's wishes in the past but allowed this writer to review DNR/DNI. He explains that he believes his family would lean towards DNR but that they would need to discuss it further as a family. Nicolas appeared receptive & appreciative of call.     Emotional support provided. Plan is for pt to return to [facility] with live-in aide when medically stable. Our team will continue to follow.
ROGELIO spoke with pt's son Nicolas via telephone to follow up & offer support. Nicolas reports that he has spoken with Dr. Alas & Dr. Sanchez which he reports was very helpful in formulating next steps. He inquired about hospice care & what hospice offers. ROGELIO explained hospice philosophy and reviewed home vs inpatient hospice. Nicolas shares that when hospice was brought up in front of his father, his father became discouraged but Nicolas feels like this would align best with their goals. Family is choosing not to mention the word "hospice" when speaking to patient. Nicolas explains that he would prefer  to pursue home hospice rather than inpatient hospice at this time.     Nicolas explains that ideally, he would want his father (patient) to return to [facility] with home hospice & 24hr aides. He has already been in touch with [facility] in [place] ([phone]) for aides services. He says they are on essentially on stand-by. Nicolas is in agreement with a home hospice referral to [facility] for more information. Floor ROGELIO Hughes aware & is in touch with [facility] re their specific requirements.     Emotional support provided. Nicolas appeared appreciative of call & agreeable to future follow up. MARILOU on chart. Our team will continue to follow.    Due to pandemic and visitation restrictions, conversations held over the phone with health care proxy/surrogate.
SW met with patient this morning at bedside. Pt appeared alert, tired but able to participate in brief discussion; drinking an orange juice. He explained how he has been feeling weak & is looking forward to a nap. He also says he did not sleep well last night. He denied any pain/discomfort and offered no questions.    This SW received return phone call from pt's son Nicolas & pt's daughter-in-law. Ramón Sanchez joined conversation. Pt's family had questions regarding next steps & about hospice. SW explained the philosophy of hospice and how the goal would be to focus solely on pt's comfort & symptom management. Dr. Sanchez explained that pt is currently on pressors and how this would need to be discontinued if family were to make this decision. Dr. Sanchez explained how pressors work & what it may look like when they are discontinued. If family makes the choice to discontinue pressors, we would need to monitor & see how pt responds. Emotional support provided.     Family would like to take time to discuss options amongst themselves which our team encourages. They know they can contact nurses station if they decide on a decision over the weekend. Both inpatient & home hospice were discussed. They appeared receptive & appreciative of discussion. Our team will continue to follow.    Due to pandemic and visitation restrictions, conversations held over the phone with health care proxy/surrogate.
Nicolas called this SW back shortly after previous discussion. He explains that he just went to visit his father & after seeing him, feels that inpatient may be a better choice. He requests referral for inpatient hospice at [facility]. ROGELIO provided emotional support & explains that decision will ultimately be up to [facility]. Floor ROGELIO Hughes updated. Emotional support provided. Our team will continue to follow

## 2022-06-08 NOTE — PROGRESS NOTE ADULT - SUBJECTIVE AND OBJECTIVE BOX
Events Overnight: off pressors almost 24 hours, no fevers, remains with poor PO    HPI:      88 y/o male with HTN, CAD s/p CABG, AAA s/p repair, CKD Stage 3, CHFpEF, insomnia,   chronic waite, BPH  SNF for evaluation of diarrhea.      Stool PCR and CDI NEGATIVE  He was  CCU on 5/29 for septic shock.  Lactate > 2.  Fever 101.8  and WBC 17  CUltures negative, ct only some stercol colitis, was treated with 5 days of meropenema now d/c  creatinine better, was on pressors for days d/c 6/07  cxr bilateral effusions  ct no hydronephrosis  Poor PO intake   DNR    PMH:       as above    ROS difficult to obtain due weakness,   no chest pain, no sob, no abdominal pain, no fevers shills       poor PO intake    MEDICATIONS  (STANDING):  ascorbic acid 500 milliGRAM(s) Oral daily  atorvastatin 80 milliGRAM(s) Oral at bedtime  chlorhexidine 4% Liquid 1 Application(s) Topical <User Schedule>  ferrous    sulfate 325 milliGRAM(s) Oral daily  finasteride 5 milliGRAM(s) Oral daily  heparin   Injectable 5000 Unit(s) SubCutaneous every 12 hours  hydrocortisone sodium succinate Injectable 50 milliGRAM(s) IV Push every 8 hours  loperamide 2 milliGRAM(s) Oral daily  melatonin 5 milliGRAM(s) Oral at bedtime  midodrine 15 milliGRAM(s) Oral every 8 hours  norepinephrine Infusion 0.05 MICROgram(s)/kG/Min (3.26 mL/Hr) IV Continuous <Continuous>  pantoprazole    Tablet 40 milliGRAM(s) Oral before breakfast  ranolazine 500 milliGRAM(s) Oral two times a day  sodium chloride 0.9%. 1000 milliLiter(s) (75 mL/Hr) IV Continuous <Continuous>  tamsulosin 0.4 milliGRAM(s) Oral at bedtime    MEDICATIONS  (PRN):  acetaminophen     Tablet .. 650 milliGRAM(s) Oral every 6 hours PRN Temp greater or equal to 38C (100.4F), Mild Pain (1 - 3)  acetaminophen     Tablet .. 1000 milliGRAM(s) Oral every 6 hours PRN Temp greater or equal to 38C (100.4F), Moderate Pain (4 - 6)  aluminum hydroxide/magnesium hydroxide/simethicone Suspension 30 milliLiter(s) Oral every 4 hours PRN Dyspepsia  morphine  - Injectable 2 milliGRAM(s) IV Push every 4 hours PRN Severe Pain (7 - 10)  ondansetron Injectable 4 milliGRAM(s) IV Push every 8 hours PRN Nausea and/or Vomiting    ICU Vital Signs Last 24 Hrs  T(C): 36.1 (08 Jun 2022 07:13), Max: 36.1 (08 Jun 2022 07:13)  T(F): 97 (08 Jun 2022 07:13), Max: 97 (08 Jun 2022 07:13)  HR: 89 (08 Jun 2022 06:00) (73 - 110)  BP: 98/57 (08 Jun 2022 06:00) (80/45 - 107/60)  BP(mean): 68 (08 Jun 2022 06:00) (54 - 72)  ABP: --  ABP(mean): --  RR: 21 (08 Jun 2022 06:00) (12 - 28)  SpO2: 94% (08 Jun 2022 06:00) (94% - 100%)    Physical Exam    General - weak, no distress  Neuro slightly confused, no focal  neck right sided IJ  lungs dec bs at bases  cv rrr  abdomen soft, no tender  extremities in splints  SUNI gallegos    I&O's Summary    07 Jun 2022 07:01  -  08 Jun 2022 07:00  --------------------------------------------------------  IN: 1490 mL / OUT: 300 mL / NET: 1190 mL                        8.5    8.42  )-----------( 246      ( 07 Jun 2022 06:36 )             26.9       06-07    134<L>  |  104  |  24<H>  ----------------------------<  185<H>  4.9   |  20<L>  |  1.25    Ca    7.9<L>      07 Jun 2022 06:36    DVT Prophylaxis:   Heparin subq                                                             Advanced Directives: Full Code

## 2022-06-09 ENCOUNTER — TRANSCRIPTION ENCOUNTER (OUTPATIENT)
Age: 87
End: 2022-06-09

## 2022-06-09 VITALS — RESPIRATION RATE: 24 BRPM | TEMPERATURE: 97 F | HEART RATE: 104 BPM | OXYGEN SATURATION: 95 %

## 2022-06-09 LAB
ANION GAP SERPL CALC-SCNC: 13 MMOL/L — SIGNIFICANT CHANGE UP (ref 5–17)
BUN SERPL-MCNC: 37 MG/DL — HIGH (ref 7–23)
CALCIUM SERPL-MCNC: 8.2 MG/DL — LOW (ref 8.5–10.1)
CHLORIDE SERPL-SCNC: 106 MMOL/L — SIGNIFICANT CHANGE UP (ref 96–108)
CO2 SERPL-SCNC: 16 MMOL/L — LOW (ref 22–31)
CREAT SERPL-MCNC: 1.7 MG/DL — HIGH (ref 0.5–1.3)
EGFR: 38 ML/MIN/1.73M2 — LOW
GLUCOSE SERPL-MCNC: 114 MG/DL — HIGH (ref 70–99)
HCT VFR BLD CALC: 31.4 % — LOW (ref 39–50)
HGB BLD-MCNC: 9.4 G/DL — LOW (ref 13–17)
MCHC RBC-ENTMCNC: 27.9 PG — SIGNIFICANT CHANGE UP (ref 27–34)
MCHC RBC-ENTMCNC: 29.9 GM/DL — LOW (ref 32–36)
MCV RBC AUTO: 93.2 FL — SIGNIFICANT CHANGE UP (ref 80–100)
PLATELET # BLD AUTO: 298 K/UL — SIGNIFICANT CHANGE UP (ref 150–400)
POTASSIUM SERPL-MCNC: 5.1 MMOL/L — SIGNIFICANT CHANGE UP (ref 3.5–5.3)
POTASSIUM SERPL-SCNC: 5.1 MMOL/L — SIGNIFICANT CHANGE UP (ref 3.5–5.3)
RBC # BLD: 3.37 M/UL — LOW (ref 4.2–5.8)
RBC # FLD: 19.7 % — HIGH (ref 10.3–14.5)
SODIUM SERPL-SCNC: 135 MMOL/L — SIGNIFICANT CHANGE UP (ref 135–145)
WBC # BLD: 17.15 K/UL — HIGH (ref 3.8–10.5)
WBC # FLD AUTO: 17.15 K/UL — HIGH (ref 3.8–10.5)

## 2022-06-09 PROCEDURE — 99233 SBSQ HOSP IP/OBS HIGH 50: CPT

## 2022-06-09 PROCEDURE — 99239 HOSP IP/OBS DSCHRG MGMT >30: CPT

## 2022-06-09 RX ORDER — TERAZOSIN HYDROCHLORIDE 10 MG/1
1 CAPSULE ORAL
Qty: 0 | Refills: 0 | DISCHARGE

## 2022-06-09 RX ORDER — ACETAMINOPHEN 500 MG
1 TABLET ORAL
Qty: 0 | Refills: 0 | DISCHARGE

## 2022-06-09 RX ORDER — TAMSULOSIN HYDROCHLORIDE 0.4 MG/1
1 CAPSULE ORAL
Qty: 0 | Refills: 0 | DISCHARGE

## 2022-06-09 RX ORDER — FUROSEMIDE 40 MG
1 TABLET ORAL
Qty: 0 | Refills: 0 | DISCHARGE

## 2022-06-09 RX ORDER — ROSUVASTATIN CALCIUM 5 MG/1
1 TABLET ORAL
Qty: 0 | Refills: 0 | DISCHARGE

## 2022-06-09 RX ORDER — MORPHINE SULFATE 50 MG/1
2 CAPSULE, EXTENDED RELEASE ORAL ONCE
Refills: 0 | Status: DISCONTINUED | OUTPATIENT
Start: 2022-06-09 | End: 2022-06-09

## 2022-06-09 RX ORDER — MIDODRINE HYDROCHLORIDE 2.5 MG/1
3 TABLET ORAL
Qty: 0 | Refills: 0 | DISCHARGE
Start: 2022-06-09

## 2022-06-09 RX ORDER — MAGNESIUM HYDROXIDE 400 MG/1
15 TABLET, CHEWABLE ORAL
Qty: 0 | Refills: 0 | DISCHARGE

## 2022-06-09 RX ORDER — MORPHINE SULFATE 50 MG/1
2 CAPSULE, EXTENDED RELEASE ORAL
Qty: 10 | Refills: 0
Start: 2022-06-09

## 2022-06-09 RX ADMIN — MORPHINE SULFATE 2 MILLIGRAM(S): 50 CAPSULE, EXTENDED RELEASE ORAL at 06:20

## 2022-06-09 RX ADMIN — MORPHINE SULFATE 2 MILLIGRAM(S): 50 CAPSULE, EXTENDED RELEASE ORAL at 00:20

## 2022-06-09 RX ADMIN — RANOLAZINE 500 MILLIGRAM(S): 500 TABLET, FILM COATED, EXTENDED RELEASE ORAL at 12:16

## 2022-06-09 RX ADMIN — MORPHINE SULFATE 2 MILLIGRAM(S): 50 CAPSULE, EXTENDED RELEASE ORAL at 15:34

## 2022-06-09 RX ADMIN — MORPHINE SULFATE 2 MILLIGRAM(S): 50 CAPSULE, EXTENDED RELEASE ORAL at 09:22

## 2022-06-09 RX ADMIN — MORPHINE SULFATE 2 MILLIGRAM(S): 50 CAPSULE, EXTENDED RELEASE ORAL at 13:30

## 2022-06-09 RX ADMIN — HEPARIN SODIUM 5000 UNIT(S): 5000 INJECTION INTRAVENOUS; SUBCUTANEOUS at 10:50

## 2022-06-09 RX ADMIN — Medication 25 MILLIGRAM(S): at 10:50

## 2022-06-09 RX ADMIN — MORPHINE SULFATE 2 MILLIGRAM(S): 50 CAPSULE, EXTENDED RELEASE ORAL at 07:07

## 2022-06-09 RX ADMIN — MORPHINE SULFATE 2 MILLIGRAM(S): 50 CAPSULE, EXTENDED RELEASE ORAL at 10:00

## 2022-06-09 RX ADMIN — MORPHINE SULFATE 2 MILLIGRAM(S): 50 CAPSULE, EXTENDED RELEASE ORAL at 15:55

## 2022-06-09 RX ADMIN — MORPHINE SULFATE 2 MILLIGRAM(S): 50 CAPSULE, EXTENDED RELEASE ORAL at 08:00

## 2022-06-09 RX ADMIN — MIDODRINE HYDROCHLORIDE 15 MILLIGRAM(S): 2.5 TABLET ORAL at 12:18

## 2022-06-09 RX ADMIN — MORPHINE SULFATE 2 MILLIGRAM(S): 50 CAPSULE, EXTENDED RELEASE ORAL at 13:11

## 2022-06-09 RX ADMIN — SODIUM CHLORIDE 75 MILLILITER(S): 9 INJECTION INTRAMUSCULAR; INTRAVENOUS; SUBCUTANEOUS at 09:22

## 2022-06-09 RX ADMIN — MIDODRINE HYDROCHLORIDE 15 MILLIGRAM(S): 2.5 TABLET ORAL at 06:03

## 2022-06-09 RX ADMIN — MORPHINE SULFATE 2 MILLIGRAM(S): 50 CAPSULE, EXTENDED RELEASE ORAL at 03:13

## 2022-06-09 NOTE — PROGRESS NOTE ADULT - SUBJECTIVE AND OBJECTIVE BOX
HPI:      HPI:  Patient was seen and examined.  Denies nausea, vomiting, shortness of breath, chest pain, headaches, abdominal pain, constipation   Hes in no acute distress, tired and continues to defer decisions to his s on.        pt seen and examined  c/o anxiety and episodes of discomfort/pain     PAIN: ( x)Yes   (  )No   moderate  back no radiation  further characterisation limited    DYSPNEA: ( ) Yes  (x ) No  Level:        Review of Systems:    Anxiety- denies  Depression- denies  Physical Discomfort- denies  Dyspnea- denies  Constipation- denies  Diarrhea- denies  Nausea- denies  Vomiting- denies  Anorexia-++  Weight Loss-  denies  Cough- denies  Secretions- denies  Fatigue- ++    Weakness- denies  Delirium- denies     All other systems reviewed and negative         PHYSICAL EXAM:    Vital Signs Last 24 Hrs  T(C): 36 (2022 06:18), Max: 36 (2022 06:18)  T(F): 96.8 (2022 06:18), Max: 96.8 (2022 06:18)  HR: 104 (2022 12:00) (93 - 111)  BP: 88/49 (2022 11:00) (85/47 - 114/69)  BP(mean): 59 (2022 11:00) (56 - 80)  RR: 24 (2022 12:00) (13 - 30)  SpO2: 95% (2022 12:00) (88% - 100%)  Daily     Daily Weight in k.6 (2022 06:18)    PPSV2:  30 %  FAST:      General::  anxious, cachectic  Mental Status: awake alert oriented x2  HEENT: eomi,    Lungs: ctabl b/l bs  Cardiac: s1s2 no mgr  GI: soft nontender +BS  : voids  Ext: moves all 4 extremities spontaneously  Neuro: no gross findings          LABS:                        9.4    17.15 )-----------( 298      ( 2022 06:22 )             31.4     06-    135  |  106  |  37<H>  ----------------------------<  114<H>  5.1   |  16<L>  |  1.70<H>    Ca    8.2<L>      2022 06:22        Albumin:     Allergies    Broccoli (Unknown)  penicillin (Vomiting; Nausea)    Intolerances      MEDICATIONS  (STANDING):  ascorbic acid 500 milliGRAM(s) Oral daily  atorvastatin 80 milliGRAM(s) Oral at bedtime  chlorhexidine 4% Liquid 1 Application(s) Topical <User Schedule>  ferrous    sulfate 325 milliGRAM(s) Oral daily  finasteride 5 milliGRAM(s) Oral daily  heparin   Injectable 5000 Unit(s) SubCutaneous every 12 hours  hydrocortisone sodium succinate Injectable 25 milliGRAM(s) IV Push every 12 hours  loperamide 2 milliGRAM(s) Oral daily  melatonin 5 milliGRAM(s) Oral at bedtime  midodrine 15 milliGRAM(s) Oral every 8 hours  morphine  - Injectable 2 milliGRAM(s) IV Push once  pantoprazole    Tablet 40 milliGRAM(s) Oral before breakfast  ranolazine 500 milliGRAM(s) Oral two times a day  sodium chloride 0.9%. 1000 milliLiter(s) (75 mL/Hr) IV Continuous <Continuous>  tamsulosin 0.4 milliGRAM(s) Oral at bedtime    MEDICATIONS  (PRN):  acetaminophen     Tablet .. 650 milliGRAM(s) Oral every 6 hours PRN Temp greater or equal to 38C (100.4F), Mild Pain (1 - 3)  acetaminophen     Tablet .. 1000 milliGRAM(s) Oral every 6 hours PRN Temp greater or equal to 38C (100.4F), Moderate Pain (4 - 6)  aluminum hydroxide/magnesium hydroxide/simethicone Suspension 30 milliLiter(s) Oral every 4 hours PRN Dyspepsia  morphine  - Injectable 2 milliGRAM(s) IV Push every 4 hours PRN Severe Pain (7 - 10)  ondansetron Injectable 4 milliGRAM(s) IV Push every 8 hours PRN Nausea and/or Vomiting      RADIOLOGY:

## 2022-06-09 NOTE — PROGRESS NOTE ADULT - ASSESSMENT
Process of Care  --Reviewed dx/treatment problems and alignment with Goals of Care    Physical Aspects of Care  --Pain  pt has pain currenty on   2mg IV Morphine [id number] prn  may need further adjustment  would benefit from iPU for further adjustments of meds.     --Anxiety  reccomend starting Ativan IV 0.5mg [id number] PRN   pt has decreased PO intake requiring more IV     --Bowel Regimen  denies constipation  risk for constipation d/t immobility  daily dulcolax    --Dyspnea  No SOB at this time  comfortable and in NAD    --Nausea Vomiting  denies    --Weakness  PT as tolerated     Psychological and Psychiatric Aspects of Care:   --Greif/Bereavment: emotional support provided  --Hx of psychiatric dx: none  -Pastoral Care Available PRN     Social Aspects of Care  -SW involved     Cultural Aspects  -Primary Language: English    Goals of Care:     We discussed Palliative Care team being a supportive team when a patient has ongoing illnesses.  We also discussed that it is not an end of life care service, but can help navigate symptoms and emotional support throughout their hospital stay here.       CPR/DNR discussed at length,   please refer to [facility] note    6/9  pt would benefit from IPU for endstage CHF pt w/ pain and anxiety needing  frequent dosing and adjustment of medication  Pt has endstage CHF w/ frequent episodes of SOB regardless of maximized medications.  He is chachectic w/ anorexia and poor po intake.   pt renal function again begining to increase and pt likely has days to weeks.   please refer to [facility] note for further info on discussion w/ son and patient    Prognosis:    Ethical and Legal Aspects:   NA        Capacity: pt w/ clear capacity   Surrogate: pt  defers to  danny (son)    Code Status: dnr dni  MOLST:  dnr dni     Dispo Plan: IPU     Discussed With: Case coordinated with attending and SW and RN     Time Spent: 45  minutes including the care, coordination and counseling of this patient, 50% of which was spent coordinating and counseling.

## 2022-06-09 NOTE — DISCHARGE NOTE PROVIDER - DETAILS OF MALNUTRITION DIAGNOSIS/DIAGNOSES
This patient has been assessed with a concern for Malnutrition and was treated during this hospitalization for the following Nutrition diagnosis/diagnoses:     -  05/23/2022: Severe protein-calorie malnutrition

## 2022-06-09 NOTE — PROGRESS NOTE ADULT - REASON FOR ADMISSION
diarrhea

## 2022-06-09 NOTE — DISCHARGE NOTE PROVIDER - NSDCCPCAREPLAN_GEN_ALL_CORE_FT
PRINCIPAL DISCHARGE DIAGNOSIS  Diagnosis: Diarrhea  Assessment and Plan of Treatment:       SECONDARY DISCHARGE DIAGNOSES  Diagnosis: Sepsis secondary to UTI  Assessment and Plan of Treatment:     Diagnosis: Dehydration  Assessment and Plan of Treatment:

## 2022-06-09 NOTE — DISCHARGE NOTE PROVIDER - NSDCMRMEDTOKEN_GEN_ALL_CORE_FT
loperamide 2 mg oral capsule: 1 cap(s) orally 2 times a day, As Needed loose stool  melatonin 5 mg oral tablet: 1 tab(s) orally once a day (at bedtime)  metoprolol succinate 50 mg oral tablet, extended release: 1 tab(s) orally once a day  midodrine 5 mg oral tablet: 3 tab(s) orally every 8 hours  morphine 0.5 mg/mL preservative-free injectable solution: 2 milligram(s) injectable every 4 hours, As Needed -for severe pain MDD:12mg

## 2022-06-09 NOTE — CHART NOTE - NSCHARTNOTEFT_GEN_A_CORE
Nursing team contacted palliative with concern that pt was unaware of plan for hospice. When a staff member mentioned to patient, he reportedly became very anxious. SW met with son Nicolas to discuss his feelings about explaining next steps to patient. Nicolas reports that patient has always struggled with anxiety & that he is anxious at baseline. He shares that pt feels safe here and would want to stay here indefinitely if he could. Nicolas explains that when he used the word "hospice" with his father, it did seem to discourage him. Nicolas feels it would be best for his father for staff just to provide reassurance that he will be moved somewhere that will keep him safe & comfortable.     This SW & Nicolas met with patient together. Pt appeared very tired, often closing eyes during conversation. When we mentioned next steps & moving to another room, pt appeared anxious, shut his eyes & did not want to discuss further. This writer did not want to further distress patient, thus conversation ended with this SW & Nicolas reassuring patient he is in good hands.     Emotional support provided. MARILOU on chart. Floor SW working on inpt hospice referral with VNS. Our team will continue to follow, Nursing team contacted palliative with concern that pt was unaware of plan for hospice. When a staff member mentioned to patient, he reportedly became very anxious. SW met with son Nicolas to discuss his feelings about explaining next steps to patient. Nicolas reports that patient has always struggled with anxiety & that he is anxious at baseline. He shares that pt feels safe here and would want to stay here indefinitely if he could. Nicolas explains that when he used the word "hospice" with his father, it did seem to discourage him. Nicolas feels it would be best for his father for staff just to provide reassurance that he will be moved somewhere that will keep him safe & comfortable.     This SW & Nicolas met with patient together. Pt appeared very tired, often closing eyes during conversation. When we mentioned next steps & moving to another room, pt appeared anxious, shut his eyes & did not want to discuss further. This writer did not want to further distress patient, thus conversation ended with this ROGELIO & Nicolas reassuring patient he is in good hands.     Note that pt has deffered decision-making to son Nicolas since admission. He also has dx of dementia thus, may lack some insight into complex understanding.     Emotional support provided. MARILOU on chart. Floor SW working on inpt hospice referral with VNS. Our team will continue to follow,

## 2022-06-09 NOTE — DISCHARGE NOTE PROVIDER - HOSPITAL COURSE
HPI:  89 y/o male with HTN, CAD s/p CABG, AAA s/p repair, CKD Stage 3, HFpEF, insomnia,   chronic waite, BPH  SNF admitted for sepsis and diarrhea.      Stool PCR Cdiff  negative   He was  CCU on 5/29 for septic shock.  Lactate > 2.  Fever 101.8  and WBC 17  CUltures negative, ct only some stercoral colitis, was treated with 5 days of meropenem now d/c            REVIEW OF SYSTEMS:  confused, unable to obtain     All other review of systems is negative unless indicated above    Vital Signs Last 24 Hrs  T(C): 36 (09 Jun 2022 12:00), Max: 36 (09 Jun 2022 06:18)  T(F): 96.8 (09 Jun 2022 12:00), Max: 96.8 (09 Jun 2022 06:18)  HR: 104 (09 Jun 2022 12:00) (93 - 111)  BP: 88/49 (09 Jun 2022 11:00) (85/47 - 114/69)  BP(mean): 59 (09 Jun 2022 11:00) (56 - 80)  RR: 24 (09 Jun 2022 12:00) (13 - 30)  SpO2: 95% (09 Jun 2022 12:00) (88% - 100%)    PHYSICAL EXAM:    GENERAL: NAD, cachectic, weak   HEENT:  NC/AT, EOMI, PERRLA, No scleral icterus, Moist mucous membranes  NECK: Supple, No JVD  CNS:  Alert & Oriented X1,  no motor deficits   LUNG: decreased Breath sounds, Clear to auscultation bilaterally, No rales, No rhonchi, No wheezing  HEART: RRR; No murmurs, No rubs  ABDOMEN: +BS, ST/ND/NT  GENITOURINARY: Voiding, Bladder not distended  EXTREMITIES:  2+ Peripheral Pulses, No clubbing, No cyanosis, No tibial edema  MUSCULOSKELTAL: generalized muscle atrophy   SKIN: no rashes  RECTAL: deferred, not indicated  BREAST: deferred                          9.4    17.15 )-----------( 298      ( 09 Jun 2022 06:22 )             31.4     06-09    135  |  106  |  37<H>  ----------------------------<  114<H>  5.1   |  16<L>  |  1.70<H>    Plan:  septic shock possible from UTI vs colitis   Completed course of Meropenem   no other clear source infection   off pressors, BP persistently low  DESIRAE from sepsis, creatinine improved  on stress steroids   hydration  d/c central line  Poor PO intake   DNR    Palliative care consult: for Hospice and comfort care

## 2022-06-09 NOTE — PROGRESS NOTE ADULT - PROVIDER SPECIALTY LIST ADULT
Critical Care
Infectious Disease
Palliative Care
Critical Care
Hospitalist
Infectious Disease
MICU
Palliative Care
Critical Care
Hospitalist
Infectious Disease
Palliative Care
Palliative Care
Critical Care
Hospitalist
Infectious Disease
Hospitalist
Gastroenterology
Palliative Care
Critical Care

## 2022-06-20 DIAGNOSIS — E87.2 ACIDOSIS: ICD-10-CM

## 2022-06-20 DIAGNOSIS — J96.01 ACUTE RESPIRATORY FAILURE WITH HYPOXIA: ICD-10-CM

## 2022-06-20 DIAGNOSIS — R65.21 SEVERE SEPSIS WITH SEPTIC SHOCK: ICD-10-CM

## 2022-06-20 DIAGNOSIS — N40.1 BENIGN PROSTATIC HYPERPLASIA WITH LOWER URINARY TRACT SYMPTOMS: ICD-10-CM

## 2022-06-20 DIAGNOSIS — D53.9 NUTRITIONAL ANEMIA, UNSPECIFIED: ICD-10-CM

## 2022-06-20 DIAGNOSIS — N17.0 ACUTE KIDNEY FAILURE WITH TUBULAR NECROSIS: ICD-10-CM

## 2022-06-20 DIAGNOSIS — N18.30 CHRONIC KIDNEY DISEASE, STAGE 3 UNSPECIFIED: ICD-10-CM

## 2022-06-20 DIAGNOSIS — K52.9 NONINFECTIVE GASTROENTERITIS AND COLITIS, UNSPECIFIED: ICD-10-CM

## 2022-06-20 DIAGNOSIS — A41.9 SEPSIS, UNSPECIFIED ORGANISM: ICD-10-CM

## 2022-06-20 DIAGNOSIS — J69.0 PNEUMONITIS DUE TO INHALATION OF FOOD AND VOMIT: ICD-10-CM

## 2022-06-20 DIAGNOSIS — Z88.0 ALLERGY STATUS TO PENICILLIN: ICD-10-CM

## 2022-06-20 DIAGNOSIS — Z96.641 PRESENCE OF RIGHT ARTIFICIAL HIP JOINT: ICD-10-CM

## 2022-06-20 DIAGNOSIS — E87.1 HYPO-OSMOLALITY AND HYPONATREMIA: ICD-10-CM

## 2022-06-20 DIAGNOSIS — Z91.018 ALLERGY TO OTHER FOODS: ICD-10-CM

## 2022-06-20 DIAGNOSIS — F41.9 ANXIETY DISORDER, UNSPECIFIED: ICD-10-CM

## 2022-06-20 DIAGNOSIS — I13.0 HYPERTENSIVE HEART AND CHRONIC KIDNEY DISEASE WITH HEART FAILURE AND STAGE 1 THROUGH STAGE 4 CHRONIC KIDNEY DISEASE, OR UNSPECIFIED CHRONIC KIDNEY DISEASE: ICD-10-CM

## 2022-06-20 DIAGNOSIS — R23.3 SPONTANEOUS ECCHYMOSES: ICD-10-CM

## 2022-06-20 DIAGNOSIS — F03.90 UNSPECIFIED DEMENTIA WITHOUT BEHAVIORAL DISTURBANCE: ICD-10-CM

## 2022-06-20 DIAGNOSIS — Z79.02 LONG TERM (CURRENT) USE OF ANTITHROMBOTICS/ANTIPLATELETS: ICD-10-CM

## 2022-06-20 DIAGNOSIS — R19.5 OTHER FECAL ABNORMALITIES: ICD-10-CM

## 2022-06-20 DIAGNOSIS — I50.43 ACUTE ON CHRONIC COMBINED SYSTOLIC (CONGESTIVE) AND DIASTOLIC (CONGESTIVE) HEART FAILURE: ICD-10-CM

## 2022-06-20 DIAGNOSIS — L89.620 PRESSURE ULCER OF LEFT HEEL, UNSTAGEABLE: ICD-10-CM

## 2022-06-20 DIAGNOSIS — I25.10 ATHEROSCLEROTIC HEART DISEASE OF NATIVE CORONARY ARTERY WITHOUT ANGINA PECTORIS: ICD-10-CM

## 2022-06-20 DIAGNOSIS — Z20.822 CONTACT WITH AND (SUSPECTED) EXPOSURE TO COVID-19: ICD-10-CM

## 2022-06-20 DIAGNOSIS — N39.0 URINARY TRACT INFECTION, SITE NOT SPECIFIED: ICD-10-CM

## 2022-06-20 DIAGNOSIS — R64 CACHEXIA: ICD-10-CM

## 2022-06-20 DIAGNOSIS — E86.0 DEHYDRATION: ICD-10-CM

## 2022-06-20 DIAGNOSIS — I87.2 VENOUS INSUFFICIENCY (CHRONIC) (PERIPHERAL): ICD-10-CM

## 2022-06-20 DIAGNOSIS — Z79.82 LONG TERM (CURRENT) USE OF ASPIRIN: ICD-10-CM

## 2022-06-20 DIAGNOSIS — I42.9 CARDIOMYOPATHY, UNSPECIFIED: ICD-10-CM

## 2022-06-20 DIAGNOSIS — G47.00 INSOMNIA, UNSPECIFIED: ICD-10-CM

## 2022-06-20 DIAGNOSIS — Z95.1 PRESENCE OF AORTOCORONARY BYPASS GRAFT: ICD-10-CM

## 2022-06-20 DIAGNOSIS — R33.8 OTHER RETENTION OF URINE: ICD-10-CM

## 2022-06-20 DIAGNOSIS — H91.90 UNSPECIFIED HEARING LOSS, UNSPECIFIED EAR: ICD-10-CM

## 2022-06-20 DIAGNOSIS — R41.0 DISORIENTATION, UNSPECIFIED: ICD-10-CM

## 2022-06-20 DIAGNOSIS — N13.8 OTHER OBSTRUCTIVE AND REFLUX UROPATHY: ICD-10-CM

## 2022-06-20 DIAGNOSIS — E43 UNSPECIFIED SEVERE PROTEIN-CALORIE MALNUTRITION: ICD-10-CM

## 2022-06-20 DIAGNOSIS — R63.0 ANOREXIA: ICD-10-CM

## 2022-06-20 DIAGNOSIS — L89.150 PRESSURE ULCER OF SACRAL REGION, UNSTAGEABLE: ICD-10-CM

## 2022-06-20 DIAGNOSIS — R68.0 HYPOTHERMIA, NOT ASSOCIATED WITH LOW ENVIRONMENTAL TEMPERATURE: ICD-10-CM

## 2022-06-20 DIAGNOSIS — Z86.14 PERSONAL HISTORY OF METHICILLIN RESISTANT STAPHYLOCOCCUS AUREUS INFECTION: ICD-10-CM

## 2022-06-20 DIAGNOSIS — Z66 DO NOT RESUSCITATE: ICD-10-CM

## 2022-07-22 NOTE — PATIENT PROFILE ADULT - NSPROPOAPRESSUREINJURYCT_GEN_A_NUR
For information on Fall & Injury Prevention, visit: [email or web address] OR  [email or web address] OR  https://www.cdc.gov/steadi/patient.html
1

## 2022-11-07 NOTE — ED ADULT NURSE NOTE - NS ED PATIENT SAFETY CONCERN
Chemotherapy/Immunotherapy Teaching Checklist    Treatment Plan: Oxaliplatin/5FU  Frequency: 2 weeks  Patient accompanied by son      Day of chemo instructions:  [x] Must have    [x] Eat light breakfast  [x] Bring pain medications/other routine medications scheduled during appointment time  [] Hold blood pressure medications morning of treatment    Treatment process:  [x] Flow of appointment  [x] IV access Peripheral start  or  PORT access process [x] EMLA cream  [x] Premedication/ Hydration  [x] Length of treatment  [x] Tour of clinic    Diet and hydration:  [x] Discuss [place] diet    [x] Eating Hints book provided  [x] Importance of hydration 48- 64 ounces daily   [x] Hydration handout provided     Side Effects:  [x] Chemotherapy and you book provided  [x] Side effects and management discussed   [x] Diarrhea[x]Nausea/Vomiting []home antiemetic [x]hair loss[x]Neuropathy[x] Constipation[x] Fatigue[x]Mouth sores[x] Dehydration[x] Skin/Nail Changes []Pneumonitis []Colitis [] Hepatitis []Thyroid changes  []Fertility issues (birth control, sperm/egg banking issues)    Labs:  [x]BMP- renal function and electrolytes discussed  [x] CBC- RBC, WBC with ANC, platelet counts discussed  [x]Understanding your Blood hand out given   [x]Neutropenia handout/Reduce infection handout [x]Thrombocytopenia handout   [x]Farhan discussed    Complications that require immediate attention from physician:  [x]Fever 100.3 [x]signs of infection- chills, fever, burning with urination, worsening cough   [x]Uncontrolled vomiting [x]Uncontrolled diarrhea/constipation   [x]Unable to drink 48 ounces [x]Uncontrolled pain  [x] When to notify provider handout given  [x] After hours contact process discussed    Support Services:  [] Financial navigator   []RN navigator explained  []Local cancer society  []     Patient and family verbalized understanding and all  questions answered. Encouraged to call for any concerns.  Approximately 60 minutes spent with patient and family. Discharged in satisfactory condition. Ambulated off unit per self. No

## 2023-01-18 NOTE — DISCHARGE NOTE PROVIDER - DISCHARGE DATE
21-Mar-2022 Mastoid Interpolation Flap Text: A decision was made to reconstruct the defect utilizing an interpolation axial flap and a staged reconstruction.  A telfa template was made of the defect.  This telfa template was then used to outline the mastoid interpolation flap.  The donor area for the pedicle flap was then injected with anesthesia.  The flap was excised through the skin and subcutaneous tissue down to the layer of the underlying musculature.  The pedicle flap was carefully excised within this deep plane to maintain its blood supply.  The edges of the donor site were undermined.   The donor site was closed in a primary fashion.  The pedicle was then rotated into position and sutured.  Once the tube was sutured into place, adequate blood supply was confirmed with blanching and refill.  The pedicle was then wrapped with xeroform gauze and dressed appropriately with a telfa and gauze bandage to ensure continued blood supply and protect the attached pedicle.

## 2023-04-06 NOTE — ED PROVIDER NOTE - WR ORDER ID 1
[FreeTextEntry1] : inflamed dilated pore/double comedone; \par I&D today;  resolved;  no suspicious features; \par \par Therapeutic options and their risks and benefits; along with multiple diagnostic possibilities were discussed at length; risks and benefits of further study were discussed;\par \par congenital nevus R upper arm\par \par Hx tanning bed use;  discussed need for annual TBSE
[id number]

## 2023-09-22 NOTE — ED PROVIDER NOTE - SECONDARY DIAGNOSIS.
Will discuss results at patient's upcoming appointment   NSTEMI (non-ST elevated myocardial infarction) Anemia

## 2024-02-21 NOTE — DIETITIAN INITIAL EVALUATION ADULT. - 30 CAL TO
From: Mohini Shaw  To: Froy Alexandra  Sent: 2/20/2024 8:14 PM CST  Subject: Left shoulder/Neck Issues    Hey Dr Alexandra.  The pain in my left shoulder/neck has been ongoing. I did have my massage with April (down stairs) today I'm glad. I have another appointment set up. But now I'm really hurting and she said I would be. I tried taking a muscle relaxer that I have few from you awhile ago but it did seem to do anything for the pain. Tried Tylenol, not much relief. Could you please recommend something?    Thanks,  Mohini    [id number]

## 2024-04-24 NOTE — DIETITIAN INITIAL EVALUATION ADULT. - RD TO REMAIN AVAILABLE
Physical Therapy    Physical Therapy Treatment    Patient Name: Ry Sandhu  MRN: [id number]  Today's Date: 4/24/2024  Time Calculation  Start Time: 1259  Stop Time: 1314  Time Calculation (min): 15 min       Assessment/Plan   PT Assessment  PT Assessment Results: Decreased strength, Decreased endurance, Impaired balance, Decreased mobility, Decreased cognition, Orthopedic restrictions  Rehab Prognosis: Good  Barriers to Discharge: None  Evaluation/Treatment Tolerance: Patient tolerated treatment well  End of Session Communication: Bedside nurse  Assessment Comment: Pt remains appropriate for continued PT in house and after discharge to maximize functional mobility.  End of Session Patient Position: Bed, 3 rail up, Alarm on  PT Plan  Inpatient/Swing Bed or Outpatient: Inpatient  PT Plan  Treatment/Interventions: Bed mobility, Transfer training, Balance training, Strengthening, Endurance training, Therapeutic exercise, Therapeutic activity, Home exercise program  PT Plan: Skilled PT  PT Frequency: 5 times per week  PT Discharge Recommendations: Moderate intensity level of continued care  Equipment Recommended upon Discharge:  (none)  PT Recommended Transfer Status: Assist x1  PT - OK to Discharge: Yes      General Visit Information:   PT  Visit  PT Received On: 04/24/24  General  Family/Caregiver Present: Yes  Caregiver Feedback: RN present at start of visit checking on lines/IV's. No other new comments.  Prior to Session Communication: Bedside nurse  Patient Position Received: Bed, 3 rail up, Alarm on  Preferred Learning Style: verbal, auditory  General Comment: Pt resting in bed upon entry. Continues to report feeling fatigued though willing to work with PT and attempt mobility on this date.    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 Receptive to therapist without difficulty though remains somewhat confused.)  Arousal/Alertness: Appropriate responses to stimuli  Orientation Level: Disoriented to situation, Disoriented to place  Following Commands: Follows one step commands with repetition  Insight: Mild  Postural Control:  Postural Control  Postural Control: Impaired  Posture Comment: Tends to leans posteriorly w static sitting. Unable to maintain posture without assist.  Static Sitting Balance  Static Sitting-Balance Support: Bilateral upper extremity supported (Pt tending to flex AKA's in efforts to compensate for impaired balance.)  Static Sitting-Level of Assistance:  (Initially min assist, required max assist as pt demonstrated fatigue.)  Static Sitting-Comment/Number of Minutes: 5 minutes total prior to return to supine due to persistent fatigue and patient requiring increased assist  Static Standing Balance  Static Standing-Comment/Number of Minutes: Unable as pt with bilat AKA's    Activity Tolerance:  Activity Tolerance  Endurance: Decreased tolerance for upright activites  Treatments:  Therapeutic Exercise  Therapeutic Exercise Performed: Yes  Therapeutic Exercise Activity 1: Reviewed and performed hip flex, abd, int/ext rotation x 10 each. BUE should flex, int/ext rotation, elbow flex/ext x 10 each.    Therapeutic Activity  Therapeutic Activity Performed: Yes  Therapeutic Activity 1: bed mobility/static sit    Bed Mobility  Bed Mobility: Yes  Bed Mobility 1  Bed Mobility 1: Supine to sitting, Sitting to supine  Level of Assistance 1: Maximum verbal cues    Ambulation/Gait Training  Ambulation/Gait Training Performed: No  Transfers  Transfer: No    Outcome Measures:  [facility] Basic Mobility  Turning from your back to your side while in a flat bed without using  bedrails: A lot  Moving from lying on your back to sitting on the side of a flat bed without using bedrails: A lot  Moving to and from bed to chair (including a wheelchair): Total  Standing up from a chair using your arms (e.g. wheelchair or bedside chair): Total  To walk in hospital room: Total  Climbing 3-5 steps with railing: Total  Basic Mobility - Total Score: 8    Education Documentation  Body Mechanics, taught by Rolo Dee PT at 4/24/2024  1:47 PM.  Learner: Patient  Readiness: Acceptance  Method: Explanation  Response: Verbalizes Understanding    Mobility Training, taught by Rolo Dee PT at 4/24/2024  1:47 PM.  Learner: Patient  Readiness: Acceptance  Method: Explanation  Response: Verbalizes Understanding    Education Comments  No comments found.      Encounter Problems       Encounter Problems (Active)       PT Problem       Patient will complete bed mobility with MINx1 with HOB elevated, use of bedrail   (Progressing)       Start:  04/09/24    Expected End:  04/30/24            Patient will complete bed<->chair lateral slide transfer with OD x1 using LRD as needed without acute LOB  (Progressing)       Start:  04/09/24    Expected End:  04/30/24            Patient will complete static (contact guard assist x1) and dynamic (minimal assist x1) sitting balance activities using UE support as needed in order to maintain midline without acute LOB.  (Progressing)       Start:  04/09/24    Expected End:  04/30/24            Patient will participate in BLE there-ex program in order to assist in improving strength and to assist with the completion of functional mobility tasks.  (Progressing)       Start:  04/09/24    Expected End:  04/30/24                    yes

## 2024-11-19 NOTE — REASON FOR VISIT
[FreeTextEntry1] : This is a 88 year old male preop for cataract surgery and with a known history of:\par \par 1. Abdominal Aortic aneurysm s/p recent stent graft at [facility]\par 2. CAD with stenting of the  OM1  in 2008 and CABG x 4 1997\par 3. Chest pain syndrome\par 4. Claudication\par 5. inferior wall MI 1997\par 6. HTN\par 7. PAD\par 8. 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 Transient hypotension and developed as well.\par The patient dropped his hemoglobin with a small retroperitoneal hematoma.\par \par Subsequently had implant of a [employer] MRI compatible biventricular generator ICD  and because the coronary sinus could not be cannulated he had His bundle resynchronization performed.\par \par Discharged on 8/5/20 with a slightly elevated creatinine but generally euvolemic. He has subsequently improved and his general health with increased strengths mobility and has remained free of any shortness of breath but has had some mild chronic ankle edema.
Bed/Stretcher in lowest position, wheels locked, appropriate side rails in place/Call bell, personal items and telephone in reach/Instruct patient to call for assistance before getting out of bed/chair/stretcher/Non-slip footwear applied when patient is off stretcher/[place] to call system/Physically safe environment - no spills, clutter or unnecessary equipment/Purposeful proactive rounding/Room/bathroom lighting operational, light cord in reach

## 2025-03-21 NOTE — PATIENT PROFILE ADULT - OVER THE PAST TWO WEEKS, HAVE YOU FELT LITTLE INTEREST OR PLEASURE IN DOING THINGS?
[de-identified] : 23 year old man presents today with a clinical picture that is consistent with L hamstring strain.   Plan: 1. initiate PT via [facility] and patient may RTC if needed.  2. Also educated patient on utility of topical analgesia in this setting. 
[de-identified] : 23 year old man presents today with a clinical picture that is consistent with L hamstring strain.   Plan: 1. initiate PT via [facility] and patient may RTC if needed.  2. Also educated patient on utility of topical analgesia in this setting. 
no

## 2025-04-17 NOTE — ED PROVIDER NOTE - ATTENDING CONTRIBUTION TO CARE
Called and spoke with pt, explained that she would need to call  to discuss results, as he is the physician that performed colonoscopy.  
Patient calling about pathology from 4/11/25 colonoscopy-she is reading pathology report on my chart and has questions. She has a follow up appointment with Dr Rowe on 5/5/25 but does not want to wait until then to discuss. She knows Dr Horner did the colonoscopy but would like to discuss with Dr Rowe or someone in the office.  
Rachna: I performed a face to face evaluation of this patient and performed a full history and physical examination on the patient.  I agree with the resident's history, physical examination, and plan of the patient unless otherwise noted. My brief assessment is as follows: recent AAA repair this week at [facility], hx cabg in 1997, c/o intermittent b/l thigh pain with walking since surgery. Told triage ?had sob< denying any currently. no cp/abd pain, no neuro symptoms/numbness. states pain improves with rest. From [facility], states hasn't been able to walk much. No f/c, no hx dvt/pe. elderly, non toxic, ctab, rrr, abd benign, equal pulses b/l. 2+ pitting edema b/l. no ttp or skin changes to thigh. will check labs, cta chest/abd/pel to eval for claudication causes given recent aorta repair, PT consult, covid. reassess
